# Patient Record
Sex: MALE | Employment: FULL TIME | URBAN - METROPOLITAN AREA
[De-identification: names, ages, dates, MRNs, and addresses within clinical notes are randomized per-mention and may not be internally consistent; named-entity substitution may affect disease eponyms.]

---

## 2017-08-06 DIAGNOSIS — I10 BENIGN ESSENTIAL HYPERTENSION: ICD-10-CM

## 2017-08-07 NOTE — TELEPHONE ENCOUNTER
HYZAAR 100-25 MG    Last Written Prescription Date: 6/28/16  Last Fill Quantity: 90, # refills: 3  Last Office Visit : 6/28/16  Next Clinic Visit: none     Results for JONATHAN DAVIDSON (MRN [id number]) as of 8/7/2017 11:41   Ref. Range 6/29/2016 09:15   Sodium Latest Ref Range: 133 - 144 mmol/L 138       Potassium   Date Value Ref Range Status   06/29/2016 4.3 3.4 - 5.3 mmol/L Final     Creatinine   Date Value Ref Range Status   06/29/2016 0.66 0.66 - 1.25 mg/dL Final     BP Readings from Last 3 Encounters:   06/28/16 153/88   12/29/14 (!) 170/95   10/28/13 (!) 146/93     * MD APPT. OVER DUE / LETTER SENT    * labs are overdue, route to provider  30 DAY RF

## 2017-08-08 RX ORDER — LOSARTAN POTASSIUM AND HYDROCHLOROTHIAZIDE 25; 100 MG/1; MG/1
1 TABLET ORAL DAILY
Qty: 30 TABLET | Refills: 0 | Status: SHIPPED | OUTPATIENT
Start: 2017-08-08 | End: 2017-09-15

## 2017-09-15 DIAGNOSIS — I10 BENIGN ESSENTIAL HYPERTENSION: ICD-10-CM

## 2017-09-18 NOTE — TELEPHONE ENCOUNTER
HYZAAR 100-25 MG       Last Written Prescription Date: 8/8/17  Last Fill Quantity: 30, # refills: 0  Last Office Visit : 6/28/16  Next Clinic Visit: NONE     Results for JONATHAN DAVIDSON (MRN [id number]) as of 9/18/2017 14:24   Ref. Range 6/29/2016 09:15   Sodium Latest Ref Range: 133 - 144 mmol/L 138       Potassium   Date Value Ref Range Status   06/29/2016 4.3 3.4 - 5.3 mmol/L Final     Creatinine   Date Value Ref Range Status   06/29/2016 0.66 0.66 - 1.25 mg/dL Final     BP Readings from Last 3 Encounters:   06/28/16 153/88   12/29/14 (!) 170/95   10/28/13 (!) 146/93       Routing refill request to provider for review/approval because:  OVER DUE MD APPT./LABS   LETTER + 30 DAY RF 8/8/17  NO APPT.

## 2017-09-19 RX ORDER — LOSARTAN POTASSIUM AND HYDROCHLOROTHIAZIDE 25; 100 MG/1; MG/1
1 TABLET ORAL DAILY
Qty: 30 TABLET | Refills: 0 | Status: SHIPPED | OUTPATIENT
Start: 2017-09-19 | End: 2017-09-20

## 2017-09-20 ENCOUNTER — OFFICE VISIT (OUTPATIENT)
Dept: FAMILY MEDICINE | Facility: CLINIC | Age: 67
End: 2017-09-20

## 2017-09-20 VITALS
OXYGEN SATURATION: 98 % | DIASTOLIC BLOOD PRESSURE: 84 MMHG | BODY MASS INDEX: 26.08 KG/M2 | HEIGHT: 68 IN | SYSTOLIC BLOOD PRESSURE: 124 MMHG | TEMPERATURE: 97.9 F | HEART RATE: 74 BPM | WEIGHT: 172.1 LBS | RESPIRATION RATE: 20 BRPM

## 2017-09-20 DIAGNOSIS — J31.0 OTHER CHRONIC RHINITIS: ICD-10-CM

## 2017-09-20 DIAGNOSIS — Z23 NEED FOR PROPHYLACTIC VACCINATION AND INOCULATION AGAINST INFLUENZA: ICD-10-CM

## 2017-09-20 DIAGNOSIS — I10 BENIGN ESSENTIAL HYPERTENSION: ICD-10-CM

## 2017-09-20 DIAGNOSIS — Z23 NEED FOR PROPHYLACTIC VACCINATION AGAINST STREPTOCOCCUS PNEUMONIAE (PNEUMOCOCCUS): ICD-10-CM

## 2017-09-20 DIAGNOSIS — Z00.00 ROUTINE GENERAL MEDICAL EXAMINATION AT A HEALTH CARE FACILITY: Primary | ICD-10-CM

## 2017-09-20 LAB
ALBUMIN SERPL-MCNC: 4 G/DL (ref 3.4–5)
ALP SERPL-CCNC: 72 U/L (ref 40–150)
ALT SERPL W P-5'-P-CCNC: 29 U/L (ref 0–70)
ANION GAP SERPL CALCULATED.3IONS-SCNC: 5 MMOL/L (ref 3–14)
AST SERPL W P-5'-P-CCNC: 13 U/L (ref 0–45)
BILIRUB SERPL-MCNC: 0.6 MG/DL (ref 0.2–1.3)
BUN SERPL-MCNC: 13 MG/DL (ref 7–30)
CALCIUM SERPL-MCNC: 8.9 MG/DL (ref 8.5–10.1)
CHLORIDE SERPL-SCNC: 106 MMOL/L (ref 94–109)
CHOLEST SERPL-MCNC: 143 MG/DL
CO2 SERPL-SCNC: 27 MMOL/L (ref 20–32)
CREAT SERPL-MCNC: 0.84 MG/DL (ref 0.66–1.25)
GFR SERPL CREATININE-BSD FRML MDRD: >90 ML/MIN/1.7M2
GLUCOSE SERPL-MCNC: 107 MG/DL (ref 70–99)
HDLC SERPL-MCNC: 51 MG/DL
LDLC SERPL CALC-MCNC: 67 MG/DL
NONHDLC SERPL-MCNC: 92 MG/DL
POTASSIUM SERPL-SCNC: 4.2 MMOL/L (ref 3.4–5.3)
PROT SERPL-MCNC: 7.6 G/DL (ref 6.8–8.8)
SODIUM SERPL-SCNC: 138 MMOL/L (ref 133–144)
TRIGL SERPL-MCNC: 126 MG/DL

## 2017-09-20 RX ORDER — LOSARTAN POTASSIUM AND HYDROCHLOROTHIAZIDE 25; 100 MG/1; MG/1
1 TABLET ORAL DAILY
Qty: 90 TABLET | Refills: 3 | Status: SHIPPED | OUTPATIENT
Start: 2017-09-20 | End: 2018-10-02

## 2017-09-20 ASSESSMENT — ACTIVITIES OF DAILY LIVING (ADL)
IN_THE_PAST_7_DAYS,_DID_YOU_NEED_HELP_FROM_OTHERS_TO_PERFORM_EVERYDAY_ACTIVITIES_SUCH_AS_EATING,_GETTING_DRESSED,_GROOMING,_BATHING,_WALKING,_OR_USING_THE_TOILET: N
IN_THE_PAST_7_DAYS,_DID_YOU_NEED_HELP_FROM_OTHERS_TO_TAKE_CARE_OF_THINGS_SUCH_AS_LAUNDRY_AND_HOUSEKEEPING,_BANKING,_SHOPPING,_USING_THE_TELEPHONE,_FOOD_PREPARATION,_TRANSPORTATION,_OR_TAKING_YOUR_OWN_MEDICATIONS?: N

## 2017-09-20 ASSESSMENT — ENCOUNTER SYMPTOMS
SMELL DISTURBANCE: 0
TASTE DISTURBANCE: 0
SINUS CONGESTION: 0
HOARSE VOICE: 0
NECK MASS: 0
SORE THROAT: 0
TROUBLE SWALLOWING: 0
SINUS PAIN: 0

## 2017-09-20 ASSESSMENT — PAIN SCALES - GENERAL: PAINLEVEL: NO PAIN (0)

## 2017-09-20 NOTE — NURSING NOTE
Injectable Influenza Immunization Documentation      1.  Has the patient received the information for the injectable influenza vaccine? YES    2. Is the patient 6 months of age or older? YES    3. Does the patient have any of the following contraindications?          Severe allergy to eggs? No     Severe allergic reaction to previous influenza vaccines? No     Allergy to contact lens solution/thimerosol? No     History of Guillain-[place] syndrome? No     Undergoing chemotherapy or radiation therapy?       (vaccine should be given at least 2 weeks prior or 3 weeks after)  No     Currently have moderate or severe illness? No         4.  The vaccine has been administered and the patient was instructed to wait 15 minutes before leaving the building in the event of an allergic reaction: YES      Administered Influenza High Dose and Pneumococcal Prevnar 13 (see Immunizations in Chart Review). Patient tolerated well.      Sin De Jesus CMA ([facility]) at 9:29 AM on 9/20/2017

## 2017-09-20 NOTE — PROGRESS NOTES
SUBJECTIVE:    Pt is a 67 year old male with pmh of     Patient Active Problem List   Diagnosis     High triglycerides     HTN (hypertension)       who is here for evaluation of had concerns including Physical.    Here for a physical.   Doing well.  Eats healthy, exercises about qod.   Tolerates BP meds, due for labs, working well.  Chronic nasal drg; usually managed with Rhinocort. Had a bad cold earlier this mo and it went away, but since nasal post nasal drainage worse than usual. Not on Rhinocort right now, ran out.  Colonoscopy next year.    No sinus pain or fever.    Rvwd pro/con PSA, he declines    Due for Prevnar, flu shot    H/o rectal abscess    FH: metabolic syndrome    Ex smoker. Social etoh. U prof. 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     No Known Allergies        Current Outpatient Prescriptions   Medication Sig Dispense Refill     losartan-hydrochlorothiazide (HYZAAR) 100-25 MG per tablet Take 1 tablet by mouth daily 90 tablet 3     budesonide (RHINOCORT AQUA) 32 MCG/ACT spray Spray 2 sprays into both nostrils daily 1 Bottle 3     losartan (COZAAR) 100 MG tablet Take 1 tablet (100 mg) by mouth daily MUST BE SEEN IN CLINIC FOR ANY FURTHER REFILLS  **Reminder letter sent today** 30 tablet 0     [DISCONTINUED] losartan-hydrochlorothiazide (HYZAAR) 100-25 MG per tablet Take 1 tablet by mouth daily 30 tablet 0       Social History   Substance Use Topics     Smoking status: Former Smoker     Quit date: 9/2/2000     Smokeless tobacco: Never Used     Alcohol use Yes      Comment: occ       Answers for HPI/ROS submitted by the patient on 9/20/2017   General Symptoms: No  Skin Symptoms: No  HENT Symptoms: Yes  EYE SYMPTOMS: No  HEART SYMPTOMS: No  LUNG SYMPTOMS: No  INTESTINAL SYMPTOMS: No  URINARY SYMPTOMS: No  REPRODUCTIVE SYMPTOMS: No  SKELETAL SYMPTOMS: No  BLOOD SYMPTOMS: No  NERVOUS SYSTEM SYMPTOMS: No  MENTAL HEALTH SYMPTOMS: No  Ear pain: No  Ear discharge: No  Hearing loss: No  Tinnitus: No  Nosebleeds:  "No  Congestion: No  Sinus pain: No  Trouble swallowing: No   Voice hoarseness: No  Mouth sores: No  Sore throat: No  Tooth pain: No  Gum tenderness: No  Bleeding gums: No  Change in taste: No  Change in sense of smell: No  Dry mouth: No  Hearing aid used: No  Neck lump: No      OBJECTIVE:  /84 (BP Location: Right arm, Patient Position: Chair, Cuff Size: Adult Regular)  Pulse 74  Temp 97.9  F (36.6  C) (Oral)  Resp 20  Ht 1.727 m (5' 8\")  Wt 78.1 kg (172 lb 1.6 oz)  SpO2 98%  BMI 26.17 kg/m2  GENERAL APPEARANCE: Alert, no acute distress  EYES: PERRL, EOM normal, conjunctiva and lids normal  HENT: Ears and TMs normal, oral mucosa and posterior oropharynx normal  NECK: No adenopathy,masses or thyromegaly  RESP: lungs clear to auscultation   CV: normal rate, regular rhythm, no murmur or gallop  ABDOMEN: soft, no organomegaly, masses or tenderness   (male): penis, scrotum, testes normal, no hernias  LYMPHATICS: No cervical, supraclavicular or inguinal adenopathy  MS: extremities normal, no peripheral edema  NEURO: Alert, oriented, speech and mentation normal  PSYCHE: mentation appears normal, affect and mood normal    ASSESSMENT/PLAN:    Rhinitis: resume Rhinocort, if not helping let me know  Labs, refill for htn  Shots updated  See me one year earlier prn  Alonso then    AMANDA UREÑA MD      "

## 2017-09-20 NOTE — MR AVS SNAPSHOT
After Visit Summary   9/20/2017    Ronna Collins    MRN: [id number]           Patient Information     Date Of Birth          1950        Visit Information        Provider Department      9/20/2017 8:35 AM Stewart Henry MD [facility]        Today's Diagnoses     Routine general medical examination at a health care facility    -  1    Need for prophylactic vaccination and inoculation against influenza        Need for prophylactic vaccination against Streptococcus pneumoniae (pneumococcus)        Benign essential hypertension        Other chronic rhinitis           Follow-ups after your visit        Future tests that were ordered for you today     Open Future Orders        Priority Expected Expires Ordered    Lipid panel reflex to direct LDL FASTING Routine 9/20/2017 10/4/2017 9/20/2017    Comprehensive metabolic panel Routine 9/20/2017 10/4/2017 9/20/2017            Who to contact     Please call your clinic at [phone] to:    Ask questions about your health    Make or cancel appointments    Discuss your medicines    Learn about your test results    Speak to your doctor   If you have compliments or concerns about an experience at your clinic, or if you wish to file a complaint, please contact [facility] Patient Relations at [phone] or email us at [email or web address]         Additional Information About Your Visit        AltavozharVitaPortal Information     poLight gives you secure access to your electronic health record. If you see a primary care provider, you can also send messages to your care team and make appointments. If you have questions, please call your primary care clinic.  If you do not have a primary care provider, please call [phone] and they will assist you.      poLight is an electronic gateway that provides easy, online access to your medical records. With poLight, you can request a clinic appointment, read your  "test results, renew a prescription or communicate with your care team.     To access your existing account, please contact your [facility] or call [phone] for assistance.        Care EveryWhere ID     This is your Care EveryWhere ID. This could be used by other organizations to access your [facility] medical records  [id number]        Your Vitals Were     Pulse Temperature Respirations Height Pulse Oximetry BMI (Body Mass Index)    74 97.9  F (36.6  C) (Oral) 20 1.727 m (5' 8\") 98% 26.17 kg/m2       Blood Pressure from Last 3 Encounters:   09/20/17 124/84   06/28/16 153/88   12/29/14 (!) 170/95    Weight from Last 3 Encounters:   09/20/17 78.1 kg (172 lb 1.6 oz)   06/28/16 79.6 kg (175 lb 8 oz)   12/29/14 83 kg (183 lb)              We Performed the Following     FLU VACCINE, INCREASED ANTIGEN, PRESV FREE, AGE 65+ [85382]     Pneumococcal vaccine 13 valent PCV13 IM (Prevnar) [50248]          Today's Medication Changes          These changes are accurate as of: 9/20/17  9:22 AM.  If you have any questions, ask your nurse or doctor.               Start taking these medicines.        Dose/Directions    budesonide 32 MCG/ACT spray   Commonly known as:  RHINOCORT AQUA   Used for:  Other chronic rhinitis   Started by:  Stewart Henry MD        Dose:  2 spray   Spray 2 sprays into both nostrils daily   Quantity:  1 Bottle   Refills:  3            Where to get your medicines      These medications were sent to [facility] [place], [place] - [address]  [address], [facility] [address]    Hours:  TRANSPLANT PHONE NUMBER [phone] Phone:  [phone]     budesonide 32 MCG/ACT spray         These medications were sent to [facility] [address] [place] [address] - [address] [place] [address], [facility] [address]     Phone:  [phone]     losartan-hydrochlorothiazide 100-25 MG " per tablet                Primary Care Provider Office Phone # Fax #    Stewart Henry [phone]       [address] [address]        Equal Access to Services     ARIELLE GRANADO : Hadii aad ku hadanno Soautumnali, waaxda luqadaha, qaybta kaalmada adebrianyada, sedrick mathews laPiedadjacque st. So [facility] [phone].    ATENCIÓN: Si habla español, tiene a luevano disposición servicios gratuitos de asistencia lingüística. Llame al [phone].    We comply with applicable federal civil rights laws and [place] laws. We do not discriminate on the basis of race, color, national origin, age, disability sex, sexual orientation or gender identity.            Thank you!     Thank you for choosing [facility]  for your care. Our goal is always to provide you with excellent care. Hearing back from our patients is one way we can continue to improve our services. Please take a few minutes to complete the written survey that you may receive in the mail after your visit with us. Thank you!             Your Updated Medication List - Protect others around you: Learn how to safely use, store and throw away your medicines at www.disposemymeds.org.          This list is accurate as of: 9/20/17  9:22 AM.  Always use your most recent med list.                   Brand Name Dispense Instructions for use Diagnosis    budesonide 32 MCG/ACT spray    RHINOCORT AQUA    1 Bottle    Spray 2 sprays into both nostrils daily    Other chronic rhinitis       losartan 100 MG tablet    COZAAR    30 tablet    Take 1 tablet (100 mg) by mouth daily MUST BE SEEN IN CLINIC FOR ANY FURTHER REFILLS  **Reminder letter sent today**    Benign essential hypertension       losartan-hydrochlorothiazide 100-25 MG per tablet    HYZAAR    90 tablet    Take 1 tablet by mouth daily    Benign essential hypertension

## 2017-09-20 NOTE — NURSING NOTE
Chief Complaint   Patient presents with     Physical     Patient is here for physical exam.     Sin De Jesus CMA ([facility]) at 8:39 AM on 9/20/2017

## 2017-10-18 DIAGNOSIS — I10 BENIGN ESSENTIAL HYPERTENSION: ICD-10-CM

## 2017-10-18 RX ORDER — LOSARTAN POTASSIUM AND HYDROCHLOROTHIAZIDE 25; 100 MG/1; MG/1
TABLET ORAL
Qty: 30 TABLET | Refills: 0 | OUTPATIENT
Start: 2017-10-18

## 2018-10-02 ENCOUNTER — TELEPHONE (OUTPATIENT)
Dept: GASTROENTEROLOGY | Facility: CLINIC | Age: 68
End: 2018-10-02

## 2018-10-02 ENCOUNTER — OFFICE VISIT (OUTPATIENT)
Dept: FAMILY MEDICINE | Facility: CLINIC | Age: 68
End: 2018-10-02
Payer: COMMERCIAL

## 2018-10-02 VITALS
DIASTOLIC BLOOD PRESSURE: 87 MMHG | SYSTOLIC BLOOD PRESSURE: 134 MMHG | HEART RATE: 78 BPM | WEIGHT: 178.4 LBS | OXYGEN SATURATION: 98 % | BODY MASS INDEX: 27.13 KG/M2

## 2018-10-02 DIAGNOSIS — I10 BENIGN ESSENTIAL HYPERTENSION: ICD-10-CM

## 2018-10-02 DIAGNOSIS — I10 BENIGN ESSENTIAL HYPERTENSION: Primary | ICD-10-CM

## 2018-10-02 DIAGNOSIS — Z00.00 HEALTH CARE MAINTENANCE: ICD-10-CM

## 2018-10-02 DIAGNOSIS — H61.23 EXCESSIVE EAR WAX, BILATERAL: ICD-10-CM

## 2018-10-02 LAB
ALBUMIN SERPL-MCNC: 4.2 G/DL (ref 3.4–5)
ALP SERPL-CCNC: 73 U/L (ref 40–150)
ALT SERPL W P-5'-P-CCNC: 41 U/L (ref 0–70)
ANION GAP SERPL CALCULATED.3IONS-SCNC: 6 MMOL/L (ref 3–14)
AST SERPL W P-5'-P-CCNC: 23 U/L (ref 0–45)
BILIRUB SERPL-MCNC: 0.6 MG/DL (ref 0.2–1.3)
BUN SERPL-MCNC: 11 MG/DL (ref 7–30)
CALCIUM SERPL-MCNC: 9.1 MG/DL (ref 8.5–10.1)
CHLORIDE SERPL-SCNC: 106 MMOL/L (ref 94–109)
CHOLEST SERPL-MCNC: 132 MG/DL
CO2 SERPL-SCNC: 27 MMOL/L (ref 20–32)
CREAT SERPL-MCNC: 0.76 MG/DL (ref 0.66–1.25)
GFR SERPL CREATININE-BSD FRML MDRD: >90 ML/MIN/1.7M2
GLUCOSE SERPL-MCNC: 115 MG/DL (ref 70–99)
HDLC SERPL-MCNC: 47 MG/DL
LDLC SERPL CALC-MCNC: 44 MG/DL
NONHDLC SERPL-MCNC: 84 MG/DL
POTASSIUM SERPL-SCNC: 4.4 MMOL/L (ref 3.4–5.3)
PROT SERPL-MCNC: 7.9 G/DL (ref 6.8–8.8)
SODIUM SERPL-SCNC: 139 MMOL/L (ref 133–144)
TRIGL SERPL-MCNC: 200 MG/DL

## 2018-10-02 RX ORDER — LOSARTAN POTASSIUM AND HYDROCHLOROTHIAZIDE 25; 100 MG/1; MG/1
1 TABLET ORAL DAILY
Qty: 90 TABLET | Refills: 3 | Status: SHIPPED | OUTPATIENT
Start: 2018-10-02 | End: 2019-10-07

## 2018-10-02 ASSESSMENT — PAIN SCALES - GENERAL: PAINLEVEL: NO PAIN (0)

## 2018-10-02 ASSESSMENT — ACTIVITIES OF DAILY LIVING (ADL)
IN_THE_PAST_7_DAYS,_DID_YOU_NEED_HELP_FROM_OTHERS_TO_TAKE_CARE_OF_THINGS_SUCH_AS_LAUNDRY_AND_HOUSEKEEPING,_BANKING,_SHOPPING,_USING_THE_TELEPHONE,_FOOD_PREPARATION,_TRANSPORTATION,_OR_TAKING_YOUR_OWN_MEDICATIONS?: N
IN_THE_PAST_7_DAYS,_DID_YOU_NEED_HELP_FROM_OTHERS_TO_PERFORM_EVERYDAY_ACTIVITIES_SUCH_AS_EATING,_GETTING_DRESSED,_GROOMING,_BATHING,_WALKING,_OR_USING_THE_TOILET: N

## 2018-10-02 NOTE — PATIENT INSTRUCTIONS
Primary Care Center Medication Refill Request Information:  * Please contact your pharmacy regarding ANY request for medication refills.  ** [facility] Prescription Fax = [phone]  * Please allow 3 business days for routine medication refills.  * Please allow 5 business days for controlled substance medication refills.     Primary [facility] Test Result notification information:  *You will be notified with in 7-10 days of your appointment day regarding the results of your test.  If you are on MyChart you will be notified as soon as the provider has reviewed the results and signed off on them.    [facility]: [phone]       Colonoscopy or Upper GI Endoscopy  [phone]

## 2018-10-02 NOTE — MR AVS SNAPSHOT
After Visit Summary   10/2/2018    Ronna Collins    MRN: [id number]           Patient Information     Date Of Birth          1950        Visit Information        Provider Department      10/2/2018 8:00 AM Stewart Henry MD [facility]        Today's Diagnoses     Benign essential hypertension    -  1    Health care maintenance          Care Instructions    Primary Care Center Medication Refill Request Information:  * Please contact your pharmacy regarding ANY request for medication refills.  ** [facility] Prescription Fax = [phone]  * Please allow 3 business days for routine medication refills.  * Please allow 5 business days for controlled substance medication refills.     Primary Care Center Test Result notification information:  *You will be notified with in 7-10 days of your appointment day regarding the results of your test.  If you are on MyChart you will be notified as soon as the provider has reviewed the results and signed off on them.    Primary Care Center: [phone]       Colonoscopy or Upper GI Endoscopy  [phone]            Follow-ups after your visit        Additional Services     GASTROENTEROLOGY ADULT REF PROCEDURE ONLY       Last Lab Result: Creatinine (mg/dL)       Date                     Value                 09/20/2017               0.84             ----------  Body mass index is 27.13 kg/(m^2).     Needed:  No  Language:  English    Patient will be contacted to schedule procedure.     Please be aware that coverage of these services is subject to the terms and limitations of your health insurance plan.  Call member services at your health plan with any benefit or coverage questions.  Any procedures must be performed at a [facility] facility OR coordinated by your clinic's referral office.    Please bring the following with you to your appointment:    (1) Any X-Rays, CTs or MRIs which have been performed.  Contact the facility where  they were done to arrange for  prior to your scheduled appointment.    (2) List of current medications   (3) This referral request   (4) Any documents/labs given to you for this referral                  Your next 10 appointments already scheduled     Oct 02, 2018  9:15 AM CDT   LAB with  LAB    Health Lab ([facility])    [address]  1st Floor  [facility] [address] [phone]           Please do not eat 10-12 hours before your appointment if you are coming in fasting for labs on lipids, cholesterol, or glucose (sugar). This does not apply to pregnant women. Water, hot tea and black coffee (with nothing added) are okay. Do not drink other fluids, diet soda or chew gum.              Future tests that were ordered for you today     Open Future Orders        Priority Expected Expires Ordered    Lipid panel reflex to direct LDL Fasting Routine 10/2/2018 10/16/2018 10/2/2018    Comprehensive metabolic panel Routine 10/2/2018 10/16/2018 10/2/2018            Who to contact     Please call your clinic at [phone] to:    Ask questions about your health    Make or cancel appointments    Discuss your medicines    Learn about your test results    Speak to your doctor            Additional Information About Your Visit        CinaMaker Information     CinaMaker gives you secure access to your electronic health record. If you see a primary care provider, you can also send messages to your care team and make appointments. If you have questions, please call your primary care clinic.  If you do not have a primary care provider, please call [phone] and they will assist you.      CinaMaker is an electronic gateway that provides easy, online access to your medical records. With CinaMaker, you can request a clinic appointment, read your test results, renew a prescription or communicate with your care team.     To access your existing account, please contact your [facility]  [place] [facility] or call [phone] for assistance.        Care EveryWhere ID     This is your Care EveryWhere ID. This could be used by other organizations to access your [facility] medical records  [id number]        Your Vitals Were     Pulse Pulse Oximetry BMI (Body Mass Index)             78 98% 27.13 kg/m2          Blood Pressure from Last 3 Encounters:   10/02/18 134/87   09/20/17 124/84   06/28/16 153/88    Weight from Last 3 Encounters:   10/02/18 80.9 kg (178 lb 6.4 oz)   09/20/17 78.1 kg (172 lb 1.6 oz)   06/28/16 79.6 kg (175 lb 8 oz)              We Performed the Following     FLU VACCINE HIGH DOSE PRESERVATIVE FREE, AGE =>65 YR     GASTROENTEROLOGY ADULT REF PROCEDURE ONLY     Pneumococcal vaccine 23 valent PPSV23  (Pneumovax) [63131]          Where to get your medicines      These medications were sent to [facility] [address] [place] [address] [address] [place] [address] [address], [facility] [address]     Phone:  [phone]     losartan-hydrochlorothiazide 100-25 MG per tablet          Primary Care Provider Office Phone # Fax #    Stewart Henry [phone]       [address] [address]        Equal Access to Services     JORGE ALBERTO [facility] AH: Hadii ayanna eliaso Soluz, waaxda luqadaha, qaybta kaalmarick booker, sedrick pillai . So [facility] [phone].    ATENCIÓN: Si habla español, tiene a luevano disposición servicios gratuitos de asistencia lingüística. Llame al [phone].    We comply with applicable federal civil rights laws and [place] laws. We do not discriminate on the basis of race, color, national origin, age, disability, sex, sexual orientation, or gender identity.            Thank you!     Thank you for choosing [facility]  for your care. Our goal is always to provide you with excellent care. Hearing back from our patients is one way we can continue to  improve our services. Please take a few minutes to complete the written survey that you may receive in the mail after your visit with us. Thank you!             Your Updated Medication List - Protect others around you: Learn how to safely use, store and throw away your medicines at www.disposemymeds.org.          This list is accurate as of 10/2/18  8:42 AM.  Always use your most recent med list.                   Brand Name Dispense Instructions for use Diagnosis    budesonide 32 MCG/ACT spray    RHINOCORT AQUA    1 Bottle    Spray 2 sprays into both nostrils daily    Other chronic rhinitis       losartan 100 MG tablet    COZAAR    30 tablet    Take 1 tablet (100 mg) by mouth daily MUST BE SEEN IN CLINIC FOR ANY FURTHER REFILLS  **Reminder letter sent today**    Benign essential hypertension       losartan-hydrochlorothiazide 100-25 MG per tablet    HYZAAR    90 tablet    Take 1 tablet by mouth daily    Benign essential hypertension

## 2018-10-02 NOTE — NURSING NOTE
Chief Complaint   Patient presents with     Physical     annual physical       JOSE Villagomez 8:10 AM on 10/2/2018.

## 2018-10-02 NOTE — PROGRESS NOTES
[facility]  Primary Care Center   Stewart Henry MD  10/02/2018      Chief Complaint:   Physical       History of Present Illness:   Ronna Collins is a 68 year old male with a history of high triglycerides and hypertension who presents for evaluation of a physical.    Ronna has not been into the ER or hospital since our last visit a year ago. He notes that he does check his blood pressure at home regularly which ranges about 125/83. He is currently fasting and is open to having blood work drawn. He does exercise about once a week on the treadmill. He acknowledges that he could increase his amount of weekly physical activity.    Other concerns discussed:  -Ronna is interested in the new shingles vaccine. He would like his flu shot today.  -Ronna is due for a colonoscopy. His most recent one was in 2008. He has no family history of colon issues and has never had polyps pulled in past colonoscopies.     Review of Systems:   Pertinent items are noted in HPI, remainder of complete ROS is negative.      Active Medications:      budesonide (RHINOCORT AQUA) 32 MCG/ACT spray, Spray 2 sprays into both nostrils daily, Disp: 1 Bottle, Rfl: 3     losartan (COZAAR) 100 MG tablet, Take 1 tablet (100 mg) by mouth daily MUST BE SEEN IN CLINIC FOR ANY FURTHER REFILLS  **Reminder letter sent today**, Disp: 30 tablet, Rfl: 0     losartan-hydrochlorothiazide (HYZAAR) 100-25 MG per tablet, Take 1 tablet by mouth daily, Disp: 90 tablet, Rfl: 3      Allergies:   Review of patient's allergies indicates no known allergies.      Past Medical History:  High triglycerides  Hypertension     Past Surgical History:  No past surgical history.    Family History:   No pertinent family medical history.     Social History:   The patient was alone.  Smoking Status: Former smoker, Quit: 9/2/2000   Smokeless Tobacco: never used   Alcohol Use: yes, occ   Marital Status:      Physical Exam:   /87 (BP Location: Right  arm, Patient Position: Sitting, Cuff Size: Adult Regular)  Pulse 78  Wt 80.9 kg (178 lb 6.4 oz)  SpO2 98%  BMI 27.13 kg/m2   Constitutional: Alert. In no distress.  Head: Normocephalic. No masses, lesions, tenderness or abnormalities.  ENT: No neck nodes or sinus tenderness. Ears plugged with cerumen bilaterally  Cardiovascular: RRR. No murmurs, clicks, gallops, or rub.  Respiratory: Clear to auscultation bilaterally, no wheezes or crackles.  Gastrointestinal: Abdomen soft. Non-tender. BS normal. No masses or organomegaly.  Musculoskeletal: Extremities normal. No gross deformities noted. Normal muscle tone.  Skin: No suspicious lesions. No rashes.  Neurologic: Gait normal. Reflexes normal and symmetric. Sensation grossly WNL.  Psychiatric: Mentation appears normal. Normal affect.   Hematologic/Lymphatic/Immunologic: Normal cervical lymph nodes.   Penis/testicle/Hernia Exam: All normal     Assessment and Plan:  Benign essential hypertension  Due for labs today. Patient is fasting. Refill of Hyzaar provided. Medication works well and condition is controlled.  - Lipid panel reflex to direct LDL Fasting  - Comprehensive metabolic panel  - losartan-hydrochlorothiazide (HYZAAR) 100-25 MG per tablet  Dispense: 90 tablet; Refill: 3    Health care maintenance  Flu shot was offered and administered. Pneumococcal vaccine administered to update immunization records. Patient due for colonoscopy, last being in 2008. Referral provided, will schedule for fall. Mentioned new shingles shot and provided a handout. He will check with insurance to see if it is covered.  - FLU VACCINE HIGH DOSE PRESERVATIVE FREE, AGE =>65 YR  - Pneumococcal vaccine 23 valent PPSV23  (Pneumovax) [89417]  - GASTROENTEROLOGY ADULT REF PROCEDURE ONLY    Excessive ear wax, bilateral  Upon further exam, patient has excess cerumen build up. Ear washout provided.  - REMOVE IMPACTED CERUMEN     Follow-up: Return in 1 year for physical     Discussed PSA  pro/con, he declines        Scribe Disclosure:   I, Neri Dubon, am serving as a scribe to document services personally performed by Stewart Henry MD at this visit, based upon the provider's statements to me. All documentation has been reviewed by the aforementioned provider prior to being entered into the official medical record.     Portions of this medical record were completed by a scribe. UPON MY REVIEW AND AUTHENTICATION BY ELECTRONIC SIGNATURE, this confirms (a) I performed the applicable clinical services, and (b) the record is accurate.   Stewart Henry MD

## 2019-06-19 ENCOUNTER — OFFICE VISIT (OUTPATIENT)
Dept: INTERNAL MEDICINE | Facility: CLINIC | Age: 69
End: 2019-06-19
Payer: COMMERCIAL

## 2019-06-19 ENCOUNTER — ANCILLARY PROCEDURE (OUTPATIENT)
Dept: GENERAL RADIOLOGY | Facility: CLINIC | Age: 69
End: 2019-06-19
Attending: NURSE PRACTITIONER
Payer: COMMERCIAL

## 2019-06-19 VITALS
SYSTOLIC BLOOD PRESSURE: 136 MMHG | HEART RATE: 64 BPM | TEMPERATURE: 98.4 F | WEIGHT: 175.9 LBS | BODY MASS INDEX: 26.75 KG/M2 | OXYGEN SATURATION: 98 % | DIASTOLIC BLOOD PRESSURE: 86 MMHG

## 2019-06-19 DIAGNOSIS — M25.562 LEFT KNEE PAIN, UNSPECIFIED CHRONICITY: ICD-10-CM

## 2019-06-19 DIAGNOSIS — R21 RASH AND NONSPECIFIC SKIN ERUPTION: Primary | ICD-10-CM

## 2019-06-19 RX ORDER — TRIAMCINOLONE ACETONIDE 1 MG/G
CREAM TOPICAL 2 TIMES DAILY
Qty: 80 G | Refills: 1 | Status: SHIPPED | OUTPATIENT
Start: 2019-06-19 | End: 2021-06-04

## 2019-06-19 RX ORDER — NAPROXEN 500 MG/1
500 TABLET ORAL 2 TIMES DAILY PRN
Qty: 30 TABLET | Refills: 1 | Status: SHIPPED | OUTPATIENT
Start: 2019-06-19 | End: 2021-06-04

## 2019-06-19 ASSESSMENT — PAIN SCALES - GENERAL: PAINLEVEL: MILD PAIN (3)

## 2019-06-19 NOTE — NURSING NOTE
Chief Complaint   Patient presents with     Musculoskeletal Problem     pt states he is having pain in left knee, trouble climbing stairs and walking fast     Derm Problem     pt states he is itchy all over body, especially back and legs       Anne Jackson CMA at 8:47 AM on 6/19/2019.

## 2019-06-19 NOTE — PATIENT INSTRUCTIONS
[facility] Medication Refill Request Information:  * Please contact your pharmacy regarding ANY request for medication refills.  ** [facility] Prescription Fax = [phone]  * Please allow 3 business days for routine medication refills.  * Please allow 5 business days for controlled substance medication refills.     [facility] Test Result notification information:  *You will be notified with in 7-10 days of your appointment day regarding the results of your test.  If you are on MyChart you will be notified as soon as the provider has reviewed the results and signed off on them.    [facility]: [phone]
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
## 2019-06-21 ENCOUNTER — OFFICE VISIT (OUTPATIENT)
Dept: DERMATOLOGY | Facility: CLINIC | Age: 69
End: 2019-06-21
Attending: NURSE PRACTITIONER
Payer: COMMERCIAL

## 2019-06-21 DIAGNOSIS — L30.0 NUMMULAR DERMATITIS: Primary | ICD-10-CM

## 2019-06-21 DIAGNOSIS — L01.00 IMPETIGO: ICD-10-CM

## 2019-06-21 RX ORDER — MOMETASONE FUROATE 1 MG/G
OINTMENT TOPICAL
Qty: 45 G | Refills: 3 | Status: SHIPPED | OUTPATIENT
Start: 2019-06-21 | End: 2021-06-04

## 2019-06-21 ASSESSMENT — PAIN SCALES - GENERAL: PAINLEVEL: NO PAIN (0)

## 2019-06-21 NOTE — LETTER
6/21/2019       RE: Ronna Collins  [address]  [facility] [address]     Dear Colleague,    Thank you for referring your patient, Ronna Collins, to the [facility]MAT[facility] at [facility]. Please see a copy of my visit note below.    Dermatology Consult Note    CHIEF COMPLAINT:  Rash.      DERMATOLOGY PROBLEM LIST:   1.  Nummular dermatitis with impetiginization.    -Current treatment:  Mometasone 0.1% ointment b.i.d., gentle skin care with frequent moisturizer application, povidone iodine wash, aerobic swab pending 06/21/2019.   2.  Atopic dermatitis with chronic sinusitis and cat/dog allergy on prick testing 06/21/2019.      HISTORY OF PRESENT ILLNESS:  Mr. Ronna Collins is a pleasant 68-year-old man who is seen today in consultation from Arabella Hanna for evaluation of a rash on the back and lower extremities.  On discussion with the patient and review of Epic, the patient has had a rash on the low back and more recently the bilateral lower extremities for the last 3 months.  He notes that these spots initially started on the back and then spread distally.  He notes round areas that are quite pruritic and he scratches at these frequently.  He is not using any moisturizer.  He uses a Dove bar soap.  He recently was prescribed triamcinolone 0.1% cream that he has used a couple times since starting a couple of days ago.  This has led to some improvement and the best improvement that he has had to date.  He denies any issues with eczema or psoriasis previously and denies rashes in childhood.  He has had some difficulty with chronic sinusitis and has never undergone prick/poke testing.  No other major concerns at this point.  No fevers, chills, sweats.      REVIEW OF SYSTEMS:  As per HPI.      PAST MEDICAL HISTORY:  Negative for eczema or psoriasis.      FAMILY HISTORY:  Negative for eczema or psoriasis.      SOCIAL HISTORY:  The patient's daughter is an ENT  physician.     Current Outpatient Medications   Medication     budesonide (RHINOCORT AQUA) 32 MCG/ACT spray     losartan-hydrochlorothiazide (HYZAAR) 100-25 MG per tablet     mometasone (ELOCON) 0.1 % external ointment     naproxen (NAPROSYN) 500 MG tablet     povidone-iodine (BETADINE) 7.5 % SOLN topical solution     triamcinolone (KENALOG) 0.1 % external cream     losartan (COZAAR) 100 MG tablet     No current facility-administered medications for this visit.        No Known Allergies      PHYSICAL EXAMINATION:  There were no vitals taken for this visit.     GENERAL:  Well appearing, well nourished, in no acute distress, apparent stated age, cooperative with exam.   SKIN:  A full body skin examination was performed today, including the head, neck, chest, abdomen, back, lower extremities, palms, soles, buttocks, nails, sparing the genitalia.  The patient has well-marginated, eczematous, nummular pink plaques over the central lower back, abdomen and bilaterally over the lower extremities with mild overlying crust.  He has a background of xerosis, benign-appearing, waxy, stuck-on, airwq-wv-bdapdqdka papules over the areas evaluated today.      ASSESSMENT AND PLAN:   1.  Nummular dermatitis with mild impetiginization.  Reviewed today in detail the etiology, pathophysiology and treatment options for this condition.  Suspect that the triamcinolone cream will be not quite potent enough to take care of this rash for him.  A multi-pronged approach to treatment was reviewed.   -Start gentle skin care with twice daily application of a gentle moisturizer like Vanicream or CeraVe.   -Start mometasone 0.1% ointment b.i.d. p.r.n. to the affected sites for the next 2-3 weeks until the lesions have resolved on the trunk and extremities.   -Start povidone iodine wash once to twice weekly for antimicrobial effect.   --> in  Bacteriology swab few staphylococcus aureus see! PVP Iodine right treatment (if not improved, then do nasal  swab and maybe treat nasal colonisation)    2.  Atopic dermatitis with systemic allergy to cats/dogs.  Given a history of chronic sinusitis and no previous allergy testing, we did perform atopic type 1 hypersensitivity testing as noted below.  Of note, the patient had a severe reaction to cat and a less severe, but positive reaction to dog; see below.  This confirms the suspicion of atopy, although does not necessarily explain his tendency towards chronic sinusitis as his exposure to pets is absent/quite minimal at this point.      Follow up in 6-8 weeks or sooner as needed.      Patient was seen and discussed with Dr. Jeremy Varela, who was staff for this encounter.     This note was dictated and edited by MD Ryan Garsia MD  PGY4 Dermatology  [phone]        Dictated by Tomás Rios MD   Resident     Staff Physician Comments:  I saw and evaluated the patient with the resident and I agree with the assessment and plan as documented in the resident's note. I was present and evaluated the prick tests     Jeremy Varela MD  Professor  Head of Dermato-Allergy Division  Department of Dermatology  [facility]    I spent a total of 20 minutes face to face with Ronna Collins during today s office visit. Over 50% of this time was spent counseling the patient and/or coordinating care. Please see Assessment and Plan for details.      Aerobic swab performed and pending.     Order documented by: Khadra Lin LPN  Physician:    Date/time of application:06/21/19   Localization of application: arm   Atopy Screen  No Substance Readings (15min) Evaluation   POS Histamine 1mg/ml +/++    NEG NaCl 0.9% -      No Substance Readings (15min) Evaluation   1 Alternaria alternata (tenuis)  -    2 Cladosporium herbarum -    3 Aspergillus fumigatus -    4 Penicillium notatum -    5 Dermatophagoides pteronyssinus -    6 Dermatophagoides farinae -    7 Dog  epithelium (canis spp) +    8 Cat hair (mary ellen catus) +++    9 Cockroach   (Blatella americana & germanica) -    10 Grass mix midwest   (Marie, Orchard, Redtop, Marcus) -    11 Celestino grass (sorghum halepense) -    12 Weed mix   (common Cocklebur, Lamb s quarters, rough redroot Pigweed, Dock/Sorrel) -    13 Mugwort (artemisia vulgare) -    14 Ragweed giant/short (ambrosia spp) -    15 English Plantain (plantago lanceolata) -    16 Tree mix 1 (Pecan, Maple BHR, Oak RVW, american [place], black [place]) NA    17 Red cedar (juniperus virginia) -    18 Tree mix 2   (white [place], river/red Birch, black [place], common [place], american Elm) NA    19 Box elder/Maple mix (acer spp) -    20 [place] shagbark (carya ovata) -       -    --> patient is atopic with strong reaction to cat epithelia and less to dog. Patient has no cat and therefore not sure if clinically relevant for the recurrent sinusitis.

## 2019-06-21 NOTE — NURSING NOTE
Chief Complaint   Patient presents with     Derm Problem     Patient is here today for a rash on back and legs.      Nidhi Kauffman CMA

## 2019-06-21 NOTE — PATIENT INSTRUCTIONS
Nummular dermatitis:   -Start daily moisturizer (as below).   -Start mometasone ointment twice daily to the raised itchy rash for 2-3 weeks as needed.   -Use iodine soap 1-2x weekly in the shower; apply moistuizer immediately after bathing.   -bacterial culture performed today.       For sinus difficulties:   -Skin prick testing for house dust mites and molds (atopy screen).     Dry Skin    What is dry skin?    Common skin problem    Can be worse during the winter     Affects all ages    Occurs in people with or without other skin problems    What does it look like?    Fine lines in the skin become more visible     Rough feeling skin     Flaky skin    Most common on the arms and legs    Skin can become cracked, especially on the hands and feet    What are some problems caused by dry skin?     Itching    Rubbing or scratching can cause thickened, rough skin patches    Cracks in skin can be painful    Red, itchy, scaly skin (called eczema) can occur    Yellow crusting or pus could be signs of an infection    What causes dry skin?    A lack of water in the top layer of the skin    Too much soapy water,  hot water, or harsh chemicals    Aging and sun damage    How do I treat dry skin?    Shower or bathe daily for under ten minutes with lukewarm water and mild soap.    Pat yourself dry with a towel gently and leave your skin slightly damp.    Use moisturizing cream or ointment right away.  Avoid lotions.    What kind of mild soap should I be using?    Camay , Dove , Tone , Neutrogena , Purpose , or Oil of Olay     A non-detergent cleanser, like Cetaphil , can be used.    What should I stay away from?    Scented soaps     Bath oils    What moisturizers should I be using?    Cetaphil Cream,CeraVe Cream, Vanicream, Aquaphilic, Eucerin, Aquaphor, or Vaseline     Always apply after showering or bathing.    Reapply throughout the day, if possible.    If dry skin affects your hands, always reapply after handwashing.    What  else should I know?    Using a humidifier during winter months may help.    If dry skin gets worse or if eczema develops, a steroid cream may be needed.      Order documented by: Khadra Lin LPN  Physician:    Date/time of application:06/21/19   Localization of application: arm   Atopy Screen  No Substance Readings (15min) Evaluation   POS Histamine 1mg/ml +/++     NEG NaCl 0.9% -        No Substance Readings (15min) Evaluation   1 Alternaria alternata (tenuis)  -     2 Cladosporium herbarum -     3 Aspergillus fumigatus -     4 Penicillium notatum -     5 Dermatophagoides pteronyssinus -     6 Dermatophagoides farinae -     7 Dog epithelium (canis spp) +     8 Cat hair (mary ellen catus) +++     9 Cockroach   (Blatella americana & germanica) -     10 Grass mix midwest   (Marie, Orchard, Redtop, Marcus) -     11 Celestino grass (sorghum halepense) -     12 Weed mix   (common Cocklebur, Lamb s quarters, rough redroot Pigweed, Dock/Sorrel) -     13 Mugwort (artemisia vulgare) -     14 Ragweed giant/short (ambrosia spp) -     15 English Plantain (plantago lanceolata) -     16 Tree mix 1 (Pecan, Maple BHR, Oak RVW, american [place], black [place]) NA     17 Red cedar (juniperus virginia) -     18 Tree mix 2   (white [place], river/red Birch, black [place], common [place], american Elm) NA     19 Box elder/Maple mix (acer spp) -     20 [place] shagbark (carya ovata) -         -     --> patient is atopic with strong reaction to cat epithelia and less to dog. Patient has no cat and therefore not sure if clinically relevant for the recurrent sinusitis.

## 2019-06-21 NOTE — PROGRESS NOTES
Dermatology Consult Note    CHIEF COMPLAINT:  Rash.      DERMATOLOGY PROBLEM LIST:   1.  Nummular dermatitis with impetiginization.    -Current treatment:  Mometasone 0.1% ointment b.i.d., gentle skin care with frequent moisturizer application, povidone iodine wash, aerobic swab pending 06/21/2019.   2.  Atopic dermatitis with chronic sinusitis and cat/dog allergy on prick testing 06/21/2019.      HISTORY OF PRESENT ILLNESS:  Mr. Ronna Collins is a pleasant 68-year-old man who is seen today in consultation from Arabella Hanna for evaluation of a rash on the back and lower extremities.  On discussion with the patient and review of Epic, the patient has had a rash on the low back and more recently the bilateral lower extremities for the last 3 months.  He notes that these spots initially started on the back and then spread distally.  He notes round areas that are quite pruritic and he scratches at these frequently.  He is not using any moisturizer.  He uses a Dove bar soap.  He recently was prescribed triamcinolone 0.1% cream that he has used a couple times since starting a couple of days ago.  This has led to some improvement and the best improvement that he has had to date.  He denies any issues with eczema or psoriasis previously and denies rashes in childhood.  He has had some difficulty with chronic sinusitis and has never undergone prick/poke testing.  No other major concerns at this point.  No fevers, chills, sweats.      REVIEW OF SYSTEMS:  As per HPI.      PAST MEDICAL HISTORY:  Negative for eczema or psoriasis.      FAMILY HISTORY:  Negative for eczema or psoriasis.      SOCIAL HISTORY:  The patient's daughter is an ENT physician.     Current Outpatient Medications   Medication     budesonide (RHINOCORT AQUA) 32 MCG/ACT spray     losartan-hydrochlorothiazide (HYZAAR) 100-25 MG per tablet     mometasone (ELOCON) 0.1 % external ointment     naproxen (NAPROSYN) 500 MG tablet     povidone-iodine (BETADINE)  7.5 % SOLN topical solution     triamcinolone (KENALOG) 0.1 % external cream     losartan (COZAAR) 100 MG tablet     No current facility-administered medications for this visit.        No Known Allergies      PHYSICAL EXAMINATION:  There were no vitals taken for this visit.     GENERAL:  Well appearing, well nourished, in no acute distress, apparent stated age, cooperative with exam.   SKIN:  A full body skin examination was performed today, including the head, neck, chest, abdomen, back, lower extremities, palms, soles, buttocks, nails, sparing the genitalia.  The patient has well-marginated, eczematous, nummular pink plaques over the central lower back, abdomen and bilaterally over the lower extremities with mild overlying crust.  He has a background of xerosis, benign-appearing, waxy, stuck-on, xjyln-mx-ciuyrlzlm papules over the areas evaluated today.      ASSESSMENT AND PLAN:   1.  Nummular dermatitis with mild impetiginization.  Reviewed today in detail the etiology, pathophysiology and treatment options for this condition.  Suspect that the triamcinolone cream will be not quite potent enough to take care of this rash for him.  A multi-pronged approach to treatment was reviewed.   -Start gentle skin care with twice daily application of a gentle moisturizer like Vanicream or CeraVe.   -Start mometasone 0.1% ointment b.i.d. p.r.n. to the affected sites for the next 2-3 weeks until the lesions have resolved on the trunk and extremities.   -Start povidone iodine wash once to twice weekly for antimicrobial effect.   --> in  Bacteriology swab few staphylococcus aureus see! PVP Iodine right treatment (if not improved, then do nasal swab and maybe treat nasal colonisation)    2.  Atopic dermatitis with systemic allergy to cats/dogs.  Given a history of chronic sinusitis and no previous allergy testing, we did perform atopic type 1 hypersensitivity testing as noted below.  Of note, the patient had a severe reaction to  cat and a less severe, but positive reaction to dog; see below.  This confirms the suspicion of atopy, although does not necessarily explain his tendency towards chronic sinusitis as his exposure to pets is absent/quite minimal at this point.      Follow up in 6-8 weeks or sooner as needed.      Patient was seen and discussed with Dr. Jeremy Varela, who was staff for this encounter.     This note was dictated and edited by MD Ryan Garsia MD  PGY4 Dermatology  [phone]        Dictated by Tomás Rios MD   Resident     Staff Physician Comments:  I saw and evaluated the patient with the resident and I agree with the assessment and plan as documented in the resident's note. I was present and evaluated the prick tests     Jeremy Varela MD  Professor  Head of Dermato-Allergy Division  Department of Dermatology  [facility]    I spent a total of 20 minutes face to face with Ronna Collins during today s office visit. Over 50% of this time was spent counseling the patient and/or coordinating care. Please see Assessment and Plan for details.      Aerobic swab performed and pending.     Order documented by: Khadra Lin LPN  Physician:    Date/time of application:06/21/19   Localization of application: arm   Atopy Screen  No Substance Readings (15min) Evaluation   POS Histamine 1mg/ml +/++    NEG NaCl 0.9% -      No Substance Readings (15min) Evaluation   1 Alternaria alternata (tenuis)  -    2 Cladosporium herbarum -    3 Aspergillus fumigatus -    4 Penicillium notatum -    5 Dermatophagoides pteronyssinus -    6 Dermatophagoides farinae -    7 Dog epithelium (canis spp) +    8 Cat hair (mary ellen catus) +++    9 Cockroach   (Blatella americana & germanica) -    10 Grass mix midwest   (Marie, Orchard, Redtop, Marcus) -    11 Celestino grass (sorghum halepense) -    12 Weed mix   (common Cocklebur, Lamb s quarters, rough redroot Pigweed,  Dock/Sorrel) -    13 Mugwort (artemisia vulgare) -    14 Ragweed giant/short (ambrosia spp) -    15 English Plantain (plantago lanceolata) -    16 Tree mix 1 (Pecan, Maple BHR, Oak RVW, american Tripp, black Yampa) NA    17 Red cedar (juniperus virginia) -    18 Tree mix 2   (white Keith, river/red Birch, black [place]ch [place], common Hand, american Elm) NA    19 Box elder/Maple mix (acer spp) -    20 Pawnee shagbark (carya ovata) -       -    --> patient is atopic with strong reaction to cat epithelia and less to dog. Patient has no cat and therefore not sure if clinically relevant for the recurrent sinusitis.

## 2019-06-23 LAB
BACTERIA SPEC CULT: ABNORMAL
Lab: ABNORMAL
SPECIMEN SOURCE: ABNORMAL

## 2019-06-28 ENCOUNTER — THERAPY VISIT (OUTPATIENT)
Dept: PHYSICAL THERAPY | Facility: CLINIC | Age: 69
End: 2019-06-28
Attending: NURSE PRACTITIONER
Payer: COMMERCIAL

## 2019-06-28 DIAGNOSIS — M25.562 LEFT KNEE PAIN, UNSPECIFIED CHRONICITY: ICD-10-CM

## 2019-06-28 DIAGNOSIS — M25.562 ACUTE PAIN OF LEFT KNEE: ICD-10-CM

## 2019-06-28 PROCEDURE — 97161 PT EVAL LOW COMPLEX 20 MIN: CPT | Mod: GP | Performed by: PHYSICAL THERAPIST

## 2019-06-28 PROCEDURE — 97110 THERAPEUTIC EXERCISES: CPT | Mod: GP | Performed by: PHYSICAL THERAPIST

## 2019-06-28 NOTE — PROGRESS NOTES
[facility] Initial Evaluation  Subjective:    Ronna Collins being seen for Knee Pain.   Problem began 4/1/2019. Where condition occurred: for unknown reasons.  and reported as 2/10 on pain scale. General health as reported by patient is good. Pertinent medical history includes:  None.    Surgeries include:  None.  Current medications:  High blood pressure medication and pain medication.   Primary job tasks include:  Computer work.  Pain is described as other and is constant. Pain is the same all the time. Since onset symptoms are unchanged. Special tests:  X-ray.      Restrictions include:  Working in normal job without restrictions.    Barriers include:  None as reported by patient.  Red flags:  None as reported by patient.  Type of problem:  Left knee   Condition occurred with:  Repetition/overuse and insidious onset.    Problem details: MD order 6/19/19. MD order 6/19/19. Ronna has been having knee pain for 5 yeas. He took MRI and did PT and got better. 6 months ago he experienced pain in his left knee, took xray recently which came out clear. He was advised to see PT for further management. .   Patient reports pain:  Medial and anterior.  Associated symptoms:  Buckling/giving out and loss of motion/stiffness. Symptoms are exacerbated by bending/squatting, ascending stairs, standing, weight bearing and walking Relieved by: naproxen.                      Objective:        Flexibility/Screens:       Lower Extremity:  Decreased left lower extremity flexibility:Hamstrings; Gastroc and Soleus    Decreased right lower extremity flexibility:  Hamstrings; Gastroc and Soleus               Lumbar/SI Evaluation  ROM:      Strength: weak abdominal muscle adn core stabilization is poor                                                      Hip Evaluation    Hip Strength:    Flexion:   Left: 5-/5   Pain:                      Extension:  Left: 4+/5  Pain:  Abduction:  Left: 4+/5      Pain:                           Knee Evaluation:  ROM:  Strength:  Normal  AROM    Hyperextension:  Left:  130    Right: 135              Ligament Testing:  Normal                Special Tests: Normal      Palpation:    Left knee tenderness present at:  Medial Joint Line and Patellar Medial              General     ROS    Assessment/Plan:    Patient is a 68 year old male with left side knee complaints.    Patient has the following significant findings with corresponding treatment plan.                Diagnosis 1:  Left Knee pain  Pain -  hot/cold therapy, manual therapy, STS, splint/taping/bracing/orthotics, self management, education and home program  Decreased ROM/flexibility - manual therapy, therapeutic exercise, therapeutic activity and home program  Decreased strength - therapeutic exercise, therapeutic activities and home program  Decreased function - therapeutic activities and home program    Therapy Evaluation Codes:   1) History comprised of:   Personal factors that impact the plan of care:      Time since onset of symptoms.    Comorbidity factors that impact the plan of care are:      cehck HPI.     Medications impacting care: Check HPI.  2) Examination of Body Systems comprised of:   Body structures and functions that impact the plan of care:      Knee.   Activity limitations that impact the plan of care are:      Standing, Walking and transfers.  3) Clinical presentation characteristics are:   Stable/Uncomplicated.  4) Decision-Making    Low complexity using standardized patient assessment instrument and/or measureable assessment of functional outcome.  Cumulative Therapy Evaluation is: Low complexity.    Previous and current functional limitations:  (See Goal Flow Sheet for this information)    Short term and Long term goals: (See Goal Flow Sheet for this information)     Communication ability:  Patient appears to be able to clearly communicate and understand verbal and written communication and follow  directions correctly.  Treatment Explanation - The following has been discussed with the patient:   RX ordered/plan of care  Anticipated outcomes  Possible risks and side effects  This patient would benefit from PT intervention to resume normal activities.   Rehab potential is good.    Frequency:  1 X week, once daily  Duration:  for 6 weeks  Discharge Plan:  Achieve all LTG.  Independent in home treatment program.  Reach maximal therapeutic benefit.    Please refer to the daily flowsheet for treatment today, total treatment time and time spent performing 1:1 timed codes.

## 2019-07-02 ENCOUNTER — THERAPY VISIT (OUTPATIENT)
Dept: PHYSICAL THERAPY | Facility: CLINIC | Age: 69
End: 2019-07-02
Payer: COMMERCIAL

## 2019-07-02 DIAGNOSIS — M25.562 ACUTE PAIN OF LEFT KNEE: ICD-10-CM

## 2019-07-02 PROCEDURE — 97112 NEUROMUSCULAR REEDUCATION: CPT | Mod: GP | Performed by: PHYSICAL THERAPIST

## 2019-07-02 PROCEDURE — 97110 THERAPEUTIC EXERCISES: CPT | Mod: GP | Performed by: PHYSICAL THERAPIST

## 2019-07-09 ENCOUNTER — THERAPY VISIT (OUTPATIENT)
Dept: PHYSICAL THERAPY | Facility: CLINIC | Age: 69
End: 2019-07-09
Payer: COMMERCIAL

## 2019-07-09 DIAGNOSIS — M25.562 ACUTE PAIN OF LEFT KNEE: ICD-10-CM

## 2019-07-09 PROCEDURE — 97112 NEUROMUSCULAR REEDUCATION: CPT | Mod: GP | Performed by: PHYSICAL THERAPIST

## 2019-07-09 PROCEDURE — 97530 THERAPEUTIC ACTIVITIES: CPT | Mod: GP | Performed by: PHYSICAL THERAPIST

## 2019-07-09 PROCEDURE — 97110 THERAPEUTIC EXERCISES: CPT | Mod: GP | Performed by: PHYSICAL THERAPIST

## 2019-08-07 ENCOUNTER — TELEPHONE (OUTPATIENT)
Dept: ALLERGY | Facility: CLINIC | Age: 69
End: 2019-08-07

## 2019-08-21 PROBLEM — M25.562 LEFT KNEE PAIN: Status: RESOLVED | Noted: 2019-06-28 | Resolved: 2019-08-21

## 2019-08-21 NOTE — PROGRESS NOTES
Subjective:  HPI                    Objective:  System    Physical Exam    General     ROS    Discharge Note    Progress reporting period is from last progress note on   to Jul 9, 2019.    Ronna failed to follow up and current status is unknown.  Please see information below for last relevant information on current status.  Patient seen for 3 visits.    SUBJECTIVE  Subjective changes noted by patient:  Whit is well, he was in [place] last week for weeding and was unable to keep up with his exercises  .  Current pain level is 1/10.     Previous pain level was   .   Changes in function:  Yes (See Goal flowsheet attached for changes in current functional level)  Adverse reaction to treatment or activity: None    OBJECTIVE  Changes noted in objective findings: Started WB exercises today adn he mentioned of decreased pain level.      ASSESSMENT/PLAN  Diagnosis: Left knee pain   Updated problem list and treatment plan:     STG/LTGs have been met or progress has been made towards goals:  Yes, please see goal flowsheet for most current information  Assessment of Progress: current status is unknown.    Last current status: Pt is progressing well   Self Management Plans:  HEP  I have re-evaluated this patient and find that the nature, scope, duration and intensity of the therapy is appropriate for the medical condition of the patient.  Ronna continues to require the following intervention to meet STG and LTG's:  HEP.    Recommendations:  Discharge with current home program.  Patient to follow up with MD as needed.    Please refer to the daily flowsheet for treatment today, total treatment time and time spent performing 1:1 timed codes.

## 2019-09-30 ENCOUNTER — HEALTH MAINTENANCE LETTER (OUTPATIENT)
Age: 69
End: 2019-09-30

## 2019-10-07 ENCOUNTER — HOSPITAL ENCOUNTER (OUTPATIENT)
Facility: AMBULATORY SURGERY CENTER | Age: 69
End: 2019-10-07
Attending: INTERNAL MEDICINE
Payer: COMMERCIAL

## 2019-10-07 ENCOUNTER — OFFICE VISIT (OUTPATIENT)
Dept: FAMILY MEDICINE | Facility: CLINIC | Age: 69
End: 2019-10-07
Payer: COMMERCIAL

## 2019-10-07 VITALS
SYSTOLIC BLOOD PRESSURE: 150 MMHG | OXYGEN SATURATION: 98 % | WEIGHT: 172 LBS | BODY MASS INDEX: 26.15 KG/M2 | HEART RATE: 71 BPM | DIASTOLIC BLOOD PRESSURE: 90 MMHG

## 2019-10-07 DIAGNOSIS — R09.82 POST-NASAL DRAINAGE: ICD-10-CM

## 2019-10-07 DIAGNOSIS — I10 BENIGN ESSENTIAL HYPERTENSION: ICD-10-CM

## 2019-10-07 DIAGNOSIS — Z00.00 HEALTH CARE MAINTENANCE: Primary | ICD-10-CM

## 2019-10-07 LAB
ALBUMIN SERPL-MCNC: 4.2 G/DL (ref 3.4–5)
ALP SERPL-CCNC: 77 U/L (ref 40–150)
ALT SERPL W P-5'-P-CCNC: 35 U/L (ref 0–70)
ANION GAP SERPL CALCULATED.3IONS-SCNC: 5 MMOL/L (ref 3–14)
AST SERPL W P-5'-P-CCNC: 16 U/L (ref 0–45)
BILIRUB SERPL-MCNC: 0.6 MG/DL (ref 0.2–1.3)
BUN SERPL-MCNC: 12 MG/DL (ref 7–30)
CALCIUM SERPL-MCNC: 9.3 MG/DL (ref 8.5–10.1)
CHLORIDE SERPL-SCNC: 105 MMOL/L (ref 94–109)
CHOLEST SERPL-MCNC: 130 MG/DL
CO2 SERPL-SCNC: 29 MMOL/L (ref 20–32)
CREAT SERPL-MCNC: 0.77 MG/DL (ref 0.66–1.25)
GFR SERPL CREATININE-BSD FRML MDRD: >90 ML/MIN/{1.73_M2}
GLUCOSE SERPL-MCNC: 108 MG/DL (ref 70–99)
HDLC SERPL-MCNC: 44 MG/DL
LDLC SERPL CALC-MCNC: 55 MG/DL
NONHDLC SERPL-MCNC: 86 MG/DL
POTASSIUM SERPL-SCNC: 4.1 MMOL/L (ref 3.4–5.3)
PROT SERPL-MCNC: 7.9 G/DL (ref 6.8–8.8)
SODIUM SERPL-SCNC: 138 MMOL/L (ref 133–144)
TRIGL SERPL-MCNC: 158 MG/DL

## 2019-10-07 RX ORDER — LOSARTAN POTASSIUM AND HYDROCHLOROTHIAZIDE 25; 100 MG/1; MG/1
1 TABLET ORAL DAILY
Qty: 90 TABLET | Refills: 3 | Status: SHIPPED | OUTPATIENT
Start: 2019-10-07 | End: 2020-10-14

## 2019-10-07 ASSESSMENT — PAIN SCALES - GENERAL: PAINLEVEL: NO PAIN (0)

## 2019-10-07 NOTE — TELEPHONE ENCOUNTER
FUTURE VISIT INFORMATION      FUTURE VISIT INFORMATION:    Date: 11/14/2019    Time: 8:00 AM    Location: [facility]   REFERRAL INFORMATION:    Referring provider:  Dr. Henry    Referring providers clinic:  [facility]    Reason for visit/diagnosis  Post Nasal Drainage     RECORDS REQUESTED FROM:       Clinic name Comments Records Status Imaging Status   [facility] 10/7/19 Office visit with Dr. Henry, Referral [facility] 10/10/11 Office visit with Dr. Calles  9/2/11 Office visit with Dr. He [facility]  CT Maxillofacial: 10/10/11  CT Maxillofacial: 1/13/11  CT Scan Face, Jaw: 4/4/07 Epic PACS

## 2019-10-07 NOTE — PATIENT INSTRUCTIONS
[facility] Medication Refill Request Information:  * Please contact your pharmacy regarding ANY request for medication refills.  ** [facility] Prescription Fax = [phone]  * Please allow 3 business days for routine medication refills.  * Please allow 5 business days for controlled substance medication refills.     [facility] Test Result notification information:  *You will be notified with in 7-10 days of your appointment day regarding the results of your test.  If you are on MyChart you will be notified as soon as the provider has reviewed the results and signed off on them.    [facility]: [phone]

## 2019-10-07 NOTE — NURSING NOTE
Ronna Collins comes into clinic today at the request of Carl, Ordering Provider for an immunization/s.    I gave the Shingrix immunization/s while the patient was in clinic. They received an informational sheet and I explained the common side effects of the injection. The patient tolerated the procedure well and had no complications while here in clinic.     This service provided today was under the supervising provider of the day Carl, who was available if needed.    Gera Hawkins CMA, EMT at 12:00 PM on 10/7/2019.   Shingrix vaccine information: Adjuvant Suspension Component: [id number] LOT/EXP: [id number], 09/13/2021

## 2019-10-07 NOTE — PROGRESS NOTES
[facility]  Primary Care Center   Stewart Henry MD  10/07/2019      Chief Complaint:   Physical       History of Present Illness:   Ronna Collins is a 69 year old male with a history of hypertension who presents for a physical.    Hypertension: He went to [place] for his biometric screening, at that time his blood pressure was ~130/80. He also takes his blood pressure at home, which is about the same. He got a significant amount of exercise while vacationing in [facility], otherwise he is not exercising as often as he should. He does some rowing and running. He eats a vegetarian diet which has not changed recently. Losartan has been on backorder and he is having difficulty refilling. His batch was recalled so he simply asked for a new one.     Post nasal drip: he has a cough from post nasal drip which is irritating. He has been trying to use Budesonide without noticing relief. This becomes an issue annually around August/September. He saw ENT for this issue in 2011.     Rash: He saw dermatology for a rash. They diagnosed it as nummular dermatitis and recommended Mometasone. Allergy testing was also performed which showed he is allergic to cats and mildly to dogs. He does not currently have cat or a dog at home. They also recommended Betadine washes but he was unable to find this online.     Left knee pain: he saw PT for knee pain, they have been treating with exercises, manual therapy, and heat/ice.     Healthcare maintenance:   Colon Cancer screening (age 50 - 75): yearly FIT or colonoscopy every 5 - 10 years - Recommended  Glucose: every 5 years, yearly if risk factors? - Recommended  Lipid panel: every 5 years, yearly if risk factors - Recommended  Hepatitis C (adults born 1945 - 1965): once per lifetime - Negative 2016  PSA screening: discussed, declined  Immunizations (Tetanus every 10 years, Influenza yearly, Pneumonia PPV-23 and PCV-13 age ? 65 or sooner if risk factors) - Recommended Shingrix vaccine  today, patient agreed   Immunization History   Administered Date(s) Administered     Influenza (High Dose) 3 valent vaccine 2017, 10/02/2018     Influenza (IIV3) PF 2012, 10/28/2013, 2014     Pneumo Conj 13-V (2010&after) 2017     Pneumococcal 23 valent 10/02/2018     TD (ADULT, 7+) 2004     TDAP Vaccine (Boostrix) 10/28/2013     Zoster vaccine, live 2012      The following health maintenance issues were also reviewed and addressed as needed:  Blood pressure (>18 years annually)  Lifestyle habits (including exercise, diet, weight)    Review of Systems:   Pertinent items are noted in HPI, remainder of complete ROS is negative.      Active Medications:     Current Outpatient Medications:      budesonide (RHINOCORT AQUA) 32 MCG/ACT spray, Spray 2 sprays into both nostrils daily, Disp: 1 Bottle, Rfl: 3     losartan-hydrochlorothiazide (HYZAAR) 100-25 MG tablet, Take 1 tablet by mouth daily, Disp: 90 tablet, Rfl: 3     mometasone (ELOCON) 0.1 % external ointment, Apply twice daily to the rash for 2-3 weeks as needed., Disp: 45 g, Rfl: 3     naproxen (NAPROSYN) 500 MG tablet, Take 1 tablet (500 mg) by mouth 2 times daily as needed for moderate pain (take with food), Disp: 30 tablet, Rfl: 1     povidone-iodine (BETADINE) 7.5 % SOLN topical solution, Wash body ~1-2x weekly., Disp: 237 mL, Rfl: 3     triamcinolone (KENALOG) 0.1 % external cream, Apply topically 2 times daily To rash on back and legs for 14 days, Disp: 80 g, Rfl: 1      Allergies:   Patient has no known allergies.      Past Medical History:  High triglycerides  Hypertension      Past Surgical History:  No past surgical history      Family history:   Father:  at 83  Mother:  at 86, poor health after hip fracture  Sister: 80  Brother: diabetes mellitus, hypertension     Social History:     Former smoker, quit     Physical Exam:   BP (!) 150/90 (BP Location: Right arm, Patient Position: Sitting, Cuff Size:  Adult Regular)   Pulse 71   Wt 78 kg (172 lb)   SpO2 98%   BMI 26.15 kg/m     Constitutional: Alert. In no distress.  Head: Normocephalic. No masses, lesions, tenderness or abnormalities.  ENT: No neck nodes or sinus tenderness.  Cardiovascular: RRR. No murmurs, clicks, gallops, or rub.  Respiratory: Clear to auscultation bilaterally, no wheezes or crackles.  Gastrointestinal: Abdomen soft. Non-tender. BS normal. No masses or organomegaly.  Musculoskeletal: Extremities normal. No gross deformities noted. Normal muscle tone.  Skin: No suspicious lesions. No rashes.  : no inguinal hernias, normal scrotum, penis, testes normal  Neurologic: Gait normal. Reflexes normal and symmetric. Sensation grossly WNL.  Psychiatric: Mentation appears normal. Normal affect.   Hematologic/Lymphatic/Immunologic: Normal cervical lymph nodes.      Labs   Component      Latest Ref Rng & Units 10/7/2019   Sodium      133 - 144 mmol/L 138   Potassium      3.4 - 5.3 mmol/L 4.1   Chloride      94 - 109 mmol/L 105   Carbon Dioxide      20 - 32 mmol/L 29   Anion Gap      3 - 14 mmol/L 5   Glucose      70 - 99 mg/dL 108 (H)   Urea Nitrogen      7 - 30 mg/dL 12   Creatinine      0.66 - 1.25 mg/dL 0.77   GFR Estimate      >60 mL/min/1.73:m2 >90   GFR Estimate If Black      >60 mL/min/1.73:m2 >90   Calcium      8.5 - 10.1 mg/dL 9.3   Bilirubin Total      0.2 - 1.3 mg/dL 0.6   Albumin      3.4 - 5.0 g/dL 4.2   Protein Total      6.8 - 8.8 g/dL 7.9   Alkaline Phosphatase      40 - 150 U/L 77   ALT      0 - 70 U/L 35   AST      0 - 45 U/L 16   Cholesterol      <200 mg/dL 130   Triglycerides      <150 mg/dL 158 (H)   HDL Cholesterol      >39 mg/dL 44   LDL Cholesterol Calculated      <100 mg/dL 55   Non HDL Cholesterol      <130 mg/dL 86     Assessment and Plan:    Benign essential hypertension  Per patient well controlled at home and at his previous office visit with Viviana. Gave him a paper copy of his prescription to find a pharmacy with  stock, he will let us know if he cannot get his medicine.   - Lipid panel reflex to direct LDL Fasting  - Comprehensive metabolic panel  - losartan-hydrochlorothiazide (HYZAAR) 100-25 MG tablet  Dispense: 90 tablet; Refill: 3    Health care maintenance  - GASTROENTEROLOGY ADULT REF PROCEDURE ONLY [facility] [phone]    Rash   Patient will try to contact one of the dermatology nurses for instructions on Betadine use.     Post-nasal drainage  He will try Flonase and Zyrtec for post-nasal drainage. He did have abnormal sinus scans years ago. -     OTOLARYNGOLOGY REFERRAL    Other orders  -     FLU VACCINE HIGH DOSE PRESERVATIVE FREE, AGE =>65 YR  -     ZOSTER VACCINE RECOMBINANT ADJUVANTED IM NJX    Follow-up: No follow-ups on file.         Scribe Disclosure:  I, Prosper Cortés, am serving as a scribe to document services personally performed by Stewart Henry MD at this visit, based upon the provider's statements to me. All documentation has been reviewed by the aforementioned provider prior to being entered into the official medical record.    Portions of this medical record were completed by a scribe. UPON MY REVIEW AND AUTHENTICATION BY ELECTRONIC SIGNATURE, this confirms (a) I performed the applicable clinical services, and (b) the record is accurate.   Stewart Henry MD MD

## 2019-10-07 NOTE — NURSING NOTE
Chief Complaint   Patient presents with     Physical     Patient is here for his annual physical        Oliva Juan LPN, EMT at 7:14 AM on 10/7/2019.

## 2019-10-07 NOTE — NURSING NOTE
Ronna TUAN Karina      1.  Has the patient received the information for the influenza vaccine? YES    2.  Does the patient have any of the following contraindications?     Allergy to eggs? No     Allergy to a component of the influenza vaccine? No     Serious reaction to previous influenza vaccines? No     Paralyzed by Guillain-Greenwood syndrome? No     Currently sick today? No         3.  Verified patient name and  prior to injection. Yes  4.  The patient was instructed to wait 15 minutes before leaving the building in the event of an allergic reaction: YES        Vaccination given by Gera Hawkins,EMT.      Recorded by Gera Hawkins CMA      Ashiabillnatali Collins comes into clinic today at the request of Carl, Ordering Provider for an immunization/s.    I gave the Flu  immunization/s while the patient was in clinic. They received an informational sheet and I explained the common side effects of the injection. The patient tolerated the procedure well and had no complications while here in clinic.     This service provided today was under the supervising provider of the day Carl, who was available if needed.    Gera Hawkins CMA, EMT at 11:59 AM on 10/7/2019.

## 2019-10-10 ENCOUNTER — DOCUMENTATION ONLY (OUTPATIENT)
Dept: CARE COORDINATION | Facility: CLINIC | Age: 69
End: 2019-10-10

## 2019-10-24 ENCOUNTER — TELEPHONE (OUTPATIENT)
Dept: GASTROENTEROLOGY | Facility: CLINIC | Age: 69
End: 2019-10-24

## 2019-10-24 NOTE — TELEPHONE ENCOUNTER
"Patient Name: Ronna Collins   : 1950  MRN: [id number]       : [x] N/A         Woman answered pt's phone, would not identify herself.  She stated \"he's out of town, is this about ?\"  When I stated that is was not and asked to leave a message for the patient, she stated, \"then I just need to cancel that.\"  There are no individuals, including pt's wife with CBO Authorization, listed in Epic.  I informed her that Mr. Parada would need to cancel his appointment himself.  She stated \"OK, I'll have him call\" and began to hang up.  I asked if she would like the call-back number to provide him.  She stated \"alright\".    Susannah Saldana, RN, RN  [facility]    Additional Information regarding appointment:      Patient scheduled for:   [x] Colonoscopy      Indication for procedure. [x] Screening       Sedation Type:   [x] MAC       Procedure Provider:  Adolfo      Referring Provider. Stewart Henry    Arrival time verified: Wed / [phone]    Facility location verified:   [x][address], 5th floor       Prep Type:   [x]Golytely eRx: (not sent)    Anticoagulants or blood thinners: [x]None        Electronic implanted devices: [x] No      H&P / Pre op physical completed: [x] N/A    Additional Information: None at this time.   _______________________________________________    "

## 2019-11-13 ENCOUNTER — OFFICE VISIT (OUTPATIENT)
Dept: ALLERGY | Facility: CLINIC | Age: 69
End: 2019-11-13
Payer: COMMERCIAL

## 2019-11-13 ENCOUNTER — OFFICE VISIT (OUTPATIENT)
Dept: DERMATOLOGY | Facility: CLINIC | Age: 69
End: 2019-11-13
Payer: COMMERCIAL

## 2019-11-13 DIAGNOSIS — L20.89 OTHER ATOPIC DERMATITIS: Primary | ICD-10-CM

## 2019-11-13 DIAGNOSIS — L30.0 NUMMULAR DERMATITIS: ICD-10-CM

## 2019-11-13 DIAGNOSIS — L30.0 NUMMULAR DERMATITIS: Primary | ICD-10-CM

## 2019-11-13 RX ORDER — MOMETASONE FUROATE 1 MG/G
OINTMENT TOPICAL
Qty: 45 G | Refills: 3 | Status: SHIPPED | OUTPATIENT
Start: 2019-11-14 | End: 2021-06-04

## 2019-11-13 RX ORDER — EMOLLIENT BASE
CREAM (GRAM) TOPICAL
Qty: 453 G | Refills: 11 | Status: SHIPPED | OUTPATIENT
Start: 2019-11-13 | End: 2021-06-22

## 2019-11-13 RX ORDER — FEXOFENADINE HCL 180 MG/1
180 TABLET ORAL EVERY MORNING
Qty: 90 TABLET | Refills: 1 | Status: SHIPPED | OUTPATIENT
Start: 2019-11-13 | End: 2020-02-21

## 2019-11-13 RX ORDER — HYDROXYZINE HYDROCHLORIDE 25 MG/1
25 TABLET, FILM COATED ORAL EVERY EVENING
Qty: 90 TABLET | Refills: 1 | Status: SHIPPED | OUTPATIENT
Start: 2019-11-13 | End: 2020-02-11

## 2019-11-13 ASSESSMENT — PAIN SCALES - GENERAL
PAINLEVEL: NO PAIN (0)
PAINLEVEL: NO PAIN (0)

## 2019-11-13 NOTE — PROGRESS NOTES
[facility] Dermatology Note      Dermatology Problem List:  1.  Nummular dermatitis, previously with impetiginization.    -Current treatment:  Mometasone 0.1% ointment b.i.d., gentle skin care with frequent moisturizer application  - Prior rx: povidone iodine wash, bacterial cx 6/21/19 with MSSA  2.  Atopic dermatitis with chronic sinusitis and cat/dog allergy on prick testing 6/21/19     Encounter Date: Nov 13, 2019    CC:  Chief Complaint   Patient presents with     Derm Problem     Nummular dermatitis - He says he has not been doing very well. The sores are gone but the itching is still there.         History of Present Illness:  Mr. Ronna Collins is a 69 year old male who presents today for a dermatitis follow up. The patient was last seen by Dr. Varela on 6/21/19 when he started mometasone 0.1% ointment and povidone iodine wash. He says his sores have disappeared, however discoloration in those areas has become much more pronounced. He is still dealing with significant itch. This all began around March of this year. He has been applying the mometasone once daily to wherever he senses itch but it doesn't help resolve it. He admits to not using moisturizer often enough. His wife who accompanies him corroborates this statement. She bought him a Neutrogena sesame body oil, but he does not use it much. The patient is otherwise feeling well. There are no other skin concerns at this time. He has no personal history of eczema.    His daughter is an ENT physician on the [facility] [employer].    Past Medical History:   Patient Active Problem List   Diagnosis     High triglycerides     HTN (hypertension)     History reviewed. No pertinent past medical history.  History reviewed. No pertinent surgical history.    Social History:  Patient reports that he quit smoking about 19 years ago. He has never used smokeless tobacco. He reports current alcohol use.    Family History:  No history of eczema in  family.    Medications:  Current Outpatient Medications   Medication Sig Dispense Refill     budesonide (RHINOCORT AQUA) 32 MCG/ACT spray Spray 2 sprays into both nostrils daily 1 Bottle 3     losartan-hydrochlorothiazide (HYZAAR) 100-25 MG tablet Take 1 tablet by mouth daily 90 tablet 3     mometasone (ELOCON) 0.1 % external ointment Apply twice daily to the rash for 2-3 weeks as needed. 45 g 3     naproxen (NAPROSYN) 500 MG tablet Take 1 tablet (500 mg) by mouth 2 times daily as needed for moderate pain (take with food) 30 tablet 1     povidone-iodine (BETADINE) 7.5 % SOLN topical solution Wash body ~1-2x weekly. (Patient not taking: Reported on 11/13/2019) 237 mL 3     triamcinolone (KENALOG) 0.1 % external cream Apply topically 2 times daily To rash on back and legs for 14 days (Patient not taking: Reported on 11/13/2019) 80 g 1       No Known Allergies    Review of Systems:  -Skin Establ Pt: The patient denies any new rash, pruritus, or lesions that are symptomatic, changing or bleeding, except as per HPI.  -Constitutional: The patient is feeling generally well.    Physical exam:  GEN: This is a well developed, well-nourished male in no acute distress, in a pleasant mood.    SKIN: Examination of the face, neck, chest, abdomen, back, upper and lower extremities was performed.  - Multiple coin shaped lichenified hyperpigmented plaques on the lower back, arms, and legs.   - No other lesions of concern on areas examined.       Impression/Plan:  1. Nummular dermatitis. Still with active disease but only using mometasone once daily and no moisturization. Natural history and etiology of nummular dermatitis discussed at length today. Advised need to start daily moisturization with use of topical steroid twice daily as needed. Also advised to start anti-histamine for pruritus. Lastly counseled on expectations for post-inflammatory hyperpigmentation.    Advised daily moisturization with bland emollient. Dry skin care  handout provided and samples of moisturizer provided.    Continue mometasone 0.1% ointment up to twice daily to affected areas.     Start daily antihistamine for pruritus such as cetirizine, fexofenadine, or loratidine.     Follow-up in 6-12 weeks for recheck, earlier as needed.       Staff Involved:    Scribe Disclosure  I, Joseph Sparks, am serving as a scribe to document services personally performed by Dr. Kayla Buitrago, based on data collection and the provider's statements to me.     Provider Disclosure:   The documentation recorded by the scribe accurately reflects the services I personally performed and the decisions made by me.    Kayla Buitrago MD    Department of Dermatology  [facility]: Phone: [phone], Fax: [phone]

## 2019-11-13 NOTE — LETTER
11/13/2019       RE: Ronna Collins  [address]  [place] [address]     Dear Colleague,    Thank you for referring your patient, Ronna Collins, to the [facility] DERMATOLOGY at [facility]. Please see a copy of my visit note below.    [facility] Dermatology Note      Dermatology Problem List:  1.  Nummular dermatitis, previously with impetiginization.    -Current treatment:  Mometasone 0.1% ointment b.i.d., gentle skin care with frequent moisturizer application  - Prior rx: povidone iodine wash, bacterial cx 6/21/19 with MSSA  2.  Atopic dermatitis with chronic sinusitis and cat/dog allergy on prick testing 6/21/19     Encounter Date: Nov 13, 2019    CC:  Chief Complaint   Patient presents with     Derm Problem     Nummular dermatitis - He says he has not been doing very well. The sores are gone but the itching is still there.       History of Present Illness:  Mr. Ronna Collins is a 69 year old male who presents today for a dermatitis follow up. The patient was last seen by Dr. Varela on 6/21/19 when he started mometasone 0.1% ointment and povidone iodine wash. He says his sores have disappeared, however discoloration in those areas has become much more pronounced. He is still dealing with significant itch. This all began around March of this year. He has been applying the mometasone once daily to wherever he senses itch but it doesn't help resolve it. He admits to not using moisturizer often enough. His wife who accompanies him corroborates this statement. She bought him a Neutrogena sesame body oil, but he does not use it much. The patient is otherwise feeling well. There are no other skin concerns at this time. He has no personal history of eczema.    [place]    Past Medical History:   Patient Active Problem List   Diagnosis     High triglycerides     HTN (hypertension)     History  reviewed. No pertinent past medical history.  History reviewed. No pertinent surgical history.    Social History:  Patient reports that he quit smoking about 19 years ago. He has never used smokeless tobacco. He reports current alcohol use.    Family History:  No history of eczema in family.    Medications:  Current Outpatient Medications   Medication Sig Dispense Refill     budesonide (RHINOCORT AQUA) 32 MCG/ACT spray Spray 2 sprays into both nostrils daily 1 Bottle 3     losartan-hydrochlorothiazide (HYZAAR) 100-25 MG tablet Take 1 tablet by mouth daily 90 tablet 3     mometasone (ELOCON) 0.1 % external ointment Apply twice daily to the rash for 2-3 weeks as needed. 45 g 3     naproxen (NAPROSYN) 500 MG tablet Take 1 tablet (500 mg) by mouth 2 times daily as needed for moderate pain (take with food) 30 tablet 1     povidone-iodine (BETADINE) 7.5 % SOLN topical solution Wash body ~1-2x weekly. (Patient not taking: Reported on 11/13/2019) 237 mL 3     triamcinolone (KENALOG) 0.1 % external cream Apply topically 2 times daily To rash on back and legs for 14 days (Patient not taking: Reported on 11/13/2019) 80 g 1       No Known Allergies    Review of Systems:  -Skin Establ Pt: The patient denies any new rash, pruritus, or lesions that are symptomatic, changing or bleeding, except as per HPI.  -Constitutional: The patient is feeling generally well.    Physical exam:  GEN: This is a well developed, well-nourished male in no acute distress, in a pleasant mood.    SKIN: Examination of the face, neck, chest, abdomen, back, upper and lower extremities was performed.  - Multiple coin shaped lichenified hyperpigmented plaques on the lower back, arms, and legs.   - No other lesions of concern on areas examined.       Impression/Plan:  1. Nummular dermatitis. Still with active disease but only using mometasone once daily and no moisturization. Natural history and etiology of nummular dermatitis discussed at length today.  Advised need to start daily moisturization with use of topical steroid twice daily as needed. Also advised to start anti-histamine for pruritus. Lastly counseled on expectations for post-inflammatory hyperpigmentation.    Advised daily moisturization with bland emollient. Dry skin care handout provided and samples of moisturizer provided.    Continue mometasone 0.1% ointment up to twice daily to affected areas.     Start daily antihistamine for pruritus such as cetirizine, fexofenadine, or loratidine.     Follow-up in 6-12 weeks for recheck, earlier as needed.       Staff Involved:    Scribe Disclosure  I, Joseph Sparks, am serving as a scribe to document services personally performed by Dr. Kayla Buitrago, based on data collection and the provider's statements to me.     Provider Disclosure:   The documentation recorded by the scribe accurately reflects the services I personally performed and the decisions made by me.    Kayla Buitrago MD    Department of Dermatology  [facility]: Phone: [phone], Fax: [phone]

## 2019-11-13 NOTE — NURSING NOTE
Dermatology Rooming Note    Ronna Collins's goals for this visit include:   Chief Complaint   Patient presents with     Derm Problem     Nummular dermatitis - He says he has not been doing very well. The sores are gone but the itching is still there.     Claudette Zamarripa, [facility]

## 2019-11-13 NOTE — PROGRESS NOTES
[facility] Dermatology Note    CHIEF COMPLAINT:  Allergy Testing Followup (Allergy testing follow up from last appointment. Dermatitis same as it was a few months ago.)       DERMATOLOGY PROBLEM LIST:   1.  Nummular dermatitis with impetiginization.    -Current treatment:  Mometasone 0.1% ointment b.i.d., gentle skin care with frequent moisturizer application  2.  Atopic dermatitis with chronic sinusitis and cat/dog allergy on prick testing 06/21/2019     HISTORY OF PRESENT ILLNESS:    Mr. Ronna Collins is a pleasant 69 year old who is seen today in consultation from Arabella Hanna for evaluation of a rash on the back and lower extremities.  On discussion with the patient and review of Epic, the patient has had a rash on the low back and more recently the bilateral lower extremities for the last 3 months.  He notes that these spots initially started on the back and then spread distally.  He notes round areas that are quite pruritic and he scratches at these frequently.  He is not using any moisturizer.  He uses a Dove bar soap.  He recently was prescribed triamcinolone 0.1% cream that he has used a couple times since starting a couple of days ago.  This has led to some improvement and the best improvement that he has had to date.  He denies any issues with eczema or psoriasis previously and denies rashes in childhood.  He has had some difficulty with chronic sinusitis and has never undergone prick/poke testing.  No other major concerns at this point.  No fevers, chills, sweats.     ----------------  Follow up 11/13/2019:    Patient returns to clinic today for a follow up. He reports he has been doing well since he was last seen on 6/21/2019, but is still very itchy and having issues with hyperpigmentation. He has been using mometasone as prescribed. He has not been using povidone iodine or moisturizers. No additional concerns discussed.       REVIEW OF SYSTEMS:  As per HPI.      PAST  MEDICAL HISTORY:  Negative for eczema or psoriasis.      FAMILY HISTORY:  Negative for eczema or psoriasis.      SOCIAL HISTORY:  The patient's daughter is an ENT physician.     Current Outpatient Medications   Medication     budesonide (RHINOCORT AQUA) 32 MCG/ACT spray     losartan-hydrochlorothiazide (HYZAAR) 100-25 MG tablet     mometasone (ELOCON) 0.1 % external ointment     naproxen (NAPROSYN) 500 MG tablet     povidone-iodine (BETADINE) 7.5 % SOLN topical solution     triamcinolone (KENALOG) 0.1 % external cream     No current facility-administered medications for this visit.        No Known Allergies      PHYSICAL EXAMINATION:  There were no vitals taken for this visit.     GENERAL:  Well appearing, well nourished, in no acute distress, apparent stated age, cooperative with exam.   SKIN: Focused examination of chest, abdomen, and trunk was performed.  - generalized xerosis of skin  - nummular lesions on trunk  - post-inflammatory hyperpigmentations on trunk  - No other lesions of concern on areas examined.    Atopy Screen 6/21/2019  No Substance Readings (15min) Evaluation   POS Histamine 1mg/ml +/++    NEG NaCl 0.9% -      No Substance Readings (15min) Evaluation   1 Alternaria alternata (tenuis)  -    2 Cladosporium herbarum -    3 Aspergillus fumigatus -    4 Penicillium notatum -    5 Dermatophagoides pteronyssinus -    6 Dermatophagoides farinae -    7 Dog epithelium (canis spp) +    8 Cat hair (mary ellen catus) +++    9 Cockroach   (Blatella americana & germanica) -    10 Grass mix midwest   (Marie, Orchard, Redtop, Marcus) -    11 Celestino grass (sorghum halepense) -    12 Weed mix   (common Cocklebur, Lamb s quarters, rough redroot Pigweed, Dock/Sorrel) -    13 Mugwort (artemisia vulgare) -    14 Ragweed giant/short (ambrosia spp) -    15 English Plantain (plantago lanceolata) -    16 Tree mix 1 (Pecan, Maple BHR, Oak RVW, american [place], black [place]) NA    17 Red cedar (juniperus virginia) -    18  Tree mix 2   (white Keith, river/red Birch, black [place], common Sweet Grass, american Elm) NA    19 Box elder/Maple mix (acer spp) -    20 [place] shagbark (carya ovata) -       -    --> patient is atopic with strong reaction to cat epithelia and less to dog. Patient has no cat and therefore not sure if clinically relevant for the recurrent sinusitis.              ASSESSMENT AND PLAN:     1.  Nummular dermatitis with mild impetiginization    Start gentle skin care with twice daily application of a gentle moisturizer like Vanicream or CeraVe.    Continue mometasone 0.1% ointment b.i.d. p.r.n. to the affected sites for the next 2-3 weeks until the lesions have resolved on the trunk and extremities.     Start Allegra 180 mg. Take daily in morning.    Start hydroxyzine 25 mg. Take daily at night.    Consider patch testing if not improved after 1 month of consistent moisturizer use.    2.  Atopic dermatitis with sensitization to cats and dogs (clinically relevant?)    Given a history of chronic sinusitis and no previous allergy testing, we did perform atopic type 1 hypersensitivity testing as noted below.  Of note, the patient had a severe reaction to cat and a less severe, but positive reaction to dog.  This confirms the suspicion of atopy, although does not necessarily explain his tendency towards chronic sinusitis as his exposure to pets is absent/quite minimal at this point.       Follow up in 1-2 months if not improved, earlier if needed.       Scribe Disclosure  I, Татьяна Hurt, am serving as a scribe to document services personally performed by Dr. Jeremy Varela MD, based on data collection and the provider's statements to me.    I spent a total of 18 minutes face to face with Ronna Collins during today s office visit. Over 50% of this time was spent counseling the patient and/or coordinating care. Please see Assessment and Plan for details.

## 2019-11-13 NOTE — NURSING NOTE
Chief Complaint   Patient presents with     Allergy Testing Followup     Allergy testing follow up from last appointment. Dermatitis same as it was a few months ago.     Khadra Lin LPN on 11/13/2019 at 7:42 AM

## 2019-11-13 NOTE — LETTER
11/13/2019         RE: Ronna Collins  [address]  [place] [place] [facility] [address]        Dear Colleague,    Thank you for referring your patient, Ronna Collins, to the [facility] ALLERGY. Please see a copy of my visit note below.    [facility] Dermatology Note    CHIEF COMPLAINT:  Allergy Testing Followup (Allergy testing follow up from last appointment. Dermatitis same as it was a few months ago.)       DERMATOLOGY PROBLEM LIST:   1.  Nummular dermatitis with impetiginization.    -Current treatment:  Mometasone 0.1% ointment b.i.d., gentle skin care with frequent moisturizer application  2.  Atopic dermatitis with chronic sinusitis and cat/dog allergy on prick testing 06/21/2019     HISTORY OF PRESENT ILLNESS:    Mr. Ronna Collins is a pleasant 69 year old who is seen today in consultation from Arabella Hanna for evaluation of a rash on the back and lower extremities.  On discussion with the patient and review of Epic, the patient has had a rash on the low back and more recently the bilateral lower extremities for the last 3 months.  He notes that these spots initially started on the back and then spread distally.  He notes round areas that are quite pruritic and he scratches at these frequently.  He is not using any moisturizer.  He uses a Dove bar soap.  He recently was prescribed triamcinolone 0.1% cream that he has used a couple times since starting a couple of days ago.  This has led to some improvement and the best improvement that he has had to date.  He denies any issues with eczema or psoriasis previously and denies rashes in childhood.  He has had some difficulty with chronic sinusitis and has never undergone prick/poke testing.  No other major concerns at this point.  No fevers, chills, sweats.     ----------------  Follow up 11/13/2019:    Patient returns to clinic today for a follow up. He reports he has been doing well since he was last seen on 6/21/2019, but is  still very itchy and having issues with hyperpigmentation. He has been using mometasone as prescribed. He has not been using povidone iodine or moisturizers. No additional concerns discussed.       REVIEW OF SYSTEMS:  As per HPI.      PAST MEDICAL HISTORY:  Negative for eczema or psoriasis.      FAMILY HISTORY:  Negative for eczema or psoriasis.      SOCIAL HISTORY:  The patient's daughter is an ENT physician.     Current Outpatient Medications   Medication     budesonide (RHINOCORT AQUA) 32 MCG/ACT spray     losartan-hydrochlorothiazide (HYZAAR) 100-25 MG tablet     mometasone (ELOCON) 0.1 % external ointment     naproxen (NAPROSYN) 500 MG tablet     povidone-iodine (BETADINE) 7.5 % SOLN topical solution     triamcinolone (KENALOG) 0.1 % external cream     No current facility-administered medications for this visit.        No Known Allergies      PHYSICAL EXAMINATION:  There were no vitals taken for this visit.     GENERAL:  Well appearing, well nourished, in no acute distress, apparent stated age, cooperative with exam.   SKIN: Focused examination of chest, abdomen, and trunk was performed.  - generalized xerosis of skin  - nummular lesions on trunk  - post-inflammatory hyperpigmentations on trunk  - No other lesions of concern on areas examined.    Atopy Screen 6/21/2019  No Substance Readings (15min) Evaluation   POS Histamine 1mg/ml +/++    NEG NaCl 0.9% -      No Substance Readings (15min) Evaluation   1 Alternaria alternata (tenuis)  -    2 Cladosporium herbarum -    3 Aspergillus fumigatus -    4 Penicillium notatum -    5 Dermatophagoides pteronyssinus -    6 Dermatophagoides farinae -    7 Dog epithelium (canis spp) +    8 Cat hair (mary ellen catus) +++    9 Cockroach   (Blatella americana & germanica) -    10 Grass mix midwest   (Marie, Orchard, Redtop, Marcus) -    11 Celestino grass (sorghum halepense) -    12 Weed mix   (common Cocklebur, Lamb s quarters, rough redroot Pigweed, Dock/Sorrel) -    13 Mugwort  (artemisia vulgare) -    14 Ragweed giant/short (ambrosia spp) -    15 English Plantain (plantago lanceolata) -    16 Tree mix 1 (Pecan, Maple BHR, Oak RVW, american [place], black [place]) NA    17 Red cedar (juniperus virginia) -    18 Tree mix 2   (white Keith, river/red Birch, black [place], common [place], american Elm) NA    19 Box elder/Maple mix (acer spp) -    20 [place] shagbark (carya ovata) -       -    --> patient is atopic with strong reaction to cat epithelia and less to dog. Patient has no cat and therefore not sure if clinically relevant for the recurrent sinusitis.              ASSESSMENT AND PLAN:     1.  Nummular dermatitis with mild impetiginization    Start gentle skin care with twice daily application of a gentle moisturizer like Vanicream or CeraVe.    Continue mometasone 0.1% ointment b.i.d. p.r.n. to the affected sites for the next 2-3 weeks until the lesions have resolved on the trunk and extremities.     Start Allegra 180 mg. Take daily in morning.    Start hydroxyzine 25 mg. Take daily at night.    Consider patch testing if not improved after 1 month of consistent moisturizer use.    2.  Atopic dermatitis with sensitization to cats and dogs (clinically relevant?)    Given a history of chronic sinusitis and no previous allergy testing, we did perform atopic type 1 hypersensitivity testing as noted below.  Of note, the patient had a severe reaction to cat and a less severe, but positive reaction to dog.  This confirms the suspicion of atopy, although does not necessarily explain his tendency towards chronic sinusitis as his exposure to pets is absent/quite minimal at this point.       Follow up in 1-2 months if not improved, earlier if needed.       Scribe Disclosure  I, Татьяна Hurt, am serving as a scribe to document services personally performed by Dr. Jeremy Varela MD, based on data collection and the provider's statements to me.    I spent a total of 18 minutes face to face with  Ronna Collins during today s office visit. Over 50% of this time was spent counseling the patient and/or coordinating care. Please see Assessment and Plan for details.      Again, thank you for allowing me to participate in the care of your patient.        Sincerely,        Jeremy Varela MD

## 2019-11-13 NOTE — PATIENT INSTRUCTIONS
Please start a daily moisturizer every day to all skin, best when applied getting out of the shower.  Can use the mometasone ointment twice daily to itchy spots.  Can also try anti-histamine for itch:  - Cetirizine 10 mg daily (some people notice drowsiness)  - Fexofenadine 180 mg daily  - Loratidine 10 mg daily    Discoloration may take a long time to fade and may not fade completely.  Best way to treat it is to prevent it from occurring.    Return in 6-12 weeks, sooner if concerns.    Dry Skin    What is dry skin?    Common skin problem    Can be worse during the winter     Affects all ages    Occurs in people with or without other skin problems    What does it look like?    Fine lines in the skin become more visible     Rough feeling skin     Flaky skin    Most common on the arms and legs    Skin can become cracked, especially on the hands and feet    What are some problems caused by dry skin?     Itching    Rubbing or scratching can cause thickened, rough skin patches    Cracks in skin can be painful    Red, itchy, scaly skin (called eczema) can occur    Yellow crusting or pus could be signs of an infection    What causes dry skin?    A lack of water in the top layer of the skin    Too much soapy water,  hot water, or harsh chemicals    Aging and sun damage    How do I treat dry skin?    Shower or bathe daily for under ten minutes with lukewarm water and mild soap.    Pat yourself dry with a towel gently and leave your skin slightly damp.    Use moisturizing cream or ointment right away.  Avoid lotions.    What kind of mild soap should I be using?    Camay , Dove , Tone , Neutrogena , Purpose , or Oil of Olay     A non-detergent cleanser, like Cetaphil , can be used.    What should I stay away from?    Scented soaps     Bath oils    What moisturizers should I be using?    Cetaphil Cream,CeraVe Cream, Vanicream, Aquaphilic, Eucerin, Aquaphor, or Vaseline     Always apply after showering or bathing.    Reapply  throughout the day, if possible.    If dry skin affects your hands, always reapply after handwashing.    What else should I know?    Using a humidifier during winter months may help.    If dry skin gets worse or if eczema develops, a steroid cream may be needed.

## 2019-11-14 ENCOUNTER — OFFICE VISIT (OUTPATIENT)
Dept: OTOLARYNGOLOGY | Facility: CLINIC | Age: 69
End: 2019-11-14
Attending: FAMILY MEDICINE
Payer: COMMERCIAL

## 2019-11-14 ENCOUNTER — PRE VISIT (OUTPATIENT)
Dept: OTOLARYNGOLOGY | Facility: CLINIC | Age: 69
End: 2019-11-14

## 2019-11-14 VITALS
DIASTOLIC BLOOD PRESSURE: 85 MMHG | HEART RATE: 71 BPM | HEIGHT: 68 IN | OXYGEN SATURATION: 98 % | TEMPERATURE: 97.6 F | RESPIRATION RATE: 18 BRPM | WEIGHT: 178 LBS | SYSTOLIC BLOOD PRESSURE: 129 MMHG | BODY MASS INDEX: 26.98 KG/M2

## 2019-11-14 DIAGNOSIS — R05.3 CHRONIC COUGHING: Primary | ICD-10-CM

## 2019-11-14 DIAGNOSIS — J34.2 DEVIATED NASAL SEPTUM: ICD-10-CM

## 2019-11-14 DIAGNOSIS — J32.0 CHRONIC MAXILLARY SINUSITIS: ICD-10-CM

## 2019-11-14 RX ORDER — LORATADINE 10 MG/1
10 CAPSULE, LIQUID FILLED ORAL DAILY
Qty: 60 CAPSULE | Refills: 3 | Status: SHIPPED | OUTPATIENT
Start: 2019-11-14 | End: 2020-01-13

## 2019-11-14 RX ORDER — MONTELUKAST SODIUM 10 MG/1
10 TABLET ORAL AT BEDTIME
Qty: 90 TABLET | Refills: 1 | Status: SHIPPED | OUTPATIENT
Start: 2019-11-14 | End: 2021-06-04

## 2019-11-14 ASSESSMENT — PAIN SCALES - GENERAL: PAINLEVEL: NO PAIN (0)

## 2019-11-14 ASSESSMENT — MIFFLIN-ST. JEOR: SCORE: 1552.41

## 2019-11-14 NOTE — PROGRESS NOTES
The patient presents with a history of chronic rhinitis and coughing. He reports that the symptoms are most pronounced during the Fall and early spring. His allergy testing demonstrates positive findings for Cat and Dog allergies. He does not have such pets. He reports chronic post-nasal drainage but no facial pain. The patient denies chronic or recurrent tonsillitis, chronic or recurrent pharyngitis. The patient denies otalgia, otorrhea, eustachian tube dysfunction, ear infections, dizziness or tinnitus.       This patient is seen in consultation at the request of Dr. Stewart Henry.    All other systems were reviewed and they are either negative or they are not directly pertinent to this Otolaryngology examination.      Past Medical History:    Past Medical History:   Diagnosis Date     Chronic sinusitis      Hypertension 2002       Past Surgical History:    No past surgical history on file.    Medications:      Current Outpatient Medications:      budesonide (RHINOCORT AQUA) 32 MCG/ACT spray, Spray 2 sprays into both nostrils daily, Disp: 1 Bottle, Rfl: 3     emollient (VANICREAM) external cream, On entire body against dry skin  Use for skin wash free&clear soap and for hair Free&Clear Shampoo and conditioner, Disp: 453 g, Rfl: 11     fexofenadine (ALLEGRA) 180 MG tablet, Take 1 tablet (180 mg) by mouth every morning, Disp: 90 tablet, Rfl: 1     hydrOXYzine (ATARAX) 25 MG tablet, Take 1 tablet (25 mg) by mouth every evening, Disp: 90 tablet, Rfl: 1     losartan-hydrochlorothiazide (HYZAAR) 100-25 MG tablet, Take 1 tablet by mouth daily, Disp: 90 tablet, Rfl: 3     mometasone (ELOCON) 0.1 % external ointment, Apply topically twice a week On lesions, Disp: 45 g, Rfl: 3     mometasone (ELOCON) 0.1 % external ointment, Apply twice daily to the rash for 2-3 weeks as needed., Disp: 45 g, Rfl: 3     naproxen (NAPROSYN) 500 MG tablet, Take 1 tablet (500 mg) by mouth 2 times daily as needed for moderate pain (take with  food), Disp: 30 tablet, Rfl: 1     povidone-iodine (BETADINE) 7.5 % SOLN topical solution, Wash body ~1-2x weekly., Disp: 237 mL, Rfl: 3     triamcinolone (KENALOG) 0.1 % external cream, Apply topically 2 times daily To rash on back and legs for 14 days, Disp: 80 g, Rfl: 1    Allergies:    Patient has no known allergies.    Physical Examination:    The patient is a well developed, well nourished male in no apparent distress.  He is normocepahlic, atraumatic with pupils equally round and reactive to light.    Oral Cavity Examination: Normal Mucosa with no masses or lesions  Nasal Examination: Normal Mucosa with no masses or lesions  Ear Examination: Ear canals clear, tympanic membranes and middle ear spaces normal, deviated septum to the left.   Neurological Examination: Facial nerve function intact and symmetric  Integumentary Examination: No lesions on the skin of the head or neck  Neck Examination: No masses or lesions, no lymphadenopathy  Endocrine Examination: Normal thyroid examination  Flexible Fiberoptic Laryngoscopy:  Normal nasopharynx, base of tongue, pyriform sinuses, epiglottis, valleculae, false vocal cords, true vocal cords, and larynx.  Normal motion of the vocal cords with no lesions, masses, nodules, or polyps bilaterally.     Assessment and Plan:    The patient presents with a history of chronic rhinitis and post-nasal drainage with coughing. Based upon his allergy testing results, the patient will be further evaluated with a CT scan of the sinuses. He will also be treated in the Spring with Claritin and Singulair. He will be seen again after his CT scan is completed.     PreOp Diagnosis:  Chronic Coughing    PostOp Diagnosis: Chronic Coughing    Procedure: Flexible Fiberoptic Laryngoscopy    Estimated Blood Loss: None    Complications: None    Consent: The patient was informed of the possible complications and probable outcomes of the procedure and the patient gave informed consent.    Fluids:  None    Drains: None    Disposition: to home    Findings: Normal nasopharynx, base of tongue, pyriform sinuses, epiglottis, valleculae, false vocal cords, true vocal cords, and larynx.  Normal motion of the vocal cords with no lesions, masses, nodules, or polyps bilaterally.     Description of Procedure:    The patient was positioned on the clinic room chair. The nose was decongested and anesthetized with 2 puffs in each nostrils lidocaine hcl 4% and oxymetazoline hcl 0.05% topical solution. The flexible fiberoptic scope was passed into the each nostrils and the nasal passages visualized. The scope was passed through the left nostril into the nasopharynx which was normal. The base of tongue and tonsil tissues were visualized and found to be normal. The vocal cords and larynx were visualized and the true vocal cords were visualized and found to be normal.       CC: Dr. Stewart Henry

## 2019-11-14 NOTE — LETTER
11/14/2019       RE: Ronna Collins  [address]  [facility] [address]     Dear Colleague,    Thank you for referring your patient, Ronna Collins, to the [facility] EAR NOSE AND THROAT at [facility]. Please see a copy of my visit note below.    The patient presents with a history of chronic rhinitis and coughing. He reports that the symptoms are most pronounced during the Fall and early spring. His allergy testing demonstrates positive findings for Cat and Dog allergies. He does not have such pets. He reports chronic post-nasal drainage but no facial pain. The patient denies chronic or recurrent tonsillitis, chronic or recurrent pharyngitis. The patient denies otalgia, otorrhea, eustachian tube dysfunction, ear infections, dizziness or tinnitus.       This patient is seen in consultation at the request of Dr. Stewart Henry.    All other systems were reviewed and they are either negative or they are not directly pertinent to this Otolaryngology examination.      Past Medical History:    Past Medical History:   Diagnosis Date     Chronic sinusitis      Hypertension 2002       Past Surgical History:    No past surgical history on file.    Medications:      Current Outpatient Medications:      budesonide (RHINOCORT AQUA) 32 MCG/ACT spray, Spray 2 sprays into both nostrils daily, Disp: 1 Bottle, Rfl: 3     emollient (VANICREAM) external cream, On entire body against dry skin  Use for skin wash free&clear soap and for hair Free&Clear Shampoo and conditioner, Disp: 453 g, Rfl: 11     fexofenadine (ALLEGRA) 180 MG tablet, Take 1 tablet (180 mg) by mouth every morning, Disp: 90 tablet, Rfl: 1     hydrOXYzine (ATARAX) 25 MG tablet, Take 1 tablet (25 mg) by mouth every evening, Disp: 90 tablet, Rfl: 1     losartan-hydrochlorothiazide (HYZAAR) 100-25 MG tablet, Take 1 tablet by mouth daily, Disp: 90 tablet, Rfl: 3     mometasone (ELOCON) 0.1 % external ointment, Apply  topically twice a week On lesions, Disp: 45 g, Rfl: 3     mometasone (ELOCON) 0.1 % external ointment, Apply twice daily to the rash for 2-3 weeks as needed., Disp: 45 g, Rfl: 3     naproxen (NAPROSYN) 500 MG tablet, Take 1 tablet (500 mg) by mouth 2 times daily as needed for moderate pain (take with food), Disp: 30 tablet, Rfl: 1     povidone-iodine (BETADINE) 7.5 % SOLN topical solution, Wash body ~1-2x weekly., Disp: 237 mL, Rfl: 3     triamcinolone (KENALOG) 0.1 % external cream, Apply topically 2 times daily To rash on back and legs for 14 days, Disp: 80 g, Rfl: 1    Allergies:    Patient has no known allergies.    Physical Examination:    The patient is a well developed, well nourished male in no apparent distress.  He is normocepahlic, atraumatic with pupils equally round and reactive to light.    Oral Cavity Examination: Normal Mucosa with no masses or lesions  Nasal Examination: Normal Mucosa with no masses or lesions  Ear Examination: Ear canals clear, tympanic membranes and middle ear spaces normal, deviated septum to the left.   Neurological Examination: Facial nerve function intact and symmetric  Integumentary Examination: No lesions on the skin of the head or neck  Neck Examination: No masses or lesions, no lymphadenopathy  Endocrine Examination: Normal thyroid examination  Flexible Fiberoptic Laryngoscopy:  Normal nasopharynx, base of tongue, pyriform sinuses, epiglottis, valleculae, false vocal cords, true vocal cords, and larynx.  Normal motion of the vocal cords with no lesions, masses, nodules, or polyps bilaterally.     Assessment and Plan:    The patient presents with a history of chronic rhinitis and post-nasal drainage with coughing. Based upon his allergy testing results, the patient will be further evaluated with a CT scan of the sinuses. He will also be treated in the Spring with Claritin and Singulair. He will be seen again after his CT scan is completed.     PreOp Diagnosis:  Chronic  Coughing    PostOp Diagnosis: Chronic Coughing    Procedure: Flexible Fiberoptic Laryngoscopy    Estimated Blood Loss: None    Complications: None    Consent: The patient was informed of the possible complications and probable outcomes of the procedure and the patient gave informed consent.    Fluids: None    Drains: None    Disposition: to home    Findings: Normal nasopharynx, base of tongue, pyriform sinuses, epiglottis, valleculae, false vocal cords, true vocal cords, and larynx.  Normal motion of the vocal cords with no lesions, masses, nodules, or polyps bilaterally.     Description of Procedure:    The patient was positioned on the clinic room chair. The nose was decongested and anesthetized with 2 puffs in each nostrils lidocaine hcl 4% and oxymetazoline hcl 0.05% topical solution. The flexible fiberoptic scope was passed into the each nostrils and the nasal passages visualized. The scope was passed through the left nostril into the nasopharynx which was normal. The base of tongue and tonsil tissues were visualized and found to be normal. The vocal cords and larynx were visualized and the true vocal cords were visualized and found to be normal.       CC: Dr. Stewart Henry      Again, thank you for allowing me to participate in the care of your patient.      Sincerely,    Carson Cesar MD

## 2019-11-14 NOTE — PATIENT INSTRUCTIONS
1.  You were seen in the ENT Clinic today by Dr. Cesar.  If you have any questions or concerns after your appointment, please call [phone]. Press option #1 for scheduling related needs. Press option #3 for Nurse advice.    2.  Please schedule an appointment for the following:   - CT Scan - Sinus    3.  Plan is to return to clinic to see Dr. Cesar AFTER completion of CT scan.      Renay Lopez LPN  [facility] Otolaryngology  [phone]    The patient presents with a history of chronic rhinitis and post-nasal drainage with coughing. Based upon his allergy testing results, the patient will be further evaluated with a CT scan of the sinuses. He will also be treated in the Spring with Claritin and Singulair. He will be seen again after his CT scan is completed.

## 2019-11-14 NOTE — NURSING NOTE
"Chief Complaint   Patient presents with     Consult     post nasal drip      Blood pressure 129/85, pulse 71, temperature 97.6  F (36.4  C), resp. rate 18, height 1.72 m (5' 7.72\"), weight 80.7 kg (178 lb), SpO2 98 %.    Juan Manuel Ulloa LPN    "

## 2019-11-15 ENCOUNTER — MYC MEDICAL ADVICE (OUTPATIENT)
Dept: OTOLARYNGOLOGY | Facility: CLINIC | Age: 69
End: 2019-11-15

## 2019-11-15 ENCOUNTER — ANCILLARY PROCEDURE (OUTPATIENT)
Dept: CT IMAGING | Facility: CLINIC | Age: 69
End: 2019-11-15
Attending: OTOLARYNGOLOGY
Payer: COMMERCIAL

## 2019-11-15 DIAGNOSIS — J32.0 CHRONIC MAXILLARY SINUSITIS: ICD-10-CM

## 2019-11-15 DIAGNOSIS — R05.3 CHRONIC COUGHING: ICD-10-CM

## 2019-11-22 ENCOUNTER — OFFICE VISIT (OUTPATIENT)
Dept: OTOLARYNGOLOGY | Facility: CLINIC | Age: 69
End: 2019-11-22
Payer: COMMERCIAL

## 2019-11-22 VITALS
RESPIRATION RATE: 17 BRPM | SYSTOLIC BLOOD PRESSURE: 130 MMHG | DIASTOLIC BLOOD PRESSURE: 88 MMHG | HEART RATE: 90 BPM | BODY MASS INDEX: 27.29 KG/M2 | WEIGHT: 178 LBS

## 2019-11-22 DIAGNOSIS — J32.0 CHRONIC MAXILLARY SINUSITIS: Primary | ICD-10-CM

## 2019-11-22 ASSESSMENT — PAIN SCALES - GENERAL: PAINLEVEL: NO PAIN (0)

## 2019-11-22 NOTE — LETTER
11/22/2019       RE: Ronna Collins  [address]  [facility] [address]     Dear Colleague,  Thank you for referring your patient, Ronna Collins, to the [facility] EAR NOSE AND THROAT at [facility]. Please see a copy of my visit note below.    The patient presents with a history of chronic rhinitis and coughing. He reports that the symptoms are most pronounced during the Fall and early spring. His allergy testing demonstrates positive findings for Cat and Dog allergies. He does not have such pets. He reports chronic post-nasal drainage but no facial pain. The patient's CT scan demonstrates:     Impression: No evidence of acute sinusitis. Pansinus mucosal  thickening. Enlarged turbinates with irregular margins and  outpouchings. These may be sequela of chronic rhinosinusitis or small  polyps.     All other systems were reviewed and they are either negative or they are not directly pertinent to this Otolaryngology examination.      Past Medical History:  Past Medical History:   Diagnosis Date     Chronic sinusitis      Hypertension 2002     Past Surgical History:  No past surgical history on file.    Medications:  Current Outpatient Medications:      budesonide (RHINOCORT AQUA) 32 MCG/ACT spray, Spray 2 sprays into both nostrils daily, Disp: 1 Bottle, Rfl: 3     emollient (VANICREAM) external cream, On entire body against dry skin  Use for skin wash free&clear soap and for hair Free&Clear Shampoo and conditioner, Disp: 453 g, Rfl: 11     fexofenadine (ALLEGRA) 180 MG tablet, Take 1 tablet (180 mg) by mouth every morning, Disp: 90 tablet, Rfl: 1     hydrOXYzine (ATARAX) 25 MG tablet, Take 1 tablet (25 mg) by mouth every evening, Disp: 90 tablet, Rfl: 1     loratadine (CLARITIN) 10 MG capsule, Take 10 mg by mouth daily, Disp: 60 capsule, Rfl: 3     losartan-hydrochlorothiazide (HYZAAR) 100-25 MG tablet, Take 1 tablet by mouth daily, Disp: 90 tablet, Rfl: 3      mometasone (ELOCON) 0.1 % external ointment, Apply topically twice a week On lesions, Disp: 45 g, Rfl: 3     mometasone (ELOCON) 0.1 % external ointment, Apply twice daily to the rash for 2-3 weeks as needed., Disp: 45 g, Rfl: 3     montelukast (SINGULAIR) 10 MG tablet, Take 1 tablet (10 mg) by mouth At Bedtime, Disp: 90 tablet, Rfl: 1     naproxen (NAPROSYN) 500 MG tablet, Take 1 tablet (500 mg) by mouth 2 times daily as needed for moderate pain (take with food), Disp: 30 tablet, Rfl: 1     povidone-iodine (BETADINE) 7.5 % SOLN topical solution, Wash body ~1-2x weekly., Disp: 237 mL, Rfl: 3     triamcinolone (KENALOG) 0.1 % external cream, Apply topically 2 times daily To rash on back and legs for 14 days, Disp: 80 g, Rfl: 1    Allergies:  Patient has no known allergies.    Physical Examination:  The patient is a well developed, well nourished male in no apparent distress.  He is normocepahlic, atraumatic with pupils equally round and reactive to light.    Oral Cavity Examination: Normal Mucosa with no masses or lesions  Nasal Examination: Normal Mucosa with no masses or lesions  Neurological Examination: Facial nerve function intact and symmetric  Integumentary Examination: No lesions on the skin of the head or neck    Assessment and Plan:  The patient presents with a history of chronic rhinitis and post-nasal drainage with coughing. Based upon his CT scan, the patient will referred to Dr. Carson Coughiln for further management. He will also be treated in the Spring with Claritin and Singulair.       CC: Dr. Stewart Henry          Again, thank you for allowing me to participate in the care of your patient.      Sincerely,    Carson Cesar MD

## 2019-11-22 NOTE — PROGRESS NOTES
The patient presents with a history of chronic rhinitis and coughing. He reports that the symptoms are most pronounced during the Fall and early spring. His allergy testing demonstrates positive findings for Cat and Dog allergies. He does not have such pets. He reports chronic post-nasal drainage but no facial pain. The patient's CT scan demonstrates:     Impression: No evidence of acute sinusitis. Pansinus mucosal  thickening. Enlarged turbinates with irregular margins and  outpouchings. These may be sequela of chronic rhinosinusitis or small  polyps.     All other systems were reviewed and they are either negative or they are not directly pertinent to this Otolaryngology examination.      Past Medical History:    Past Medical History:   Diagnosis Date     Chronic sinusitis      Hypertension 2002       Past Surgical History:    No past surgical history on file.    Medications:      Current Outpatient Medications:      budesonide (RHINOCORT AQUA) 32 MCG/ACT spray, Spray 2 sprays into both nostrils daily, Disp: 1 Bottle, Rfl: 3     emollient (VANICREAM) external cream, On entire body against dry skin  Use for skin wash free&clear soap and for hair Free&Clear Shampoo and conditioner, Disp: 453 g, Rfl: 11     fexofenadine (ALLEGRA) 180 MG tablet, Take 1 tablet (180 mg) by mouth every morning, Disp: 90 tablet, Rfl: 1     hydrOXYzine (ATARAX) 25 MG tablet, Take 1 tablet (25 mg) by mouth every evening, Disp: 90 tablet, Rfl: 1     loratadine (CLARITIN) 10 MG capsule, Take 10 mg by mouth daily, Disp: 60 capsule, Rfl: 3     losartan-hydrochlorothiazide (HYZAAR) 100-25 MG tablet, Take 1 tablet by mouth daily, Disp: 90 tablet, Rfl: 3     mometasone (ELOCON) 0.1 % external ointment, Apply topically twice a week On lesions, Disp: 45 g, Rfl: 3     mometasone (ELOCON) 0.1 % external ointment, Apply twice daily to the rash for 2-3 weeks as needed., Disp: 45 g, Rfl: 3     montelukast (SINGULAIR) 10 MG tablet, Take 1 tablet (10 mg)  by mouth At Bedtime, Disp: 90 tablet, Rfl: 1     naproxen (NAPROSYN) 500 MG tablet, Take 1 tablet (500 mg) by mouth 2 times daily as needed for moderate pain (take with food), Disp: 30 tablet, Rfl: 1     povidone-iodine (BETADINE) 7.5 % SOLN topical solution, Wash body ~1-2x weekly., Disp: 237 mL, Rfl: 3     triamcinolone (KENALOG) 0.1 % external cream, Apply topically 2 times daily To rash on back and legs for 14 days, Disp: 80 g, Rfl: 1    Allergies:    Patient has no known allergies.    Physical Examination:    The patient is a well developed, well nourished male in no apparent distress.  He is normocepahlic, atraumatic with pupils equally round and reactive to light.    Oral Cavity Examination: Normal Mucosa with no masses or lesions  Nasal Examination: Normal Mucosa with no masses or lesions  Neurological Examination: Facial nerve function intact and symmetric  Integumentary Examination: No lesions on the skin of the head or neck    Assessment and Plan:    The patient presents with a history of chronic rhinitis and post-nasal drainage with coughing. Based upon his CT scan, the patient will referred to Dr. Carson Coughlin for further management. He will also be treated in the Spring with Claritin and Singulair.       CC: Dr. Stewart Henry

## 2019-11-22 NOTE — NURSING NOTE
Chief Complaint   Patient presents with     RECHECK     chronic cough      Blood pressure 130/88, pulse 90, resp. rate 17, weight 80.7 kg (178 lb).    Juan Manuel Ulloa LPN

## 2019-11-22 NOTE — PATIENT INSTRUCTIONS
1.  You were seen in the ENT Clinic today by Dr. Cesar.  If you have any questions or concerns after your appointment, please call [phone]. Press option #1 for scheduling related needs. Press option #3 for Nurse advice.    2.  Please contact our Medical Records Department to obtain CT scan images:   - Phone #: [phone]    3.  Plan is to return to clinic to see Dr. Carson Coughlin for further evaluation.      Renay Lopez LPN  [facility] Otolaryngology  [phone]    The patient presents with a history of chronic rhinitis and post-nasal drainage with coughing. Based upon his CT scan, the patient will referred to Dr. Carson Coughlin for further management. He will also be treated in the Spring with Claritin and Singulair.

## 2020-10-01 ASSESSMENT — ENCOUNTER SYMPTOMS
POOR WOUND HEALING: 0
NAIL CHANGES: 0
SKIN CHANGES: 0

## 2020-10-14 ENCOUNTER — OFFICE VISIT (OUTPATIENT)
Dept: FAMILY MEDICINE | Facility: CLINIC | Age: 70
End: 2020-10-14
Payer: COMMERCIAL

## 2020-10-14 VITALS
WEIGHT: 176.7 LBS | OXYGEN SATURATION: 98 % | SYSTOLIC BLOOD PRESSURE: 134 MMHG | DIASTOLIC BLOOD PRESSURE: 88 MMHG | HEART RATE: 73 BPM | BODY MASS INDEX: 27.09 KG/M2

## 2020-10-14 DIAGNOSIS — Z00.00 HEALTH CARE MAINTENANCE: Primary | ICD-10-CM

## 2020-10-14 DIAGNOSIS — Z00.00 ENCOUNTER FOR MEDICARE ANNUAL WELLNESS EXAM: ICD-10-CM

## 2020-10-14 DIAGNOSIS — L98.9 SKIN LESION: ICD-10-CM

## 2020-10-14 DIAGNOSIS — I10 BENIGN ESSENTIAL HYPERTENSION: ICD-10-CM

## 2020-10-14 DIAGNOSIS — E78.1 HIGH TRIGLYCERIDES: ICD-10-CM

## 2020-10-14 LAB
ALBUMIN SERPL-MCNC: 4.1 G/DL (ref 3.4–5)
ALP SERPL-CCNC: 67 U/L (ref 40–150)
ALT SERPL W P-5'-P-CCNC: 49 U/L (ref 0–70)
ANION GAP SERPL CALCULATED.3IONS-SCNC: 5 MMOL/L (ref 3–14)
AST SERPL W P-5'-P-CCNC: 35 U/L (ref 0–45)
BILIRUB SERPL-MCNC: 0.6 MG/DL (ref 0.2–1.3)
BUN SERPL-MCNC: 9 MG/DL (ref 7–30)
CALCIUM SERPL-MCNC: 9 MG/DL (ref 8.5–10.1)
CHLORIDE SERPL-SCNC: 108 MMOL/L (ref 94–109)
CHOLEST SERPL-MCNC: 160 MG/DL
CO2 SERPL-SCNC: 26 MMOL/L (ref 20–32)
CREAT SERPL-MCNC: 0.75 MG/DL (ref 0.66–1.25)
GFR SERPL CREATININE-BSD FRML MDRD: >90 ML/MIN/{1.73_M2}
GLUCOSE SERPL-MCNC: 107 MG/DL (ref 70–99)
HDLC SERPL-MCNC: 51 MG/DL
LDLC SERPL CALC-MCNC: 72 MG/DL
NONHDLC SERPL-MCNC: 108 MG/DL
POTASSIUM SERPL-SCNC: 4.4 MMOL/L (ref 3.4–5.3)
PROT SERPL-MCNC: 7.7 G/DL (ref 6.8–8.8)
SODIUM SERPL-SCNC: 140 MMOL/L (ref 133–144)
TRIGL SERPL-MCNC: 183 MG/DL

## 2020-10-14 PROCEDURE — 36415 COLL VENOUS BLD VENIPUNCTURE: CPT | Performed by: PATHOLOGY

## 2020-10-14 PROCEDURE — 80053 COMPREHEN METABOLIC PANEL: CPT | Performed by: PATHOLOGY

## 2020-10-14 PROCEDURE — 80061 LIPID PANEL: CPT | Performed by: PATHOLOGY

## 2020-10-14 PROCEDURE — 99397 PER PM REEVAL EST PAT 65+ YR: CPT | Performed by: FAMILY MEDICINE

## 2020-10-14 RX ORDER — ATORVASTATIN CALCIUM 40 MG/1
40 TABLET, FILM COATED ORAL DAILY
Qty: 90 TABLET | Refills: 3 | Status: SHIPPED | OUTPATIENT
Start: 2020-10-14 | End: 2021-06-04

## 2020-10-14 RX ORDER — LOSARTAN POTASSIUM AND HYDROCHLOROTHIAZIDE 25; 100 MG/1; MG/1
1 TABLET ORAL DAILY
Qty: 90 TABLET | Refills: 3 | Status: SHIPPED | OUTPATIENT
Start: 2020-10-14 | End: 2021-04-19

## 2020-10-14 ASSESSMENT — PAIN SCALES - GENERAL: PAINLEVEL: NO PAIN (0)

## 2020-10-14 NOTE — NURSING NOTE
Chief Complaint   Patient presents with     Medicare Visit     Pt would like a medicare annual wellness visit      JOSE Castillo at 8:56 AM sign on 10/14/2020

## 2020-10-14 NOTE — PROGRESS NOTES
Answers for HPI/ROS submitted by the patient on 10/1/2020   General Symptoms: No  Skin Symptoms: Yes  HENT Symptoms: No  EYE SYMPTOMS: No  HEART SYMPTOMS: No  LUNG SYMPTOMS: No  INTESTINAL SYMPTOMS: No  URINARY SYMPTOMS: No  REPRODUCTIVE SYMPTOMS: No  SKELETAL SYMPTOMS: No  BLOOD SYMPTOMS: No  NERVOUS SYSTEM SYMPTOMS: No  MENTAL HEALTH SYMPTOMS: No  Changes in hair: No  Changes in moles/birth marks: No  Itching: Yes  Rashes: No  Changes in nails: No  Acne: No  Change in facial hair: No  Warts: No  Non-healing sores: No  Scarring: No  Flaking of skin: No  Color changes of hands/feet in cold : No  Sun sensitivity: No  Skin thickening: No    Skin right upper arm lesion about a year getting bigger at first, is itchy no bleed  Dermtatitis better  Walks for exercise, home bp's about like here  First gc on way   PMH Hi trig, htn  No PSH  FH: dad  at 85          Mom  at 86 had htn         DM in family, htn too    SH , [employer] prof, smoked till       Medicare Annual Wellness Visit         HPI     This 70 year old male presents as an established patient  Stewart Henry who presents for an subsequent Medicare Wellness Exam.  Patient also reports above        Patient Active Problem List   Diagnosis     High triglycerides     HTN (hypertension)       Past Medical History:   Diagnosis Date     Chronic sinusitis      Hypertension         Family History   Problem Relation Age of Onset     Diabetes Brother      Hypertension Brother          No past surgical history on file.    Reviewed no other significant FH    Family History and past Medical History reviewed and it is unchanged/updated.       Review of Systems       above         Medical Care     Have you been to an ER or a hospital in the last year? No  What other specialists or organizations are involved in your medical care?  below  Current providers sharing in care for this patient include:  Patient Care Team:  Stewart Henry MD as PCP - General  (Family Practice)  Jeremy Varela MD as MD (Dermatology)  Kayla Buitrago MD as MD (Internal Medicine)  Stewart Henry MD as Assigned PCP         Social History     Social History     Tobacco Use     Smoking status: Former Smoker     Packs/day: 0.50     Years: 20.00     Pack years: 10.00     Types: Cigarettes     Start date: 1976     Quit date: 2000     Years since quittin.1     Smokeless tobacco: Never Used   Substance Use Topics     Alcohol use: Yes     Comment: occ     Marital Status:  Who lives in your household? Wife and self  Does your home have any of the following safety concerns? Loose rugs in the hallway, no grab bars in the bathroom, no handrails on the stairs or have poorly lit areas?  No  Do you feel threatened or controlled by a partner, ex-partner or anyone in your life? No  Has anyone hurt you physically, for example by pushing, hitting, slapping or kicking you   or forcing you to have sex? No  Do you need help with the phone, transportation, shopping, preparing meals, housework, laundry, medications or managing money? No   Have you noticed any hearing difficulties? No      Risk Behaviors and Healthy Habits     How many servings of fruits and vegetables do you eat a day? 4  How often do you exercise and what do you do? 60 minutes not answered a week  Do you frequently ride without a seatbelt? No  Do you use tobacco?  No  Do you use any other drugs? No         Do you use alcohol?No      Today's PHQ-2 Score: 0    Sexual Health     Are you sexually active?  No   If yes, with men, women, or both? NA  If yes, do you more than one current partner?No  If yes, are you using condoms?  No  Have you had any sexually transmitted infections? No   Any sexual concerns? No       FUNCTIONAL ABILITY/SAFETY SCREENING     Fall Risk Assessment Today: Fallen 2 or more times in the past year?: No  Any fall with injury in the past year?: No    Hearing evaluation if done: No    EVALUATION OF  COGNITIVE FUNCTION     Mood/affect:Normal  Appearance:Normal  Family member/caregiver input: none    Mini Cog Scoring   3 points   Clock Draw Test result:  Normal      SCREENING FOR PREVENTION and EARLY DETECTION     ECG (if done)not performed    Corrected Visual acuity: will see eye MD here soon    CV Risk based on Pooled Cohort Risk:  The 10-year ASCVD risk score (Kyler NGUYEN Jr., et al., 2013) is: 16.5%    Values used to calculate the score:      Age: 70 years      Sex: Male      Is Non- : No      Diabetic: No      Tobacco smoker: No      Systolic Blood Pressure: 134 mmHg      Is BP treated: Yes      HDL Cholesterol: 44 mg/dL      Total Cholesterol: 130 mg/dL  {      Advanced Directives: Discussed and patient desires to he will bring his to scan in.      Immunization History   Administered Date(s) Administered     Influenza (High Dose) 3 valent vaccine 09/20/2017, 10/02/2018, 10/07/2019     Influenza (IIV3) PF 09/24/2012, 10/28/2013, 12/29/2014     Pneumo Conj 13-V (2010&after) 09/20/2017     Pneumococcal 23 valent 10/02/2018     TD (ADULT, 7+) 11/29/2004     TDAP Vaccine (Boostrix) 10/28/2013     Zoster vaccine recombinant adjuvanted (SHINGRIX) 10/07/2019     Zoster vaccine, live 09/24/2012       Reviewed Immunization Record Today  Pneumoccocal Vaccine: No  Varicella Vaccine: Yes  TDaP:No         Physical Exam     Vitals: /88 (BP Location: Left arm, Patient Position: Sitting, Cuff Size: Adult Regular)   Pulse 73   Wt 80.2 kg (176 lb 11.2 oz)   SpO2 98%   BMI 27.09 kg/m    BMI= Body mass index is 27.09 kg/m .  GENERAL APPEARANCE: healthy, alert and no distress  EYES: Eyes grossly normal to inspection, PERRL and conjunctivae and sclerae normal  HENT: ear canals and TM's normal, nose and mouth without ulcers or lesions, oropharynx clear and oral mucous membranes moist  NECK: no adenopathy, no asymmetry, masses, or scars and thyroid normal to palpation  RESP: lungs clear to auscultation  - no rales, rhonchi or wheezes  CV: regular rates and rhythm, normal S1 S2, no S3 or S4, no murmur, click or rub, no peripheral edema and peripheral pulses strong  ABDOMEN: soft, nontender, no hepatosplenomegaly, no masses and bowel sounds normal   (male): normal male genitalia without lesions or urethral discharge, no hernia  RECTAL: normal sphincter tone, no rectal masses, prostate of normal size, smooth, nontender without nodules or masses  MS: no musculoskeletal defects are noted and gait is age appropriate without ataxia  SKIN: no suspicious lesions or rashes except right upper arm five mm around based raised nodule  NEURO: Normal strength and tone, sensory exam grossly normal, mentation intact and speech normal  PSYCH: mentation appears normal and affect normal/bright        Assessment and Plan   subsequent   Medicare Wellness Exam      PLAN:  1. Lipid panel ordered. , Patient referred to ophthalmology for glaucoma testing. , Colonoscopy ordered. and Routine follow up in one year.       Ronna was seen today for medicare visit.    Diagnoses and all orders for this visit:    Health care maintenance    Benign essential hypertension    Based on risk calc, start lipitor    See derm for arm lesion, KA vs SCC    Cont as is htn/meds      Options for treatment and follow-up care were reviewed with the Ronna DICKERSON Karina and/or guardian engaged in the decision making process and verbalized understanding of the options discussed and agreed with the final plan.    AMANDA UREÑA

## 2020-10-14 NOTE — PATIENT INSTRUCTIONS
Personalized Prevention Plan  You are due for the preventive services outlined below.  Your care team is available to assist you in scheduling these services.  If you have already completed any of these items, please share that information with your care team to update in your medical record.  Health Maintenance Due   Topic Date Due     Discuss Advance Care Planning  1950     AORTIC ANEURYSM SCREENING (SYSTEM ASSIGNED)  08/03/2017     Colorectal Cancer Screening  08/07/2018     Zoster (Shingles) Vaccine (3 of 3) 12/02/2019       Gastroenterology [phone] (4th Floor [facility])   Dermatology [phone] (3rd Floor [facility])

## 2021-01-14 ENCOUNTER — OFFICE VISIT (OUTPATIENT)
Dept: DERMATOLOGY | Facility: CLINIC | Age: 71
End: 2021-01-14
Attending: FAMILY MEDICINE
Payer: COMMERCIAL

## 2021-01-14 DIAGNOSIS — D48.5 NEOPLASM OF UNCERTAIN BEHAVIOR OF SKIN: Primary | ICD-10-CM

## 2021-01-14 DIAGNOSIS — L82.1 SEBORRHEIC KERATOSIS: ICD-10-CM

## 2021-01-14 DIAGNOSIS — L30.0 NUMMULAR DERMATITIS: ICD-10-CM

## 2021-01-14 PROCEDURE — 99213 OFFICE O/P EST LOW 20 MIN: CPT | Mod: 25 | Performed by: DERMATOLOGY

## 2021-01-14 PROCEDURE — 88305 TISSUE EXAM BY PATHOLOGIST: CPT | Performed by: DERMATOLOGY

## 2021-01-14 PROCEDURE — 11301 SHAVE SKIN LESION 0.6-1.0 CM: CPT | Mod: GC | Performed by: DERMATOLOGY

## 2021-01-14 ASSESSMENT — PAIN SCALES - GENERAL: PAINLEVEL: NO PAIN (0)

## 2021-01-14 NOTE — PROGRESS NOTES
[facility] Dermatology Note  Encounter Date: Jan 14, 2021  Office Visit     Dermatology Problem List:  0. NUB right triceps s/p shave bx 1/14/21  1.  Nummular dermatitis, previously with impetiginization.    -Current treatment:  Mometasone 0.1% ointment b.i.d., gentle skin care with frequent moisturizer application  - Prior rx: povidone iodine wash, bacterial cx 6/21/19 with MSSA  2.  Atopic dermatitis with chronic sinusitis and cat/dog allergy on prick testing 6/21/19     ____________________________________________    Assessment & Plan:  # Neoplasm of uncertain behavior on the right triceps. The differential diagnosis includes verruca vulgaris vs SCC vs KA. Suspect verruca but given size ~1 cm, elected to do shave biopsy .  - Shave biopsy performed today (see procedure note(s) below).       # Nummular dermatitis  No active lesions, but several post inflammatory patches with xerosis.  -Will refill mometasone to use for flares, encouraged more frequent emollient use    #SK right upper extremity  -Benign nature of skin lesion described    Procedures Performed:   - Shave biopsy procedure note: After discussion of benefits and risks including but not limited to bleeding, infection, scar, incomplete removal, recurrence, and non-diagnostic biopsy, written consent and photographs were obtained. The area was cleaned with isopropyl alcohol. 1 mL of 1% lidocaine with epinephrine was injected to obtain adequate anesthesia of 1 lesion(s) on the right triceps. Shave biopsy at site(s) performed. Hemostasis was achieved with aluminium chloride. Petrolatum ointment and a sterile dressing were applied. The patient tolerated the procedure and no complications were noted. The patient was provided with verbal and written post care instructions.     Follow-up: Pending biopsy otherwise prn for new or changing lesions    Staff and Resident:     Peter Mares MD  Internal Medicine-Dermatology,  PGY-5    Staffed with Dr. Ranjit GREENFIELD was present for key portions of the procedure. Nati WOODRUFF I, Nati Church MD, saw this patient with the resident and agree with the resident s findings and plan of care as documented in the resident s note.    ____________________________________________    CC: Derm Problem (Ronna is here today for a lesion on his arm )      HPI:  Mr. Ronna Collins is a(n) 70 year old male who presents today as a return patient for a growth on the arm    Patient is otherwise feeling well, without additional concerns.    ROS: As per HPI    Labs:  None reviewed.    Physical Exam:  Vitals: There were no vitals taken for this visit.  SKIN: Focused examination of the upper extremities, lower back, and this shins/calves was performed.  - ~1cm verrucous nodule on the right triceps  -adjacent is a stuck on waxy brown papule with pseudocysts seen under dermoscopy  -xerosis cutis  -post inflammatory dark ovoid patches on back and lower extremities  - No other lesions of concern on areas examined.     Medications:  Current Outpatient Medications   Medication     atorvastatin (LIPITOR) 40 MG tablet     budesonide (RHINOCORT AQUA) 32 MCG/ACT spray     emollient (VANICREAM) external cream     losartan-hydrochlorothiazide (HYZAAR) 100-25 MG tablet     mometasone (ELOCON) 0.1 % external ointment     mometasone (ELOCON) 0.1 % external ointment     montelukast (SINGULAIR) 10 MG tablet     naproxen (NAPROSYN) 500 MG tablet     povidone-iodine (BETADINE) 7.5 % SOLN topical solution     triamcinolone (KENALOG) 0.1 % external cream     No current facility-administered medications for this visit.       Past Medical/Surgical History:   Patient Active Problem List   Diagnosis     High triglycerides     HTN (hypertension)     Past Medical History:   Diagnosis Date     Chronic sinusitis      Hypertension 2002       CC Stewart Henry MD  [address] [place] [address] on close of  this encounter.

## 2021-01-14 NOTE — NURSING NOTE
Dermatology Rooming Note    Ronna Collins's goals for this visit include:   Chief Complaint   Patient presents with     Derm Problem     Ronna is here today for a lesion on his arm      CODY Baeza

## 2021-01-14 NOTE — PATIENT INSTRUCTIONS

## 2021-01-18 LAB — COPATH REPORT: NORMAL

## 2021-04-19 ENCOUNTER — TELEPHONE (OUTPATIENT)
Dept: FAMILY MEDICINE | Facility: CLINIC | Age: 71
End: 2021-04-19

## 2021-04-19 DIAGNOSIS — I10 BENIGN ESSENTIAL HYPERTENSION: ICD-10-CM

## 2021-04-19 RX ORDER — LOSARTAN POTASSIUM AND HYDROCHLOROTHIAZIDE 25; 100 MG/1; MG/1
1 TABLET ORAL DAILY
Qty: 90 TABLET | Refills: 1 | Status: SHIPPED | OUTPATIENT
Start: 2021-04-19 | End: 2021-10-25

## 2021-04-19 NOTE — TELEPHONE ENCOUNTER
[facility] Call Center    Phone Message    May a detailed message be left on voicemail: yes     Reason for Call: Medication Refill Request    Has the patient contacted the pharmacy for the refill? Yes   Name of medication being requested: losartan-hydrochlorothiazide (HYZAAR) 100-25 MG tablet  Provider who prescribed the medication: Doctor Ofstedal  Pharmacy: [id number] - [address] [place] [address]  Date medication is needed: as soon as possible         Action Taken: Message routed to:  [facility] primary care clinic    Travel Screening: Not Applicable

## 2021-06-01 ENCOUNTER — TRANSFERRED RECORDS (OUTPATIENT)
Dept: HEALTH INFORMATION MANAGEMENT | Facility: CLINIC | Age: 71
End: 2021-06-01

## 2021-06-01 ENCOUNTER — DOCUMENTATION ONLY (OUTPATIENT)
Dept: ONCOLOGY | Facility: CLINIC | Age: 71
End: 2021-06-01

## 2021-06-01 ENCOUNTER — PATIENT OUTREACH (OUTPATIENT)
Dept: SURGERY | Facility: CLINIC | Age: 71
End: 2021-06-01

## 2021-06-01 ENCOUNTER — TRANSCRIBE ORDERS (OUTPATIENT)
Dept: OTHER | Age: 71
End: 2021-06-01

## 2021-06-01 ENCOUNTER — TELEPHONE (OUTPATIENT)
Dept: FAMILY MEDICINE | Facility: CLINIC | Age: 71
End: 2021-06-01

## 2021-06-01 DIAGNOSIS — C50.021: ICD-10-CM

## 2021-06-01 DIAGNOSIS — C50.929: Primary | ICD-10-CM

## 2021-06-01 DIAGNOSIS — D49.2 NEOPLASM OF SKIN: Primary | ICD-10-CM

## 2021-06-01 NOTE — PROGRESS NOTES
RECORDS STATUS - ALL OTHER DIAGNOSIS      RECORDS RECEIVED FROM: [place], [facility]   DATE RECEIVED:    NOTES STATUS DETAILS   OFFICE NOTE from referring provider     OFFICE NOTE from medical oncologist     DISCHARGE SUMMARY from hospital     DISCHARGE REPORT from the ER     OPERATIVE REPORT     MEDICATION LIST     CLINICAL TRIAL TREATMENTS TO DATE     LABS     PATHOLOGY REPORTS Muslim, Report in CE, Slides requested per IB  [employer]:    ANYTHING RELATED TO DIAGNOSIS     GENONOMIC TESTING     TYPE:     IMAGING (NEED IMAGES & REPORT)     CT SCANS PACS 21: [facility]   MRI     MAMMO     ULTRASOUND     PET

## 2021-06-01 NOTE — TELEPHONE ENCOUNTER
[facility] Call Center    Phone Message    May a detailed message be left on voicemail: yes     Reason for Call: Other: Pt requesting call back to discussa referral for male breast cancer. Pt would like a referral to either Dr. Serge Gould or Dr. Nida Snider     Action Taken: Message routed to:  [facility] pcc

## 2021-06-01 NOTE — PROGRESS NOTES
New Patient Oncology Nurse Navigator Note     Referring provider: Self     Referring Clinic/Organization: [facility]     Referred to: Surgical Oncology - Breast Surgery     Requested provider (if applicable): Dr. Nida Snider     Referral Received: 06/01/21       Evaluation for : Breast cancer      Clinical History (per Nurse review of records provided):      - patient seen by derm for concerns of right breast skin lesion, biopsy completed and concerns of breast cancer.       Past Medical History:   Diagnosis Date     Chronic sinusitis      Hypertension 2002       No past surgical history on file.    Current Outpatient Medications   Medication Sig Dispense Refill     atorvastatin (LIPITOR) 40 MG tablet Take 1 tablet (40 mg) by mouth daily 90 tablet 3     budesonide (RHINOCORT AQUA) 32 MCG/ACT spray Spray 2 sprays into both nostrils daily 1 Bottle 3     emollient (VANICREAM) external cream On entire body against dry skin    Use for skin wash free&clear soap and for hair Free&Clear Shampoo and conditioner 453 g 11     losartan-hydrochlorothiazide (HYZAAR) 100-25 MG tablet Take 1 tablet by mouth daily 90 tablet 1     mometasone (ELOCON) 0.1 % external ointment Apply topically twice a week On lesions 45 g 3     mometasone (ELOCON) 0.1 % external ointment Apply twice daily to the rash for 2-3 weeks as needed. 45 g 3     montelukast (SINGULAIR) 10 MG tablet Take 1 tablet (10 mg) by mouth At Bedtime 90 tablet 1     naproxen (NAPROSYN) 500 MG tablet Take 1 tablet (500 mg) by mouth 2 times daily as needed for moderate pain (take with food) 30 tablet 1     povidone-iodine (BETADINE) 7.5 % SOLN topical solution Wash body ~1-2x weekly. 237 mL 3     triamcinolone (KENALOG) 0.1 % external cream Apply topically 2 times daily To rash on back and legs for 14 days 80 g 1         No Known Allergies       Clinical Assessment / Barriers to Care (Per Nurse):    None at this time.     Records Location:     [facility] Everywhere      Records Needed:     BREAST IMAGING DATING BACK TO 5/21  PATHOLOGY SLIDES      Additional testing needed prior to consult:     Mammogram and breast US     Referral updates and Plan:       Consult with Surgical Oncology   Breast US and Mammogram       Tova Tirado RN, BSN   Surgical Oncology [facility] Patient Nurse Navigator  [facility]  [phone]

## 2021-06-02 NOTE — TELEPHONE ENCOUNTER
Referral signed for two providers requested, patient will be called to schedule .   JOSE Carey at 12:18 PM on 6/2/2021.

## 2021-06-03 ENCOUNTER — ANCILLARY PROCEDURE (OUTPATIENT)
Dept: MAMMOGRAPHY | Facility: CLINIC | Age: 71
End: 2021-06-03
Attending: SURGERY
Payer: COMMERCIAL

## 2021-06-03 ENCOUNTER — ANCILLARY PROCEDURE (OUTPATIENT)
Dept: ULTRASOUND IMAGING | Facility: CLINIC | Age: 71
End: 2021-06-03
Attending: SURGERY
Payer: COMMERCIAL

## 2021-06-03 DIAGNOSIS — C50.929: ICD-10-CM

## 2021-06-03 DIAGNOSIS — R92.8 ABNORMAL FINDING ON BREAST IMAGING: ICD-10-CM

## 2021-06-03 PROCEDURE — 77066 DX MAMMO INCL CAD BI: CPT

## 2021-06-03 PROCEDURE — 88341 IMHCHEM/IMCYTCHM EA ADD ANTB: CPT | Mod: 59

## 2021-06-03 PROCEDURE — 99N1020 PR STATISTIC H-SEND OUTS PREP

## 2021-06-03 PROCEDURE — 88342 IMHCHEM/IMCYTCHM 1ST ANTB: CPT | Mod: 59

## 2021-06-03 PROCEDURE — 88377 M/PHMTRC ALYS ISHQUANT/SEMIQ: CPT

## 2021-06-03 PROCEDURE — 88305 TISSUE EXAM BY PATHOLOGIST: CPT

## 2021-06-03 PROCEDURE — 88360 TUMOR IMMUNOHISTOCHEM/MANUAL: CPT

## 2021-06-03 PROCEDURE — 99N1019 PR STATISTIC H-FISH PROCESS B/S

## 2021-06-03 PROCEDURE — 76642 ULTRASOUND BREAST LIMITED: CPT | Mod: RT

## 2021-06-03 PROCEDURE — 19083 BX BREAST 1ST LESION US IMAG: CPT | Mod: RT

## 2021-06-03 RX ORDER — LIDOCAINE HYDROCHLORIDE 10 MG/ML
9 INJECTION, SOLUTION EPIDURAL; INFILTRATION; INTRACAUDAL; PERINEURAL ONCE
Status: COMPLETED | OUTPATIENT
Start: 2021-06-03 | End: 2021-06-03

## 2021-06-03 RX ADMIN — LIDOCAINE HYDROCHLORIDE 9 ML: 10 INJECTION, SOLUTION EPIDURAL; INFILTRATION; INTRACAUDAL; PERINEURAL at 15:04

## 2021-06-04 ENCOUNTER — TRANSFERRED RECORDS (OUTPATIENT)
Dept: HEALTH INFORMATION MANAGEMENT | Facility: CLINIC | Age: 71
End: 2021-06-04

## 2021-06-04 ENCOUNTER — TELEPHONE (OUTPATIENT)
Dept: ONCOLOGY | Facility: CLINIC | Age: 71
End: 2021-06-04

## 2021-06-04 ENCOUNTER — ONCOLOGY VISIT (OUTPATIENT)
Dept: ONCOLOGY | Facility: CLINIC | Age: 71
End: 2021-06-04
Attending: SURGERY
Payer: COMMERCIAL

## 2021-06-04 VITALS
BODY MASS INDEX: 27.91 KG/M2 | RESPIRATION RATE: 16 BRPM | DIASTOLIC BLOOD PRESSURE: 77 MMHG | SYSTOLIC BLOOD PRESSURE: 118 MMHG | HEART RATE: 87 BPM | WEIGHT: 177.8 LBS | OXYGEN SATURATION: 97 % | HEIGHT: 67 IN | TEMPERATURE: 98 F

## 2021-06-04 DIAGNOSIS — C50.121: ICD-10-CM

## 2021-06-04 PROBLEM — Z17.0 MALIGNANT NEOPLASM OF CENTRAL PORTION OF RIGHT BREAST IN MALE, ESTROGEN RECEPTOR POSITIVE (H): Status: ACTIVE | Noted: 2021-06-04

## 2021-06-04 LAB
INTERPRETATION: NORMAL
LAB DIRECTOR INTERPRETATION: NORMAL
LAB PDF RESULT: NORMAL
MDL NUMBER: NORMAL
SIGNIFICANT RESULTS: NORMAL
SPECIMEN DESCRIPTION: NORMAL
TEST DETAILS, MDL: NORMAL

## 2021-06-04 PROCEDURE — 99417 PROLNG OP E/M EACH 15 MIN: CPT | Performed by: SURGERY

## 2021-06-04 PROCEDURE — 999N001104 HC STATISTIC DNA ISOL HIGH PURITY: Performed by: SURGERY

## 2021-06-04 PROCEDURE — 99205 OFFICE O/P NEW HI 60 MIN: CPT | Performed by: SURGERY

## 2021-06-04 PROCEDURE — G0463 HOSPITAL OUTPT CLINIC VISIT: HCPCS

## 2021-06-04 ASSESSMENT — MIFFLIN-ST. JEOR: SCORE: 1542.75

## 2021-06-04 ASSESSMENT — PAIN SCALES - GENERAL: PAINLEVEL: NO PAIN (0)

## 2021-06-04 NOTE — NURSING NOTE
Labs drawn in clinic by LPN via venipuncture in left arm with 23G.    Patient tolerated well and had no issues.    Guillermo Espinoza LPN

## 2021-06-04 NOTE — PROGRESS NOTES
NEW SURGICAL CONSULTATION  Jun 4, 2021    Ronna Collins is a 70 year old man who presents with a right  breast complaint.  He was a self-referral.    HPI:    He noted some discomfort and hardening on the skin in the right nipple 5 or 6 weeks ago. He thought it looked similar to a verrucous keratoma in the arm some time ago. Then while on vacation with his family to [place],  3 weeks ago, an underlying mass was also noted.  There has been some progressive enlargement of the breast mass in the meantime.  No nipple discharge.  No contralateral symptoms.  No upper extremity symptoms.     He was evaluated at an urgent care clinic and a CT chest was obtained, which showed a 3 cm right subareolar mass and adenopathy.   He was then evaluated by a dermatologist, Dr El, who obtained a punch biopsy of the nipple mass.  The results of this biopsy are pending.    I was then contacted by Ronna Collins as a self-referral and I recommended dedicated breast imaging and biopsy, which was performed yesterday.     In the meantime, he had also met with Dr Burroughs of Medical oncology, who recommended neoadjuvant systemic therapy and evaluation with a PET/CT scan.    Imaging showed a 2.9 cm mass in the retroareolar region with invasion of overlying skin and nipple and abutment of the pectoralis muscle.  Axillary adenopathy was also seen.    A biopsy was performed and a clip was placed yesterday. The pathology results are pending.      BREAST-SPECIFIC HISTORY:  Prior radiation history: No  Dominant hand: Right    FAMILY HISTORY:  Breast ca: No  Ovarian ca: No  Pancreatic ca: No  Melanoma: No  Gastric ca: No  Colon ca: Yes - mat uncle (uncertain, older age)  Other cancer: No     Past Medical History:   Diagnosis Date     Chronic sinusitis      Hypertension 2002   No MI, CVA, DM  Able to climb a flight of stair    No past surgical history on file.   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.  He has two daughters, one of which is an otolaryngologist.    Fully vaccinated against COVID-19 in Feb 2021 (left arm).    ROS:  Back pain: No  Headache: No  Abdominal pain: No  Unexpected weight loss: No  Easy bruising/bleeding: No    /77 (BP Location: Right arm, Patient Position: Sitting, Cuff Size: Adult Regular)   Pulse 87   Temp 98  F (36.7  C) (Oral)   Resp 16   Ht 1.714 m (5' 7.48\")   Wt 80.6 kg (177 lb 12.8 oz)   SpO2 97%   BMI 27.45 kg/m     Physical Exam  Constitutional:       Appearance: He is well-developed.   Pulmonary:      Effort: Pulmonary effort is normal. No respiratory distress.   Chest:      Breasts: Breasts are asymmetrical.         Right: Mass (There is a 2.5 cm firm mass fixed to the overlying nipple-areolar complex but mobile to the underlying chest wall.  There is also a nipple mass without ulceration (see photo)) present. No inverted nipple, nipple discharge, skin change or tenderness.         Left: No inverted nipple, mass, nipple discharge, skin change or tenderness.   Lymphadenopathy:      Upper Body:      Right upper body: Axillary adenopathy (Firm mobile 2 cm node laterally and at least 1 additional firm mobile node posterior to the first node) and pectoral adenopathy present. No supraclavicular adenopathy.      Left upper body: No supraclavicular, axillary or pectoral adenopathy.      Comments: No lymphedema in bilateral upper extremities.    Skin:     General: Skin is warm and dry.        INVESTIGATIONS:    PET/CT today at [facility] - report pending.  PET/CT demonstrates FDG avid central right breast mass and multiple FDG avid enlarge right axillary nodes. No intramammary christiano disease was seen on PET/CT.    Diagnostic Mammogram & Ultrasound (6/3/2021) showed:  Findings: There is a spiculated hyperdense retroareolar mass with a few calcifications and nipple retraction on the right. There are enlarged rounded lymph nodes with, one with possible calcifications in the right axilla.  Targeted right breast ultrasound was performed demonstrating a spiculated hypoechoic shadowing mass in the retroareolar region measuring 2.9 x 2.5 x 2.6 cm. There is invasion of the overlying skin/nipple. The mass abuts the pectoralis muscle, indeterminate for invasion.  There are at least 2 enlarged, irregular abnormal appearing shadowing right axillary lymph nodes measuring 1.6 x 1.5 x 1.4 cm and 2.1 x 1.1 x 1.3 cm.  IMPRESSION: BI-RADS CATEGORY: 5 - Highly Suggestive of Malignancy-Appropriate Action Should Be Taken.     CT chest from [facility] (5/30/2021) showed:  FINDINGS:   LUNGS AND PLEURA: Lungs are clear. No pleural effusion.  MEDIASTINUM/AXILLAE: No lymphadenopathy. No thoracic aneurysm.  CORONARY ARTERY CALCIFICATION: None.  UPPER ABDOMEN: Diffuse hepatic steatosis.  MUSCULOSKELETAL: Right subareolar 3 x 2.9 cm irregular mass which appears adherent to the pectoralis major muscle. There is right axillary 1.8 and 1.6 cm adenopathy. There is also an abnormal lymph node below the pectoralis minor muscle.  IMPRESSION:   1.  Probable right male breast cancer with axillary metastases. Recommend ultrasound-guided core biopsy at a breast center.  2.  No pulmonary metastases seen.    Biopsy (6/3/2021) are pending.    ASSESSMENT:  Ronna Collins is a 70 year old man with likely right breast cancer.    His stage is II or III, given the christiano involvement, depending on the receptor status.    I personally reviewed the imaging above.    I reviewed the imaging,  diagnosis, staging, and management (including surgery, chemotherapy, radiation therapy, and endocrine therapy) of breast cancer with Ronna Collins and his wife, and his daughter Emerald, by phone.     We reviewed that the treatment of breast cancer typically involves a combination of surgery, systemic therapy, and radiation therapy. In terms of surgery, mastectomy is recommended given the involvement of the central breast.  Because of the christiano involvement based on PET/CT and the morphology of the axillary nodes, post-mastectomy adjuvant radiation therapy will be recommended.    With the christiano involvement, Ronna Collins is a candidate for chemotherapy. We discussed that the specific recommendation is highly dependent on the receptor status of the breast cancer, which is pending.  Surgery is typically performed 4-6 weeks after neoadjuvant (or upfront) chemotherapy is completed.  We reviewed that the benefits of neoadjuvant systemic therapy include potential prognostic information from tumor pathology post-chemotherapy and the ability to participate in a clinical trial, I-SPY2. He is interested in this.   I have recommended a referral to Dr Sr of Medical Oncology to further discuss this.     Another major benefit to neoadjuvant chemotherapy is the potential to downstage his axilla.  I strongly recommended neoadjuvant chemotherapy for this reason.  We discussed that upfront surgery with clinically positive nodes is a lymph node dissection, with a high risk of lymphedema.  We reviewed that if his nodes are normal on clinical exam and imaging after chemotherapy, he would be a candidate for a sentinel lymph node biopsy instead of the lymph node dissection.  The higher risks of an axillary lymph node dissection were reviewed, including lymphedema (20-30%), bleeding, wound infection, wound dehiscence, seroma formation, nerve injury, limited arm range of motion and paresthesias.     Finally, given that he has  male breast cancer, I recommended a genetic testing and counseling.  We reviewed genetic testing and its implications.  Please see below for additional details.    He plans on going to [facility] for a second opinion, which I encouraged.    I will see Ronna Collins for follow up at the last cycle of chemotherapy for surgical planning.     All of the above was discussed with Ronna Collins and all questions were answered.  He elected to proceed with neoadjuvant systemic therapy and genetic testing + counseling.    Total time spent with the patient was 80 minutes, of which 75% was counseling.     PLAN:  1. Genetic testing + counseling  2. Referral to Dr Sr of medical oncology for consideration of I-SPY2  3. RIGHT simple mastectomy  4. Currently only a candidate for RIGHT axillary lymph node dissection  5. Follow up with me at the end of chemotherapy for surgical planning  6. Follow up on PET/CT report and biopsy results  7. Will need adjuvant radiation therapy    Nida Snider MD MSc [facility] FACS    Division of Surgical Oncology  [facility]     115 minutes spent on the date of the encounter doing chart review, review of outside records, review of test results, interpretation of tests, patient visit, documentation and discussion with family.    ---  GENETIC TESTING FOR BREAST ACTIONABLE    Based on his personal history, Ronna meets the current National Comprehensive Cancer Network (NCCN) criteria for genetic testing of BRCA1/BRCA2 and/or requires genetic testing based on the recommendation of the American Society of Breast Surgeons Consensus Guideline on Genetic Testing for Hereditary Breast Cancer.     The Breast Actionable test analyzes 11 genes associated with hereditary breast cancer: HERNAN, BRCA1, BRCA2, CDH1, CHEK2, NBN, NF1, PALB2, PTEN, STK11, TP53. BRCA1 and BRCA2 only account for about half of hereditary breast cancer; therefore, this multi-gene test is the most  efficient and cost-effective way to analyze these genes. As hereditary breast cancer is suspected, there is a reasonable probability of detecting a mutation in my patient. All of the genes on this test have published clinical practice guidelines.    Medical Management:   For Ronna, we reviewed that the information from genetic testing may determine:    surgery to treat Simeons active cancer diagnosis    targeted chemotherapies for Simeons active cancer    additional cancer screening and/or medical intervention for which Ronna may qualify    Additionally, family history was reviewed for any significant cancer history in Simenos first and second degree relatives. Family history is significant for the following: see above    I, a qualified medical provider, counseled the patient on possible test results and its implications in relation to Simeons treatment/management. Ronna expressed understanding and consented to proceed with genetic testing. Referral to genetic counseling will be made upon return of results to discuss additional genetic testing options if indicated based on patient s personal and/or family history.

## 2021-06-04 NOTE — TELEPHONE ENCOUNTER
I called Ronna Collins to confirm the location ([facility]) of his appointment with Dr Snider for today 6/4/21at 3pm. I called both his mobile & home phone numbers - mobile # no answer and voicemail was full, I was able to leave a message on his home # stating that I was calling to confirm he is aware that his appointment with Dr Snider is at the [facility] not , and I provided the address of the [facility] & asked for a return call. Message sent to DEEPIKA with status.

## 2021-06-04 NOTE — LETTER
6/4/2021         RE: Ronna Collins  [address]  [place] [address]        Dear Colleague,    Thank you for referring your patient, Ronna Collins, to the [facility]. Please see a copy of my visit note below.    NEW SURGICAL CONSULTATION  Jun 4, 2021    Ronna Collins is a 70 year old man who presents with a right  breast complaint.  He was a self-referral.    HPI:    He noted some discomfort and hardening on the skin in the right nipple 5 or 6 weeks ago. He thought it looked similar to a verrucous keratoma in the arm some time ago. Then while on vacation with his family to [place],  3 weeks ago, an underlying mass was also noted.  There has been some progressive enlargement of the breast mass in the meantime.  No nipple discharge.  No contralateral symptoms.  No upper extremity symptoms.     He was evaluated at an urgent care clinic and a CT chest was obtained, which showed a 3 cm right subareolar mass and adenopathy.   He was then evaluated by a dermatologist, Dr El, who obtained a punch biopsy of the nipple mass.  The results of this biopsy are pending.    I was then contacted by Ronna Collins as a self-referral and I recommended dedicated breast imaging and biopsy, which was performed yesterday.     In the meantime, he had also met with Dr Burroughs of Medical oncology, who recommended neoadjuvant systemic therapy and evaluation with a PET/CT scan.    Imaging showed a 2.9 cm mass in the retroareolar region with invasion of overlying skin and nipple and abutment of the pectoralis muscle.  Axillary adenopathy was also seen.    A biopsy was performed and a clip was placed yesterday. The pathology results are pending.      BREAST-SPECIFIC HISTORY:  Prior radiation history: No  Dominant hand: Right    FAMILY HISTORY:  Breast ca: No  Ovarian ca: No  Pancreatic ca: No  Melanoma: No  Gastric ca: No  Colon ca: Yes - mat uncle (uncertain, older age)  Other  "cancer: No     Past Medical History:   Diagnosis Date     Chronic sinusitis      Hypertension 2002   No MI, CVA, DM  Able to climb a flight of stair    No past surgical history on file.   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    Fully vaccinated against COVID-19 in Feb 2021 (left arm).    ROS:  Back pain: No  Headache: No  Abdominal pain: No  Unexpected weight loss: No  Easy bruising/bleeding: No    /77 (BP Location: Right arm, Patient Position: Sitting, Cuff Size: Adult Regular)   Pulse 87   Temp 98  F (36.7  C) (Oral)   Resp 16   Ht 1.714 m (5' 7.48\")   Wt 80.6 kg (177 lb 12.8 oz)   SpO2 97%   BMI 27.45 kg/m     Physical Exam  Constitutional:       Appearance: He is well-developed.   Pulmonary:      Effort: Pulmonary effort is normal. No respiratory distress.   Chest:      Breasts: Breasts are asymmetrical.         Right: Mass (There is a 2.5 cm firm mass fixed to the overlying nipple-areolar complex but mobile to the underlying chest wall.  There is also a nipple mass without ulceration (see photo)) present. No inverted nipple, nipple discharge, skin change or tenderness.         Left: No inverted nipple, mass, nipple discharge, skin change or tenderness.   Lymphadenopathy:      Upper Body:      Right upper body: Axillary adenopathy (Firm mobile 2 cm node laterally and at least 1 additional firm mobile node posterior to the first node) and pectoral " adenopathy present. No supraclavicular adenopathy.      Left upper body: No supraclavicular, axillary or pectoral adenopathy.      Comments: No lymphedema in bilateral upper extremities.    Skin:     General: Skin is warm and dry.        INVESTIGATIONS:    PET/CT today at [facility] - report pending.  PET/CT demonstrates FDG avid central right breast mass and multiple FDG avid enlarge right axillary nodes. No intramammary christiano disease was seen on PET/CT.    Diagnostic Mammogram & Ultrasound (6/3/2021) showed:  Findings: There is a spiculated hyperdense retroareolar mass with a few calcifications and nipple retraction on the right. There are enlarged rounded lymph nodes with, one with possible calcifications in the right axilla.  Targeted right breast ultrasound was performed demonstrating a spiculated hypoechoic shadowing mass in the retroareolar region measuring 2.9 x 2.5 x 2.6 cm. There is invasion of the overlying skin/nipple. The mass abuts the pectoralis muscle, indeterminate for invasion.  There are at least 2 enlarged, irregular abnormal appearing shadowing right axillary lymph nodes measuring 1.6 x 1.5 x 1.4 cm and 2.1 x 1.1 x 1.3 cm.  IMPRESSION: BI-RADS CATEGORY: 5 - Highly Suggestive of Malignancy-Appropriate Action Should Be Taken.     CT chest from [facility] (5/30/2021) showed:  FINDINGS:   LUNGS AND PLEURA: Lungs are clear. No pleural effusion.  MEDIASTINUM/AXILLAE: No lymphadenopathy. No thoracic aneurysm.  CORONARY ARTERY CALCIFICATION: None.  UPPER ABDOMEN: Diffuse hepatic steatosis.  MUSCULOSKELETAL: Right subareolar 3 x 2.9 cm irregular mass which appears adherent to the pectoralis major muscle. There is right axillary 1.8 and 1.6 cm adenopathy. There is also an abnormal lymph node below the pectoralis minor muscle.  IMPRESSION:   1.  Probable right male breast cancer with axillary metastases. Recommend ultrasound-guided core biopsy at a breast center.  2.  No pulmonary metastases seen.    Biopsy  (6/3/2021) are pending.    ASSESSMENT:  Ronna Collins is a 70 year old man with likely right breast cancer.    His stage is II or III, given the christiano involvement, depending on the receptor status.    I personally reviewed the imaging above.    I reviewed the imaging, diagnosis, staging, and management (including surgery, chemotherapy, radiation therapy, and endocrine therapy) of breast cancer with Ronna Collins and his wife, and his daughter Emerald, by phone.     We reviewed that the treatment of breast cancer typically involves a combination of surgery, systemic therapy, and radiation therapy. In terms of surgery, mastectomy is recommended given the involvement of the central breast.  Because of the christiano involvement based on PET/CT and the morphology of the axillary nodes, post-mastectomy adjuvant radiation therapy will be recommended.    With the christiano involvement, Ronna Collins is a candidate for chemotherapy. We discussed that the specific recommendation is highly dependent on the receptor status of the breast cancer, which is pending.  Surgery is typically performed 4-6 weeks after neoadjuvant (or upfront) chemotherapy is completed.  We reviewed that the benefits of neoadjuvant systemic therapy include potential prognostic information from tumor pathology post-chemotherapy and the ability to participate in a clinical trial, I-SPY2. He is interested in this.   I have recommended a referral to Dr Sr of Medical Oncology to further discuss this.     Another major benefit to neoadjuvant chemotherapy is the potential to downstage his axilla.  I strongly recommended neoadjuvant chemotherapy for this reason.  We discussed that upfront surgery with clinically positive nodes is a lymph node dissection, with a high risk of lymphedema.  We reviewed that if his nodes are normal on clinical exam and imaging after chemotherapy, he would be a candidate for a sentinel lymph node biopsy instead of the  lymph node dissection.  The higher risks of an axillary lymph node dissection were reviewed, including lymphedema (20-30%), bleeding, wound infection, wound dehiscence, seroma formation, nerve injury, limited arm range of motion and paresthesias.     Finally, given that he has male breast cancer, I recommended a genetic testing and counseling.  We reviewed genetic testing and its implications.  Please see below for additional details.    He plans on going to [facility] for a second opinion, which I encouraged.    I will see Ronna Collins for follow up at the last cycle of chemotherapy for surgical planning.     All of the above was discussed with Ronna Collins and all questions were answered.  He elected to proceed with neoadjuvant systemic therapy and genetic testing + counseling.    Total time spent with the patient was 80 minutes, of which 75% was counseling.     PLAN:  1. Genetic testing + counseling  2. Referral to Dr Sr of medical oncology for consideration of I-SPY2  3. RIGHT simple mastectomy  4. Currently only a candidate for RIGHT axillary lymph node dissection  5. Follow up with me at the end of chemotherapy for surgical planning  6. Follow up on PET/CT report and biopsy results  7. Will need adjuvant radiation therapy    Nida Snider MD MSc [facility] FACS    Division of Surgical Oncology  [facility]     115 minutes spent on the date of the encounter doing chart review, review of outside records, review of test results, interpretation of tests, patient visit, documentation and discussion with family.    ---  GENETIC TESTING FOR BREAST ACTIONABLE    Based on his personal history, Ronna meets the current National Comprehensive Cancer Network (NCCN) criteria for genetic testing of BRCA1/BRCA2 and/or requires genetic testing based on the recommendation of the American Society of Breast Surgeons Consensus Guideline on Genetic Testing for Hereditary Breast  Cancer.     The Breast Actionable test analyzes 11 genes associated with hereditary breast cancer: HERNAN, BRCA1, BRCA2, CDH1, CHEK2, NBN, NF1, PALB2, PTEN, STK11, TP53. BRCA1 and BRCA2 only account for about half of hereditary breast cancer; therefore, this multi-gene test is the most efficient and cost-effective way to analyze these genes. As hereditary breast cancer is suspected, there is a reasonable probability of detecting a mutation in my patient. All of the genes on this test have published clinical practice guidelines.    Medical Management:   For Ronna, we reviewed that the information from genetic testing may determine:    surgery to treat Simeons active cancer diagnosis    targeted chemotherapies for Simeons active cancer    additional cancer screening and/or medical intervention for which Ronna may qualify    Additionally, family history was reviewed for any significant cancer history in Simeons first and second degree relatives. Family history is significant for the following: see above    I, a qualified medical provider, counseled the patient on possible test results and its implications in relation to Simeons treatment/management. Ronna expressed understanding and consented to proceed with genetic testing. Referral to genetic counseling will be made upon return of results to discuss additional genetic testing options if indicated based on patient s personal and/or family history.        Again, thank you for allowing me to participate in the care of your patient.        Sincerely,        Nida Snider MD

## 2021-06-04 NOTE — NURSING NOTE
"Oncology Rooming Note    June 4, 2021 3:09 PM   Ronna Collins is a 70 year old male who presents for:    Chief Complaint   Patient presents with     Oncology Clinic Visit     BREAST CANCER     Initial Vitals: /77 (BP Location: Right arm, Patient Position: Sitting, Cuff Size: Adult Regular)   Pulse 87   Temp 98  F (36.7  C) (Oral)   Resp 16   Ht 1.714 m (5' 7.48\")   Wt 80.6 kg (177 lb 12.8 oz)   SpO2 97%   BMI 27.45 kg/m   Estimated body mass index is 27.45 kg/m  as calculated from the following:    Height as of this encounter: 1.714 m (5' 7.48\").    Weight as of this encounter: 80.6 kg (177 lb 12.8 oz). Body surface area is 1.96 meters squared.  No Pain (0) Comment: Data Unavailable   No LMP for male patient.  Allergies reviewed: Yes  Medications reviewed: Yes    Medications: Medication refills not needed today.  Pharmacy name entered into [facility]:    [id number] - [place], [place] - [address] [place] [address] N AT [id number] - [place], [place] - [address] [place] [address] AT [facility]/ [place] [facility]    Clinical concerns: No new concerns.        Renay Cuevas CMA              "

## 2021-06-07 ENCOUNTER — TRANSCRIBE ORDERS (OUTPATIENT)
Dept: ONCOLOGY | Facility: CLINIC | Age: 71
End: 2021-06-07

## 2021-06-07 DIAGNOSIS — C50.919 BREAST CANCER (H): Primary | ICD-10-CM

## 2021-06-07 NOTE — PROGRESS NOTES
Oncology Consultation:  Date on this visit: 6/8/2021    Ronna Collins is referred by Dr.Jane Manuel Snider for an oncology consultation. He requires evaluation for new diagnosis of clinical prognostic stage IIIb, [id number], grade 2, ER positive, SC positive, HER-2 negative right breast cancer.    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  He noted discomfort and hardening of the skin of the right nipple in late April/early May, 2021.  He had a similar appearing benign skin lesion of the arm 3 months earlier and thought it likely to be the same.  However, he subsequently noted a mass in the same area.  Right breast skin punch biopsy performed by dermatology was c/w grade 2 invasive ductal carcinoma with associated DCIS.  Invasive carcinoma was ER positive 100% and SC positive 70%, with tumor invading the dermis.  HER-2 was negative by IHC (score 1+).  Diagnostic mammogram and ultrasound showed a retroareolar right breast mass measuring 2.9 cm with associated nipple retraction and enlarged, abnormal right axillary lymph nodes.  Right breast biopsy was again c/w grade 2 invasive ductal carcinoma, ER strong in 98% of tumor cell nuclei, HER-2 pending.  Ki-67 was 15%.    Patient denies right chest pain or discomfort.  He denies prior breast issues or biopsies.  He denies open wound or drainage.  He is generally in good health.  He takes Hyzaar for hypertension and states blood pressure is well controlled on the medication.  He has no current bone or joint aches or pains.  He denies abdominal complaints.  He has no cough, shortness of breath, or chest pain.  No headaches or focal neurologic complaints.  He reports chronic sinusitis characterized by nasal congestion and rhinorrhea.  He has previously seen ENT for this and was previously using Rhinocort.  The remainder of a complete 12 point review of systems was reviewed with the  patient and was negative with the exception of that mentioned above.    Past Medical/Surgical History:  Past Medical History:   Diagnosis Date     Chronic sinusitis      Hypertension 2002     No past surgical history on file.    Allergies:  Allergies as of 06/08/2021     (No Known Allergies)     Current Medications:  Current Outpatient Medications   Medication Sig Dispense Refill     emollient (VANICREAM) external cream On entire body against dry skin    Use for skin wash free&clear soap and for hair Free&Clear Shampoo and conditioner 453 g 11     losartan-hydrochlorothiazide (HYZAAR) 100-25 MG tablet Take 1 tablet by mouth daily 90 tablet 1      Family History:  Maternal uncle with a h/o colon cancer, age at diagnosis unknown.  Denies other family history of malignancy.    Social History:  .  He is a  at the [employer].  Has 2 daughters, one is an ENT physician in the [place] area.  Has a 25 pyh of smoking cigarettes, quit around 2000.  He drinks alcohol socially only.    Physical Exam:  /78   Pulse 91   Temp 98  F (36.7  C) (Oral)   Wt 81.4 kg (179 lb 8 oz)   SpO2 97%   BMI 27.72 kg/m    General:  Well appearing adult male in NAD.  Alert and oriented.  HEENT:  Normocephalic.  Sclera anicteric.  MMM.  No lesions of the oropharynx.  Lymph:  There is an approximately 3-4 cm mass of lymph nodes palpated in the right anterior axilla/prepectoral area.  No palpable cervical or supraclavicular LAD.  Chest:  CTA bilaterally.  No wheezes or crackles.  CV:  RRR.  Nl S1 and S2.  No m/r/g.  Breast:  Retroareolar right breast mass measures 5.5 x 5 cm.  There is a fungating skin involvement of mass with right nipple.  No ulceration.    Abd:  SoftND.   Ext:  No pitting edema of the bilateral lower extremities.  Musculo:  Full ROM of the bilateral upper extremities.  Neuro:  Cranial nerves grossly intact.  Psych:  Mood and affect appear normal.  Skin:  Scar right forearm.  Area  of hyperpigmentation mid back in area of topically treated skin lesion.  Numerous scattered nevi and keratoses.    Laboratory/Imaging Studies  6/2/2021 Right breast skin punch biopsy:  Grade 2 invasive ductal carcinoma  DCIS with associated calcification  Tumor invades the dermis  ER positive 100%  MD positive 70%  HER2 negative by IHC    6/3/2021 Bilateral Diagnostic Mammograms:  Spiculated retroareolar mass with calcifications and nipple retraction on the right.  Enlarged rounded lymph nodes.        6/3/2021 Right breast ultrasound:  Retroareolar mass measuring 2.9 x 2.5 x 2.6 cm.  There are at least 2 enlarged, abnormal right axillary nodes measuring 1.6 and 2.1 cm respectively.    6/3/2021 Right breast 9:00, retroareolar biopsy:  - Grade 2 invasive ductal carcinoma  - ER strong in 98%.  - MD strong in 95%.  - HER-2 by FISH is pending.  - Ki-67 is 15%.    6/4/2021 PET/CT:  - Hypermetabolic mass in the central breast/areolar region right breast.  Extends from the skin, through the breast, to the right pectoralis muscle.  Measures 3.6 cm in greatest dimension.  - Multiple hypermetabolic right axillary lymph nodes (at least 3 on my view).  - No convincing evidence of metastatic disease.    ASSESSMENT/PLAN:  Jamie Collins is a 69 yo male with clinical stage IIIb, [id number], grade 2, ER positive, MD positive, HER-2 negative invasive ductal carcinoma of the right breast.  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  We reviewed by ultrasound, the right breast cancer measures 2.9 cm, by PET/CT 3.6 cm.  There is skin invasion of the tumor with macronodularity.  He has multiple abnormal lymph nodes in the right axilla.  Taken together with grade and receptor status, this is a stage IIIb breast cancer.  The goal of treatment is cure, or in other words, to reduce the future risk of recurrence as  much as possible.      We reviewed the roles of surgery and radiation therapy in preventing local breast cancer recurrence.  Given size of the right breast mass and skin invasion, a right mastectomy will be recommended.  Surgery will also include definitive staging of the axillary lymph nodes.  Given known lymph node involvement radiation after surgery is indicated.    We then reviewed the roles of chemotherapy and endocrine therapy in reducing the future risks of both local and distant recurrence.  We specifically discussed the role of chemotherapy in eliminating micrometastatic disease.  Indications for chemotherapy in this case include T4b/stage III disease and >3 suspicious lymph nodes in the right axilla.  I recommend chemotherapy be administered in the neoadjuvant setting in order to allow monitoring of tumor response to treatment, ability to predict prognosis based on response and also ability to choose adjuvant therapies based on response.      We discussed options for chemotherapy both off clinical trial and on clinical trial.  Off clinical trial, I recommend a regimen of Taxol administered IV once a week for 12 weeks.  This would then be followed by adriamycin and cyclophosphamide administered IV once every 2 weeks for 4 cycles.  We reviewed potential side effects of chemotherapy including alopecia, fatigue, nausea, vomiting, diarrhea, constipation, low blood counts, increased risk of infection, peripheral neuropathy, hypersensitivity reaction, mouth sores, rash, elevation of liver tests, and cardiac toxicity.  I recommend the chemotherapy be administered via a port-a-catheter which can be placed in a day procedure by interventional radiology.      We then reviewed that he is eligible to screen for the ISPY2 clinical trial which is currently open at the [facility].  ISPY2 is a trial evaluating whether the addition of a study drug to standard breast cancer chemotherapy in the neoadjuvant treatment of high  risk breast cancer will increase the number of patients with no tumor at the time of surgery.  The trial also seeks to monitor tumor response to treatment with serial breast MRIs and biopsies.  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  Four of these arms are adding immunotherapy to chemotherapy, one is adding the IV CDK 4/6 inhibitor trilaciclib, and another is evaluating [id number] trastuzumab duocarmazine in the treatment of HER2 low breast cancer (HER2 1+ or 2+ by IHC).  In order for a patient with ER positive breast cancer to be randomized to one of these treatment arms, they must have a high risk Mammaprint genotyping study.  The final arm is randomizing ER positive patients with a low risk Mammaprint to one of 3 neoadjuvant endocrine therapy treatments.  We reviewed it is an adaptive randomization trial in that the response to treatment of patients with a similar subtype of tumor influences which arm future patients are assigned.  Dr. 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  I also agreed to send Dr. Collins and his daughter more information on the treatment arms by e-mail.      In summary, plan is to screen for the ISPY-2 clinical trial.  This order of treatments will be as follows:  neoadjuvant therapy followed by surgery and radiation.  Finally, upon completion of these treatments, plan will be to treat with a 10 year course of endocrine therapy.  In the case of male breast cancer the standard recommendation is for treatment with tamoxifen.  We spent some time reviewing the timeline of treatments and how side effects of each type of treatment may influence patient's ability to work.      At this time, we will proceed with screening studies for ISPY-2 including echocardiogram, breast MRI, repeat biopsy, Mammaprint, and laboratory  testing.  In addition, I recommend biopsy of right axillary lymph node to confirm lymph node involvement and to evaluate HER2 status of the lymph node prior to initiation of neoadjuvant therapy.  I recommend holding off on port placement until Mammaprint is resulted as the endocrine optimization arm of ISPY2, for patients with low risk Mammaprint, consists or oral therapy only and does not require a port.    We discussed recommendation for genetic counseling and testing given male breast cancer.  He is agreeable to this and had blood drawn for genetic testing at the time of his visit with Dr. Snider on 6/3/2021.    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  120 minutes was spent on the date of the encounter doing chart review, review of outside records, review of test results, interpretation of tests, patient visit, documentation, discussion with other provider(s) and discussion with family.

## 2021-06-07 NOTE — TELEPHONE ENCOUNTER
RECORDS STATUS - BREAST    RECORDS REQUESTED FROM: breast cancer, ref by Dr GILL Snider   DATE REQUESTED: 7.13.21   NOTES DETAILS STATUS   OFFICE NOTE from referring provider Internal 6.4.21 Dr Nida Snider, [facility]   OFFICE NOTE from medical oncologist Care Everywhere 6.1.21 Dr Jose R El, [facility] Derm   OFFICE NOTE from surgeon     OFFICE NOTE from radiation oncologist     DISCHARGE SUMMARY from hospital     DISCHARGE REPORT from the ER Care Everywhere 5.30.21 [facility]   OPERATIVE REPORT     MEDICATION LIST Internal    CLINICAL TRIAL TREATMENTS TO DATE N/A    LABS     PATHOLOGY REPORTS  (Tissue diagnosis, Stage, ER/GA percentage positive and intensity of staining, HER2 IHC, FISH, and all biopsies from breast and any distant metastasis)                 Rastafari, Report in CE 6.1.21   GENONOMIC TESTING     TYPE:   (Next Generation Sequencing, including Foundation One testing, and Oncotype score) N/A    IMAGING (NEED IMAGES & REPORT)     CT SCANS PACS 5.30.21 [facility]   MRI     MAMMO Internal 6.3.21   ULTRASOUND Internal 6.3.21   PET PACS 6.4.21   BONE SCAN     BRAIN MRI

## 2021-06-08 ENCOUNTER — PRE VISIT (OUTPATIENT)
Dept: ONCOLOGY | Facility: CLINIC | Age: 71
End: 2021-06-08

## 2021-06-08 ENCOUNTER — ONCOLOGY VISIT (OUTPATIENT)
Dept: ONCOLOGY | Facility: CLINIC | Age: 71
End: 2021-06-08
Attending: INTERNAL MEDICINE
Payer: COMMERCIAL

## 2021-06-08 ENCOUNTER — TELEPHONE (OUTPATIENT)
Dept: GENERAL RADIOLOGY | Facility: CLINIC | Age: 71
End: 2021-06-08

## 2021-06-08 VITALS
SYSTOLIC BLOOD PRESSURE: 137 MMHG | OXYGEN SATURATION: 97 % | TEMPERATURE: 98 F | HEART RATE: 91 BPM | WEIGHT: 179.5 LBS | BODY MASS INDEX: 27.72 KG/M2 | DIASTOLIC BLOOD PRESSURE: 78 MMHG

## 2021-06-08 DIAGNOSIS — Z51.11 ENCOUNTER FOR ANTINEOPLASTIC CHEMOTHERAPY: ICD-10-CM

## 2021-06-08 DIAGNOSIS — Z00.6 EXAMINATION OF PARTICIPANT IN CLINICAL TRIAL: ICD-10-CM

## 2021-06-08 DIAGNOSIS — Z17.0 MALIGNANT NEOPLASM OF CENTRAL PORTION OF RIGHT BREAST IN MALE, ESTROGEN RECEPTOR POSITIVE (H): Primary | ICD-10-CM

## 2021-06-08 DIAGNOSIS — C50.911 INVASIVE DUCTAL CARCINOMA OF BREAST, RIGHT (H): Primary | ICD-10-CM

## 2021-06-08 DIAGNOSIS — C50.121 MALIGNANT NEOPLASM OF CENTRAL PORTION OF RIGHT BREAST IN MALE, ESTROGEN RECEPTOR POSITIVE (H): Primary | ICD-10-CM

## 2021-06-08 LAB
COPATH REPORT: NORMAL
COPATH REPORT: NORMAL

## 2021-06-08 PROCEDURE — G0463 HOSPITAL OUTPT CLINIC VISIT: HCPCS

## 2021-06-08 PROCEDURE — 99205 OFFICE O/P NEW HI 60 MIN: CPT | Performed by: INTERNAL MEDICINE

## 2021-06-08 ASSESSMENT — PAIN SCALES - GENERAL: PAINLEVEL: NO PAIN (0)

## 2021-06-08 NOTE — TELEPHONE ENCOUNTER
Spoke to patient regarding Right breast biopsy done on 6/3/21 with finding of Invasive ductal carcinoma. Notified patient that the Radiologist recommendation is Oncological and Surgical follow-up. Patient is scheduled to see Dr. Sr today 6/8/21 and referral for Dr. Agatha mercado.  will call patient to schedule surgery consultation. Patient verbalized understanding and all questions and concerns answered to patients satisfaction.

## 2021-06-08 NOTE — TELEPHONE ENCOUNTER
RECORDS STATUS - BREAST    [facility]   DATE REQUESTED:    NOTES DETAILS STATUS   OFFICE NOTE from referring provider Benson Snider   OFFICE NOTE from medical oncologist [facility] 21   OFFICE NOTE from surgeon     OFFICE NOTE from radiation oncologist     DISCHARGE SUMMARY from hospital     DISCHARGE REPORT from the [facility] 21   OPERATIVE REPORT     MEDICATION LIST     CLINICAL TRIAL TREATMENTS TO DATE     LABS     PATHOLOGY REPORTS  (Tissue diagnosis, Stage, ER/NE percentage positive and intensity of staining, HER2 IHC, FISH, and all biopsies from breast and any distant metastasis)                 [employer], Report in CE, Slides requested   [employer] 21: [id number]    [facility]  6/3/20: HER 2 BILL FISH/Surg Path  21: Dermatopathology     GENONOMIC TESTING     TYPE:   (Next Generation Sequencing, including Foundation One testing, and Oncotype score)     IMAGING (NEED IMAGES & REPORT)     CT SCANS PACS 21: [facility]   MRI     MAMMO     ULTRASOUND     PET     BONE SCAN     BRAIN MRI PACS 21: [facility]

## 2021-06-08 NOTE — LETTER
6/8/2021         RE: Ronna Collins  [address]  [facility] [address]        Dear Colleague,    Thank you for referring your patient, Ronna Collins, to the [facility]. Please see a copy of my visit note below.    Oncology Consultation:  Date on this visit: 6/8/2021    Ronna Collins is referred by Dr.Jane Manuel Snider for an oncology consultation. He requires evaluation for new diagnosis of clinical prognostic stage IIIb, [id number], grade 2, ER positive, GA positive, HER-2 negative right breast cancer.    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  He noted discomfort and hardening of the skin of the right nipple in late April/early May, 2021.  He had a similar appearing benign skin lesion of the arm 3 months earlier and thought it likely to be the same.  However, he subsequently noted a mass in the same area.  Right breast skin punch biopsy performed by dermatology was c/w grade 2 invasive ductal carcinoma with associated DCIS.  Invasive carcinoma was ER positive 100% and GA positive 70%, with tumor invading the dermis.  HER-2 was negative by IHC (score 1+).  Diagnostic mammogram and ultrasound showed a retroareolar right breast mass measuring 2.9 cm with associated nipple retraction and enlarged, abnormal right axillary lymph nodes.  Right breast biopsy was again c/w grade 2 invasive ductal carcinoma, ER strong in 98% of tumor cell nuclei, HER-2 pending.  Ki-67 was 15%.    Patient denies right chest pain or discomfort.  He denies prior breast issues or biopsies.  He denies open wound or drainage.  He is generally in good health.  He takes Hyzaar for hypertension and states blood pressure is well controlled on the medication.  He has no current bone or joint aches or pains.  He denies abdominal complaints.  He has no cough, shortness of breath, or chest pain.  No headaches  or focal neurologic complaints.  He reports chronic sinusitis characterized by nasal congestion and rhinorrhea.  He has previously seen ENT for this and was previously using Rhinocort.  The remainder of a complete 12 point review of systems was reviewed with the patient and was negative with the exception of that mentioned above.    Past Medical/Surgical History:  Past Medical History:   Diagnosis Date     Chronic sinusitis      Hypertension 2002     No past surgical history on file.    Allergies:  Allergies as of 06/08/2021     (No Known Allergies)     Current Medications:  Current Outpatient Medications   Medication Sig Dispense Refill     emollient (VANICREAM) external cream On entire body against dry skin    Use for skin wash free&clear soap and for hair Free&Clear Shampoo and conditioner 453 g 11     losartan-hydrochlorothiazide (HYZAAR) 100-25 MG tablet Take 1 tablet by mouth daily 90 tablet 1      Family History:  Maternal uncle with a h/o colon cancer, age at diagnosis unknown.  Denies other family history of malignancy.    Social History:  .  He is a  at the [employer].  Has 2 daughters, one is an ENT physician in the [place] area.  Has a 25 pyh of smoking cigarettes, quit around 2000.  He drinks alcohol socially only.    Physical Exam:  /78   Pulse 91   Temp 98  F (36.7  C) (Oral)   Wt 81.4 kg (179 lb 8 oz)   SpO2 97%   BMI 27.72 kg/m    General:  Well appearing adult male in NAD.  Alert and oriented.  HEENT:  Normocephalic.  Sclera anicteric.  MMM.  No lesions of the oropharynx.  Lymph:  There is an approximately 3-4 cm mass of lymph nodes palpated in the right anterior axilla/prepectoral area.  No palpable cervical or supraclavicular LAD.  Chest:  CTA bilaterally.  No wheezes or crackles.  CV:  RRR.  Nl S1 and S2.  No m/r/g.  Breast:  Retroareolar right breast mass measures 5.5 x 5 cm.  There is a fungating skin involvement of mass with right nipple.   No ulceration.    Abd:  SoftND.   Ext:  No pitting edema of the bilateral lower extremities.  Musculo:  Full ROM of the bilateral upper extremities.  Neuro:  Cranial nerves grossly intact.  Psych:  Mood and affect appear normal.  Skin:  Scar right forearm.  Area of hyperpigmentation mid back in area of topically treated skin lesion.  Numerous scattered nevi and keratoses.    Laboratory/Imaging Studies  6/2/2021 Right breast skin punch biopsy:  Grade 2 invasive ductal carcinoma  DCIS with associated calcification  Tumor invades the dermis  ER positive 100%  TN positive 70%  HER2 negative by IHC    6/3/2021 Bilateral Diagnostic Mammograms:  Spiculated retroareolar mass with calcifications and nipple retraction on the right.  Enlarged rounded lymph nodes.        6/3/2021 Right breast ultrasound:  Retroareolar mass measuring 2.9 x 2.5 x 2.6 cm.  There are at least 2 enlarged, abnormal right axillary nodes measuring 1.6 and 2.1 cm respectively.    6/3/2021 Right breast 9:00, retroareolar biopsy:  - Grade 2 invasive ductal carcinoma  - ER strong in 98%.  - TN strong in 95%.  - HER-2 by FISH is pending.  - Ki-67 is 15%.    6/4/2021 PET/CT:  - Hypermetabolic mass in the central breast/areolar region right breast.  Extends from the skin, through the breast, to the right pectoralis muscle.  Measures 3.6 cm in greatest dimension.  - Multiple hypermetabolic right axillary lymph nodes (at least 3 on my view).  - No convincing evidence of metastatic disease.    ASSESSMENT/PLAN:  Jamie Collins is a 69 yo male with clinical stage IIIb, [id number], grade 2, ER positive, TN positive, HER-2 negative invasive ductal carcinoma of the right breast.  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  We reviewed by ultrasound, the right breast cancer measures 2.9 cm, by PET/CT 3.6 cm.  There is skin invasion of the  tumor with macronodularity.  He has multiple abnormal lymph nodes in the right axilla.  Taken together with grade and receptor status, this is a stage IIIb breast cancer.  The goal of treatment is cure, or in other words, to reduce the future risk of recurrence as much as possible.      We reviewed the roles of surgery and radiation therapy in preventing local breast cancer recurrence.  Given size of the right breast mass and skin invasion, a right mastectomy will be recommended.  Surgery will also include definitive staging of the axillary lymph nodes.  Given known lymph node involvement radiation after surgery is indicated.    We then reviewed the roles of chemotherapy and endocrine therapy in reducing the future risks of both local and distant recurrence.  We specifically discussed the role of chemotherapy in eliminating micrometastatic disease.  Indications for chemotherapy in this case include T4b/stage III disease and >3 suspicious lymph nodes in the right axilla.  I recommend chemotherapy be administered in the neoadjuvant setting in order to allow monitoring of tumor response to treatment, ability to predict prognosis based on response and also ability to choose adjuvant therapies based on response.      We discussed options for chemotherapy both off clinical trial and on clinical trial.  Off clinical trial, I recommend a regimen of Taxol administered IV once a week for 12 weeks.  This would then be followed by adriamycin and cyclophosphamide administered IV once every 2 weeks for 4 cycles.  We reviewed potential side effects of chemotherapy including alopecia, fatigue, nausea, vomiting, diarrhea, constipation, low blood counts, increased risk of infection, peripheral neuropathy, hypersensitivity reaction, mouth sores, rash, elevation of liver tests, and cardiac toxicity.  I recommend the chemotherapy be administered via a port-a-catheter which can be placed in a day procedure by interventional radiology.       We then reviewed that he is eligible to screen for the ISPY2 clinical trial which is currently open at the [facility].  ISPY2 is a trial evaluating whether the addition of a study drug to standard breast cancer chemotherapy in the neoadjuvant treatment of high risk breast cancer will increase the number of patients with no tumor at the time of surgery.  The trial also seeks to monitor tumor response to treatment with serial breast MRIs and biopsies.  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  Four of these arms are adding immunotherapy to chemotherapy, one is adding the IV CDK 4/6 inhibitor trilaciclib, and another is evaluating [id number] trastuzumab duocarmazine in the treatment of HER2 low breast cancer (HER2 1+ or 2+ by IHC).  In order for a patient with ER positive breast cancer to be randomized to one of these treatment arms, they must have a high risk Mammaprint genotyping study.  The final arm is randomizing ER positive patients with a low risk Mammaprint to one of 3 neoadjuvant endocrine therapy treatments.  We reviewed it is an adaptive randomization trial in that the response to treatment of patients with a similar subtype of tumor influences which arm future patients are assigned.  Dr. 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  I also agreed to send Dr. Collins and his daughter more information on the treatment arms by e-mail.      In summary, plan is to screen for the ISPY-2 clinical trial.  This order of treatments will be as follows:  neoadjuvant therapy followed by surgery and radiation.  Finally, upon completion of these treatments, plan will be to treat with a 10 year course of endocrine therapy.  In the case of male breast cancer the standard recommendation is for treatment with tamoxifen.  We spent some time reviewing the  timeline of treatments and how side effects of each type of treatment may influence patient's ability to work.      At this time, we will proceed with screening studies for ISPY-2 including echocardiogram, breast MRI, repeat biopsy, Mammaprint, and laboratory testing.  In addition, I recommend biopsy of right axillary lymph node to confirm lymph node involvement and to evaluate HER2 status of the lymph node prior to initiation of neoadjuvant therapy.  I recommend holding off on port placement until Mammaprint is resulted as the endocrine optimization arm of ISPY2, for patients with low risk Mammaprint, consists or oral therapy only and does not require a port.    We discussed recommendation for genetic counseling and testing given male breast cancer.  He is agreeable to this and had blood drawn for genetic testing at the time of his visit with Dr. Snider on 6/3/2021.    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  120 minutes was spent on the date of the encounter doing chart review, review of outside records, review of test results, interpretation of tests, patient visit, documentation, discussion with other provider(s) and discussion with family.         Again, thank you for allowing me to participate in the care of your patient.        Sincerely,        Mikaela Sr MD

## 2021-06-08 NOTE — NURSING NOTE
"Oncology Rooming Note    June 8, 2021 1:36 PM   Ronna Collins is a 70 year old male who presents for:    Chief Complaint   Patient presents with     Oncology Clinic Visit     breast cancer     Initial Vitals: /78   Pulse 91   Temp 98  F (36.7  C) (Oral)   Wt 81.4 kg (179 lb 8 oz)   SpO2 97%   BMI 27.72 kg/m   Estimated body mass index is 27.72 kg/m  as calculated from the following:    Height as of 6/4/21: 1.714 m (5' 7.48\").    Weight as of this encounter: 81.4 kg (179 lb 8 oz). Body surface area is 1.97 meters squared.  No Pain (0) Comment: Data Unavailable   No LMP for male patient.  Allergies reviewed: Yes  Medications reviewed: Yes    Medications: Medication refills not needed today.  Pharmacy name entered into [facility]:    [id number] - [place], [place] - [address] [place] [address] N AT [id number] - [place], [place] - [address] [place] [address] [place] AT [facility]/ [facility]    Clinical concerns: none       Samia New CMA            "

## 2021-06-14 ENCOUNTER — ALLIED HEALTH/NURSE VISIT (OUTPATIENT)
Dept: ONCOLOGY | Facility: CLINIC | Age: 71
End: 2021-06-14
Attending: INTERNAL MEDICINE
Payer: COMMERCIAL

## 2021-06-14 ENCOUNTER — VIRTUAL VISIT (OUTPATIENT)
Dept: FAMILY MEDICINE | Facility: CLINIC | Age: 71
End: 2021-06-14
Payer: COMMERCIAL

## 2021-06-14 VITALS
HEART RATE: 69 BPM | RESPIRATION RATE: 16 BRPM | SYSTOLIC BLOOD PRESSURE: 136 MMHG | TEMPERATURE: 97.5 F | BODY MASS INDEX: 27.33 KG/M2 | OXYGEN SATURATION: 99 % | DIASTOLIC BLOOD PRESSURE: 76 MMHG | WEIGHT: 177 LBS

## 2021-06-14 DIAGNOSIS — Z51.11 ENCOUNTER FOR ANTINEOPLASTIC CHEMOTHERAPY: ICD-10-CM

## 2021-06-14 DIAGNOSIS — C50.919 BREAST CANCER (H): Primary | ICD-10-CM

## 2021-06-14 DIAGNOSIS — C50.021: Primary | ICD-10-CM

## 2021-06-14 LAB
ALBUMIN SERPL-MCNC: 4 G/DL (ref 3.4–5)
ALBUMIN UR-MCNC: NEGATIVE MG/DL
ALP SERPL-CCNC: 79 U/L (ref 40–150)
ALT SERPL W P-5'-P-CCNC: 60 U/L (ref 0–70)
ANION GAP SERPL CALCULATED.3IONS-SCNC: 3 MMOL/L (ref 3–14)
APPEARANCE UR: CLEAR
APTT PPP: 44 SEC (ref 22–37)
AST SERPL W P-5'-P-CCNC: 31 U/L (ref 0–45)
BASOPHILS # BLD AUTO: 0 10E9/L (ref 0–0.2)
BASOPHILS NFR BLD AUTO: 0.7 %
BILIRUB SERPL-MCNC: 0.6 MG/DL (ref 0.2–1.3)
BILIRUB UR QL STRIP: NEGATIVE
BUN SERPL-MCNC: 9 MG/DL (ref 7–30)
CALCIUM SERPL-MCNC: 8.7 MG/DL (ref 8.5–10.1)
CHLORIDE SERPL-SCNC: 106 MMOL/L (ref 94–109)
CO2 SERPL-SCNC: 28 MMOL/L (ref 20–32)
COLOR UR AUTO: ABNORMAL
CREAT SERPL-MCNC: 0.69 MG/DL (ref 0.66–1.25)
DIFFERENTIAL METHOD BLD: NORMAL
EOSINOPHIL # BLD AUTO: 0.3 10E9/L (ref 0–0.7)
EOSINOPHIL NFR BLD AUTO: 6 %
ERYTHROCYTE [DISTWIDTH] IN BLOOD BY AUTOMATED COUNT: 12.5 % (ref 10–15)
GFR SERPL CREATININE-BSD FRML MDRD: >90 ML/MIN/{1.73_M2}
GLUCOSE SERPL-MCNC: 116 MG/DL (ref 70–99)
GLUCOSE UR STRIP-MCNC: NEGATIVE MG/DL
HBA1C MFR BLD: 6.5 % (ref 0–5.6)
HCT VFR BLD AUTO: 45.1 % (ref 40–53)
HGB BLD-MCNC: 14.7 G/DL (ref 13.3–17.7)
HGB UR QL STRIP: NEGATIVE
IMM GRANULOCYTES # BLD: 0 10E9/L (ref 0–0.4)
IMM GRANULOCYTES NFR BLD: 0.2 %
INR PPP: 1.12 (ref 0.86–1.14)
KETONES UR STRIP-MCNC: NEGATIVE MG/DL
LEUKOCYTE ESTERASE UR QL STRIP: NEGATIVE
LYMPHOCYTES # BLD AUTO: 1.9 10E9/L (ref 0.8–5.3)
LYMPHOCYTES NFR BLD AUTO: 34.5 %
MAGNESIUM SERPL-MCNC: 2.4 MG/DL (ref 1.6–2.3)
MCH RBC QN AUTO: 27.2 PG (ref 26.5–33)
MCHC RBC AUTO-ENTMCNC: 32.6 G/DL (ref 31.5–36.5)
MCV RBC AUTO: 84 FL (ref 78–100)
MONOCYTES # BLD AUTO: 0.4 10E9/L (ref 0–1.3)
MONOCYTES NFR BLD AUTO: 7 %
NEUTROPHILS # BLD AUTO: 2.9 10E9/L (ref 1.6–8.3)
NEUTROPHILS NFR BLD AUTO: 51.6 %
NITRATE UR QL: NEGATIVE
NRBC # BLD AUTO: 0 10*3/UL
NRBC BLD AUTO-RTO: 0 /100
PH UR STRIP: 8 PH (ref 5–7)
PLATELET # BLD AUTO: 252 10E9/L (ref 150–450)
POTASSIUM SERPL-SCNC: 3.6 MMOL/L (ref 3.4–5.3)
PROT SERPL-MCNC: 7.7 G/DL (ref 6.8–8.8)
RBC # BLD AUTO: 5.4 10E12/L (ref 4.4–5.9)
RBC #/AREA URNS AUTO: 0 /HPF (ref 0–2)
SODIUM SERPL-SCNC: 138 MMOL/L (ref 133–144)
SOURCE: ABNORMAL
SP GR UR STRIP: 1.01 (ref 1–1.03)
T4 FREE SERPL-MCNC: 1.18 NG/DL (ref 0.76–1.46)
TSH SERPL DL<=0.005 MIU/L-ACNC: 1.03 MU/L (ref 0.4–4)
UROBILINOGEN UR STRIP-MCNC: 0 MG/DL (ref 0–2)
WBC # BLD AUTO: 5.5 10E9/L (ref 4–11)
WBC #/AREA URNS AUTO: 1 /HPF (ref 0–5)

## 2021-06-14 PROCEDURE — 81001 URINALYSIS AUTO W/SCOPE: CPT | Performed by: INTERNAL MEDICINE

## 2021-06-14 PROCEDURE — 85025 COMPLETE CBC W/AUTO DIFF WBC: CPT | Performed by: INTERNAL MEDICINE

## 2021-06-14 PROCEDURE — 85730 THROMBOPLASTIN TIME PARTIAL: CPT | Performed by: INTERNAL MEDICINE

## 2021-06-14 PROCEDURE — 93005 ELECTROCARDIOGRAM TRACING: CPT

## 2021-06-14 PROCEDURE — 999N000104 HC STATISTIC NO CHARGE

## 2021-06-14 PROCEDURE — 85610 PROTHROMBIN TIME: CPT | Performed by: INTERNAL MEDICINE

## 2021-06-14 PROCEDURE — 36415 COLL VENOUS BLD VENIPUNCTURE: CPT

## 2021-06-14 PROCEDURE — 83735 ASSAY OF MAGNESIUM: CPT | Performed by: INTERNAL MEDICINE

## 2021-06-14 PROCEDURE — 80053 COMPREHEN METABOLIC PANEL: CPT | Performed by: INTERNAL MEDICINE

## 2021-06-14 PROCEDURE — 84443 ASSAY THYROID STIM HORMONE: CPT | Performed by: INTERNAL MEDICINE

## 2021-06-14 PROCEDURE — 84439 ASSAY OF FREE THYROXINE: CPT | Performed by: INTERNAL MEDICINE

## 2021-06-14 PROCEDURE — 83036 HEMOGLOBIN GLYCOSYLATED A1C: CPT | Performed by: INTERNAL MEDICINE

## 2021-06-14 PROCEDURE — 99213 OFFICE O/P EST LOW 20 MIN: CPT | Mod: 95 | Performed by: FAMILY MEDICINE

## 2021-06-14 ASSESSMENT — PAIN SCALES - GENERAL: PAINLEVEL: NO PAIN (0)

## 2021-06-14 NOTE — PROGRESS NOTES
"Ronna is a 70 year old who is being evaluated via a billable video visit.      How would you like to obtain your AVS? MyChart  If the video visit is dropped, the invitation should be resent by: in epic  Will anyone else be joining your video visit? No    Video Start Time: 9 am    Assessment & Plan     Malignant neoplasm of nipple and areola of male breast, right (H)  He's not sure if will proceed at  vs [place], I've relayed study concerns      Review of external notes as documented elsewhere in note  28 minutes spent on the date of the encounter doing chart review, history and exam, documentation and further activities per the note    BMI:   Estimated body mass index is 27.72 kg/m  as calculated from the following:    Height as of 6/4/21: 1.714 m (5' 7.48\").    Weight as of 6/8/21: 81.4 kg (179 lb 8 oz).     Stewart Henry MD  [facility] [place]    Subjective   Ronna is a 70 year old who presents for the following health issues     HPI Here in follow-up new dx ca. [facility] work up rvwd, he's undecided where to proceed yet. Considering  study, not sure how to schedule required MR to fit his schedule or if his lymph node bx clip is MR safe, I've relayed those concerns to his  onco team.   Past Medical History:   Diagnosis Date     Chronic sinusitis      Hypertension 2002     No past surgical history on file.  Current Outpatient Medications   Medication     losartan-hydrochlorothiazide (HYZAAR) 100-25 MG tablet     emollient (VANICREAM) external cream     No current facility-administered medications for this visit.      No Known Allergies          Review of Systems         Objective           Vitals:  No vitals were obtained today due to virtual visit.    Physical Exam   GENERAL: Healthy, alert and no distress  EYES: Eyes grossly normal to inspection.  No discharge or erythema, or obvious scleral/conjunctival abnormalities.  RESP: No audible wheeze, cough, or visible " cyanosis.  No visible retractions or increased work of breathing.    SKIN: Visible skin clear. No significant rash, abnormal pigmentation or lesions.  NEURO: Cranial nerves grossly intact.  Mentation and speech appropriate for age.  PSYCH: Mentation appears normal, affect normal/bright, judgement and insight intact, normal speech and appearance well-groomed.            Video-Visit Details    Type of service:  Video Visit    Video End Time:9:52    Originating Location (pt. Location): Home    Distant Location (provider location):  [facility] [place]     Platform used for Video Visit: Liquid [employer]

## 2021-06-14 NOTE — NURSING NOTE
Chief Complaint   Patient presents with     Recheck Medication     discuss new diagnosis per pt        Oliva Juan EMT at 8:48 AM on 6/14/2021.

## 2021-06-14 NOTE — PATIENT INSTRUCTIONS
[facility] Medication Refill Request Information:  * Please contact your pharmacy regarding ANY request for medication refills.  ** [facility] Prescription Fax = [phone]  * Please allow 3 business days for routine medication refills.  * Please allow 5 business days for controlled substance medication refills.     [facility] Test Result notification information:  *You will be notified with in 7-10 days of your appointment day regarding the results of your test.  If you are on MyChart you will be notified as soon as the provider has reviewed the results and signed off on them.    [facility]: [phone]

## 2021-06-14 NOTE — NURSING NOTE
EKG was performed today.  Order written by Dr Sr was completed today. Name and  verified with patient.  Patient was checked into next appt.    Cailin Owens CMA ([facility])       0

## 2021-06-15 PROCEDURE — 999N001032 HC STATISTIC REVIEW OUTSIDE SLIDES TC 88321: Performed by: INTERNAL MEDICINE

## 2021-06-15 PROCEDURE — 88321 CONSLTJ&REPRT SLD PREP ELSWR: CPT | Performed by: PATHOLOGY

## 2021-06-18 ENCOUNTER — ANCILLARY PROCEDURE (OUTPATIENT)
Dept: MRI IMAGING | Facility: CLINIC | Age: 71
End: 2021-06-18
Attending: INTERNAL MEDICINE

## 2021-06-18 DIAGNOSIS — C50.121 MALIGNANT NEOPLASM OF CENTRAL PORTION OF RIGHT BREAST IN MALE, ESTROGEN RECEPTOR POSITIVE (H): ICD-10-CM

## 2021-06-18 DIAGNOSIS — Z51.11 ENCOUNTER FOR ANTINEOPLASTIC CHEMOTHERAPY: ICD-10-CM

## 2021-06-18 DIAGNOSIS — Z17.0 MALIGNANT NEOPLASM OF CENTRAL PORTION OF RIGHT BREAST IN MALE, ESTROGEN RECEPTOR POSITIVE (H): ICD-10-CM

## 2021-06-18 LAB
COPATH REPORT: NORMAL
MAGNESIUM SERPL-MCNC: 2.5 MG/DL (ref 1.6–2.3)

## 2021-06-18 PROCEDURE — 83735 ASSAY OF MAGNESIUM: CPT | Performed by: INTERNAL MEDICINE

## 2021-06-18 RX ORDER — GADOBUTROL 604.72 MG/ML
8.1 INJECTION INTRAVENOUS ONCE
Status: COMPLETED | OUTPATIENT
Start: 2021-06-18 | End: 2021-06-18

## 2021-06-18 RX ADMIN — GADOBUTROL 8.1 ML: 604.72 INJECTION INTRAVENOUS at 08:02

## 2021-06-18 NOTE — NURSING NOTE
Chief Complaint   Patient presents with     Blood Draw     vpt blood draw.     Venipuncture labs drawn from right arm.  There was no research labs    Jackeline Hendricks MA

## 2021-06-21 ENCOUNTER — ANCILLARY PROCEDURE (OUTPATIENT)
Dept: MAMMOGRAPHY | Facility: CLINIC | Age: 71
End: 2021-06-21
Attending: INTERNAL MEDICINE
Payer: COMMERCIAL

## 2021-06-21 ENCOUNTER — ALLIED HEALTH/NURSE VISIT (OUTPATIENT)
Dept: ONCOLOGY | Facility: CLINIC | Age: 71
End: 2021-06-21
Attending: INTERNAL MEDICINE
Payer: COMMERCIAL

## 2021-06-21 ENCOUNTER — PATIENT OUTREACH (OUTPATIENT)
Dept: ONCOLOGY | Facility: CLINIC | Age: 71
End: 2021-06-21

## 2021-06-21 DIAGNOSIS — C50.121: ICD-10-CM

## 2021-06-21 DIAGNOSIS — Z00.6 EXAMINATION OF PARTICIPANT IN CLINICAL TRIAL: Primary | ICD-10-CM

## 2021-06-21 DIAGNOSIS — Z17.0 MALIGNANT NEOPLASM OF CENTRAL PORTION OF RIGHT BREAST IN MALE, ESTROGEN RECEPTOR POSITIVE (H): ICD-10-CM

## 2021-06-21 DIAGNOSIS — C50.121 MALIGNANT NEOPLASM OF CENTRAL PORTION OF RIGHT BREAST IN MALE, ESTROGEN RECEPTOR POSITIVE (H): ICD-10-CM

## 2021-06-21 DIAGNOSIS — Z51.11 ENCOUNTER FOR ANTINEOPLASTIC CHEMOTHERAPY: ICD-10-CM

## 2021-06-21 PROCEDURE — 999N000104 HC STATISTIC NO CHARGE

## 2021-06-21 PROCEDURE — 93005 ELECTROCARDIOGRAM TRACING: CPT

## 2021-06-21 PROCEDURE — 19083 BX BREAST 1ST LESION US IMAG: CPT | Mod: RT | Performed by: RADIOLOGY

## 2021-06-21 NOTE — PROGRESS NOTES
Oncology Visit:  Date on this visit: 6/21/2021    Diagnosis: clinical prognostic stage IIIb, [id number], grade 2, ER positive, TX positive, HER-2 negative right breast cancer.    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  He noted discomfort and hardening of the skin of the right nipple in late April/early May, 2021.  He had a similar appearing benign skin lesion of the arm 3 months earlier and thought it likely to be the same.  However, he subsequently noted a mass in the same area.  Right breast skin punch biopsy performed by dermatology was c/w grade 2 invasive ductal carcinoma with associated DCIS.  Invasive carcinoma was ER positive 100% and TX positive 70%, with tumor invading the dermis.  HER-2 was negative by IHC (score 1+).  Diagnostic mammogram and ultrasound showed a retroareolar right breast mass measuring 2.9 cm with associated nipple retraction and enlarged, abnormal right axillary lymph nodes.  Right breast biopsy was again c/w grade 2 invasive ductal carcinoma, ER strong in 98% of tumor cell nuclei, HER-2 pending.  Ki-67 was 15%.    He elected to screen for the ISPY-2 clinical trial.    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  He is in process of screening the the ISPY-2 clinical trial and has many questions regarding the trial.  There have been no changes in his health since our initial visit on 6/8/2021.    Past Medical/Surgical History:  Past Medical History:   Diagnosis Date     Chronic sinusitis      Hypertension 2002     No past surgical history on file.    Allergies:  Allergies as of 06/22/2021     (No Known Allergies)     Current Medications:  Current Outpatient Medications   Medication Sig Dispense Refill     emollient (VANICREAM) external cream On entire body against dry skin    Use for skin wash  free&clear soap and for hair Free&Clear Shampoo and conditioner (Patient not taking: Reported on 6/8/2021) 453 g 11     losartan-hydrochlorothiazide (HYZAAR) 100-25 MG tablet Take 1 tablet by mouth daily 90 tablet 1      Family and Social History:  See initial consultation dated 6/8/2021 for further details.    Physical Exam:  BP (!) 144/88   Pulse 68   Temp 97.8  F (36.6  C) (Oral)   Wt 80.3 kg (177 lb)   SpO2 97%   BMI 27.33 kg/m    General:  Well appearing adult male in NAD.  No physical exam performed today as the entirety of the visit was spent in counseling.    Laboratory/Imaging Studies  6/14/2021 Labs:  Electrolytes and creatinine are wnl.  LFTs are wnl.  Hemoglobin A1C is elevated at 6.5%  TSH and free T4 are wnl.    Blood counts are wnl.    6/18/2021 Breast MRI:  - Biopsy proven spiculated cancer in the right breast measures 3.9 cm and demonstrates invasion of the nipple and underlying musculature.  - In the right axilla are at least 4 abnormal lymph nodes measuring 2.2 cm, 2 cm, 1.4 cm, and 1.2 cm.  There is a suspicious lymph node bordering on level 2 and 3 measuring 1.3 cm.  There is a tiny 0.4 mm right internal mammary chain lymph node.          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      1.  Right breast cancer:  We reviewed the results of breast MRI which measures the right breast mass at 3.9 cm and shows level 1, 2, and 3 LAD.  There is no suspicious enhancement on the left side.     We spent the majority of our visit discussing details of the I-SPY2 clinical trial.  He is anxious to be randomized to a treatment arm.  We are currently awaiting the results of his Mammaprint also central review of breast MRI.  Once these are complete, we should be able to proceed with randomization.       We reviewed the purpose of the trial is to pair the right treatment with  the right patient.  There are currently two separate portions of the trial, a chemotherapy + study drug portion and an endocrine optimization protocol.  If Mammaprint returns high risk, he will be a candidate for treatment on the chemotherapy portion.  This portion is adding a study drug to standard breast cancer chemotherapy.  On this portion of the trial, he would have an 80% chance of getting a study drug and a 20% chance of getting SOC chemotherapy (weekly Taxol x 12 followed by ddAC).  The chemotherapy portion of the study is an adaptive randomization, meaning the response to treatment of patients with similar tumor subtype who were enrolled on the trial prior to him will influence which arm he is randomized to.  We reviewed the treatment options on the chemotherapy portion of the trial and I have previously sent these in written form to him in an e-mail.  On the chemotherapy portion of the trial, he would have additional breast MRIs at weeks 3, 6, 12, and prior to surgery.  If he did not have significant response to treatment by week 6, would change treatment (I.e. if he was on Taxol plus study drug without response at week 6, would then change treatment to ddAC portion of treatment).      If he has a low risk Mammaprint, he will be eligible to screen the Endocrine Optimization Protocol.  We reviewed the three treatment arms on the EOP as well.  On the EOP he would have breast MRIs at weeks 3, 12 and prior to surgery.  He would be treated with a total 6 months of preoperative endocrine therapy.    We discussed once we know which arm he is randomized too, we will discuss the dosing, administration and potential side effects in detail.    2.  Pre-diabetes.  His screening Hemoglobin A1C for the ISPY-2 trial was 6.5% c/w pre-diabetes.  I have notified his PCP of this result.    3.  Cancer risk management:  Breast Actionable panel was drawn on 6/4/2021 and results are pending at this time.    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  35 minutes spent on the date of the encounter doing chart review, review of test results, interpretation of tests, patient visit, documentation and discussion with other provider(s).

## 2021-06-21 NOTE — NURSING NOTE
EKG was performed today.  Order written by Dr Sr was completed today. Name and  verified with patient.  Patient was discharged.    Cailin Owens CMA ([facility])

## 2021-06-22 ENCOUNTER — ONCOLOGY VISIT (OUTPATIENT)
Dept: ONCOLOGY | Facility: CLINIC | Age: 71
End: 2021-06-22
Attending: INTERNAL MEDICINE
Payer: COMMERCIAL

## 2021-06-22 VITALS
TEMPERATURE: 97.8 F | WEIGHT: 177 LBS | HEART RATE: 68 BPM | OXYGEN SATURATION: 97 % | BODY MASS INDEX: 27.33 KG/M2 | SYSTOLIC BLOOD PRESSURE: 144 MMHG | DIASTOLIC BLOOD PRESSURE: 88 MMHG

## 2021-06-22 DIAGNOSIS — Z00.6 EXAMINATION OF PARTICIPANT IN CLINICAL TRIAL: ICD-10-CM

## 2021-06-22 DIAGNOSIS — Z51.11 ENCOUNTER FOR ANTINEOPLASTIC CHEMOTHERAPY: ICD-10-CM

## 2021-06-22 DIAGNOSIS — Z17.0 MALIGNANT NEOPLASM OF CENTRAL PORTION OF RIGHT BREAST IN MALE, ESTROGEN RECEPTOR POSITIVE (H): Primary | ICD-10-CM

## 2021-06-22 DIAGNOSIS — R73.03 PREDIABETES: ICD-10-CM

## 2021-06-22 DIAGNOSIS — C50.121 MALIGNANT NEOPLASM OF CENTRAL PORTION OF RIGHT BREAST IN MALE, ESTROGEN RECEPTOR POSITIVE (H): Primary | ICD-10-CM

## 2021-06-22 LAB
INTERPRETATION ECG - MUSE: NORMAL
MAGNESIUM SERPL-MCNC: 2.3 MG/DL (ref 1.6–2.3)

## 2021-06-22 PROCEDURE — 83735 ASSAY OF MAGNESIUM: CPT | Performed by: INTERNAL MEDICINE

## 2021-06-22 PROCEDURE — 99215 OFFICE O/P EST HI 40 MIN: CPT | Performed by: INTERNAL MEDICINE

## 2021-06-22 PROCEDURE — G0463 HOSPITAL OUTPT CLINIC VISIT: HCPCS

## 2021-06-22 PROCEDURE — 36415 COLL VENOUS BLD VENIPUNCTURE: CPT

## 2021-06-22 ASSESSMENT — PAIN SCALES - GENERAL: PAINLEVEL: NO PAIN (0)

## 2021-06-22 NOTE — NURSING NOTE
Chief Complaint   Patient presents with     Lab Only     labs drawn with vpt by rn.       Labs drawn with vpt by rn.  Pt tolerated well.      Monie Ibrahim RN

## 2021-06-22 NOTE — NURSING NOTE
"Oncology Rooming Note    June 22, 2021 10:21 AM   Ronna Collins is a 70 year old male who presents for:    Chief Complaint   Patient presents with     Oncology Clinic Visit     breast cancer     Initial Vitals: BP (!) 144/88   Pulse 68   Temp 97.8  F (36.6  C) (Oral)   Wt 80.3 kg (177 lb)   SpO2 97%   BMI 27.33 kg/m   Estimated body mass index is 27.33 kg/m  as calculated from the following:    Height as of 6/4/21: 1.714 m (5' 7.48\").    Weight as of this encounter: 80.3 kg (177 lb). Body surface area is 1.96 meters squared.  No Pain (0) Comment: Data Unavailable   No LMP for male patient.  Allergies reviewed: Yes  Medications reviewed: Yes    Medications: Medication refills not needed today.  Pharmacy name entered into [facility]:    [id number] - [place], [place] - [address] [place] [address] N AT [facility] #[id number] - [place], [place] - [address] [place] [address] [place] AT [facility]/ [place] [facility]    Clinical concerns: none       Samia New CMA            "

## 2021-06-22 NOTE — LETTER
6/22/2021         RE: Ronna Collins  [address]  [facility] [address]        Dear Colleague,    Thank you for referring your patient, Ronna Collins, to the [facility]. Please see a copy of my visit note below.    Oncology Visit:  Date on this visit: 6/21/2021    Diagnosis: clinical prognostic stage IIIb, [id number], grade 2, ER positive, NY positive, HER-2 negative right breast cancer.    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  He noted discomfort and hardening of the skin of the right nipple in late April/early May, 2021.  He had a similar appearing benign skin lesion of the arm 3 months earlier and thought it likely to be the same.  However, he subsequently noted a mass in the same area.  Right breast skin punch biopsy performed by dermatology was c/w grade 2 invasive ductal carcinoma with associated DCIS.  Invasive carcinoma was ER positive 100% and NY positive 70%, with tumor invading the dermis.  HER-2 was negative by IHC (score 1+).  Diagnostic mammogram and ultrasound showed a retroareolar right breast mass measuring 2.9 cm with associated nipple retraction and enlarged, abnormal right axillary lymph nodes.  Right breast biopsy was again c/w grade 2 invasive ductal carcinoma, ER strong in 98% of tumor cell nuclei, HER-2 pending.  Ki-67 was 15%.    He elected to screen for the ISPY-2 clinical trial.    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  He is in process of screening the the ISPY-2 clinical trial and has many questions regarding the trial.  There have been no changes in his health since our initial visit on 6/8/2021.    Past Medical/Surgical History:  Past Medical History:   Diagnosis Date     Chronic sinusitis      Hypertension 2002     No past surgical history on  file.    Allergies:  Allergies as of 06/22/2021     (No Known Allergies)     Current Medications:  Current Outpatient Medications   Medication Sig Dispense Refill     emollient (VANICREAM) external cream On entire body against dry skin    Use for skin wash free&clear soap and for hair Free&Clear Shampoo and conditioner (Patient not taking: Reported on 6/8/2021) 453 g 11     losartan-hydrochlorothiazide (HYZAAR) 100-25 MG tablet Take 1 tablet by mouth daily 90 tablet 1      Family and Social History:  See initial consultation dated 6/8/2021 for further details.    Physical Exam:  BP (!) 144/88   Pulse 68   Temp 97.8  F (36.6  C) (Oral)   Wt 80.3 kg (177 lb)   SpO2 97%   BMI 27.33 kg/m    General:  Well appearing adult male in NAD.  No physical exam performed today as the entirety of the visit was spent in counseling.    Laboratory/Imaging Studies  6/14/2021 Labs:  Electrolytes and creatinine are wnl.  LFTs are wnl.  Hemoglobin A1C is elevated at 6.5%  TSH and free T4 are wnl.    Blood counts are wnl.    6/18/2021 Breast MRI:  - Biopsy proven spiculated cancer in the right breast measures 3.9 cm and demonstrates invasion of the nipple and underlying musculature.  - In the right axilla are at least 4 abnormal lymph nodes measuring 2.2 cm, 2 cm, 1.4 cm, and 1.2 cm.  There is a suspicious lymph node bordering on level 2 and 3 measuring 1.3 cm.  There is a tiny 0.4 mm right internal mammary chain lymph node.          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      1.  Right breast cancer:  We reviewed the results of breast MRI which measures the right breast mass at 3.9 cm and shows level 1, 2, and 3 LAD.  There is no suspicious enhancement on the left side.     We spent the majority of our visit discussing details of the I-SPY2 clinical trial.  He is anxious to be randomized to a  treatment arm.  We are currently awaiting the results of his Mammaprint also central review of breast MRI.  Once these are complete, we should be able to proceed with randomization.       We reviewed the purpose of the trial is to pair the right treatment with the right patient.  There are currently two separate portions of the trial, a chemotherapy + study drug portion and an endocrine optimization protocol.  If Mammaprint returns high risk, he will be a candidate for treatment on the chemotherapy portion.  This portion is adding a study drug to standard breast cancer chemotherapy.  On this portion of the trial, he would have an 80% chance of getting a study drug and a 20% chance of getting SOC chemotherapy (weekly Taxol x 12 followed by ddAC).  The chemotherapy portion of the study is an adaptive randomization, meaning the response to treatment of patients with similar tumor subtype who were enrolled on the trial prior to him will influence which arm he is randomized to.  We reviewed the treatment options on the chemotherapy portion of the trial and I have previously sent these in written form to him in an e-mail.  On the chemotherapy portion of the trial, he would have additional breast MRIs at weeks 3, 6, 12, and prior to surgery.  If he did not have significant response to treatment by week 6, would change treatment (I.e. if he was on Taxol plus study drug without response at week 6, would then change treatment to ddAC portion of treatment).      If he has a low risk Mammaprint, he will be eligible to screen the Endocrine Optimization Protocol.  We reviewed the three treatment arms on the EOP as well.  On the EOP he would have breast MRIs at weeks 3, 12 and prior to surgery.  He would be treated with a total 6 months of preoperative endocrine therapy.    We discussed once we know which arm he is randomized too, we will discuss the dosing, administration and potential side effects in detail.    2.  Pre-diabetes.   His screening Hemoglobin A1C for the ISPY-2 trial was 6.5% c/w pre-diabetes.  I have notified his PCP of this result.    3.  Cancer risk management:  Breast Actionable panel was drawn on 6/4/2021 and results are pending at this time.    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  35 minutes spent on the date of the encounter doing chart review, review of test results, interpretation of tests, patient visit, documentation and discussion with other provider(s).           Again, thank you for allowing me to participate in the care of your patient.        Sincerely,        Mikaela Sr MD

## 2021-06-23 ENCOUNTER — ANCILLARY PROCEDURE (OUTPATIENT)
Dept: CARDIOLOGY | Facility: CLINIC | Age: 71
End: 2021-06-23
Attending: INTERNAL MEDICINE
Payer: COMMERCIAL

## 2021-06-23 DIAGNOSIS — Z51.11 ENCOUNTER FOR ANTINEOPLASTIC CHEMOTHERAPY: ICD-10-CM

## 2021-06-23 DIAGNOSIS — Z00.6 EXAMINATION OF PARTICIPANT IN CLINICAL TRIAL: ICD-10-CM

## 2021-06-23 DIAGNOSIS — C50.121: ICD-10-CM

## 2021-06-23 LAB — GGT SERPL-CCNC: 28 U/L (ref 0–75)

## 2021-06-23 PROCEDURE — 93306 TTE W/DOPPLER COMPLETE: CPT | Performed by: INTERNAL MEDICINE

## 2021-06-23 PROCEDURE — 82977 ASSAY OF GGT: CPT | Performed by: INTERNAL MEDICINE

## 2021-06-23 PROCEDURE — 36415 COLL VENOUS BLD VENIPUNCTURE: CPT | Performed by: INTERNAL MEDICINE

## 2021-06-24 LAB — LAB SCANNED RESULT: NORMAL

## 2021-06-25 LAB — COPATH REPORT: NORMAL

## 2021-06-28 DIAGNOSIS — C50.121: Primary | ICD-10-CM

## 2021-06-28 PROCEDURE — 81406 MOPATH PROCEDURE LEVEL 7: CPT | Performed by: SURGERY

## 2021-06-28 PROCEDURE — 81479 UNLISTED MOLECULAR PATHOLOGY: CPT | Performed by: SURGERY

## 2021-06-28 PROCEDURE — 81408 MOPATH PROCEDURE LEVEL 9: CPT | Performed by: SURGERY

## 2021-06-28 PROCEDURE — 81321 PTEN GENE FULL SEQUENCE: CPT | Performed by: SURGERY

## 2021-06-28 PROCEDURE — 81162 BRCA1&2 GEN FULL SEQ DUP/DEL: CPT | Performed by: SURGERY

## 2021-06-28 PROCEDURE — 81405 MOPATH PROCEDURE LEVEL 6: CPT | Performed by: SURGERY

## 2021-06-28 PROCEDURE — 81323 PTEN GENE DUP/DELET VARIANT: CPT | Performed by: SURGERY

## 2021-06-28 PROCEDURE — 81307 PALB2 GENE FULL GENE SEQ: CPT | Performed by: SURGERY

## 2021-06-28 PROCEDURE — G0452 MOLECULAR PATHOLOGY INTERPR: HCPCS | Mod: 26 | Performed by: PATHOLOGY

## 2021-06-28 PROCEDURE — 81351 TP53 GENE FULL GENE SEQUENCE: CPT | Performed by: SURGERY

## 2021-06-29 LAB — COPATH REPORT: NORMAL

## 2021-06-30 ENCOUNTER — ONCOLOGY VISIT (OUTPATIENT)
Dept: ONCOLOGY | Facility: CLINIC | Age: 71
End: 2021-06-30
Attending: INTERNAL MEDICINE
Payer: COMMERCIAL

## 2021-06-30 ENCOUNTER — APPOINTMENT (OUTPATIENT)
Dept: LAB | Facility: CLINIC | Age: 71
End: 2021-06-30
Attending: INTERNAL MEDICINE
Payer: COMMERCIAL

## 2021-06-30 ENCOUNTER — RESEARCH ENCOUNTER (OUTPATIENT)
Dept: ONCOLOGY | Facility: CLINIC | Age: 71
End: 2021-06-30

## 2021-06-30 VITALS
BODY MASS INDEX: 27.14 KG/M2 | WEIGHT: 175.8 LBS | SYSTOLIC BLOOD PRESSURE: 135 MMHG | DIASTOLIC BLOOD PRESSURE: 89 MMHG | OXYGEN SATURATION: 98 % | HEART RATE: 85 BPM | TEMPERATURE: 98 F | RESPIRATION RATE: 16 BRPM

## 2021-06-30 DIAGNOSIS — C50.121 MALIGNANT NEOPLASM OF CENTRAL PORTION OF RIGHT BREAST IN MALE, ESTROGEN RECEPTOR POSITIVE (H): Primary | ICD-10-CM

## 2021-06-30 DIAGNOSIS — C50.929: ICD-10-CM

## 2021-06-30 DIAGNOSIS — Z17.0 MALIGNANT NEOPLASM OF CENTRAL PORTION OF RIGHT BREAST IN MALE, ESTROGEN RECEPTOR POSITIVE (H): Primary | ICD-10-CM

## 2021-06-30 LAB
ALBUMIN SERPL-MCNC: 4 G/DL (ref 3.4–5)
ALP SERPL-CCNC: 75 U/L (ref 40–150)
ALT SERPL W P-5'-P-CCNC: 48 U/L (ref 0–70)
ANION GAP SERPL CALCULATED.3IONS-SCNC: 9 MMOL/L (ref 3–14)
AST SERPL W P-5'-P-CCNC: 23 U/L (ref 0–45)
BASOPHILS # BLD AUTO: 0.1 10E9/L (ref 0–0.2)
BASOPHILS NFR BLD AUTO: 1 %
BILIRUB SERPL-MCNC: 0.5 MG/DL (ref 0.2–1.3)
BUN SERPL-MCNC: 11 MG/DL (ref 7–30)
CALCIUM SERPL-MCNC: 9.4 MG/DL (ref 8.5–10.1)
CHLORIDE SERPL-SCNC: 103 MMOL/L (ref 94–109)
CO2 SERPL-SCNC: 25 MMOL/L (ref 20–32)
CREAT SERPL-MCNC: 0.73 MG/DL (ref 0.66–1.25)
DIFFERENTIAL METHOD BLD: NORMAL
EOSINOPHIL # BLD AUTO: 0.3 10E9/L (ref 0–0.7)
EOSINOPHIL NFR BLD AUTO: 5.9 %
ERYTHROCYTE [DISTWIDTH] IN BLOOD BY AUTOMATED COUNT: 12.4 % (ref 10–15)
GFR SERPL CREATININE-BSD FRML MDRD: >90 ML/MIN/{1.73_M2}
GLUCOSE SERPL-MCNC: 182 MG/DL (ref 70–99)
HCT VFR BLD AUTO: 47.5 % (ref 40–53)
HGB BLD-MCNC: 15.1 G/DL (ref 13.3–17.7)
IMM GRANULOCYTES # BLD: 0 10E9/L (ref 0–0.4)
IMM GRANULOCYTES NFR BLD: 0.3 %
LYMPHOCYTES # BLD AUTO: 2 10E9/L (ref 0.8–5.3)
LYMPHOCYTES NFR BLD AUTO: 33.7 %
MCH RBC QN AUTO: 27.1 PG (ref 26.5–33)
MCHC RBC AUTO-ENTMCNC: 31.8 G/DL (ref 31.5–36.5)
MCV RBC AUTO: 85 FL (ref 78–100)
MONOCYTES # BLD AUTO: 0.4 10E9/L (ref 0–1.3)
MONOCYTES NFR BLD AUTO: 7.4 %
NEUTROPHILS # BLD AUTO: 3 10E9/L (ref 1.6–8.3)
NEUTROPHILS NFR BLD AUTO: 51.7 %
NRBC # BLD AUTO: 0 10*3/UL
NRBC BLD AUTO-RTO: 0 /100
PLATELET # BLD AUTO: 241 10E9/L (ref 150–450)
POTASSIUM SERPL-SCNC: 3.7 MMOL/L (ref 3.4–5.3)
PROT SERPL-MCNC: 7.8 G/DL (ref 6.8–8.8)
RBC # BLD AUTO: 5.57 10E12/L (ref 4.4–5.9)
SODIUM SERPL-SCNC: 138 MMOL/L (ref 133–144)
WBC # BLD AUTO: 5.8 10E9/L (ref 4–11)

## 2021-06-30 PROCEDURE — G0463 HOSPITAL OUTPT CLINIC VISIT: HCPCS

## 2021-06-30 PROCEDURE — 99214 OFFICE O/P EST MOD 30 MIN: CPT | Performed by: INTERNAL MEDICINE

## 2021-06-30 PROCEDURE — 80053 COMPREHEN METABOLIC PANEL: CPT | Performed by: INTERNAL MEDICINE

## 2021-06-30 PROCEDURE — 36415 COLL VENOUS BLD VENIPUNCTURE: CPT

## 2021-06-30 PROCEDURE — 85025 COMPLETE CBC W/AUTO DIFF WBC: CPT | Performed by: INTERNAL MEDICINE

## 2021-06-30 ASSESSMENT — PAIN SCALES - GENERAL: PAINLEVEL: NO PAIN (0)

## 2021-06-30 NOTE — LETTER
6/30/2021         RE: Ronna Collins  [address]  [facility] [address]        Dear Colleague,    Thank you for referring your patient, Ronna Collins, to the [facility]. Please see a copy of my visit note below.    Oncology Visit:  Date on this visit: 6/30/2021    Diagnosis: clinical prognostic stage IIIb, P5iG1F4, grade 2, ER positive, AL positive, HER-2 negative right breast cancer.    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  He noted discomfort and hardening of the skin of the right nipple in late April/early May, 2021.  He had a similar appearing benign skin lesion of the arm 3 months earlier and thought it likely to be the same.  However, he subsequently noted a mass in the same area.  Right breast skin punch biopsy performed by dermatology was c/w grade 2 invasive ductal carcinoma with associated DCIS.  Invasive carcinoma was ER positive 100% and AL positive 70%, with tumor invading the dermis.  HER-2 was negative by IHC (score 1+).  Diagnostic mammogram and ultrasound showed a retroareolar right breast mass measuring 2.9 cm with associated nipple retraction and enlarged, abnormal right axillary lymph nodes.  Right breast biopsy was again c/w grade 2 invasive ductal carcinoma, ER strong in 98% of tumor cell nuclei, HER-2 pending.  Ki-67 was 15%.    He elected to screen for the ISPY-2 clinical trial.  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  He has not yet been randomized to  a treatment arm at the time of our visit.  He is anxious to start treatment.  He is otherwise in good health and is without complaints today.    Past Medical/Surgical History:  Past Medical History:   Diagnosis Date     Chronic sinusitis      Hypertension 2002     No past surgical history on file.    Allergies:  Allergies as of 06/30/2021     (No Known Allergies)     Current Medications:  Current Outpatient Medications   Medication Sig Dispense Refill     losartan-hydrochlorothiazide (HYZAAR) 100-25 MG tablet Take 1 tablet by mouth daily 90 tablet 1      Family and Social History:  See initial consultation dated 6/8/2021 for further details.    Physical Exam:  /89 (BP Location: Right arm, Patient Position: Sitting, Cuff Size: Adult Regular)   Pulse 85   Temp 98  F (36.7  C) (Oral)   Resp 16   Wt 79.7 kg (175 lb 12.8 oz)   SpO2 98%   BMI 27.14 kg/m    General:  Well appearing adult male in NAD.  Alert and oriented.    Laboratory/Imaging Studies  6/30/2021 Labs:  Electrolytes, creatinine and LFTs are wnl.  Non-fasting blood glucose is elevated at 182  Blood counts are wnl.    Breast Actionable hereditary cancer panel drawn on 6/4 and specimen received on 6/28, results are pending.    6/23/2021 Echocardiogram:  LVEF is normal at 60-65%      ASSESSMENT/PLAN:  Jamie Collins is a 69 yo male with clinical stage IIIb, I1pW4P2, grade 2, ER positive, PA positive, HER-2 negative invasive ductal carcinoma of the right breast.      1.  Right breast cancer:  He as completed the screening procedures for the ISPY-2 trial and is awaiting randomization.  We reviewed Mammaprint returned low risk with a score of +0.29.  This indicates little benefit to chemotherapy.  Given low risk Mammaprint he is not eligible for treatment on one of the chemotherapy based treatment arms of ISPY-2.  He is eligible for the Endocrine Optimization Protocol (EOP).    We reviewed the 3 treatment arms on the EOP in detail.  He will be randomized to  either treatment with 1) amcenestrant monotherapy, 2) amcenestrant and letrozole, or 3) amcenestrant and abemaciclib.  We reviewed the dosing and administration of each agent in detail.  We reviewed the potential side effects of these medications including but not limited to hot flashes, arthralgias, nausea, decreased appetite, fatigue, diarrhea, constipation, thinning of the bones, and potential for cardiac and eye toxicity.  We also discussed risk of low blood counts with abemaciclib and need to monitor frequent blood counts.  Ira Rider is in the process of reviewing his screening data and we anticipate randomization later today.    He had questions about lifestyle changes needed to be made on treatment.  We reviewed if he is randomized to the abemaciclib arm, he is at high risk of diarrhea.  Foods high in saturated fat may make this worse.  We reviewed management of diarrhea on abemaciclib including use of antidiarrheals such as imoidum and Lomotil, as well as some patients requiring dose reductions.  I encouraged him to drink water and exercise daily while on treatment.    2.  Pre-diabetes:  6/14/2021 hemoglobin A1C was 6.5%.  He will follow up with his PCP for recommendations.  Of note, I have previously messaged Dr. Henry about this result.    3.  Cancer risk management:  Breast Actionable panel drawn on 6/4/2021 and results are pending at this time.  He has an appointment with genetic counseling on 7/13/2021.    4.  Follow up:  To be scheduled once patient is randomized to a treatment arm on ISPY-2 clinical trial.    35 minutes spent on the date of the encounter doing chart review, review of test results, interpretation of tests, patient visit, documentation, discussion with other provider(s) and discussion with family.           Again, thank you for allowing me to participate in the care of your patient.        Sincerely,        Mikaela Sr MD

## 2021-06-30 NOTE — NURSING NOTE
"Oncology Rooming Note    June 30, 2021 9:48 AM   Ronna Collins is a 70 year old male who presents for:    Chief Complaint   Patient presents with     Oncology Clinic Visit     breast cancer     Blood Draw     Labs drawn via  by rn in lab. VS taken.     Initial Vitals: /89 (BP Location: Right arm, Patient Position: Sitting, Cuff Size: Adult Regular)   Pulse 85   Temp 98  F (36.7  C) (Oral)   Resp 16   Wt 79.7 kg (175 lb 12.8 oz)   SpO2 98%   BMI 27.14 kg/m   Estimated body mass index is 27.14 kg/m  as calculated from the following:    Height as of 6/4/21: 1.714 m (5' 7.48\").    Weight as of this encounter: 79.7 kg (175 lb 12.8 oz). Body surface area is 1.95 meters squared.  No Pain (0) Comment: Data Unavailable   No LMP for male patient.  Allergies reviewed: Yes  Medications reviewed: Yes    Medications: Medication refills not needed today.  Pharmacy name entered into [facility]:    [id number] - [place], [place] - [address] [place] [address] N [id number] - [place], [place] - [address] [place] [address] AT [facility]/ [place] [facility]    Clinical concerns: none       Samia New CMA            "

## 2021-06-30 NOTE — PROGRESS NOTES
Oncology Visit:  Date on this visit: 6/30/2021    Diagnosis: clinical prognostic stage IIIb, [id number], grade 2, ER positive, HI positive, HER-2 negative right breast cancer.    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  He noted discomfort and hardening of the skin of the right nipple in late April/early May, 2021.  He had a similar appearing benign skin lesion of the arm 3 months earlier and thought it likely to be the same.  However, he subsequently noted a mass in the same area.  Right breast skin punch biopsy performed by dermatology was c/w grade 2 invasive ductal carcinoma with associated DCIS.  Invasive carcinoma was ER positive 100% and HI positive 70%, with tumor invading the dermis.  HER-2 was negative by IHC (score 1+).  Diagnostic mammogram and ultrasound showed a retroareolar right breast mass measuring 2.9 cm with associated nipple retraction and enlarged, abnormal right axillary lymph nodes.  Right breast biopsy was again c/w grade 2 invasive ductal carcinoma, ER strong in 98% of tumor cell nuclei, HER-2 pending.  Ki-67 was 15%.    He elected to screen for the ISPY-2 clinical trial.  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  He has not yet been randomized to a treatment arm at the time of our visit.  He is anxious to start treatment.  He is otherwise in good health and is without complaints today.    Past Medical/Surgical History:  Past Medical History:   Diagnosis Date     Chronic sinusitis      Hypertension 2002      No past surgical history on file.    Allergies:  Allergies as of 06/30/2021     (No Known Allergies)     Current Medications:  Current Outpatient Medications   Medication Sig Dispense Refill     losartan-hydrochlorothiazide (HYZAAR) 100-25 MG tablet Take 1 tablet by mouth daily 90 tablet 1      Family and Social History:  See initial consultation dated 6/8/2021 for further details.    Physical Exam:  /89 (BP Location: Right arm, Patient Position: Sitting, Cuff Size: Adult Regular)   Pulse 85   Temp 98  F (36.7  C) (Oral)   Resp 16   Wt 79.7 kg (175 lb 12.8 oz)   SpO2 98%   BMI 27.14 kg/m    General:  Well appearing adult male in NAD.  Alert and oriented.    Laboratory/Imaging Studies  6/30/2021 Labs:  Electrolytes, creatinine and LFTs are wnl.  Non-fasting blood glucose is elevated at 182  Blood counts are wnl.    Breast Actionable hereditary cancer panel drawn on 6/4 and specimen received on 6/28, results are pending.    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      1.  Right breast cancer:  He as completed the screening procedures for the ISPY-2 trial and is awaiting randomization.  We reviewed Mammaprint returned low risk with a score of +0.29.  This indicates little benefit to chemotherapy.  Given low risk Mammaprint he is not eligible for treatment on one of the chemotherapy based treatment arms of ISPY-2.  He is eligible for the Endocrine Optimization Protocol (EOP).    We reviewed the 3 treatment arms on the EOP in detail.  He will be randomized to either treatment with 1) amcenestrant monotherapy, 2) amcenestrant and letrozole, or 3) amcenestrant and abemaciclib.  We reviewed the dosing and administration of each agent in detail.  We reviewed the potential side effects of these medications including but  not limited to hot flashes, arthralgias, nausea, decreased appetite, fatigue, diarrhea, constipation, thinning of the bones, and potential for cardiac and eye toxicity.  We also discussed risk of low blood counts with abemaciclib and need to monitor frequent blood counts.  Ira Rider is in the process of reviewing his screening data and we anticipate randomization later today.    He had questions about lifestyle changes needed to be made on treatment.  We reviewed if he is randomized to the abemaciclib arm, he is at high risk of diarrhea.  Foods high in saturated fat may make this worse.  We reviewed management of diarrhea on abemaciclib including use of antidiarrheals such as imoidum and Lomotil, as well as some patients requiring dose reductions.  I encouraged him to drink water and exercise daily while on treatment.    2.  Pre-diabetes:  6/14/2021 hemoglobin A1C was 6.5%.  He will follow up with his PCP for recommendations.  Of note, I have previously messaged Dr. Henry about this result.    3.  Cancer risk management:  Breast Actionable panel drawn on 6/4/2021 and results are pending at this time.  He has an appointment with genetic counseling on 7/13/2021.    4.  Follow up:  To be scheduled once patient is randomized to a treatment arm on ISPY-2 clinical trial.    35 minutes spent on the date of the encounter doing chart review, review of test results, interpretation of tests, patient visit, documentation, discussion with other provider(s) and discussion with family.

## 2021-06-30 NOTE — NURSING NOTE
Chief Complaint   Patient presents with     Oncology Clinic Visit     breast cancer     Blood Draw     Labs drawn via  by rn in lab. VS taken.     Labs collected from venipuncture by RN. Vitals taken. Checked in for appointment(s).    Jt Cheng RN

## 2021-07-01 ENCOUNTER — VIRTUAL VISIT (OUTPATIENT)
Dept: ONCOLOGY | Facility: CLINIC | Age: 71
End: 2021-07-01
Attending: SURGERY
Payer: COMMERCIAL

## 2021-07-01 DIAGNOSIS — Z17.0 MALIGNANT NEOPLASM OF CENTRAL PORTION OF RIGHT BREAST IN MALE, ESTROGEN RECEPTOR POSITIVE (H): Primary | ICD-10-CM

## 2021-07-01 DIAGNOSIS — C50.121 MALIGNANT NEOPLASM OF CENTRAL PORTION OF RIGHT BREAST IN MALE, ESTROGEN RECEPTOR POSITIVE (H): Primary | ICD-10-CM

## 2021-07-01 PROCEDURE — 99212 OFFICE O/P EST SF 10 MIN: CPT | Mod: 95 | Performed by: SURGERY

## 2021-07-01 NOTE — PROGRESS NOTES
The above were completed by the medical assistant for the visit.    FOLLOW-UP  Jul 1, 2021    Ronna Collins is a 70 year old male who is phoning in for follow-up for     Cancer Staging  Malignant neoplasm of central portion of right breast in male, estrogen receptor positive (H)  Staging form: Breast, AJCC 8th Edition  - Clinical stage from 6/8/2021: Stage IIIB (cT4b, cN1, cM0, G2, ER+, PA+, HER2-) - Signed by Mikaela Sr MD on 6/8/2021    Treatment to date:  1. Genetic testing - negative for pathogenic variants in HERNAN, BRCA1 , BRCA2, CDH1, CHEK2, NBN, NF1, PALB2, PTEN, STK11, TP53  2. MammaPrint - low    HPI:    Since his last visit, he has been screening for the ISPY-2 clinical trial with Dr Sr.  The MammaPrint score was low and he will be in the amcenestrant monotherapy arm. He is coming in to pick this up today.    Assessment/Plan:  Diagnoses       Codes Comments    Malignant neoplasm of central portion of right breast in male, estrogen receptor positive (H)    -  Primary C50.121, Z17.0          ASSESSMENT:  Ronna Collins is a 70 year old male with breast cancer.    I spoke with Ronna Collins and his daughter.  We reviewed the implications of his negative genetic testing.  He does not need to be screened for other malignancies outside of the general screening guidelines.  I also recommended breast cancer risk consultation for his daughters, in their 30s to determine how they should be screened, but they themselves do not need to undergo genetic testing.    His recent HbA1c was 6.5.  We discussed the implications for wound healing with diabetes.  I recommended a nutritionist consultation; Ronna Collins will follow up with Dr Sr/his PCP regarding whether to start treatment for diabetes.    We discussed the rationale for neoadjuvant therapy and the desire to downstage the axilla.  Ronna Collins is in agreement. I will see him for follow up in person about  1 month prior to surgery after neoadjuvant therapy.    All of the above was discussed and all questions were answered.    PLAN:  1. Nutritionist consult  2. Neoadjuvant endocrine therapy via ISPY2 trial per Dr Sr  3. Follow up with me about a month prior to surgery for surgical planning    Nida Snider MD MSc [facility] FACS    Division of Surgical Oncology  [facility]     Phone call duration: 14 minutes    27 minutes spent on the date of the encounter doing chart review, review of test results, interpretation of tests, patient visit, documentation and discussion with family.

## 2021-07-01 NOTE — LETTER
7/1/2021         RE: Ronna Collins  [address]  [facility] [address]        Dear Colleague,    Thank you for referring your patient, Ronna Collins, to the [facility]. Please see a copy of my visit note below.    Ronna is a 70 year old who is being evaluated via a billable telephone visit.      What phone number would you like to be contacted at? [phone]    How would you like to obtain your AVS? Edis Espinoza LPN           The above were completed by the medical assistant for the visit.    FOLLOW-UP  Jul 1, 2021    Ronna Collins is a 70 year old male who is phoning in for follow-up for     Cancer Staging  Malignant neoplasm of central portion of right breast in male, estrogen receptor positive (H)  Staging form: Breast, AJCC 8th Edition  - Clinical stage from 6/8/2021: Stage IIIB (cT4b, cN1, cM0, G2, ER+, MA+, HER2-) - Signed by Mikaela Sr MD on 6/8/2021    Treatment to date:  1. Genetic testing - negative for pathogenic variants in HERNAN, BRCA1 , BRCA2, CDH1, CHEK2, NBN, NF1, PALB2, PTEN, STK11, TP53  2. MammaPrint - low    HPI:    Since his last visit, he has been screening for the ISPY-2 clinical trial with Dr Sr.  The MammaPrint score was low and he will be in the amcenestrant monotherapy arm. He is coming in to pick this up today.    Assessment/Plan:  Diagnoses       Codes Comments    Malignant neoplasm of central portion of right breast in male, estrogen receptor positive (H)    -  Primary C50.121, Z17.0          ASSESSMENT:  Ronna Collins is a 70 year old male with breast cancer.    I spoke with Ronna Collins and his daughter.  We reviewed the implications of his negative genetic testing.  He does not need to be screened for other malignancies outside of the general screening guidelines.  I also recommended breast cancer risk consultation for his daughters, in their 30s to determine how  they should be screened, but they themselves do not need to undergo genetic testing.    His recent HbA1c was 6.5.  We discussed the implications for wound healing with diabetes.  I recommended a nutritionist consultation; Ronna Collins will follow up with Dr Sr/his PCP regarding whether to start treatment for diabetes.    We discussed the rationale for neoadjuvant therapy and the desire to downstage the axilla.  Donycolin TUAN Karina is in agreement. I will see him for follow up in person about 1 month prior to surgery after neoadjuvant therapy.    All of the above was discussed and all questions were answered.    PLAN:  1. Nutritionist consult  2. Neoadjuvant endocrine therapy via ISPY2 trial per Dr Sr  3. Follow up with me about a month prior to surgery for surgical planning    Nida Snider MD MSc [facility] FACS    Division of Surgical Oncology  [facility]     Phone call duration: 14 minutes    27 minutes spent on the date of the encounter doing chart review, review of test results, interpretation of tests, patient visit, documentation and discussion with family.      Again, thank you for allowing me to participate in the care of your patient.        Sincerely,        Nida Snider MD

## 2021-07-01 NOTE — PROGRESS NOTES
Ronna is a 70 year old who is being evaluated via a billable telephone visit.      What phone number would you like to be contacted at? [phone]    How would you like to obtain your AVS? Edis Espinoza LPN

## 2021-07-08 DIAGNOSIS — Z17.0 MALIGNANT NEOPLASM OF CENTRAL PORTION OF RIGHT BREAST IN MALE, ESTROGEN RECEPTOR POSITIVE (H): ICD-10-CM

## 2021-07-08 DIAGNOSIS — C50.121 MALIGNANT NEOPLASM OF CENTRAL PORTION OF RIGHT BREAST IN MALE, ESTROGEN RECEPTOR POSITIVE (H): ICD-10-CM

## 2021-07-08 LAB
ALBUMIN SERPL-MCNC: 3.8 G/DL (ref 3.4–5)
ALP SERPL-CCNC: 61 U/L (ref 40–150)
ALT SERPL W P-5'-P-CCNC: 45 U/L (ref 0–70)
ANION GAP SERPL CALCULATED.3IONS-SCNC: 4 MMOL/L (ref 3–14)
AST SERPL W P-5'-P-CCNC: 21 U/L (ref 0–45)
BASOPHILS # BLD AUTO: 0 10E9/L (ref 0–0.2)
BASOPHILS NFR BLD AUTO: 0.7 %
BILIRUB SERPL-MCNC: 0.5 MG/DL (ref 0.2–1.3)
BUN SERPL-MCNC: 8 MG/DL (ref 7–30)
CALCIUM SERPL-MCNC: 8.7 MG/DL (ref 8.5–10.1)
CHLORIDE SERPL-SCNC: 107 MMOL/L (ref 94–109)
CO2 SERPL-SCNC: 27 MMOL/L (ref 20–32)
CREAT SERPL-MCNC: 0.8 MG/DL (ref 0.66–1.25)
DIFFERENTIAL METHOD BLD: NORMAL
EOSINOPHIL # BLD AUTO: 0.4 10E9/L (ref 0–0.7)
EOSINOPHIL NFR BLD AUTO: 7.1 %
ERYTHROCYTE [DISTWIDTH] IN BLOOD BY AUTOMATED COUNT: 12.2 % (ref 10–15)
GFR SERPL CREATININE-BSD FRML MDRD: >90 ML/MIN/{1.73_M2}
GLUCOSE SERPL-MCNC: 106 MG/DL (ref 70–99)
HCT VFR BLD AUTO: 43.9 % (ref 40–53)
HGB BLD-MCNC: 14.2 G/DL (ref 13.3–17.7)
IMM GRANULOCYTES # BLD: 0 10E9/L (ref 0–0.4)
IMM GRANULOCYTES NFR BLD: 0.4 %
LYMPHOCYTES # BLD AUTO: 1.8 10E9/L (ref 0.8–5.3)
LYMPHOCYTES NFR BLD AUTO: 32.2 %
MCH RBC QN AUTO: 27.1 PG (ref 26.5–33)
MCHC RBC AUTO-ENTMCNC: 32.3 G/DL (ref 31.5–36.5)
MCV RBC AUTO: 84 FL (ref 78–100)
MONOCYTES # BLD AUTO: 0.5 10E9/L (ref 0–1.3)
MONOCYTES NFR BLD AUTO: 8.7 %
NEUTROPHILS # BLD AUTO: 2.9 10E9/L (ref 1.6–8.3)
NEUTROPHILS NFR BLD AUTO: 50.9 %
NRBC # BLD AUTO: 0 10*3/UL
NRBC BLD AUTO-RTO: 0 /100
PLATELET # BLD AUTO: 220 10E9/L (ref 150–450)
POTASSIUM SERPL-SCNC: 4.3 MMOL/L (ref 3.4–5.3)
PROT SERPL-MCNC: 7.3 G/DL (ref 6.8–8.8)
RBC # BLD AUTO: 5.24 10E12/L (ref 4.4–5.9)
SODIUM SERPL-SCNC: 139 MMOL/L (ref 133–144)
WBC # BLD AUTO: 5.7 10E9/L (ref 4–11)

## 2021-07-08 PROCEDURE — 85025 COMPLETE CBC W/AUTO DIFF WBC: CPT | Performed by: INTERNAL MEDICINE

## 2021-07-08 PROCEDURE — 80053 COMPREHEN METABOLIC PANEL: CPT | Performed by: INTERNAL MEDICINE

## 2021-07-08 NOTE — NURSING NOTE
Chief Complaint   Patient presents with     Blood Draw     Labs drawn via VPT by RN in lab.      Labs collected from venipuncture by RN.     [place] YUEN RN PHN BSN  BMT/Oncology Lab

## 2021-07-12 ENCOUNTER — MYC MEDICAL ADVICE (OUTPATIENT)
Dept: ONCOLOGY | Facility: CLINIC | Age: 71
End: 2021-07-12

## 2021-07-12 ENCOUNTER — PATIENT OUTREACH (OUTPATIENT)
Dept: ONCOLOGY | Facility: CLINIC | Age: 71
End: 2021-07-12

## 2021-07-12 DIAGNOSIS — L98.9 SKIN LESION: Primary | ICD-10-CM

## 2021-07-12 NOTE — PROGRESS NOTES
Patient calls to report a growth on the right side of head, around the temple just into his hairline. He notes that it has always been there and thought it was a mole, but it has changed and grown. It is not warm or red, not tender, it looks like an ulcerative dark lesion. It's about a cm, and is irregular. He noticed it over the last 3 or 4 days.     Patient wrote a MyChart Message in as well.    Information forwarded to Dr. Sr for review.    Asha Morelos RN

## 2021-07-13 ENCOUNTER — PRE VISIT (OUTPATIENT)
Dept: ONCOLOGY | Facility: CLINIC | Age: 71
End: 2021-07-13

## 2021-07-13 ENCOUNTER — VIRTUAL VISIT (OUTPATIENT)
Dept: ONCOLOGY | Facility: CLINIC | Age: 71
End: 2021-07-13
Attending: GENETIC COUNSELOR, MS
Payer: COMMERCIAL

## 2021-07-13 DIAGNOSIS — Z80.0 FAMILY HISTORY OF COLON CANCER: ICD-10-CM

## 2021-07-13 DIAGNOSIS — C50.121 MALIGNANT NEOPLASM OF CENTRAL PORTION OF RIGHT BREAST IN MALE, ESTROGEN RECEPTOR POSITIVE (H): Primary | ICD-10-CM

## 2021-07-13 DIAGNOSIS — Z17.0 MALIGNANT NEOPLASM OF CENTRAL PORTION OF RIGHT BREAST IN MALE, ESTROGEN RECEPTOR POSITIVE (H): Primary | ICD-10-CM

## 2021-07-13 PROCEDURE — 96040 HC GENETIC COUNSELING, EACH 30 MINUTES: CPT | Mod: GT | Performed by: GENETIC COUNSELOR, MS

## 2021-07-13 NOTE — PROGRESS NOTES
7/13/2021    Ronna is a 70 year old who is being evaluated via a billable video visit.      Video-Visit Details    Type of service: Video Visit    Video Start Time: 12:02 pm  Video End Time: 12:47 pm    Originating Location (pt. Location): Home    Distant Location (provider location): Cancer Risk Management Program    Platform used for Video Visit: Jhonathan    Referring Provider: Nida Snider MD    Presenting Information:   I spoke with Ronna Collins and his daughter over video today for genetic counseling to discuss his personal and family history of cancer. With his permission, this appointment was conducted virtually due to COVID-19 precautions. We talked today to review this history, cancer screening recommendations, and available genetic testing options.    Personal History:  Ronna is a 70 year old male. He was recently diagnosed with a right breast cancer at age 70 (IDC, ER positive, NH positive, HER2 negative). He has begun neoadjuvant endocrine therapy as part of the ISPY2 trial.     He reports that his most recent colonoscopy or sigmoidoscopy was 6 or 7 years ago and that he also had one around age 52 (records not available for review today). He reports that both were negative for polyps. He is due for his next one, although this was previously postponed due to COVID-19. Ronna reported former tobacco use (quit 1.5 years ago).    Family History: (Please see scanned pedigree for detailed family history information)  Children:    He has two daughters (ages 30 and 33) with no known history of cancer. His older daughter is a physician and was also present for the visit today.  Siblings:    He reports no known history of cancers or colon polyps in his two brothers (ages 77 and 74) or sister (age 80).   Maternal:    His mother passed away at age 87 with no known history of cancer or colon polyps.     His uncle was diagnosed with colon cancer in his late 70s-early 80s and passed away in  his early 80s.    He is not aware of any other diagnoses of cancer in his maternal relatives.   Paternal:    His father passed away at age 83 with no known history of cancer.      He is not aware of any diagnoses of cancer in his paternal relatives.     His maternal and paternal ethnicity is .    Discussion:    Ronna's personal and family history of cancer is suggestive of a hereditary cancer syndrome.    We reviewed the features of sporadic, familial, and hereditary cancers. In looking at Ronna's family history, it is possible that a cancer susceptibility gene is present due to his recent diagnosis of breast cancer.    We discussed the natural history and genetics of hereditary cancer. He has already had some genetic testing via the Breast Actionable Panel, ordered by Dr. Snider through the Molecular Diagnostics Laboratory at [facility]. Results of this testing were negative. No mutations were detected in the following genes: HERNAN, BRCA1 , BRCA2, CDH1, CHEK2, NBN, NF1, PALB2, PTEN, STK11, TP53. He has previously reviewed these results with Dr. Snider, and we discussed these results as well today.    We discussed that there are additional genes that could cause increased risk for breast cancer. As many of these genes present with overlapping features in a family and accurate cancer risk cannot always be established based upon the pedigree analysis alone, it would be reasonable for Ronna to consider additional panel genetic testing to analyze multiple genes at once.     We reviewed additional genetic testing options for Ronna based on his personal and family history: a panel of genes associated with an increased risk for breast cancer (the Breast Expanded panel), or larger panel options to include genes associated with increased risk for multiple different cancer types. Ronna expressed an interest in additional testing of genes associated with an increased risk for breast cancer and  opted for the Breast Expanded Panel.   Genetic testing is available for 18 genes associated with increased risk for breast cancer: Breast Expanded Panel  (HERNAN, BARD1, BRCA1, BRCA2, BRIP1, CDH1, CHEK2, MRE11, MUTYH, NBN, NF1, PALB2, PTEN, RAD50, RAD51C, RAD51D, STK11 and TP53).  We discussed that some of the genes in the Breast Expanded Panel  have known risks and published management guidelines. Some genes are associated with specific hereditary cancer syndromes: Hereditary Breast and Ovarian Cancer syndrome (BRCA1, BRCA2), Peutz-Jeghers syndrome (STK11), Hereditary Diffuse Gastric Cancer (CDH1), Cowden syndrome (PTEN), Li Fraumeni syndrome (TP53), MUTYH Associated Polyposis (MUTYH), and Neurofibromatosis type 1 (NF1).   The HERNAN, BRIP1, CHEK2, NBN, PALB2, RAD51C, and RAD51D genes are associated with moderate breast cancer risk and have published management guidelines for either breast and/or ovarian cancer risk management.    The remaining genes (BARD1, MRE11, and RAD50) are associated with moderate breast cancer risk and may allow us to make medical recommendations when mutations are identified.   A detailed handout regarding the Breast Expanded panel will be provided to Ronna via [email or web address] and can be found in the after visit summary. Topics included: inheritance pattern, cancer risks, cancer screening recommendations, and also risks, benefits and limitations of testing.    Due to COVID-19 precautions consent was obtained over video today. Genetic testing via the Breast Expanded Panel will be ordered from the Molecular Diagnostics Laboratory at [facility]. This additional testing will be completed on his blood sample that is already available at the lab. Turnaround time from date when sample is received at the lab: approximately 3-4 weeks.    Medical Management: For Ronna, we reviewed that the information from genetic testing may determine:    additional cancer screening for which Ronna or his  family members may qualify (i.e. mammogram and breast MRI, more frequent colonoscopies, more frequent dermatologic exams, etc.),    and targeted chemotherapies if he were to develop certain cancers in the future (i.e. immunotherapy for individuals with Marquez syndrome, PARP inhibitors, etc.).     These recommendations will be discussed in detail once genetic testing is completed.     Plan:  1) Today Ronna elected to proceed with additional genetic testing via the Breast Expanded Panel offered by the Molecular Diagnostics Laboratory at [facility].  2) This information should be available in 3-4 weeks.  3) Ronna will be scheduled for a virtual visit (phone or video) to discuss the results.    Time spent over video: 45 minutes    Lo Zambrano MS, [facility] [place]  Licensed, Certified Genetic Counselor  Office: [phone]  Email: [email or web address]

## 2021-07-13 NOTE — LETTER
Cancer Risk Management  Program Locations    [facility]  [facility]  [facility]  [facility]  Mailing Address  Cancer Risk Management Program  [facility]  [address]  [place] [address]    New patient appointments  [phone]  July 13, 2021    Ronna Collins  [address]  [facility] [address]      Dear Ronna,    It was a pleasure speaking with you over video for genetic counseling on 7/13/2021. Here is a copy of the progress note from our discussion. If you have any additional questions, please feel free to call.    Referring Provider: Nida Snider MD    Presenting Information:   I spoke with Ronna Collins and his daughter over video today for genetic counseling to discuss his personal and family history of cancer. With his permission, this appointment was conducted virtually due to COVID-19 precautions. We talked today to review this history, cancer screening recommendations, and available genetic testing options.    Personal History:  Ronna is a 70 year old male. He was recently diagnosed with a right breast cancer at age 70 (IDC, ER positive, NC positive, HER2 negative). He has begun neoadjuvant endocrine therapy as part of the ISPY2 trial.     He reports that his most recent colonoscopy or sigmoidoscopy was 6 or 7 years ago and that he also had one around age 52 (records not available for review today). He reports that both were negative for polyps. He is due for his next one, although this was previously postponed due to COVID-19. Ronna reported former tobacco use (quit 1.5 years ago).    Family History: (Please see scanned pedigree for detailed family history information)  Children:    He has two daughters (ages 30 and 33) with no known history of cancer. His older daughter is a physician and  was also present for the visit today.  Siblings:    He reports no known history of cancers or colon polyps in his two brothers (ages 77 and 74) or sister (age 80).   Maternal:    His mother passed away at age 87 with no known history of cancer or colon polyps.     His uncle was diagnosed with colon cancer in his late 70s-early 80s and passed away in his early 80s.    He is not aware of any other diagnoses of cancer in his maternal relatives.   Paternal:    His father passed away at age 83 with no known history of cancer.      He is not aware of any diagnoses of cancer in his paternal relatives.     His maternal and paternal ethnicity is [place].    Discussion:    Ronna's personal and family history of cancer is suggestive of a hereditary cancer syndrome.    We reviewed the features of sporadic, familial, and hereditary cancers. In looking at Ronna's family history, it is possible that a cancer susceptibility gene is present due to his recent diagnosis of breast cancer.    We discussed the natural history and genetics of hereditary cancer. He has already had some genetic testing via the Breast Actionable Panel, ordered by Dr. Snider through the Molecular Diagnostics Laboratory at [facility]. Results of this testing were negative. No mutations were detected in the following genes: HERNAN, BRCA1 , BRCA2, CDH1, CHEK2, NBN, NF1, PALB2, PTEN, STK11, TP53. He has previously reviewed these results with Dr. Snider, and we discussed these results as well today.    We discussed that there are additional genes that could cause increased risk for breast cancer. As many of these genes present with overlapping features in a family and accurate cancer risk cannot always be established based upon the pedigree analysis alone, it would be reasonable for Ronna to consider additional panel genetic testing to analyze multiple genes at once.     We reviewed additional genetic testing options for Ronna based on his  personal and family history: a panel of genes associated with an increased risk for breast cancer (the Breast Expanded panel), or larger panel options to include genes associated with increased risk for multiple different cancer types. Ronna expressed an interest in additional testing of genes associated with an increased risk for breast cancer and opted for the Breast Expanded Panel.   Genetic testing is available for 18 genes associated with increased risk for breast cancer: Breast Expanded Panel  (HERNAN, BARD1, BRCA1, BRCA2, BRIP1, CDH1, CHEK2, MRE11, MUTYH, NBN, NF1, PALB2, PTEN, RAD50, RAD51C, RAD51D, STK11 and TP53).  We discussed that some of the genes in the Breast Expanded Panel  have known risks and published management guidelines. Some genes are associated with specific hereditary cancer syndromes: Hereditary Breast and Ovarian Cancer syndrome (BRCA1, BRCA2), Peutz-Jeghers syndrome (STK11), Hereditary Diffuse Gastric Cancer (CDH1), Cowden syndrome (PTEN), Li Fraumeni syndrome (TP53), MUTYH Associated Polyposis (MUTYH), and Neurofibromatosis type 1 (NF1).   The HERNAN, BRIP1, CHEK2, NBN, PALB2, RAD51C, and RAD51D genes are associated with moderate breast cancer risk and have published management guidelines for either breast and/or ovarian cancer risk management.    The remaining genes (BARD1, MRE11, and RAD50) are associated with moderate breast cancer risk and may allow us to make medical recommendations when mutations are identified.   A detailed handout regarding the Breast Expanded panel will be provided to Ronna via [employer] and can be found in the after visit summary. Topics included: inheritance pattern, cancer risks, cancer screening recommendations, and also risks, benefits and limitations of testing.    Due to COVID-19 precautions consent was obtained over video today. Genetic testing via the Breast Expanded Panel will be ordered from the Molecular Diagnostics Laboratory at [facility]  [place]. This additional testing will be completed on his blood sample that is already available at the lab. Turnaround time from date when sample is received at the lab: approximately 3-4 weeks.    Medical Management: For Ronna, we reviewed that the information from genetic testing may determine:    additional cancer screening for which Ronna or his family members may qualify (i.e. mammogram and breast MRI, more frequent colonoscopies, more frequent dermatologic exams, etc.),    and targeted chemotherapies if he were to develop certain cancers in the future (i.e. immunotherapy for individuals with Marquez syndrome, PARP inhibitors, etc.).     These recommendations will be discussed in detail once genetic testing is completed.     Plan:  1) Today Ronna elected to proceed with additional genetic testing via the Breast Expanded Panel offered by the Molecular Diagnostics Laboratory at [facility].  2) This information should be available in 3-4 weeks.  3) Ronna will be scheduled for a virtual visit (phone or video) to discuss the results.    Lo Zambrano MS, [facility] [place]  Licensed, Certified Genetic Counselor  Office: [phone]  Email: [email or web address]

## 2021-07-13 NOTE — PATIENT INSTRUCTIONS
BREAST EXPANDED PANEL  The Breast Expanded cancer panel is a genetic test which analyzes 18 genes known to be associated with an increased lifetime risk of breast and other cancers. For many of the genes, published medical guidelines exist for detection, prevention, risk reduction, and/or treatment.     Assessing Cancer Risk  Only about 5-10% of cancers are thought to be due to an inherited cancer susceptibility gene.    These families often have:    Several people with the same or related types of cancer    Cancers diagnosed at a young age (before age 50)    Individuals with more than one primary cancer    Multiple generations of the family affected with cancer    Some people may be candidates for genetic testing of more than one gene.  For these families, genetic testing using a multi-gene cancer panel may be offered.  These panels may test genes known to increase the risk for breast (and other) cancers: HERNAN, BRCA1, BRCA2, CDH1, CHEK2, PALB2, PTEN, and TP53.  The purpose of this handout is to serve as a brief summary of the high/moderate-risk breast cancer genes which have published clinical management guidelines for individuals who are found to carry a mutation.    BRCA1 and BRCA2-Hereditary Breast and Ovarian Cancer Syndrome (HBOC)  A single mutation in one of the copies of BRCA1 or BRCA2 increases the risk for breast and ovarian cancer, among others.  The risk for pancreatic cancer and melanoma may also be slightly increased in some families.  The tables below list the chance that someone with a BRCA mutation would develop cancer in his or her lifetime1,2,3,4.          Women   Men     General Population BRCA+    General Population BRCA+   Breast  12% 40-80%  Breast <1% ~7%   Ovarian  1-2% 10-40%  Prostate 16% 20%             A person s ethnic background is also important to consider, as individuals of Ashkenazi Hindu ancestry have a higher chance of having a BRCA gene mutation.  There are three BRCA  mutations that occur more frequently in this population.     Individuals who inherit two BRCA2 mutations--one from their mother and one from their father--have a condition called Fanconi Anemia.  This rare autosomal recessive condition is associated with short stature, developmental delay, bone marrow failure, and increased risk for childhood cancers.    TP53-Li-Fraumeni Syndrome (LFS)  LFS is a cancer predisposition syndrome. Individuals with LFS are at an increased risk for developing cancer at a young age. The general lifetime risk for development of cancer is 50% by age 30 and 90% by age 60.  LFS is caused by a mutation in the TP53 gene.  A single mutation in one of the copies of TP53 increases the risk for multiple cancers.     Core Cancers: Sarcomas, Breast, Brain, Lung, Leukemias/Lymphomas, Adrenocortical carcinomas  Other Cancers: Gastrointestinal, Thyroid, Skin, Genitourinary    PTEN-Cowden Syndrome  Cowden syndrome is a hereditary condition that increases the risk for breast, thyroid, endometrial, and kidney cancer.  Cowden syndrome is caused by a mutation in the PTEN gene.  A single mutation in one of the copies of PTEN causes Cowden syndrome and increases cancer risk.  The table below shows the chance that someone with a PTEN mutation would develop cancer in their lifetime5,6.  Other benign features seen in some individuals with Cowden syndrome include benign skin lesions (facial papules, keratoses, lipomas), learning disability, autism, thyroid nodules, colon polyps, and larger head size.      Lifetime Cancer Risk   Cancer Type General Population Cowden Syndrome   Breast  12% 25-50%*   Thyroid  1% 35%   Renal 1-2% 35%   Endometrial  2-3% 28%   Colon 5% 9%   Melanoma 2-3% >5%     *One recent study found breast cancer risk to be increased to 85%    CDH1-Hereditary Diffuse Gastric Cancer (HDGC)  Currently, one gene is known to cause hereditary diffuse gastric cancer: CDH1.  Individuals with HDGC are at  increased risk for diffuse gastric cancer and lobular breast cancer. Of people diagnosed with HDGC, 30-50% have a mutation in the CDH1 gene.  This suggests there are likely other genes that may cause HDGC that have not been identified yet.      Lifetime Cancer Risks    General Pop HDGC    Diffuse Gastric  <1% ~80%   Breast 12% 39-52%       Additional Genes Associated with Increased Breast Cancer Risk  PALB2  Mutations in PALB2 have been shown to increase the risk of breast cancer up to 33-58% in some families; where individuals fall within this risk range is dependent upon family history.9 PALB2 mutations have also been associated with increased risk for pancreatic cancer, although this risk has not been quantified yet.  Individuals who inherit two PALB2 mutations--one from their mother and one from their father--have a condition called Fanconi Anemia.  This rare autosomal recessive condition is associated with short stature, developmental delay, bone marrow failure, and increased risk for childhood cancers.    HERNAN  HERNAN is a moderate-risk breast cancer gene. Women who have a mutation in HERNAN can have between a 2-4 fold increased risk for breast cancer compared to the general population.10  HERNAN mutations have also been associated with increased risk for pancreatic cancer, however an estimate of this cancer risk is not well understood.11  Individuals who inherit two HERNAN mutations have a condition called ataxia-telangiectasia (AT).  This rare autosomal recessive condition affects the nervous system and immune system, and is associated with progressive cerebellar ataxia beginning in childhood.  Individuals with ataxia-telangiectasia often have a weakened immune system and have an increased risk for childhood cancers.         CHEK2   CHEK2 is a moderate-risk breast cancer gene.  Women who have a mutation in CHEK2 have around a 2-fold increased risk for breast cancer compared to the general population, and this risk may be  higher depending upon family history.12,13,14 Mutations in CHEK2 have also been shown to increase the risk of a number of other cancers, including colon and prostate, however these cancer risks are currently not well understood.      Note that the table at the end of the handout includes additional moderate breast cancer genes.         Inheritance   All of the genes reviewed above are inherited in an autosomal dominant pattern.  This means that if a parent has a mutation, each of his or her children will have a 50% chance of inheriting that same mutation.  Therefore, each child--male or female--would have a 50% chance of being at increased risk for developing cancer.        Image obtained from Genetics Home Reference, 2013     In rare cases, there can be mutations in both copies of these genes (one from each parent), leading to different autosomal recessive conditions.  Mutations in some genes can occur de param, which means that a person s mutation occurred for the first time in them and was not inherited from a parent.  Now that they have the mutation, however, it can be passed on to future generations.     Genetic Testing  Genetic testing involves a blood test and will look for any harmful mutations within genes that are associated with increased cancer risk.  If possible, it is recommended that the person(s) who has had cancer be tested before other family members.  That person will give us the most useful information about whether or not a specific gene is associated with the cancer in the family.    Results  There are three possible results of genetic testing:    Positive--a harmful mutation was identified in one or more of the genes    Negative--no mutation was identified in any of the genes on this panel    Variant of unknown significance--a variation in one of the genes was identified, but it is unclear how this impacts cancer risk in the family      Advantages and Disadvantages  There are advantages and  disadvantages to genetic testing.  Advantages    May clarify your cancer risk    Can help you make medical decisions    May explain the cancers in your family    May give useful information to your family members (if you share your results)    Disadvantages    Possible negative emotional impact of learning about inherited cancer risk    Uncertainty in interpreting a negative test result in some situations    Possible genetic discrimination concerns (see below)    Genetic Information Nondiscrimination Act (URI)  URI is a federal law that protects individuals from health insurance or employment discrimination based on a genetic test result alone.  Although rare, there are currently no legal discrimination protections in terms of life insurance, long term care, or disability insurances.  Visit the National Human Red [employer] [place] genome.gov/62655638 to learn more.    Reducing Cancer Risk  Each of the genes listed within this handout have nationally recognized cancer screening guidelines that would be recommended for individuals who test positive.  In addition to increased cancer screening, surgeries may be offered or recommended to reduce cancer risk.  Recommendations are based upon an individual s genetic test result as well as their personal and family history of cancer.    Questions to Think About Regarding Genetic Testing    What effect will the test result have on me and my relationship with my family members if I have an inherited gene mutation?  If I don t have a gene mutation?    Should I share my test results, and how will my family react to this news, which may also affect them?    Are my children ready to learn new information that may one day affect their own health?  Resources  FORCE: Facing Our Risk of Cancer Empowered facingourrisk.org   Bright Pink bebrightpink.org   Li-Fraumeni Syndrome Association lfsassociation.org   PTEN World PTENwFeedMagnet.BlueRonin   No stomach for cancer, Inc.  nostomachforcancer.org   Stomach cancer relief network scrnet.org   Collaborative Group of the Americas on Inherited Colorectal Cancer (CGA) cgaicc.com    Cancer Care cancercare.org   American Cancer Society (ACS) cancer.org   National Cancer [place] (NCI) cancer.gov     Please call us if you have any questions or concerns.   Cancer Risk Management Program [phone] ([phone])  ? Yovani Perera, MS, [facility] [phone]  ? Carole Plasencia, MS, [facility]  [phone]  ? Kathleen Reyes, MS, [facility]  [phone]  ? Yasmine Noble, MS, [facility] [phone]  ? Ritika Cheyenne, MS, [facility] [phone]  ? Lo Zambrano, MS, [facility]  [phone]    References  1. Ananth A, Gayatri PDP, Silvano S, Evelina BARRERA, Concepcion JE, Ruth JL, Fernanda N, Raad H, Ambrocio O, Aayush A, Oanh B, Rochelle P, Mantatyana S, Pedrito DM, Ruiz N, Mateo E, Kath H, Everardo E, Casey J, Gronjg J, Shawn B, Peggy H, Thorlacius S, Eerola H, Suzie H, Kathie K, Brittnee OP. Average risks of breast and ovarian cancer associated with BRCA1 or BRCA2 mutations detected in case series unselected for family history: a combined analysis of 222 studies. Am J Hum Kristan. 2003;72:1117-30.  2. Julio ROMERO, Mago M, Fawad G.  BRCA1 and BRCA2 Hereditary Breast and Ovarian Cancer. Gene Reviews online. 2013.  3. James YC, Sherita S, Jasvir G, Mcwilliams S. Breast cancer risk among male BRCA1 and BRCA2 mutation carriers. J Natl Cancer Inst. 2007;99:1811-4.  4. Gerry RANGEL, Trinity I, John J, Floyd E, Amador ER, Barbie F. Risk of breast cancer in male BRCA2 carriers. J Med Kristan. 2010;47:710-1.  5. Demario CR, Kong GARLAND, Darinel J, Rocio LA, Kirt MS, Adrienne C. Lifetime cancer risks in individuals with germline PTEN mutations. Clin Cancer Res. 2012;18:400-7.  6. Cary NOVOA. Cowden Syndrome: A Critical Review of the Clinical Literature. J Kristan . 2009:18:13-27.  7. National Comprehensive Cancer Network. Clinical practice guidelines in oncology, colorectal cancer  screening. Available online (registration required). 2013.  8. National Cancer [place]. SEER Cancer Stat Fact Sheets.  December 2013.  9. Ananth MATHEWS et al. Breast-Cancer Risk in Families with Mutations in PALB2. NEJM. 2014; 371(6):497-506.  10. Chinmay GUERRIER, Dallin D, Rafi S, Patricia P, Latesha T, Slim M, Jax B, Mari H, Omer R, Bimal K, Ene L, Gerry DG, Pedrito D, Juanito DF, Jose MR, The Breast Cancer Susceptibility Collaboration (UK) & Rebeka N. HERNAN mutations that cause ataxia-telangiectasia are breast cancer susceptibility alleles. Nature Genetics. 2006;38:873-875  11. Salomón N , Henna Y, Patience J, Jj L, Sherine GM , Nahun ML, Jania S, [place] AG, Syngal S, Blas ML, Susan J , Shana R, Gustavo SZ, Naomy JR, Jannette VE, Elie M, Vogelstein B, Bella N, Corina RH, Izabella KW, and Hernadez AP. HERNAN mutations in patients with hereditary pancreatic cancer. Cancer Discover. 2012;2:41-46  12. CHEK2 Breast Cancer Case-Control Consortium. CHEK2*1100delC and susceptibility to breast cancer: A collaborative analysis involving 10,860 breast cancer cases and 9,065 controls from 10 studies. Am J Hum Kristan, 74 (2004), pp. 4410-1735  13. Russell T, Vicenta S, Twin K, et al. Spectrum of Mutations in BRCA1, BRCA2, CHEK2, and TP53 in Families at High Risk of Breast Cancer. LUPE,2006;295(12):0005-4003.   14. Arsh C, Meggan MOREJON, Darron GUERRIER, et al. Risk of breast cancer in women with a CHEK2 mutation with and without a family history of breast cancer. JClin Oncol. 2011;29:8312-1539.      BREAST EXPANDED PANEL    GENES (18) ASSOCIATED CANCER(S) ASSOCIATED CANCER SYNDROME(S)   HERNAN breast, pancreas    BARD1 breast, ovary    BRCA1 breast, ovary, pancreas, melanoma, prostate, male breast Hereditary breast /ovarian cancer syndrome (HBOC)   BRCA2 breast, ovary, pancreas, melanoma, prostate, male breast Hereditary breast /ovarian cancer syndrome (HBOC)   BRIP1 breast, ovary    CDH1 breast,  stomach, colon Hereditary diffuse gastric cancer   CHEK2 breast, colon, prostate    MRE11 breast, ovary    MUTYH breast, colon MUTYH-associated polyposis (MAP)   NBN breast, ovary, prostate    NF1 breast, brain, peripheral nervous system Neurofibromatosis 1   PALB2 breast, pancreas    PTEN breast, thyroid, uterus, colon, kidney Cowden syndrome, Amylbnfk-Eivvs-Jlnctgmoc syndrome (BRRS), PTEN-related Proteus syndrome (PS), Proteus-like syndrome   RAD50 breast, ovary    RAD51C breast, ovary    RAD51D breast, ovary    STK11 breast, ovary, colon, pancreas, stomach, uterus, cervix Peutz-Jeghers syndrome   TP53 breast, bone, brain, soft tissues, adrenal cortex Li-Fraumeni syndrome           TURNAROUND TIME  4 - 6 weeks    INSURANCE COVERAGE   Testing will be held until prior authorization is obtained. You will be contacted once prior authorization is completed.   About the [facility] ([facility]), [facility]  In collaborative effort with the [facility] and the [place] [employer], the [facility] offers a full range of diagnostic testing services, with a strong focus on quality, accuracy, and timeliness.

## 2021-07-14 ENCOUNTER — VIRTUAL VISIT (OUTPATIENT)
Dept: ONCOLOGY | Facility: CLINIC | Age: 71
End: 2021-07-14
Attending: INTERNAL MEDICINE
Payer: COMMERCIAL

## 2021-07-14 ENCOUNTER — VIRTUAL VISIT (OUTPATIENT)
Dept: ONCOLOGY | Facility: CLINIC | Age: 71
End: 2021-07-14
Attending: DIETITIAN, REGISTERED
Payer: COMMERCIAL

## 2021-07-14 DIAGNOSIS — C50.121 MALIGNANT NEOPLASM OF CENTRAL PORTION OF RIGHT BREAST IN MALE, ESTROGEN RECEPTOR POSITIVE (H): Primary | ICD-10-CM

## 2021-07-14 DIAGNOSIS — L98.9 SKIN LESION: ICD-10-CM

## 2021-07-14 DIAGNOSIS — Z17.0 MALIGNANT NEOPLASM OF CENTRAL PORTION OF RIGHT BREAST IN MALE, ESTROGEN RECEPTOR POSITIVE (H): Primary | ICD-10-CM

## 2021-07-14 DIAGNOSIS — R73.03 PREDIABETES: ICD-10-CM

## 2021-07-14 DIAGNOSIS — R73.03 PRE-DIABETES: ICD-10-CM

## 2021-07-14 PROCEDURE — 97803 MED NUTRITION INDIV SUBSEQ: CPT | Mod: TEL,GT | Performed by: DIETITIAN, REGISTERED

## 2021-07-14 PROCEDURE — 999N001193 HC VIDEO/TELEPHONE VISIT; NO CHARGE

## 2021-07-14 PROCEDURE — 97802 MEDICAL NUTRITION INDIV IN: CPT | Mod: TEL,GT | Performed by: DIETITIAN, REGISTERED

## 2021-07-14 PROCEDURE — 99214 OFFICE O/P EST MOD 30 MIN: CPT | Mod: 95 | Performed by: INTERNAL MEDICINE

## 2021-07-14 NOTE — LETTER
7/14/2021         RE: Ronna Collins  [address]  [place] [address]        Dear Colleague,    Thank you for referring your patient, Ronna Collins, to the [facility]. Please see a copy of my visit note below.    Video-Visit Details     Type of service:  Video Visit     Video Start Time (time video started): 7:56AM     Video End Time (time video stopped): 8:23AM    Originating Location (pt. Location): Home     Distant Location (provider location):  [facility]      Mode of Communication:  Video Conference via [employer]ver    CLINICAL NUTRITION SERVICES - ASSESSMENT NOTE    Ronna Collins 70 year old referred for MNT related to breast cancer    Time Spent: 30 minutes  Visit Type: video  Pt accompanied by: his wife  Referring Physician: Agatha 7/1/21  C50.121, Z17.0 (ICD-10-CM) - Malignant neoplasm of central portion of right breast in male, estrogen receptor positive (H)    NUTRITION HISTORY  Factors affecting nutrition intake include: none at this time  Current diet/appetite: lacto/ovo vegetarian  Chemotherapy: Trial hormonal therapy drug  Surgery: impending post trial drug    Ronna is mostly lacto-ovo vegetarian - eats only vegetables, grains, [place] citizen flat bread, lentils/beans and home made yogurt  -Used to eat a lot of bagels, has cut back since noted elevated A1C  -He does not eat added sugars  -Has green tea twice/day, no sugar added but with oat or cashew milk, +/-2% milk  -No fried fried foods  -He is walking about 2-3 times/day, 20-30 min and engages in yoga 2-3 times/week  -He has lost ~4 lb with diet, exercise and behavioral changes  -He has a moderate understanding of carb counting has his wife is diabetic.   -He inquires about nutrition needs for carbs and protein     Diet Recall  Breakfast Oats, oatmeal.    Lunch Rice with vegetables   Dinner Whole wheat bread with vegetables   Snacks Green smoothie in am (all  "green, maria victoria), fruits   Beverages Water, tea     ANTHROPOMETRICS  Height: 67\"  Weight: 175 lb/79kg  BMI: 27  Weight Status:  Overweight BMI 25-29.9  IBW: 148 lbs (118%)  Weight History: down 4 lb x 1 month  Wt Readings from Last 8 Encounters:   06/30/21 79.7 kg (175 lb 12.8 oz)   06/22/21 80.3 kg (177 lb)   06/14/21 80.3 kg (177 lb)   06/08/21 81.4 kg (179 lb 8 oz)   06/04/21 80.6 kg (177 lb 12.8 oz)   10/14/20 80.2 kg (176 lb 11.2 oz)   11/22/19 80.7 kg (178 lb)   11/14/19 80.7 kg (178 lb)     Dosing Weight: 79kg (current wt)    Medications/vitamins/minerals/herbals:   Reviewed   A1C - 6.5 elevated  BG - 106-182    Labs:   Labs reviewed    NUTRITION FOCUSED PHYSICAL ASSESSMENT FOR DIAGNOSING MALNUTRITION:  Not observed due to Covid 19 pandemic - no clinic contact  Consult for education only      ASSESSED NUTRITION NEEDS:  Estimated Energy Needs: 7437-1355 kcals (30-35 Kcal/Kg)  Justification: maintenance/increased needs with cancer/treatment  Estimated Protein Needs:  grams protein (1.2-1.5 g pro/Kg)  Justification: increased needs with cancer/treatment    NUTRITION DIAGNOSIS:  Food and nutrition-related knowledge deficit related to nutrition needs with cancer treatment as evidenced by pt with questions regarding carb control and protein intake     INTERVENTIONS  Provided written & verbal education:     - Reviewed nutrition needs for maintenance. Advised pt to aim for at least 2500kcal and 80-100g protein daily.   - Reviewed carb counting and fiber intake. Encouraged to choose carb choices with >3-4 g fiber/svg.  Advised to choose ~3-4 CHO sources /meal and aim for at least 25-30g fiber/day.   - Reviewed sources of protein.  Encouraged to add protein to each meal and snack.  Advised to aim for at least 80-100g protein/day.     Provided pt with corresponding education materials/handouts on:  Carb counting, Fiber content of common foods and Sources of Protein    Pt verbalize understanding of materials provided " during consult.   Patient Understanding: Excellent  Expected patient engagement: Excellent     Goals  1.  Aim 25-30g fiber/day; 3-4 cho choices/meal  2.  Aim for 2500kcal and 80-100g protein/day  3. Weight maintenance     Follow-Up Plans: Pt has RD contact information for questions.      Patricia Cruz RD, LD          Again, thank you for allowing me to participate in the care of your patient.        Sincerely,        Patricia Cruz RD

## 2021-07-14 NOTE — LETTER
"    7/14/2021         RE: Ronna Collins  [address]  [facility] [address]        Dear Colleague,    Thank you for referring your patient, Ronna Collins, to the [facility]. Please see a copy of my visit note below.    Ronna is a 70 year old who is being evaluated via a billable video visit.      How would you like to obtain your AVS? MyChart     If the video visit is dropped, the invitation should be resent by: Send to e-mail at: [email or web address]     Will anyone else be joining your video visit? No     Vitals - Patient Reported  Weight (Patient Reported): 79.4 kg (175 lb)  Height (Patient Reported): 172.7 cm (5' 8\")  BMI (Based on Pt Reported Ht/Wt): 26.61  Pain Score: No Pain (0)    Guillermo Espinoza LPN    Video-Visit Details    Type of service:  Video Visit    30 minutes spent on the date of the encounter doing chart review, review of test results, interpretation of tests, patient visit and documentation     Originating Location (pt. Location): Home    Distant Location (provider location):  [facility]     Platform used for Video Visit: [employer] Mobile      Oncology Visit:  Date on this visit: 7/14/2021    Diagnosis: clinical prognostic stage IIIb, Q0gA1P6, grade 2, ER positive, NV positive, HER-2 negative right breast cancer.    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  He noted discomfort and hardening of the skin of the right nipple in late April/early May, 2021.  He had a similar appearing benign skin lesion of the arm 3 months earlier and thought it likely to be the same.  However, he subsequently noted a mass in the same area.  Right breast skin punch biopsy performed by dermatology was c/w grade 2 invasive ductal carcinoma with associated DCIS.  Invasive carcinoma was ER positive 100% and NV positive 70%, with tumor invading the dermis.  HER-2 was " negative by IHC (score 1+).  Diagnostic mammogram and ultrasound showed a retroareolar right breast mass measuring 2.9 cm with associated nipple retraction and enlarged, abnormal right axillary lymph nodes.  Right breast biopsy was again c/w grade 2 invasive ductal carcinoma, ER strong in 98% of tumor cell nuclei, HER-2 pending.  Ki-67 was 15%.    He elected to screen for the ISPY-2 clinical trial.  MRI breast on 6/18/2021 showed the biopsy proven right breast cancer to measure 3.9 cm in maximum dimension with invasion of the nipple and the pectoralis muscle as well as at least 4 abnormal level 1 and 2 right axillary lymph nodes and a tiny 0.4 cm right internal mammary lymph node, likely benign).  Mammoprint returned low risk with a score of +0.29.  He elected for treatment on the endocrine optimization protocol of ISPY-2 and was randomized to treatment with amcenestrant.  He has been on amcenestrant since 7/1/2021.    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  He has been on treatment with amcenestrant for approximately 2 weeks.  He has not noted any side effects from the medication.  He specifically denies hot flashes or mood disorder.  He states one morning, he woke with increased pain in his elbow, however, he is not sure whether it was due to the medication or something he had done the day prior.  This lasted the 1 day only and has not recurred since.  He has not had other bone or joint aches or pains.  He denies symptoms of nausea or diarrhea.  He has not had heart palpitations or vision changes.  He believes he is already seeing some change in the tumor.  There is less erythema than there was previously.  In addition, the tumor seems more mobile.  There previously seemed to be more lumpiness over the inferior aspect, now the superior aspect.  He reports recent growth of a pigmented lesion right scalp.  The remainder of a complete 12 point review of systems is  reviewed with the patient was negative with exception that mentioned above.    Past Medical/Surgical History:  Past Medical History:   Diagnosis Date     Chronic sinusitis      Hypertension 2002     No past surgical history on file.    Allergies:  Allergies as of 07/14/2021     (No Known Allergies)     Current Medications:  Current Outpatient Medications   Medication Sig Dispense Refill     losartan-hydrochlorothiazide (HYZAAR) 100-25 MG tablet Take 1 tablet by mouth daily 90 tablet 1     study - amcenestrant, IDS# [id number], 200 MG tablet Take 1 tablet (200 mg) by mouth daily 28 tablet 0      Family and Social History:  See initial consultation dated 6/8/2021 for further details.    Physical Exam:  General:  Well appearing adult male in NAD  Eyes:  No erythema or discharge  Respiratory:  Breathing comfortably on room air.  No wheezing or distress.  Musculoskeletal:  Full ROM of the bilateral upper extremities.  Chest:  Tumor macronodule in the area of the right nipple.  No surrounding erythema.  Skin:  No visible concerning skin rashes or lesions  Neuro:  No notable tremor and dyskinetic movements.  Psych:  Mood and affect appear normal.  Skin:  There is a dark, pigmented, round lesion right frontoparietal scalp.  Per his wife there is some variability in color.    Laboratory/Imaging Studies  6/30/2021 Labs:  Electrolytes, creatinine and LFTs are wnl.  Non-fasting blood glucose is elevated at 182  Blood counts are wnl.    Breast Actionable hereditary cancer panel drawn on 6/4 and specimen received on 6/28, results are pending.    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      1.  Right breast cancer:    He is enrolled on the Endocrine Optimization Protocol (EOP) of ISPY2 and has been on treatment with amcenestrant since 7/1/2021.  He  is tolerating the medication remarkably well.  We reviewed plan to repeat breast biopsy and MRI following week 3.  He is scheduled to have this done on 7/20/2021.  We reviewed protocol includes additional MRIs at weeks 12 and prior to surgery.  Plan is to treat with up to 6 months of neoadjuvant therapy as long as there is tumor response.  I would like him to be seen in person monthly for tumor measurement.    2.  Pre-diabetes:  6/14/2021 hemoglobin A1C was 6.5%.  Since last visit, he has started metformin.  He has also increased cardiovascular exercise and has reduced sugar intake in the diet.  He met with a nutritionist this morning.    3.  Cancer risk management:  Breast Actionable panel drawn on 6/4/2021 was reviewed and was negative.  He met with genetic counseling on 7/13/2021 and Breast Expanded testing was performed.    4.  Skin lesion:  We have scheduled patient to see Dr. Rubio on 7/16/2021 for possible biopsy.    5.  Follow up:  In person visit with me in 2 weeks.  Okay to add on at 1:00 on Tuesday 7/27.        Again, thank you for allowing me to participate in the care of your patient.        Sincerely,        Mikaela Sr MD

## 2021-07-14 NOTE — PROGRESS NOTES
"Ronna is a 70 year old who is being evaluated via a billable video visit.      How would you like to obtain your AVS? MyChart     If the video visit is dropped, the invitation should be resent by: Send to e-mail at: [email or web address]     Will anyone else be joining your video visit? No     Vitals - Patient Reported  Weight (Patient Reported): 79.4 kg (175 lb)  Height (Patient Reported): 172.7 cm (5' 8\")  BMI (Based on Pt Reported Ht/Wt): 26.61  Pain Score: No Pain (0)    Guillermo Espinoza LPN    Video-Visit Details    Type of service:  Video Visit    30 minutes spent on the date of the encounter doing chart review, review of test results, interpretation of tests, patient visit and documentation     Originating Location (pt. Location): Home    Distant Location (provider location):  [facility]     Platform used for Video Visit: [employer]      Oncology Visit:  Date on this visit: 7/14/2021    Diagnosis: clinical prognostic stage IIIb, [id number], grade 2, ER positive, NH positive, HER-2 negative right breast cancer.    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  He noted discomfort and hardening of the skin of the right nipple in late April/early May, 2021.  He had a similar appearing benign skin lesion of the arm 3 months earlier and thought it likely to be the same.  However, he subsequently noted a mass in the same area.  Right breast skin punch biopsy performed by dermatology was c/w grade 2 invasive ductal carcinoma with associated DCIS.  Invasive carcinoma was ER positive 100% and NH positive 70%, with tumor invading the dermis.  HER-2 was negative by IHC (score 1+).  Diagnostic mammogram and ultrasound showed a retroareolar right breast mass measuring 2.9 cm with associated nipple retraction and enlarged, abnormal right axillary lymph nodes.  Right breast biopsy was again c/w grade 2 invasive ductal " carcinoma, ER strong in 98% of tumor cell nuclei, HER-2 pending.  Ki-67 was 15%.    He elected to screen for the ISPY-2 clinical trial.  MRI breast on 6/18/2021 showed the biopsy proven right breast cancer to measure 3.9 cm in maximum dimension with invasion of the nipple and the pectoralis muscle as well as at least 4 abnormal level 1 and 2 right axillary lymph nodes and a tiny 0.4 cm right internal mammary lymph node, likely benign).  Mammoprint returned low risk with a score of +0.29.  He elected for treatment on the endocrine optimization protocol of ISPY-2 and was randomized to treatment with amcenestrant.  He has been on amcenestrant since 7/1/2021.    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  He has been on treatment with amcenestrant for approximately 2 weeks.  He has not noted any side effects from the medication.  He specifically denies hot flashes or mood disorder.  He states one morning, he woke with increased pain in his elbow, however, he is not sure whether it was due to the medication or something he had done the day prior.  This lasted the 1 day only and has not recurred since.  He has not had other bone or joint aches or pains.  He denies symptoms of nausea or diarrhea.  He has not had heart palpitations or vision changes.  He believes he is already seeing some change in the tumor.  There is less erythema than there was previously.  In addition, the tumor seems more mobile.  There previously seemed to be more lumpiness over the inferior aspect, now the superior aspect.  He reports recent growth of a pigmented lesion right scalp.  The remainder of a complete 12 point review of systems is reviewed with the patient was negative with exception that mentioned above.    Past Medical/Surgical History:  Past Medical History:   Diagnosis Date     Chronic sinusitis      Hypertension 2002     No past surgical history on file.    Allergies:  Allergies as of  07/14/2021     (No Known Allergies)     Current Medications:  Current Outpatient Medications   Medication Sig Dispense Refill     losartan-hydrochlorothiazide (HYZAAR) 100-25 MG tablet Take 1 tablet by mouth daily 90 tablet 1     study - amcenestrant, IDS# [id number], 200 MG tablet Take 1 tablet (200 mg) by mouth daily 28 tablet 0      Family and Social History:  See initial consultation dated 6/8/2021 for further details.    Physical Exam:  General:  Well appearing adult male in NAD  Eyes:  No erythema or discharge  Respiratory:  Breathing comfortably on room air.  No wheezing or distress.  Musculoskeletal:  Full ROM of the bilateral upper extremities.  Chest:  Tumor macronodule in the area of the right nipple.  No surrounding erythema.  Skin:  No visible concerning skin rashes or lesions  Neuro:  No notable tremor and dyskinetic movements.  Psych:  Mood and affect appear normal.  Skin:  There is a dark, pigmented, round lesion right frontoparietal scalp.  Per his wife there is some variability in color.    Laboratory/Imaging Studies  6/30/2021 Labs:  Electrolytes, creatinine and LFTs are wnl.  Non-fasting blood glucose is elevated at 182  Blood counts are wnl.    Breast Actionable hereditary cancer panel drawn on 6/4 and specimen received on 6/28, results are pending.    6/23/2021 Echocardiogram:  LVEF is normal at 60-65%    ASSESSMENT/PLAN:  Jamie Collins is a 69 yo male with clinical stage IIIb, [id number], grade 2, ER positive, WA positive, HER-2 negative invasive ductal carcinoma of the right breast.      1.  Right breast cancer:    He is enrolled on the Endocrine Optimization Protocol (EOP) of ISPY2 and has been on treatment with amcenestrant since 7/1/2021.  He is tolerating the medication remarkably well.  We reviewed plan to repeat breast biopsy and MRI following week 3.  He is scheduled to have this done on 7/20/2021.  We reviewed protocol includes additional MRIs at weeks 12 and prior to surgery.  Plan is to treat  with up to 6 months of neoadjuvant therapy as long as there is tumor response.  I would like him to be seen in person monthly for tumor measurement.    2.  Pre-diabetes:  6/14/2021 hemoglobin A1C was 6.5%.  Since last visit, he has started metformin.  He has also increased cardiovascular exercise and has reduced sugar intake in the diet.  He met with a nutritionist this morning.    3.  Cancer risk management:  Breast Actionable panel drawn on 6/4/2021 was reviewed and was negative.  He met with genetic counseling on 7/13/2021 and Breast Expanded testing was performed.    4.  Skin lesion:  We have scheduled patient to see Dr. Rubio on 7/16/2021 for possible biopsy.    5.  Follow up:  In person visit with me in 2 weeks.  Okay to add on at 1:00 on Tuesday 7/27.

## 2021-07-14 NOTE — PROGRESS NOTES
"Video-Visit Details     Type of service:  Video Visit     Video Start Time (time video started): 7:56AM     Video End Time (time video stopped): 8:23AM    Originating Location (pt. Location): Home     Distant Location (provider location):  [facility] NUTRITION SERVICES      Mode of Communication:  Video Conference via [facility]    CLINICAL NUTRITION SERVICES - ASSESSMENT NOTE    Ronna Collins 70 year old referred for MNT related to breast cancer    Time Spent: 30 minutes  Visit Type: video  Pt accompanied by: his wife  Referring Physician: Agatha 7/1/21  C50.121, Z17.0 (ICD-10-CM) - Malignant neoplasm of central portion of right breast in male, estrogen receptor positive (H)    NUTRITION HISTORY  Factors affecting nutrition intake include: none at this time  Current diet/appetite: lacto/ovo vegetarian  Chemotherapy: Trial hormonal therapy drug  Surgery: impending post trial drug    Ronna is mostly lacto-ovo vegetarian - eats only vegetables, grains,  flat bread, lentils/beans and home made yogurt  -Used to eat a lot of bagels, has cut back since noted elevated A1C  -He does not eat added sugars  -Has green tea twice/day, no sugar added but with oat or cashew milk, +/-2% milk  -No fried fried foods  -He is walking about 2-3 times/day, 20-30 min and engages in yoga 2-3 times/week  -He has lost ~4 lb with diet, exercise and behavioral changes  -He has a moderate understanding of carb counting has his wife is diabetic.   -He inquires about nutrition needs for carbs and protein     Diet Recall  Breakfast Oats, oatmeal.    Lunch Rice with vegetables   Dinner Whole wheat bread with vegetables   Snacks Green smoothie in am (all green, maria victoria), fruits   Beverages Water, tea     ANTHROPOMETRICS  Height: 67\"  Weight: 175 lb/79kg  BMI: 27  Weight Status:  Overweight BMI 25-29.9  IBW: 148 lbs (118%)  Weight History: down 4 lb x 1 month  Wt Readings from Last 8 Encounters:   06/30/21 79.7 kg (175 lb " 12.8 oz)   06/22/21 80.3 kg (177 lb)   06/14/21 80.3 kg (177 lb)   06/08/21 81.4 kg (179 lb 8 oz)   06/04/21 80.6 kg (177 lb 12.8 oz)   10/14/20 80.2 kg (176 lb 11.2 oz)   11/22/19 80.7 kg (178 lb)   11/14/19 80.7 kg (178 lb)     Dosing Weight: 79kg (current wt)    Medications/vitamins/minerals/herbals:   Reviewed   A1C - 6.5 elevated  BG - 106-182    Labs:   Labs reviewed    NUTRITION FOCUSED PHYSICAL ASSESSMENT FOR DIAGNOSING MALNUTRITION:  Not observed due to Covid 19 pandemic - no clinic contact  Consult for education only      ASSESSED NUTRITION NEEDS:  Estimated Energy Needs: 8986-7055 kcals (30-35 Kcal/Kg)  Justification: maintenance/increased needs with cancer/treatment  Estimated Protein Needs:  grams protein (1.2-1.5 g pro/Kg)  Justification: increased needs with cancer/treatment    NUTRITION DIAGNOSIS:  Food and nutrition-related knowledge deficit related to nutrition needs with cancer treatment as evidenced by pt with questions regarding carb control and protein intake     INTERVENTIONS  Provided written & verbal education:     - Reviewed nutrition needs for maintenance. Advised pt to aim for at least 2500kcal and 80-100g protein daily.   - Reviewed carb counting and fiber intake. Encouraged to choose carb choices with >3-4 g fiber/svg.  Advised to choose ~3-4 CHO sources /meal and aim for at least 25-30g fiber/day.   - Reviewed sources of protein.  Encouraged to add protein to each meal and snack.  Advised to aim for at least 80-100g protein/day.     Provided pt with corresponding education materials/handouts on:  Carb counting, Fiber content of common foods and Sources of Protein    Pt verbalize understanding of materials provided during consult.   Patient Understanding: Excellent  Expected patient engagement: Excellent     Goals  1.  Aim 25-30g fiber/day; 3-4 cho choices/meal  2.  Aim for 2500kcal and 80-100g protein/day  3. Weight maintenance     Follow-Up Plans: Pt has RD contact information  for questions.      Patricia Cruz RD, LD

## 2021-07-15 ENCOUNTER — LAB (OUTPATIENT)
Dept: LAB | Facility: CLINIC | Age: 71
End: 2021-07-15
Payer: COMMERCIAL

## 2021-07-15 DIAGNOSIS — L98.9 SKIN LESION: ICD-10-CM

## 2021-07-15 DIAGNOSIS — Z17.0 MALIGNANT NEOPLASM OF CENTRAL PORTION OF RIGHT BREAST IN MALE, ESTROGEN RECEPTOR POSITIVE (H): ICD-10-CM

## 2021-07-15 DIAGNOSIS — C50.121 MALIGNANT NEOPLASM OF CENTRAL PORTION OF RIGHT BREAST IN MALE, ESTROGEN RECEPTOR POSITIVE (H): ICD-10-CM

## 2021-07-15 LAB
ALBUMIN SERPL-MCNC: 4 G/DL (ref 3.4–5)
ALP SERPL-CCNC: 61 U/L (ref 40–150)
ALT SERPL W P-5'-P-CCNC: 39 U/L (ref 0–70)
ANION GAP SERPL CALCULATED.3IONS-SCNC: 4 MMOL/L (ref 3–14)
AST SERPL W P-5'-P-CCNC: 21 U/L (ref 0–45)
BASOPHILS # BLD AUTO: 0.1 10E3/UL (ref 0–0.2)
BASOPHILS NFR BLD AUTO: 1 %
BILIRUB SERPL-MCNC: 0.5 MG/DL (ref 0.2–1.3)
BUN SERPL-MCNC: 7 MG/DL (ref 7–30)
CALCIUM SERPL-MCNC: 9.3 MG/DL (ref 8.5–10.1)
CHLORIDE BLD-SCNC: 107 MMOL/L (ref 94–109)
CO2 SERPL-SCNC: 28 MMOL/L (ref 20–32)
CREAT SERPL-MCNC: 0.77 MG/DL (ref 0.66–1.25)
EOSINOPHIL # BLD AUTO: 0.4 10E3/UL (ref 0–0.7)
EOSINOPHIL NFR BLD AUTO: 7 %
ERYTHROCYTE [DISTWIDTH] IN BLOOD BY AUTOMATED COUNT: 12.4 % (ref 10–15)
GFR SERPL CREATININE-BSD FRML MDRD: >90 ML/MIN/1.73M2
GLUCOSE BLD-MCNC: 101 MG/DL (ref 70–99)
HCT VFR BLD AUTO: 45.2 % (ref 40–53)
HGB BLD-MCNC: 14.4 G/DL (ref 13.3–17.7)
IMM GRANULOCYTES # BLD: 0 10E3/UL
IMM GRANULOCYTES NFR BLD: 0 %
LYMPHOCYTES # BLD AUTO: 1.8 10E3/UL (ref 0.8–5.3)
LYMPHOCYTES NFR BLD AUTO: 32 %
MCH RBC QN AUTO: 26.9 PG (ref 26.5–33)
MCHC RBC AUTO-ENTMCNC: 31.9 G/DL (ref 31.5–36.5)
MCV RBC AUTO: 85 FL (ref 78–100)
MONOCYTES # BLD AUTO: 0.4 10E3/UL (ref 0–1.3)
MONOCYTES NFR BLD AUTO: 8 %
NEUTROPHILS # BLD AUTO: 2.8 10E3/UL (ref 1.6–8.3)
NEUTROPHILS NFR BLD AUTO: 52 %
NRBC # BLD AUTO: 0 10E3/UL
NRBC BLD AUTO-RTO: 0 /100
PLATELET # BLD AUTO: 236 10E3/UL (ref 150–450)
POTASSIUM BLD-SCNC: 4.3 MMOL/L (ref 3.4–5.3)
PROT SERPL-MCNC: 7.5 G/DL (ref 6.8–8.8)
RBC # BLD AUTO: 5.35 10E6/UL (ref 4.4–5.9)
SODIUM SERPL-SCNC: 139 MMOL/L (ref 133–144)
WBC # BLD AUTO: 5.5 10E3/UL (ref 4–11)

## 2021-07-15 PROCEDURE — 36415 COLL VENOUS BLD VENIPUNCTURE: CPT

## 2021-07-15 PROCEDURE — 85025 COMPLETE CBC W/AUTO DIFF WBC: CPT

## 2021-07-15 PROCEDURE — 82040 ASSAY OF SERUM ALBUMIN: CPT

## 2021-07-15 RX ORDER — LIDOCAINE HYDROCHLORIDE AND EPINEPHRINE 10; 10 MG/ML; UG/ML
1.5 INJECTION, SOLUTION INFILTRATION; PERINEURAL ONCE
Status: DISCONTINUED | OUTPATIENT
Start: 2021-07-16 | End: 2021-07-16

## 2021-07-15 NOTE — PROGRESS NOTES
[facility] Dermatology Note  Encounter Date: Jul 16, 2021  Office Visit     Dermatology Problem List:  1. Nummular dermatitis, previously with impetiginization.    -Current treatment:  Mometasone 0.1% ointment b.i.d., gentle skin care with frequent moisturizer application  - Prior rx: povidone iodine wash, bacterial cx 6/21/19 with [facility]  2. Atopic dermatitis with chronic sinusitis and cat/dog allergy on prick testing 6/21/19   3. History of benign biopsy  - Verrucous keratosis, right triceps, s/p shave bx 1/14/21  4. 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  ____________________________________________    Assessment & Plan:     # Benign skin findings including: seborrheic keratoses - minor, self limited problem  - No further intervention required. Patient to report changes.   - Patient reassured of the benign nature of these lesions.  - It is possible that clinical trial drug (amcenestrant) is causing inflammation of past SKs. This has been reported with other targeted therapies (BRAF and MEK inhibitors for instance), but I could not find any reports for amcenestrant in the literature.    # Inflamed seborrheic keratosis, right temporal scalp: Catching with combing hair. Symptomatic thus warranting treatment. Discussed cryotherapy to debulk size versus shave removal. He preferred cryo as first line. Instructed to call for a visit if does not respond enough to cryo in next few weeks. Lives in [place], so would be better to see at  office.  - See procedure note below.    Procedures Performed:   - Cryotherapy procedure note, location(s): right temporal scalpe. After verbal consent and discussion of risks and benefits including, but not limited to, dyspigmentation/scar, blister, and pain, 1 SK was(were) treated with 1-2 mm freeze border for 2-3 cycles with liquid nitrogen. Post cryotherapy  "instructions were provided.      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 He was present with his wife and daughter Constantino was on the phone for today's visit.    Referred by Remberto for pigmented lesion of concern. Note from 7/14/21 reviewed. States \"There is a dark, pigmented, round lesion right frontoparietal scalp.  Per his wife there is some variability in color.\" Currently on a clinical trial for breast cancer (treatment with neoadjuvant amcenestrant).    Today, Dr. 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 It has been present for some time but seems to be worsening. It is more elevated and catching on his comb more now. Wife notes several of the brown spots on his body have gotten darker and more elevated. This is concerning to them as Dr. Collins thought a lesion on the right breast was a seborrheic keratosis and did not seek care right away. It turned out to be breast cancer. They want to make sure no spots are concerning.    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 He has not been very diligent with moisturizing. They use Lubriderm and Cerave at home.     Patient is otherwise feeling well, without additional skin concerns.     Labs Reviewed:  N/A    Physical Exam:  Vitals: BP (!) 141/82 (BP Location: Right arm, Patient Position: Chair, Cuff Size: Adult Regular)   Pulse 75   Temp 98.3  F (36.8  C)   Resp 14   Wt 79.4 kg (175 " lb)   SpO2 98%   BMI 27.02 kg/m    SKIN: Waist-up skin, which includes the head/face, neck, both arms, chest, back, abdomen, digits and/or nails was examined.  - Gunn Type IV  - There are waxy stuck on tan to brown papules on the right temporal scalp, hairline scalp, trunk, and upper extremities.  - There is a waxy stuck on appearing cobblestone plaque at the right areola. At about 7 o'clock, there are more translucent pink cobblestones. There is firm induration underneath this area in particular.  - PIH in circular patches on the posterior legs and central mid back.  - No other lesions of concern on areas examined.       Medications:  Current Outpatient Medications   Medication     losartan-hydrochlorothiazide (HYZAAR) 100-25 MG tablet     study - amcenestrant, IDS# [id number], 200 MG tablet     Current Facility-Administered Medications   Medication     lidocaine 1% with EPINEPHrine 1:100,000 injection 1.5 mL     lidocaine 1% with EPINEPHrine 1:100,000 injection 1.5 mL      Past Medical History:   Patient Active Problem List   Diagnosis     High triglycerides     HTN (hypertension)     Malignant neoplasm of central portion of right breast in male, estrogen receptor positive (H)     Past Medical History:   Diagnosis Date     Chronic sinusitis      Hypertension 2002       CC Mikaela Sr MD  [address] [place] [address] on close of this encounter.

## 2021-07-15 NOTE — NURSING NOTE
Chief Complaint   Patient presents with     Labs Only     Labs drawn from  by RN in lab.      Labs drawn by venipuncture by RN in lab.      Luci Dias RN

## 2021-07-16 ENCOUNTER — OFFICE VISIT (OUTPATIENT)
Dept: SURGERY | Facility: CLINIC | Age: 71
End: 2021-07-16
Attending: DERMATOLOGY
Payer: COMMERCIAL

## 2021-07-16 VITALS
RESPIRATION RATE: 14 BRPM | SYSTOLIC BLOOD PRESSURE: 141 MMHG | BODY MASS INDEX: 27.02 KG/M2 | HEART RATE: 75 BPM | TEMPERATURE: 98.3 F | OXYGEN SATURATION: 98 % | WEIGHT: 175 LBS | DIASTOLIC BLOOD PRESSURE: 82 MMHG

## 2021-07-16 DIAGNOSIS — L82.1 SEBORRHEIC KERATOSIS: ICD-10-CM

## 2021-07-16 DIAGNOSIS — L82.0 INFLAMED SEBORRHEIC KERATOSIS: Primary | ICD-10-CM

## 2021-07-16 PROCEDURE — 17110 DESTRUCTION B9 LES UP TO 14: CPT | Performed by: DERMATOLOGY

## 2021-07-16 PROCEDURE — G0463 HOSPITAL OUTPT CLINIC VISIT: HCPCS

## 2021-07-16 PROCEDURE — 99212 OFFICE O/P EST SF 10 MIN: CPT | Mod: 25 | Performed by: DERMATOLOGY

## 2021-07-16 ASSESSMENT — PAIN SCALES - GENERAL: PAINLEVEL: NO PAIN (0)

## 2021-07-16 NOTE — NURSING NOTE
"Oncology Rooming Note    July 16, 2021 3:31 PM   Ronna Collins is a 70 year old male who presents for:    Chief Complaint   Patient presents with     Oncology Clinic Visit     [facility] NEW - NEOPLASM OF [facility] BEHAVIOR OF SKIN     Initial Vitals: BP (!) 141/82 (BP Location: Right arm, Patient Position: Chair, Cuff Size: Adult Regular)   Pulse 75   Temp 98.3  F (36.8  C)   Resp 14   Wt 79.4 kg (175 lb)   SpO2 98%   BMI 27.02 kg/m   Estimated body mass index is 27.02 kg/m  as calculated from the following:    Height as of 6/4/21: 1.714 m (5' 7.48\").    Weight as of this encounter: 79.4 kg (175 lb). Body surface area is 1.94 meters squared.  No Pain (0) Comment: Data Unavailable   No LMP for male patient.  Allergies reviewed: Yes  Medications reviewed: Yes    Medications: Medication refills not needed today.  Pharmacy name entered into [employer]:    [id number] - [place], [place] - [address] [place] [address] N AT [id number] - [place], [place] - [address] AT [facility]/ [facility] - [place], [place] - [address]    Clinical concerns: No new concerns. Ramiro was notified.      Guillermo Espinoza LPN            "

## 2021-07-16 NOTE — LETTER
7/16/2021         RE: Ronna Collins  [address]  [place] [address]        Dear Colleague,    Thank you for referring your patient, Ronna Collins, to the [facility]. Please see a copy of my visit note below.    [facility] Dermatology Note  Encounter Date: Jul 16, 2021  Office Visit     Dermatology Problem List:  1. Nummular dermatitis, previously with impetiginization.    -Current treatment:  Mometasone 0.1% ointment b.i.d., gentle skin care with frequent moisturizer application  - Prior rx: povidone iodine wash, bacterial cx 6/21/19 with MSSA  2. Atopic dermatitis with chronic sinusitis and cat/dog allergy on prick testing 6/21/19   3. History of benign biopsy  - Verrucous keratosis, right triceps, s/p shave bx 1/14/21  4. 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  ____________________________________________    Assessment & Plan:     # Benign skin findings including: seborrheic keratoses - minor, self limited problem  - No further intervention required. Patient to report changes.   - Patient reassured of the benign nature of these lesions.  - It is possible that clinical trial drug (amcenestrant) is causing inflammation of past SKs. This has been reported with other targeted therapies (BRAF and MEK inhibitors for instance), but I could not find any reports for amcenestrant in the literature.    # Inflamed seborrheic keratosis, right temporal scalp: Catching with combing hair. Symptomatic thus warranting treatment. Discussed cryotherapy to debulk size versus shave removal. He preferred cryo as first line. Instructed to call for a visit if does not respond enough to cryo in next few weeks. Lives in [place], so would be better to see at  office.  - See procedure note below.    Procedures Performed:   - Cryotherapy procedure note, location(s):  "right temporal scalpe. After verbal consent and discussion of risks and benefits including, but not limited to, dyspigmentation/scar, blister, and pain, 1 SK was(were) treated with 1-2 mm freeze border for 2-3 cycles with liquid nitrogen. Post cryotherapy instructions were provided.      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 He was present with his wife and daughter Constantino was on the phone for today's visit.    Referred by Remberto for pigmented lesion of concern. Note from 7/14/21 reviewed. States \"There is a dark, pigmented, round lesion right frontoparietal scalp.  Per his wife there is some variability in color.\" Currently on a clinical trial for breast cancer (treatment with neoadjuvant amcenestrant).    Today, Dr. 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 It has been present for some time but seems to be worsening. It is more elevated and catching on his comb more now. Wife notes several of the brown spots on his body have gotten darker and more elevated. This is concerning to them as Dr. Collins thought a lesion on the right breast was a seborrheic keratosis and did not seek care right away. It turned out to be breast cancer. They want to make sure no spots are concerning.    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 He has not been very diligent with moisturizing. They use Lubriderm and Cerave at home.     Patient is otherwise " feeling well, without additional skin concerns.     Labs Reviewed:  N/A    Physical Exam:  Vitals: BP (!) 141/82 (BP Location: Right arm, Patient Position: Chair, Cuff Size: Adult Regular)   Pulse 75   Temp 98.3  F (36.8  C)   Resp 14   Wt 79.4 kg (175 lb)   SpO2 98%   BMI 27.02 kg/m    SKIN: Waist-up skin, which includes the head/face, neck, both arms, chest, back, abdomen, digits and/or nails was examined.  - Gunn Type IV  - There are waxy stuck on tan to brown papules on the right temporal scalp, hairline scalp, trunk, and upper extremities.  - There is a waxy stuck on appearing cobblestone plaque at the right areola. At about 7 o'clock, there are more translucent pink cobblestones. There is firm induration underneath this area in particular.  - PIH in circular patches on the posterior legs and central mid back.  - No other lesions of concern on areas examined.       Medications:  Current Outpatient Medications   Medication     losartan-hydrochlorothiazide (HYZAAR) 100-25 MG tablet     study - amcenestrant, IDS# [id number], 200 MG tablet     Current Facility-Administered Medications   Medication     lidocaine 1% with EPINEPHrine 1:100,000 injection 1.5 mL     lidocaine 1% with EPINEPHrine 1:100,000 injection 1.5 mL      Past Medical History:   Patient Active Problem List   Diagnosis     High triglycerides     HTN (hypertension)     Malignant neoplasm of central portion of right breast in male, estrogen receptor positive (H)     Past Medical History:   Diagnosis Date     Chronic sinusitis      Hypertension 2002       CC Mikaela Sr MD  [address] [place] [address] on close of this encounter.        Again, thank you for allowing me to participate in the care of your patient.        Sincerely,        Josselyn Rubio MD

## 2021-07-20 ENCOUNTER — ANCILLARY PROCEDURE (OUTPATIENT)
Dept: MAMMOGRAPHY | Facility: CLINIC | Age: 71
End: 2021-07-20
Attending: INTERNAL MEDICINE
Payer: COMMERCIAL

## 2021-07-20 ENCOUNTER — ANCILLARY PROCEDURE (OUTPATIENT)
Dept: MRI IMAGING | Facility: CLINIC | Age: 71
End: 2021-07-20
Attending: INTERNAL MEDICINE

## 2021-07-20 DIAGNOSIS — Z00.6 RESEARCH EXAM: ICD-10-CM

## 2021-07-20 DIAGNOSIS — Z17.0 MALIGNANT NEOPLASM OF CENTRAL PORTION OF RIGHT BREAST IN MALE, ESTROGEN RECEPTOR POSITIVE (H): ICD-10-CM

## 2021-07-20 DIAGNOSIS — C50.121 MALIGNANT NEOPLASM OF CENTRAL PORTION OF RIGHT BREAST IN MALE, ESTROGEN RECEPTOR POSITIVE (H): ICD-10-CM

## 2021-07-20 DIAGNOSIS — Z80.0 FAMILY HISTORY OF COLON CANCER: ICD-10-CM

## 2021-07-20 DIAGNOSIS — C50.929: ICD-10-CM

## 2021-07-20 PROCEDURE — 36415 COLL VENOUS BLD VENIPUNCTURE: CPT

## 2021-07-20 PROCEDURE — 19083 BX BREAST 1ST LESION US IMAG: CPT | Mod: RT | Performed by: STUDENT IN AN ORGANIZED HEALTH CARE EDUCATION/TRAINING PROGRAM

## 2021-07-20 PROCEDURE — G0452 MOLECULAR PATHOLOGY INTERPR: HCPCS | Performed by: PATHOLOGY

## 2021-07-20 RX ORDER — GADOBUTROL 604.72 MG/ML
10 INJECTION INTRAVENOUS ONCE
Status: COMPLETED | OUTPATIENT
Start: 2021-07-20 | End: 2021-07-20

## 2021-07-20 RX ADMIN — GADOBUTROL 7.9 ML: 604.72 INJECTION INTRAVENOUS at 12:07

## 2021-07-20 NOTE — DISCHARGE INSTRUCTIONS
MRI Contrast Discharge Instructions    The IV contrast you received today will pass out of your body in your  urine. This will happen in the next 24 hours. You will not feel this process.  Your urine will not change color.    Drink at least 4 extra glasses of water or juice today (unless your doctor  has restricted your fluids). This reduces the stress on your kidneys.  You may take your regular medicines.    If you are on dialysis: It is best to have dialysis today.    If you have a reaction: Most reactions happen right away. If you have  any new symptoms after leaving the hospital (such as hives or swelling),  call your hospital at the correct number below. Or call your family doctor.  If you have breathing distress or wheezing, call 911.    Special instructions: ***    I have read and understand the above information.    Signature:______________________________________ Date:___________    Staff:__________________________________________ Date:___________     Time:__________    [facility] Rad[facility] Departments:    ___[place]: [phone]  ___[facility]: [phone]  ___[place]: [phone] ___[facility]: [phone]  ___[facility]: [phone]  ___[facility]: [phone]  ___[phone]  ___[facility]: [phone]  ___[phone]

## 2021-07-21 ENCOUNTER — LAB (OUTPATIENT)
Dept: LAB | Facility: CLINIC | Age: 71
End: 2021-07-21
Attending: INTERNAL MEDICINE
Payer: COMMERCIAL

## 2021-07-21 DIAGNOSIS — C50.929: Primary | ICD-10-CM

## 2021-07-21 LAB
ALBUMIN SERPL-MCNC: 4 G/DL (ref 3.4–5)
ALP SERPL-CCNC: 60 U/L (ref 40–150)
ALT SERPL W P-5'-P-CCNC: 33 U/L (ref 0–70)
ANION GAP SERPL CALCULATED.3IONS-SCNC: 5 MMOL/L (ref 3–14)
AST SERPL W P-5'-P-CCNC: 17 U/L (ref 0–45)
BASOPHILS # BLD AUTO: 0 10E3/UL (ref 0–0.2)
BASOPHILS NFR BLD AUTO: 1 %
BILIRUB SERPL-MCNC: 0.4 MG/DL (ref 0.2–1.3)
BUN SERPL-MCNC: 8 MG/DL (ref 7–30)
CALCIUM SERPL-MCNC: 9.2 MG/DL (ref 8.5–10.1)
CHLORIDE BLD-SCNC: 106 MMOL/L (ref 94–109)
CO2 SERPL-SCNC: 27 MMOL/L (ref 20–32)
CREAT SERPL-MCNC: 0.78 MG/DL (ref 0.66–1.25)
EOSINOPHIL # BLD AUTO: 0.5 10E3/UL (ref 0–0.7)
EOSINOPHIL NFR BLD AUTO: 9 %
ERYTHROCYTE [DISTWIDTH] IN BLOOD BY AUTOMATED COUNT: 12.4 % (ref 10–15)
GFR SERPL CREATININE-BSD FRML MDRD: >90 ML/MIN/1.73M2
GLUCOSE BLD-MCNC: 122 MG/DL (ref 70–99)
HCT VFR BLD AUTO: 45.4 % (ref 40–53)
HGB BLD-MCNC: 14.6 G/DL (ref 13.3–17.7)
IMM GRANULOCYTES # BLD: 0 10E3/UL
IMM GRANULOCYTES NFR BLD: 0 %
LYMPHOCYTES # BLD AUTO: 1.8 10E3/UL (ref 0.8–5.3)
LYMPHOCYTES NFR BLD AUTO: 34 %
MCH RBC QN AUTO: 27.1 PG (ref 26.5–33)
MCHC RBC AUTO-ENTMCNC: 32.2 G/DL (ref 31.5–36.5)
MCV RBC AUTO: 84 FL (ref 78–100)
MONOCYTES # BLD AUTO: 0.4 10E3/UL (ref 0–1.3)
MONOCYTES NFR BLD AUTO: 8 %
NEUTROPHILS # BLD AUTO: 2.7 10E3/UL (ref 1.6–8.3)
NEUTROPHILS NFR BLD AUTO: 48 %
NRBC # BLD AUTO: 0 10E3/UL
NRBC BLD AUTO-RTO: 0 /100
PLATELET # BLD AUTO: 247 10E3/UL (ref 150–450)
POTASSIUM BLD-SCNC: 4.7 MMOL/L (ref 3.4–5.3)
PROT SERPL-MCNC: 7.6 G/DL (ref 6.8–8.8)
RBC # BLD AUTO: 5.38 10E6/UL (ref 4.4–5.9)
SODIUM SERPL-SCNC: 138 MMOL/L (ref 133–144)
WBC # BLD AUTO: 5.4 10E3/UL (ref 4–11)

## 2021-07-21 PROCEDURE — 80053 COMPREHEN METABOLIC PANEL: CPT | Performed by: PATHOLOGY

## 2021-07-21 PROCEDURE — 85025 COMPLETE CBC W/AUTO DIFF WBC: CPT | Performed by: PATHOLOGY

## 2021-07-21 PROCEDURE — 36415 COLL VENOUS BLD VENIPUNCTURE: CPT | Performed by: PATHOLOGY

## 2021-07-26 NOTE — PROGRESS NOTES
Oncology Visit:  Date on this visit: 7/27/2021    Diagnosis: clinical prognostic stage IIIb, [id number], grade 2, ER positive, FL positive, HER-2 negative right breast cancer.    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  He noted discomfort and hardening of the skin of the right nipple in late April/early May, 2021.  He had a similar appearing benign skin lesion of the arm 3 months earlier and thought it likely to be the same.  However, he subsequently noted a mass in the same area.  Right breast skin punch biopsy performed by dermatology was c/w grade 2 invasive ductal carcinoma with associated DCIS.  Invasive carcinoma was ER positive 100% and FL positive 70%, with tumor invading the dermis.  HER-2 was negative by IHC (score 1+).  Diagnostic mammogram and ultrasound showed a retroareolar right breast mass measuring 2.9 cm with associated nipple retraction and enlarged, abnormal right axillary lymph nodes.  Right breast biopsy was again c/w grade 2 invasive ductal carcinoma, ER strong in 98% of tumor cell nuclei, HER-2 pending.  Ki-67 was 15%.    He elected to screen for the ISPY-2 clinical trial.  MRI breast on 6/18/2021 showed the biopsy proven right breast cancer to measure 3.9 cm in maximum dimension with invasion of the nipple and the pectoralis muscle as well as at least 4 abnormal level 1 and 2 right axillary lymph nodes and a tiny 0.4 cm right internal mammary lymph node, likely benign).  Mammoprint returned low risk with a score of +0.29.  He elected for treatment on the endocrine optimization protocol of ISPY-2 and was randomized to treatment with amcenestrant.  He has been on amcenestrant since 7/1/2021.    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  He was seen alongside his wife and daughter.  He continues to tolerate treatment very well.  He states initially he had  some joint pains on the medication, but these seemed to have resolved.  He denies current joint pains, hot flashes, or mood disorder.  He has had no nausea or diarrhea.  He feels right breast mass is unchanged from prior.  Since last visit, he saw a dermatologist who treated a right scalp lesion.  The dermatologist felt the right nipple lesion was a seborrheic keratosis that was being pushed outwards by the underlying breast cancer.  The remainder of a complete 12 point review of systems was reviewed with the patient and was negative with the exception of that mentioned above.    Past Medical/Surgical History:  Past Medical History:   Diagnosis Date     Chronic sinusitis      Hypertension 2002     No past surgical history on file.    Allergies:  Allergies as of 07/27/2021     (No Known Allergies)     Current Medications:  Current Outpatient Medications   Medication Sig Dispense Refill     losartan-hydrochlorothiazide (HYZAAR) 100-25 MG tablet Take 1 tablet by mouth daily 90 tablet 1     study - amcenestrant, IDS# [id number], 200 MG tablet Take 1 tablet (200 mg) by mouth daily 28 tablet 0      Family and Social History:  See initial consultation dated 6/8/2021 for further details.    Physical Exam:  /82   Pulse 85   Temp 97.7  F (36.5  C) (Oral)   Resp 14   Wt 80.6 kg (177 lb 11.2 oz)   SpO2 98%   BMI 27.44 kg/m    General:  Well appearing adult male in NAD.  HEENT:  Normocephalic.  Sclera anicteric.  MMM.  No lesions of the oropharynx.  Lymph:  Right axillary node is about 2-2.5 cm, mobile, in the anterior right axilla.  Chest:  CTA bilaterally.  No wheezes or crackles.  CV:  RRR.  Nl S1 and S2.  No m/r/g.  Breast:  Retroareolar right breast mass measures 5.5 x 5 cm, unchanged from prior examination.  There is a fungating skin involvement of mass with right nipple.  No ulceration.   Abd:  Soft/ND.   Ext:  No pitting edema of the bilateral lower extremities.    Musculo:  Full ROM of the bilateral upper  extremities.  Neuro:  Cranial nerves grossly intact.  Psych:  Mood and affect appear normal.    Laboratory/Imaging Studies  7/20/2021 Breast MRI:    Biopsy proven retroareolar breast carcinoma with nipple retraction and invasion of the pectoralis appears similar and measures 3.9 x 3.5 x 3.6 cm.  Multiple stable right axillary level 1 and 2 lymph nodes, such as a right level 1 lymph node measuring 1.6 cm and a level 2/3 lymph node measuring 1.1 cm.  Unchanged tiny right internal mammary chain lymph nodes.    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      1.  Right breast cancer:    He is enrolled on the Endocrine Optimization Protocol (EOP) of ISPY2 and has been on treatment with amcenestrant since 7/1/2021.  We reviewed the images from the pretreatment and 3 week posttreatment MRIs together.  There has been no change in either right breast mass or right axillary lymphadenopathy.  Clinical exam is without change as well.  We reviewed protocol includes additional MRIs at weeks 12 and prior to surgery.  Plan is to treat with up to 6 months of neoadjuvant therapy as long as there is tumor response.  I would like him to be seen in person monthly for tumor measurement.  They had a number of questions about this plan which I answered to the best of my ability today.    2.  Pre-diabetes:  6/14/2021 hemoglobin A1C was 6.5%.  Since last visit, he has started metformin.  He has also increased cardiovascular exercise and has reduced sugar intake in the diet.     3.  Cancer risk management:  Breast Actionable panel drawn on 6/4/2021 was negative.  He met with genetic counseling on 7/13/2021 and Breast Expanded testing was performed, results are still pending at this time.    4.  Follow up:  SANDRA visit in 4 weeks.  In person visit with me in 8 weeks.  Breast MRI 1-2 business days prior to return  visit with me.    30 minutes spent on the date of the encounter doing chart review, review of test results, interpretation of tests, patient visit and documentation.

## 2021-07-27 ENCOUNTER — ONCOLOGY VISIT (OUTPATIENT)
Dept: ONCOLOGY | Facility: CLINIC | Age: 71
End: 2021-07-27
Attending: INTERNAL MEDICINE
Payer: COMMERCIAL

## 2021-07-27 ENCOUNTER — RESEARCH ENCOUNTER (OUTPATIENT)
Dept: ONCOLOGY | Facility: CLINIC | Age: 71
End: 2021-07-27

## 2021-07-27 VITALS
DIASTOLIC BLOOD PRESSURE: 82 MMHG | TEMPERATURE: 97.7 F | SYSTOLIC BLOOD PRESSURE: 125 MMHG | OXYGEN SATURATION: 98 % | RESPIRATION RATE: 14 BRPM | WEIGHT: 177.7 LBS | HEART RATE: 85 BPM | BODY MASS INDEX: 27.44 KG/M2

## 2021-07-27 DIAGNOSIS — C50.929 MALIGNANT NEOPLASM OF MALE BREAST, UNSPECIFIED ESTROGEN RECEPTOR STATUS, UNSPECIFIED LATERALITY, UNSPECIFIED SITE OF BREAST (H): ICD-10-CM

## 2021-07-27 DIAGNOSIS — C50.929: Primary | ICD-10-CM

## 2021-07-27 DIAGNOSIS — C50.121 MALIGNANT NEOPLASM OF CENTRAL PORTION OF RIGHT BREAST IN MALE, ESTROGEN RECEPTOR POSITIVE (H): Primary | ICD-10-CM

## 2021-07-27 DIAGNOSIS — Z17.0 MALIGNANT NEOPLASM OF CENTRAL PORTION OF RIGHT BREAST IN MALE, ESTROGEN RECEPTOR POSITIVE (H): Primary | ICD-10-CM

## 2021-07-27 DIAGNOSIS — C50.929: ICD-10-CM

## 2021-07-27 LAB
ALBUMIN SERPL-MCNC: 4.1 G/DL (ref 3.4–5)
ALP SERPL-CCNC: 60 U/L (ref 40–150)
ALT SERPL W P-5'-P-CCNC: 37 U/L (ref 0–70)
ANION GAP SERPL CALCULATED.3IONS-SCNC: 7 MMOL/L (ref 3–14)
AST SERPL W P-5'-P-CCNC: 24 U/L (ref 0–45)
BASOPHILS # BLD AUTO: 0.1 10E3/UL (ref 0–0.2)
BASOPHILS NFR BLD AUTO: 1 %
BILIRUB SERPL-MCNC: 0.4 MG/DL (ref 0.2–1.3)
BUN SERPL-MCNC: 12 MG/DL (ref 7–30)
CALCIUM SERPL-MCNC: 8.9 MG/DL (ref 8.5–10.1)
CHLORIDE BLD-SCNC: 105 MMOL/L (ref 94–109)
CO2 SERPL-SCNC: 26 MMOL/L (ref 20–32)
CREAT SERPL-MCNC: 0.72 MG/DL (ref 0.66–1.25)
EOSINOPHIL # BLD AUTO: 0.4 10E3/UL (ref 0–0.7)
EOSINOPHIL NFR BLD AUTO: 8 %
ERYTHROCYTE [DISTWIDTH] IN BLOOD BY AUTOMATED COUNT: 12.6 % (ref 10–15)
GFR SERPL CREATININE-BSD FRML MDRD: >90 ML/MIN/1.73M2
GLUCOSE BLD-MCNC: 150 MG/DL (ref 70–99)
HCT VFR BLD AUTO: 45.7 % (ref 40–53)
HGB BLD-MCNC: 15.1 G/DL (ref 13.3–17.7)
IMM GRANULOCYTES # BLD: 0 10E3/UL
IMM GRANULOCYTES NFR BLD: 1 %
LYMPHOCYTES # BLD AUTO: 1.8 10E3/UL (ref 0.8–5.3)
LYMPHOCYTES NFR BLD AUTO: 32 %
MCH RBC QN AUTO: 27.2 PG (ref 26.5–33)
MCHC RBC AUTO-ENTMCNC: 33 G/DL (ref 31.5–36.5)
MCV RBC AUTO: 82 FL (ref 78–100)
MONOCYTES # BLD AUTO: 0.5 10E3/UL (ref 0–1.3)
MONOCYTES NFR BLD AUTO: 8 %
NEUTROPHILS # BLD AUTO: 2.8 10E3/UL (ref 1.6–8.3)
NEUTROPHILS NFR BLD AUTO: 50 %
NRBC # BLD AUTO: 0 10E3/UL
NRBC BLD AUTO-RTO: 0 /100
PLATELET # BLD AUTO: 278 10E3/UL (ref 150–450)
POTASSIUM BLD-SCNC: 3.6 MMOL/L (ref 3.4–5.3)
PROT SERPL-MCNC: 8 G/DL (ref 6.8–8.8)
RBC # BLD AUTO: 5.55 10E6/UL (ref 4.4–5.9)
SODIUM SERPL-SCNC: 138 MMOL/L (ref 133–144)
WBC # BLD AUTO: 5.5 10E3/UL (ref 4–11)

## 2021-07-27 PROCEDURE — 99214 OFFICE O/P EST MOD 30 MIN: CPT | Performed by: INTERNAL MEDICINE

## 2021-07-27 PROCEDURE — G0463 HOSPITAL OUTPT CLINIC VISIT: HCPCS

## 2021-07-27 PROCEDURE — 36415 COLL VENOUS BLD VENIPUNCTURE: CPT | Performed by: INTERNAL MEDICINE

## 2021-07-27 PROCEDURE — 85004 AUTOMATED DIFF WBC COUNT: CPT | Performed by: INTERNAL MEDICINE

## 2021-07-27 PROCEDURE — 80053 COMPREHEN METABOLIC PANEL: CPT | Performed by: INTERNAL MEDICINE

## 2021-07-27 ASSESSMENT — PAIN SCALES - GENERAL: PAINLEVEL: NO PAIN (0)

## 2021-07-27 NOTE — LETTER
7/27/2021         RE: Ronna Collins  [address]  [place] [address]        Dear Colleague,    Thank you for referring your patient, Ronna Collins, to the [facility]. Please see a copy of my visit note below.    Oncology Visit:  Date on this visit: 7/27/2021    Diagnosis: clinical prognostic stage IIIb, [id number], grade 2, ER positive, GA positive, HER-2 negative right breast cancer.    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  He noted discomfort and hardening of the skin of the right nipple in late April/early May, 2021.  He had a similar appearing benign skin lesion of the arm 3 months earlier and thought it likely to be the same.  However, he subsequently noted a mass in the same area.  Right breast skin punch biopsy performed by dermatology was c/w grade 2 invasive ductal carcinoma with associated DCIS.  Invasive carcinoma was ER positive 100% and GA positive 70%, with tumor invading the dermis.  HER-2 was negative by IHC (score 1+).  Diagnostic mammogram and ultrasound showed a retroareolar right breast mass measuring 2.9 cm with associated nipple retraction and enlarged, abnormal right axillary lymph nodes.  Right breast biopsy was again c/w grade 2 invasive ductal carcinoma, ER strong in 98% of tumor cell nuclei, HER-2 pending.  Ki-67 was 15%.    He elected to screen for the ISPY-2 clinical trial.  MRI breast on 6/18/2021 showed the biopsy proven right breast cancer to measure 3.9 cm in maximum dimension with invasion of the nipple and the pectoralis muscle as well as at least 4 abnormal level 1 and 2 right axillary lymph nodes and a tiny 0.4 cm right internal mammary lymph node, likely benign).  Mammoprint returned low risk with a score of +0.29.  He elected for treatment on the endocrine optimization protocol of ISPY-2 and was randomized to treatment with amcenestrant.   He has been on amcenestrant since 7/1/2021.    Interval History:   Karina was seen in clinic today for follow up on neoadjuvant amcenestrant.  He was seen alongside his wife and daughter.  He continues to tolerate treatment very well.  He states initially he had some joint pains on the medication, but these seemed to have resolved.  He denies current joint pains, hot flashes, or mood disorder.  He has had no nausea or diarrhea.  He feels right breast mass is unchanged from prior.  Since last visit, he saw a dermatologist who treated a right scalp lesion.  The dermatologist felt the right nipple lesion was a seborrheic keratosis that was being pushed outwards by the underlying breast cancer.  The remainder of a complete 12 point review of systems was reviewed with the patient and was negative with the exception of that mentioned above.    Past Medical/Surgical History:  Past Medical History:   Diagnosis Date     Chronic sinusitis      Hypertension 2002     No past surgical history on file.    Allergies:  Allergies as of 07/27/2021     (No Known Allergies)     Current Medications:  Current Outpatient Medications   Medication Sig Dispense Refill     losartan-hydrochlorothiazide (HYZAAR) 100-25 MG tablet Take 1 tablet by mouth daily 90 tablet 1     study - amcenestrant, IDS# [id number], 200 MG tablet Take 1 tablet (200 mg) by mouth daily 28 tablet 0      Family and Social History:  See initial consultation dated 6/8/2021 for further details.    Physical Exam:  /82   Pulse 85   Temp 97.7  F (36.5  C) (Oral)   Resp 14   Wt 80.6 kg (177 lb 11.2 oz)   SpO2 98%   BMI 27.44 kg/m    General:  Well appearing adult male in NAD.  HEENT:  Normocephalic.  Sclera anicteric.  MMM.  No lesions of the oropharynx.  Lymph:  Right axillary node is about 2-2.5 cm, mobile, in the anterior right axilla.  Chest:  CTA bilaterally.  No wheezes or crackles.  CV:  RRR.  Nl S1 and S2.  No m/r/g.  Breast:  Retroareolar right breast mass  measures 5.5 x 5 cm, unchanged from prior examination.  There is a fungating skin involvement of mass with right nipple.  No ulceration.   Abd:  Soft/ND.   Ext:  No pitting edema of the bilateral lower extremities.    Musculo:  Full ROM of the bilateral upper extremities.  Neuro:  Cranial nerves grossly intact.  Psych:  Mood and affect appear normal.    Laboratory/Imaging Studies  7/20/2021 Breast MRI:    Biopsy proven retroareolar breast carcinoma with nipple retraction and invasion of the pectoralis appears similar and measures 3.9 x 3.5 x 3.6 cm.  Multiple stable right axillary level 1 and 2 lymph nodes, such as a right level 1 lymph node measuring 1.6 cm and a level 2/3 lymph node measuring 1.1 cm.  Unchanged tiny right internal mammary chain lymph nodes.    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      1.  Right breast cancer:    He is enrolled on the Endocrine Optimization Protocol (EOP) of ISPY2 and has been on treatment with amcenestrant since 7/1/2021.  We reviewed the images from the pretreatment and 3 week posttreatment MRIs together.  There has been no change in either right breast mass or right axillary lymphadenopathy.  Clinical exam is without change as well.  We reviewed protocol includes additional MRIs at weeks 12 and prior to surgery.  Plan is to treat with up to 6 months of neoadjuvant therapy as long as there is tumor response.  I would like him to be seen in person monthly for tumor measurement.  They had a number of questions about this plan which I answered to the best of my ability today.    2.  Pre-diabetes:  6/14/2021 hemoglobin A1C was 6.5%.  Since last visit, he has started metformin.  He has also increased cardiovascular exercise and has reduced sugar intake in the diet.     3.  Cancer risk management:  Breast Actionable panel drawn on 6/4/2021 was  negative.  He met with genetic counseling on 7/13/2021 and Breast Expanded testing was performed, results are still pending at this time.    4.  Follow up:  SANDRA visit in 4 weeks.  In person visit with me in 8 weeks.  Breast MRI 1-2 business days prior to return visit with me.    30 minutes spent on the date of the encounter doing chart review, review of test results, interpretation of tests, patient visit and documentation.         Again, thank you for allowing me to participate in the care of your patient.        Sincerely,        Mikaela Sr MD

## 2021-07-27 NOTE — NURSING NOTE
[id number]: Study Visit Note   Subject name: Ronna Collins     Visit: C2D1    Did the study visit occur within the appropriate window allowed by the protocol? yes    Since the last study visit, He has been doing well.     I have personally interviewed Ronna Collins and reviewed his medical record for adverse events and concomitant medications and these have been recorded on the corresponding logs in Ronna Collins's research file.     Ronna Collins was given the opportunity to ask any trial related questions.  Please see provider progress note for physical exam and other clinical information. Labs were reviewed - any significant lab values were addressed and reviewed.    Delvis Bae RN     Pager: [phone]

## 2021-07-27 NOTE — NURSING NOTE
"Oncology Rooming Note    July 27, 2021 1:07 PM   Ronna Collins is a 70 year old male who presents for:    Chief Complaint   Patient presents with     Oncology Clinic Visit     Return; Malignant neoplasm      Initial Vitals: /82   Pulse 85   Temp 97.7  F (36.5  C) (Oral)   Resp 14   Wt 80.6 kg (177 lb 11.2 oz)   SpO2 98%   BMI 27.44 kg/m   Estimated body mass index is 27.44 kg/m  as calculated from the following:    Height as of 6/4/21: 1.714 m (5' 7.48\").    Weight as of this encounter: 80.6 kg (177 lb 11.2 oz). Body surface area is 1.96 meters squared.  No Pain (0) Comment: Data Unavailable   No LMP for male patient.  Allergies reviewed: Yes  Medications reviewed: Yes    Medications: MEDICATION REFILLS NEEDED TODAY. Provider was notified.  Pharmacy name entered into [employer]:    [id number] - [place], [place] - [address] [place] [address] N AT [facility] #[id number] - [place], [place] - [address] [facility] AT [facility]/ [place] [facility] - [place], [place] - [address] [facility]    Clinical concerns: No concerns.        Benjamin Alvarez,   (Student [place])              "

## 2021-08-09 ENCOUNTER — VIRTUAL VISIT (OUTPATIENT)
Dept: ONCOLOGY | Facility: CLINIC | Age: 71
End: 2021-08-09
Attending: GENETIC COUNSELOR, MS
Payer: COMMERCIAL

## 2021-08-09 DIAGNOSIS — C50.929 MALIGNANT NEOPLASM OF MALE BREAST, UNSPECIFIED ESTROGEN RECEPTOR STATUS, UNSPECIFIED LATERALITY, UNSPECIFIED SITE OF BREAST (H): Primary | ICD-10-CM

## 2021-08-09 PROCEDURE — 999N000069 HC STATISTIC GENETIC COUNSELING, < 16 MIN: Mod: GT | Performed by: GENETIC COUNSELOR, MS

## 2021-08-09 NOTE — LETTER
Cancer Risk Management  Program Locations    [facility]  [facility]  [facility]  Mailing Address  Cancer Risk Management Program  [facility]  [address]  [place] [address]    New patient appointments  [phone]  August 12, 2021    Ronna Collins  [address]  [facility] [address]      Dear Ronna,    It was a pleasure speaking with you over video for genetic counseling on 8/9/2021. Here is a copy of the progress note from our discussion. If you have any additional questions, please feel free to call.    Referring Provider: Nida Snider MD    Presenting Information:  I spoke to Ronna over video today to discuss his additional genetic testing results. The Breast Expanded Panel was ordered from the Molecular Diagnostics Laboratory at [facility]. This testing was done because of Ronna's personal and family history of cancer.    Genetic Testing Result: NEGATIVE  Ronna is negative for mutations in the 18 genes analyzed: HERNAN, BARD1, BRCA1, BRCA2, BRIP1, CDH1, CHEK2, MRE11, MUTYH, NBN, NF1, PALB2, PTEN, RAD50, RAD51C, RAD51D, STK11, TP53.     Interpretation:  We discussed several different interpretations of this negative test result.    1. One explanation may be that there is a different gene or combination of genes and environment that are associated with the cancers in this family.  2. It is possible that his relatives have a mutation in one of these genes and he did not inherit it.  3. There is also a small possibility that there is a mutation in one of these genes, and the testing laboratory could not find it with their current testing methods.       Screening:  Based on this negative test result, it is important for Ronna and his relatives to refer back to the family history for  appropriate cancer screening.      He should continue with his breast cancer treatment and screening as recommended by his physicians.     Other population cancer screening options, such as those recommended by the American Cancer Society and the National Comprehensive Cancer Network (NCCN), are also appropriate for Ronna and his family. These screening recommendations may change if there are changes to Ronna's personal and/or family history of cancer. Final screening recommendations should be made by each individual's managing physician.    Ronna s close relatives remain at increased risk for breast cancer given their family history. Breast cancer screening is generally recommended to begin approximately 10 years younger than the earliest age of breast cancer diagnosis in the family, or at age 40, whichever comes first. In this family, screening may begin at age 40. Breast screening options should be discussed with an individual's primary care provider and a genetic counselor, to determine at what age to begin screening, what screening is appropriate, and if additional screening (such as breast MRI) is necessary based on personal/family history factors.        Inheritance:  We reviewed the autosomal dominant inheritance of mutations in these genes. We discussed that Ronna cannot/did not pass on an identifiable mutation in these genes to his children based on this test result. Mutations in these genes do not skip generations.      Summary:  We do not have an explanation for Simeons breast cancer. While no genetic changes were identified, Ronna may still be at risk for certain cancers due to family history, environmental factors, or other genetic causes not identified by this test. Because of that, it is important that he continue with cancer screening based on his personal and family history as discussed above.    Genetic testing is rapidly advancing, and new cancer susceptibility  genes will most likely be identified in the future. Therefore, I encouraged Ronna to contact me annually or if there are changes in his personal or family history. This may change how we assess his cancer risk, screening, and the testing we would offer.    Plan:  1. He has access to copy of his test results via [employer].  2. He plans to follow-up with his physicians.  3. He should contact me annually, or sooner if his family history changes.    If Ronna has any further questions, I encouraged him to contact me at [phone].    Lo Zambrano MS, [facility]  Licensed, Certified Genetic Counselor  Office: [phone]  Email: [email or web address]

## 2021-08-09 NOTE — PROGRESS NOTES
"8/9/2021    Ronna is a 71 year old who is being evaluated via a billable video visit.      Video-Visit Details    Type of service: Video Visit    Video Start Time: 03:37 pm  Video End Time: 03:41 pm     Originating Location (pt. Location): Home    Distant Location (provider location): Cancer Risk Management Program    Platform used for Video Visit: [employer]    Referring Provider: Nida Snider MD    Presenting Information:  I spoke to Ronna over video today to discuss his additional genetic testing results. The Breast Expanded Panel was ordered from the Molecular Diagnostics Laboratory at [facility]. This testing was done because of Ronna's personal and family history of cancer.    Genetic Testing Result: NEGATIVE  Ronna is negative for mutations in the 18 genes analyzed: HERNAN, BARD1, BRCA1, BRCA2, BRIP1, CDH1, CHEK2, MRE11, MUTYH, NBN, NF1, PALB2, PTEN, RAD50, RAD51C, RAD51D, STK11, TP53.     A copy of the test report can be found in the Laboratory tab, dated 7/20/21, and named \"Next generation sequencing\".     Interpretation:  We discussed several different interpretations of this negative test result.    1. One explanation may be that there is a different gene or combination of genes and environment that are associated with the cancers in this family.  2. It is possible that his relatives have a mutation in one of these genes and he did not inherit it.  3. There is also a small possibility that there is a mutation in one of these genes, and the testing laboratory could not find it with their current testing methods.       Screening:  Based on this negative test result, it is important for Ronna and his relatives to refer back to the family history for appropriate cancer screening.      He should continue with his breast cancer treatment and screening as recommended by his physicians.     Other population cancer screening options, such as those recommended by the American Cancer " Society and the National Comprehensive Cancer Network (NCCN), are also appropriate for Ronna and his family. These screening recommendations may change if there are changes to Ronna's personal and/or family history of cancer. Final screening recommendations should be made by each individual's managing physician.    Ronna s close relatives remain at increased risk for breast cancer given their family history. Breast cancer screening is generally recommended to begin approximately 10 years younger than the earliest age of breast cancer diagnosis in the family, or at age 40, whichever comes first. In this family, screening may begin at age 40. Breast screening options should be discussed with an individual's primary care provider and a genetic counselor, to determine at what age to begin screening, what screening is appropriate, and if additional screening (such as breast MRI) is necessary based on personal/family history factors.        Inheritance:  We reviewed the autosomal dominant inheritance of mutations in these genes. We discussed that Ronna cannot/did not pass on an identifiable mutation in these genes to his children based on this test result. Mutations in these genes do not skip generations.      Summary:  We do not have an explanation for Ronna's breast cancer. While no genetic changes were identified, Ronna may still be at risk for certain cancers due to family history, environmental factors, or other genetic causes not identified by this test. Because of that, it is important that he continue with cancer screening based on his personal and family history as discussed above.    Genetic testing is rapidly advancing, and new cancer susceptibility genes will most likely be identified in the future. Therefore, I encouraged Ronna to contact me annually or if there are changes in his personal or family history. This may change how we assess his cancer risk, screening,  and the testing we would offer.    Plan:  1. He has access to copy of his test results via [employer].  2. He plans to follow-up with his physicians.  3. He should contact me annually, or sooner if his family history changes.    If Ronna has any further questions, I encouraged him to contact me at [phone].    Time spent over video: 4 minutes.    Lo Zambrano MS, [facility]  Licensed, Certified Genetic Counselor  Office: [phone]  Email: [email or web address]

## 2021-08-10 ENCOUNTER — ANCILLARY PROCEDURE (OUTPATIENT)
Dept: MRI IMAGING | Facility: CLINIC | Age: 71
End: 2021-08-10
Attending: INTERNAL MEDICINE

## 2021-08-10 DIAGNOSIS — C50.929: ICD-10-CM

## 2021-08-10 RX ORDER — GADOBUTROL 604.72 MG/ML
10 INJECTION INTRAVENOUS ONCE
Status: COMPLETED | OUTPATIENT
Start: 2021-08-10 | End: 2021-08-10

## 2021-08-10 RX ADMIN — GADOBUTROL 8 ML: 604.72 INJECTION INTRAVENOUS at 11:51

## 2021-08-10 NOTE — DISCHARGE INSTRUCTIONS
MRI Contrast Discharge Instructions    The IV contrast you received today will pass out of your body in your  urine. This will happen in the next 24 hours. You will not feel this process.  Your urine will not change color.    Drink at least 4 extra glasses of water or juice today (unless your doctor  has restricted your fluids). This reduces the stress on your kidneys.  You may take your regular medicines.    If you are on dialysis: It is best to have dialysis today.    If you have a reaction: Most reactions happen right away. If you have  any new symptoms after leaving the hospital (such as hives or swelling),  call your hospital at the correct number below. Or call your family doctor.  If you have breathing distress or wheezing, call 911.    Special instructions: ***    I have read and understand the above information.    Signature:______________________________________ Date:___________    Staff:__________________________________________ Date:___________     Time:__________    [facility] Departments:    ___[place]: [phone]  ___[facility]: [phone]  ___[place]: [phone] ___[facility]: [phone]  ___[facility]: [phone]  ___[facility]: [phone]  ___[phone]  ___[facility]: [phone]  ___[phone]

## 2021-08-29 NOTE — PROGRESS NOTES
Oncology Visit:  Date on this visit: 8/30/2021    Diagnosis: clinical prognostic stage IIIb, [id number], grade 2, ER positive, UT positive, HER-2 negative right breast cancer.    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  He noted discomfort and hardening of the skin of the right nipple in late April/early May, 2021.  He had a similar appearing benign skin lesion of the arm 3 months earlier and thought it likely to be the same.  However, he subsequently noted a mass in the same area.  Right breast skin punch biopsy performed by dermatology was c/w grade 2 invasive ductal carcinoma with associated DCIS.  Invasive carcinoma was ER positive 100% and UT positive 70%, with tumor invading the dermis.  HER-2 was negative by IHC (score 1+).  Diagnostic mammogram and ultrasound showed a retroareolar right breast mass measuring 2.9 cm with associated nipple retraction and enlarged, abnormal right axillary lymph nodes.  Right breast biopsy was again c/w grade 2 invasive ductal carcinoma, ER strong in 98% of tumor cell nuclei, HER-2 pending.  Ki-67 was 15%.    He elected to screen for the ISPY-2 clinical trial.  MRI breast on 6/18/2021 showed the biopsy proven right breast cancer to measure 3.9 cm in maximum dimension with invasion of the nipple and the pectoralis muscle as well as at least 4 abnormal level 1 and 2 right axillary lymph nodes and a tiny 0.4 cm right internal mammary lymph node, likely benign).  Mammoprint returned low risk with a score of +0.29.  He elected for treatment on the endocrine optimization protocol of ISPY-2 and was randomized to treatment with amcenestrant.  He has been on amcenestrant since 7/1/2021.    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  He continues on treatment with amcenestrant.  He continues to tolerate it remarkably well.  He specifically denies hot  "flashes, rash, or joint pain.  He has had no signs or symptoms of mood disorder.  He denies infectious complaints including fevers, chills, cough, shortness of breath, or chest pain.  He typically has fall allergies, these have not yet manifested.  He denies current abdominal complaints.  He states that he is exercising and eating healthier than he was prior to his breast cancer diagnosis.  The remainder of a complete 12 point review of systems is reviewed with patient was negative with exception that mentioned above.    Past Medical/Surgical History:  Past Medical History:   Diagnosis Date     Chronic sinusitis      Hypertension 2002     No past surgical history on file.    Allergies:  Allergies as of 08/30/2021     (No Known Allergies)     Current Medications:  Current Outpatient Medications   Medication Sig Dispense Refill     losartan-hydrochlorothiazide (HYZAAR) 100-25 MG tablet Take 1 tablet by mouth daily 90 tablet 1     study - amcenestrant, IDS# 3903, 200 MG tablet Take 1 tablet (200 mg) by mouth daily 28 tablet 0     study - amcenestrant, IDS# 3903, 200 MG tablet Take 1 tablet (200 mg) by mouth daily 32 tablet 0     study - amcenestrant, IDS# 3903, 200 MG tablet Take 1 tablet (200 mg) by mouth daily 28 tablet 0      Family and Social History:  See initial consultation dated 6/8/2021 for further details.    Physical Exam:  BP (!) 141/84 (BP Location: Right arm, Patient Position: Sitting, Cuff Size: Adult Regular)   Pulse 76   Temp 97.4  F (36.3  C) (Oral)   Resp 16   Ht 1.727 m (5' 8\")   Wt 80.4 kg (177 lb 4.8 oz)   SpO2 98%   BMI 26.96 kg/m    General:  Well appearing adult male in NAD.  Alert and oriented.  HEENT:  Normocephalic.  Sclera anicteric.  MMM.  No lesions of the oropharynx.  Lymph:  Right axillary node is about 2 x 1 cm, in the anterior mid-right axilla.  Chest:  CTA bilaterally.  No wheezes or crackles.  CV:  RRR.  Nl S1 and S2.  No m/r/g.  Breast:  Retroareolar right breast mass " measures 4.5 x 4 cm.  It also is flatter than on prior exam.  There is a very firm area to the tumor at 9:00.    Abd:  Soft/ND.   Ext:  No pitting edema of the bilateral lower extremities.    Musculo:  Full ROM of the bilateral upper extremities.  Neuro:  Cranial nerves grossly intact.  Psych:  Mood and affect appear normal.    Laboratory/Imaging Studies  7/20/2021 Breast MRI:    Biopsy proven retroareolar breast carcinoma with nipple retraction and invasion of the pectoralis appears similar and measures 3.9 x 3.5 x 3.6 cm.  Multiple stable right axillary level 1 and 2 lymph nodes, such as a right level 1 lymph node measuring 1.6 cm and a level 2/3 lymph node measuring 1.1 cm.  Unchanged tiny right internal mammary chain lymph nodes.    8/10/2021 Breast MRI:  Mass measures 3.7 x 3.2 x 3.5 cm.  Multiple abnormal right axillary level 1 and 2 lymph nodes.  A right lower lymph node measures 1.9 cm and a level 2/3 lymph node measures 1.1 cm.  No significant change.  Stable size of right internal mammary node measuring 4 mm.    8/30/2021 Labs:  Electrolytes, creatinine, and LFTs are wnl.  Glucose is slightly elevated at 116.  Blood counts are wnl.    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      1.  Right breast cancer:    He is enrolled on the Endocrine Optimization Protocol (EOP) of ISPY2 and has been on treatment with amcenestrant since 7/1/2021 (approximately 2 months).  He is tolerating treatment remarkably well without side effects.  On clinical exam today, there has been a decrease in size of the breast mass.  The right axillary node also feels less plump than prior.  The EOP protocol includes additional MRIs at weeks 12 and prior to surgery.  We reviewed treatment plan.  Plan is to treat with up to 6 months of neoadjuvant therapy as long as there is tumor response.  Will then  proceed with surgery.  He will need return visit with Dr. Snider by mid-November in order to schedule surgery.  Approximately 4-6 weeks after surgery, will proceed with radiation.  We reviewed last breast MRI measured right internal mammary chain lymph node at 4 mm.  This lymph node will be involved in the radiation field.  Will then plan to treat with adjuvant endocrine therapy for a total of 10 years.    2.  Pre-diabetes:  6/14/2021 hemoglobin A1C was 6.5%.  He has increased cardiovascular exercise and has reduced sugar intake in the diet. Fasting blood sugars have been in the 100-120 range.  Will discuss with surgery whether metformin should be initiated to reduce risk of poor wound healing and perioperative infection.    3.  Cancer risk management:  Breast Actionable and Breast Expanded panels were both negative for pathogenic germline mutations.    4.  Follow up:  Breast MRI on 9/20 and visit with me on 9/21 as already scheduled.  Please add on appointment with me in 8 weeks.    35 minutes spent on the date of the encounter doing chart review, review of test results, interpretation of tests, patient visit and documentation

## 2021-08-30 ENCOUNTER — APPOINTMENT (OUTPATIENT)
Dept: LAB | Facility: CLINIC | Age: 71
End: 2021-08-30
Attending: INTERNAL MEDICINE
Payer: COMMERCIAL

## 2021-08-30 ENCOUNTER — RESEARCH ENCOUNTER (OUTPATIENT)
Dept: ONCOLOGY | Facility: CLINIC | Age: 71
End: 2021-08-30

## 2021-08-30 ENCOUNTER — ONCOLOGY VISIT (OUTPATIENT)
Dept: ONCOLOGY | Facility: CLINIC | Age: 71
End: 2021-08-30
Attending: INTERNAL MEDICINE
Payer: COMMERCIAL

## 2021-08-30 VITALS
HEART RATE: 76 BPM | HEIGHT: 68 IN | OXYGEN SATURATION: 98 % | SYSTOLIC BLOOD PRESSURE: 141 MMHG | RESPIRATION RATE: 16 BRPM | TEMPERATURE: 97.4 F | DIASTOLIC BLOOD PRESSURE: 84 MMHG | WEIGHT: 177.3 LBS | BODY MASS INDEX: 26.87 KG/M2

## 2021-08-30 DIAGNOSIS — C50.929 MALIGNANT NEOPLASM OF MALE BREAST, UNSPECIFIED ESTROGEN RECEPTOR STATUS, UNSPECIFIED LATERALITY, UNSPECIFIED SITE OF BREAST (H): ICD-10-CM

## 2021-08-30 DIAGNOSIS — C50.121 MALIGNANT NEOPLASM OF CENTRAL PORTION OF RIGHT BREAST IN MALE, ESTROGEN RECEPTOR POSITIVE (H): Primary | ICD-10-CM

## 2021-08-30 DIAGNOSIS — Z17.0 MALIGNANT NEOPLASM OF CENTRAL PORTION OF RIGHT BREAST IN MALE, ESTROGEN RECEPTOR POSITIVE (H): Primary | ICD-10-CM

## 2021-08-30 LAB
ALBUMIN SERPL-MCNC: 4.3 G/DL (ref 3.4–5)
ALP SERPL-CCNC: 56 U/L (ref 40–150)
ALT SERPL W P-5'-P-CCNC: 34 U/L (ref 0–70)
ANION GAP SERPL CALCULATED.3IONS-SCNC: 6 MMOL/L (ref 3–14)
AST SERPL W P-5'-P-CCNC: 20 U/L (ref 0–45)
BASOPHILS # BLD AUTO: 0 10E3/UL (ref 0–0.2)
BASOPHILS NFR BLD AUTO: 1 %
BILIRUB SERPL-MCNC: 0.5 MG/DL (ref 0.2–1.3)
BUN SERPL-MCNC: 10 MG/DL (ref 7–30)
CALCIUM SERPL-MCNC: 9.2 MG/DL (ref 8.5–10.1)
CHLORIDE BLD-SCNC: 104 MMOL/L (ref 94–109)
CO2 SERPL-SCNC: 29 MMOL/L (ref 20–32)
CREAT SERPL-MCNC: 0.77 MG/DL (ref 0.66–1.25)
EOSINOPHIL # BLD AUTO: 0.4 10E3/UL (ref 0–0.7)
EOSINOPHIL NFR BLD AUTO: 7 %
ERYTHROCYTE [DISTWIDTH] IN BLOOD BY AUTOMATED COUNT: 12.5 % (ref 10–15)
GFR SERPL CREATININE-BSD FRML MDRD: >90 ML/MIN/1.73M2
GLUCOSE BLD-MCNC: 116 MG/DL (ref 70–99)
HCT VFR BLD AUTO: 46.1 % (ref 40–53)
HGB BLD-MCNC: 14.8 G/DL (ref 13.3–17.7)
IMM GRANULOCYTES # BLD: 0 10E3/UL
IMM GRANULOCYTES NFR BLD: 0 %
LYMPHOCYTES # BLD AUTO: 1.9 10E3/UL (ref 0.8–5.3)
LYMPHOCYTES NFR BLD AUTO: 35 %
MCH RBC QN AUTO: 26.8 PG (ref 26.5–33)
MCHC RBC AUTO-ENTMCNC: 32.1 G/DL (ref 31.5–36.5)
MCV RBC AUTO: 84 FL (ref 78–100)
MONOCYTES # BLD AUTO: 0.5 10E3/UL (ref 0–1.3)
MONOCYTES NFR BLD AUTO: 9 %
NEUTROPHILS # BLD AUTO: 2.6 10E3/UL (ref 1.6–8.3)
NEUTROPHILS NFR BLD AUTO: 48 %
NRBC # BLD AUTO: 0 10E3/UL
NRBC BLD AUTO-RTO: 0 /100
PLATELET # BLD AUTO: 251 10E3/UL (ref 150–450)
POTASSIUM BLD-SCNC: 4 MMOL/L (ref 3.4–5.3)
PROT SERPL-MCNC: 7.7 G/DL (ref 6.8–8.8)
RBC # BLD AUTO: 5.52 10E6/UL (ref 4.4–5.9)
SODIUM SERPL-SCNC: 139 MMOL/L (ref 133–144)
WBC # BLD AUTO: 5.5 10E3/UL (ref 4–11)

## 2021-08-30 PROCEDURE — 85025 COMPLETE CBC W/AUTO DIFF WBC: CPT | Performed by: INTERNAL MEDICINE

## 2021-08-30 PROCEDURE — G0463 HOSPITAL OUTPT CLINIC VISIT: HCPCS

## 2021-08-30 PROCEDURE — 99214 OFFICE O/P EST MOD 30 MIN: CPT | Performed by: INTERNAL MEDICINE

## 2021-08-30 PROCEDURE — 36415 COLL VENOUS BLD VENIPUNCTURE: CPT | Performed by: INTERNAL MEDICINE

## 2021-08-30 PROCEDURE — 82040 ASSAY OF SERUM ALBUMIN: CPT | Performed by: INTERNAL MEDICINE

## 2021-08-30 ASSESSMENT — PAIN SCALES - GENERAL: PAINLEVEL: NO PAIN (0)

## 2021-08-30 ASSESSMENT — MIFFLIN-ST. JEOR: SCORE: 1543.73

## 2021-08-30 NOTE — NURSING NOTE
[id number]: Study Visit Note   Subject name: Ronna Collins     Visit: C3 D1    Did the study visit occur within the appropriate window allowed by the protocol? yes      Since the last study visit, He has been doing well. He has no new complaints to note. No hot flashes, joint pain, or fatigue. His labs look great.     Diary was not collected, but patient said that he would bring in the diary from C2 at his next visit.      I have personally interviewed Ronna Collins and reviewed his medical record for adverse events and concomitant medications and these have been recorded on the corresponding logs in Ronna Collins's research file.     Ronna Collins was given the opportunity to ask any trial related questions.  Please see provider progress note for physical exam and other clinical information. Labs were reviewed - any significant lab values were addressed and reviewed.      [id number]: Medication Count/IDS Note      Ronna Collins is enrolled on the trial number listed above. The patient presented for his C3 D1 day visit.   IDS number: [id number]  Drug name: Amcenestrant  Number of boxes returned: 2  Lot numbers: [id number]  Number of pills returned: 3      Number of boxes dispensed:1  Lot number: [id number]  Number of pills dispensed: 32    Drug diary returned? no    Are there any discrepancies between the amount of medication the patient was instructed to take and the amount recorded as taken in the patient s drug diary?  No    Are there any discrepancies between the amount of medication the patient has recorded as taken in the drug diary and the amount that would be expected to be returned based on the amount recorded as taken?  no    Debra Roper RN, MS  Research   Phone: [phone]  Pager: [phone]      Form 504.00.01 (Version 1)     Effective date: 01JUL2018     Next Review Date: 01JUL2020        '

## 2021-08-30 NOTE — NURSING NOTE
Chief Complaint   Patient presents with     Blood Draw     Labs drawn via  by LPN in lab. VS Taken.      Labs collected from venipuncture by LPN. Vitals taken. Checked in for appointment(s).    Regina Miranda RN

## 2021-08-30 NOTE — LETTER
8/30/2021         RE: Ronna Collins  [address]  [facility] [address]        Dear Colleague,    Thank you for referring your patient, Ronna Collins, to the [facility]. Please see a copy of my visit note below.    Oncology Visit:  Date on this visit: 8/30/2021    Diagnosis: clinical prognostic stage IIIb, [id number], grade 2, ER positive, GA positive, HER-2 negative right breast cancer.    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  He noted discomfort and hardening of the skin of the right nipple in late April/early May, 2021.  He had a similar appearing benign skin lesion of the arm 3 months earlier and thought it likely to be the same.  However, he subsequently noted a mass in the same area.  Right breast skin punch biopsy performed by dermatology was c/w grade 2 invasive ductal carcinoma with associated DCIS.  Invasive carcinoma was ER positive 100% and GA positive 70%, with tumor invading the dermis.  HER-2 was negative by IHC (score 1+).  Diagnostic mammogram and ultrasound showed a retroareolar right breast mass measuring 2.9 cm with associated nipple retraction and enlarged, abnormal right axillary lymph nodes.  Right breast biopsy was again c/w grade 2 invasive ductal carcinoma, ER strong in 98% of tumor cell nuclei, HER-2 pending.  Ki-67 was 15%.    He elected to screen for the ISPY-2 clinical trial.  MRI breast on 6/18/2021 showed the biopsy proven right breast cancer to measure 3.9 cm in maximum dimension with invasion of the nipple and the pectoralis muscle as well as at least 4 abnormal level 1 and 2 right axillary lymph nodes and a tiny 0.4 cm right internal mammary lymph node, likely benign).  Mammoprint returned low risk with a score of +0.29.  He elected for treatment on the endocrine optimization protocol of ISPY-2 and was randomized to treatment with  "amcenestrant.  He has been on amcenestrant since 7/1/2021.    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  He continues on treatment with amcenestrant.  He continues to tolerate it remarkably well.  He specifically denies hot flashes, rash, or joint pain.  He has had no signs or symptoms of mood disorder.  He denies infectious complaints including fevers, chills, cough, shortness of breath, or chest pain.  He typically has fall allergies, these have not yet manifested.  He denies current abdominal complaints.  He states that he is exercising and eating healthier than he was prior to his breast cancer diagnosis.  The remainder of a complete 12 point review of systems is reviewed with patient was negative with exception that mentioned above.    Past Medical/Surgical History:  Past Medical History:   Diagnosis Date     Chronic sinusitis      Hypertension 2002     No past surgical history on file.    Allergies:  Allergies as of 08/30/2021     (No Known Allergies)     Current Medications:  Current Outpatient Medications   Medication Sig Dispense Refill     losartan-hydrochlorothiazide (HYZAAR) 100-25 MG tablet Take 1 tablet by mouth daily 90 tablet 1     study - amcenestrant, IDS# 3903, 200 MG tablet Take 1 tablet (200 mg) by mouth daily 28 tablet 0     study - amcenestrant, IDS# 3903, 200 MG tablet Take 1 tablet (200 mg) by mouth daily 32 tablet 0     study - amcenestrant, IDS# [id number], 200 MG tablet Take 1 tablet (200 mg) by mouth daily 28 tablet 0      Family and Social History:  See initial consultation dated 6/8/2021 for further details.    Physical Exam:  BP (!) 141/84 (BP Location: Right arm, Patient Position: Sitting, Cuff Size: Adult Regular)   Pulse 76   Temp 97.4  F (36.3  C) (Oral)   Resp 16   Ht 1.727 m (5' 8\")   Wt 80.4 kg (177 lb 4.8 oz)   SpO2 98%   BMI 26.96 kg/m    General:  Well appearing adult male in NAD.  Alert and oriented.  HEENT:  Normocephalic.  " Sclera anicteric.  MMM.  No lesions of the oropharynx.  Lymph:  Right axillary node is about 2 x 1 cm, in the anterior mid-right axilla.  Chest:  CTA bilaterally.  No wheezes or crackles.  CV:  RRR.  Nl S1 and S2.  No m/r/g.  Breast:  Retroareolar right breast mass measures 4.5 x 4 cm.  It also is flatter than on prior exam.  There is a very firm area to the tumor at 9:00.    Abd:  Soft/ND.   Ext:  No pitting edema of the bilateral lower extremities.    Musculo:  Full ROM of the bilateral upper extremities.  Neuro:  Cranial nerves grossly intact.  Psych:  Mood and affect appear normal.    Laboratory/Imaging Studies  7/20/2021 Breast MRI:    Biopsy proven retroareolar breast carcinoma with nipple retraction and invasion of the pectoralis appears similar and measures 3.9 x 3.5 x 3.6 cm.  Multiple stable right axillary level 1 and 2 lymph nodes, such as a right level 1 lymph node measuring 1.6 cm and a level 2/3 lymph node measuring 1.1 cm.  Unchanged tiny right internal mammary chain lymph nodes.    8/10/2021 Breast MRI:  Mass measures 3.7 x 3.2 x 3.5 cm.  Multiple abnormal right axillary level 1 and 2 lymph nodes.  A right lower lymph node measures 1.9 cm and a level 2/3 lymph node measures 1.1 cm.  No significant change.  Stable size of right internal mammary node measuring 4 mm.    8/30/2021 Labs:  Electrolytes, creatinine, and LFTs are wnl.  Glucose is slightly elevated at 116.  Blood counts are wnl.    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      1.  Right breast cancer:    He is enrolled on the Endocrine Optimization Protocol (EOP) of ISPY2 and has been on treatment with amcenestrant since 7/1/2021 (approximately 2 months).  He is tolerating treatment remarkably well without side effects.  On clinical exam today, there has been a decrease in size of the breast mass.  The right  axillary node also feels less plump than prior.  The EO protocol includes additional MRIs at weeks 12 and prior to surgery.  We reviewed treatment plan.  Plan is to treat with up to 6 months of neoadjuvant therapy as long as there is tumor response.  Will then proceed with surgery.  He will need return visit with Dr. Snider by mid-November in order to schedule surgery.  Approximately 4-6 weeks after surgery, will proceed with radiation.  We reviewed last breast MRI measured right internal mammary chain lymph node at 4 mm.  This lymph node will be involved in the radiation field.  Will then plan to treat with adjuvant endocrine therapy for a total of 10 years.    2.  Pre-diabetes:  6/14/2021 hemoglobin A1C was 6.5%.  He has increased cardiovascular exercise and has reduced sugar intake in the diet. Fasting blood sugars have been in the 100-120 range.  Will discuss with surgery whether metformin should be initiated to reduce risk of poor wound healing and perioperative infection.    3.  Cancer risk management:  Breast Actionable and Breast Expanded panels were both negative for pathogenic germline mutations.    4.  Follow up:  Breast MRI on 9/20 and visit with me on 9/21 as already scheduled.  Please add on appointment with me in 8 weeks.    35 minutes spent on the date of the encounter doing chart review, review of test results, interpretation of tests, patient visit and documentation.      Mikaela Sr MD

## 2021-08-30 NOTE — NURSING NOTE
"Oncology Rooming Note    August 30, 2021 7:59 AM   Ronna Collins is a 71 year old male who presents for:    Chief Complaint   Patient presents with     Blood Draw     Labs drawn via  by LPN in lab. VS Taken.      Oncology Clinic Visit     Mikaela Sr MD     Initial Vitals: BP (!) 141/84 (BP Location: Right arm, Patient Position: Sitting, Cuff Size: Adult Regular)   Pulse 76   Temp 97.4  F (36.3  C) (Oral)   Resp 16   Ht 1.727 m (5' 8\")   Wt 80.4 kg (177 lb 4.8 oz)   SpO2 98%   BMI 26.96 kg/m   Estimated body mass index is 26.96 kg/m  as calculated from the following:    Height as of this encounter: 1.727 m (5' 8\").    Weight as of this encounter: 80.4 kg (177 lb 4.8 oz). Body surface area is 1.96 meters squared.  No Pain (0) Comment: Data Unavailable   No LMP for male patient.  Allergies reviewed: Yes  Medications reviewed: Yes    Medications: Medication refills not needed today.  Pharmacy name entered into [facility]:    [id number] - [place], [place] - [address] [place] [address] N AT [id number] - [place], [place] - [address] [facility] AT [facility]/ [facility] - [place], [place] - [address] [facility]    Clinical concerns: No new concerns        Mike Guerrero MA            "

## 2021-09-20 ENCOUNTER — ANCILLARY PROCEDURE (OUTPATIENT)
Dept: MRI IMAGING | Facility: CLINIC | Age: 71
End: 2021-09-20
Attending: INTERNAL MEDICINE

## 2021-09-20 DIAGNOSIS — C50.929: ICD-10-CM

## 2021-09-20 RX ORDER — GADOBUTROL 604.72 MG/ML
0.1 INJECTION INTRAVENOUS ONCE
Status: COMPLETED | OUTPATIENT
Start: 2021-09-20 | End: 2021-09-20

## 2021-09-20 RX ADMIN — GADOBUTROL 8.04 ML: 604.72 INJECTION INTRAVENOUS at 09:21

## 2021-09-20 NOTE — PROGRESS NOTES
Oncology Visit:  Date on this visit: 9/21/2021    Diagnosis: clinical prognostic stage IIIb, [id number], grade 2, ER positive, [place] positive, HER-2 negative right breast cancer.    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  He noted discomfort and hardening of the skin of the right nipple in late April/early May, 2021.  He had a similar appearing benign skin lesion of the arm 3 months earlier and thought it likely to be the same.  However, he subsequently noted a mass in the same area.  Right breast skin punch biopsy performed by dermatology was c/w grade 2 invasive ductal carcinoma with associated DCIS.  Invasive carcinoma was ER positive 100% and [place] positive 70%, with tumor invading the dermis.  HER-2 was negative by IHC (score 1+).  Diagnostic mammogram and ultrasound showed a retroareolar right breast mass measuring 2.9 cm with associated nipple retraction and enlarged, abnormal right axillary lymph nodes.  Right breast biopsy was again c/w grade 2 invasive ductal carcinoma, ER strong in 98% of tumor cell nuclei, HER-2 pending.  Ki-67 was 15%.    He elected to screen for the ISPY-2 clinical trial.  MRI breast on 6/18/2021 showed the biopsy proven right breast cancer to measure 3.9 cm in maximum dimension with invasion of the nipple and the pectoralis muscle as well as at least 4 abnormal level 1 and 2 right axillary lymph nodes and a tiny 0.4 cm right internal mammary lymph node, likely benign).  Mammoprint returned low risk with a score of +0.29.  He elected for treatment on the endocrine optimization protocol of ISPY-2 and was randomized to treatment with amcenestrant.  He has been on amcenestrant since 7/1/2021.    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  He has been on treatment with amcenestrant since July 1.  He continues to tolerate the  "medication remarkably well without side effects.  He specifically denies joint pains or stiffness.  He has no hot flashes.  He has not noted any mood disorder.  He states, in fact, he has felt better than he ever has as he is eating healthier and exercising more than prior to his breast cancer diagnosis.  He has been watching carbohydrate intake and wonders when he should have another hemoglobin A1c checked.  He has many questions today about timing of continuing endocrine therapy and future treatment plan.  The remainder of a complete 12 point review of systems is reviewed with patient was negative with exception that mentioned above.    Past Medical/Surgical History:  Past Medical History:   Diagnosis Date     Chronic sinusitis      Hypertension 2002     No past surgical history on file.    Allergies:  Allergies as of 09/21/2021     (No Known Allergies)     Current Medications:  Current Outpatient Medications   Medication Sig Dispense Refill     losartan-hydrochlorothiazide (HYZAAR) 100-25 MG tablet Take 1 tablet by mouth daily 90 tablet 1     study - amcenestrant, IDS# 3903, 200 MG tablet Take 1 tablet (200 mg) by mouth daily 32 tablet 0      Family and Social History:  See initial consultation dated 6/8/2021 for further details.  Breast Actionable and Breast Expanded panels were both negative for pathogenic germline mutations.    Physical Exam:  /77   Pulse 81   Temp (!) 96.7  F (35.9  C) (Tympanic)   Resp 14   Ht 1.727 m (5' 7.99\")   Wt 81.3 kg (179 lb 4.8 oz)   SpO2 98%   BMI 27.27 kg/m    General:  Well appearing adult male in NAD.  Alert and oriented.  HEENT:  Normocephalic.  Sclera anicteric.  MMM.  No lesions of the oropharynx.  Lymph:  Right axillary node is mobile and about 2 cm, in the anterior mid-right axilla.  Chest:  CTA bilaterally.  No wheezes or crackles.  CV:  RRR.  Nl S1 and S2.  No m/r/g.  Breast:  Retroareolar right breast mass measures 4 x 3.5 cm.  It also is flatter than on " prior exam.  There is a small firm area to the tumor at 9:00.    Abd:  Soft/ND.   Ext:  No pitting edema of the bilateral lower extremities.    Musculo:  Full ROM of the bilateral upper extremities.  Neuro:  Cranial nerves grossly intact.  Psych:  Mood and affect appear normal.    Laboratory/Imaging Studies  7/20/2021 Breast MRI:    Biopsy proven retroareolar breast carcinoma with nipple retraction and invasion of the pectoralis appears similar and measures 3.9 x 3.5 x 3.6 cm.  Multiple stable right axillary level 1 and 2 lymph nodes, such as a right level 1 lymph node measuring 1.6 cm and a level 2/3 lymph node measuring 1.1 cm.  Unchanged tiny right internal mammary chain lymph nodes.    8/10/2021 Breast MRI:  Mass measures 3.7 x 3.2 x 3.5 cm.  Multiple abnormal right axillary level 1 and 2 lymph nodes.  A right lower lymph node measures 1.9 cm and a level 2/3 lymph node measures 1.1 cm.  No significant change.  Stable size of right internal mammary node measuring 4 mm.    9/20/2021 Breast MRI:  Persistent invasion of the nipple with associated retraction and invasion of the underlying pectoralis muscle, similar to prior.  The mass measures 3.5 x 3.4 x 3.2 cm.  No significant change in right axillary lymphadenopathy.  There are two stable right internal mammary chain nodes since the first MR.    9/21/2021 Labs:  Electrolytes, creatinine, and LFTs are wnl.  Blood counts are wnl.    ASSESSMENT/PLAN:  Jamie Collins is a 72 yo male with clinical stage IIIb, [id number], grade 2, ER positive, [place] positive, HER-2 negative invasive ductal carcinoma of the right breast.      1.  Right breast cancer:    He is enrolled on the Endocrine Optimization Protocol (EOP) of ISPY2 and has been on treatment with amcenestrant since 7/1/2021 (approximately 12 weeks).  He is tolerating treatment remarkably well without side effects.  We reviewed the images of the 12 week breast MRI which again shows a small decrease in size of the right breast  tumor and stable lymphadenopathy.  With each breast MRI, he has had a progressive small decrease in size of right breast mass.  This is a  typical, expected response to endocrine therapy.  Chance of obtaining a pCR with neoadjuvant endocrine therapy is low.  Many trials are now showing 6-12 months of neoadjuvant therapy is likely needed to illicit maximal response.  We discussed we need a biomarker other than pCR to correlate with improved outcomes.  Some studies suggest a drop in Ki-67 after starting endocrine therapy is such a biomarker.  He had pretreatment and 3 week biopsies.  I have inquired with Ana Carmen MD the PI of the study as to whether we can have access to the Ki-67 data from these trials.      Given progressive decrease in size of right breast mass, I recommend continuing treatment with amcenestrant for now.  I have discussed Dr. Collins's case and most recent MRI results with Dr. Field, the site PI of the ISPY2 trial, who agrees.  I will present Dr. Collins's case at the [place] breast conference this Friday to discuss with the entire [place] breast group.    We reviewed plan is to treat with up to 6 months of neoadjuvant therapy as long as there is tumor response.  Will then proceed with surgery.  He will have a final breast MRI prior to surgery.  Approximately 4-6 weeks after surgery, will proceed with radiation.  Will then plan to treat with adjuvant endocrine therapy for a total of 10 years.    2.  Pre-diabetes:  6/14/2021 hemoglobin A1C was 6.5%.  He has increased cardiovascular exercise and has reduced sugar intake in the diet.     3.  Follow up:  Return visit with Dr. Snider in mid-November.  Labs and visit with me in 4 and 8 weeks.    45 minutes spent on the date of the encounter doing chart review, review of test results, interpretation of tests, patient visit, documentation, discussion with other provider(s) and discussion with family.    Mikaela Sr MD

## 2021-09-21 ENCOUNTER — APPOINTMENT (OUTPATIENT)
Dept: LAB | Facility: CLINIC | Age: 71
End: 2021-09-21
Attending: INTERNAL MEDICINE
Payer: COMMERCIAL

## 2021-09-21 ENCOUNTER — ONCOLOGY VISIT (OUTPATIENT)
Dept: ONCOLOGY | Facility: CLINIC | Age: 71
End: 2021-09-21
Attending: INTERNAL MEDICINE
Payer: COMMERCIAL

## 2021-09-21 ENCOUNTER — RESEARCH ENCOUNTER (OUTPATIENT)
Dept: ONCOLOGY | Facility: CLINIC | Age: 71
End: 2021-09-21

## 2021-09-21 VITALS
RESPIRATION RATE: 14 BRPM | SYSTOLIC BLOOD PRESSURE: 119 MMHG | TEMPERATURE: 96.7 F | HEART RATE: 81 BPM | OXYGEN SATURATION: 98 % | DIASTOLIC BLOOD PRESSURE: 77 MMHG | WEIGHT: 179.3 LBS | HEIGHT: 68 IN | BODY MASS INDEX: 27.17 KG/M2

## 2021-09-21 DIAGNOSIS — Z17.0 MALIGNANT NEOPLASM OF CENTRAL PORTION OF RIGHT BREAST IN MALE, ESTROGEN RECEPTOR POSITIVE (H): Primary | ICD-10-CM

## 2021-09-21 DIAGNOSIS — C50.121 MALIGNANT NEOPLASM OF CENTRAL PORTION OF RIGHT BREAST IN MALE, ESTROGEN RECEPTOR POSITIVE (H): Primary | ICD-10-CM

## 2021-09-21 DIAGNOSIS — C50.929 MALIGNANT NEOPLASM OF MALE BREAST, UNSPECIFIED ESTROGEN RECEPTOR STATUS, UNSPECIFIED LATERALITY, UNSPECIFIED SITE OF BREAST (H): ICD-10-CM

## 2021-09-21 LAB
ALBUMIN SERPL-MCNC: 3.9 G/DL (ref 3.4–5)
ALP SERPL-CCNC: 60 U/L (ref 40–150)
ALT SERPL W P-5'-P-CCNC: 31 U/L (ref 0–70)
ANION GAP SERPL CALCULATED.3IONS-SCNC: 6 MMOL/L (ref 3–14)
AST SERPL W P-5'-P-CCNC: 20 U/L (ref 0–45)
BASOPHILS # BLD AUTO: 0.1 10E3/UL (ref 0–0.2)
BASOPHILS NFR BLD AUTO: 1 %
BILIRUB SERPL-MCNC: 0.4 MG/DL (ref 0.2–1.3)
BUN SERPL-MCNC: 7 MG/DL (ref 7–30)
CALCIUM SERPL-MCNC: 8.8 MG/DL (ref 8.5–10.1)
CHLORIDE BLD-SCNC: 105 MMOL/L (ref 94–109)
CO2 SERPL-SCNC: 27 MMOL/L (ref 20–32)
CREAT SERPL-MCNC: 0.8 MG/DL (ref 0.66–1.25)
EOSINOPHIL # BLD AUTO: 0.3 10E3/UL (ref 0–0.7)
EOSINOPHIL NFR BLD AUTO: 5 %
ERYTHROCYTE [DISTWIDTH] IN BLOOD BY AUTOMATED COUNT: 12.7 % (ref 10–15)
GFR SERPL CREATININE-BSD FRML MDRD: >90 ML/MIN/1.73M2
GLUCOSE BLD-MCNC: 98 MG/DL (ref 70–99)
HCT VFR BLD AUTO: 48.3 % (ref 40–53)
HGB BLD-MCNC: 15.6 G/DL (ref 13.3–17.7)
IMM GRANULOCYTES # BLD: 0 10E3/UL
IMM GRANULOCYTES NFR BLD: 0 %
LYMPHOCYTES # BLD AUTO: 1.9 10E3/UL (ref 0.8–5.3)
LYMPHOCYTES NFR BLD AUTO: 33 %
MCH RBC QN AUTO: 27.1 PG (ref 26.5–33)
MCHC RBC AUTO-ENTMCNC: 32.3 G/DL (ref 31.5–36.5)
MCV RBC AUTO: 84 FL (ref 78–100)
MONOCYTES # BLD AUTO: 0.4 10E3/UL (ref 0–1.3)
MONOCYTES NFR BLD AUTO: 7 %
NEUTROPHILS # BLD AUTO: 3.2 10E3/UL (ref 1.6–8.3)
NEUTROPHILS NFR BLD AUTO: 54 %
NRBC # BLD AUTO: 0 10E3/UL
NRBC BLD AUTO-RTO: 0 /100
PLATELET # BLD AUTO: 217 10E3/UL (ref 150–450)
POTASSIUM BLD-SCNC: 3.7 MMOL/L (ref 3.4–5.3)
PROT SERPL-MCNC: 7.6 G/DL (ref 6.8–8.8)
RBC # BLD AUTO: 5.75 10E6/UL (ref 4.4–5.9)
SODIUM SERPL-SCNC: 138 MMOL/L (ref 133–144)
WBC # BLD AUTO: 5.9 10E3/UL (ref 4–11)

## 2021-09-21 PROCEDURE — 36415 COLL VENOUS BLD VENIPUNCTURE: CPT | Performed by: INTERNAL MEDICINE

## 2021-09-21 PROCEDURE — 85025 COMPLETE CBC W/AUTO DIFF WBC: CPT | Performed by: INTERNAL MEDICINE

## 2021-09-21 PROCEDURE — G0463 HOSPITAL OUTPT CLINIC VISIT: HCPCS

## 2021-09-21 PROCEDURE — 82040 ASSAY OF SERUM ALBUMIN: CPT | Performed by: INTERNAL MEDICINE

## 2021-09-21 PROCEDURE — 99215 OFFICE O/P EST HI 40 MIN: CPT | Performed by: INTERNAL MEDICINE

## 2021-09-21 ASSESSMENT — PAIN SCALES - GENERAL: PAINLEVEL: NO PAIN (0)

## 2021-09-21 ASSESSMENT — MIFFLIN-ST. JEOR: SCORE: 1552.67

## 2021-09-21 NOTE — NURSING NOTE
"Oncology Rooming Note    September 21, 2021 1:05 PM   Ronna Collins is a 71 year old male who presents for:    Chief Complaint   Patient presents with     Blood Draw     Labs drawn via  by RN in lab. VS taken.      Oncology Clinic Visit     [facility] RETURN - BREAST CANCER     Initial Vitals: /77   Pulse 81   Temp (!) 96.7  F (35.9  C) (Tympanic)   Resp 14   Ht 1.727 m (5' 7.99\")   Wt 81.3 kg (179 lb 4.8 oz)   SpO2 98%   BMI 27.27 kg/m   Estimated body mass index is 27.27 kg/m  as calculated from the following:    Height as of this encounter: 1.727 m (5' 7.99\").    Weight as of this encounter: 81.3 kg (179 lb 4.8 oz). Body surface area is 1.97 meters squared.  No Pain (0) Comment: Data Unavailable   No LMP for male patient.  Allergies reviewed: Yes  Medications reviewed: Yes    Medications: Medication refills not needed today.  Pharmacy name entered into [facility]:    [id number] - [place], [place] - [address] [place] [address] [address] AT [id number] - [place], [place] - [address] [facility] AT [facility]/ [place] [facility] - [place], [place] - [address] [facility]    Clinical concerns: No new concerns. Remberto was notified.      Guillermo Espinoza LPN            "

## 2021-09-21 NOTE — LETTER
9/21/2021         RE: Ronna Collins  [address]  [facility] [address]        Dear Colleague,    Thank you for referring your patient, Ronna Collins, to the [facility]. Please see a copy of my visit note below.    Oncology Visit:  Date on this visit: 9/21/2021    Diagnosis: clinical prognostic stage IIIb, [id number], grade 2, ER positive, KS positive, HER-2 negative right breast cancer.    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  He noted discomfort and hardening of the skin of the right nipple in late April/early May, 2021.  He had a similar appearing benign skin lesion of the arm 3 months earlier and thought it likely to be the same.  However, he subsequently noted a mass in the same area.  Right breast skin punch biopsy performed by dermatology was c/w grade 2 invasive ductal carcinoma with associated DCIS.  Invasive carcinoma was ER positive 100% and KS positive 70%, with tumor invading the dermis.  HER-2 was negative by IHC (score 1+).  Diagnostic mammogram and ultrasound showed a retroareolar right breast mass measuring 2.9 cm with associated nipple retraction and enlarged, abnormal right axillary lymph nodes.  Right breast biopsy was again c/w grade 2 invasive ductal carcinoma, ER strong in 98% of tumor cell nuclei, HER-2 pending.  Ki-67 was 15%.    He elected to screen for the ISPY-2 clinical trial.  MRI breast on 6/18/2021 showed the biopsy proven right breast cancer to measure 3.9 cm in maximum dimension with invasion of the nipple and the pectoralis muscle as well as at least 4 abnormal level 1 and 2 right axillary lymph nodes and a tiny 0.4 cm right internal mammary lymph node, likely benign).  Mammoprint returned low risk with a score of +0.29.  He elected for treatment on the endocrine optimization protocol of ISPY-2 and was randomized to treatment with  "amcenestrant.  He has been on amcenestrant since 7/1/2021.    Interval History:   Karina was seen for routine follow-up on the endocrine optimization protocol of the ISPY-2 clinical trial.  He has been on treatment with amcenestrant since July 1.  He continues to tolerate the medication remarkably well without side effects.  He specifically denies joint pains or stiffness.  He has no hot flashes.  He has not noted any mood disorder.  He states, in fact, he has felt better than he ever has as he is eating healthier and exercising more than prior to his breast cancer diagnosis.  He has been watching carbohydrate intake and wonders when he should have another hemoglobin A1c checked.  He has many questions today about timing of continuing endocrine therapy and future treatment plan.  The remainder of a complete 12 point review of systems is reviewed with patient was negative with exception that mentioned above.    Past Medical/Surgical History:  Past Medical History:   Diagnosis Date     Chronic sinusitis      Hypertension 2002     No past surgical history on file.    Allergies:  Allergies as of 09/21/2021     (No Known Allergies)     Current Medications:  Current Outpatient Medications   Medication Sig Dispense Refill     losartan-hydrochlorothiazide (HYZAAR) 100-25 MG tablet Take 1 tablet by mouth daily 90 tablet 1     study - amcenestrant, IDS# 3903, 200 MG tablet Take 1 tablet (200 mg) by mouth daily 32 tablet 0      Family and Social History:  See initial consultation dated 6/8/2021 for further details.  Breast Actionable and Breast Expanded panels were both negative for pathogenic germline mutations.    Physical Exam:  /77   Pulse 81   Temp (!) 96.7  F (35.9  C) (Tympanic)   Resp 14   Ht 1.727 m (5' 7.99\")   Wt 81.3 kg (179 lb 4.8 oz)   SpO2 98%   BMI 27.27 kg/m    General:  Well appearing adult male in NAD.  Alert and oriented.  HEENT:  Normocephalic.  Sclera anicteric.  MMM.  No lesions of the " oropharynx.  Lymph:  Right axillary node is mobile and about 2 cm, in the anterior mid-right axilla.  Chest:  CTA bilaterally.  No wheezes or crackles.  CV:  RRR.  Nl S1 and S2.  No m/r/g.  Breast:  Retroareolar right breast mass measures 4 x 3.5 cm.  It also is flatter than on prior exam.  There is a small firm area to the tumor at 9:00.    Abd:  Soft/ND.   Ext:  No pitting edema of the bilateral lower extremities.    Musculo:  Full ROM of the bilateral upper extremities.  Neuro:  Cranial nerves grossly intact.  Psych:  Mood and affect appear normal.    Laboratory/Imaging Studies  7/20/2021 Breast MRI:    Biopsy proven retroareolar breast carcinoma with nipple retraction and invasion of the pectoralis appears similar and measures 3.9 x 3.5 x 3.6 cm.  Multiple stable right axillary level 1 and 2 lymph nodes, such as a right level 1 lymph node measuring 1.6 cm and a level 2/3 lymph node measuring 1.1 cm.  Unchanged tiny right internal mammary chain lymph nodes.    8/10/2021 Breast MRI:  Mass measures 3.7 x 3.2 x 3.5 cm.  Multiple abnormal right axillary level 1 and 2 lymph nodes.  A right lower lymph node measures 1.9 cm and a level 2/3 lymph node measures 1.1 cm.  No significant change.  Stable size of right internal mammary node measuring 4 mm.    9/20/2021 Breast MRI:  Persistent invasion of the nipple with associated retraction and invasion of the underlying pectoralis muscle, similar to prior.  The mass measures 3.5 x 3.4 x 3.2 cm.  No significant change in right axillary lymphadenopathy.  There are two stable right internal mammary chain nodes since the first MR.    9/21/2021 Labs:  Electrolytes, creatinine, and LFTs are wnl.  Blood counts are wnl.    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      1.  Right breast cancer:    He is enrolled on the Endocrine Optimization Protocol  (EOP) of ISPY2 and has been on treatment with amcenestrant since 7/1/2021 (approximately 12 weeks).  He is tolerating treatment remarkably well without side effects.  We reviewed the images of the 12 week breast MRI which again shows a small decrease in size of the right breast tumor and stable lymphadenopathy.  With each breast MRI, he has had a progressive small decrease in size of right breast mass.  This is a  typical, expected response to endocrine therapy.  Chance of obtaining a pCR with neoadjuvant endocrine therapy is low.  Many trials are now showing 6-12 months of neoadjuvant therapy is likely needed to illicit maximal response.  We discussed we need a biomarker other than pCR to correlate with improved outcomes.  Some studies suggest a drop in Ki-67 after starting endocrine therapy is such a biomarker.  He had pretreatment and 3 week biopsies.  I have inquired with Ana Carmen MD the PI of the study as to whether we can have access to the Ki-67 data from these trials.      Given progressive decrease in size of right breast mass, I recommend continuing treatment with amcenestrant for now.  I have discussed Dr. Collins's case and most recent MRI results with Dr. Field, the site PI of the ISPY2 trial, who agrees.  I will present Dr. Collins's case at the [place] breast conference this Friday to discuss with the entire [place] breast group.    We reviewed plan is to treat with up to 6 months of neoadjuvant therapy as long as there is tumor response.  Will then proceed with surgery.  He will have a final breast MRI prior to surgery.  Approximately 4-6 weeks after surgery, will proceed with radiation.  Will then plan to treat with adjuvant endocrine therapy for a total of 10 years.    2.  Pre-diabetes:  6/14/2021 hemoglobin A1C was 6.5%.  He has increased cardiovascular exercise and has reduced sugar intake in the diet.     3.  Follow up:  Return visit with Dr. Snider in mid-November.  Labs and visit with me  in 4 and 8 weeks.    45 minutes spent on the date of the encounter doing chart review, review of test results, interpretation of tests, patient visit, documentation, discussion with other provider(s) and discussion with family.    Mikaela Sr MD           Again, thank you for allowing me to participate in the care of your patient.        Sincerely,        Mikaela Sr MD

## 2021-09-21 NOTE — NURSING NOTE
Chief Complaint   Patient presents with     Blood Draw     Labs drawn via  by RN in lab. VS taken.      Ana Morgan RN

## 2021-09-21 NOTE — NURSING NOTE
[id number]: Study Visit Note   Subject name: Ronna Collins     Visit: C4 D1    Did the study visit occur within the appropriate window allowed by the protocol? yes    Since the last study visit, He has been doing well. He notes that he has been tolerating the Amcenestrant well and has not had any side effects thus far aside from the occasional hot flashes.     I have personally interviewed Ronna Collins and reviewed his medical record for adverse events and concomitant medications and these have been recorded on the corresponding logs in Ronna Collins's research file.     Ronna Collins was given the opportunity to ask any trial related questions.  Please see provider progress note for physical exam and other clinical information. Labs were reviewed - any significant lab values were addressed and reviewed.      [id number]: Medication Count/IDS Note      Ronna Collins is enrolled on the trial number listed above. The patient presented for his C4 D1 day visit.   IDS number: [id number]  Drug name: Amcenestrant  Number of boxes returned: 0  Lot number:  Number of pills returned: patient forgot box, will return next month      Number of boxes dispensed:1  Lot number:[id number]  Number of pills dispensed: 32    Drug diary returned? yes    Are there any discrepancies between the amount of medication the patient was instructed to take and the amount recorded as taken in the patient s drug diary?  No    Are there any discrepancies between the amount of medication the patient has recorded as taken in the drug diary and the amount that would be expected to be returned based on the amount recorded as taken?  no    Debra Roper RN, MS  Research   Phone: [phone]  Pager: [phone]      Form 504.00.01 (Version 1)     Effective date: 01JUL2018     Next Review Date: 01JUL2020

## 2021-09-22 ENCOUNTER — MYC MEDICAL ADVICE (OUTPATIENT)
Dept: ONCOLOGY | Facility: CLINIC | Age: 71
End: 2021-09-22

## 2021-09-23 NOTE — TELEPHONE ENCOUNTER
[facility] Triage    MyChart Messages:    Dr. Sr requested pictures for a presentation.    Routing messages to Dr. Sr and April Farris

## 2021-10-01 DIAGNOSIS — C50.121 MALIGNANT NEOPLASM OF CENTRAL PORTION OF RIGHT BREAST IN MALE, ESTROGEN RECEPTOR POSITIVE (H): Primary | ICD-10-CM

## 2021-10-01 DIAGNOSIS — Z17.0 MALIGNANT NEOPLASM OF CENTRAL PORTION OF RIGHT BREAST IN MALE, ESTROGEN RECEPTOR POSITIVE (H): Primary | ICD-10-CM

## 2021-10-01 RX ORDER — CEFAZOLIN SODIUM 2 G/50ML
2 SOLUTION INTRAVENOUS SEE ADMIN INSTRUCTIONS
Status: CANCELLED | OUTPATIENT
Start: 2021-10-01

## 2021-10-01 RX ORDER — CEFAZOLIN SODIUM 2 G/50ML
2 SOLUTION INTRAVENOUS
Status: CANCELLED | OUTPATIENT
Start: 2021-10-01

## 2021-10-01 RX ORDER — ACETAMINOPHEN 325 MG/1
975 TABLET ORAL ONCE
Status: CANCELLED | OUTPATIENT
Start: 2021-10-01 | End: 2021-10-01

## 2021-10-18 ENCOUNTER — LAB (OUTPATIENT)
Dept: LAB | Facility: CLINIC | Age: 71
End: 2021-10-18
Attending: INTERNAL MEDICINE
Payer: COMMERCIAL

## 2021-10-18 ENCOUNTER — ANCILLARY PROCEDURE (OUTPATIENT)
Dept: MAMMOGRAPHY | Facility: CLINIC | Age: 71
End: 2021-10-18
Attending: INTERNAL MEDICINE
Payer: COMMERCIAL

## 2021-10-18 ENCOUNTER — ONCOLOGY VISIT (OUTPATIENT)
Dept: ONCOLOGY | Facility: CLINIC | Age: 71
End: 2021-10-18
Attending: INTERNAL MEDICINE
Payer: COMMERCIAL

## 2021-10-18 ENCOUNTER — RESEARCH ENCOUNTER (OUTPATIENT)
Dept: ONCOLOGY | Facility: CLINIC | Age: 71
End: 2021-10-18

## 2021-10-18 VITALS
WEIGHT: 177.4 LBS | BODY MASS INDEX: 26.98 KG/M2 | DIASTOLIC BLOOD PRESSURE: 91 MMHG | SYSTOLIC BLOOD PRESSURE: 169 MMHG | OXYGEN SATURATION: 99 % | HEART RATE: 75 BPM | TEMPERATURE: 97.9 F

## 2021-10-18 DIAGNOSIS — Z51.11 ENCOUNTER FOR ANTINEOPLASTIC CHEMOTHERAPY: ICD-10-CM

## 2021-10-18 DIAGNOSIS — C77.3 BREAST CANCER METASTASIZED TO AXILLARY LYMPH NODE, RIGHT (H): ICD-10-CM

## 2021-10-18 DIAGNOSIS — R73.03 PREDIABETES: ICD-10-CM

## 2021-10-18 DIAGNOSIS — Z17.0 MALIGNANT NEOPLASM OF CENTRAL PORTION OF RIGHT BREAST IN MALE, ESTROGEN RECEPTOR POSITIVE (H): Primary | ICD-10-CM

## 2021-10-18 DIAGNOSIS — C50.929 MALIGNANT NEOPLASM OF MALE BREAST, UNSPECIFIED ESTROGEN RECEPTOR STATUS, UNSPECIFIED LATERALITY, UNSPECIFIED SITE OF BREAST (H): ICD-10-CM

## 2021-10-18 DIAGNOSIS — C50.911 BREAST CANCER METASTASIZED TO AXILLARY LYMPH NODE, RIGHT (H): ICD-10-CM

## 2021-10-18 DIAGNOSIS — Z17.0 MALIGNANT NEOPLASM OF CENTRAL PORTION OF RIGHT BREAST IN MALE, ESTROGEN RECEPTOR POSITIVE (H): ICD-10-CM

## 2021-10-18 DIAGNOSIS — C50.121 MALIGNANT NEOPLASM OF CENTRAL PORTION OF RIGHT BREAST IN MALE, ESTROGEN RECEPTOR POSITIVE (H): Primary | ICD-10-CM

## 2021-10-18 DIAGNOSIS — C50.121 MALIGNANT NEOPLASM OF CENTRAL PORTION OF RIGHT BREAST IN MALE, ESTROGEN RECEPTOR POSITIVE (H): ICD-10-CM

## 2021-10-18 LAB
ALBUMIN SERPL-MCNC: 4.2 G/DL (ref 3.4–5)
ALP SERPL-CCNC: 63 U/L (ref 40–150)
ALT SERPL W P-5'-P-CCNC: 30 U/L (ref 0–70)
ANION GAP SERPL CALCULATED.3IONS-SCNC: 7 MMOL/L (ref 3–14)
AST SERPL W P-5'-P-CCNC: 23 U/L (ref 0–45)
BASOPHILS # BLD AUTO: 0 10E3/UL (ref 0–0.2)
BASOPHILS NFR BLD AUTO: 1 %
BILIRUB SERPL-MCNC: 0.4 MG/DL (ref 0.2–1.3)
BUN SERPL-MCNC: 8 MG/DL (ref 7–30)
CALCIUM SERPL-MCNC: 9.3 MG/DL (ref 8.5–10.1)
CHLORIDE BLD-SCNC: 105 MMOL/L (ref 94–109)
CO2 SERPL-SCNC: 26 MMOL/L (ref 20–32)
CREAT SERPL-MCNC: 0.81 MG/DL (ref 0.66–1.25)
EOSINOPHIL # BLD AUTO: 0.3 10E3/UL (ref 0–0.7)
EOSINOPHIL NFR BLD AUTO: 5 %
ERYTHROCYTE [DISTWIDTH] IN BLOOD BY AUTOMATED COUNT: 12.8 % (ref 10–15)
GFR SERPL CREATININE-BSD FRML MDRD: >90 ML/MIN/1.73M2
GLUCOSE BLD-MCNC: 96 MG/DL (ref 70–99)
HBA1C MFR BLD: 6.2 % (ref 0–5.6)
HCT VFR BLD AUTO: 47.9 % (ref 40–53)
HGB BLD-MCNC: 15.4 G/DL (ref 13.3–17.7)
IMM GRANULOCYTES # BLD: 0 10E3/UL
IMM GRANULOCYTES NFR BLD: 0 %
LYMPHOCYTES # BLD AUTO: 2 10E3/UL (ref 0.8–5.3)
LYMPHOCYTES NFR BLD AUTO: 33 %
MCH RBC QN AUTO: 26.5 PG (ref 26.5–33)
MCHC RBC AUTO-ENTMCNC: 32.2 G/DL (ref 31.5–36.5)
MCV RBC AUTO: 82 FL (ref 78–100)
MONOCYTES # BLD AUTO: 0.5 10E3/UL (ref 0–1.3)
MONOCYTES NFR BLD AUTO: 7 %
NEUTROPHILS # BLD AUTO: 3.3 10E3/UL (ref 1.6–8.3)
NEUTROPHILS NFR BLD AUTO: 54 %
NRBC # BLD AUTO: 0 10E3/UL
NRBC BLD AUTO-RTO: 0 /100
PLATELET # BLD AUTO: 254 10E3/UL (ref 150–450)
POTASSIUM BLD-SCNC: 4 MMOL/L (ref 3.4–5.3)
PROT SERPL-MCNC: 8 G/DL (ref 6.8–8.8)
RBC # BLD AUTO: 5.81 10E6/UL (ref 4.4–5.9)
SODIUM SERPL-SCNC: 138 MMOL/L (ref 133–144)
WBC # BLD AUTO: 6.1 10E3/UL (ref 4–11)

## 2021-10-18 PROCEDURE — 76642 ULTRASOUND BREAST LIMITED: CPT | Mod: RT

## 2021-10-18 PROCEDURE — 85025 COMPLETE CBC W/AUTO DIFF WBC: CPT

## 2021-10-18 PROCEDURE — 36415 COLL VENOUS BLD VENIPUNCTURE: CPT

## 2021-10-18 PROCEDURE — 80053 COMPREHEN METABOLIC PANEL: CPT

## 2021-10-18 PROCEDURE — 99214 OFFICE O/P EST MOD 30 MIN: CPT | Performed by: INTERNAL MEDICINE

## 2021-10-18 PROCEDURE — G0463 HOSPITAL OUTPT CLINIC VISIT: HCPCS

## 2021-10-18 PROCEDURE — 83036 HEMOGLOBIN GLYCOSYLATED A1C: CPT

## 2021-10-18 ASSESSMENT — PAIN SCALES - GENERAL: PAINLEVEL: NO PAIN (0)

## 2021-10-18 NOTE — LETTER
10/18/2021         RE: Ronna Collins  [address]  [facility] [address]        Dear Colleague,    Thank you for referring your patient, Ronna Collins, to the [facility]. Please see a copy of my visit note below.    Oncology Visit:  Date on this visit: 10/18/2021    Diagnosis: Clinical prognostic stage IIIb, [id number], grade 2, ER positive, OR positive, HER-2 negative right breast cancer.    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  He noted discomfort and hardening of the skin of the right nipple in late April/early May, 2021.  He had a similar appearing benign skin lesion of the arm 3 months earlier and thought it likely to be the same.  However, he subsequently noted a mass in the same area.  Right breast skin punch biopsy performed by dermatology was c/w grade 2 invasive ductal carcinoma with associated DCIS.  Invasive carcinoma was ER positive 100% and OR positive 70%, with tumor invading the dermis.  HER-2 was negative by IHC (score 1+).  Diagnostic mammogram and ultrasound showed a retroareolar right breast mass measuring 2.9 cm with associated nipple retraction and enlarged, abnormal right axillary lymph nodes.  Right breast biopsy was again c/w grade 2 invasive ductal carcinoma, ER strong in 98% of tumor cell nuclei, HER-2 pending.  Ki-67 was 15%.    He elected to screen for the ISPY-2 clinical trial.  MRI breast on 6/18/2021 showed the biopsy proven right breast cancer to measure 3.9 cm in maximum dimension with invasion of the nipple and the pectoralis muscle as well as at least 4 abnormal level 1 and 2 right axillary lymph nodes and a tiny 0.4 cm right internal mammary lymph node, likely benign).  Mammoprint returned low risk with a score of +0.29.  He elected for treatment on the endocrine optimization protocol of ISPY-2 and was randomized to treatment with  amcenestrant.  He has been on amcenestrant since 7/1/2021.    Interval History:  Dr. Collins returns to clinic for routine follow up on the ISPY2 endocrine optimization arm.  His wife telephoned into the visit today.  Dr. Collins is feeling well without new symptoms.  He has been on amcenestrant since 7/1/2021 and has been tolerating this remarkably well.  He has not noticed any significant changes in the mass or axillary lymphadenopathy.  He denies hot flashes, bone pain, joint pain, stiffness, or mood disorder.  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  He has met with Dr. Snider and is tentatively scheduled for an MRI on 12/16/2021 and surgery on 12/22/2021.  The remainder of a complete 12 point review of systems was reviewed with the patient and was negative with the exception of that mentioned above.    Past Medical/Surgical History:  Past Medical History:   Diagnosis Date     Chronic sinusitis      Hypertension 2002     No past surgical history on file.    Allergies:  Allergies as of 10/18/2021     (No Known Allergies)     Current Medications:  Current Outpatient Medications   Medication Sig Dispense Refill     losartan-hydrochlorothiazide (HYZAAR) 100-25 MG tablet Take 1 tablet by mouth daily 90 tablet 1     study - amcenestrant, IDS# 3903, 200 MG tablet Take 1 tablet (200 mg) by mouth daily 32 tablet 0     study - amcenestrant, IDS# 3903, 200 MG tablet Take 1 tablet (200 mg) by mouth daily 32 tablet 0      Family and Social History:  See initial consultation dated 6/8/2021 for further details.  Breast Actionable and Breast Expanded panels were both negative for pathogenic germline mutations.    Physical Exam:  BP (!) 169/91   Pulse 75   Temp 97.9  F (36.6  C) (Oral)   Wt 80.5 kg (177 lb 6.4 oz)   SpO2 99%   BMI 26.98 kg/m    General:  Well appearing adult male in NAD.  Alert and oriented.  HEENT:  Normocephalic.  Sclera anicteric.  Lymph:  Right axillary node is mobile, firm and measures  approximately 2 cm, in the anterior mid-right axilla.  Chest:  CTA bilaterally.  No wheezes or crackles.  CV:  RRR.  Nl S1 and S2.  No m/r/g.  Breast:  Retroareolar right breast mass measures 4.5 x 3.5 cm.  It is slightly larger and also is more firm than on prior exam.  The hardest are of tumor is a discrete nodule at 9:00.    Abd:  Soft/ND.   Ext:  No pitting edema of the bilateral lower extremities.    Musculo:  Full ROM of the bilateral upper extremities.  Neuro:  Cranial nerves grossly intact.  Psych:  Mood and affect appear normal.  Skin:  Large seborrheic keratosis overlying the right nipple.    Laboratory/Imaging Studies  7/20/2021 Breast MRI:    Biopsy proven retroareolar breast carcinoma with nipple retraction and invasion of the pectoralis appears similar and measures 3.9 x 3.5 x 3.6 cm.  Multiple stable right axillary level 1 and 2 lymph nodes, such as a right level 1 lymph node measuring 1.6 cm and a level 2/3 lymph node measuring 1.1 cm.  Unchanged tiny right internal mammary chain lymph nodes.    8/10/2021 Breast MRI:  Mass measures 3.7 x 3.2 x 3.5 cm.  Multiple abnormal right axillary level 1 and 2 lymph nodes.  A right lower lymph node measures 1.9 cm and a level 2/3 lymph node measures 1.1 cm.  No significant change.  Stable size of right internal mammary node measuring 4 mm.    9/20/2021 Breast MRI:  Persistent invasion of the nipple with associated retraction and invasion of the underlying pectoralis muscle, similar to prior.  The mass measures 3.5 x 3.4 x 3.2 cm.  No significant change in right axillary lymphadenopathy.  There are two stable right internal mammary chain nodes since the first MR.    10/18/2021 Labs:   Electrolytes, creatinine, and LFTs are wnl.  Hemoglobin A1C is 6.2% (down from 6.5% approximately 4 months prior)  Blood counts are wnl.    10/18/2021 Right Breast and Axillary Ultrasound:  I reviewed the findings with Dr. Rm Jacobson of breast radiology and the primary breast mass  appears to be 27 x 25 x 22 mm, compared to 29 x 26 x 25 mm on initial ultrasound in June 2021.  Right axillary lymphadenopathy appears unchanged.    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      1.  Right breast cancer:    He is enrolled on the Endocrine Optimization Protocol (EOP) of ISPY2 and has been on treatment with amcenestrant since 7/1/2021 (3.5 months).  He is tolerating treatment remarkably well without side effects.  However, on clinical exam today there is increased firmness and size of the right breast mass which now measures 3.5 x 4.5 cm, compared to 3.5 x 4 cm at his last visit.  We obtained an ultrasound of the right breast and right axilla today which showed a slight decrease in size of the primary breast mass from initial ultrasound in June 2021 (results above), however, we cannot reliably compare the size of the mass on today's ultrasound to the size on the 3-month MRI obtained 1 month ago, as the size of the mass on the initial ultrasound and initial MRI were not concordant.  Patient would like a breast MRI at this time, for objective measurement.  We discussed that given this would not be a protocol MRI, it may not be covered by either the ISPY2 trial or his insurance.  He expressed his understanding and wishes to proceed.    Will plan for breast MRI now and touch base with Dr. Snider to see if surgery can be scheduled sooner.   In the interim, we will continue neoadjuvant therapy with amcenestrant.  Approximately 4-6 weeks after surgery, he will need adjuvant radiation.  Will then plan to treat with adjuvant endocrine therapy for a total of 10 years.    2.  Pre-diabetes:  6/14/2021 hemoglobin A1C was 6.5%.  HgbA1c rechecked today and is 6.2%.  He has increased cardiovascular exercise and has reduced sugar intake in the diet.     3.  Follow up:  Will continue every  4 week visits until surgery.  Will need to be seen in medical oncology clinic 2 weeks after surgery.     Tobi Tolentino, MS4  [employer]    Patient was seen and discussed with medical student Tobi Tolentino.  I have edited the above note to reflect our joint assessment and plan.  I spent 35 minutes on the date of the encounter doing chart review, review of test results, interpretation of tests, patient visit, documentation, discussion with other provider(s) and discussion with family.     Mikaela Sr MD        Again, thank you for allowing me to participate in the care of your patient.        Sincerely,        Mikaela Sr MD

## 2021-10-18 NOTE — PROGRESS NOTES
Oncology Visit:  Date on this visit: 10/18/2021    Diagnosis: Clinical prognostic stage IIIb, [id number], grade 2, ER positive, CO positive, HER-2 negative right breast cancer.    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  He noted discomfort and hardening of the skin of the right nipple in late April/early May, 2021.  He had a similar appearing benign skin lesion of the arm 3 months earlier and thought it likely to be the same.  However, he subsequently noted a mass in the same area.  Right breast skin punch biopsy performed by dermatology was c/w grade 2 invasive ductal carcinoma with associated DCIS.  Invasive carcinoma was ER positive 100% and CO positive 70%, with tumor invading the dermis.  HER-2 was negative by IHC (score 1+).  Diagnostic mammogram and ultrasound showed a retroareolar right breast mass measuring 2.9 cm with associated nipple retraction and enlarged, abnormal right axillary lymph nodes.  Right breast biopsy was again c/w grade 2 invasive ductal carcinoma, ER strong in 98% of tumor cell nuclei, HER-2 pending.  Ki-67 was 15%.    He elected to screen for the ISPY-2 clinical trial.  MRI breast on 6/18/2021 showed the biopsy proven right breast cancer to measure 3.9 cm in maximum dimension with invasion of the nipple and the pectoralis muscle as well as at least 4 abnormal level 1 and 2 right axillary lymph nodes and a tiny 0.4 cm right internal mammary lymph node, likely benign).  Mammoprint returned low risk with a score of +0.29.  He elected for treatment on the endocrine optimization protocol of ISPY-2 and was randomized to treatment with amcenestrant.  He has been on amcenestrant since 7/1/2021.    Interval History:  Dr. Collins returns to clinic for routine follow up on the ISPY2 endocrine optimization arm.  His wife telephoned into the visit today.  Dr. Collins is feeling well without new symptoms.  He has been  on amcenestrant since 7/1/2021 and has been tolerating this remarkably well.  He has not noticed any significant changes in the mass or axillary lymphadenopathy.  He denies hot flashes, bone pain, joint pain, stiffness, or mood disorder.  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  He has met with Dr. Snider and is tentatively scheduled for an MRI on 12/16/2021 and surgery on 12/22/2021.  The remainder of a complete 12 point review of systems was reviewed with the patient and was negative with the exception of that mentioned above.    Past Medical/Surgical History:  Past Medical History:   Diagnosis Date     Chronic sinusitis      Hypertension 2002     No past surgical history on file.    Allergies:  Allergies as of 10/18/2021     (No Known Allergies)     Current Medications:  Current Outpatient Medications   Medication Sig Dispense Refill     losartan-hydrochlorothiazide (HYZAAR) 100-25 MG tablet Take 1 tablet by mouth daily 90 tablet 1     study - amcenestrant, IDS# 3903, 200 MG tablet Take 1 tablet (200 mg) by mouth daily 32 tablet 0     study - amcenestrant, IDS# 3903, 200 MG tablet Take 1 tablet (200 mg) by mouth daily 32 tablet 0      Family and Social History:  See initial consultation dated 6/8/2021 for further details.  Breast Actionable and Breast Expanded panels were both negative for pathogenic germline mutations.    Physical Exam:  BP (!) 169/91   Pulse 75   Temp 97.9  F (36.6  C) (Oral)   Wt 80.5 kg (177 lb 6.4 oz)   SpO2 99%   BMI 26.98 kg/m    General:  Well appearing adult male in NAD.  Alert and oriented.  HEENT:  Normocephalic.  Sclera anicteric.  Lymph:  Right axillary node is mobile, firm and measures approximately 2 cm, in the anterior mid-right axilla.  Chest:  CTA bilaterally.  No wheezes or crackles.  CV:  RRR.  Nl S1 and S2.  No m/r/g.  Breast:  Retroareolar right breast mass measures 4.5 x 3.5 cm.  It is slightly larger and also is more firm than on prior exam.  The  hardest are of tumor is a discrete nodule at 9:00.    Abd:  Soft/ND.   Ext:  No pitting edema of the bilateral lower extremities.    Musculo:  Full ROM of the bilateral upper extremities.  Neuro:  Cranial nerves grossly intact.  Psych:  Mood and affect appear normal.  Skin:  Large seborrheic keratosis overlying the right nipple.    Laboratory/Imaging Studies  7/20/2021 Breast MRI:    Biopsy proven retroareolar breast carcinoma with nipple retraction and invasion of the pectoralis appears similar and measures 3.9 x 3.5 x 3.6 cm.  Multiple stable right axillary level 1 and 2 lymph nodes, such as a right level 1 lymph node measuring 1.6 cm and a level 2/3 lymph node measuring 1.1 cm.  Unchanged tiny right internal mammary chain lymph nodes.    8/10/2021 Breast MRI:  Mass measures 3.7 x 3.2 x 3.5 cm.  Multiple abnormal right axillary level 1 and 2 lymph nodes.  A right lower lymph node measures 1.9 cm and a level 2/3 lymph node measures 1.1 cm.  No significant change.  Stable size of right internal mammary node measuring 4 mm.    9/20/2021 Breast MRI:  Persistent invasion of the nipple with associated retraction and invasion of the underlying pectoralis muscle, similar to prior.  The mass measures 3.5 x 3.4 x 3.2 cm.  No significant change in right axillary lymphadenopathy.  There are two stable right internal mammary chain nodes since the first MR.    10/18/2021 Labs:   Electrolytes, creatinine, and LFTs are wnl.  Hemoglobin A1C is 6.2% (down from 6.5% approximately 4 months prior)  Blood counts are wnl.    10/18/2021 Right Breast and Axillary Ultrasound:  I reviewed the findings with Dr. Rm Jacobson of breast radiology and the primary breast mass appears to be 27 x 25 x 22 mm, compared to 29 x 26 x 25 mm on initial ultrasound in June 2021.  Right axillary lymphadenopathy appears unchanged.    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      1.  Right breast cancer:    He is enrolled on the Endocrine Optimization Protocol (EOP) of ISPY2 and has been on treatment with amcenestrant since 7/1/2021 (3.5 months).  He is tolerating treatment remarkably well without side effects.  However, on clinical exam today there is increased firmness and size of the right breast mass which now measures 3.5 x 4.5 cm, compared to 3.5 x 4 cm at his last visit.  We obtained an ultrasound of the right breast and right axilla today which showed a slight decrease in size of the primary breast mass from initial ultrasound in June 2021 (results above), however, we cannot reliably compare the size of the mass on today's ultrasound to the size on the 3-month MRI obtained 1 month ago, as the size of the mass on the initial ultrasound and initial MRI were not concordant.  Patient would like a breast MRI at this time, for objective measurement.  We discussed that given this would not be a protocol MRI, it may not be covered by either the ISPY2 trial or his insurance.  He expressed his understanding and wishes to proceed.    Will plan for breast MRI now and touch base with Dr. Snider to see if surgery can be scheduled sooner.   In the interim, we will continue neoadjuvant therapy with amcenestrant.  Approximately 4-6 weeks after surgery, he will need adjuvant radiation.  Will then plan to treat with adjuvant endocrine therapy for a total of 10 years.    2.  Pre-diabetes:  6/14/2021 hemoglobin A1C was 6.5%.  HgbA1c rechecked today and is 6.2%.  He has increased cardiovascular exercise and has reduced sugar intake in the diet.     3.  Follow up:  Will continue every 4 week visits until surgery.  Will need to be seen in medical oncology clinic 2 weeks after surgery.     Tobi Tolentino, MS4  [employer]    Patient was seen and discussed with medical student Tobi Tolentino.  I have edited the above note to reflect our joint  assessment and plan.  I spent 35 minutes on the date of the encounter doing chart review, review of test results, interpretation of tests, patient visit, documentation, discussion with other provider(s) and discussion with family.     Mikaela Sr MD

## 2021-10-18 NOTE — NURSING NOTE
Chief Complaint   Patient presents with     Blood Draw     Labs drawn via  by RN in lab.     Ana Morgan RN

## 2021-10-18 NOTE — NURSING NOTE
"Oncology Rooming Note    October 18, 2021 12:07 PM   Ronna Collins is a 71 year old male who presents for:    Chief Complaint   Patient presents with     Oncology Clinic Visit     breast cancer     Initial Vitals: BP (!) 169/91   Pulse 75   Temp 97.9  F (36.6  C) (Oral)   Wt 80.5 kg (177 lb 6.4 oz)   SpO2 99%   BMI 26.98 kg/m   Estimated body mass index is 26.98 kg/m  as calculated from the following:    Height as of 9/21/21: 1.727 m (5' 7.99\").    Weight as of this encounter: 80.5 kg (177 lb 6.4 oz). Body surface area is 1.97 meters squared.  No Pain (0) Comment: Data Unavailable   No LMP for male patient.  Allergies reviewed: Yes  Medications reviewed: Yes    Medications: Medication refills not needed today.  Pharmacy name entered into Physitrack:    [id number] - [place], [place] - [address] [id number] - [place], [place] - [address] AT [address] [facility] - [place], [place] - [address]    Clinical concerns: none       Samia New CMA            "

## 2021-10-18 NOTE — NURSING NOTE
[id number]: Study Visit Note   Subject name: Ronna Collins     Visit: C5 D1    Did the study visit occur within the appropriate window allowed by the protocol? yes    Since the last study visit, He has been doing well. He reports no side effects with the Amcenestrant and has been tolerating the drug.     Dr. Sr is concerned that the patient's tumor is slightly bigger and size and harder than last visit and is pushing to have the patient go straight to surgery. She ordered a breast/lymph node ultrasound which was completed after the visit. She is going to speak with Dr. Snider to determine next steps. Patient will still receive next supply of Amcenestrant in the mean time.     I have personally interviewed Ronna Collins and reviewed his medical record for adverse events and concomitant medications and these have been recorded on the corresponding logs in Ronna Collins's research file.     Ronna Collins was given the opportunity to ask any trial related questions.  Please see provider progress note for physical exam and other clinical information. Labs were reviewed - any significant lab values were addressed and reviewed.       [id number]: Medication Count/IDS Note      Ronna Collins is enrolled on the trial number listed above. The patient presented for his C5 D1 day visit.   IDS number: [id number]  Drug name: Amcenestrant  Number of boxes returned: 2  Lot number: [id number]  Number of pills returned: 3      Number of boxes dispensed:2  Lot number: [id number]  Number of pills dispensed: 30    Drug diary returned? yes    Are there any discrepancies between the amount of medication the patient was instructed to take and the amount recorded as taken in the patient s drug diary?  No    Are there any discrepancies between the amount of medication the patient has recorded as taken in the drug diary and the amount that would be expected to be returned based on the amount recorded as taken?  no        Debra Roper RN, MS  Research   Phone: [phone]  Pager: [phone]      Form 504.00.01 (Version 1)     Effective date: 01JUL2018     Next Review Date: 01JUL2020

## 2021-10-21 ENCOUNTER — MYC MEDICAL ADVICE (OUTPATIENT)
Dept: ONCOLOGY | Facility: CLINIC | Age: 71
End: 2021-10-21

## 2021-10-22 ENCOUNTER — TELEPHONE (OUTPATIENT)
Dept: ONCOLOGY | Facility: CLINIC | Age: 71
End: 2021-10-22

## 2021-10-22 DIAGNOSIS — C50.929: Primary | ICD-10-CM

## 2021-10-22 DIAGNOSIS — Z11.59 ENCOUNTER FOR SCREENING FOR OTHER VIRAL DISEASES: ICD-10-CM

## 2021-10-22 NOTE — TELEPHONE ENCOUNTER
RNCC: Tova Tirado;  [phone]    Surgery is scheduled with Dr. Snider on 10/27 at [facility].  Scheduled per needing sooner date per Dr. Snider and Dr. Sr.    H&P to be completed by PAC: Scheduled virtual visit on 10/25.    C[place]19 [place] [facility]     Post-op: 11/11 as a in person visit.    The RN completed the education regarding the surgery.     Patient will receive surgery arrival and start time from PAC.    The surgery packet was sent via [employer].    Patient will complete COVID-19 test that was scheduled by surgical coordinator 2-4 days prior to surgery.     I called the patient and was able to confirm the scheduled information above.

## 2021-10-22 NOTE — TELEPHONE ENCOUNTER
FUTURE VISIT INFORMATION      SURGERY INFORMATION:    Date: 10/27/2021    Location: [facility]    Surgeon:  Nida Snider MD    Anesthesia Type:  Combined General with Block    Procedure: RIGHT Simple Mastectomy    Consult: 2021    RECORDS REQUESTED FROM:       Primary Care Provider: Dr Henry ([facility] [place])     Most recent EKG+ Tracin2021    Most recent ECHO: 2021    :

## 2021-10-22 NOTE — TELEPHONE ENCOUNTER
----- Message from Luci Mojica sent at 10/22/2021 11:52 AM CDT -----  Regarding: 10/27 Agatha Bernardo - can you enter PAC orders?    I let him know that another appointment is in the works for Monday morning and so he is aware of this. I do not know what the appointment entails and I told him this would be explained.     Surgery scheduled on 10/27 with Dr. Snider at [place]    Appointments:   10/25:Dr. Snider at 2 PM - Tova - please print the surgery packet from the letters tab for him  H&P: PAC: Scheduled virtual visit at 5:45 PM     10/26:  MRI at 7 AM   COVID: 10/26 at [facility], lab at 8:30 AM    POST-OP: I moved the appointment with Dr. Snider on 11/18 to 11/11 at 7:15 AM in person.     I was able to confirm the above dates.     - Luci     ----- Message -----  From: Mercy Mcnair, PT  Sent: 10/22/2021   9:05 AM CDT  To: Mikaela Sr MD, #  Subject: RE: Juanita - Put on for 10/27??                    Hi -    I am forwarding his MR# and contact information to PT scheduling and will let you know, hopefully we can get him in Monday morning for a new PT evaluation.    Dr Snider - will you be taking any of the pec or will that be addressed with radiation?    Thanks-    Mercy  ----- Message -----  From: Nida Snider MD  Sent: 10/21/2021   1:36 PM CDT  To: Mikaela Sr MD, #  Subject: RE: Juanita - Put on for 10/27??                    Tova - can you set him up for a [facility] clinic visit with me this coming Monday in the afternoon (2?) so I can go over surgery details with him?  Mercy - would that timing work? This is the patient who needs to be seen preop (for Monday in email) and we are moving his surgery up due to disease progression. He will need an axillary dissection.    Thanks,  JUAN RAMON  ----- Message -----  From: Tova Tirado, RN  Sent: 10/21/2021  12:26 PM CDT  To: Nida Lui MD, #  Subject: RE: Juanita - Put on for 10/27??                    Yes, there is a  ruslan to patient and he has read and responded.     DEEPIKA  ----- Message -----  From: Luci Mojica  Sent: 10/21/2021  12:21 PM CDT  To: Nida Lui MD, #  Subject: RE: Juanita - Put on for 10/27??                    I am just waiting for approval to see if my colleague does not need the hold that is in place.    I do not believe that they are using it which is why I am 90% sure it will be okay.    I'll update you. Is the patient aware?    Luci   ----- Message -----  From: Tova Tirado RN  Sent: 10/21/2021  12:08 PM CDT  To: Nida Lui MD, #  Subject: RE: Juanita - Put on for 10/27??                    Lets go ahead and schedule on 10/27 please.     Thanks!DEEPIKA   ----- Message -----  From: Luci Mojica  Sent: 10/21/2021  12:01 PM CDT  To: Nida Lui MD, #  Subject: RE: Juanita - Put on for 10/27??                    It would start at 10:20 AM on 10/27.    I will leave 11/3 to Janelle because I see a hold on H[employer] as well.     Luci   ----- Message -----  From: Nida Snider MD  Sent: 10/21/2021  11:40 AM CDT  To: Tova Lui RN, #  Subject: RE: Juanita - Put on for 10/27??                    Ok thanks 11/3 is fine too if we can't get 10/27   ----- Message -----  From: Luci Mojica  Sent: 10/21/2021  11:15 AM CDT  To: Nida Lui MD, #  Subject: RE: Juanita - Put on for 10/27??                    This was sent to Juanita at 6 PM last night (10/20) so she was off already (leaves at 4 PM) - she has an appointment this morning so is not here and I am trying my best to cover.    The block on 10/27 released at the end of the day on 10/19.    I am on the waitlist.    Luci     ----- Message -----  From: Tova Tirado RN  Sent: 10/21/2021   9:32 AM CDT  To: Shonna Field  Subject: FW: Juanita - Put on for 10/27??                    Did we lose our block or were we able to get him on the  schedule?     Christy     ----- Message -----  From: Nida Snider MD  Sent: 10/20/2021   6:03 PM CDT  To: Mikaela Sr MD, #  Subject: Juanita - Put on for 10/27??                        Juanita,    Can we put him on for next Wednesday?    Dr Sr - if you think he is progressing and want to move to surgery I don't think we necessarily need an MRI unless you wanted to document the progression.    Thanks    ----- Message -----  From: Mikaela Sr MD  Sent: 10/20/2021  12:49 PM CDT  To: Nida Snider MD  Subject: move surgery date?                               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  He is s/p 4 months of neoadjuvant amcenestrant on the endocrine optimization protocol.  On exam this week, right breast mass measured 4.5 x 3.5 (previously 4 x 3.5 cm), but also felt much more firm than prior.  He has had 3 breast MRIs, the last was 1 month ago.  The three breast MRIs showed minimal decrease in size of the breast mass with each.  I am not sure that ISPY or insurance would pay for a 4th MRI.  The patient is willing to pay for one if needed.  In addition, right axillary node is still firm and palpable.  I am thinking it may be best to start to work on a plan to move up his surgery date.    Mikaela

## 2021-10-24 ENCOUNTER — MYC MEDICAL ADVICE (OUTPATIENT)
Dept: FAMILY MEDICINE | Facility: CLINIC | Age: 71
End: 2021-10-24

## 2021-10-24 ENCOUNTER — HEALTH MAINTENANCE LETTER (OUTPATIENT)
Age: 71
End: 2021-10-24

## 2021-10-25 ENCOUNTER — VIRTUAL VISIT (OUTPATIENT)
Dept: SURGERY | Facility: CLINIC | Age: 71
End: 2021-10-25
Payer: COMMERCIAL

## 2021-10-25 ENCOUNTER — ONCOLOGY VISIT (OUTPATIENT)
Dept: ONCOLOGY | Facility: CLINIC | Age: 71
End: 2021-10-25
Attending: SURGERY
Payer: COMMERCIAL

## 2021-10-25 ENCOUNTER — PRE VISIT (OUTPATIENT)
Dept: SURGERY | Facility: CLINIC | Age: 71
End: 2021-10-25

## 2021-10-25 ENCOUNTER — HOSPITAL ENCOUNTER (OUTPATIENT)
Dept: PHYSICAL THERAPY | Facility: CLINIC | Age: 71
Setting detail: THERAPIES SERIES
End: 2021-10-25
Attending: SURGERY
Payer: COMMERCIAL

## 2021-10-25 ENCOUNTER — ANESTHESIA EVENT (OUTPATIENT)
Dept: SURGERY | Facility: CLINIC | Age: 71
End: 2021-10-25
Payer: COMMERCIAL

## 2021-10-25 VITALS
RESPIRATION RATE: 16 BRPM | TEMPERATURE: 97.6 F | DIASTOLIC BLOOD PRESSURE: 80 MMHG | HEART RATE: 80 BPM | WEIGHT: 178.3 LBS | OXYGEN SATURATION: 98 % | SYSTOLIC BLOOD PRESSURE: 138 MMHG | HEIGHT: 68 IN | BODY MASS INDEX: 27.02 KG/M2

## 2021-10-25 DIAGNOSIS — Z17.0 MALIGNANT NEOPLASM OF CENTRAL PORTION OF RIGHT BREAST IN MALE, ESTROGEN RECEPTOR POSITIVE (H): Primary | ICD-10-CM

## 2021-10-25 DIAGNOSIS — I10 BENIGN ESSENTIAL HYPERTENSION: ICD-10-CM

## 2021-10-25 DIAGNOSIS — Z01.818 PRE-OP EVALUATION: Primary | ICD-10-CM

## 2021-10-25 DIAGNOSIS — C50.121 MALIGNANT NEOPLASM OF CENTRAL PORTION OF RIGHT BREAST IN MALE, ESTROGEN RECEPTOR POSITIVE (H): Primary | ICD-10-CM

## 2021-10-25 DIAGNOSIS — C50.929: ICD-10-CM

## 2021-10-25 PROCEDURE — 99213 OFFICE O/P EST LOW 20 MIN: CPT | Mod: 95 | Performed by: PHYSICIAN ASSISTANT

## 2021-10-25 PROCEDURE — 99215 OFFICE O/P EST HI 40 MIN: CPT | Performed by: SURGERY

## 2021-10-25 PROCEDURE — G0463 HOSPITAL OUTPT CLINIC VISIT: HCPCS

## 2021-10-25 PROCEDURE — 97535 SELF CARE MNGMENT TRAINING: CPT | Mod: GP | Performed by: PHYSICAL THERAPIST

## 2021-10-25 PROCEDURE — 97162 PT EVAL MOD COMPLEX 30 MIN: CPT | Mod: GP | Performed by: PHYSICAL THERAPIST

## 2021-10-25 PROCEDURE — 97140 MANUAL THERAPY 1/> REGIONS: CPT | Mod: GP | Performed by: PHYSICAL THERAPIST

## 2021-10-25 RX ORDER — LOSARTAN POTASSIUM AND HYDROCHLOROTHIAZIDE 25; 100 MG/1; MG/1
1 TABLET ORAL EVERY MORNING
Qty: 90 TABLET | Refills: 1 | Status: SHIPPED | OUTPATIENT
Start: 2021-10-25 | End: 2022-05-10

## 2021-10-25 ASSESSMENT — LIFESTYLE VARIABLES: TOBACCO_USE: 1

## 2021-10-25 ASSESSMENT — PAIN SCALES - GENERAL
PAINLEVEL: NO PAIN (0)
PAINLEVEL: NO PAIN (0)

## 2021-10-25 ASSESSMENT — MIFFLIN-ST. JEOR: SCORE: 1548.1

## 2021-10-25 NOTE — LETTER
"    10/25/2021         RE: Ronna Collins  [address]  [facility] [place] [address]        Dear Colleague,    Thank you for referring your patient, Ronna Collins, to the [facility]. Please see a copy of my visit note below.    FOLLOW-UP  Oct 25, 2021    Ronna Collins is a 71 year old male who returns for follow-up for .    Cancer Staging  Malignant neoplasm of male breast (H)  Staging form: Breast, AJCC 8th Edition  - Clinical stage from 6/8/2021: Stage IIIB (cT4b, cN3b, cM0, G2, ER+, AZ+, HER2-) - Signed by Mikaela Sr MD on 8/30/2021      Treatment to date:  1. Enrolled on ISPY-2   2. Neoadjuvant amcenestrant (7/1/2021 to ongoing)    HPI:    Since his last visit, he has begun treatment with neoadjuvant amcenestrant in the ISPY2 trial under the care of Dr Sr.  He has been tolerating this well.  At their last visit on 9/21/2021, Dr Sr noted on physical exam progression.  Prior to this, imaging have been demonstrating small incremental decrease in size of the breast mass.    He feels well.      /80 (BP Location: Left arm, Patient Position: Sitting, Cuff Size: Adult Regular)   Pulse 80   Temp 97.6  F (36.4  C) (Tympanic)   Resp 16   Ht 1.727 m (5' 7.99\")   Wt 80.9 kg (178 lb 4.8 oz)   SpO2 98%   BMI 27.12 kg/m     Physical Exam  Pulmonary:      Effort: No respiratory distress.   Chest:      Breasts:         Right: Mass (Unchanged central mass) and skin change present. No swelling, bleeding, inverted nipple, nipple discharge or tenderness.         Left: No swelling, bleeding, inverted nipple, mass, nipple discharge, skin change or tenderness.   Lymphadenopathy:      Cervical: No cervical adenopathy.      Right cervical: No superficial, deep or posterior cervical adenopathy.     Left cervical: No superficial, deep or posterior cervical adenopathy.      Upper Body:      Right upper body: Axillary adenopathy and pectoral adenopathy " present. No supraclavicular adenopathy.      Left upper body: No supraclavicular, axillary or pectoral adenopathy.      Comments: No lymphedema in bilateral upper extremities.    Skin:     General: Skin is warm and dry.        INVESTIGATIONS:    Right Breast US (10/18/2021) showed:  FINDINGS:     Targeted right breast ultrasound was performed demonstrating a slight decrease in size of spiculated retroareolar mass measuring 2.7 x 2.2 x 2.5 cm compared to 2.9 x 2.5 x 2.6 cm on ultrasound from 6/3/2021. Measurements on ultrasound and MRI were discrepant in June when the mass measured 3.9 x 3.4 cm on MR. Therefore, comparing current ultrasound with most recent MRI may not be helpful.  Right axillary lymphadenopathy is again seen, not significantly changed when compared with the prior ultrasound. Previously biopsied node measures 1.4 x 1.5 x 1.5 cm, previously 1.6 x 1.5 x 1.4 cm. Second abnormal lymph node seen in the right axilla more superiorly also appear similar.  IMPRESSION: BI-RADS CATEGORY: 6 - Known Biopsy-Proven Malignancy-Appropriate Action Should Be Taken    Bilateral Breast MRI (9/20/2021) showed:  TECHNIQUE: Axial T2 images with fat suppression and axial T1 without fat saturation images were obtained of both breasts prior to gadolinium administration. Following the uneventful administration of weight-based gadolinium intravenously, high-resolution dynamic imaging of both breasts was performed in the axial plane. Dynamic images are reviewed with subtraction technique. Axial and coronal maximal intensity projection images are displayed. Additional research sequences were obtained.  Kinetic analysis was performed using a separate station. Contrast: 8 ml Gadavist  Breast composition: Almost entirely fat  Background parenchymal enhancement 1st post C image: Minimal  FINDINGS:   Longest distance of suspicious enhancement: Right breast, 3.5 cm  Biopsy-proven heterogeneously enhancing, irregular cancer of the right  breast has persistent invasion of the nipple and associated retraction and invasion of the underlying pectoralis muscle, similar to prior. The mass measures 3.5 x 3.4 x 3.2 cm, previously 3.7 x 3.2 x 3.5 cm and 3.9 x 3.5 x 3.6 cm on 7/20/2021. However, upon remeasurement, longest dimension initially is up to 4.1 cm. No additional abnormal enhancement in either breast.    No significant change of the right axillary lymphadenopathy.   There are two stable right internal mammary chain nodes since the first MR: just below the 2nd costochondral junction (image 83) and below the 3rd rib junction (image 59).  No left sided lymphadenopathy.   IMPRESSION: BI-RADS CATEGORY: 6 - Known Biopsy-Proven Malignancy.      The biopsy-proven cancer in the right breast measures smaller. Longest distance is 3.5 cm.  Persistent, unchanged right axillary lymphadenopathy.    ASSESSMENT:    Ronna Collins is a 71 year old male with locally advanced right breast cancer, s/p neoadjuvant endocrine therapy.    The diagnosis and management of locoregionally advanced breast cancer was discussed with Ronna Collins.  His disease has progressed while on neoadjuvant endocrine therapy.  The risks of a mastectomy were discussed with the patient and his wife, including the risks of bleeding, wound infection, wound dehiscence, skin flap necrosis, and seroma formation.   We discussed that a disc of pectoralis major muscle will be removed en bloc with the specimen. Typically this has little functional consequence.    In addition to the surgical management of the breast, his axilla must be addressed.  He was initially found to be node positive.  Currently, the adenopathy IS palpable.  He is NOT a candidate for sentinel lymph node biopsy.   The risks of an axillary lymph node dissection were reviewed with the patient and and his wife, including lymphedema (20-30%), bleeding, wound infection, wound dehiscence, seroma formation, nerve injury, limited  arm range of motion and paresthesias. We discussed that a drain will be placed intra-operatively.  We reviewed that a physical therapy/lymphedema clinic referral will be made pre- and post-operatively.     We then discussed that he can use either arm for labs, IVs, blood pressure measurements, etc. postoperatively, as there is little data to suggest these interventions increase his risk of lymphedema.     We discussed that since he was initially node positive, adjuvant radiation will be recommended.    We discussed that surgical pathology results will be reviewed at the postoperative visit to allow for careful discussion of next steps and for answering questions.    All of the above were discussed and all questions were answered. He elected to proceed with RIGHT simple mastectomy, RIGHT axillary lymph node dissection.    I personally reviewed his case with Dr Sr today as well.    Total time spent with the patient was 40 minutes, of which 75% was counseling.     PLAN:  1. RIGHT simple mastectomy  2. RIGHT axillary lymph node dissection  3. Preop lymphedema clinic evaluation (completed today)  4. Patient to report to his PCP for preoperative H&P and testing.    Nida Snider MD MSc [facility] FACS  Assistant Professor of Surgery  Division of Surgical Oncology  [facility]     60 minutes spent on the date of the encounter doing chart review, review of test results, interpretation of tests, patient visit, documentation, discussion with other provider(s) and discussion with family.        Again, thank you for allowing me to participate in the care of your patient.        Sincerely,        Nida Snider MD

## 2021-10-25 NOTE — PROGRESS NOTES
"FOLLOW-UP  Oct 25, 2021    Ronna Collins is a 71 year old male who returns for follow-up for .    Cancer Staging  Malignant neoplasm of male breast (H)  Staging form: Breast, AJCC 8th Edition  - Clinical stage from 6/8/2021: Stage IIIB (cT4b, cN3b, cM0, G2, ER+, MO+, HER2-) - Signed by Mikaela Sr MD on 8/30/2021      Treatment to date:  1. Enrolled on ISPY-2   2. Neoadjuvant amcenestrant (7/1/2021 to ongoing)    HPI:    Since his last visit, he has begun treatment with neoadjuvant amcenestrant in the ISPY2 trial under the care of Dr Sr.  He has been tolerating this well.  At their last visit on 9/21/2021, Dr Sr noted on physical exam progression.  Prior to this, imaging have been demonstrating small incremental decrease in size of the breast mass.    He feels well.      /80 (BP Location: Left arm, Patient Position: Sitting, Cuff Size: Adult Regular)   Pulse 80   Temp 97.6  F (36.4  C) (Tympanic)   Resp 16   Ht 1.727 m (5' 7.99\")   Wt 80.9 kg (178 lb 4.8 oz)   SpO2 98%   BMI 27.12 kg/m     Physical Exam  Pulmonary:      Effort: No respiratory distress.   Chest:      Breasts:         Right: Mass (Unchanged central mass) and skin change present. No swelling, bleeding, inverted nipple, nipple discharge or tenderness.         Left: No swelling, bleeding, inverted nipple, mass, nipple discharge, skin change or tenderness.   Lymphadenopathy:      Cervical: No cervical adenopathy.      Right cervical: No superficial, deep or posterior cervical adenopathy.     Left cervical: No superficial, deep or posterior cervical adenopathy.      Upper Body:      Right upper body: Axillary adenopathy and pectoral adenopathy present. No supraclavicular adenopathy.      Left upper body: No supraclavicular, axillary or pectoral adenopathy.      Comments: No lymphedema in bilateral upper extremities.    Skin:     General: Skin is warm and dry.        INVESTIGATIONS:    Right Breast US " (10/18/2021) showed:  FINDINGS:     Targeted right breast ultrasound was performed demonstrating a slight decrease in size of spiculated retroareolar mass measuring 2.7 x 2.2 x 2.5 cm compared to 2.9 x 2.5 x 2.6 cm on ultrasound from 6/3/2021. Measurements on ultrasound and MRI were discrepant in June when the mass measured 3.9 x 3.4 cm on MR. Therefore, comparing current ultrasound with most recent MRI may not be helpful.  Right axillary lymphadenopathy is again seen, not significantly changed when compared with the prior ultrasound. Previously biopsied node measures 1.4 x 1.5 x 1.5 cm, previously 1.6 x 1.5 x 1.4 cm. Second abnormal lymph node seen in the right axilla more superiorly also appear similar.  IMPRESSION: BI-RADS CATEGORY: 6 - Known Biopsy-Proven Malignancy-Appropriate Action Should Be Taken    Bilateral Breast MRI (9/20/2021) showed:  TECHNIQUE: Axial T2 images with fat suppression and axial T1 without fat saturation images were obtained of both breasts prior to gadolinium administration. Following the uneventful administration of weight-based gadolinium intravenously, high-resolution dynamic imaging of both breasts was performed in the axial plane. Dynamic images are reviewed with subtraction technique. Axial and coronal maximal intensity projection images are displayed. Additional research sequences were obtained.  Kinetic analysis was performed using a separate station. Contrast: 8 ml Gadavist  Breast composition: Almost entirely fat  Background parenchymal enhancement 1st post C image: Minimal  FINDINGS:   Longest distance of suspicious enhancement: Right breast, 3.5 cm  Biopsy-proven heterogeneously enhancing, irregular cancer of the right breast has persistent invasion of the nipple and associated retraction and invasion of the underlying pectoralis muscle, similar to prior. The mass measures 3.5 x 3.4 x 3.2 cm, previously 3.7 x 3.2 x 3.5 cm and 3.9 x 3.5 x 3.6 cm on 7/20/2021. However, upon  remeasurement, longest dimension initially is up to 4.1 cm. No additional abnormal enhancement in either breast.    No significant change of the right axillary lymphadenopathy.   There are two stable right internal mammary chain nodes since the first MR: just below the 2nd costochondral junction (image 83) and below the 3rd rib junction (image 59).  No left sided lymphadenopathy.   IMPRESSION: BI-RADS CATEGORY: 6 - Known Biopsy-Proven Malignancy.      The biopsy-proven cancer in the right breast measures smaller. Longest distance is 3.5 cm.  Persistent, unchanged right axillary lymphadenopathy.    ASSESSMENT:    Ronna Collins is a 71 year old male with locally advanced right breast cancer, s/p neoadjuvant endocrine therapy.    The diagnosis and management of locoregionally advanced breast cancer was discussed with Ronna Collins.  His disease has progressed while on neoadjuvant endocrine therapy.  The risks of a mastectomy were discussed with the patient and his wife, including the risks of bleeding, wound infection, wound dehiscence, skin flap necrosis, and seroma formation.   We discussed that a disc of pectoralis major muscle will be removed en bloc with the specimen. Typically this has little functional consequence.    In addition to the surgical management of the breast, his axilla must be addressed.  He was initially found to be node positive.  Currently, the adenopathy IS palpable.  He is NOT a candidate for sentinel lymph node biopsy.   The risks of an axillary lymph node dissection were reviewed with the patient and and his wife, including lymphedema (20-30%), bleeding, wound infection, wound dehiscence, seroma formation, nerve injury, limited arm range of motion and paresthesias. We discussed that a drain will be placed intra-operatively.  We reviewed that a physical therapy/lymphedema clinic referral will be made pre- and post-operatively.     We then discussed that he can use either arm for  labs, IVs, blood pressure measurements, etc. postoperatively, as there is little data to suggest these interventions increase his risk of lymphedema.     We discussed that since he was initially node positive, adjuvant radiation will be recommended.    We discussed that surgical pathology results will be reviewed at the postoperative visit to allow for careful discussion of next steps and for answering questions.    All of the above were discussed and all questions were answered. He elected to proceed with RIGHT simple mastectomy, RIGHT axillary lymph node dissection.    I personally reviewed his case with Dr Sr today as well.    Total time spent with the patient was 40 minutes, of which 75% was counseling.     PLAN:  1. RIGHT simple mastectomy  2. RIGHT axillary lymph node dissection  3. Preop lymphedema clinic evaluation (completed today)  4. Patient to report to his PCP for preoperative H&P and testing.    Nida Snider MD MSc [facility] FACS  Assistant Professor of Surgery  Division of Surgical Oncology  [facility]     60 minutes spent on the date of the encounter doing chart review, review of test results, interpretation of tests, patient visit, documentation, discussion with other provider(s) and discussion with family.

## 2021-10-25 NOTE — ANESTHESIA PREPROCEDURE EVALUATION
Anesthesia Pre-Procedure Evaluation    Patient: Ronna Collins   MRN: [id number] : 1950        Preoperative Diagnosis: Malignant neoplasm of central portion of right breast in male, estrogen receptor positive (H) [C50.121, Z17.0]    Procedure : Procedure(s):  RIGHT Simple Mastectomy  RIGHT Axillary Lymph Node Dissection  Video Visit       Past Medical History:   Diagnosis Date     Chronic sinusitis      Hypertension       No past surgical history on file.   No Known Allergies   Social History     Tobacco Use     Smoking status: Former Smoker     Packs/day: 0.50     Years: 20.00     Pack years: 10.00     Types: Cigarettes     Start date: 1976     Quit date: 2000     Years since quittin.1     Smokeless tobacco: Never Used   Substance Use Topics     Alcohol use: Yes     Comment: occ      Wt Readings from Last 1 Encounters:   10/25/21 80.9 kg (178 lb 4.8 oz)        Anesthesia Evaluation   Pt has not had prior anesthetic         ROS/MED HX  ENT/Pulmonary:     (+) tobacco use, Past use,     Neurologic:  - neg neurologic ROS     Cardiovascular:     (+) hypertension-----Previous cardiac testing   Echo: Date: 21 Results:  Interpretation Summary     Global and regional left ventricular function is normal with an EF of 60-65%.  LVEF 63% based on biplane 2D tracing.  Right ventricular function, chamber size, wall motion, and thickness are  normal.  The inferior vena cava is normal.  No pericardial effusion is present.  There is no prior study for direct comparison.    Stress Test: Date: Results:    ECG Reviewed: Date: 21 Results:  SR with first degree AV block  Cath: Date: Results:   (-) taking anticoagulants/antiplatelets, murmur and wheezes   METS/Exercise Tolerance: >4 METS Comment: Walk 3 miles daily, yoga   Hematologic:  - neg hematologic  ROS  (-) history of blood clots and history of blood transfusion   Musculoskeletal:  - neg musculoskeletal ROS     GI/Hepatic:  - neg GI/hepatic  ROS  (-) GERD   Renal/Genitourinary:  - neg Renal ROS     Endo:  - neg endo ROS     Psychiatric/Substance Use:  - neg psychiatric ROS     Infectious Disease:  - neg infectious disease ROS     Malignancy:   (+) Malignancy, History of Breast.Breast CA status post Chemo.        Other:            Physical Exam    Airway        Mallampati: II   TM distance: > 3 FB   Neck ROM: full   Mouth opening: > 3 cm    Respiratory Devices and Support         Dental  no notable dental history         Cardiovascular          Rhythm and rate: regular and normal (-) no systolic click and no murmur    Pulmonary   pulmonary exam normal        breath sounds clear to auscultation   (-) no wheezes        OUTSIDE LABS:  CBC:   Lab Results   Component Value Date    WBC 6.1 10/18/2021    WBC 5.9 09/21/2021    HGB 15.4 10/18/2021    HGB 15.6 09/21/2021    HCT 47.9 10/18/2021    HCT 48.3 09/21/2021     10/18/2021     09/21/2021     BMP:   Lab Results   Component Value Date     10/18/2021     09/21/2021    POTASSIUM 4.0 10/18/2021    POTASSIUM 3.7 09/21/2021    CHLORIDE 105 10/18/2021    CHLORIDE 105 09/21/2021    CO2 26 10/18/2021    CO2 27 09/21/2021    BUN 8 10/18/2021    BUN 7 09/21/2021    CR 0.81 10/18/2021    CR 0.80 09/21/2021    GLC 96 10/18/2021    GLC 98 09/21/2021     COAGS:   Lab Results   Component Value Date    PTT 44 (H) 06/14/2021    INR 1.12 06/14/2021     POC: No results found for: BGM, HCG, HCGS  HEPATIC:   Lab Results   Component Value Date    ALBUMIN 4.2 10/18/2021    PROTTOTAL 8.0 10/18/2021    ALT 30 10/18/2021    AST 23 10/18/2021    GGT 28 06/23/2021    ALKPHOS 63 10/18/2021    BILITOTAL 0.4 10/18/2021     OTHER:   Lab Results   Component Value Date    A1C 6.2 (H) 10/18/2021    PROSPER 9.3 10/18/2021    MAG 2.3 06/22/2021    TSH 1.03 06/14/2021    T4 1.18 06/14/2021       Anesthesia Plan    ASA Status:  2   NPO Status:  NPO Appropriate    Anesthesia Type: General.     - Airway: ETT   Induction:  "Intravenous.   Maintenance: Inhalation.        Consents    Anesthesia Plan(s) and associated risks, benefits, and realistic alternatives discussed. Questions answered and patient/representative(s) expressed understanding.     - Discussed with:  Patient      - Extended Intubation/Ventilatory Support Discussed: Yes.      - Patient is DNR/DNI Status: No    Use of blood products discussed: Yes.     - Discussed with: Patient.     Postoperative Care    Pain management: IV analgesics.   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 Snider in treatment of breast cancer.  PAC referral for risk assessment and optimization for anesthesia with comorbid conditions of hypertension, former tobacco use:      Pre-operative considerations:   1.  Cardiac:  Functional status- METS >4. Denies cardiac symptoms.  low risk surgery with 0.4% (RCRI #) risk of major adverse cardiac event.   ~hypertension using hyzaar   ~previous cardiac testing above     2.  Pulm:   YANE risk: intermediate   ~former tobacco use      3.  GI:  Risk of PONV score = 2.  If > 2, anti-emetic intervention recommended.      4.  onc: breast cancer with the above procedure planned      5. ENT: h/o chronic sinusitis symptoms. Reports some mild \"stuffiness\" lately. Meme fevers or other associated symptoms.     6. Access: patient reports difficult IV access     VTE risk: 3%     Patient is optimized and is acceptable candidate for the proposed procedure.  No further diagnostic evaluation is needed.      **Physical exam and vital signs not completed today as this visit was " scheduled as a virtual visit during Covid 19 pandemic. Physical exam should be completed the DOS in pre-op**     Final plan per anesthesiologist on day of surgery.      **For further details of assessment, testing, and physical exam please see H and P completed on same date.         TRINY Alicea PA-C

## 2021-10-25 NOTE — H&P
Pre-Operative H & P     CC:  Preoperative exam to assess for increased cardiopulmonary risk while undergoing surgery and anesthesia.    Date of Encounter: 10/25/2021  Primary Care Physician:  Stewart Henry     Reason for visit:   Encounter Diagnosis   Name Primary?     Pre-op evaluation Yes         HPI  Ronna Collins is a 71 year old male who presents for pre-operative H & P in preparation for right simple mastectomy with Dr. Snider on 10/27/21 at [facility]. Patient is being evaluated for comorbid conditions of hypertension, former tobacco use      Dr. Collins has a known right breast cancer. He has been part of ISPY-2 clinical trial. He has been treating with amcenestrant since 2021. He was seen by oncology 10/18/21. He is now scheduled for surgery as above.      History is obtained from the patient and chart review      Hx of abnormal bleeding or anti-platelet use: denies    Menstrual history: No LMP for male patient.    Prior to Admission Medications  Current Outpatient Medications   Medication Sig Dispense Refill     losartan-hydrochlorothiazide (HYZAAR) 100-25 MG tablet Take 1 tablet by mouth daily (Patient taking differently: Take 1 tablet by mouth every morning ) 90 tablet 1     study - amcenestrant, IDS# 3903, 200 MG tablet Take 1 tablet (200 mg) by mouth daily (Patient taking differently: Take 200 mg by mouth every morning ) 32 tablet 0     study - amcenestrant, IDS# 3903, 200 MG tablet Take 1 tablet (200 mg) by mouth daily 32 tablet 0     study - amcenestrant, IDS# 3903, 200 MG tablet Take 1 tablet (200 mg) by mouth daily 32 tablet 0       Family History  Family History   Problem Relation Age of Onset     Diabetes Brother      Hypertension Brother        The complete review of systems is negative other than noted in the HPI or here.   Anesthesia Pre-Procedure Evaluation    Patient: Ronna Collins   MRN: [id number] : 1950        Preoperative  Diagnosis: Malignant neoplasm of central portion of right breast in male, estrogen receptor positive (H) [C50.121, Z17.0]    Procedure : Procedure(s):  RIGHT Simple Mastectomy  RIGHT Axillary Lymph Node Dissection  Video Visit       Past Medical History:   Diagnosis Date     Chronic sinusitis      Hypertension       No past surgical history on file.   No Known Allergies   Social History     Tobacco Use     Smoking status: Former Smoker     Packs/day: 0.50     Years: 20.00     Pack years: 10.00     Types: Cigarettes     Start date: 1976     Quit date: 2000     Years since quittin.1     Smokeless tobacco: Never Used   Substance Use Topics     Alcohol use: Yes     Comment: occ      Wt Readings from Last 1 Encounters:   10/25/21 80.9 kg (178 lb 4.8 oz)        Anesthesia Evaluation            ROS/MED HX  ENT/Pulmonary:     (+) tobacco use, Past use,     Neurologic:  - neg neurologic ROS     Cardiovascular:     (+) hypertension-----Previous cardiac testing   Echo: Date: 21 Results:  Interpretation Summary       Global and regional left ventricular function is normal with an EF of 60-65%.   LVEF 63% based on biplane 2D tracing.   Right ventricular function, chamber size, wall motion, and thickness are   normal.   The inferior vena cava is normal.   No pericardial effusion is present.   There is no prior study for direct comparison.     Stress Test: Date: Results:    ECG Reviewed: Date: 21 Results:  SR with first degree AV block  Cath: Date: Results:   (-) taking anticoagulants/antiplatelets   METS/Exercise Tolerance:     Hematologic:  - neg hematologic  ROS  (-) history of blood transfusion   Musculoskeletal:  - neg musculoskeletal ROS     GI/Hepatic:  - neg GI/hepatic ROS     Renal/Genitourinary:  - neg Renal ROS     Endo:  - neg endo ROS     Psychiatric/Substance Use:  - neg psychiatric ROS     Infectious Disease:  - neg infectious disease ROS     Malignancy:   (+) Malignancy, History of  Breast.Breast CA status post Chemo.        Other:               OUTSIDE LABS:  CBC:   Lab Results   Component Value Date    WBC 6.1 10/18/2021    WBC 5.9 09/21/2021    HGB 15.4 10/18/2021    HGB 15.6 09/21/2021    HCT 47.9 10/18/2021    HCT 48.3 09/21/2021     10/18/2021     09/21/2021     BMP:   Lab Results   Component Value Date     10/18/2021     09/21/2021    POTASSIUM 4.0 10/18/2021    POTASSIUM 3.7 09/21/2021    CHLORIDE 105 10/18/2021    CHLORIDE 105 09/21/2021    CO2 26 10/18/2021    CO2 27 09/21/2021    BUN 8 10/18/2021    BUN 7 09/21/2021    CR 0.81 10/18/2021    CR 0.80 09/21/2021    GLC 96 10/18/2021    GLC 98 09/21/2021     COAGS:   Lab Results   Component Value Date    PTT 44 (H) 06/14/2021    INR 1.12 06/14/2021     POC: No results found for: BGM, HCG, HCGS  HEPATIC:   Lab Results   Component Value Date    ALBUMIN 4.2 10/18/2021    PROTTOTAL 8.0 10/18/2021    ALT 30 10/18/2021    AST 23 10/18/2021    GGT 28 06/23/2021    ALKPHOS 63 10/18/2021    BILITOTAL 0.4 10/18/2021     OTHER:   Lab Results   Component Value Date    A1C 6.2 (H) 10/18/2021    PROSPER 9.3 10/18/2021    MAG 2.3 06/22/2021    TSH 1.03 06/14/2021    T4 1.18 06/14/2021     Virtual visit -  No vitals were obtained       Physical Exam  Constitutional: Awake, alert, cooperative, no apparent distress, and appears stated age.  HENT: Normocephalic  Respiratory: non labored breathing   Neurologic: Awake, alert, oriented to name, place and time.   Neuropsychiatric: Calm, cooperative. Normal affect.         PRIOR LABS/DIAGNOSTIC STUDIES:   All labs and imaging personally reviewed       EKG/ stress test - if available please see in ROS above   Echo result w/o MOPS: Interpretation Summary Global and regional left ventricular function is normal with an EF of 60-65%.LVEF 63% based on biplane 2D tracing.Right ventricular function, chamber size, wall motion, and thickness arenormal.The inferior vena cava is normal.No pericardial  "effusion is present.There is no prior study for direct comparison.    No flowsheet data found.    The patient's records and results personally reviewed by this provider.     Outside records reviewed from: care everywhere      ASSESSMENT and PLAN     Karina is a 71 year old male scheduled for right simple mastectomy on 10/27/21 by Dr. Snider in treatment of breast cancer.  PAC referral for risk assessment and optimization for anesthesia with comorbid conditions of hypertension, former tobacco use:      Pre-operative considerations:   1.  Cardiac:  Functional status- METS >4. Denies cardiac symptoms.  low risk surgery with 0.4% (RCRI #) risk of major adverse cardiac event.   ~hypertension using hyzaar   ~previous cardiac testing above     2.  Pulm:   YANE risk: intermediate   ~former tobacco use      3.  GI:  Risk of PONV score = 2.  If > 2, anti-emetic intervention recommended.      4.  onc: breast cancer with the above procedure planned      5. ENT: h/o chronic sinusitis symptoms. Reports some mild \"stuffiness\" lately. Denies fevers or other associated symptoms.     6. Access: patient reports difficult IV access     VTE risk: 3%     Patient is optimized and is acceptable candidate for the proposed procedure.  No further diagnostic evaluation is needed.      **Physical exam and vital signs not completed today as this visit was scheduled as a virtual visit during Covid 19 pandemic. Physical exam should be completed the DOS in pre-op**     Final plan per anesthesiologist on day of surgery.     ** Patient's visit was done virtually today.  A full physical exam was not completed.  Please refer to the physical examination documented by the anesthesiologist in the anesthesia record on the day of surgery. **     Arrival time, NPO, shower and medication instructions provided by nursing staff today.      Video-Visit Details    Type of service:  Video Visit    Patient verbally consented to video service today: YES      Video " Start Time: 1603  Video End Time (time video stopped): 1616    Originating Location (pt. Location): Home    Distant Location (provider location):  home    Mode of Communication:  Video Conference via [employer]    30 minutes were spent completing chart review, seeing the patient, reviewing labs and test results, and completing documentation         Liana Haley PA-C  Preoperative Assessment Center  [facility]  Clinic and Surgery Center  Phone: [phone]  Fax: [phone]

## 2021-10-25 NOTE — PROGRESS NOTES
Ronna is a 71 year old who is being evaluated via a billable video visit.      How would you like to obtain your AVS? MyChart  If the video visit is dropped, the invitation should be resent by:  Text to [phone]         HPI         Review of Systems         Physical Exam

## 2021-10-25 NOTE — PATIENT INSTRUCTIONS
Preparing for Your Surgery      Name:  Ronna Collins   MRN:  [id number]   :  1950   Today's Date:  10/25/2021       Arriving for surgery:  Surgery date:  10/27/21  Arrival time:  8 am    Restrictions due to COVID 19:       One visitor is allowed in the Pre Op area.       When you go into surgery, one visitor is allowed to wait in the Surgery Waiting Room       (provided there is enough space to social distance).         In hospital patients are allowed 1 visitor per day       The visitor may change daily     Visiting Hours: 8 am - 8:30 pm   No ill visitors.   All visitors must wear face mask.    eduFire parking is available for anyone with mobility limitations or disabilities.  ([place]  24 hours/ 7 days a week; [facility]  7 am- 3:30 pm, Mon- Fri)    Please come to:     [facility] [address]  [address] [place] [address]     -    On arrival to hospital, you will be asked some screening questions and directed to Patient Registration. Patient Registration will then give you further instructions. [phone]?     - ?If you are in need of directions, wheelchair or escort please stop at the Information Desk in the lobby.  Inform the information person that you are here for surgery; a wheelchair and escort to [address] will be provided.?     What can I eat or drink?  -  You may eat and drink normally for up to 8 hours before your surgery. (Until 2:20 am)  -  You may have clear liquids until 2 hours before surgery. (Until 8 am- Stop on arrival to hospital)    Examples of clear liquids:  Water  Clear broth  Juices (apple, white grape, white cranberry  and cider) without pulp  Noncarbonated, powder based beverages  (lemonade and Gerardo-Aid)  Sodas (Sprite, 7-Up, ginger ale and seltzer)  Coffee or tea (without milk or cream)  Gatorade    -  No Alcohol for at least 24 hours before surgery     Which medicines can I take?  Hold Aspirin for 7 days  before surgery.   Hold Multivitamins for 7 days before surgery.  Hold Supplements for 7 days before surgery.  Hold Ibuprofen (Advil, Motrin) for 1 day before surgery--unless otherwise directed by surgeon.  Hold Naproxen (Aleve) for 4 days before surgery.    -  DO NOT take these medications the day of surgery:  Losartan-Hydrochlorothiazide (Hyzaar)    -  PLEASE TAKE these medications the day of surgery:  Study Drug  Acetaminophen (Tylenol) if needed    How do I prepare myself?  - Please take 2 showers before surgery using Scrubcare or Hibiclens soap.    Use this soap only from the neck to your toes.     Leave the soap on your skin for one minute--then rinse thoroughly.      You may use your own shampoo and conditioner; no other hair products.   - Please remove all jewelry and body piercings.  - No lotions, deodorants or fragrance.  - Bring your ID and insurance card.    -If you have a Deep Brain Stimulator, Spinal Cord Stimulator or any neuro stimulator device---you must bring the remote control to the hospital     - All patients are required to have a Covid-19 test within 4 days of surgery/procedure.      -Patients will be contacted by the [facility] scheduling team within 1 week of surgery to make an appointment.      - Patients may call the Scheduling team at [phone] if they have not been scheduled within 4 days of  surgery.      ALL PATIENTS GOING HOME THE SAME DAY OF SURGERY ARE REQUIRED TO HAVE A RESPONSIBLE ADULT TO DRIVE AND BE IN ATTENDANCE WITH THEM FOR 24 HOURS FOLLOWING SURGERY.      Questions or Concerns:    - For any questions regarding the day of surgery or your hospital stay, please contact the Pre Admission Nursing Office at [phone].       - If you have health changes between today and your surgery please call your surgeon.       For questions after surgery please call your surgeons office.

## 2021-10-25 NOTE — NURSING NOTE
"Oncology Rooming Note    October 25, 2021 1:59 PM   Ronna Collins is a 71 year old male who presents for:    Chief Complaint   Patient presents with     Oncology Clinic Visit     Return: Malignant neoplasm of male breast     Initial Vitals: /80 (BP Location: Left arm, Patient Position: Sitting, Cuff Size: Adult Regular)   Pulse 80   Temp 97.6  F (36.4  C) (Tympanic)   Resp 16   Ht 1.727 m (5' 7.99\")   Wt 80.9 kg (178 lb 4.8 oz)   SpO2 98%   BMI 27.12 kg/m   Estimated body mass index is 27.12 kg/m  as calculated from the following:    Height as of this encounter: 1.727 m (5' 7.99\").    Weight as of this encounter: 80.9 kg (178 lb 4.8 oz). Body surface area is 1.97 meters squared.  No Pain (0) Comment: Data Unavailable   No LMP for male patient.  Allergies reviewed: Yes  Medications reviewed: Yes    Medications: Medication refills not needed today.  Pharmacy name entered into [facility]:    [id number] - [place], [place] - [address] AT [id number] - [place], [place] - [address] AT [facility]/ [facility] - [place], [place] - [address]    Clinical concerns: N/A       Lizbet Matos CMA              "

## 2021-10-26 ENCOUNTER — ANCILLARY PROCEDURE (OUTPATIENT)
Dept: MRI IMAGING | Facility: CLINIC | Age: 71
End: 2021-10-26
Attending: INTERNAL MEDICINE
Payer: COMMERCIAL

## 2021-10-26 ENCOUNTER — VIRTUAL VISIT (OUTPATIENT)
Dept: ONCOLOGY | Facility: CLINIC | Age: 71
End: 2021-10-26
Attending: SURGERY
Payer: COMMERCIAL

## 2021-10-26 ENCOUNTER — LAB (OUTPATIENT)
Dept: LAB | Facility: CLINIC | Age: 71
End: 2021-10-26
Attending: SURGERY
Payer: COMMERCIAL

## 2021-10-26 DIAGNOSIS — C77.3 BREAST CANCER METASTASIZED TO AXILLARY LYMPH NODE, RIGHT (H): ICD-10-CM

## 2021-10-26 DIAGNOSIS — Z11.59 ENCOUNTER FOR SCREENING FOR OTHER VIRAL DISEASES: ICD-10-CM

## 2021-10-26 DIAGNOSIS — Z17.0 MALIGNANT NEOPLASM OF CENTRAL PORTION OF RIGHT BREAST IN MALE, ESTROGEN RECEPTOR POSITIVE (H): Primary | ICD-10-CM

## 2021-10-26 DIAGNOSIS — Z17.0 MALIGNANT NEOPLASM OF CENTRAL PORTION OF RIGHT BREAST IN MALE, ESTROGEN RECEPTOR POSITIVE (H): ICD-10-CM

## 2021-10-26 DIAGNOSIS — C50.121 MALIGNANT NEOPLASM OF CENTRAL PORTION OF RIGHT BREAST IN MALE, ESTROGEN RECEPTOR POSITIVE (H): Primary | ICD-10-CM

## 2021-10-26 DIAGNOSIS — C50.911 BREAST CANCER METASTASIZED TO AXILLARY LYMPH NODE, RIGHT (H): ICD-10-CM

## 2021-10-26 DIAGNOSIS — C50.121 MALIGNANT NEOPLASM OF CENTRAL PORTION OF RIGHT BREAST IN MALE, ESTROGEN RECEPTOR POSITIVE (H): ICD-10-CM

## 2021-10-26 LAB — SARS-COV-2 RNA RESP QL NAA+PROBE: NEGATIVE

## 2021-10-26 PROCEDURE — A9585 GADOBUTROL INJECTION: HCPCS | Performed by: RADIOLOGY

## 2021-10-26 PROCEDURE — U0003 INFECTIOUS AGENT DETECTION BY NUCLEIC ACID (DNA OR RNA); SEVERE ACUTE RESPIRATORY SYNDROME CORONAVIRUS 2 (SARS-COV-2) (CORONAVIRUS DISEASE [COVID-19]), AMPLIFIED PROBE TECHNIQUE, MAKING USE OF HIGH THROUGHPUT TECHNOLOGIES AS DESCRIBED BY CMS-2020-01-R: HCPCS | Mod: 90 | Performed by: PATHOLOGY

## 2021-10-26 PROCEDURE — U0005 INFEC AGEN DETEC AMPLI PROBE: HCPCS | Mod: 90 | Performed by: PATHOLOGY

## 2021-10-26 PROCEDURE — 99207 PR PREOP VISIT IN GLOBAL PKG: CPT | Performed by: SURGERY

## 2021-10-26 PROCEDURE — 77049 MRI BREAST C-+ W/CAD BI: CPT | Performed by: RADIOLOGY

## 2021-10-26 RX ORDER — GADOBUTROL 604.72 MG/ML
10 INJECTION INTRAVENOUS ONCE
Status: COMPLETED | OUTPATIENT
Start: 2021-10-26 | End: 2021-10-26

## 2021-10-26 RX ADMIN — GADOBUTROL 8 ML: 604.72 INJECTION INTRAVENOUS at 07:20

## 2021-10-26 NOTE — LETTER
10/26/2021         RE: Ronna Collins  [address]  [place] [address]        Dear Colleague,    Thank you for referring your patient, Ronna Collins, to the [facility]. Please see a copy of my visit note below.    Ronna is a 71 year old who is being evaluated via a billable video visit.      How would you like to obtain your AVS? MyChart  If the video visit is dropped, the invitation should be resent by: Send to e-mail at: [email or web address]  Will anyone else be joining your video visit? Yes: Daughter-Gregorio. How would they like to receive their invitation? Send to e-mail at: [email or web address]      ---  Visit was scheduled as a phone visit and thus patient was phoned.    The above were completed by the medical assistant for the visit.    FOLLOW-UP  Oct 26, 2021    Ronna Collins is a 71 year old male who is phoning in for follow-up for:    Cancer Staging  Malignant neoplasm of male breast (H)  Staging form: Breast, AJCC 8th Edition  - Clinical stage from 6/8/2021: Stage IIIB (cT4b, cN3b, cM0, G2, ER+, AL+, HER2-) - Signed by Mikaela Sr MD on 8/30/2021    Treatment to date:  1. Enrolled on ISPY-2   2. Neoadjuvant amcenestrant (7/1/2021 to ongoing)    INVESTIGATIONS:    Bilateral Breast MRI (10/26/2021) showed:  Breast composition: Almost entirely fat  Background parenchymal enhancement 1st post C image: Minimal  FINDINGS:   Longest distance of suspicious enhancement: Right breast, 3.2 cm  Biopsy-proven heterogeneously enhancing, irregular cancer of the right breast has persistent invasion of the nipple and associated retraction and invasion of the underlying pectoralis muscle, similar to prior. The mass measures 3.3 x 2.6 x 3.3 cm, previously 3.5 x 3.4 x 3.2 cm,    No significant change of the right axillary lymphadenopathy.  There are two stable right internal mammary chain nodes since the first MR, unchanged.  No left sided  lymphadenopathy. No suspicious enhancement in the left breast.  IMPRESSION: BI-RADS CATEGORY: 6 - Known Biopsy-Proven Malignancy.      Continued decrease in volume of the right breast cancer. Unchanged right axillary adenopathy.     Assessment/Plan:  Diagnoses       Codes Comments    Malignant neoplasm of central portion of right breast in male, estrogen receptor positive (H)    -  Primary C50.121, Z17.0          ASSESSMENT:  Ronna Collins is a 71 year old male with breast cancer.    We reviewed the MRI findings. It is essentially unchanged from prior, with small incremental decrease in the primary. The axillary adenopathy remains unchanged. My suspicion is that it is unlikely to significantly change enough before December that it'll alter surgery.  I also reviewed this with Dr Sr and the plan will be to move forward with surgery.      All of the above was discussed and all questions were answered.    PLAN:  1. Proceed with surgery.    Nida Snider MD MSc [facility] FACS    Division of Surgical Oncology  [facility]     Phone call duration: 5 minutes    10 minutes spent on the date of the encounter doing chart review, review of test results, interpretation of tests, patient visit, documentation and discussion with family.      Again, thank you for allowing me to participate in the care of your patient.        Sincerely,        Nida Snider MD

## 2021-10-26 NOTE — DISCHARGE INSTRUCTIONS
MRI Contrast Discharge Instructions    The IV contrast you received today will pass out of your body in your  urine. This will happen in the next 24 hours. You will not feel this process.  Your urine will not change color.    Drink at least 4 extra glasses of water or juice today (unless your doctor  has restricted your fluids). This reduces the stress on your kidneys.  You may take your regular medicines.    If you are on dialysis: It is best to have dialysis today.    If you have a reaction: Most reactions happen right away. If you have  any new symptoms after leaving the hospital (such as hives or swelling),  call your hospital at the correct number below. Or call your family doctor.  If you have breathing distress or wheezing, call 911.    Special instructions: ***    I have read and understand the above information.    Signature:______________________________________ Date:___________    Staff:__________________________________________ Date:___________     Time:__________    [facility] Departments:    ___[place]: [phone]  ___[facility]: [phone]  ___[place]: [phone] ___[facility]: [phone]  ___[facility]: [phone]  ___[facility]: [phone]  ___[phone]  ___[facility]: [phone]  ___[phone]

## 2021-10-26 NOTE — PROGRESS NOTES
Ronna is a 71 year old who is being evaluated via a billable video visit.      How would you like to obtain your AVS? MyChart  If the video visit is dropped, the invitation should be resent by: Send to e-mail at: [email or web address]  Will anyone else be joining your video visit? Yes: Daughter-Gregorio. How would they like to receive their invitation? Send to e-mail at: [email or web address]      ---  Visit was scheduled as a phone visit and thus patient was phoned.    The above were completed by the medical assistant for the visit.    FOLLOW-UP  Oct 26, 2021    Ronna Collnis is a 71 year old male who is phoning in for follow-up for:    Cancer Staging  Malignant neoplasm of male breast (H)  Staging form: Breast, AJCC 8th Edition  - Clinical stage from 6/8/2021: Stage IIIB (cT4b, cN3b, cM0, G2, ER+, CO+, HER2-) - Signed by Mikaela Sr MD on 8/30/2021    Treatment to date:  1. Enrolled on ISPY-2   2. Neoadjuvant amcenestrant (7/1/2021 to ongoing)    INVESTIGATIONS:    Bilateral Breast MRI (10/26/2021) showed:  Breast composition: Almost entirely fat  Background parenchymal enhancement 1st post C image: Minimal  FINDINGS:   Longest distance of suspicious enhancement: Right breast, 3.2 cm  Biopsy-proven heterogeneously enhancing, irregular cancer of the right breast has persistent invasion of the nipple and associated retraction and invasion of the underlying pectoralis muscle, similar to prior. The mass measures 3.3 x 2.6 x 3.3 cm, previously 3.5 x 3.4 x 3.2 cm,    No significant change of the right axillary lymphadenopathy.  There are two stable right internal mammary chain nodes since the first MR, unchanged.  No left sided lymphadenopathy. No suspicious enhancement in the left breast.  IMPRESSION: BI-RADS CATEGORY: 6 - Known Biopsy-Proven Malignancy.      Continued decrease in volume of the right breast cancer. Unchanged right axillary adenopathy.     Assessment/Plan:  Diagnoses       Codes  Comments    Malignant neoplasm of central portion of right breast in male, estrogen receptor positive (H)    -  Primary C50.121, Z17.0          ASSESSMENT:  Ronna Collins is a 71 year old male with breast cancer.    We reviewed the MRI findings. It is essentially unchanged from prior, with small incremental decrease in the primary. The axillary adenopathy remains unchanged. My suspicion is that it is unlikely to significantly change enough before December that it'll alter surgery.  I also reviewed this with Dr Sr and the plan will be to move forward with surgery.      All of the above was discussed and all questions were answered.    PLAN:  1. Proceed with surgery.    Nida Snider MD MSc [facility] FACS    Division of Surgical Oncology  [facility]     Phone call duration: 5 minutes    10 minutes spent on the date of the encounter doing chart review, review of test results, interpretation of tests, patient visit, documentation and discussion with family.

## 2021-10-27 ENCOUNTER — HOSPITAL ENCOUNTER (OUTPATIENT)
Facility: CLINIC | Age: 71
Discharge: HOME | End: 2021-10-27
Attending: SURGERY | Admitting: SURGERY
Payer: COMMERCIAL

## 2021-10-27 ENCOUNTER — ANESTHESIA (OUTPATIENT)
Dept: SURGERY | Facility: CLINIC | Age: 71
End: 2021-10-27
Payer: COMMERCIAL

## 2021-10-27 VITALS
HEIGHT: 68 IN | HEART RATE: 83 BPM | OXYGEN SATURATION: 98 % | SYSTOLIC BLOOD PRESSURE: 155 MMHG | DIASTOLIC BLOOD PRESSURE: 100 MMHG | TEMPERATURE: 97 F | RESPIRATION RATE: 16 BRPM | WEIGHT: 175.27 LBS | BODY MASS INDEX: 26.56 KG/M2

## 2021-10-27 DIAGNOSIS — Z17.0 MALIGNANT NEOPLASM OF CENTRAL PORTION OF RIGHT BREAST IN MALE, ESTROGEN RECEPTOR POSITIVE (H): Primary | ICD-10-CM

## 2021-10-27 DIAGNOSIS — C50.121 MALIGNANT NEOPLASM OF CENTRAL PORTION OF RIGHT BREAST IN MALE, ESTROGEN RECEPTOR POSITIVE (H): Primary | ICD-10-CM

## 2021-10-27 LAB
CREAT SERPL-MCNC: 0.73 MG/DL (ref 0.66–1.25)
GFR SERPL CREATININE-BSD FRML MDRD: >90 ML/MIN/1.73M2
GLUCOSE BLDC GLUCOMTR-MCNC: 112 MG/DL (ref 70–99)
POTASSIUM BLD-SCNC: 4 MMOL/L (ref 3.4–5.3)

## 2021-10-27 PROCEDURE — 88332 PATH CONSLTJ SURG EA ADD BLK: CPT | Mod: 26 | Performed by: PATHOLOGY

## 2021-10-27 PROCEDURE — 88360 TUMOR IMMUNOHISTOCHEM/MANUAL: CPT | Mod: 26 | Performed by: PATHOLOGY

## 2021-10-27 PROCEDURE — 250N000013 HC RX MED GY IP 250 OP 250 PS 637: Performed by: ANESTHESIOLOGY

## 2021-10-27 PROCEDURE — 258N000003 HC RX IP 258 OP 636: Performed by: NURSE ANESTHETIST, CERTIFIED REGISTERED

## 2021-10-27 PROCEDURE — 250N000009 HC RX 250: Performed by: NURSE ANESTHETIST, CERTIFIED REGISTERED

## 2021-10-27 PROCEDURE — 88341 IMHCHEM/IMCYTCHM EA ADD ANTB: CPT | Mod: 26 | Performed by: PATHOLOGY

## 2021-10-27 PROCEDURE — 88307 TISSUE EXAM BY PATHOLOGIST: CPT | Mod: 26 | Performed by: PATHOLOGY

## 2021-10-27 PROCEDURE — 710N000012 HC RECOVERY PHASE 2, PER MINUTE: Performed by: SURGERY

## 2021-10-27 PROCEDURE — 250N000011 HC RX IP 250 OP 636: Performed by: ANESTHESIOLOGY

## 2021-10-27 PROCEDURE — 999N000141 HC STATISTIC PRE-PROCEDURE NURSING ASSESSMENT: Performed by: SURGERY

## 2021-10-27 PROCEDURE — 88342 IMHCHEM/IMCYTCHM 1ST ANTB: CPT | Mod: TC | Performed by: SURGERY

## 2021-10-27 PROCEDURE — 250N000011 HC RX IP 250 OP 636: Performed by: SURGERY

## 2021-10-27 PROCEDURE — 88331 PATH CONSLTJ SURG 1 BLK 1SPC: CPT | Mod: 26 | Performed by: PATHOLOGY

## 2021-10-27 PROCEDURE — 84132 ASSAY OF SERUM POTASSIUM: CPT | Performed by: ANESTHESIOLOGY

## 2021-10-27 PROCEDURE — 88291 CYTO/MOLECULAR REPORT: CPT | Performed by: MEDICAL GENETICS

## 2021-10-27 PROCEDURE — 360N000076 HC SURGERY LEVEL 3, PER MIN: Performed by: SURGERY

## 2021-10-27 PROCEDURE — 250N000013 HC RX MED GY IP 250 OP 250 PS 637: Performed by: SURGERY

## 2021-10-27 PROCEDURE — 88342 IMHCHEM/IMCYTCHM 1ST ANTB: CPT | Mod: 26 | Performed by: PATHOLOGY

## 2021-10-27 PROCEDURE — 370N000017 HC ANESTHESIA TECHNICAL FEE, PER MIN: Performed by: SURGERY

## 2021-10-27 PROCEDURE — 710N000010 HC RECOVERY PHASE 1, LEVEL 2, PER MIN: Performed by: SURGERY

## 2021-10-27 PROCEDURE — 272N000001 HC OR GENERAL SUPPLY STERILE: Performed by: SURGERY

## 2021-10-27 PROCEDURE — 19307 MAST MOD RAD: CPT | Mod: RT | Performed by: SURGERY

## 2021-10-27 PROCEDURE — 250N000025 HC SEVOFLURANE, PER MIN: Performed by: SURGERY

## 2021-10-27 PROCEDURE — 36415 COLL VENOUS BLD VENIPUNCTURE: CPT | Performed by: ANESTHESIOLOGY

## 2021-10-27 PROCEDURE — 250N000011 HC RX IP 250 OP 636: Performed by: NURSE ANESTHETIST, CERTIFIED REGISTERED

## 2021-10-27 PROCEDURE — 88305 TISSUE EXAM BY PATHOLOGIST: CPT | Mod: 26 | Performed by: PATHOLOGY

## 2021-10-27 PROCEDURE — 82565 ASSAY OF CREATININE: CPT | Performed by: ANESTHESIOLOGY

## 2021-10-27 PROCEDURE — 88309 TISSUE EXAM BY PATHOLOGIST: CPT | Mod: 26 | Performed by: PATHOLOGY

## 2021-10-27 PROCEDURE — 88331 PATH CONSLTJ SURG 1 BLK 1SPC: CPT | Mod: TC | Performed by: SURGERY

## 2021-10-27 PROCEDURE — 250N000009 HC RX 250: Performed by: ANESTHESIOLOGY

## 2021-10-27 PROCEDURE — 82962 GLUCOSE BLOOD TEST: CPT

## 2021-10-27 PROCEDURE — 88377 M/PHMTRC ALYS ISHQUANT/SEMIQ: CPT | Performed by: SURGERY

## 2021-10-27 RX ORDER — PROPOFOL 10 MG/ML
INJECTION, EMULSION INTRAVENOUS PRN
Status: DISCONTINUED | OUTPATIENT
Start: 2021-10-27 | End: 2021-10-27

## 2021-10-27 RX ORDER — LORAZEPAM 2 MG/ML
.5-1 INJECTION INTRAMUSCULAR
Status: DISCONTINUED | OUTPATIENT
Start: 2021-10-27 | End: 2021-10-27 | Stop reason: HOSPADM

## 2021-10-27 RX ORDER — NALOXONE HYDROCHLORIDE 0.4 MG/ML
0.2 INJECTION, SOLUTION INTRAMUSCULAR; INTRAVENOUS; SUBCUTANEOUS
Status: DISCONTINUED | OUTPATIENT
Start: 2021-10-27 | End: 2021-10-27 | Stop reason: HOSPADM

## 2021-10-27 RX ORDER — ONDANSETRON 4 MG/1
4 TABLET, ORALLY DISINTEGRATING ORAL EVERY 30 MIN PRN
Status: DISCONTINUED | OUTPATIENT
Start: 2021-10-27 | End: 2021-10-27 | Stop reason: HOSPADM

## 2021-10-27 RX ORDER — OXYCODONE HYDROCHLORIDE 5 MG/1
5 TABLET ORAL
Status: CANCELLED | OUTPATIENT
Start: 2021-10-27

## 2021-10-27 RX ORDER — ONDANSETRON 4 MG/1
4 TABLET, ORALLY DISINTEGRATING ORAL
Status: CANCELLED | OUTPATIENT
Start: 2021-10-27

## 2021-10-27 RX ORDER — SODIUM CHLORIDE, SODIUM LACTATE, POTASSIUM CHLORIDE, CALCIUM CHLORIDE 600; 310; 30; 20 MG/100ML; MG/100ML; MG/100ML; MG/100ML
INJECTION, SOLUTION INTRAVENOUS CONTINUOUS PRN
Status: DISCONTINUED | OUTPATIENT
Start: 2021-10-27 | End: 2021-10-27

## 2021-10-27 RX ORDER — PROPOFOL 10 MG/ML
INJECTION, EMULSION INTRAVENOUS CONTINUOUS PRN
Status: DISCONTINUED | OUTPATIENT
Start: 2021-10-27 | End: 2021-10-27

## 2021-10-27 RX ORDER — OXYCODONE HYDROCHLORIDE 5 MG/1
5 TABLET ORAL EVERY 4 HOURS PRN
Status: DISCONTINUED | OUTPATIENT
Start: 2021-10-27 | End: 2021-10-27 | Stop reason: HOSPADM

## 2021-10-27 RX ORDER — OXYCODONE HYDROCHLORIDE 5 MG/1
5 TABLET ORAL EVERY 4 HOURS PRN
Qty: 25 TABLET | Refills: 0 | Status: SHIPPED | OUTPATIENT
Start: 2021-10-27 | End: 2021-12-14

## 2021-10-27 RX ORDER — POLYETHYLENE GLYCOL 3350 17 G/17G
17 POWDER, FOR SOLUTION ORAL DAILY
Qty: 510 G | Refills: 0 | Status: SHIPPED | OUTPATIENT
Start: 2021-10-27 | End: 2021-12-14

## 2021-10-27 RX ORDER — BUPIVACAINE HYDROCHLORIDE 2.5 MG/ML
INJECTION, SOLUTION EPIDURAL; INFILTRATION; INTRACAUDAL
Status: COMPLETED | OUTPATIENT
Start: 2021-10-27 | End: 2021-10-27

## 2021-10-27 RX ORDER — ALBUTEROL SULFATE 0.83 MG/ML
2.5 SOLUTION RESPIRATORY (INHALATION) EVERY 4 HOURS PRN
Status: DISCONTINUED | OUTPATIENT
Start: 2021-10-27 | End: 2021-10-27 | Stop reason: HOSPADM

## 2021-10-27 RX ORDER — FENTANYL CITRATE 50 UG/ML
25 INJECTION, SOLUTION INTRAMUSCULAR; INTRAVENOUS
Status: DISCONTINUED | OUTPATIENT
Start: 2021-10-27 | End: 2021-10-27 | Stop reason: HOSPADM

## 2021-10-27 RX ORDER — HYDROMORPHONE HYDROCHLORIDE 1 MG/ML
0.2 INJECTION, SOLUTION INTRAMUSCULAR; INTRAVENOUS; SUBCUTANEOUS EVERY 5 MIN PRN
Status: DISCONTINUED | OUTPATIENT
Start: 2021-10-27 | End: 2021-10-27 | Stop reason: HOSPADM

## 2021-10-27 RX ORDER — DEXMEDETOMIDINE HYDROCHLORIDE 4 UG/ML
INJECTION, SOLUTION INTRAVENOUS
Status: COMPLETED | OUTPATIENT
Start: 2021-10-27 | End: 2021-10-27

## 2021-10-27 RX ORDER — DEXAMETHASONE SODIUM PHOSPHATE 10 MG/ML
INJECTION, SOLUTION INTRAMUSCULAR; INTRAVENOUS
Status: COMPLETED | OUTPATIENT
Start: 2021-10-27 | End: 2021-10-27

## 2021-10-27 RX ORDER — ONDANSETRON 2 MG/ML
4 INJECTION INTRAMUSCULAR; INTRAVENOUS EVERY 30 MIN PRN
Status: DISCONTINUED | OUTPATIENT
Start: 2021-10-27 | End: 2021-10-27 | Stop reason: HOSPADM

## 2021-10-27 RX ORDER — FENTANYL CITRATE 50 UG/ML
50 INJECTION, SOLUTION INTRAMUSCULAR; INTRAVENOUS EVERY 5 MIN PRN
Status: DISCONTINUED | OUTPATIENT
Start: 2021-10-27 | End: 2021-10-27 | Stop reason: HOSPADM

## 2021-10-27 RX ORDER — FENTANYL CITRATE 50 UG/ML
25-50 INJECTION, SOLUTION INTRAMUSCULAR; INTRAVENOUS
Status: DISCONTINUED | OUTPATIENT
Start: 2021-10-27 | End: 2021-10-27 | Stop reason: HOSPADM

## 2021-10-27 RX ORDER — NALOXONE HYDROCHLORIDE 0.4 MG/ML
0.4 INJECTION, SOLUTION INTRAMUSCULAR; INTRAVENOUS; SUBCUTANEOUS
Status: DISCONTINUED | OUTPATIENT
Start: 2021-10-27 | End: 2021-10-27 | Stop reason: HOSPADM

## 2021-10-27 RX ORDER — FLUMAZENIL 0.1 MG/ML
0.2 INJECTION, SOLUTION INTRAVENOUS
Status: DISCONTINUED | OUTPATIENT
Start: 2021-10-27 | End: 2021-10-27 | Stop reason: HOSPADM

## 2021-10-27 RX ORDER — LIDOCAINE HYDROCHLORIDE 20 MG/ML
INJECTION, SOLUTION INFILTRATION; PERINEURAL PRN
Status: DISCONTINUED | OUTPATIENT
Start: 2021-10-27 | End: 2021-10-27

## 2021-10-27 RX ORDER — LIDOCAINE 40 MG/G
CREAM TOPICAL
Status: DISCONTINUED | OUTPATIENT
Start: 2021-10-27 | End: 2021-10-27 | Stop reason: HOSPADM

## 2021-10-27 RX ORDER — ACETAMINOPHEN 325 MG/1
975 TABLET ORAL ONCE
Status: COMPLETED | OUTPATIENT
Start: 2021-10-27 | End: 2021-10-27

## 2021-10-27 RX ORDER — ACETAMINOPHEN 325 MG/1
650 TABLET ORAL
Status: CANCELLED | OUTPATIENT
Start: 2021-10-27

## 2021-10-27 RX ORDER — EPHEDRINE SULFATE 50 MG/ML
INJECTION, SOLUTION INTRAMUSCULAR; INTRAVENOUS; SUBCUTANEOUS PRN
Status: DISCONTINUED | OUTPATIENT
Start: 2021-10-27 | End: 2021-10-27

## 2021-10-27 RX ORDER — HALOPERIDOL 5 MG/ML
1 INJECTION INTRAMUSCULAR
Status: DISCONTINUED | OUTPATIENT
Start: 2021-10-27 | End: 2021-10-27 | Stop reason: HOSPADM

## 2021-10-27 RX ORDER — CEFAZOLIN SODIUM 2 G/100ML
2 INJECTION, SOLUTION INTRAVENOUS
Status: COMPLETED | OUTPATIENT
Start: 2021-10-27 | End: 2021-10-27

## 2021-10-27 RX ORDER — MEPERIDINE HYDROCHLORIDE 25 MG/ML
12.5 INJECTION INTRAMUSCULAR; INTRAVENOUS; SUBCUTANEOUS
Status: DISCONTINUED | OUTPATIENT
Start: 2021-10-27 | End: 2021-10-27 | Stop reason: HOSPADM

## 2021-10-27 RX ORDER — ONDANSETRON 2 MG/ML
INJECTION INTRAMUSCULAR; INTRAVENOUS PRN
Status: DISCONTINUED | OUTPATIENT
Start: 2021-10-27 | End: 2021-10-27

## 2021-10-27 RX ORDER — SODIUM CHLORIDE, SODIUM LACTATE, POTASSIUM CHLORIDE, CALCIUM CHLORIDE 600; 310; 30; 20 MG/100ML; MG/100ML; MG/100ML; MG/100ML
INJECTION, SOLUTION INTRAVENOUS CONTINUOUS
Status: DISCONTINUED | OUTPATIENT
Start: 2021-10-27 | End: 2021-10-27 | Stop reason: HOSPADM

## 2021-10-27 RX ORDER — DEXAMETHASONE SODIUM PHOSPHATE 4 MG/ML
INJECTION, SOLUTION INTRA-ARTICULAR; INTRALESIONAL; INTRAMUSCULAR; INTRAVENOUS; SOFT TISSUE PRN
Status: DISCONTINUED | OUTPATIENT
Start: 2021-10-27 | End: 2021-10-27

## 2021-10-27 RX ORDER — CEFAZOLIN SODIUM 2 G/100ML
2 INJECTION, SOLUTION INTRAVENOUS SEE ADMIN INSTRUCTIONS
Status: DISCONTINUED | OUTPATIENT
Start: 2021-10-27 | End: 2021-10-27 | Stop reason: HOSPADM

## 2021-10-27 RX ADMIN — SODIUM CHLORIDE, POTASSIUM CHLORIDE, SODIUM LACTATE AND CALCIUM CHLORIDE: 600; 310; 30; 20 INJECTION, SOLUTION INTRAVENOUS at 10:39

## 2021-10-27 RX ADMIN — ROCURONIUM BROMIDE 10 MG: 50 INJECTION, SOLUTION INTRAVENOUS at 11:30

## 2021-10-27 RX ADMIN — PHENYLEPHRINE HYDROCHLORIDE 100 MCG: 10 INJECTION INTRAVENOUS at 10:51

## 2021-10-27 RX ADMIN — PROPOFOL 180 MG: 10 INJECTION, EMULSION INTRAVENOUS at 10:45

## 2021-10-27 RX ADMIN — ENOXAPARIN SODIUM 40 MG: 40 INJECTION SUBCUTANEOUS at 10:07

## 2021-10-27 RX ADMIN — DEXAMETHASONE SODIUM PHOSPHATE 6 MG: 4 INJECTION, SOLUTION INTRA-ARTICULAR; INTRALESIONAL; INTRAMUSCULAR; INTRAVENOUS; SOFT TISSUE at 11:06

## 2021-10-27 RX ADMIN — FENTANYL CITRATE 150 MCG: 50 INJECTION INTRAMUSCULAR; INTRAVENOUS at 10:45

## 2021-10-27 RX ADMIN — HYDROMORPHONE HYDROCHLORIDE 0.25 MG: 1 INJECTION, SOLUTION INTRAMUSCULAR; INTRAVENOUS; SUBCUTANEOUS at 15:31

## 2021-10-27 RX ADMIN — PROPOFOL 50 MCG/KG/MIN: 10 INJECTION, EMULSION INTRAVENOUS at 10:49

## 2021-10-27 RX ADMIN — ROCURONIUM BROMIDE 5 MG: 50 INJECTION, SOLUTION INTRAVENOUS at 13:55

## 2021-10-27 RX ADMIN — ROCURONIUM BROMIDE 10 MG: 50 INJECTION, SOLUTION INTRAVENOUS at 12:13

## 2021-10-27 RX ADMIN — ACETAMINOPHEN 975 MG: 325 TABLET, FILM COATED ORAL at 17:48

## 2021-10-27 RX ADMIN — LIDOCAINE HYDROCHLORIDE 100 MG: 20 INJECTION, SOLUTION INFILTRATION; PERINEURAL at 10:45

## 2021-10-27 RX ADMIN — CEFAZOLIN 2 G: 10 INJECTION, POWDER, FOR SOLUTION INTRAVENOUS at 10:53

## 2021-10-27 RX ADMIN — ROCURONIUM BROMIDE 5 MG: 50 INJECTION, SOLUTION INTRAVENOUS at 14:17

## 2021-10-27 RX ADMIN — FENTANYL CITRATE 50 MCG: 50 INJECTION INTRAMUSCULAR; INTRAVENOUS at 10:00

## 2021-10-27 RX ADMIN — DEXAMETHASONE SODIUM PHOSPHATE 1 MG: 10 INJECTION, SOLUTION INTRAMUSCULAR; INTRAVENOUS at 10:10

## 2021-10-27 RX ADMIN — Medication 5 MG: at 10:51

## 2021-10-27 RX ADMIN — FENTANYL CITRATE 50 MCG: 50 INJECTION INTRAMUSCULAR; INTRAVENOUS at 13:07

## 2021-10-27 RX ADMIN — FENTANYL CITRATE 25 MCG: 50 INJECTION INTRAMUSCULAR; INTRAVENOUS at 18:08

## 2021-10-27 RX ADMIN — HYDROMORPHONE HYDROCHLORIDE 0.2 MG: 1 INJECTION, SOLUTION INTRAMUSCULAR; INTRAVENOUS; SUBCUTANEOUS at 16:06

## 2021-10-27 RX ADMIN — ROCURONIUM BROMIDE 5 MG: 50 INJECTION, SOLUTION INTRAVENOUS at 13:07

## 2021-10-27 RX ADMIN — Medication 10 MG: at 11:02

## 2021-10-27 RX ADMIN — ROCURONIUM BROMIDE 50 MG: 50 INJECTION, SOLUTION INTRAVENOUS at 10:45

## 2021-10-27 RX ADMIN — ONDANSETRON 4 MG: 2 INJECTION INTRAMUSCULAR; INTRAVENOUS at 11:06

## 2021-10-27 RX ADMIN — ACETAMINOPHEN 975 MG: 325 TABLET, FILM COATED ORAL at 09:48

## 2021-10-27 RX ADMIN — OXYCODONE HYDROCHLORIDE 5 MG: 5 TABLET ORAL at 16:29

## 2021-10-27 RX ADMIN — HYDROMORPHONE HYDROCHLORIDE 0.2 MG: 1 INJECTION, SOLUTION INTRAMUSCULAR; INTRAVENOUS; SUBCUTANEOUS at 16:12

## 2021-10-27 RX ADMIN — SUGAMMADEX 200 MG: 100 INJECTION, SOLUTION INTRAVENOUS at 15:37

## 2021-10-27 RX ADMIN — DEXMEDETOMIDINE 20 MCG: 100 INJECTION, SOLUTION, CONCENTRATE INTRAVENOUS at 10:10

## 2021-10-27 RX ADMIN — MIDAZOLAM 1 MG: 1 INJECTION INTRAMUSCULAR; INTRAVENOUS at 09:59

## 2021-10-27 RX ADMIN — PHENYLEPHRINE HYDROCHLORIDE 0.2 MCG/KG/MIN: 10 INJECTION INTRAVENOUS at 11:30

## 2021-10-27 RX ADMIN — CEFAZOLIN 2 G: 10 INJECTION, POWDER, FOR SOLUTION INTRAVENOUS at 14:50

## 2021-10-27 RX ADMIN — FENTANYL CITRATE 25 MCG: 50 INJECTION INTRAMUSCULAR; INTRAVENOUS at 17:48

## 2021-10-27 RX ADMIN — BUPIVACAINE HYDROCHLORIDE 30 ML: 2.5 INJECTION, SOLUTION EPIDURAL; INFILTRATION; INTRACAUDAL; PERINEURAL at 10:10

## 2021-10-27 ASSESSMENT — MIFFLIN-ST. JEOR: SCORE: 1534.5

## 2021-10-27 NOTE — DISCHARGE INSTRUCTIONS
[facility]  Same-Day Surgery   Adult Discharge Orders & Instructions     For 24 hours after surgery    1. Get plenty of rest.  A responsible adult must stay with you for at least 24 hours after you leave the hospital.   2. Do not drive or use heavy equipment.  If you have weakness or tingling, don't drive or use heavy equipment until this feeling goes away.  3. Do not drink alcohol.  4. Avoid strenuous or risky activities.  Ask for help when climbing stairs.   5. You may feel lightheaded.  IF so, sit for a few minutes before standing.  Have someone help you get up.   6. If you have nausea (feel sick to your stomach): Drink only clear liquids such as apple juice, ginger ale, broth or 7-Up.  Rest may also help.  Be sure to drink enough fluids.  Move to a regular diet as you feel able.  7. You may have a slight fever. Call the doctor if your fever is over 100 F (37.7 C) (taken under the tongue) or lasts longer than 24 hours.  8. You may have a dry mouth, a sore throat, muscle aches or trouble sleeping.  These should go away after 24 hours.  9. Do not make important or legal decisions.   Call your doctor for any of the followin.  Signs of infection (fever, growing tenderness at the surgery site, a large amount of drainage or bleeding, severe pain, foul-smelling drainage, redness, swelling).    2. It has been over 8 to 10 hours since surgery and you are still not able to urinate (pass water).    3.  Headache for over 24 hours.    4.  Numbness, tingling or weakness the day after surgery (if you had spinal anesthesia).  To contact a doctor, call Dr. Nida Snider's office @ [phone] (clinic number) or:        [phone] and ask for the resident on call for   Oncology (answered 24 hours a day)      Emergency Department:    [facility]: [phone]       (TTY for hearing impaired: [phone])    [facility]: [phone]       (TTY for hearing impaired:  [phone])

## 2021-10-27 NOTE — BRIEF OP NOTE
[facility]    Brief Operative Note    Pre-operative diagnosis: Malignant neoplasm of central portion of right breast in male, estrogen receptor positive (H) [C50.121, Z17.0]  Post-operative diagnosis Same as pre-operative diagnosis    Procedure: Procedure(s):  RIGHT Simple Mastectomy  RIGHT Axillary Lymph Node Dissection  Surgeon: Surgeon(s) and Role:     * Nida Snider MD - Primary     * Cheryl Mooney MD - Resident - Assisting  Anesthesia: Combined General with Block   Estimated Blood Loss: 30 mL from 10/27/2021 10:38 AM to 10/27/2021  3:50 PM      Drains: TEOFILO drain x2  Specimens:   ID Type Source Tests Collected by Time Destination   1 : Right breast inferior flap margain Foreign Body Breast, Right SURGICAL PATHOLOGY EXAM Nida Snider MD 10/27/2021 11:24 AM    2 : Right axillary contents, levels 1 and 2 Tissue Axilla, Right SURGICAL PATHOLOGY EXAM Nida Snider MD 10/27/2021  2:28 PM    3 : Right breast skin, nipple and muscle  Tissue Breast, Right SURGICAL PATHOLOGY EXAM, RESEARCH SPECIMEN FOR BIONET TESTING Nida Snider MD 10/27/2021 12:28 PM    4 : Right Inferior Skip Flap new inferior margin Tissue Other SURGICAL PATHOLOGY EXAM Nida Snider MD 10/27/2021  2:51 PM      Findings:   Right mastectomy performed with axillary node dissection. Long thoracic and thoracodorsal nerves identified and preserved..  Complications: None.  Implants: No implants

## 2021-10-27 NOTE — ANESTHESIA POSTPROCEDURE EVALUATION
Patient: Ronna Collins    Procedure: Procedure(s):  RIGHT Simple Mastectomy  RIGHT Axillary Lymph Node Dissection       Diagnosis:Malignant neoplasm of central portion of right breast in male, estrogen receptor positive (H) [C50.121, Z17.0]  Diagnosis Additional Information: No value filed.    Anesthesia Type:  General    Note:  Disposition: Outpatient   Postop Pain Control: Uneventful            Sign Out: Well controlled pain   PONV: No   Neuro/Psych: Uneventful            Sign Out: Acceptable/Baseline neuro status   Airway/Respiratory: Uneventful            Sign Out: Acceptable/Baseline resp. status   CV/Hemodynamics: Uneventful            Sign Out: Acceptable CV status; No obvious hypovolemia; No obvious fluid overload   Other NRE: NONE   DID A NON-ROUTINE EVENT OCCUR? No           Last vitals:  Vitals Value Taken Time   /97 10/27/21 1645   Temp 36.5  C (97.7  F) 10/27/21 1551   Pulse 86 10/27/21 1659   Resp 19 10/27/21 1659   SpO2 97 % 10/27/21 1701   Vitals shown include unvalidated device data.    Electronically Signed By: Devon Shaw DO  October 27, 2021  5:02 PM

## 2021-10-27 NOTE — ANESTHESIA CARE TRANSFER NOTE
Patient: Ronna Collins    Procedure: Procedure(s):  RIGHT Simple Mastectomy  RIGHT Axillary Lymph Node Dissection       Diagnosis: Malignant neoplasm of central portion of right breast in male, estrogen receptor positive (H) [C50.121, Z17.0]  Diagnosis Additional Information: No value filed.    Anesthesia Type:   General     Note:    Oropharynx: oropharynx clear of all foreign objects and spontaneously breathing  Level of Consciousness: drowsy  Oxygen Supplementation: nasal cannula  Level of Supplemental Oxygen (L/min / FiO2): 2  Independent Airway: airway patency satisfactory and stable  Dentition: dentition unchanged  Vital Signs Stable: post-procedure vital signs reviewed and stable  Report to RN Given: handoff report given  Patient transferred to: PACU    Handoff Report: Identifed the Patient, Identified the Reponsible Provider, Reviewed the pertinent medical history, Discussed the surgical course, Reviewed Intra-OP anesthesia mangement and issues during anesthesia, Set expectations for post-procedure period and Allowed opportunity for questions and acknowledgement of understanding      Vitals:  Vitals Value Taken Time   BP     Temp     Pulse 93 10/27/21 1552   Resp 17 10/27/21 1552   SpO2 97 % 10/27/21 1552   Vitals shown include unvalidated device data.    Electronically Signed By: ZINA Jimenez CRNA  October 27, 2021  3:53 PM

## 2021-10-27 NOTE — ANESTHESIA PROCEDURE NOTES
Airway       Patient location during procedure: OR       Procedure Start/Stop Times: 10/27/2021 10:47 AM  Staff -        CRNA: Autumn Lam APRN CRNA       Performed By: CRNA  Consent for Airway        Urgency: elective  Indications and Patient Condition       Indications for airway management: minnie-procedural       Induction type:intravenous       Mask difficulty assessment: 2 - vent by mask + OA or adjuvant +/- NMBA    Final Airway Details       Final airway type: endotracheal airway       Successful airway: ETT - single  Endotracheal Airway Details        ETT size (mm): 7.5       Cuffed: yes       Successful intubation technique: direct laryngoscopy       DL Blade Type: King 2       Grade View of Cords: 1       Adjucts: stylet       Position: Right       Measured from: gums/teeth       Secured at (cm): 22       Bite block used: None    Post intubation assessment        Placement verified by: capnometry, equal breath sounds and chest rise        Number of attempts at approach: 1       Secured with: pink tape       Ease of procedure: easy       Dentition: Intact

## 2021-10-27 NOTE — OP NOTE
DATE OF SURGERY: October 27, 2021     SURGEON: Nida Snider MD  ASSISTANT(S):   1. Cheryl Mooney MD PGY-3  2. Wilman Cunningham MS-3  3. Irina Augustin MS-3    PREOPERATIVE DIAGNOSIS: Right central breast cancer  POSTOPERATIVE DIAGNOSIS: same    PROCEDURE(S):   1. Right simple mastectomy  2. Right axillary lymph node dissection, levels I & II    ANESTHESIA: General + Block    CLINICAL NOTE:  Ronna Collins is a 71 year old man with right breast cancer, s/p neoadjuvant endocrine therapy with minimal to no clinical response.  He now presents for surgical management.  The risks and benefits of right mastectomy and axillary dissection were discussed with the patient and he elected to proceed with informed consent.    OPERATIVE FINDINGS:  1. Mass involving nipple areolar complex and pectoralis major posteriorly.  Initially, a small margin of skin inferior to the skin involvement at the nipple-areolar complex was removed and sent for frozen section. That new inferior margin on the frozen section was free of tumor, but there was tumor in the specimen. Thus, an additional 8 mm of skin inferior to the cut edge was removed for a new inferior margin.  Posteriorly, a disc of pectoralis major muscle was removed en bloc with the main specimen. This area in the muscle was marked with numerous large hemoclips.  2. Numerous palpable firm lymph nodes in the right axilla at levels I and II. No palpable adenopathy in level III.   3. Right thoracodorsal and long thoracic nerves preserved and functional at the end of the case.    OPERATIVE PROCEDURE:  The patient was brought to the operating room and placed in the supine position with appropriate padding for all of the pressure points. A general anesthetic was administered by the Anesthesia team.   A surgical safety checklist was performed to confirm the patient's identity, the site and laterality of the procedure. Perioperative antibiotics (Ancef) was provided.  VTE prophylaxis was  provided with serial compression devices and preoperative subcutaneous lovenox. The right breast and axilla were then prepped and draped in the usual sterile fashion using Chloraprep.     The central involvement was evident on the skin.  An elliptical incision was made in the skin encompassing the nipple-areolar complex.  Attention was paid to the inferior skin flap, where there was a red nodule at the inferolateral corner of the nipple-areolar complex, to go around this nodule.  An additional sliver of skin inferior to this was removed, inked, and sent for frozen section.  The frozen section returned with tumor within the specimen but not reaching the inked new margin.    Skin flaps were carefully raised, heading superiorly towards the clavicle, medially towards the lateral border of the sternum, inferiorly towards the insertion of the rectus sheath, and laterally to the lateral edge of the pectoralis major muscle. The breast was then dissected off of the pectoralis major muscle, taking its fascia en bloc with the specimen.  Where the tumor was centrally, I did resect a disc of pectoralis major muscle en bloc with the specimen. This area of muscle resection was marked with large hemoclips.  The specimen was then marked with a short suture for the 12:00 position at the skin and a long suture for the axillary tail and sent to pathology.     A lateral skin flap was continued in the right  axilla, down to the level of the anterior portion of the latissimus dorsi.  The christiano tissue medial to the latissimus was swept medially.  Medially, we have already previously identified the pectoralis major muscle. We then dissected the axillary christiano tissue off of the lateral edge of the pectoralis major muscle, and followed this posterior to the pectoralis major, and onto the pectoralis minor as well, sweeping all of the christiano tissue laterally.  There were numerous palpable nodes in levels I and II.  Superiorly, we identified  theright  axillary vein. The first tributary was ligated and divided.  We then identified the right  thoracodorsal neurovascular bundle and dissected the christiano tissue off of it.  Medially, we swept the christiano tissue from the interpectoral space, as well as from posterior to the pectoralis minor muscle.  We identified the right  long thoracic nerve and protected it throughout its course, dissecting the christiano tissue off of it.  We did preserve the right intercostobrachial nerve.  All of the christiano tissue between the chest wall and the latissimus and between long thoracic and thoracodorsal nerves were swept inferiorly and removed with the specimen.  We continued this dissection inferiorly, until we reached the point where the thoracodorsal neurovascular bundle turns laterally towards the latissimus dorsi.  The specimen was sent to pathology.   There were no palpable level III lymph nodes, and I did not dissect medial to the pectoralis minor.      Given the finding of tumor in the inferior margin of the ellipse opposite the cutaneous nodule earlier, an additional 8 mm rim of skin was removed along the entire length of the lower limb of the ellipse as a new inferior margin.  This too was inked and sent to pathology.    The wound was irrigated and hemostasis was achieved.  Two 15 Fr drains were placed through separate stab incisions in the skin and sutured in place. The lateral one was placed in the axilla and the medial one placed on top of the pectoralis major muscle. The incision was closed in two layers with interrupted 3-0 vicryl and a running subcuticular 4-0 monocryl.  Exofin fusion was applied.  The chest was wrapped in an ACE bandage.   The patient tolerated the procedure well. The sponge, needle, instrument counts were correct.  The patient was then awakened and taken to recovery in stable fashion.      I was present and scrubbed for the entire above procedure.    EBL: 30 mL    SPECIMEN(S):  A. Right breast skin  inferior flap margin - for frozen section  B. Right breast, skin, nipple and muscle; short suture = 12:00, long suture - axillary tail  C. Right axillary contents, levels I & II  D. Right inferior skin flap, new inferior margin; ink = new margin    POSTOPERATIVE PLANS:  Ronna Camarilloi will be discharged home today with wound/drain care instructions and a prescription for analgesics.  He will follow-up with me in 2 weeks.     Nida Snider MD MSc FRCSC FACS    Division of Surgical Oncology  [facility]

## 2021-10-27 NOTE — OR NURSING
Right pectoralis block performed without complications. 1 mg Versed and 50 mcg Fentanyl given for sedation. VSS.  Pt tolerated well.  Will continue to monitor.

## 2021-10-28 ENCOUNTER — PATIENT OUTREACH (OUTPATIENT)
Dept: ONCOLOGY | Facility: CLINIC | Age: 71
End: 2021-10-28

## 2021-10-28 NOTE — PROGRESS NOTES
POST-OP CALL  Oct 28, 2021    Ronna Collins is a 71 year old male s/p right mastectomy and right axillary lymph node dissection with Dr. Snider.   He reports doing fine. He has some pain at his IV site.   Fevers/chills: Patient denies fever/chills. 99  Eating/drinking: Patient is able to eat and drink without any complaints.   Bowel habits: No concerns.   Urine output: Voiding without difficulty.   Drains (TEOFILO): Drainage: axillary drain with 50 ml overnight thin dark red, breast drain with 20 ml overnight light red.   Incisions:chest is wrapped. No swelling or bruising.   Pain: Patient reports pain is controlled with acetaminophen and oxycodone.   Follow up appointment scheduled on 11/1 with Dr. Snider.  Patient will call with any questions or concerns.    Kennedi Maria PA-C

## 2021-11-01 NOTE — TELEPHONE ENCOUNTER
Patient needs an appointment in august or September. FOllow up allergy appointment with .   
Patient scheduled,.   
[Time Spent: ___ minutes] : I have spent [unfilled] minutes of time on the encounter.

## 2021-11-08 NOTE — PROGRESS NOTES
FOLLOW-UP  Nov 11, 2021    Ronna Collins is a 71 year old male who returns for his 1st post-operative follow-up visit.    Cancer Staging  Malignant neoplasm of male breast (H)  Staging form: Breast, AJCC 8th Edition  - Clinical stage from 6/8/2021: Stage IIIB (cT4b, cN3b, cM0, G2, ER+, CO+, HER2-) - Signed by Mikaela Sr MD on 8/30/2021    Treatment to date:  1. Enrolled on ISPY-2   2. Neoadjuvant amcenestrant (7/1/2021 to ongoing)  3. Right simple mastectomy and right axillary lymph node dissection, levels I & II (10/27/2021)    HPI:    He underwent a right simple mastectomy and ALND on 10/27/2021.  He is currently 2 week(s) post-op.  Final surgical pathology showed a [id number] 3.2 cm invasive ductal carcinoma, with direct skin and pectoralis major muscle involvement, uninvolved margins, and 15/44 involved lymph nodes.  The largest metastatic deposit was 2.1 cm and extranodal extension was seen.    Since the procedure, he has been doing well.  He denies any redness or swelling, or drainage from the incision, or fever/chills.  He has good range of motion of his shoulders bilaterally and denies any upper extremity swelling.  His axillary drain has put out 60-80 mL per day for the past 3 days, and the breast drain has put out 10 mL per day for the past 3 days.  There has not been fluid leakage around the drain site.  They have not been unwrapping and rewrapping with the ACE wrap daily; it has been on since surgery until 11/9/2021.    He met with Dr Sr on 11/9/2021.  Repeat systemic staging and adjuvant chemotherapy is planned.    BP (!) 153/89   Pulse 81   Temp 98.3  F (36.8  C) (Oral)   Wt 78.5 kg (173 lb 1.6 oz)   SpO2 99%   BMI 26.32 kg/m     Physical Exam  Pulmonary:      Effort: No respiratory distress.   Chest:      Comments: Surgical absence of right breast. Incision with exofin fusion in place, intact, healing well, without cellulitis, hematoma or seroma. Axillary and breast  drains with serous output. Drain sites unremarkable. Inferior to the drain sites, there is some scabbing likely from pressure from the drain tubing.  Skin:     General: Skin is warm and dry.         INVESTIGATIONS:    Surgical Pathology (10/27/2021):  Final Diagnosis   A. RIGHT BREAST INFERIOR FLAP MARGIN, EXCISION:  -SMALL FOCUS OF INVASIVE CARCINOMA present in the dermis, measuring 1 mm in linear extent.  -Final inferior margin is negative for tumor (at least 10 mm from invasive carcinoma).     B. LYMPH NODES, RIGHT AXILLARY CONTENTS, LEVELS 1 AND 2, DISSECTION:  -METASTATIC BREAST CARCINOMA to fifteen of forty-four lymph nodes, 2.1 cm in greatest dimension, 6 of which with extranodal extension up to 10 mm into adjacent adipose tissue (15/44).     C. RIGHT BREAST SKIN, NIPPLE AND MUSCLE, MASTECTOMY:  -INVASIVE DUCTAL CARCINOMA, [place] grade 2, 3.2 cm in greatest dimension.  -Tumor infiltrating lymphocytes (TILs) comprise approximately 20% of mass volume.  -There is no apparent response to neoadjuvant endocrine therapy.  -Angiolymphatic invasion is present, including involvement of dermal lymphatics.  -Calcifications are associated with invasive carcinoma.  -Margins are negative for tumor; closest margin to invasive carcinoma is deep/posterior (muscle) at 4 mm.  -Seborrheic keratosis.  -AJCC pathologic staging is ypT4b N3b.  -See comment and synoptic report.     D. RIGHT INFERIOR SKIP FLAP NEW INFERIOR MARGIN, EXCISION:  -Benign skin and subcutaneous tissue with scar.  -No evidence of residual malignancy identified.       ASSESSMENT:    Ronna Collins is a 71 year old male with locally advanced RIGHT breast cancer.    He is healing well. We did remove the breast drain today without incident. The axillary drain output is too high for removal. He will call when the output is < 30 mL per day for at least 2 consecutive days for drain and exofin fusion removal. In the meantime, we replaced the ACE wrap. I  recommended taking it down and rewrapping daily.  I recommended vaseline to the blisters inferior to the drain site and to pad this area with gauze to minimize contact of the drain tubing with the skin.  He should have the ACE wrap on for another 48 hours following the last drain removal. After that, it may remain off.    I recommended a physical and occupational therapy referral for range of motion as well as lymphedema education. We reviewed that he will have a compression sleeve and he should wear it even in the absence of swelling if his arm will be immobile for a long period of time, such as during a long car ride or a flight.  We also discussed that the ipsilateral (RIGHT) arm may be used in the future for IVs/blood draws/blood pressure measurements, etc.    We reviewed the pathology today and a copy of the report was provided. No additional surgery is recommended.  We discussed systemic therapy recommendations; I agree with this given the high burden of disease.      He will also need adjuvant radiation therapy given the node positive disease in the axilla, as well as the internal mammary disease seen on initial imaging. A referral to radiation oncology will be made.    All of the above was discussed with the patient and all questions were answered.     PLAN:  1. Patient will call for axillary drain & Exofin removal; the chest wall skin inferior to the drain sites to be checked at that time.  2. Radiation oncology referral  3. Lymphedema clinic  4. Follow up with Dr Sr  5. Follow up with me in 3 months    Nida Snider MD MSc [facility] FACS  Assistant Professor of Surgery  Division of Surgical Oncology  [facility]

## 2021-11-09 ENCOUNTER — ONCOLOGY VISIT (OUTPATIENT)
Dept: ONCOLOGY | Facility: CLINIC | Age: 71
End: 2021-11-09
Attending: INTERNAL MEDICINE
Payer: COMMERCIAL

## 2021-11-09 VITALS
DIASTOLIC BLOOD PRESSURE: 87 MMHG | HEART RATE: 102 BPM | SYSTOLIC BLOOD PRESSURE: 144 MMHG | WEIGHT: 177.2 LBS | TEMPERATURE: 98.2 F | BODY MASS INDEX: 26.94 KG/M2 | OXYGEN SATURATION: 97 %

## 2021-11-09 DIAGNOSIS — C77.3 BREAST CANCER METASTASIZED TO AXILLARY LYMPH NODE, RIGHT (H): ICD-10-CM

## 2021-11-09 DIAGNOSIS — Z17.0 MALIGNANT NEOPLASM OF CENTRAL PORTION OF RIGHT BREAST IN MALE, ESTROGEN RECEPTOR POSITIVE (H): Primary | ICD-10-CM

## 2021-11-09 DIAGNOSIS — C50.121 MALIGNANT NEOPLASM OF CENTRAL PORTION OF RIGHT BREAST IN MALE, ESTROGEN RECEPTOR POSITIVE (H): Primary | ICD-10-CM

## 2021-11-09 DIAGNOSIS — C50.911 BREAST CANCER METASTASIZED TO AXILLARY LYMPH NODE, RIGHT (H): ICD-10-CM

## 2021-11-09 PROCEDURE — 99215 OFFICE O/P EST HI 40 MIN: CPT | Performed by: INTERNAL MEDICINE

## 2021-11-09 PROCEDURE — 99417 PROLNG OP E/M EACH 15 MIN: CPT | Performed by: INTERNAL MEDICINE

## 2021-11-09 PROCEDURE — G0463 HOSPITAL OUTPT CLINIC VISIT: HCPCS

## 2021-11-09 ASSESSMENT — PAIN SCALES - GENERAL: PAINLEVEL: NO PAIN (0)

## 2021-11-09 NOTE — NURSING NOTE
"Oncology Rooming Note    November 9, 2021 1:13 PM   Ronna Collins is a 71 year old male who presents for:    Chief Complaint   Patient presents with     Oncology Clinic Visit     Malignant neoplasm of male breast (H)     Initial Vitals: BP (!) 144/87 (BP Location: Left arm, Patient Position: Sitting, Cuff Size: Adult Small)   Pulse 102   Temp 98.2  F (36.8  C) (Oral)   Wt 80.4 kg (177 lb 3.2 oz)   SpO2 97%   BMI 26.94 kg/m   Estimated body mass index is 26.94 kg/m  as calculated from the following:    Height as of 10/27/21: 1.727 m (5' 8\").    Weight as of this encounter: 80.4 kg (177 lb 3.2 oz). Body surface area is 1.96 meters squared.  No Pain (0) Comment: Data Unavailable   No LMP for male patient.  Allergies reviewed: Yes  Medications reviewed: Yes    Medications: Medication refills not needed today.  Pharmacy name entered into [email or web address]:    [id number] - [place], [place] - [address] [place] [address] N AT [facility][id number] - [place], [place] - [address] [place] [address] AT [facility] - [place], [place] - [address] [place] [address]    Clinical concerns: Surgical procedure recently-removed tumor and lymph nodes on the right side.        aRdha Orta LPN November 9, 2021 1:14 PM                "

## 2021-11-09 NOTE — PROGRESS NOTES
Oncology Visit:  Date on this visit: 11/9/2021    Diagnosis: Clinical prognostic stage IIIb, [id number], grade 2, ER positive, WV positive, HER-2 negative right breast cancer.    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  He noted discomfort and hardening of the skin of the right nipple in late April/early May, 2021.  He noted a seborrheic keratosis of the right nipple and remembered he had a similar skin lesion of the arm 3 months earlier.  However, he subsequently noted a mass in the same area.  Right breast skin punch biopsy performed by dermatology was c/w grade 2 invasive ductal carcinoma with associated DCIS.  Invasive carcinoma was ER positive 100% and WV positive 70%, with tumor invading the dermis.  HER-2 was negative by IHC (score 1+).  Diagnostic mammogram and ultrasound showed a retroareolar right breast mass measuring 2.9 cm with associated nipple retraction and enlarged, abnormal right axillary lymph nodes.  Right breast biopsy was again c/w grade 2 invasive ductal carcinoma, ER strong in 98% of tumor cell nuclei, HER-2 negative.  Ki-67 was 15%.    He elected to screen for the ISPY-2 clinical trial.  MRI breast on 6/18/2021 showed the biopsy proven right breast cancer to measure 3.9 cm in maximum dimension with invasion of the nipple and the pectoralis muscle as well as at least 4 abnormal level 1 and 2 right axillary lymph nodes and a tiny 0.4 cm right internal mammary lymph node.  Mammoprint returned low risk with a score of +0.29.  He elected for treatment on the endocrine optimization protocol of ISPY-2 and was randomized to treatment with amcenestrant.  He was on treatment with  amcenestrant from 7/1/2021 - 10/26/2021.  At our visit in 01/2021, both right axillary node and right breast tumor palpated more firm then prior.  Breast MRI was stable, however, due to clinical concerns for progression, decision was made  to proceed with surgery.   Right breast mastectomy and right axillary lymph node dissection was performed by Dr. Snider on 10/27/2021.  Pathology showed grade 2 carcinoma of the right breast measuring 3.2 cm with invasion of the dermis, nipple and the pectoralis muscle.  Ki-67 of the excised tumor was 7%.  15/44 right axillary lymph nodes were positive with 6 of the lymph nodes demonstrating extranodal extension.  The largest lymph node metastasis measured 2.1 cm.    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  He states the right mastectomy overall went well for him.  He took oxycodone only the day and day after the surgery.  He took tylenol for 4-5 days and has been off of all pain medication since.  He has not yet unwrapped the chest so is uncertain as to what the incision looks like.  His axillary drain has put out less than 10 mL per day for quite sometime now.  The right chest wall drain output is diminishing, but was still 73 mL of output as of yesterday.  He has near full range of movement of his right upper extremity.  He continues on a lifting restriction.  He has had no fevers, chills, or infectious complaints.  He otherwise feels well.  The remainder of a complete 12 point review of systems was reviewed with the patient and was negative with the exception of that mentioned above.    Past Medical/Surgical History:  Past Medical History:   Diagnosis Date     Chronic sinusitis      Hypertension 2002     Past Surgical History:   Procedure Laterality Date     DISSECT LYMPH NODE AXILLA Right 10/27/2021    Procedure: RIGHT Axillary Lymph Node Dissection;  Surgeon: Nida Snider MD;  Location: [facility] OR     MASTECTOMY SIMPLE Right 10/27/2021    Procedure: RIGHT Simple Mastectomy;  Surgeon: Nida Snider MD;  Location: [facility] OR       Allergies:  Allergies as of 11/09/2021     (No Known Allergies)     Current Medications:  Current Outpatient  Medications   Medication Sig Dispense Refill     losartan-hydrochlorothiazide (HYZAAR) 100-25 MG tablet Take 1 tablet by mouth every morning 90 tablet 1     oxyCODONE (ROXICODONE) 5 MG tablet Take 1 tablet (5 mg) by mouth every 4 hours as needed for moderate to severe pain 25 tablet 0     polyethylene glycol (MIRALAX) 17 GM/Dose powder Take 17 g by mouth daily 510 g 0     study - amcenestrant, IDS# [id number], 200 MG tablet Take 1 tablet (200 mg) by mouth daily (Patient taking differently: Take 200 mg by mouth every morning ) 32 tablet 0      Family and Social History:  See initial consultation dated 6/8/2021 for further details.  Breast Actionable and Breast Expanded panels were both negative for pathogenic germline mutations.    Physical Exam:  BP (!) 144/87 (BP Location: Left arm, Patient Position: Sitting, Cuff Size: Adult Small)   Pulse 102   Temp 98.2  F (36.8  C) (Oral)   Wt 80.4 kg (177 lb 3.2 oz)   SpO2 97%   BMI 26.94 kg/m    General:  Well appearing adult male in NAD.  Alert and oriented.  HEENT:  Normocephalic.  Sclera anicteric.  Breast:  Patient with chest wall wrapped.  This was removed for exam and redressed after.  Right breast mastectomy.  Incision is clean, dry, and intact and without surrounding erythema or edema.  Axillary and chest wall TEOFILO drains in place with serosanguinous fluid without significant debris.  Abd:  Soft/ND.   Ext:  No pitting edema of the bilateral lower extremities.    Musculo:  Full ROM of the bilateral upper extremities.  Neuro:  Cranial nerves grossly intact.  Psych:  Mood and affect appear normal.    Laboratory/Imaging Studies  10/26/2021 Breast MRI:  Irregular mass right breast with persistent invasion of the nipple and pectoralis muscle measuring 3.3 x 2.6 x 3.3 cm, unchanged from prior.  No significant change in right axillary lymphadenopathy.  Two stable right internal mammary lymph nodes.    10/27/2021 Right breast mastectomy and right axillary lymph node  dissection:  1.  Right breast inferior flap margin, small focus of invasive carcinoma in the dermis measuring 1 mm.  2.  Right axillary lymph nodes:  - metastatic carcinoma to 15/44 lymph nodes.  Largest metastasis measures 2.1 cm and shows extranodal extension.  6 of the nodes demonstrate extranodal extension.  3.  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  He is s/p treatment with 3.5 months neoadjuvant amcenestrant, right breast mastectomy, and right ALND.    1.  Right breast cancer:    We reviewed the pathology of the right breast mastectomy and right axillary lymph node dissection performed on 10/27/2021.  Pathology of the right breast shows a 3.2 cm carcinoma with invasion of the skin, nipple and pectoralis muscle.  15/22 axillary lymph nodes were positive.  Notably, the pathology showed no evidence of response to either neoadjuvant endocrine therapy in either the breast or the lymph nodes.  Pathologic staging is T4bN3b or stage IIIb.  I recommend repeating a CT C/A/P w/ contrast at this time to rule our distant metastases.    As long as restaging CT is without distant metastases, my recommendation at this time is to proceed with a course of adjuvant chemotherapy.  I specifically recommend a regimen of Taxol 80 mg/m2 administered IV weekly for 12 weeks followed by adriamycin 60 mg/m2 and cyclophosphamide 600 mg/m2 administered IV once every 2 weeks for 4 cycles.  We reviewed the potential side effects of chemotherapy including alopecia, fatigue, nausea, vomiting, diarrhea, constipation,  low blood counts, increased risk of infection, peripheral neuropathy, hypersensitivity reaction, cardiac toxicity and risk of a secondary malignancy.  The chemotherapy is administered via a port-a-catheter which can be placed by interventional radiology.      We had a prolonged conversation regarding the recommendation for chemotherapy.  Dr. Collins is aware he has a number of biomarkers which suggest little benefit to chemotherapy including initial high hormone receptor staining, a Mammaprint of +0.29, and low Ki-67.  In addition the mitotic grading of his excised tumor is low.  On the other hand, he has a large number of positive lymph nodes which conveys a high risk of distant recurrence and he had little to no response to neoadjuvant endocrine therapy.  I plan to discuss his case with my colleagues at this Friday's breast conference to ensure adjuvant chemotherapy is the consensus of the group.      We discussed if he elects to proceed with chemotherapy, this would take place over a period of approximately 4.5 months.  One month after completion of chemotherapy, would proceed with adjuvant radiation.  Radiation will be administered over a period of approximately 6 weeks with treatments administered Mon-Fri, 5 days per week.  Finally, we would plan to treat with a 10 year course of endocrine therapy.    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  Amongst our discussion, we discussed cooling hands and feet during infusions and vitamin B6 supplementation as a means of reducing Taxol induced neuropathy.  We discussed plan to administer Neulasta the day after each AC infusion in order to reduce the duration of neutropenia.  I recommend he adhere to a diet high in lean proteins and continue to walk daily while on treatment.  We discussed infectious precautions needed during chemotherapy as well as how chemotherapy may affect his work and travel plans.    2.  S/p right breast mastectomy and  right axillary lymph node dissection:  Given these surgeries and plan for christiano irradiation, he is at high risk for lymphedema.  Will need referral to lymphedema clinic once drains have been removed.  He has follow up with Dr. Snider this Thursday.    3.  Follow up:  CT C/A/P w/ contrast within 1-2 weeks.  I will communicate with patient the outcome of case discussion at the [place] breast tumor conference on 11/12.    75 minutes spent on the date of the encounter doing chart review, review of test results, interpretation of tests, patient visit, documentation, discussion with other provider(s) and discussion with family.

## 2021-11-09 NOTE — LETTER
11/9/2021         RE: Ronna Collins  [address]  [facility] [address]        Dear Colleague,    Thank you for referring your patient, Ronna Collins, to the [facility]. Please see a copy of my visit note below.    Oncology Visit:  Date on this visit: 11/9/2021    Diagnosis: Clinical prognostic stage IIIb, [id number], grade 2, ER positive, ND positive, HER-2 negative right breast cancer.    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  He noted discomfort and hardening of the skin of the right nipple in late April/early May, 2021.  He noted a seborrheic keratosis of the right nipple and remembered he had a similar skin lesion of the arm 3 months earlier.  However, he subsequently noted a mass in the same area.  Right breast skin punch biopsy performed by dermatology was c/w grade 2 invasive ductal carcinoma with associated DCIS.  Invasive carcinoma was ER positive 100% and ND positive 70%, with tumor invading the dermis.  HER-2 was negative by IHC (score 1+).  Diagnostic mammogram and ultrasound showed a retroareolar right breast mass measuring 2.9 cm with associated nipple retraction and enlarged, abnormal right axillary lymph nodes.  Right breast biopsy was again c/w grade 2 invasive ductal carcinoma, ER strong in 98% of tumor cell nuclei, HER-2 negative.  Ki-67 was 15%.    He elected to screen for the ISPY-2 clinical trial.  MRI breast on 6/18/2021 showed the biopsy proven right breast cancer to measure 3.9 cm in maximum dimension with invasion of the nipple and the pectoralis muscle as well as at least 4 abnormal level 1 and 2 right axillary lymph nodes and a tiny 0.4 cm right internal mammary lymph node.  Mammoprint returned low risk with a score of +0.29.  He elected for treatment on the endocrine optimization protocol of ISPY-2 and was randomized to treatment with amcenestrant.   He was on treatment with  amcenestrant from 7/1/2021 - 10/26/2021.  At our visit in 01/2021, both right axillary node and right breast tumor palpated more firm then prior.  Breast MRI was stable, however, due to clinical concerns for progression, decision was made to proceed with surgery.   Right breast mastectomy and right axillary lymph node dissection was performed by Dr. Snider on 10/27/2021.  Pathology showed grade 2 carcinoma of the right breast measuring 3.2 cm with invasion of the dermis, nipple and the pectoralis muscle.  Ki-67 of the excised tumor was 7%.  15/44 right axillary lymph nodes were positive with 6 of the lymph nodes demonstrating extranodal extension.  The largest lymph node metastasis measured 2.1 cm.    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  He states the right mastectomy overall went well for him.  He took oxycodone only the day and day after the surgery.  He took tylenol for 4-5 days and has been off of all pain medication since.  He has not yet unwrapped the chest so is uncertain as to what the incision looks like.  His axillary drain has put out less than 10 mL per day for quite sometime now.  The right chest wall drain output is diminishing, but was still 73 mL of output as of yesterday.  He has near full range of movement of his right upper extremity.  He continues on a lifting restriction.  He has had no fevers, chills, or infectious complaints.  He otherwise feels well.  The remainder of a complete 12 point review of systems was reviewed with the patient and was negative with the exception of that mentioned above.    Past Medical/Surgical History:  Past Medical History:   Diagnosis Date     Chronic sinusitis      Hypertension 2002     Past Surgical History:   Procedure Laterality Date     DISSECT LYMPH NODE AXILLA Right 10/27/2021    Procedure: RIGHT Axillary Lymph Node Dissection;  Surgeon: Nida Snider MD;   Location: [facility] OR     MASTECTOMY SIMPLE Right 10/27/2021    Procedure: RIGHT Simple Mastectomy;  Surgeon: Nida Snider MD;  Location: [facility] OR       Allergies:  Allergies as of 11/09/2021     (No Known Allergies)     Current Medications:  Current Outpatient Medications   Medication Sig Dispense Refill     losartan-hydrochlorothiazide (HYZAAR) 100-25 MG tablet Take 1 tablet by mouth every morning 90 tablet 1     oxyCODONE (ROXICODONE) 5 MG tablet Take 1 tablet (5 mg) by mouth every 4 hours as needed for moderate to severe pain 25 tablet 0     polyethylene glycol (MIRALAX) 17 GM/Dose powder Take 17 g by mouth daily 510 g 0     study - amcenestrant, IDS# [id number], 200 MG tablet Take 1 tablet (200 mg) by mouth daily (Patient taking differently: Take 200 mg by mouth every morning ) 32 tablet 0      Family and Social History:  See initial consultation dated 6/8/2021 for further details.  Breast Actionable and Breast Expanded panels were both negative for pathogenic germline mutations.    Physical Exam:  BP (!) 144/87 (BP Location: Left arm, Patient Position: Sitting, Cuff Size: Adult Small)   Pulse 102   Temp 98.2  F (36.8  C) (Oral)   Wt 80.4 kg (177 lb 3.2 oz)   SpO2 97%   BMI 26.94 kg/m    General:  Well appearing adult male in NAD.  Alert and oriented.  HEENT:  Normocephalic.  Sclera anicteric.  Breast:  Patient with chest wall wrapped.  This was removed for exam and redressed after.  Right breast mastectomy.  Incision is clean, dry, and intact and without surrounding erythema or edema.  Axillary and chest wall TEOFILO drains in place with serosanguinous fluid without significant debris.  Abd:  Soft/ND.   Ext:  No pitting edema of the bilateral lower extremities.    Musculo:  Full ROM of the bilateral upper extremities.  Neuro:  Cranial nerves grossly intact.  Psych:  Mood and affect appear normal.    Laboratory/Imaging Studies  10/26/2021 Breast MRI:  Irregular mass right breast with persistent invasion of the nipple  and pectoralis muscle measuring 3.3 x 2.6 x 3.3 cm, unchanged from prior.  No significant change in right axillary lymphadenopathy.  Two stable right internal mammary lymph nodes.    10/27/2021 Right breast mastectomy and right axillary lymph node dissection:  1.  Right breast inferior flap margin, small focus of invasive carcinoma in the dermis measuring 1 mm.  2.  Right axillary lymph nodes:  - metastatic carcinoma to 15/44 lymph nodes.  Largest metastasis measures 2.1 cm and shows extranodal extension.  6 of the nodes demonstrate extranodal extension.  3.  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  He is s/p treatment with 3.5 months neoadjuvant amcenestrant, right breast mastectomy, and right ALND.    1.  Right breast cancer:    We reviewed the pathology of the right breast mastectomy and right axillary lymph node dissection performed on 10/27/2021.  Pathology of the right breast shows a 3.2 cm carcinoma with invasion of the skin, nipple and pectoralis muscle.  15/22 axillary lymph nodes were positive.  Notably, the pathology showed no evidence of response to either neoadjuvant endocrine therapy in either the breast or the lymph nodes.  Pathologic staging is T4bN3b or stage IIIb.  I recommend repeating a CT C/A/P w/ contrast at this time to rule our distant metastases.    As long as restaging CT is without distant metastases, my recommendation at this time is to proceed with a course of adjuvant chemotherapy.  I specifically recommend a regimen of Taxol 80 mg/m2 administered IV weekly for  12 weeks followed by adriamycin 60 mg/m2 and cyclophosphamide 600 mg/m2 administered IV once every 2 weeks for 4 cycles.  We reviewed the potential side effects of chemotherapy including alopecia, fatigue, nausea, vomiting, diarrhea, constipation, low blood counts, increased risk of infection, peripheral neuropathy, hypersensitivity reaction, cardiac toxicity and risk of a secondary malignancy.  The chemotherapy is administered via a port-a-catheter which can be placed by interventional radiology.      We had a prolonged conversation regarding the recommendation for chemotherapy.  Dr. Collins is aware he has a number of biomarkers which suggest little benefit to chemotherapy including initial high hormone receptor staining, a Mammaprint of +0.29, and low Ki-67.  In addition the mitotic grading of his excised tumor is low.  On the other hand, he has a large number of positive lymph nodes which conveys a high risk of distant recurrence and he had little to no response to neoadjuvant endocrine therapy.  I plan to discuss his case with my colleagues at this Friday's breast conference to ensure adjuvant chemotherapy is the consensus of the group.      We discussed if he elects to proceed with chemotherapy, this would take place over a period of approximately 4.5 months.  One month after completion of chemotherapy, would proceed with adjuvant radiation.  Radiation will be administered over a period of approximately 6 weeks with treatments administered Mon-Fri, 5 days per week.  Finally, we would plan to treat with a 10 year course of endocrine therapy.    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  Amongst our discussion, we discussed cooling hands and feet during infusions and vitamin B6 supplementation as a means of reducing Taxol induced neuropathy.  We discussed plan to administer Neulasta the day after each AC infusion in order to reduce the duration of neutropenia.  I recommend he adhere  to a diet high in lean proteins and continue to walk daily while on treatment.  We discussed infectious precautions needed during chemotherapy as well as how chemotherapy may affect his work and travel plans.    2.  S/p right breast mastectomy and right axillary lymph node dissection:  Given these surgeries and plan for christiano irradiation, he is at high risk for lymphedema.  Will need referral to lymphedema clinic once drains have been removed.  He has follow up with Dr. Snider this Thursday.    3.  Follow up:  CT C/A/P w/ contrast within 1-2 weeks.  I will communicate with patient the outcome of case discussion at the [place] breast tumor conference on 11/12.    75 minutes spent on the date of the encounter doing chart review, review of test results, interpretation of tests, patient visit, documentation, discussion with other provider(s) and discussion with family.       Mikaela Sr MD

## 2021-11-10 ENCOUNTER — MYC MEDICAL ADVICE (OUTPATIENT)
Dept: ONCOLOGY | Facility: CLINIC | Age: 71
End: 2021-11-10
Payer: COMMERCIAL

## 2021-11-11 ENCOUNTER — HOSPITAL ENCOUNTER (OUTPATIENT)
Dept: PHYSICAL THERAPY | Facility: CLINIC | Age: 71
Setting detail: THERAPIES SERIES
End: 2021-11-11
Attending: SURGERY
Payer: COMMERCIAL

## 2021-11-11 ENCOUNTER — ONCOLOGY VISIT (OUTPATIENT)
Dept: ONCOLOGY | Facility: CLINIC | Age: 71
End: 2021-11-11
Attending: SURGERY
Payer: COMMERCIAL

## 2021-11-11 VITALS
WEIGHT: 173.1 LBS | OXYGEN SATURATION: 99 % | TEMPERATURE: 98.3 F | BODY MASS INDEX: 26.32 KG/M2 | SYSTOLIC BLOOD PRESSURE: 153 MMHG | DIASTOLIC BLOOD PRESSURE: 89 MMHG | HEART RATE: 81 BPM

## 2021-11-11 DIAGNOSIS — C50.121 MALIGNANT NEOPLASM OF CENTRAL PORTION OF RIGHT BREAST IN MALE, ESTROGEN RECEPTOR POSITIVE (H): Primary | ICD-10-CM

## 2021-11-11 DIAGNOSIS — I97.2 POSTMASTECTOMY LYMPHEDEMA SYNDROME OF RIGHT UPPER EXTREMITY: ICD-10-CM

## 2021-11-11 DIAGNOSIS — Z17.0 MALIGNANT NEOPLASM OF CENTRAL PORTION OF RIGHT BREAST IN MALE, ESTROGEN RECEPTOR POSITIVE (H): Primary | ICD-10-CM

## 2021-11-11 PROCEDURE — 97110 THERAPEUTIC EXERCISES: CPT | Mod: GP | Performed by: PHYSICAL THERAPIST

## 2021-11-11 PROCEDURE — 99024 POSTOP FOLLOW-UP VISIT: CPT | Performed by: SURGERY

## 2021-11-11 PROCEDURE — G0463 HOSPITAL OUTPT CLINIC VISIT: HCPCS

## 2021-11-11 PROCEDURE — 97535 SELF CARE MNGMENT TRAINING: CPT | Mod: GP | Performed by: PHYSICAL THERAPIST

## 2021-11-11 PROCEDURE — 97140 MANUAL THERAPY 1/> REGIONS: CPT | Mod: GP | Performed by: PHYSICAL THERAPIST

## 2021-11-11 ASSESSMENT — PAIN SCALES - GENERAL: PAINLEVEL: NO PAIN (0)

## 2021-11-11 NOTE — LETTER
11/11/2021         RE: Ronna Collins  [address]  [facility] [address]        Dear Colleague,    Thank you for referring your patient, Ronna Collins, to the [facility]. Please see a copy of my visit note below.    FOLLOW-UP  Nov 11, 2021    Ronna Collins is a 71 year old male who returns for his 1st post-operative follow-up visit.    Cancer Staging  Malignant neoplasm of male breast (H)  Staging form: Breast, AJCC 8th Edition  - Clinical stage from 6/8/2021: Stage IIIB (cT4b, cN3b, cM0, G2, ER+, ID+, HER2-) - Signed by Mikaela Sr MD on 8/30/2021    Treatment to date:  1. Enrolled on ISPY-2   2. Neoadjuvant amcenestrant (7/1/2021 to ongoing)  3. Right simple mastectomy and right axillary lymph node dissection, levels I & II (10/27/2021)    HPI:    He underwent a right simple mastectomy and ALND on 10/27/2021.  He is currently 2 week(s) post-op.  Final surgical pathology showed a [id number] 3.2 cm invasive ductal carcinoma, with direct skin and pectoralis major muscle involvement, uninvolved margins, and 15/44 involved lymph nodes.  The largest metastatic deposit was 2.1 cm and extranodal extension was seen.    Since the procedure, he has been doing well.  He denies any redness or swelling, or drainage from the incision, or fever/chills.  He has good range of motion of his shoulders bilaterally and denies any upper extremity swelling.  His axillary drain has put out 60-80 mL per day for the past 3 days, and the breast drain has put out 10 mL per day for the past 3 days.  There has not been fluid leakage around the drain site.  They have not been unwrapping and rewrapping with the ACE wrap daily; it has been on since surgery until 11/9/2021.    He met with Dr Sr on 11/9/2021.  Repeat systemic staging and adjuvant chemotherapy is planned.    BP (!) 153/89   Pulse 81   Temp 98.3  F (36.8  C) (Oral)   Wt 78.5 kg (173 lb 1.6 oz)   SpO2  99%   BMI 26.32 kg/m     Physical Exam  Pulmonary:      Effort: No respiratory distress.   Chest:      Comments: Surgical absence of right breast. Incision with exofin fusion in place, intact, healing well, without cellulitis, hematoma or seroma. Axillary and breast drains with serous output. Drain sites unremarkable. Inferior to the drain sites, there is some scabbing likely from pressure from the drain tubing.  Skin:     General: Skin is warm and dry.         INVESTIGATIONS:    Surgical Pathology (10/27/2021):  Final Diagnosis   A. RIGHT BREAST INFERIOR FLAP MARGIN, EXCISION:  -SMALL FOCUS OF INVASIVE CARCINOMA present in the dermis, measuring 1 mm in linear extent.  -Final inferior margin is negative for tumor (at least 10 mm from invasive carcinoma).     B. LYMPH NODES, RIGHT AXILLARY CONTENTS, LEVELS 1 AND 2, DISSECTION:  -METASTATIC BREAST CARCINOMA to fifteen of forty-four lymph nodes, 2.1 cm in greatest dimension, 6 of which with extranodal extension up to 10 mm into adjacent adipose tissue (15/44).     C. RIGHT BREAST SKIN, NIPPLE AND MUSCLE, MASTECTOMY:  -INVASIVE DUCTAL CARCINOMA, [place] grade 2, 3.2 cm in greatest dimension.  -Tumor infiltrating lymphocytes (TILs) comprise approximately 20% of mass volume.  -There is no apparent response to neoadjuvant endocrine therapy.  -Angiolymphatic invasion is present, including involvement of dermal lymphatics.  -Calcifications are associated with invasive carcinoma.  -Margins are negative for tumor; closest margin to invasive carcinoma is deep/posterior (muscle) at 4 mm.  -Seborrheic keratosis.  -AJCC pathologic staging is ypT4b N3b.  -See comment and synoptic report.     D. RIGHT INFERIOR SKIP FLAP NEW INFERIOR MARGIN, EXCISION:  -Benign skin and subcutaneous tissue with scar.  -No evidence of residual malignancy identified.       ASSESSMENT:    Ronna Collins is a 71 year old male with locally advanced RIGHT breast cancer.    He is healing well. We  did remove the breast drain today without incident. The axillary drain output is too high for removal. He will call when the output is < 30 mL per day for at least 2 consecutive days for drain and exofin fusion removal. In the meantime, we replaced the ACE wrap. I recommended taking it down and rewrapping daily.  I recommended vaseline to the blisters inferior to the drain site and to pad this area with gauze to minimize contact of the drain tubing with the skin.  He should have the ACE wrap on for another 48 hours following the last drain removal. After that, it may remain off.    I recommended a physical and occupational therapy referral for range of motion as well as lymphedema education. We reviewed that he will have a compression sleeve and he should wear it even in the absence of swelling if his arm will be immobile for a long period of time, such as during a long car ride or a flight.  We also discussed that the ipsilateral (RIGHT) arm may be used in the future for IVs/blood draws/blood pressure measurements, etc.    We reviewed the pathology today and a copy of the report was provided. No additional surgery is recommended.  We discussed systemic therapy recommendations; I agree with this given the high burden of disease.      He will also need adjuvant radiation therapy given the node positive disease in the axilla, as well as the internal mammary disease seen on initial imaging. A referral to radiation oncology will be made.    All of the above was discussed with the patient and all questions were answered.     PLAN:  1. Patient will call for axillary drain & Exofin removal; the chest wall skin inferior to the drain sites to be checked at that time.  2. Radiation oncology referral  3. Lymphedema clinic  4. Follow up with Dr Sr  5. Follow up with me in 3 months    Nida Snider MD MSc [facility] FACS  Assistant Professor of Surgery  Division of Surgical Oncology  [facility]

## 2021-11-11 NOTE — NURSING NOTE
"Oncology Rooming Note    November 11, 2021 7:19 AM   Ronna Collins is a 71 year old male who presents for:    Chief Complaint   Patient presents with     Oncology Clinic Visit     breast cancer     Initial Vitals: BP (!) 153/89   Pulse 81   Temp 98.3  F (36.8  C) (Oral)   Wt 78.5 kg (173 lb 1.6 oz)   SpO2 99%   BMI 26.32 kg/m   Estimated body mass index is 26.32 kg/m  as calculated from the following:    Height as of 10/27/21: 1.727 m (5' 8\").    Weight as of this encounter: 78.5 kg (173 lb 1.6 oz). Body surface area is 1.94 meters squared.  No Pain (0) Comment: Data Unavailable   No LMP for male patient.  Allergies reviewed: Yes  Medications reviewed: Yes    Medications: Medication refills not needed today.  Pharmacy name entered into EPIC:    [id number] - [place], [place] - [address] [id number] - [place], [place] - [address] [place] [address] AT [facility]/ [facility] - [place], [place] - [address]    Clinical concerns: none       Samia New CMA            "

## 2021-11-13 LAB — INTERPRETATION: NORMAL

## 2021-11-15 ENCOUNTER — PATIENT OUTREACH (OUTPATIENT)
Dept: ONCOLOGY | Facility: CLINIC | Age: 71
End: 2021-11-15
Payer: COMMERCIAL

## 2021-11-15 DIAGNOSIS — C50.929: ICD-10-CM

## 2021-11-15 DIAGNOSIS — Z90.11 HX OF RIGHT MASTECTOMY: Primary | ICD-10-CM

## 2021-11-16 LAB
PATH REPORT.ADDENDUM SPEC: NORMAL
PATH REPORT.COMMENTS IMP SPEC: NORMAL
PATH REPORT.FINAL DX SPEC: NORMAL
PATH REPORT.GROSS SPEC: NORMAL
PATH REPORT.INTRAOP OBS SPEC DOC: NORMAL
PATH REPORT.MICROSCOPIC SPEC OTHER STN: NORMAL
PATH REPORT.RELEVANT HX SPEC: NORMAL
PATHOLOGY SYNOPTIC REPORT: NORMAL
PHOTO IMAGE: NORMAL

## 2021-11-22 ENCOUNTER — HOSPITAL ENCOUNTER (OUTPATIENT)
Dept: PET IMAGING | Facility: CLINIC | Age: 71
Discharge: HOME | End: 2021-11-22
Attending: INTERNAL MEDICINE | Admitting: INTERNAL MEDICINE
Payer: COMMERCIAL

## 2021-11-22 DIAGNOSIS — C50.911 BREAST CANCER METASTASIZED TO AXILLARY LYMPH NODE, RIGHT (H): ICD-10-CM

## 2021-11-22 DIAGNOSIS — C50.121 MALIGNANT NEOPLASM OF CENTRAL PORTION OF RIGHT BREAST IN MALE, ESTROGEN RECEPTOR POSITIVE (H): ICD-10-CM

## 2021-11-22 DIAGNOSIS — Z17.0 MALIGNANT NEOPLASM OF CENTRAL PORTION OF RIGHT BREAST IN MALE, ESTROGEN RECEPTOR POSITIVE (H): ICD-10-CM

## 2021-11-22 DIAGNOSIS — C77.3 BREAST CANCER METASTASIZED TO AXILLARY LYMPH NODE, RIGHT (H): ICD-10-CM

## 2021-11-22 PROCEDURE — 74177 CT ABD & PELVIS W/CONTRAST: CPT | Mod: 26 | Performed by: STUDENT IN AN ORGANIZED HEALTH CARE EDUCATION/TRAINING PROGRAM

## 2021-11-22 PROCEDURE — 250N000011 HC RX IP 250 OP 636: Performed by: INTERNAL MEDICINE

## 2021-11-22 PROCEDURE — 78816 PET IMAGE W/CT FULL BODY: CPT | Mod: 26 | Performed by: STUDENT IN AN ORGANIZED HEALTH CARE EDUCATION/TRAINING PROGRAM

## 2021-11-22 PROCEDURE — 78816 PET IMAGE W/CT FULL BODY: CPT | Mod: PI

## 2021-11-22 PROCEDURE — 71260 CT THORAX DX C+: CPT | Mod: 26 | Performed by: STUDENT IN AN ORGANIZED HEALTH CARE EDUCATION/TRAINING PROGRAM

## 2021-11-22 PROCEDURE — 343N000001 HC RX 343: Performed by: INTERNAL MEDICINE

## 2021-11-22 PROCEDURE — 999N000128 HC STATISTIC PERIPHERAL IV START W/O US GUIDANCE

## 2021-11-22 PROCEDURE — A9552 F18 FDG: HCPCS | Performed by: INTERNAL MEDICINE

## 2021-11-22 RX ORDER — IOPAMIDOL 755 MG/ML
50-135 INJECTION, SOLUTION INTRAVASCULAR ONCE
Status: COMPLETED | OUTPATIENT
Start: 2021-11-22 | End: 2021-11-22

## 2021-11-22 RX ADMIN — FLUDEOXYGLUCOSE F-18 10.4 MCI.: 500 INJECTION, SOLUTION INTRAVENOUS at 14:44

## 2021-11-22 RX ADMIN — IOPAMIDOL 105 ML: 755 INJECTION, SOLUTION INTRAVENOUS at 15:51

## 2021-11-23 ENCOUNTER — LAB (OUTPATIENT)
Dept: LAB | Facility: CLINIC | Age: 71
End: 2021-11-23
Attending: INTERNAL MEDICINE
Payer: COMMERCIAL

## 2021-11-23 DIAGNOSIS — C50.929 MALIGNANT NEOPLASM OF MALE BREAST, UNSPECIFIED ESTROGEN RECEPTOR STATUS, UNSPECIFIED LATERALITY, UNSPECIFIED SITE OF BREAST (H): ICD-10-CM

## 2021-11-23 LAB
ALBUMIN SERPL-MCNC: 3.6 G/DL (ref 3.4–5)
ALP SERPL-CCNC: 74 U/L (ref 40–150)
ALT SERPL W P-5'-P-CCNC: 29 U/L (ref 0–70)
ANION GAP SERPL CALCULATED.3IONS-SCNC: 8 MMOL/L (ref 3–14)
AST SERPL W P-5'-P-CCNC: 16 U/L (ref 0–45)
BASOPHILS # BLD AUTO: 0 10E3/UL (ref 0–0.2)
BASOPHILS NFR BLD AUTO: 1 %
BILIRUB SERPL-MCNC: 0.5 MG/DL (ref 0.2–1.3)
BUN SERPL-MCNC: 8 MG/DL (ref 7–30)
CALCIUM SERPL-MCNC: 8.9 MG/DL (ref 8.5–10.1)
CHLORIDE BLD-SCNC: 106 MMOL/L (ref 94–109)
CO2 SERPL-SCNC: 26 MMOL/L (ref 20–32)
CREAT SERPL-MCNC: 0.7 MG/DL (ref 0.66–1.25)
EOSINOPHIL # BLD AUTO: 0.4 10E3/UL (ref 0–0.7)
EOSINOPHIL NFR BLD AUTO: 8 %
ERYTHROCYTE [DISTWIDTH] IN BLOOD BY AUTOMATED COUNT: 13.3 % (ref 10–15)
GFR SERPL CREATININE-BSD FRML MDRD: >90 ML/MIN/1.73M2
GLUCOSE BLD-MCNC: 112 MG/DL (ref 70–99)
HCT VFR BLD AUTO: 46.6 % (ref 40–53)
HGB BLD-MCNC: 15 G/DL (ref 13.3–17.7)
IMM GRANULOCYTES # BLD: 0 10E3/UL
IMM GRANULOCYTES NFR BLD: 1 %
LYMPHOCYTES # BLD AUTO: 1.4 10E3/UL (ref 0.8–5.3)
LYMPHOCYTES NFR BLD AUTO: 32 %
MCH RBC QN AUTO: 26.8 PG (ref 26.5–33)
MCHC RBC AUTO-ENTMCNC: 32.2 G/DL (ref 31.5–36.5)
MCV RBC AUTO: 83 FL (ref 78–100)
MONOCYTES # BLD AUTO: 0.4 10E3/UL (ref 0–1.3)
MONOCYTES NFR BLD AUTO: 9 %
NEUTROPHILS # BLD AUTO: 2.1 10E3/UL (ref 1.6–8.3)
NEUTROPHILS NFR BLD AUTO: 49 %
NRBC # BLD AUTO: 0 10E3/UL
NRBC BLD AUTO-RTO: 0 /100
PLATELET # BLD AUTO: 229 10E3/UL (ref 150–450)
POTASSIUM BLD-SCNC: 3.6 MMOL/L (ref 3.4–5.3)
PROT SERPL-MCNC: 7.2 G/DL (ref 6.8–8.8)
RBC # BLD AUTO: 5.59 10E6/UL (ref 4.4–5.9)
SODIUM SERPL-SCNC: 140 MMOL/L (ref 133–144)
WBC # BLD AUTO: 4.4 10E3/UL (ref 4–11)

## 2021-11-23 PROCEDURE — 36415 COLL VENOUS BLD VENIPUNCTURE: CPT

## 2021-11-23 PROCEDURE — 85025 COMPLETE CBC W/AUTO DIFF WBC: CPT

## 2021-11-23 PROCEDURE — 80053 COMPREHEN METABOLIC PANEL: CPT

## 2021-11-23 NOTE — NURSING NOTE
Chief Complaint   Patient presents with     Labs Only     Labs drawn via  by RN in lab.         Labs collected from venipuncture by RN.     Emilie Henderson RN

## 2021-11-29 ENCOUNTER — VIRTUAL VISIT (OUTPATIENT)
Dept: ONCOLOGY | Facility: CLINIC | Age: 71
End: 2021-11-29
Attending: INTERNAL MEDICINE
Payer: COMMERCIAL

## 2021-11-29 ENCOUNTER — OFFICE VISIT (OUTPATIENT)
Dept: SURGERY | Facility: CLINIC | Age: 71
End: 2021-11-29
Attending: PHYSICIAN ASSISTANT
Payer: COMMERCIAL

## 2021-11-29 DIAGNOSIS — Z17.0 MALIGNANT NEOPLASM OF CENTRAL PORTION OF RIGHT BREAST IN MALE, ESTROGEN RECEPTOR POSITIVE (H): Primary | ICD-10-CM

## 2021-11-29 DIAGNOSIS — C50.929 MALIGNANT NEOPLASM OF MALE BREAST, UNSPECIFIED ESTROGEN RECEPTOR STATUS, UNSPECIFIED LATERALITY, UNSPECIFIED SITE OF BREAST (H): Primary | ICD-10-CM

## 2021-11-29 DIAGNOSIS — Z51.11 ENCOUNTER FOR ANTINEOPLASTIC CHEMOTHERAPY: ICD-10-CM

## 2021-11-29 DIAGNOSIS — C50.121 MALIGNANT NEOPLASM OF CENTRAL PORTION OF RIGHT BREAST IN MALE, ESTROGEN RECEPTOR POSITIVE (H): Primary | ICD-10-CM

## 2021-11-29 PROCEDURE — 99024 POSTOP FOLLOW-UP VISIT: CPT | Performed by: PHYSICIAN ASSISTANT

## 2021-11-29 PROCEDURE — 99215 OFFICE O/P EST HI 40 MIN: CPT | Mod: 95 | Performed by: INTERNAL MEDICINE

## 2021-11-29 PROCEDURE — G0463 HOSPITAL OUTPT CLINIC VISIT: HCPCS

## 2021-11-29 RX ORDER — METHYLPREDNISOLONE SODIUM SUCCINATE 125 MG/2ML
125 INJECTION, POWDER, LYOPHILIZED, FOR SOLUTION INTRAMUSCULAR; INTRAVENOUS
Status: CANCELLED
Start: 2021-12-10

## 2021-11-29 RX ORDER — MEPERIDINE HYDROCHLORIDE 25 MG/ML
25 INJECTION INTRAMUSCULAR; INTRAVENOUS; SUBCUTANEOUS EVERY 30 MIN PRN
Status: CANCELLED | OUTPATIENT
Start: 2021-12-10

## 2021-11-29 RX ORDER — HEPARIN SODIUM,PORCINE 10 UNIT/ML
5 VIAL (ML) INTRAVENOUS
Status: CANCELLED | OUTPATIENT
Start: 2021-12-10

## 2021-11-29 RX ORDER — LORAZEPAM 2 MG/ML
0.5 INJECTION INTRAMUSCULAR EVERY 4 HOURS PRN
Status: CANCELLED | OUTPATIENT
Start: 2021-12-10

## 2021-11-29 RX ORDER — DIPHENHYDRAMINE HCL 25 MG
50 CAPSULE ORAL ONCE
Status: CANCELLED
Start: 2021-12-10

## 2021-11-29 RX ORDER — DIPHENHYDRAMINE HYDROCHLORIDE 50 MG/ML
50 INJECTION INTRAMUSCULAR; INTRAVENOUS
Status: CANCELLED
Start: 2021-12-10

## 2021-11-29 RX ORDER — ALBUTEROL SULFATE 90 UG/1
1-2 AEROSOL, METERED RESPIRATORY (INHALATION)
Status: CANCELLED
Start: 2021-12-10

## 2021-11-29 RX ORDER — NALOXONE HYDROCHLORIDE 0.4 MG/ML
0.2 INJECTION, SOLUTION INTRAMUSCULAR; INTRAVENOUS; SUBCUTANEOUS
Status: CANCELLED | OUTPATIENT
Start: 2021-12-10

## 2021-11-29 RX ORDER — EPINEPHRINE 1 MG/ML
0.3 INJECTION, SOLUTION INTRAMUSCULAR; SUBCUTANEOUS EVERY 5 MIN PRN
Status: CANCELLED | OUTPATIENT
Start: 2021-12-10

## 2021-11-29 RX ORDER — ALBUTEROL SULFATE 0.83 MG/ML
2.5 SOLUTION RESPIRATORY (INHALATION)
Status: CANCELLED | OUTPATIENT
Start: 2021-12-10

## 2021-11-29 RX ORDER — HEPARIN SODIUM (PORCINE) LOCK FLUSH IV SOLN 100 UNIT/ML 100 UNIT/ML
5 SOLUTION INTRAVENOUS
Status: CANCELLED | OUTPATIENT
Start: 2021-12-10

## 2021-11-29 NOTE — PROGRESS NOTES
FOLLOW-UP  Nov 29, 2021    Ronna Collins is a 71 year old male who returns for a post-operative follow-up visit.    HPI:    He underwent a right mastectomy and right axillary lymph node dissection for right breast cancer on 10/27/21 with Dr. Snider. He presents today for drain removal. He reports the output is minimal to none at this point. He denies any pain, swelling, drainage, fever or other concern.     There were no vitals taken for this visit.   Physical Exam  Right chest wall with intact incision. Exofin mesh was removed. Drain bulb with scant serous fluid. Drain removed. Chest rewrapped    ASSESSMENT:    Ronna Collins is a 71 year old male s/p right mastectomy and right axillary sentinel lymph node biopsy. Drain removed today. Keep chest wrapped for 24 hours and then use as needed for comfort. Ok to shower starting tomorrow. Follow up with lymphedema. Follow up with Dr. Snider in 2 months.     Kennedi Maria PA-C

## 2021-11-29 NOTE — LETTER
11/29/2021         RE: Ronna Collins  [address]  [facility] [address]        Dear Colleague,    Thank you for referring your patient, Ronna Collins, to the [facility]. Please see a copy of my visit note below.    FOLLOW-UP  Nov 29, 2021    Ronna Collins is a 71 year old male who returns for a post-operative follow-up visit.    HPI:    He underwent a right mastectomy and right axillary lymph node dissection for right breast cancer on 10/27/21 with Dr. Snider. He presents today for drain removal. He reports the output is minimal to none at this point. He denies any pain, swelling, drainage, fever or other concern.     There were no vitals taken for this visit.   Physical Exam  Right chest wall with intact incision. Exofin mesh was removed. Drain bulb with scant serous fluid. Drain removed. Chest rewrapped    ASSESSMENT:    Ronna Collins is a 71 year old male s/p right mastectomy and right axillary sentinel lymph node biopsy. Drain removed today. Keep chest wrapped for 24 hours and then use as needed for comfort. Ok to shower starting tomorrow. Follow up with lymphedema. Follow up with Dr. Snider in 2 months.     Kennedi Maria PA-C

## 2021-11-29 NOTE — LETTER
11/29/2021         RE: Jessicanatali TUAN Karina  [address]  [facility] [address]        Dear Colleague,    Thank you for referring your patient, Ronna Collins, to the [facility]. Please see a copy of my visit note below.    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  It has been nearly 1 month since he underwent a right breast mastectomy and right axillary lymph node dissection for treatment of a stage III breast cancer.  Since last visit, a PET/CT was performed.  I personally reviewed the images and we reviewed the results together, which demonstrated no evidence of distant metastases.  He was noted to have bladder wall thickening and prostate enlargement for which I have recommended further evaluation by urology.      We reviewed discussion of his case which occurred at the [facility] breast conference on Friday, 11/26.  At the conference we reviewed his case.  There were mixed opinions about whether to proceed with adjuvant chemotherapy versus endocrine therapy as next line treatment.  The mixed opinions are due to patient having multiple biomarkers including low Ki-67, initial high hormone receptor positivity, and a low risk MammaPrint which suggest little benefit to chemotherapy.  However, adjuvant chemotherapy is the standard of care in the setting of stage III disease.  We spent some time discussing the predictors of benefit from chemotherapy in detail.  At the end of our conversation he agreed to proceed with adjuvant chemotherapy.    His wife inquired as to whether there were different options for chemotherapy.  We reviewed the recommendation for weekly Taxol followed by Adriamycin and cyclophosphamide is standard chemotherapy recommendation for stage III breast cancer.  In patients who we would like to avoid the cardiotoxic side effects of Adriamycin we will sometimes  use Taxotere and cyclophosphamide administered IV once every 3 weeks for total of 4 cycles instead.  However, the ABC trials demonstrated anthracycline containing regimen was superior to a nonanthracycline regimen in multiple subtypes of breast cancer including node positive disease.  It is therefore my recommendation that we proceed with planned Taxol followed by Adriamycin and cyclophosphamide.  We reviewed the anticipated side effects of this regimen.  We reviewed that side effects tend to be worse towards the middle of each cycle.  Patient states that he would like to continue to work work during the Taxol portion of chemotherapy and is considering taking time off work during the Adriamycin and cyclophosphamide portions.    Following completion of chemotherapy, plan will be to proceed with adjuvant radiation therapy.  Finally, we will plan to treat with a 10 year course of endocrine therapy.  We discussed the endocrine therapy portion of his treatment will consist of lupron, letrozole, and abemaciclib.  We reviewed the data from the MONARCH-E clinical trial which showed improvement in 3 year disease free survival in patients with high risk ER positive breast cancer treated with 2 years of abemaciclib in addition to planned endocrine therapy.    The patient and his family had multiple questions which I answered to the best my ability today.  All in all we decided to proceed with adjuvant chemotherapy.  This will then be followed by radiation and endocrine therapy.  We discussed recommendation for endocrine therapy will be with Lupron, letrozole, and abemaciclib.  I spent a total of 35 minutes on the telephone with the patient and his family today.    Addendum:  In the days following our visit, patient consulted with Dr. Mckay at the [facility], Dr. Farrell at [facility], and Dr. Cordero in [place], who all thought given the unknown benefit of chemotherapy in this case, avoiding the risks of treatment with an  anthracycline is preferable.  Plan is therefore to proceed with treatment with Taxotere and cyclophosphamide IV once every 3 weeks x 4 cycles.      Mikaela Sr MD

## 2021-11-29 NOTE — PROGRESS NOTES
Ronna is a 71 year old who is being evaluated via a billable telephone visit.      What phone number would you like to be contacted at? [phone]  How would you like to obtain your AVS? Edis     Phone call duration: 35 minutes, an additional 20 minutes was spent on the day of the visit reviewing medical records, discussing case with colleagues, and in documentation.    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  It has been nearly 1 month since he underwent a right breast mastectomy and right axillary lymph node dissection for treatment of a stage III breast cancer.  Since last visit, a PET/CT was performed.  I personally reviewed the images and we reviewed the results together, which demonstrated no evidence of distant metastases.  He was noted to have bladder wall thickening and prostate enlargement for which I have recommended further evaluation by urology.      We reviewed discussion of his case which occurred at the [employer] breast conference on Friday, 11/26.  At the conference we reviewed his case.  There were mixed opinions about whether to proceed with adjuvant chemotherapy versus endocrine therapy as next line treatment.  The mixed opinions are due to patient having multiple biomarkers including low Ki-67, initial high hormone receptor positivity, and a low risk MammaPrint which suggest little benefit to chemotherapy.  However, adjuvant chemotherapy is the standard of care in the setting of stage III disease.  We spent some time discussing the predictors of benefit from chemotherapy in detail.  At the end of our conversation he agreed to proceed with adjuvant chemotherapy.    His wife inquired as to whether there were different options for chemotherapy.  We reviewed the recommendation for weekly Taxol followed by Adriamycin and cyclophosphamide is standard chemotherapy recommendation for stage III  breast cancer.  In patients who we would like to avoid the cardiotoxic side effects of Adriamycin we will sometimes use Taxotere and cyclophosphamide administered IV once every 3 weeks for total of 4 cycles instead.  However, the ABC trials demonstrated anthracycline containing regimen was superior to a nonanthracycline regimen in multiple subtypes of breast cancer including node positive disease.  It is therefore my recommendation that we proceed with planned Taxol followed by Adriamycin and cyclophosphamide.  We reviewed the anticipated side effects of this regimen.  We reviewed that side effects tend to be worse towards the middle of each cycle.  Patient states that he would like to continue to work work during the Taxol portion of chemotherapy and is considering taking time off work during the Adriamycin and cyclophosphamide portions.    Following completion of chemotherapy, plan will be to proceed with adjuvant radiation therapy.  Finally, we will plan to treat with a 10 year course of endocrine therapy.  We discussed the endocrine therapy portion of his treatment will consist of lupron, letrozole, and abemaciclib.  We reviewed the data from the MONARCH-E clinical trial which showed improvement in 3 year disease free survival in patients with high risk ER positive breast cancer treated with 2 years of abemaciclib in addition to planned endocrine therapy.    The patient and his family had multiple questions which I answered to the best my ability today.  All in all we decided to proceed with adjuvant chemotherapy.  This will then be followed by radiation and endocrine therapy.  We discussed recommendation for endocrine therapy will be with Lupron, letrozole, and abemaciclib.  I spent a total of 35 minutes on the telephone with the patient and his family today.    Addendum:  In the days following our visit, patient consulted with Dr. Mckay at the [facility], Dr. Farrell at [facility], and Dr. Cordero in [place],  who all thought given the unknown benefit of chemotherapy in this case, avoiding the risks of treatment with an anthracycline is preferable.  Plan is therefore to proceed with treatment with Taxotere and cyclophosphamide IV once every 3 weeks x 4 cycles.

## 2021-12-02 ENCOUNTER — TELEPHONE (OUTPATIENT)
Dept: FAMILY MEDICINE | Facility: CLINIC | Age: 71
End: 2021-12-02

## 2021-12-02 ENCOUNTER — MYC MEDICAL ADVICE (OUTPATIENT)
Dept: ONCOLOGY | Facility: CLINIC | Age: 71
End: 2021-12-02
Payer: COMMERCIAL

## 2021-12-02 NOTE — TELEPHONE ENCOUNTER
[facility] to [facility].    Fax to:[phone]    Mya Wylie RN

## 2021-12-02 NOTE — TELEPHONE ENCOUNTER
Hi  I am his primary  I just got this message I don't know if you did also but I think you could handle the request better than I could

## 2021-12-06 ENCOUNTER — PATIENT OUTREACH (OUTPATIENT)
Dept: ONCOLOGY | Facility: CLINIC | Age: 71
End: 2021-12-06
Payer: COMMERCIAL

## 2021-12-06 NOTE — PROGRESS NOTES
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 Via Oncology handouts sent via on TC. Reviewed administration, side effects (including myelosuppression, nausea/vomiting, diarrhea/constipation, hair loss, mouth sores) and ongoing symptom management by APPs in clinic. Provided phone numbers for triage and after hours care. Highlighted steps to expect when getting chemotherapy (check in, labs, pre- meds, infusion).   Answered all Ronna's questions to his stated satisfaction.

## 2021-12-07 ENCOUNTER — MYC MEDICAL ADVICE (OUTPATIENT)
Dept: ONCOLOGY | Facility: CLINIC | Age: 71
End: 2021-12-07
Payer: COMMERCIAL

## 2021-12-07 DIAGNOSIS — C50.929 MALIGNANT NEOPLASM OF MALE BREAST, UNSPECIFIED ESTROGEN RECEPTOR STATUS, UNSPECIFIED LATERALITY, UNSPECIFIED SITE OF BREAST (H): Primary | ICD-10-CM

## 2021-12-07 RX ORDER — EPINEPHRINE 1 MG/ML
0.3 INJECTION, SOLUTION INTRAMUSCULAR; SUBCUTANEOUS EVERY 5 MIN PRN
Status: CANCELLED | OUTPATIENT
Start: 2021-12-13

## 2021-12-07 RX ORDER — DIPHENHYDRAMINE HYDROCHLORIDE 50 MG/ML
50 INJECTION INTRAMUSCULAR; INTRAVENOUS
Status: CANCELLED
Start: 2021-12-13

## 2021-12-07 RX ORDER — ALBUTEROL SULFATE 0.83 MG/ML
2.5 SOLUTION RESPIRATORY (INHALATION)
Status: CANCELLED | OUTPATIENT
Start: 2021-12-13

## 2021-12-07 RX ORDER — HEPARIN SODIUM (PORCINE) LOCK FLUSH IV SOLN 100 UNIT/ML 100 UNIT/ML
5 SOLUTION INTRAVENOUS
Status: CANCELLED | OUTPATIENT
Start: 2021-12-13

## 2021-12-07 RX ORDER — ALBUTEROL SULFATE 90 UG/1
1-2 AEROSOL, METERED RESPIRATORY (INHALATION)
Status: CANCELLED
Start: 2021-12-13

## 2021-12-07 RX ORDER — LORAZEPAM 2 MG/ML
0.5 INJECTION INTRAMUSCULAR EVERY 4 HOURS PRN
Status: CANCELLED | OUTPATIENT
Start: 2021-12-13

## 2021-12-07 RX ORDER — PALONOSETRON 0.05 MG/ML
0.25 INJECTION, SOLUTION INTRAVENOUS ONCE
Status: CANCELLED
Start: 2021-12-13 | End: 2021-12-13

## 2021-12-07 RX ORDER — METHYLPREDNISOLONE SODIUM SUCCINATE 125 MG/2ML
125 INJECTION, POWDER, LYOPHILIZED, FOR SOLUTION INTRAMUSCULAR; INTRAVENOUS
Status: CANCELLED
Start: 2021-12-13

## 2021-12-07 RX ORDER — NALOXONE HYDROCHLORIDE 0.4 MG/ML
0.2 INJECTION, SOLUTION INTRAMUSCULAR; INTRAVENOUS; SUBCUTANEOUS
Status: CANCELLED | OUTPATIENT
Start: 2021-12-13

## 2021-12-07 RX ORDER — MEPERIDINE HYDROCHLORIDE 25 MG/ML
25 INJECTION INTRAMUSCULAR; INTRAVENOUS; SUBCUTANEOUS EVERY 30 MIN PRN
Status: CANCELLED | OUTPATIENT
Start: 2021-12-13

## 2021-12-07 RX ORDER — HEPARIN SODIUM,PORCINE 10 UNIT/ML
5 VIAL (ML) INTRAVENOUS
Status: CANCELLED | OUTPATIENT
Start: 2021-12-13

## 2021-12-10 ENCOUNTER — HOSPITAL ENCOUNTER (OUTPATIENT)
Dept: PHYSICAL THERAPY | Facility: CLINIC | Age: 71
Setting detail: THERAPIES SERIES
End: 2021-12-10
Attending: SURGERY
Payer: COMMERCIAL

## 2021-12-10 PROCEDURE — 97140 MANUAL THERAPY 1/> REGIONS: CPT | Mod: GP | Performed by: PHYSICAL THERAPIST

## 2021-12-10 PROCEDURE — 97535 SELF CARE MNGMENT TRAINING: CPT | Mod: GP | Performed by: PHYSICAL THERAPIST

## 2021-12-10 PROCEDURE — 97110 THERAPEUTIC EXERCISES: CPT | Mod: GP | Performed by: PHYSICAL THERAPIST

## 2021-12-10 NOTE — PROGRESS NOTES
[facility]    Outpatient Physical Therapy Progress Note  Patient: Ronna Collins  : 1950    Beginning/End Dates of Reporting Period:  10/25/21 to 12/10/21    Referring Provider: Dr. Nida Snider    Therapy Diagnosis: high risk for Lymphedema and now with mild truncal edema, shoulder dysfunction     Client Self Report: Pt has the last of the drains removed on 21.  He is to start chemo on 21 unless a spot opens up sooner.  Chemo will last about 12 weeks and 4 weeks later will start radiation, which is late March.   Spouse would like for patient to check in with edema therapy regularly to keep accountability.      Objective Measurements:  Objective Measure: MICHELE  Details: not palpable today  Objective Measure: Volume/edema  Details: increased but different therapist taking measurements today 1716mL. Pt without signs or symptoms of edema in the R UE. He does have mild edema in the lateral trunk and medial pole of chest incision  Objective Measure: AROM  Details: R shoulder , ab 136 l and L shoulder  , abd 165    Goals:  Goal Identifier edema   Goal Description Patient will understand and express independence with lymphedema risk reduction program including but not limited to skincare, exercise, GCB PRN, MLD   Target Date 22   Date Met      Progress (detail required for progress note): Date extended as it has not been met, not doing massage daily     Goal Identifier shoulder ROM   Goal Description Pt to express understanding and independence with acute postsurgical precautions re: RUQ   Target Date 21   Date Met  12/10/21   Progress (detail required for progress note):       Goal Identifier HEP   Goal Description Pt will be independent with exercise program to for R UE strength and ROM to allow for improved overhead lifting and prevent shoulder injury   Target Date 22    Date Met      Progress (detail required for progress note):       Plan:  Changes to therapy plan of care: Plan to see pt 2x/mo for 3 months and then likely after radiation to monitor swelling while working on strength and ROM deficits in shoulder    Discharge:  No

## 2021-12-14 ENCOUNTER — INFUSION THERAPY VISIT (OUTPATIENT)
Dept: ONCOLOGY | Facility: CLINIC | Age: 71
End: 2021-12-14
Attending: INTERNAL MEDICINE
Payer: COMMERCIAL

## 2021-12-14 ENCOUNTER — APPOINTMENT (OUTPATIENT)
Dept: LAB | Facility: CLINIC | Age: 71
End: 2021-12-14
Attending: INTERNAL MEDICINE
Payer: COMMERCIAL

## 2021-12-14 ENCOUNTER — TELEPHONE (OUTPATIENT)
Dept: ONCOLOGY | Facility: CLINIC | Age: 71
End: 2021-12-14

## 2021-12-14 VITALS
HEART RATE: 84 BPM | RESPIRATION RATE: 14 BRPM | BODY MASS INDEX: 26.15 KG/M2 | SYSTOLIC BLOOD PRESSURE: 138 MMHG | TEMPERATURE: 97.1 F | WEIGHT: 172 LBS | OXYGEN SATURATION: 98 % | DIASTOLIC BLOOD PRESSURE: 91 MMHG

## 2021-12-14 DIAGNOSIS — C50.121 MALIGNANT NEOPLASM OF CENTRAL PORTION OF RIGHT BREAST IN MALE, ESTROGEN RECEPTOR POSITIVE (H): Primary | ICD-10-CM

## 2021-12-14 DIAGNOSIS — Z17.0 MALIGNANT NEOPLASM OF CENTRAL PORTION OF RIGHT BREAST IN MALE, ESTROGEN RECEPTOR POSITIVE (H): Primary | ICD-10-CM

## 2021-12-14 DIAGNOSIS — C50.929 MALIGNANT NEOPLASM OF MALE BREAST, UNSPECIFIED ESTROGEN RECEPTOR STATUS, UNSPECIFIED LATERALITY, UNSPECIFIED SITE OF BREAST (H): ICD-10-CM

## 2021-12-14 LAB
ALBUMIN SERPL-MCNC: 3.6 G/DL (ref 3.4–5)
ALP SERPL-CCNC: 90 U/L (ref 40–150)
ALT SERPL W P-5'-P-CCNC: 30 U/L (ref 0–70)
ANION GAP SERPL CALCULATED.3IONS-SCNC: 11 MMOL/L (ref 3–14)
AST SERPL W P-5'-P-CCNC: 13 U/L (ref 0–45)
BASOPHILS # BLD AUTO: 0.1 10E3/UL (ref 0–0.2)
BASOPHILS NFR BLD AUTO: 1 %
BILIRUB SERPL-MCNC: 0.5 MG/DL (ref 0.2–1.3)
BUN SERPL-MCNC: 10 MG/DL (ref 7–30)
CALCIUM SERPL-MCNC: 8.9 MG/DL (ref 8.5–10.1)
CHLORIDE BLD-SCNC: 103 MMOL/L (ref 94–109)
CO2 SERPL-SCNC: 24 MMOL/L (ref 20–32)
CREAT SERPL-MCNC: 0.74 MG/DL (ref 0.66–1.25)
EOSINOPHIL # BLD AUTO: 0.4 10E3/UL (ref 0–0.7)
EOSINOPHIL NFR BLD AUTO: 7 %
ERYTHROCYTE [DISTWIDTH] IN BLOOD BY AUTOMATED COUNT: 13 % (ref 10–15)
GFR SERPL CREATININE-BSD FRML MDRD: >90 ML/MIN/1.73M2
GLUCOSE BLD-MCNC: 198 MG/DL (ref 70–99)
HCT VFR BLD AUTO: 44.4 % (ref 40–53)
HGB BLD-MCNC: 14.4 G/DL (ref 13.3–17.7)
IMM GRANULOCYTES # BLD: 0 10E3/UL
IMM GRANULOCYTES NFR BLD: 0 %
LYMPHOCYTES # BLD AUTO: 1.8 10E3/UL (ref 0.8–5.3)
LYMPHOCYTES NFR BLD AUTO: 35 %
MCH RBC QN AUTO: 26.1 PG (ref 26.5–33)
MCHC RBC AUTO-ENTMCNC: 32.4 G/DL (ref 31.5–36.5)
MCV RBC AUTO: 80 FL (ref 78–100)
MONOCYTES # BLD AUTO: 0.3 10E3/UL (ref 0–1.3)
MONOCYTES NFR BLD AUTO: 7 %
NEUTROPHILS # BLD AUTO: 2.6 10E3/UL (ref 1.6–8.3)
NEUTROPHILS NFR BLD AUTO: 50 %
NRBC # BLD AUTO: 0 10E3/UL
NRBC BLD AUTO-RTO: 0 /100
PLATELET # BLD AUTO: 267 10E3/UL (ref 150–450)
POTASSIUM BLD-SCNC: 3.7 MMOL/L (ref 3.4–5.3)
PROT SERPL-MCNC: 7.3 G/DL (ref 6.8–8.8)
RBC # BLD AUTO: 5.52 10E6/UL (ref 4.4–5.9)
SODIUM SERPL-SCNC: 138 MMOL/L (ref 133–144)
WBC # BLD AUTO: 5.1 10E3/UL (ref 4–11)

## 2021-12-14 PROCEDURE — 96417 CHEMO IV INFUS EACH ADDL SEQ: CPT

## 2021-12-14 PROCEDURE — 36415 COLL VENOUS BLD VENIPUNCTURE: CPT | Performed by: INTERNAL MEDICINE

## 2021-12-14 PROCEDURE — 96372 THER/PROPH/DIAG INJ SC/IM: CPT | Performed by: INTERNAL MEDICINE

## 2021-12-14 PROCEDURE — 250N000011 HC RX IP 250 OP 636: Performed by: INTERNAL MEDICINE

## 2021-12-14 PROCEDURE — 96377 APPLICATON ON-BODY INJECTOR: CPT | Mod: 59

## 2021-12-14 PROCEDURE — 96375 TX/PRO/DX INJ NEW DRUG ADDON: CPT

## 2021-12-14 PROCEDURE — 80053 COMPREHEN METABOLIC PANEL: CPT | Performed by: INTERNAL MEDICINE

## 2021-12-14 PROCEDURE — 85025 COMPLETE CBC W/AUTO DIFF WBC: CPT | Performed by: INTERNAL MEDICINE

## 2021-12-14 PROCEDURE — 258N000003 HC RX IP 258 OP 636: Performed by: INTERNAL MEDICINE

## 2021-12-14 PROCEDURE — 96413 CHEMO IV INFUSION 1 HR: CPT

## 2021-12-14 PROCEDURE — 96367 TX/PROPH/DG ADDL SEQ IV INF: CPT

## 2021-12-14 RX ORDER — PALONOSETRON 0.05 MG/ML
0.25 INJECTION, SOLUTION INTRAVENOUS ONCE
Status: COMPLETED | OUTPATIENT
Start: 2021-12-14 | End: 2021-12-14

## 2021-12-14 RX ORDER — ONDANSETRON 8 MG/1
8 TABLET, FILM COATED ORAL EVERY 8 HOURS PRN
Qty: 30 TABLET | Refills: 3 | Status: SHIPPED | OUTPATIENT
Start: 2021-12-14 | End: 2022-03-09

## 2021-12-14 RX ORDER — PROCHLORPERAZINE MALEATE 10 MG
5 TABLET ORAL EVERY 6 HOURS PRN
Qty: 30 TABLET | Refills: 3 | Status: SHIPPED | OUTPATIENT
Start: 2021-12-14 | End: 2022-03-09

## 2021-12-14 RX ORDER — DEXAMETHASONE 4 MG/1
8 TABLET ORAL
Qty: 6 TABLET | Refills: 3 | Status: SHIPPED | OUTPATIENT
Start: 2021-12-14 | End: 2022-01-05

## 2021-12-14 RX ORDER — LORAZEPAM 0.5 MG/1
0.5 TABLET ORAL EVERY 4 HOURS PRN
Qty: 30 TABLET | Refills: 3 | Status: SHIPPED | OUTPATIENT
Start: 2021-12-14 | End: 2022-03-09

## 2021-12-14 RX ADMIN — PALONOSETRON 0.25 MG: 0.05 INJECTION, SOLUTION INTRAVENOUS at 07:44

## 2021-12-14 RX ADMIN — DOCETAXEL 146 MG: 20 INJECTION, SOLUTION, CONCENTRATE INTRAVENOUS at 08:44

## 2021-12-14 RX ADMIN — DEXAMETHASONE SODIUM PHOSPHATE 20 MG: 10 INJECTION, SOLUTION INTRAMUSCULAR; INTRAVENOUS at 07:47

## 2021-12-14 RX ADMIN — SODIUM CHLORIDE 250 ML: 9 INJECTION, SOLUTION INTRAVENOUS at 07:44

## 2021-12-14 RX ADMIN — CYCLOPHOSPHAMIDE 1165 MG: 1 INJECTION, POWDER, FOR SOLUTION INTRAVENOUS; ORAL at 09:47

## 2021-12-14 RX ADMIN — PEGFILGRASTIM 6 MG: KIT SUBCUTANEOUS at 10:13

## 2021-12-14 ASSESSMENT — PAIN SCALES - GENERAL: PAINLEVEL: NO PAIN (0)

## 2021-12-14 NOTE — TELEPHONE ENCOUNTER
PA Initiation    Medication: Ondansetron - Submitted  Insurance Company: [employer] - Phone [phone] Fax [phone]  Pharmacy Filling the Rx:    Filling Pharmacy Phone:    Filling Pharmacy Fax:    Start Date: 12/14/2021        Oksana Ames CPhT  [facility]  Oncology Pharmacy Liaison  [email or web address]  Phone: [phone]  Fax: [phone]

## 2021-12-14 NOTE — PATIENT INSTRUCTIONS
Contact Numbers    [facility] Main Line (for Scheduling/Triage/After Hours Nurse Line): [phone]    Please call the [facility] nurse triage or the after hours nurse line if you experience a temperature greater than or equal to 100.5, shaking chills, have uncontrolled nausea, vomiting and/or diarrhea, dizziness, lightheadedness, shortness of breath, chest pain, bleeding, unexplained bruising, or if you have any other new/concerning symptoms, questions or concerns.     If you are having any concerning symptoms or wish to speak to a provider before your next infusion visit, please call your care coordinator or triage to notify them so we can adequately serve you.     If you need any refills on medications (narcotics or other medications), please call before your infusion appointment.         December 2021 Sunday Monday Tuesday Wednesday Thursday Friday Saturday                  1     2     3     4       5     6     7     8     9     10    LYMPHEDEMA TREATMENT   9:15 AM   (60 min.)   Renetta Dempsey PT   [facility] 11       12     13     14    LAB PERIPHERAL   6:30 AM   (15 min.)   [facility]   [facility]    ONC INFUSION 3 HR (180 MIN)   7:00 AM   (180 min.)    ONC INFUSION NURSE   [facility] 15     16     17     18       19     20     21     22     23     24     25       26     27     28     29     30     31 January 2022 Sunday Monday Tuesday Wednesday Thursday Friday Saturday                                 1       2     3     4    LAB PERIPHERAL  10:30 AM   (15 min.)   [facility]   [facility]    RETURN  10:45 AM   (45 min.)   Ana Plummer CNP   [facility]    ONC INFUSION 3 HR (180 MIN)  12:00 PM   (180 min.)    ONC INFUSION NURSE   [facility] 5     6     7     8       9     10     11     12      13     14    LYMPHEDEMA TREATMENT   8:00 AM   (75 min.)   Renetta Dempsey PT   [facility] 15       16     17     18    LAB PERIPHERAL   9:15 AM   (15 min.)   [facility]   [facility]    RETURN   9:30 AM   (30 min.)   Mikaela Sr MD   [facility] 19     20     21     22       23     24     25    LAB PERIPHERAL   8:30 AM   (15 min.)   [facility]   [facility]    RETURN   8:45 AM   (45 min.)   Ana Plummer CNP   [facility]    ONC INFUSION 3 HR (180 MIN)  10:30 AM   (180 min.)    ONC INFUSION NURSE   [facility] 26     27     28     29       30     31                                              Lab Results:  Recent Results (from the past 12 hour(s))   Comprehensive metabolic panel    Collection Time: 12/14/21  7:08 AM   Result Value Ref Range    Sodium 138 133 - 144 mmol/L    Potassium 3.7 3.4 - 5.3 mmol/L    Chloride 103 94 - 109 mmol/L    Carbon Dioxide (CO2) 24 20 - 32 mmol/L    Anion Gap 11 3 - 14 mmol/L    Urea Nitrogen 10 7 - 30 mg/dL    Creatinine 0.74 0.66 - 1.25 mg/dL    Calcium 8.9 8.5 - 10.1 mg/dL    Glucose 198 (H) 70 - 99 mg/dL    Alkaline Phosphatase 90 40 - 150 U/L    AST 13 0 - 45 U/L    ALT 30 0 - 70 U/L    Protein Total 7.3 6.8 - 8.8 g/dL    Albumin 3.6 3.4 - 5.0 g/dL    Bilirubin Total 0.5 0.2 - 1.3 mg/dL    GFR Estimate >90 >60 mL/min/1.73m2   CBC with platelets and differential    Collection Time: 12/14/21  7:08 AM   Result Value Ref Range    WBC Count 5.1 4.0 - 11.0 10e3/uL    RBC Count 5.52 4.40 - 5.90 10e6/uL    Hemoglobin 14.4 13.3 - 17.7 g/dL    Hematocrit 44.4 40.0 - 53.0 %    MCV 80 78 - 100 fL    MCH 26.1 (L) 26.5 - 33.0 pg    MCHC 32.4 31.5 - 36.5 g/dL    RDW 13.0 10.0 - 15.0 %    Platelet Count 267 150 - 450 10e3/uL    % Neutrophils 50 %    % Lymphocytes 35 %    % Monocytes 7 %     % Eosinophils 7 %    % Basophils 1 %    % Immature Granulocytes 0 %    NRBCs per 100 WBC 0 <1 /100    Absolute Neutrophils 2.6 1.6 - 8.3 10e3/uL    Absolute Lymphocytes 1.8 0.8 - 5.3 10e3/uL    Absolute Monocytes 0.3 0.0 - 1.3 10e3/uL    Absolute Eosinophils 0.4 0.0 - 0.7 10e3/uL    Absolute Basophils 0.1 0.0 - 0.2 10e3/uL    Absolute Immature Granulocytes 0.0 <=0.4 10e3/uL    Absolute NRBCs 0.0 10e3/uL               L

## 2021-12-14 NOTE — NURSING NOTE
Chief Complaint   Patient presents with     Blood Draw     Labs drawn with PIV start by RN. VS taken.     Labs drawn via peripheral IV. Vital signs taken. Checked into next appointment.     Ana Morgan RN

## 2021-12-14 NOTE — TELEPHONE ENCOUNTER
Prior Authorization Approval    Authorization Effective Date: 12/14/2021  Authorization Expiration Date: 12/14/2022  Medication: Ondansetron - APPROVED  Approved Dose/Quantity:   Reference #:     Insurance Company: [employer] [phone] Fax [phone]  Expected CoPay:       CoPay Card Available:      Foundation Assistance Needed:    Which Pharmacy is filling the prescription (Not needed for infusion/clinic administered):    Pharmacy Notified:    Patient Notified:          Oksana Ames CPhT  [facility]  Oncology Pharmacy Liaison  [email or web address]  Phone: [phone]  Fax: [phone]

## 2021-12-14 NOTE — PROGRESS NOTES
Infusion Nursing Note:  Ronna Collins presents today for Cycle 1 Taxotere, Cytoxan, Neulasta Onpro.    Patient seen by provider today: No    Note: Patient new to oncology infusion room and is receiving Taxotere and Cytoxan for the first time. Pt oriented to infusion room and call light. Chemotherapy teaching done previously by RNCC.  Reinforced chemotherapy teaching/side effects and schedule during infusion visit.     Copy of AVS reviewed with patient. Pt instructed to call care coordinator, triage (or MD on call if after hours/weekends) with chills/temp >=100.4, questions/concerns. Pt stated understanding of plan.     Neulasta Onpro On-Body injector applied to left arm at 1015 with light facing down.  Writer discussed Neulasta injection would start tomorrow 12/15 at 1315, approximately 27 hours after application applied today.  Written and Verbal instruction reviewed with patient.  Pt instructed when the dose delivery starts, it will take about 45 minutes to complete.  Pt aware Neulasta Onpro On-Body should have green flashing light and to call triage or on-call MD if injector flashes red or appears to be leaking. Pt aware to keep Onpro On-Body Neulasta 4 inches away from electrical equipment and to avoid showering 4 hours prior to injection.     Intravenous Access:  Peripheral IV placed.    Treatment Conditions:  Lab Results   Component Value Date    HGB 14.4 12/14/2021    WBC 5.1 12/14/2021    ANEU 2.9 07/08/2021    ANEUTAUTO 2.6 12/14/2021     12/14/2021      Lab Results   Component Value Date     12/14/2021    POTASSIUM 3.7 12/14/2021    MAG 2.3 06/22/2021    CR 0.74 12/14/2021    PROSPER 8.9 12/14/2021    BILITOTAL 0.5 12/14/2021    ALBUMIN 3.6 12/14/2021    ALT 30 12/14/2021    AST 13 12/14/2021     Results reviewed, labs MET treatment parameters, ok to proceed with treatment.    Post Infusion Assessment:  Patient tolerated infusion without incident.  Blood return noted pre and post  infusion.  Site patent and intact, free from redness, edema or discomfort.  No evidence of extravasations. Access discontinued per protocol.     Discharge Plan:   Prescription refills given for Zofran, Ativan, Compazine and Decadron.  Copy of AVS reviewed with patient and/or family.  Patient will return 1/4 for next appointment.  Patient discharged in stable condition accompanied by: self.  Departure Mode: Ambulatory.      Regina Wheatley RN

## 2021-12-15 ENCOUNTER — NURSE TRIAGE (OUTPATIENT)
Dept: NURSING | Facility: CLINIC | Age: 71
End: 2021-12-15
Payer: COMMERCIAL

## 2021-12-16 NOTE — TELEPHONE ENCOUNTER
Pt calling to report persistent hiccups every 10 seconds, occurring since 2PM today (7 hrs).      Pt received his first chemo treatment yesterday.  He took 2 Dexamethasone tabs today and questions if this medication is causing his hiccups.      RN advised pt to try the following, per [facility] protocol care advice:       Pt tried drinking cold water through as straw while pinching his nose during triage call.  Hiccups cleared.      RN will route to Oncology provider to discuss hiccups + medication.  Patient verbalized understanding and had no further questions.      COVID 19 Nurse Triage Plan/Patient Instructions    Please be aware that novel coronavirus (COVID-19) may be circulating in the community. If you develop symptoms such as fever, cough, or SOB or if you have concerns about the presence of another infection including coronavirus (COVID-19), please contact your health care provider or visit [email or web address].     Disposition/Instructions    Virtual Visit with provider recommended. Reference Visit Selection Guide.    Thank you for taking steps to prevent the spread of this virus.  o Limit your contact with others.  o Wear a simple mask to cover your cough.  o Wash your hands well and often.    Resources    [facility]: About COVID-19: [email or web address]    CDC: What to Do If You're Sick: www.cdc.gov/coronavirus/2019-ncov/about/steps-when-sick.html    CDC: Ending Home Isolation: www.cdc.gov/coronavirus/2019-ncov/hcp/disposition-in-home-patients.html     CDC: Caring for Someone: www.cdc.gov/coronavirus/2019-ncov/if-you-are-sick/care-for-someone.html     [facility]: Interim Guidance for Hospital Discharge to Home: www.health.[facility].mn.us/diseases/coronavirus/hcp/hospdischarge.pdf    [facility] clinical trials (COVID-19 research studies): [email or web address]/umn-clinical-trials     Below are the COVID-19 hotlines at the [place] [facility] ([facility]). Interpreters are  available.   o For health questions: Call 238-278-7707 or 1-217.528.9248 (7 a.m. to 7 p.m.)  o For questions about schools and childcare: Call 582-337-2991 or 1-702.205.4533 (7 a.m. to 7 p.m.)                 Rkia Frias RN  [facility] Nurse Advisor      Additional Information    Negative: Chest pain    Negative: Difficulty breathing    Negative: [1] Abdomen pain is main symptom AND [2] male    Negative: [1] Abdomen pain is main symptom AND [2] adult female    Negative: Vomiting    Negative: Patient sounds very sick or weak to the triager    Negative: [1] Hiccups present > 3 hours AND [2] severe AND [3] no improvement using hiccup treatment per protocol    Negative: [1] Hiccups present > 24 hours AND [2] nearly continuous    Hiccups interfere with work, school, or sleep    Protocols used: [id number]

## 2021-12-16 NOTE — TELEPHONE ENCOUNTER
Clinic Action Needed: Yes, please contact patient.    FNA Triage Call  Presenting Problem: See FNA triage encounter below.  Pt would like to know if he should continue taking the Dexamethasone, he plans on taking this with breakfast, around 9AM.     Routed to: Oncology pool    Rika Frias RN  [facility] Nurse Advisor

## 2021-12-17 NOTE — TELEPHONE ENCOUNTER
[facility] Triage    After Hours Call:    Spoke to Ronna and his hiccups are better.  He only has today left of the dexamethasone and is wondering if he should take it today.    He is feeling really good at this point.    Advised to finish the dexamethasone as instructed as the hiccups have gone away and the benefits of the steroid vs the hiccups is outweighed.    Patient will continue the steroid and today is his last day.    Routing to Remberto and April Farris

## 2021-12-19 ENCOUNTER — HEALTH MAINTENANCE LETTER (OUTPATIENT)
Age: 71
End: 2021-12-19

## 2022-01-03 ENCOUNTER — MYC MEDICAL ADVICE (OUTPATIENT)
Dept: ONCOLOGY | Facility: CLINIC | Age: 72
End: 2022-01-03
Payer: COMMERCIAL

## 2022-01-05 ENCOUNTER — APPOINTMENT (OUTPATIENT)
Dept: LAB | Facility: CLINIC | Age: 72
End: 2022-01-05
Attending: INTERNAL MEDICINE
Payer: COMMERCIAL

## 2022-01-05 ENCOUNTER — VIRTUAL VISIT (OUTPATIENT)
Dept: ONCOLOGY | Facility: CLINIC | Age: 72
End: 2022-01-05
Attending: PHYSICIAN ASSISTANT
Payer: COMMERCIAL

## 2022-01-05 ENCOUNTER — INFUSION THERAPY VISIT (OUTPATIENT)
Dept: ONCOLOGY | Facility: CLINIC | Age: 72
End: 2022-01-05
Attending: INTERNAL MEDICINE
Payer: COMMERCIAL

## 2022-01-05 VITALS
TEMPERATURE: 98.3 F | RESPIRATION RATE: 16 BRPM | HEART RATE: 86 BPM | OXYGEN SATURATION: 98 % | SYSTOLIC BLOOD PRESSURE: 137 MMHG | BODY MASS INDEX: 26 KG/M2 | WEIGHT: 171 LBS | DIASTOLIC BLOOD PRESSURE: 78 MMHG

## 2022-01-05 DIAGNOSIS — C50.929 MALIGNANT NEOPLASM OF MALE BREAST, UNSPECIFIED ESTROGEN RECEPTOR STATUS, UNSPECIFIED LATERALITY, UNSPECIFIED SITE OF BREAST (H): Primary | ICD-10-CM

## 2022-01-05 DIAGNOSIS — C50.929 MALIGNANT NEOPLASM OF MALE BREAST, UNSPECIFIED ESTROGEN RECEPTOR STATUS, UNSPECIFIED LATERALITY, UNSPECIFIED SITE OF BREAST (H): ICD-10-CM

## 2022-01-05 LAB
ALBUMIN SERPL-MCNC: 3.3 G/DL (ref 3.4–5)
ALP SERPL-CCNC: 85 U/L (ref 40–150)
ALT SERPL W P-5'-P-CCNC: 28 U/L (ref 0–70)
ANION GAP SERPL CALCULATED.3IONS-SCNC: 9 MMOL/L (ref 3–14)
AST SERPL W P-5'-P-CCNC: 18 U/L (ref 0–45)
BASOPHILS # BLD AUTO: 0.1 10E3/UL (ref 0–0.2)
BASOPHILS NFR BLD AUTO: 1 %
BILIRUB SERPL-MCNC: 0.4 MG/DL (ref 0.2–1.3)
BUN SERPL-MCNC: 9 MG/DL (ref 7–30)
CALCIUM SERPL-MCNC: 9.1 MG/DL (ref 8.5–10.1)
CHLORIDE BLD-SCNC: 103 MMOL/L (ref 94–109)
CO2 SERPL-SCNC: 24 MMOL/L (ref 20–32)
CREAT SERPL-MCNC: 0.65 MG/DL (ref 0.66–1.25)
EOSINOPHIL # BLD AUTO: 0.1 10E3/UL (ref 0–0.7)
EOSINOPHIL NFR BLD AUTO: 2 %
ERYTHROCYTE [DISTWIDTH] IN BLOOD BY AUTOMATED COUNT: 14 % (ref 10–15)
GFR SERPL CREATININE-BSD FRML MDRD: >90 ML/MIN/1.73M2
GLUCOSE BLD-MCNC: 163 MG/DL (ref 70–99)
HCT VFR BLD AUTO: 41.7 % (ref 40–53)
HGB BLD-MCNC: 13.3 G/DL (ref 13.3–17.7)
IMM GRANULOCYTES # BLD: 0 10E3/UL
IMM GRANULOCYTES NFR BLD: 1 %
LYMPHOCYTES # BLD AUTO: 1.4 10E3/UL (ref 0.8–5.3)
LYMPHOCYTES NFR BLD AUTO: 27 %
MCH RBC QN AUTO: 25.8 PG (ref 26.5–33)
MCHC RBC AUTO-ENTMCNC: 31.9 G/DL (ref 31.5–36.5)
MCV RBC AUTO: 81 FL (ref 78–100)
MONOCYTES # BLD AUTO: 0.4 10E3/UL (ref 0–1.3)
MONOCYTES NFR BLD AUTO: 8 %
NEUTROPHILS # BLD AUTO: 3.1 10E3/UL (ref 1.6–8.3)
NEUTROPHILS NFR BLD AUTO: 61 %
NRBC # BLD AUTO: 0 10E3/UL
NRBC BLD AUTO-RTO: 0 /100
PLATELET # BLD AUTO: 428 10E3/UL (ref 150–450)
POTASSIUM BLD-SCNC: 3.5 MMOL/L (ref 3.4–5.3)
PROT SERPL-MCNC: 7.3 G/DL (ref 6.8–8.8)
RBC # BLD AUTO: 5.15 10E6/UL (ref 4.4–5.9)
SODIUM SERPL-SCNC: 136 MMOL/L (ref 133–144)
WBC # BLD AUTO: 5.1 10E3/UL (ref 4–11)

## 2022-01-05 PROCEDURE — 250N000011 HC RX IP 250 OP 636: Performed by: PHYSICIAN ASSISTANT

## 2022-01-05 PROCEDURE — 258N000003 HC RX IP 258 OP 636: Performed by: PHYSICIAN ASSISTANT

## 2022-01-05 PROCEDURE — 85025 COMPLETE CBC W/AUTO DIFF WBC: CPT | Performed by: PHYSICIAN ASSISTANT

## 2022-01-05 PROCEDURE — 96413 CHEMO IV INFUSION 1 HR: CPT

## 2022-01-05 PROCEDURE — 82040 ASSAY OF SERUM ALBUMIN: CPT | Performed by: PHYSICIAN ASSISTANT

## 2022-01-05 PROCEDURE — 96417 CHEMO IV INFUS EACH ADDL SEQ: CPT

## 2022-01-05 PROCEDURE — 96377 APPLICATON ON-BODY INJECTOR: CPT | Mod: 59

## 2022-01-05 PROCEDURE — 99214 OFFICE O/P EST MOD 30 MIN: CPT | Mod: 95 | Performed by: PHYSICIAN ASSISTANT

## 2022-01-05 PROCEDURE — 96372 THER/PROPH/DIAG INJ SC/IM: CPT | Performed by: PHYSICIAN ASSISTANT

## 2022-01-05 PROCEDURE — 96375 TX/PRO/DX INJ NEW DRUG ADDON: CPT

## 2022-01-05 PROCEDURE — 80053 COMPREHEN METABOLIC PANEL: CPT | Performed by: PHYSICIAN ASSISTANT

## 2022-01-05 PROCEDURE — 36415 COLL VENOUS BLD VENIPUNCTURE: CPT | Performed by: PHYSICIAN ASSISTANT

## 2022-01-05 RX ORDER — EPINEPHRINE 1 MG/ML
0.3 INJECTION, SOLUTION INTRAMUSCULAR; SUBCUTANEOUS EVERY 5 MIN PRN
Status: CANCELLED | OUTPATIENT
Start: 2022-01-05

## 2022-01-05 RX ORDER — PALONOSETRON 0.05 MG/ML
0.25 INJECTION, SOLUTION INTRAVENOUS ONCE
Status: COMPLETED | OUTPATIENT
Start: 2022-01-05 | End: 2022-01-05

## 2022-01-05 RX ORDER — HEPARIN SODIUM,PORCINE 10 UNIT/ML
5 VIAL (ML) INTRAVENOUS
Status: DISCONTINUED | OUTPATIENT
Start: 2022-01-05 | End: 2022-01-05 | Stop reason: HOSPADM

## 2022-01-05 RX ORDER — PALONOSETRON 0.05 MG/ML
0.25 INJECTION, SOLUTION INTRAVENOUS ONCE
Status: CANCELLED
Start: 2022-01-05 | End: 2022-01-05

## 2022-01-05 RX ORDER — LORAZEPAM 2 MG/ML
0.5 INJECTION INTRAMUSCULAR EVERY 4 HOURS PRN
Status: CANCELLED | OUTPATIENT
Start: 2022-01-05

## 2022-01-05 RX ORDER — MEPERIDINE HYDROCHLORIDE 25 MG/ML
25 INJECTION INTRAMUSCULAR; INTRAVENOUS; SUBCUTANEOUS EVERY 30 MIN PRN
Status: CANCELLED | OUTPATIENT
Start: 2022-01-05

## 2022-01-05 RX ORDER — NALOXONE HYDROCHLORIDE 0.4 MG/ML
0.2 INJECTION, SOLUTION INTRAMUSCULAR; INTRAVENOUS; SUBCUTANEOUS
Status: CANCELLED | OUTPATIENT
Start: 2022-01-05

## 2022-01-05 RX ORDER — DEXAMETHASONE 4 MG/1
4 TABLET ORAL
Qty: 3 TABLET | Refills: 3 | Status: SHIPPED | OUTPATIENT
Start: 2022-01-05 | End: 2022-02-15

## 2022-01-05 RX ORDER — ALBUTEROL SULFATE 0.83 MG/ML
2.5 SOLUTION RESPIRATORY (INHALATION)
Status: CANCELLED | OUTPATIENT
Start: 2022-01-05

## 2022-01-05 RX ORDER — HEPARIN SODIUM (PORCINE) LOCK FLUSH IV SOLN 100 UNIT/ML 100 UNIT/ML
5 SOLUTION INTRAVENOUS
Status: CANCELLED | OUTPATIENT
Start: 2022-01-05

## 2022-01-05 RX ORDER — METHYLPREDNISOLONE SODIUM SUCCINATE 125 MG/2ML
125 INJECTION, POWDER, LYOPHILIZED, FOR SOLUTION INTRAMUSCULAR; INTRAVENOUS
Status: CANCELLED
Start: 2022-01-05

## 2022-01-05 RX ORDER — HEPARIN SODIUM,PORCINE 10 UNIT/ML
5 VIAL (ML) INTRAVENOUS
Status: CANCELLED | OUTPATIENT
Start: 2022-01-05

## 2022-01-05 RX ORDER — DIPHENHYDRAMINE HYDROCHLORIDE 50 MG/ML
50 INJECTION INTRAMUSCULAR; INTRAVENOUS
Status: CANCELLED
Start: 2022-01-05

## 2022-01-05 RX ORDER — ALBUTEROL SULFATE 90 UG/1
1-2 AEROSOL, METERED RESPIRATORY (INHALATION)
Status: CANCELLED
Start: 2022-01-05

## 2022-01-05 RX ORDER — HEPARIN SODIUM (PORCINE) LOCK FLUSH IV SOLN 100 UNIT/ML 100 UNIT/ML
5 SOLUTION INTRAVENOUS
Status: DISCONTINUED | OUTPATIENT
Start: 2022-01-05 | End: 2022-01-05 | Stop reason: HOSPADM

## 2022-01-05 RX ADMIN — DOCETAXEL 146 MG: 20 INJECTION, SOLUTION, CONCENTRATE INTRAVENOUS at 10:22

## 2022-01-05 RX ADMIN — PEGFILGRASTIM 6 MG: KIT SUBCUTANEOUS at 11:27

## 2022-01-05 RX ADMIN — CYCLOPHOSPHAMIDE 1165 MG: 1 INJECTION, POWDER, FOR SOLUTION INTRAVENOUS; ORAL at 11:24

## 2022-01-05 RX ADMIN — PALONOSETRON HYDROCHLORIDE 0.25 MG: 0.25 INJECTION INTRAVENOUS at 10:19

## 2022-01-05 RX ADMIN — SODIUM CHLORIDE 250 ML: 9 INJECTION, SOLUTION INTRAVENOUS at 10:07

## 2022-01-05 RX ADMIN — DEXAMETHASONE SODIUM PHOSPHATE 20 MG: 10 INJECTION, SOLUTION INTRAMUSCULAR; INTRAVENOUS at 10:07

## 2022-01-05 ASSESSMENT — PAIN SCALES - GENERAL: PAINLEVEL: NO PAIN (0)

## 2022-01-05 NOTE — NURSING NOTE
Chief Complaint   Patient presents with     Blood Draw     Labs drawn via PIV placed by RN in lab. VS taken.      Fabiano Diop RN

## 2022-01-05 NOTE — NURSING NOTE
Patient verified meds and allergies are correct via patients echeck in.    Tk Arriaza, Virtual Facilitator

## 2022-01-05 NOTE — PROGRESS NOTES
Infusion Nursing Note:  Ronna Collins presents today for C2D1 Taxotere-Cytoxan-Onpro Neulasta.    Patient seen by provider today: Yes: ANGEL Chavis   present during visit today: Not Applicable.    Note: PT saw provider prior to infusion, ok for treatment.      Intravenous Access:  Peripheral IV placed in lab.    Treatment Conditions:  Lab Results   Component Value Date    HGB 13.3 01/05/2022    WBC 5.1 01/05/2022    ANEU 2.9 07/08/2021    ANEUTAUTO 3.1 01/05/2022     01/05/2022      Lab Results   Component Value Date     01/05/2022    POTASSIUM 3.5 01/05/2022    MAG 2.3 06/22/2021    CR 0.65 (L) 01/05/2022    PROSPER 9.1 01/05/2022    BILITOTAL 0.4 01/05/2022    ALBUMIN 3.3 (L) 01/05/2022    ALT 28 01/05/2022    AST 18 01/05/2022     Results reviewed, labs MET treatment parameters, ok to proceed with treatment.      Post Infusion Assessment:  Patient tolerated infusion without incident.  Blood return noted pre and post infusion.  Site patent and intact, free from redness, edema or discomfort.  No evidence of extravasations.  Access discontinued per protocol.   Neulasta Onpro On-Body injector applied to L arm at 1130.  Writer discussed Neulasta injection would start on 1/6 at 1430, approximately 27 hours after application applied today.  Written and Verbal instruction reviewed with patient.  Pt instructed when the dose delivery starts, it will take about 45 minutes to complete.  Pt aware Neulasta Onpro On-Body should have green flashing light and to call triage or on-call MD if injector flashes red or appears to be leaking. Pt aware to keep Onpro On-Body Neulasta 4 inches away from electrical equipment and to avoid showering 4 hours prior to injection.         Discharge Plan:   Prescription refills given for DEX.  Discharge instructions reviewed with: Patient.  Patient and/or family verbalized understanding of discharge instructions and all questions answered.  AVS to patient via  ZEE.  Patient will return 1/26 for next appointment.   Patient discharged in stable condition accompanied by: self.  Departure Mode: Ambulatory.    Verito Leonardo RN

## 2022-01-05 NOTE — PROGRESS NOTES
Ronna is a 71 year old who is being evaluated via a billable video visit.      How would you like to obtain your AVS? MyChart  If the video visit is dropped, the invitation should be resent by: Send to e-mail at: [email or web address]  Will anyone else be joining your video visit? Yes: Melanie - wife. How would they like to receive their invitation? Text to cell phone: in person    Video Start Time: 7:08  Video-Visit Details    Type of service:  Video Visit    Video End Time:7:40    Originating Location (pt. Location):HOME    Distant Location (provider location):  [facility]     Platform used for Video Visit: NILTON Arriaza    Oncology Visit:  Date on this visit: Jan 5, 2022       Diagnosis: Clinical prognostic stage IIIb, [id number], grade 2, ER positive, AL positive, HER-2 negative right breast cancer.     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  He noted discomfort and hardening of the skin of the right nipple in late April/early May, 2021.  He noted a seborrheic keratosis of the right nipple and remembered he had a similar skin lesion of the arm 3 months earlier.  However, he subsequently noted a mass in the same area.  Right breast skin punch biopsy performed by dermatology was c/w grade 2 invasive ductal carcinoma with associated DCIS.  Invasive carcinoma was ER positive 100% and AL positive 70%, with tumor invading the dermis.  HER-2 was negative by IHC (score 1+).  Diagnostic mammogram and ultrasound showed a retroareolar right breast mass measuring 2.9 cm with associated nipple retraction and enlarged, abnormal right axillary lymph nodes.  Right breast biopsy was again c/w grade 2 invasive ductal carcinoma, ER strong in 98% of tumor cell nuclei, HER-2 negative.  Ki-67 was 15%.     He elected to screen for the ISPY-2 clinical trial.  MRI breast on 6/18/2021 showed the biopsy proven  right breast cancer to measure 3.9 cm in maximum dimension with invasion of the nipple and the pectoralis muscle as well as at least 4 abnormal level 1 and 2 right axillary lymph nodes and a tiny 0.4 cm right internal mammary lymph node.  Mammoprint returned low risk with a score of +0.29.  He elected for treatment on the endocrine optimization protocol of ISPY-2 and was randomized to treatment with amcenestrant.  He was on treatment with  amcenestrant from 7/1/2021 - 10/26/2021.  At our visit in 01/2021, both right axillary node and right breast tumor palpated more firm then prior.  Breast MRI was stable, however, due to clinical concerns for progression, decision was made to proceed with surgery.   Right breast mastectomy and right axillary lymph node dissection was performed by Dr. Snider on 10/27/2021.  Pathology showed grade 2 carcinoma of the right breast measuring 3.2 cm with invasion of the dermis, nipple and the pectoralis muscle.  Ki-67 of the excised tumor was 7%.  15/44 right axillary lymph nodes were positive with 6 of the lymph nodes demonstrating extranodal extension.  The largest lymph node metastasis measured 2.1 cm.     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  Had hiccups, resolved with stopping the dexamethasone.  Was able to maintain his taste, and did not have nausea.  Did notice some weight loss, mostly in the second week.  Admitted he is eating healthy since he was diagnosed with cancer.    On days 6-12 felt tired and less motivated but still maintained walking 45-60 min daily.  The third week he was feeling much back to baseline and felt his weight went up a little the third week.   His hair started to fall out.      No concerning f/s/c.  Hydrating 2L daily.  Up at night with nocturia a couple times because of the hydration.  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  Did notice Sore/stiff back week two, unsure if  this was related to Neulasta.  Resolved.     Has chronically dry skin, sees dermatology.  Did feel his feet were way more dry with chemo than normal.      Noticed some sore heals from treadmill.  Trying to stretch more.  Doing PT of the R arm/shoulder, ROM is getting better 90%.  No concerns with surgical site.         Allergies:      Allergies as of 11/09/2021     (No Known Allergies)      Current Medications:  Current Outpatient Prescriptions          Current Outpatient Medications   Medication Sig Dispense Refill     losartan-hydrochlorothiazide (HYZAAR) 100-25 MG tablet Take 1 tablet by mouth every morning 90 tablet 1     oxyCODONE (ROXICODONE) 5 MG tablet Take 1 tablet (5 mg) by mouth every 4 hours as needed for moderate to severe pain 25 tablet 0     polyethylene glycol (MIRALAX) 17 GM/Dose powder Take 17 g by mouth daily 510 g 0     study - amcenestrant, IDS# [id number], 200 MG tablet Take 1 tablet (200 mg) by mouth daily (Patient taking differently: Take 200 mg by mouth every morning ) 32 tablet 0         Family and Social History:  See initial consultation dated 6/8/2021 for further details.  Breast Actionable and Breast Expanded panels were both negative for pathogenic germline mutations.     Physical Exam:  Vitals 1/5/2022   Systolic 137   Diastolic 78   Pulse 86   Respiration 16   Temp 98.3   O2 98   Pain    Weight 171 lb     Video physical exam  General: Patient appears well in no acute distress.   Skin: No visualized rash or lesions on visualized skin  Eyes: EOMI, no erythema, sclera icterus or discharge noted  Resp: Appears to be breathing comfortably without accessory muscle usage, speaking in full sentences, no cough  MSK: Appears to have normal range of motion based on visualized movements  Neurologic: No apparent tremors, facial movements symmetric  Psych: affect bright, alert and oriented    The rest of a comprehensive physical examination is deferred due to PHE (public health emergency) video  restrictions         Laboratory/Imaging Studies       11/23/2021 11:19 12/14/2021 07:08 1/5/2022 09:24   Sodium 140 138 136   Potassium 3.6 3.7 3.5   Chloride 106 103 103   Carbon Dioxide 26 24 24   Urea Nitrogen 8 10 9   Creatinine 0.70 0.74 0.65 (L)   GFR Estimate >90 >90 >90   Calcium 8.9 8.9 9.1   Anion Gap 8 11 9   Albumin 3.6 3.6 3.3 (L)   Protein Total 7.2 7.3 7.3   Bilirubin Total 0.5 0.5 0.4   Alkaline Phosphatase 74 90 85   ALT 29 30 28   AST 16 13 18   Glucose 112 (H) 198 (H) 163 (H)   WBC 4.4 5.1 5.1   Hemoglobin 15.0 14.4 13.3   Hematocrit 46.6 44.4 41.7   Platelet Count 229 267 428   RBC Count 5.59 5.52 5.15   MCV 83 80 81     10/26/2021 Breast MRI:  Irregular mass right breast with persistent invasion of the nipple and pectoralis muscle measuring 3.3 x 2.6 x 3.3 cm, unchanged from prior.  No significant change in right axillary lymphadenopathy.  Two stable right internal mammary lymph nodes.     10/27/2021 Right breast mastectomy and right axillary lymph node dissection:  1.  Right breast inferior flap margin, small focus of invasive carcinoma in the dermis measuring 1 mm.  2.  Right axillary lymph nodes:  - metastatic carcinoma to 15/44 lymph nodes.  Largest metastasis measures 2.1 cm and shows extranodal extension.  6 of the nodes demonstrate extranodal extension.  3.  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  He is s/p treatment with 3.5 months neoadjuvant amcenestrant, right breast mastectomy, and right ALND.     1.  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  Pathology of the right breast shows a 3.2 cm carcinoma with invasion of the skin, nipple and pectoralis muscle.  15/22 axillary lymph nodes were positive.  Notably, the pathology showed no evidence of response to either neoadjuvant endocrine therapy in either the breast or the lymph nodes.  Pathologic staging is T4bN3b or stage IIIb.  PET in Nov 2021 showed no metastatic disease. Patient is aware he has a number of biomarkers which suggest little benefit to chemotherapy including initial high hormone receptor staining, a Mammaprint of +0.29, and low Ki-67.  In addition the mitotic grading of his excised tumor is low.  On the other hand, he has a large number of positive lymph nodes which conveys a high risk of distant recurrence and he had little to no response to neoadjuvant endocrine therapy.  Was heavily discussed and plan was for adjuvant TC, radiation and 10 years of endocrine therapy.     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  Labs sufficient for C2 today.  He tolerated first cycle with mild side effects.  Discussed plan for four cycles, then proceed with radiation.  I did make a radiation referral for him today.      2.  S/p right breast mastectomy and right axillary lymph node dissection:  Given these surgeries and plan for christiano irradiation, he is at high risk for lymphedema.  Doing daily PT with good ROM     3.  hiccups 2/2 to the Dex. Given he had no HARMEET, will drop the dex to 4 mg days 2-4.  OK to drop as well if cutting dose doesn't help.     4. CV: VSS, continues on Hyzaar    5.  FEN: eating/drinking well.  Creat/lytes stable.  Weight declined a little, discussed increased snacking to stabilize weight     45 minutes spent on the date of the encounter doing chart review, review of test results, interpretation of tests, patient visit, documentation, discussion with other provider(s) and discussion with family.          Ingrid Rolon PA-C

## 2022-01-14 ENCOUNTER — HOSPITAL ENCOUNTER (OUTPATIENT)
Dept: PHYSICAL THERAPY | Facility: CLINIC | Age: 72
Discharge: HOME | End: 2022-01-14
Attending: SURGERY | Admitting: SURGERY
Payer: COMMERCIAL

## 2022-01-14 PROCEDURE — 97140 MANUAL THERAPY 1/> REGIONS: CPT | Mod: GP | Performed by: PHYSICAL THERAPIST

## 2022-01-14 PROCEDURE — 97535 SELF CARE MNGMENT TRAINING: CPT | Mod: GP | Performed by: PHYSICAL THERAPIST

## 2022-01-14 PROCEDURE — 97110 THERAPEUTIC EXERCISES: CPT | Mod: GP | Performed by: PHYSICAL THERAPIST

## 2022-01-14 NOTE — PROGRESS NOTES
[facility] Rehabilitation Service    Outpatient Physical Therapy Progress Note  Patient: Ronna Collins  : 1950    Beginning/End Dates of Reporting Period:  12/10/21 to 22    Referring Provider: Dr. Nida Snider    Therapy Diagnosis: High risk for RUQ lymphedema, shoulder dysfunction     Client Self Report: Pt is feeling about 90-95% of his motion is there. Pt does not have anything in particular to go through. Infusions are going well, just hair loss.      Objective Measurements:  Objective Measure: MICHELE  Details: not palpable today, pt feels tight in pec but not axilla or arm  Objective Measure: Volume/edema  Details: 3.7% difference  Objective Measure: AROM  Details: R shoulder , ab 155> 150,157 after manual therapy and L shoulder  , abd 162 (start of treatment)     Goals:  Goal Identifier edema   Goal Description Patient will understand and express independence with lymphedema risk reduction program including but not limited to skincare, exercise, GCB PRN, MLD   Target Date 22   Date Met  22   Progress (detail required for progress note): Date extended as it has not been met, not doing massage daily     Goal Identifier shoulder ROM   Goal Description Pt to express understanding and independence with acute postsurgical precautions re: RUQ   Target Date 21   Date Met  12/10/21   Progress (detail required for progress note):       Goal Identifier HEP   Goal Description Pt will be independent with exercise program to for R UE strength and ROM to allow for improved overhead lifting and prevent shoulder injury   Target Date 22   Date Met  22   Progress (detail required for progress note):       Goal Identifier Maintenance   Goal Description Pt will continue with exercise program to prevent loss of ROM and use compression as appropriate if signs of edema to prevent Volume R UE from  increase greater than 5% over L.    Target Date 05/30/21   Date Met      Progress (detail required for progress note):       Plan:  Changes to therapy plan of care: Pt met 3/3 initial goals but will benefit from continued surveillance and update of home program through radiation which will not start until Mid-march. Pt to return to clinic before starting radiation and then as needed. Recommend 6 visits over next 4.5 months    Discharge:  No

## 2022-01-17 RX ORDER — METHYLPREDNISOLONE SODIUM SUCCINATE 125 MG/2ML
125 INJECTION, POWDER, LYOPHILIZED, FOR SOLUTION INTRAMUSCULAR; INTRAVENOUS
Status: CANCELLED
Start: 2022-01-26

## 2022-01-17 RX ORDER — ALBUTEROL SULFATE 90 UG/1
1-2 AEROSOL, METERED RESPIRATORY (INHALATION)
Status: CANCELLED
Start: 2022-01-26

## 2022-01-17 RX ORDER — LORAZEPAM 2 MG/ML
0.5 INJECTION INTRAMUSCULAR EVERY 4 HOURS PRN
Status: CANCELLED | OUTPATIENT
Start: 2022-01-26

## 2022-01-17 RX ORDER — DIPHENHYDRAMINE HYDROCHLORIDE 50 MG/ML
50 INJECTION INTRAMUSCULAR; INTRAVENOUS
Status: CANCELLED
Start: 2022-01-26

## 2022-01-17 RX ORDER — ALBUTEROL SULFATE 0.83 MG/ML
2.5 SOLUTION RESPIRATORY (INHALATION)
Status: CANCELLED | OUTPATIENT
Start: 2022-01-26

## 2022-01-17 RX ORDER — EPINEPHRINE 1 MG/ML
0.3 INJECTION, SOLUTION INTRAMUSCULAR; SUBCUTANEOUS EVERY 5 MIN PRN
Status: CANCELLED | OUTPATIENT
Start: 2022-01-26

## 2022-01-17 RX ORDER — HEPARIN SODIUM,PORCINE 10 UNIT/ML
5 VIAL (ML) INTRAVENOUS
Status: CANCELLED | OUTPATIENT
Start: 2022-01-26

## 2022-01-17 RX ORDER — NALOXONE HYDROCHLORIDE 0.4 MG/ML
0.2 INJECTION, SOLUTION INTRAMUSCULAR; INTRAVENOUS; SUBCUTANEOUS
Status: CANCELLED | OUTPATIENT
Start: 2022-01-26

## 2022-01-17 RX ORDER — MEPERIDINE HYDROCHLORIDE 25 MG/ML
25 INJECTION INTRAMUSCULAR; INTRAVENOUS; SUBCUTANEOUS EVERY 30 MIN PRN
Status: CANCELLED | OUTPATIENT
Start: 2022-01-26

## 2022-01-17 RX ORDER — PALONOSETRON 0.05 MG/ML
0.25 INJECTION, SOLUTION INTRAVENOUS ONCE
Status: CANCELLED
Start: 2022-01-26 | End: 2022-01-26

## 2022-01-17 RX ORDER — HEPARIN SODIUM (PORCINE) LOCK FLUSH IV SOLN 100 UNIT/ML 100 UNIT/ML
5 SOLUTION INTRAVENOUS
Status: CANCELLED | OUTPATIENT
Start: 2022-01-26

## 2022-01-18 ENCOUNTER — VIRTUAL VISIT (OUTPATIENT)
Dept: ONCOLOGY | Facility: CLINIC | Age: 72
End: 2022-01-18
Attending: INTERNAL MEDICINE
Payer: COMMERCIAL

## 2022-01-18 DIAGNOSIS — Z17.0 MALIGNANT NEOPLASM OF CENTRAL PORTION OF RIGHT BREAST IN MALE, ESTROGEN RECEPTOR POSITIVE (H): Primary | ICD-10-CM

## 2022-01-18 DIAGNOSIS — C50.121 MALIGNANT NEOPLASM OF CENTRAL PORTION OF RIGHT BREAST IN MALE, ESTROGEN RECEPTOR POSITIVE (H): Primary | ICD-10-CM

## 2022-01-18 PROCEDURE — 99214 OFFICE O/P EST MOD 30 MIN: CPT | Mod: 95 | Performed by: INTERNAL MEDICINE

## 2022-01-18 NOTE — PROGRESS NOTES
Ronna is a 71 year old who is being evaluated via a billable video visit.      If the video visit is dropped, the invitation should be resent by: Send to e-mail at: [email or web address]  Will anyone else be joining your video visit? Yes: Wife will be w/ Pt.. How would they like to receive their invitation? Text to cell phone: n/a     Video-Visit Details    Type of service:  Video Visit    Originating Location (pt. Location): Home    Distant Location (provider location):  [facility]     Platform used for Video Visit: [employer]    Oncology Visit:  Date on this visit: 1/18/2022    Diagnosis: Clinical prognostic stage IIIb, [id number], grade 2, ER positive, CA positive, HER-2 negative right breast cancer.    Primary Physician: Stewart Henry     History Of Present Illness:   Karina is a 71 year old male with right breast cancer.  He noted discomfort and hardening of the skin of the right nipple in late April/early May, 2021.  He noted a seborrheic keratosis of the right nipple and remembered he had a similar skin lesion of the arm 3 months earlier.  However, he subsequently noted a mass in the same area.  Right breast skin punch biopsy performed by dermatology was c/w grade 2 invasive ductal carcinoma with associated DCIS.  Invasive carcinoma was ER positive 100% and CA positive 70%, with tumor invading the dermis.  HER-2 was negative by IHC (score 1+).  Diagnostic mammogram and ultrasound showed a retroareolar right breast mass measuring 2.9 cm with associated nipple retraction and enlarged, abnormal right axillary lymph nodes.  Right breast biopsy was again c/w grade 2 invasive ductal carcinoma, ER strong in 98% of tumor cell nuclei, HER-2 negative.  Ki-67 was 15%.    He elected to screen for the ISPY-2 clinical trial.  MRI breast on 6/18/2021 showed the biopsy proven right breast cancer to measure 3.9 cm in maximum dimension with invasion of the nipple and the pectoralis muscle as  well as at least 4 abnormal level 1 and 2 right axillary lymph nodes and a tiny 0.4 cm right internal mammary lymph node.  Mammoprint returned low risk with a score of +0.29.  He elected for treatment on the endocrine optimization protocol of ISPY-2 and was randomized to treatment with amcenestrant.  He was on treatment with  amcenestrant from 7/1/2021 - 10/26/2021.  At our visit in 01/2021, both right axillary node and right breast tumor palpated more firm then prior.  Breast MRI was stable, however, due to clinical concerns for progression, decision was made to proceed with surgery.   Right breast mastectomy and right axillary lymph node dissection was performed by Dr. Snider on 10/27/2021.  Pathology showed grade 2 carcinoma of the right breast measuring 3.2 cm with invasion of the dermis, nipple and the pectoralis muscle.  Ki-67 of the excised tumor was 7%.  15/44 right axillary lymph nodes were positive with 6 of the lymph nodes demonstrating extranodal extension.  The largest lymph node metastasis measured 2.1 cm.    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  C2 was given on 1/5/2021.      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  He has completed 2 cycles of Taxotere and cyclophosphamide chemotherapy.  He received his second cycle on January 5 and is now day 14.  In general, he is tolerating chemotherapy well.  He reports increased fatigue this cycle compared to last cycle.  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  He states that his feet have become quite dry and fissured.  At times the cracks are painful.  He has been using a pumice stone as well as a moisturizer on them.  He also has noted some bleeding from the nose, particularly in the morning.  He is using a humidifier at home.  He denies skin rashes or mouth sores.  He had a couple days of low-grade  temperature as high as 100.4.  This has since resolved.  He denies other infectious complaints.  He specifically has no cough, shortness of breath, or chest pain.  He is walking a 1/2-hour daily on his treadmill.  He has no current abdominal complaints.  He had hiccups for 4 to 5 days following chemotherapy.  He was told that if he stopped the steroid this might go away faster.  He took dexamethasone only 1 day, however, the hiccups seem to last longer than they did with the first cycle despite this.  He also reports symptom of increased belching.  He denies nausea despite having stopped the steroid earlier.  The remainder of a complete 12 point review of systems was reviewed with the patient was negative with exception that mentioned above.    Past Medical/Surgical History:  Past Medical History:   Diagnosis Date     Chronic sinusitis      Hypertension 2002     Past Surgical History:   Procedure Laterality Date     DISSECT LYMPH NODE AXILLA Right 10/27/2021    Procedure: RIGHT Axillary Lymph Node Dissection;  Surgeon: Nida Snider MD;  Location: [facility] OR     MASTECTOMY SIMPLE Right 10/27/2021    Procedure: RIGHT Simple Mastectomy;  Surgeon: Nida Snider MD;  Location: [facility] OR       Allergies:  Allergies as of 01/18/2022     (No Known Allergies)     Current Medications:  Current Outpatient Medications   Medication Sig Dispense Refill     dexamethasone (DECADRON) 4 MG tablet Take 1 tablet (4 mg) by mouth daily (with breakfast) Start morning AFTER Docetaxel dose and continue for 2 additional doses. 3 tablet 3     LORazepam (ATIVAN) 0.5 MG tablet Take 1 tablet (0.5 mg) by mouth every 4 hours as needed (Anxiety, Nausea/Vomiting or Sleep) (Patient not taking: Reported on 12/14/2021) 30 tablet 3     losartan-hydrochlorothiazide (HYZAAR) 100-25 MG tablet Take 1 tablet by mouth every morning 90 tablet 1     ondansetron (ZOFRAN) 8 MG tablet Take 1 tablet (8 mg) by mouth every 8 hours as needed for nausea  (Patient not taking: Reported on 12/14/2021) 30 tablet 3     prochlorperazine (COMPAZINE) 10 MG tablet Take 0.5 tablets (5 mg) by mouth every 6 hours as needed (Nausea/Vomiting) (Patient not taking: Reported on 12/14/2021) 30 tablet 3      Family and Social History:  See initial consultation dated 6/8/2021 for further details.  Breast Actionable and Breast Expanded panels were both negative for pathogenic germline mutations.    Physical Exam:  General:  Well appearing adult male in NAD.  Eyes:  No erythema or discharge  Respiratory:  Breathing comfortably on room air.  No wheezing or distress.  Musculoskeletal:  Full ROM of the bilateral upper extremities.  Skin:  No visible concerning skin rashes or lesions  Neuro:  No notable tremor and dyskinetic movements.  Psych:  Mood and affect appear normal.    The rest of a comprehensive physical examination is deferred due to PHE (public health emergency) video visit restrictions.    Laboratory/Imaging Studies  1/5/2022 Labs:  Electrolytes, kidney function, and liver function are wnl.  Albumin is slightly low at 3.3 g/dL c/w chemotherapy effect.  Liver function tests are wnl.    Blood counts are wnl.    ASSESSMENT/PLAN:  Jamie Collins is a 72 yo male with pathologic stage IIIb, [id number], grade 2, ER positive, VT positive, HER-2 negative invasive ductal carcinoma of the right breast.  He is s/p treatment with 3.5 months neoadjuvant amcenestrant, right breast mastectomy, right ALND, and 2 cycles of adjuvant Taxotere and cyclophosphamide.    1.  Right breast cancer:    He has now completed 2 cycles of Taxotere and cyclophosphamide.   He is day #14 from his last cycle.  He has fatigue, dry nose, and fissured/cracking soles from the treatment.  He has also had hiccups with each cycle.  Overall, however he is tolerating chemotherapy well and despite these side effects, he is willing to continue treatment.   He is scheduled to receive C3 on 1/26/2022 and C4 on 2/15/2022.    We  reviewed his overall treatment plan.  Plan is for adjuvant radiation following completion of chemotherapy.  We will initiate treatment with letrozole at same time as radiation and abemaciclib upon completion of radiation.  Plan to administer abemaciclib for 2 years.  Letrozole a total of 10 years.    2.  Hiccups:  It is not clear to me this is from dexamethasone.  Nevertheless, he has no h/o hypersensitivity reaction to Taxotere and has had minimal nausea.  Will decrease dexamethasone premedication dose.  Will fill prescription for at home dexamethasone on days 2-4, but he will only take if nausea.  Hiccups may be due to muscle spasms induced by cyclophosphamide.  Recommend drinking plenty of water.  If recurrent symptom of hiccups, recommend trial of a muscle relaxant.    3.  Epistaxis:  Secondary to loss of nasal hair and dryness.  Recommend trial of Ayr gel or applying a thin layer of vaseline to the inside of the nares QHS.  Continue to use humidifier in bedroom.    4.  Fatigue:  We discussed chemotherapy related fatigue is common.  I recommend he continue daily cardiovascular exercise and try to obtain 7-9 hours of sleep each night.  Continue to eat a diet high in variety.    5.  Follow Up:  Labs, Taxotere and cyclophosphamide infusion, and visit with Ingrid Rolon on 1/26 as already scheduled.    35 minutes spent on the date of the encounter doing chart review, review of test results, interpretation of tests, patient visit and documentation.

## 2022-01-18 NOTE — LETTER
1/18/2022         RE: Ronna Collins  [address]  [facility] [address]        Dear Colleague,    Thank you for referring your patient, Ronna Collins, to the [facility]. Please see a copy of my visit note below.    Ronna is a 71 year old who is being evaluated via a billable video visit.      If the video visit is dropped, the invitation should be resent by: Send to e-mail at: [email or web address]  Will anyone else be joining your video visit? Yes: Wife will be w/ Pt.. How would they like to receive their invitation? Text to cell phone: n/a     Video-Visit Details    Type of service:  Video Visit    Originating Location (pt. Location): Home    Distant Location (provider location):  [facility]     Platform used for Video Visit: [employer]    Oncology Visit:  Date on this visit: 1/18/2022    Diagnosis: Clinical prognostic stage IIIb, [id number], grade 2, ER positive, VT positive, HER-2 negative right breast cancer.    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  He noted discomfort and hardening of the skin of the right nipple in late April/early May, 2021.  He noted a seborrheic keratosis of the right nipple and remembered he had a similar skin lesion of the arm 3 months earlier.  However, he subsequently noted a mass in the same area.  Right breast skin punch biopsy performed by dermatology was c/w grade 2 invasive ductal carcinoma with associated DCIS.  Invasive carcinoma was ER positive 100% and VT positive 70%, with tumor invading the dermis.  HER-2 was negative by IHC (score 1+).  Diagnostic mammogram and ultrasound showed a retroareolar right breast mass measuring 2.9 cm with associated nipple retraction and enlarged, abnormal right axillary lymph nodes.  Right breast biopsy was again c/w grade 2 invasive ductal carcinoma, ER strong in 98% of tumor  cell nuclei, HER-2 negative.  Ki-67 was 15%.    He elected to screen for the ISPY-2 clinical trial.  MRI breast on 6/18/2021 showed the biopsy proven right breast cancer to measure 3.9 cm in maximum dimension with invasion of the nipple and the pectoralis muscle as well as at least 4 abnormal level 1 and 2 right axillary lymph nodes and a tiny 0.4 cm right internal mammary lymph node.  Mammoprint returned low risk with a score of +0.29.  He elected for treatment on the endocrine optimization protocol of ISPY-2 and was randomized to treatment with amcenestrant.  He was on treatment with  amcenestrant from 7/1/2021 - 10/26/2021.  At our visit in 01/2021, both right axillary node and right breast tumor palpated more firm then prior.  Breast MRI was stable, however, due to clinical concerns for progression, decision was made to proceed with surgery.   Right breast mastectomy and right axillary lymph node dissection was performed by Dr. Snider on 10/27/2021.  Pathology showed grade 2 carcinoma of the right breast measuring 3.2 cm with invasion of the dermis, nipple and the pectoralis muscle.  Ki-67 of the excised tumor was 7%.  15/44 right axillary lymph nodes were positive with 6 of the lymph nodes demonstrating extranodal extension.  The largest lymph node metastasis measured 2.1 cm.    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  C2 was given on 1/5/2021.      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  He has completed 2 cycles of Taxotere and cyclophosphamide chemotherapy.  He received his second cycle on January 5 and is now day 14.  In general, he is tolerating chemotherapy well.  He reports increased fatigue this cycle compared to last cycle.  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  He states that his feet have become quite dry and fissured.  At times the cracks are  painful.  He has been using a pumice stone as well as a moisturizer on them.  He also has noted some bleeding from the nose, particularly in the morning.  He is using a humidifier at home.  He denies skin rashes or mouth sores.  He had a couple days of low-grade temperature as high as 100.4.  This has since resolved.  He denies other infectious complaints.  He specifically has no cough, shortness of breath, or chest pain.  He is walking a 1/2-hour daily on his treadmill.  He has no current abdominal complaints.  He had hiccups for 4 to 5 days following chemotherapy.  He was told that if he stopped the steroid this might go away faster.  He took dexamethasone only 1 day, however, the hiccups seem to last longer than they did with the first cycle despite this.  He also reports symptom of increased belching.  He denies nausea despite having stopped the steroid earlier.  The remainder of a complete 12 point review of systems was reviewed with the patient was negative with exception that mentioned above.    Past Medical/Surgical History:  Past Medical History:   Diagnosis Date     Chronic sinusitis      Hypertension 2002     Past Surgical History:   Procedure Laterality Date     DISSECT LYMPH NODE AXILLA Right 10/27/2021    Procedure: RIGHT Axillary Lymph Node Dissection;  Surgeon: Nida Snider MD;  Location: [facility] OR     MASTECTOMY SIMPLE Right 10/27/2021    Procedure: RIGHT Simple Mastectomy;  Surgeon: Nida Snider MD;  Location: [facility] OR       Allergies:  Allergies as of 01/18/2022     (No Known Allergies)     Current Medications:  Current Outpatient Medications   Medication Sig Dispense Refill     dexamethasone (DECADRON) 4 MG tablet Take 1 tablet (4 mg) by mouth daily (with breakfast) Start morning AFTER Docetaxel dose and continue for 2 additional doses. 3 tablet 3     LORazepam (ATIVAN) 0.5 MG tablet Take 1 tablet (0.5 mg) by mouth every 4 hours as needed (Anxiety, Nausea/Vomiting or Sleep) (Patient  not taking: Reported on 12/14/2021) 30 tablet 3     losartan-hydrochlorothiazide (HYZAAR) 100-25 MG tablet Take 1 tablet by mouth every morning 90 tablet 1     ondansetron (ZOFRAN) 8 MG tablet Take 1 tablet (8 mg) by mouth every 8 hours as needed for nausea (Patient not taking: Reported on 12/14/2021) 30 tablet 3     prochlorperazine (COMPAZINE) 10 MG tablet Take 0.5 tablets (5 mg) by mouth every 6 hours as needed (Nausea/Vomiting) (Patient not taking: Reported on 12/14/2021) 30 tablet 3      Family and Social History:  See initial consultation dated 6/8/2021 for further details.  Breast Actionable and Breast Expanded panels were both negative for pathogenic germline mutations.    Physical Exam:  General:  Well appearing adult male in NAD.  Eyes:  No erythema or discharge  Respiratory:  Breathing comfortably on room air.  No wheezing or distress.  Musculoskeletal:  Full ROM of the bilateral upper extremities.  Skin:  No visible concerning skin rashes or lesions  Neuro:  No notable tremor and dyskinetic movements.  Psych:  Mood and affect appear normal.    The rest of a comprehensive physical examination is deferred due to PHE (public health emergency) video visit restrictions.    Laboratory/Imaging Studies  1/5/2022 Labs:  Electrolytes, kidney function, and liver function are wnl.  Albumin is slightly low at 3.3 g/dL c/w chemotherapy effect.  Liver function tests are wnl.    Blood counts are wnl.    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  He is s/p treatment with 3.5 months neoadjuvant amcenestrant, right breast mastectomy, right ALND, and 2 cycles of adjuvant Taxotere and cyclophosphamide.    1.  Right breast cancer:    He has now completed 2 cycles of Taxotere and cyclophosphamide.   He is day #14 from his last cycle.  He has fatigue, dry nose, and fissured/cracking soles from  the treatment.  He has also had hiccups with each cycle.  Overall, however he is tolerating chemotherapy well and despite these side effects, he is willing to continue treatment.   He is scheduled to receive C3 on 1/26/2022 and C4 on 2/15/2022.    We reviewed his overall treatment plan.  Plan is for adjuvant radiation following completion of chemotherapy.  We will initiate treatment with letrozole at same time as radiation and abemaciclib upon completion of radiation.  Plan to administer abemaciclib for 2 years.  Letrozole a total of 10 years.    2.  Hiccups:  It is not clear to me this is from dexamethasone.  Nevertheless, he has no h/o hypersensitivity reaction to Taxotere and has had minimal nausea.  Will decrease dexamethasone premedication dose.  Will fill prescription for at home dexamethasone on days 2-4, but he will only take if nausea.  Hiccups may be due to muscle spasms induced by cyclophosphamide.  Recommend drinking plenty of water.  If recurrent symptom of hiccups, recommend trial of a muscle relaxant.    3.  Epistaxis:  Secondary to loss of nasal hair and dryness.  Recommend trial of Ayr gel or applying a thin layer of vaseline to the inside of the nares QHS.  Continue to use humidifier in bedroom.    4.  Fatigue:  We discussed chemotherapy related fatigue is common.  I recommend he continue daily cardiovascular exercise and try to obtain 7-9 hours of sleep each night.  Continue to eat a diet high in variety.    5.  Follow Up:  Labs, Taxotere and cyclophosphamide infusion, and visit with Ingrid Rolon on 1/26 as already scheduled.    35 minutes spent on the date of the encounter doing chart review, review of test results, interpretation of tests, patient visit and documentation.              Again, thank you for allowing me to participate in the care of your patient.      Sincerely,    Mikaela Sr MD

## 2022-01-26 ENCOUNTER — APPOINTMENT (OUTPATIENT)
Dept: LAB | Facility: CLINIC | Age: 72
End: 2022-01-26
Attending: INTERNAL MEDICINE
Payer: COMMERCIAL

## 2022-01-26 ENCOUNTER — ONCOLOGY VISIT (OUTPATIENT)
Dept: ONCOLOGY | Facility: CLINIC | Age: 72
End: 2022-01-26
Attending: INTERNAL MEDICINE
Payer: COMMERCIAL

## 2022-01-26 VITALS
DIASTOLIC BLOOD PRESSURE: 79 MMHG | HEART RATE: 94 BPM | SYSTOLIC BLOOD PRESSURE: 148 MMHG | TEMPERATURE: 97.7 F | WEIGHT: 173.3 LBS | OXYGEN SATURATION: 94 % | RESPIRATION RATE: 16 BRPM | BODY MASS INDEX: 26.35 KG/M2

## 2022-01-26 DIAGNOSIS — R73.03 PREDIABETES: Primary | ICD-10-CM

## 2022-01-26 DIAGNOSIS — C50.929 MALIGNANT NEOPLASM OF MALE BREAST, UNSPECIFIED ESTROGEN RECEPTOR STATUS, UNSPECIFIED LATERALITY, UNSPECIFIED SITE OF BREAST (H): ICD-10-CM

## 2022-01-26 DIAGNOSIS — C50.929 MALIGNANT NEOPLASM OF MALE BREAST, UNSPECIFIED ESTROGEN RECEPTOR STATUS, UNSPECIFIED LATERALITY, UNSPECIFIED SITE OF BREAST (H): Primary | ICD-10-CM

## 2022-01-26 LAB
ALBUMIN SERPL-MCNC: 3.4 G/DL (ref 3.4–5)
ALP SERPL-CCNC: 85 U/L (ref 40–150)
ALT SERPL W P-5'-P-CCNC: 26 U/L (ref 0–70)
ANION GAP SERPL CALCULATED.3IONS-SCNC: 10 MMOL/L (ref 3–14)
AST SERPL W P-5'-P-CCNC: 16 U/L (ref 0–45)
BASOPHILS # BLD AUTO: 0 10E3/UL (ref 0–0.2)
BASOPHILS NFR BLD AUTO: 1 %
BILIRUB SERPL-MCNC: 0.5 MG/DL (ref 0.2–1.3)
BUN SERPL-MCNC: 8 MG/DL (ref 7–30)
CALCIUM SERPL-MCNC: 9.1 MG/DL (ref 8.5–10.1)
CHLORIDE BLD-SCNC: 103 MMOL/L (ref 94–109)
CO2 SERPL-SCNC: 26 MMOL/L (ref 20–32)
CREAT SERPL-MCNC: 0.69 MG/DL (ref 0.66–1.25)
EOSINOPHIL # BLD AUTO: 0 10E3/UL (ref 0–0.7)
EOSINOPHIL NFR BLD AUTO: 1 %
ERYTHROCYTE [DISTWIDTH] IN BLOOD BY AUTOMATED COUNT: 15.9 % (ref 10–15)
GFR SERPL CREATININE-BSD FRML MDRD: >90 ML/MIN/1.73M2
GLUCOSE BLD-MCNC: 152 MG/DL (ref 70–99)
HCT VFR BLD AUTO: 39.9 % (ref 40–53)
HGB BLD-MCNC: 12.7 G/DL (ref 13.3–17.7)
IMM GRANULOCYTES # BLD: 0 10E3/UL
IMM GRANULOCYTES NFR BLD: 0 %
LYMPHOCYTES # BLD AUTO: 1.3 10E3/UL (ref 0.8–5.3)
LYMPHOCYTES NFR BLD AUTO: 27 %
MCH RBC QN AUTO: 26.3 PG (ref 26.5–33)
MCHC RBC AUTO-ENTMCNC: 31.8 G/DL (ref 31.5–36.5)
MCV RBC AUTO: 83 FL (ref 78–100)
MONOCYTES # BLD AUTO: 0.4 10E3/UL (ref 0–1.3)
MONOCYTES NFR BLD AUTO: 9 %
NEUTROPHILS # BLD AUTO: 2.9 10E3/UL (ref 1.6–8.3)
NEUTROPHILS NFR BLD AUTO: 62 %
NRBC # BLD AUTO: 0 10E3/UL
NRBC BLD AUTO-RTO: 0 /100
PLATELET # BLD AUTO: 287 10E3/UL (ref 150–450)
POTASSIUM BLD-SCNC: 3.4 MMOL/L (ref 3.4–5.3)
PROT SERPL-MCNC: 7 G/DL (ref 6.8–8.8)
RBC # BLD AUTO: 4.83 10E6/UL (ref 4.4–5.9)
SODIUM SERPL-SCNC: 139 MMOL/L (ref 133–144)
WBC # BLD AUTO: 4.7 10E3/UL (ref 4–11)

## 2022-01-26 PROCEDURE — G0463 HOSPITAL OUTPT CLINIC VISIT: HCPCS

## 2022-01-26 PROCEDURE — 258N000003 HC RX IP 258 OP 636: Performed by: INTERNAL MEDICINE

## 2022-01-26 PROCEDURE — 96375 TX/PRO/DX INJ NEW DRUG ADDON: CPT

## 2022-01-26 PROCEDURE — 96413 CHEMO IV INFUSION 1 HR: CPT

## 2022-01-26 PROCEDURE — 80053 COMPREHEN METABOLIC PANEL: CPT | Performed by: INTERNAL MEDICINE

## 2022-01-26 PROCEDURE — 96417 CHEMO IV INFUS EACH ADDL SEQ: CPT

## 2022-01-26 PROCEDURE — 250N000011 HC RX IP 250 OP 636: Performed by: INTERNAL MEDICINE

## 2022-01-26 PROCEDURE — 82040 ASSAY OF SERUM ALBUMIN: CPT | Performed by: INTERNAL MEDICINE

## 2022-01-26 PROCEDURE — 99215 OFFICE O/P EST HI 40 MIN: CPT | Performed by: PHYSICIAN ASSISTANT

## 2022-01-26 PROCEDURE — 36415 COLL VENOUS BLD VENIPUNCTURE: CPT | Performed by: INTERNAL MEDICINE

## 2022-01-26 PROCEDURE — 85041 AUTOMATED RBC COUNT: CPT | Performed by: INTERNAL MEDICINE

## 2022-01-26 PROCEDURE — 96377 APPLICATON ON-BODY INJECTOR: CPT | Mod: 59

## 2022-01-26 PROCEDURE — 250N000009 HC RX 250: Performed by: INTERNAL MEDICINE

## 2022-01-26 PROCEDURE — 96372 THER/PROPH/DIAG INJ SC/IM: CPT | Performed by: INTERNAL MEDICINE

## 2022-01-26 RX ORDER — HEPARIN SODIUM,PORCINE 10 UNIT/ML
5 VIAL (ML) INTRAVENOUS
Status: DISCONTINUED | OUTPATIENT
Start: 2022-01-26 | End: 2022-01-26 | Stop reason: HOSPADM

## 2022-01-26 RX ORDER — HEPARIN SODIUM (PORCINE) LOCK FLUSH IV SOLN 100 UNIT/ML 100 UNIT/ML
5 SOLUTION INTRAVENOUS
Status: DISCONTINUED | OUTPATIENT
Start: 2022-01-26 | End: 2022-01-26 | Stop reason: HOSPADM

## 2022-01-26 RX ORDER — PALONOSETRON 0.05 MG/ML
0.25 INJECTION, SOLUTION INTRAVENOUS ONCE
Status: COMPLETED | OUTPATIENT
Start: 2022-01-26 | End: 2022-01-26

## 2022-01-26 RX ADMIN — DEXAMETHASONE SODIUM PHOSPHATE 12 MG: 10 INJECTION, SOLUTION INTRAMUSCULAR; INTRAVENOUS at 09:11

## 2022-01-26 RX ADMIN — CYCLOPHOSPHAMIDE 1165 MG: 1 INJECTION, POWDER, FOR SOLUTION INTRAVENOUS; ORAL at 10:44

## 2022-01-26 RX ADMIN — DOCETAXEL 146 MG: 20 INJECTION, SOLUTION, CONCENTRATE INTRAVENOUS at 09:42

## 2022-01-26 RX ADMIN — SODIUM CHLORIDE 250 ML: 9 INJECTION, SOLUTION INTRAVENOUS at 09:11

## 2022-01-26 RX ADMIN — FAMOTIDINE 20 MG: 10 INJECTION INTRAVENOUS at 09:31

## 2022-01-26 RX ADMIN — PEGFILGRASTIM 6 MG: KIT SUBCUTANEOUS at 10:48

## 2022-01-26 RX ADMIN — PALONOSETRON HYDROCHLORIDE 0.25 MG: 0.25 INJECTION INTRAVENOUS at 09:30

## 2022-01-26 ASSESSMENT — PAIN SCALES - GENERAL: PAINLEVEL: NO PAIN (0)

## 2022-01-26 NOTE — NURSING NOTE
Chief Complaint   Patient presents with     Blood Draw     Labs drawn via PIV by RN in lab. VS taken     Labs drawn from PIV placed by RN. Line flushed with saline. Vitals taken. Pt checked in for next appointment.      Carmen Gandhi RN

## 2022-01-26 NOTE — PROGRESS NOTES
Oncology Visit:  Date on this visit: Jan 26, 2022          Diagnosis: Clinical prognostic stage IIIb, [id number], grade 2, ER positive, NE positive, HER-2 negative right breast cancer.     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  He noted discomfort and hardening of the skin of the right nipple in late April/early May, 2021.  He noted a seborrheic keratosis of the right nipple and remembered he had a similar skin lesion of the arm 3 months earlier.  However, he subsequently noted a mass in the same area.  Right breast skin punch biopsy performed by dermatology was c/w grade 2 invasive ductal carcinoma with associated DCIS.  Invasive carcinoma was ER positive 100% and NE positive 70%, with tumor invading the dermis.  HER-2 was negative by IHC (score 1+).  Diagnostic mammogram and ultrasound showed a retroareolar right breast mass measuring 2.9 cm with associated nipple retraction and enlarged, abnormal right axillary lymph nodes.  Right breast biopsy was again c/w grade 2 invasive ductal carcinoma, ER strong in 98% of tumor cell nuclei, HER-2 negative.  Ki-67 was 15%.     He elected to screen for the ISPY-2 clinical trial.  MRI breast on 6/18/2021 showed the biopsy proven right breast cancer to measure 3.9 cm in maximum dimension with invasion of the nipple and the pectoralis muscle as well as at least 4 abnormal level 1 and 2 right axillary lymph nodes and a tiny 0.4 cm right internal mammary lymph node.  Mammoprint returned low risk with a score of +0.29.  He elected for treatment on the endocrine optimization protocol of ISPY-2 and was randomized to treatment with amcenestrant.  He was on treatment with  amcenestrant from 7/1/2021 - 10/26/2021.  At our visit in 01/2021, both right axillary node and right breast tumor palpated more firm then prior.  Breast MRI was stable, however, due to clinical concerns for progression,  decision was made to proceed with surgery.   Right breast mastectomy and right axillary lymph node dissection was performed by Dr. Snider on 10/27/2021.  Pathology showed grade 2 carcinoma of the right breast measuring 3.2 cm with invasion of the dermis, nipple and the pectoralis muscle.  Ki-67 of the excised tumor was 7%.  15/44 right axillary lymph nodes were positive with 6 of the lymph nodes demonstrating extranodal extension.  The largest lymph node metastasis measured 2.1 cm.     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  Had hiccups, that lasted longer with this second cycle, despite not taking the oral Dex.  Was able to maintain his taste, and did not have nausea.  Felt more fatigue in the second week, than with the first cycle,  less motivated but still maintained walking 45-60 min daily.  He has alopecia now.      He did have a low grade fever 100 on night of day 2 and 3.  He noted this did occur with the first cycle as well the night of day 2.  He treated with APAP and had not concerning other symptoms.   Hydrating 2L daily.  Up at night with nocturia a couple times because of the hydration, which has improved with less evening fluid intake.      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 No neulasta concerns.      Has chronically dry skin, sees dermatology.  Did feel his feet were way more dry with chemo than normal.  Has cracks in his heels, using Urea.      Doing PT of the R arm/shoulder, ROM is getting better 90%.  No concerns with surgical site.  Has appt with lymphedema.         Allergies:        Allergies as of 11/09/2021     (No Known Allergies)      Current Medications:  Current Outpatient Prescriptions     Current Outpatient Medications   Medication Sig Dispense Refill     dexamethasone (DECADRON) 4 MG tablet Take 1 tablet (4 mg) by mouth daily (with breakfast) Start morning AFTER Docetaxel dose and continue for 2  additional doses. 3 tablet 3     losartan-hydrochlorothiazide (HYZAAR) 100-25 MG tablet Take 1 tablet by mouth every morning 90 tablet 1     LORazepam (ATIVAN) 0.5 MG tablet Take 1 tablet (0.5 mg) by mouth every 4 hours as needed (Anxiety, Nausea/Vomiting or Sleep) (Patient not taking: Reported on 12/14/2021) 30 tablet 3     ondansetron (ZOFRAN) 8 MG tablet Take 1 tablet (8 mg) by mouth every 8 hours as needed for nausea (Patient not taking: Reported on 12/14/2021) 30 tablet 3     prochlorperazine (COMPAZINE) 10 MG tablet Take 0.5 tablets (5 mg) by mouth every 6 hours as needed (Nausea/Vomiting) (Patient not taking: Reported on 12/14/2021) 30 tablet 3       Family and Social History:  See initial consultation dated 6/8/2021 for further details.  Breast Actionable and Breast Expanded panels were both negative for pathogenic germline mutations.     Physical Exam:  BP (!) 148/79   Pulse 94   Temp 97.7  F (36.5  C) (Oral)   Resp 16   Wt 78.6 kg (173 lb 4.8 oz)   SpO2 94%   BMI 26.35 kg/m    Wt Readings from Last 4 Encounters:   01/26/22 78.6 kg (173 lb 4.8 oz)   01/05/22 77.6 kg (171 lb)   12/14/21 78 kg (172 lb)   11/11/21 78.5 kg (173 lb 1.6 oz)     Vital signs were reviewed.   Patient alert and oriented x3.   PERRLA. EOMI. No scleral icterus noted. OP without thrush/sores.  Neck exam: No palpable cervical, supraclavicular or axillary nodes bilaterally.   Heart: RRR no murmurs noted.   Lungs: clear to auscultation bilaterally.  No crackles or wheezing.   Breast: R breast s/p mastectomy, no palpable masses or adenopathy.  No masses in left breast  Abd: positive bowel sounds in all four quadrants.  No tenderness to palpation.  No hepatomegaly.   Extremities: No lower extremity edema.   Neuro: grossly intact.   Mood and affect is stable.        Laboratory/Imaging Studies   12/14/2021 07:08 1/5/2022 09:24 1/26/2022 07:56   Sodium 138 136 139   Potassium 3.7 3.5 3.4   Chloride 103 103 103   Carbon Dioxide 24 24 26    Urea Nitrogen 10 9 8   Creatinine 0.74 0.65 (L) 0.69   GFR Estimate >90 >90 >90   Calcium 8.9 9.1 9.1   Anion Gap 11 9 10   Albumin 3.6 3.3 (L) 3.4   Protein Total 7.3 7.3 7.0   Bilirubin Total 0.5 0.4 0.5   Alkaline Phosphatase 90 85 85   ALT 30 28 26   AST 13 18 16   Glucose 198 (H) 163 (H) 152 (H)   WBC 5.1 5.1 4.7   Hemoglobin 14.4 13.3 12.7 (L)   Hematocrit 44.4 41.7 39.9 (L)   Platelet Count 267 428 287   RBC Count 5.52 5.15 4.83   MCV 80 81 83          10/26/2021 Breast MRI:  Irregular mass right breast with persistent invasion of the nipple and pectoralis muscle measuring 3.3 x 2.6 x 3.3 cm, unchanged from prior.  No significant change in right axillary lymphadenopathy.  Two stable right internal mammary lymph nodes.     10/27/2021 Right breast mastectomy and right axillary lymph node dissection:  1.  Right breast inferior flap margin, small focus of invasive carcinoma in the dermis measuring 1 mm.  2.  Right axillary lymph nodes:  - metastatic carcinoma to 15/44 lymph nodes.  Largest metastasis measures 2.1 cm and shows extranodal extension.  6 of the nodes demonstrate extranodal extension.  3.  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  He is s/p treatment with 3.5 months neoadjuvant amcenestrant, right breast mastectomy, and right ALND.     1.  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  Pathology of the right breast shows a 3.2 cm carcinoma with invasion of the skin, nipple and pectoralis  muscle.  15/22 axillary lymph nodes were positive.  Notably, the pathology showed no evidence of response to either neoadjuvant endocrine therapy in either the breast or the lymph nodes.  Pathologic staging is T4bN3b or stage IIIb.  PET in Nov 2021 showed no metastatic disease. Patient is aware he has a number of biomarkers which suggest little benefit to chemotherapy including initial high hormone receptor staining, a Mammaprint of +0.29, and low Ki-67.  In addition the mitotic grading of his excised tumor is low.  On the other hand, he has a large number of positive lymph nodes which conveys a high risk of distant recurrence and he had little to no response to neoadjuvant endocrine therapy.  Was heavily discussed and plan was for adjuvant TC, radiation and 10 years of endocrine therapy.      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  Labs sufficient for C3 today.  He has had mild fatigue, hiccups as his main side effect  Discussed plan for four cycles, then proceed with radiation. He has appt with Dr Paredes in Feb.     2.  S/p right breast mastectomy and right axillary lymph node dissection:  Given these surgeries and plan for christiano irradiation, he is at high risk for lymphedema.  Doing daily PT with good ROM, has lymphedema appt.      3.  hiccups 2/2 to the Dex. Given he had no HARMEET, he stopped the Dex.  Still had hiccups.  Dex IV 20 mg reduced to 12 mg for today.  Discussed zofran/compazine if needed for HARMEET.  Will try Pepcid this week given the burping.      4. CV: VSS, continues on Hyzaar     5.  FEN: eating/drinking well.  Creat/lytes stable.  weight stable.     6. Endo: elevated glucose- last couple labs, without DEX oral.  His recent Hgb A1c was 6.2, prediabetic.  He has been trying to watch his sugars, suggested checking home BS as well.      45 minutes spent on the date of the encounter doing chart review, review of test results, interpretation of tests, patient visit, documentation, discussion  with other provider(s) and discussion with family.         Ingrid Rolon PA-C

## 2022-01-26 NOTE — NURSING NOTE
"Oncology Rooming Note    January 26, 2022 8:07 AM   Ronna Collins is a 71 year old male who presents for:    Chief Complaint   Patient presents with     Blood Draw     Labs drawn via PIV by RN in lab. VS taken     Oncology Clinic Visit     Return; Breast Ca     Initial Vitals: BP (!) 148/79   Pulse 94   Temp 97.7  F (36.5  C) (Oral)   Resp 16   Wt 78.6 kg (173 lb 4.8 oz)   SpO2 94%   BMI 26.35 kg/m   Estimated body mass index is 26.35 kg/m  as calculated from the following:    Height as of 10/27/21: 1.727 m (5' 8\").    Weight as of this encounter: 78.6 kg (173 lb 4.8 oz). Body surface area is 1.94 meters squared.  No Pain (0) Comment: Data Unavailable   No LMP for male patient.  Allergies reviewed: Yes  Medications reviewed: Yes    Medications: Medication refills not needed today.  Pharmacy name entered into [email or web address]: [id number] [address] AT [facility]    Clinical concerns: Patient states there are no new concerns to discuss with provider.  Ingrid was not notified.         Cailin Owens CMA              "

## 2022-01-26 NOTE — PROGRESS NOTES
Infusion Nursing Note:  Ronna Collins presents today for C3D1 Taxotere-Cytoxan-Onpro Neulasta.    Patient seen by provider today: Yes: ANGEL Chavis   present during visit today: Not Applicable.    Note: Pt saw provider prior to infusion, ok for treatment.  Pt requests ice on hands and feet during infusion.      Intravenous Access:  Peripheral IV placed in lab.    Treatment Conditions:  Lab Results   Component Value Date    HGB 12.7 (L) 01/26/2022    WBC 4.7 01/26/2022    ANEU 2.9 07/08/2021    ANEUTAUTO 2.9 01/26/2022     01/26/2022      Lab Results   Component Value Date     01/26/2022    POTASSIUM 3.4 01/26/2022    MAG 2.3 06/22/2021    CR 0.69 01/26/2022    PROSPER 9.1 01/26/2022    BILITOTAL 0.5 01/26/2022    ALBUMIN 3.4 01/26/2022    ALT 26 01/26/2022    AST 16 01/26/2022     Results reviewed, labs MET treatment parameters, ok to proceed with treatment.      Post Infusion Assessment:  Patient tolerated infusion without incident.  Blood return noted pre and post infusion.  Site patent and intact, free from redness, edema or discomfort.  No evidence of extravasations.  Access discontinued per protocol.   Neulasta Onpro On-Body injector applied to L arm at 1050.  Writer discussed Neulasta injection would start on 1/27 at 1:50PM, approximately 27 hours after application applied today.  Written and Verbal instruction reviewed with patient.  Pt instructed when the dose delivery starts, it will take about 45 minutes to complete.  Pt aware Neulasta Onpro On-Body should have green flashing light and to call triage or on-call MD if injector flashes red or appears to be leaking. Pt aware to keep Onpro On-Body Neulasta 4 inches away from electrical equipment and to avoid showering 4 hours prior to injection.       Discharge Plan:   Prescription refills given for DEX and OTC Pepcid.  Discharge instructions reviewed with: Patient.  Patient and/or family verbalized understanding of discharge  instructions and all questions answered.  AVS to patient via Thing[employer].  Patient will return 2/15 for MD and 2/16 for infusion.   Patient discharged in stable condition accompanied by: self.  Departure Mode: Ambulatory.    Verito Leonardo RN

## 2022-01-30 ENCOUNTER — NURSE TRIAGE (OUTPATIENT)
Dept: NURSING | Facility: CLINIC | Age: 72
End: 2022-01-30
Payer: COMMERCIAL

## 2022-01-30 ENCOUNTER — TELEPHONE (OUTPATIENT)
Dept: ONCOLOGY | Facility: CLINIC | Age: 72
End: 2022-01-30
Payer: COMMERCIAL

## 2022-01-30 ENCOUNTER — MYC MEDICAL ADVICE (OUTPATIENT)
Dept: ONCOLOGY | Facility: CLINIC | Age: 72
End: 2022-01-30
Payer: COMMERCIAL

## 2022-01-30 DIAGNOSIS — C50.121 MALIGNANT NEOPLASM OF CENTRAL PORTION OF RIGHT BREAST IN MALE, ESTROGEN RECEPTOR POSITIVE (H): Primary | ICD-10-CM

## 2022-01-30 DIAGNOSIS — Z17.0 MALIGNANT NEOPLASM OF CENTRAL PORTION OF RIGHT BREAST IN MALE, ESTROGEN RECEPTOR POSITIVE (H): Primary | ICD-10-CM

## 2022-01-31 ENCOUNTER — LAB (OUTPATIENT)
Dept: LAB | Facility: CLINIC | Age: 72
End: 2022-01-31
Attending: INTERNAL MEDICINE
Payer: COMMERCIAL

## 2022-01-31 DIAGNOSIS — Z17.0 MALIGNANT NEOPLASM OF CENTRAL PORTION OF RIGHT BREAST IN MALE, ESTROGEN RECEPTOR POSITIVE (H): ICD-10-CM

## 2022-01-31 DIAGNOSIS — C50.121 MALIGNANT NEOPLASM OF CENTRAL PORTION OF RIGHT BREAST IN MALE, ESTROGEN RECEPTOR POSITIVE (H): ICD-10-CM

## 2022-01-31 LAB
ALBUMIN SERPL-MCNC: 3.3 G/DL (ref 3.4–5)
ALP SERPL-CCNC: 100 U/L (ref 40–150)
ALT SERPL W P-5'-P-CCNC: 24 U/L (ref 0–70)
ANION GAP SERPL CALCULATED.3IONS-SCNC: 8 MMOL/L (ref 3–14)
AST SERPL W P-5'-P-CCNC: 18 U/L (ref 0–45)
BASOPHILS # BLD MANUAL: 0.2 10E3/UL (ref 0–0.2)
BASOPHILS NFR BLD MANUAL: 3 %
BILIRUB SERPL-MCNC: 0.6 MG/DL (ref 0.2–1.3)
BUN SERPL-MCNC: 15 MG/DL (ref 7–30)
CALCIUM SERPL-MCNC: 9.2 MG/DL (ref 8.5–10.1)
CHLORIDE BLD-SCNC: 104 MMOL/L (ref 94–109)
CO2 SERPL-SCNC: 25 MMOL/L (ref 20–32)
CREAT SERPL-MCNC: 0.71 MG/DL (ref 0.66–1.25)
EOSINOPHIL # BLD MANUAL: 0.2 10E3/UL (ref 0–0.7)
EOSINOPHIL NFR BLD MANUAL: 3 %
ERYTHROCYTE [DISTWIDTH] IN BLOOD BY AUTOMATED COUNT: 16.1 % (ref 10–15)
GFR SERPL CREATININE-BSD FRML MDRD: >90 ML/MIN/1.73M2
GLUCOSE BLD-MCNC: 196 MG/DL (ref 70–99)
HCT VFR BLD AUTO: 35.9 % (ref 40–53)
HGB BLD-MCNC: 11.7 G/DL (ref 13.3–17.7)
LYMPHOCYTES # BLD MANUAL: 0.6 10E3/UL (ref 0.8–5.3)
LYMPHOCYTES NFR BLD MANUAL: 12 %
MCH RBC QN AUTO: 26.8 PG (ref 26.5–33)
MCHC RBC AUTO-ENTMCNC: 32.6 G/DL (ref 31.5–36.5)
MCV RBC AUTO: 82 FL (ref 78–100)
METAMYELOCYTES # BLD MANUAL: 0.1 10E3/UL
METAMYELOCYTES NFR BLD MANUAL: 1 %
MONOCYTES # BLD MANUAL: 0.1 10E3/UL (ref 0–1.3)
MONOCYTES NFR BLD MANUAL: 1 %
NEUTROPHILS # BLD MANUAL: 4.1 10E3/UL (ref 1.6–8.3)
NEUTROPHILS NFR BLD MANUAL: 80 %
PLAT MORPH BLD: ABNORMAL
PLATELET # BLD AUTO: 130 10E3/UL (ref 150–450)
POTASSIUM BLD-SCNC: 3.5 MMOL/L (ref 3.4–5.3)
PROT SERPL-MCNC: 6.9 G/DL (ref 6.8–8.8)
RBC # BLD AUTO: 4.36 10E6/UL (ref 4.4–5.9)
RBC MORPH BLD: ABNORMAL
SODIUM SERPL-SCNC: 137 MMOL/L (ref 133–144)
WBC # BLD AUTO: 5.1 10E3/UL (ref 4–11)

## 2022-01-31 PROCEDURE — 36415 COLL VENOUS BLD VENIPUNCTURE: CPT

## 2022-01-31 PROCEDURE — 82040 ASSAY OF SERUM ALBUMIN: CPT

## 2022-01-31 PROCEDURE — 85027 COMPLETE CBC AUTOMATED: CPT

## 2022-01-31 PROCEDURE — 80053 COMPREHEN METABOLIC PANEL: CPT

## 2022-01-31 NOTE — CONFIDENTIAL NOTE
Brief Hematology Note    Received call from patient and his family regarding fever. Tmax today 103F. Patient is otherwise asymptomatic. Describes fevers after previous cycles of chemotherapy but states that fevers have been intermittent for the last 3 days.     Reviewed recent labs, no documented neutropenia in response to chemotherapy previously.  Today is C3D4 taxotere-cytoxan-onpro neulasta.     Discussed risks and benefits of presenting to the ED for evaluation given most likely this fever represents a drug reaction in response to therapy. At this time patient feels comfortable staying at home and presenting for labs in the morning.     Educated patient that if patient experiences any new symptoms including changes in mental status, chest pain, cough, shortness of breath that he should present to the ED for evaluation.    CBC with diff and CMP ordered    Roberth Turner MD  Hematology Fellow

## 2022-01-31 NOTE — TELEPHONE ENCOUNTER
[facility] Triage    Edis message: fever started Friday and goes up to 103, called after hours and Dr. Turner placed orders for CBC and CMP and eval in ED.  Patient did not go to ED.      Patient has fever last night of 103 and rotating tyl and IB and broke a sweat during the night, and this am 98.2 and then temp went up to 99.8 and took tyl again.  No other symptoms.    Advised he come in for his CBC and CMP today.    Patient would like a call from Dr. Sr or April Farris.    Tammy Rolon saw him last.  Paged Ingrid to advise further    Routing to Remberto Chavis and April Farris    Spoke to Ingrid: He should get his labs and as long he doesn't have any other symptoms - this might be his norm after his infusions.  We will call him if his labs are not within a range that is acceptable.    1046 - spoke to Chair - he is coming in for labs and will wait for a call if they are abnormal.  He does not have any UTI, COVID, Lung symptoms.  He feels good.  Advised we will call him if abnormal labs.    Routing to Ingrid, April Juárez

## 2022-01-31 NOTE — TELEPHONE ENCOUNTER
S-(situation): Call from patient  3rd infusion on Wed. Patient says he usually gets a fever that may last up to 48 hrs after his infusion.     This Friday he started running a fever and today his highest temp about one hour ago was 103 F.    Patient denies shivering, diarrhea or upper respiratory symptoms.    Cell: [phone]    B-(background): cancer of right male breast      A-(assessment): may need to be evaluated tonight.    Patient says he will head to the ED but wants to have on-call paged to make sure he really needs to go. Patient says he will go back home if he does not need to stay at the ED for an eval.    7:57 pm paged marcello on-call to call FNA back. Repaged 8:13 pm. Call back from Dr. Turner who says she spoke to patient and with the following recommendations: Ok to head back home; CBC ordered for tomorrow; most likely fever related to his infusion; patient to go to the ED with concerning symptoms.    R-(recommendations): Go to ED.    Reviewed care advice with caller per RN triage protocol guideline.  Advised to call back with worsening symptoms, concerns or questions.   Caller verbalized understanding.          Judith Haque RN/[facility]    Reason for Disposition    [1] Neutropenia known or suspected (e.g., recent cancer chemotherapy) AND [2] fever > 100.4 F (38.0 C)    Additional Information    Negative: Shock suspected (e.g., cold/pale/clammy skin, too weak to stand, low BP, rapid pulse)    Negative: Difficult to awaken or acting confused (e.g., disoriented, slurred speech)    Negative: Bluish (or gray) lips or face now    Negative: New onset rash with many purple (or blood-colored) spots or dots    Negative: Sounds like a life-threatening emergency to the triager    Negative: Fever > 104 F (40 C)    Protocols used: CANCER - FEVER-A-

## 2022-01-31 NOTE — NURSING NOTE
Chief Complaint   Patient presents with     Blood Draw     Labs drawn via vpt by RN in lab.      Carmen Gandhi RN

## 2022-02-08 DIAGNOSIS — R50.9 FEVER, UNSPECIFIED FEVER CAUSE: Primary | ICD-10-CM

## 2022-02-08 DIAGNOSIS — Z51.11 ENCOUNTER FOR ANTINEOPLASTIC CHEMOTHERAPY: ICD-10-CM

## 2022-02-08 DIAGNOSIS — I77.6 AORTITIS (H): ICD-10-CM

## 2022-02-08 PROBLEM — C50.929: Status: ACTIVE | Noted: 2021-06-04

## 2022-02-09 ENCOUNTER — LAB (OUTPATIENT)
Dept: LAB | Facility: CLINIC | Age: 72
End: 2022-02-09
Attending: INTERNAL MEDICINE
Payer: COMMERCIAL

## 2022-02-09 ENCOUNTER — ANCILLARY PROCEDURE (OUTPATIENT)
Dept: CT IMAGING | Facility: CLINIC | Age: 72
End: 2022-02-09
Attending: INTERNAL MEDICINE
Payer: COMMERCIAL

## 2022-02-09 DIAGNOSIS — I77.6 AORTITIS (H): ICD-10-CM

## 2022-02-09 DIAGNOSIS — R50.9 FEVER, UNSPECIFIED FEVER CAUSE: ICD-10-CM

## 2022-02-09 DIAGNOSIS — Z51.11 ENCOUNTER FOR ANTINEOPLASTIC CHEMOTHERAPY: ICD-10-CM

## 2022-02-09 LAB
BASOPHILS # BLD AUTO: 0.1 10E3/UL (ref 0–0.2)
BASOPHILS NFR BLD AUTO: 1 %
CRP SERPL-MCNC: 16.9 MG/L (ref 0–8)
EOSINOPHIL # BLD AUTO: 0 10E3/UL (ref 0–0.7)
EOSINOPHIL NFR BLD AUTO: 0 %
ERYTHROCYTE [DISTWIDTH] IN BLOOD BY AUTOMATED COUNT: 17.2 % (ref 10–15)
ERYTHROCYTE [SEDIMENTATION RATE] IN BLOOD BY WESTERGREN METHOD: 67 MM/HR (ref 0–20)
HCT VFR BLD AUTO: 42 % (ref 40–53)
HGB BLD-MCNC: 13.5 G/DL (ref 13.3–17.7)
IMM GRANULOCYTES # BLD: 0.2 10E3/UL
IMM GRANULOCYTES NFR BLD: 2 %
LYMPHOCYTES # BLD AUTO: 1.7 10E3/UL (ref 0.8–5.3)
LYMPHOCYTES NFR BLD AUTO: 17 %
MCH RBC QN AUTO: 27.3 PG (ref 26.5–33)
MCHC RBC AUTO-ENTMCNC: 32.1 G/DL (ref 31.5–36.5)
MCV RBC AUTO: 85 FL (ref 78–100)
MONOCYTES # BLD AUTO: 0.8 10E3/UL (ref 0–1.3)
MONOCYTES NFR BLD AUTO: 8 %
NEUTROPHILS # BLD AUTO: 7.2 10E3/UL (ref 1.6–8.3)
NEUTROPHILS NFR BLD AUTO: 72 %
NRBC # BLD AUTO: 0 10E3/UL
NRBC BLD AUTO-RTO: 0 /100
PLATELET # BLD AUTO: 99 10E3/UL (ref 150–450)
RBC # BLD AUTO: 4.95 10E6/UL (ref 4.4–5.9)
WBC # BLD AUTO: 9.9 10E3/UL (ref 4–11)

## 2022-02-09 PROCEDURE — 85025 COMPLETE CBC W/AUTO DIFF WBC: CPT | Performed by: PATHOLOGY

## 2022-02-09 PROCEDURE — 86140 C-REACTIVE PROTEIN: CPT | Performed by: PATHOLOGY

## 2022-02-09 PROCEDURE — 71260 CT THORAX DX C+: CPT | Mod: GC | Performed by: RADIOLOGY

## 2022-02-09 PROCEDURE — 85652 RBC SED RATE AUTOMATED: CPT | Performed by: PATHOLOGY

## 2022-02-09 PROCEDURE — 36415 COLL VENOUS BLD VENIPUNCTURE: CPT | Performed by: PATHOLOGY

## 2022-02-09 RX ORDER — IOPAMIDOL 755 MG/ML
83 INJECTION, SOLUTION INTRAVASCULAR ONCE
Status: COMPLETED | OUTPATIENT
Start: 2022-02-09 | End: 2022-02-09

## 2022-02-09 RX ADMIN — IOPAMIDOL 83 ML: 755 INJECTION, SOLUTION INTRAVASCULAR at 13:48

## 2022-02-11 ENCOUNTER — ONCOLOGY VISIT (OUTPATIENT)
Dept: ONCOLOGY | Facility: CLINIC | Age: 72
End: 2022-02-11
Attending: SURGERY
Payer: COMMERCIAL

## 2022-02-11 VITALS
DIASTOLIC BLOOD PRESSURE: 75 MMHG | OXYGEN SATURATION: 98 % | BODY MASS INDEX: 25.96 KG/M2 | HEIGHT: 68 IN | TEMPERATURE: 98.2 F | SYSTOLIC BLOOD PRESSURE: 116 MMHG | WEIGHT: 171.3 LBS | HEART RATE: 102 BPM

## 2022-02-11 DIAGNOSIS — Z17.0 MALIGNANT NEOPLASM OF CENTRAL PORTION OF RIGHT BREAST IN MALE, ESTROGEN RECEPTOR POSITIVE (H): Primary | ICD-10-CM

## 2022-02-11 DIAGNOSIS — C50.121 MALIGNANT NEOPLASM OF CENTRAL PORTION OF RIGHT BREAST IN MALE, ESTROGEN RECEPTOR POSITIVE (H): Primary | ICD-10-CM

## 2022-02-11 PROCEDURE — 99213 OFFICE O/P EST LOW 20 MIN: CPT | Performed by: SURGERY

## 2022-02-11 PROCEDURE — G0463 HOSPITAL OUTPT CLINIC VISIT: HCPCS

## 2022-02-11 ASSESSMENT — PAIN SCALES - GENERAL: PAINLEVEL: NO PAIN (0)

## 2022-02-11 ASSESSMENT — MIFFLIN-ST. JEOR: SCORE: 1508.89

## 2022-02-11 NOTE — PROGRESS NOTES
"FOLLOW-UP  Feb 11, 2022    Ronna Collins is a 71 year old male who returns for follow-up for    Cancer Staging  Malignant neoplasm of male breast (H)  Staging form: Breast, AJCC 8th Edition  - Clinical stage from 6/8/2021: Stage IIIB (cT4b, cN3b, cM0, G2, ER+, NM+, HER2-) - Signed by Mikaela Sr MD on 8/30/2021  - Pathologic: Stage IIIA (pT4b, pN3b, cM0, G2, ER+, NM+, HER2-) - Signed by Nida Snider MD on 11/11/2021      Treatment to date:  1. Enrolled on ISPY-2   2. Neoadjuvant amcenestrant (7/1/2021 to ongoing)  3. Right simple mastectomy and right axillary lymph node dissection, levels I & II (10/27/2021)  4. Adjuvant taxotere and cyclophosphamide (12/14/2021 to ongoing)    HPI:    Since his last visit, he has been undergoing adjuvant systemic therapy.  He has been tolerating this reasonably well. He did have a fever with cycle 3.  Cycle 4 is coming up next week.    He has been meeting with PT; minimal % difference in volume measurement between the 2 sides. Range of motion is good. No limitations.    /75   Pulse 102   Temp 98.2  F (36.8  C) (Oral)   Ht 1.715 m (5' 7.52\")   Wt 77.7 kg (171 lb 4.8 oz)   SpO2 98%   BMI 26.42 kg/m     Physical Exam  Constitutional:       Appearance: He is well-developed.   Pulmonary:      Effort: Pulmonary effort is normal. No respiratory distress.   Chest:   Breasts:      Right: No mass, skin change, axillary adenopathy or supraclavicular adenopathy.      Left: Normal. No swelling, inverted nipple, mass, nipple discharge, skin change, axillary adenopathy or supraclavicular adenopathy.        Comments: Surgical absence of right breast. Incision well healed. Skin flap flat.  Lymphadenopathy:      Upper Body:      Right upper body: No supraclavicular, axillary, pectoral or epitrochlear adenopathy.      Left upper body: No supraclavicular, axillary, pectoral or epitrochlear adenopathy.      Comments: No lymphedema in bilateral upper " extremities.    Skin:     General: Skin is warm and dry.          INVESTIGATIONS:    CT chest (2/9/2022) showed:  FINDINGS:  The central tracheobronchial tree is patent. Esophagus is mildly patulous. Heart size is normal. Atherosclerotic changes of the aortic arch. No pericardial effusion. Normal three vessel arch anatomy. No axillary adenopathy. No mediastinal adenopathy. Thyroid lobes are normal  No suspiciously pulmonary nodules. No airspace consolidation. No pleural effusion or pneumothorax.  Visualized portions of the upper abdomen are unremarkable.   No suspicious osseus lesions. Post surgical changes of right mastectomy with axillary lymph node dissection.   IMPRESSION:   1. No acute cardiopulmonary findings.   2. Post surgical changes of right mastectomy and axillary node dissection.    ASSESSMENT:    Ronna Collins is a 71 year old male with RIGHT breast cancer, 4 months s/p surgery.    I personally reviewed the CT above.    He is doing well.  We discussed continuing physical therapy exercises, likely on a lifelong basis, to continue to maintain his shoulder range of motion. We discussed he should wear the compression sleeve even in the absence of swelling if his arm will be immobile for a long period of time, such as during a long car ride or a flight. Ronna Collins's wife asked about a longterm physical therapy follow up and I recommended a referral to Dr Waters in rehabilitation medicine within our cancer center.  I will see him on an as needed basis.    Total time spent with the patient was 20 minutes, of which 75% was counseling.     PLAN:  1. Referral to Dr Waters  2. Continue lymphedema clinic   3. Adjuvant radiation following completion of chemotherapy - has upcoming appointment with Dr Paredes  4. Follow up with me NATHAN Snider MD MSc [facility] FACS  Assistant Professor of Surgery  Division of Surgical Oncology  [facility]     22 minutes spent on the date of the encounter doing  chart review, review of test results, interpretation of tests, patient visit, documentation and discussion with family.

## 2022-02-11 NOTE — NURSING NOTE
"Oncology Rooming Note    February 11, 2022 1:16 PM   Ronna Collins is a 71 year old male who presents for:    Chief Complaint   Patient presents with     Oncology Clinic Visit     Pt is here for a New Eval for Breast Cancer     Initial Vitals: Blood Pressure 116/75   Pulse 102   Temperature 98.2  F (36.8  C) (Oral)   Height 1.675 m (5' 5.95\")   Weight 77.7 kg (171 lb 4.8 oz)   Oxygen Saturation 98%   Body Mass Index 27.69 kg/m   Estimated body mass index is 27.69 kg/m  as calculated from the following:    Height as of this encounter: 1.675 m (5' 5.95\").    Weight as of this encounter: 77.7 kg (171 lb 4.8 oz). Body surface area is 1.9 meters squared.  No Pain (0) Comment: Data Unavailable   No LMP for male patient.  Allergies reviewed: Yes  Medications reviewed: Yes    Medications: Medication refills not needed today.  Pharmacy name entered into [employer]: [id number] - [place], [place] - [facility] [address] AT [facility]    Clinical concerns: none       Linda De Leon MA            "

## 2022-02-11 NOTE — LETTER
"  2/11/2022         RE: Ronna Collins  [address]  [facility] [address]        Dear Colleague,    Thank you for referring your patient, Ronna Collins, to the [facility]. Please see a copy of my visit note below.    FOLLOW-UP  Feb 11, 2022    Ronna Collins is a 71 year old male who returns for follow-up for    Cancer Staging  Malignant neoplasm of male breast (H)  Staging form: Breast, AJCC 8th Edition  - Clinical stage from 6/8/2021: Stage IIIB (cT4b, cN3b, cM0, G2, ER+, IA+, HER2-) - Signed by Mikaela Sr MD on 8/30/2021  - Pathologic: Stage IIIA (pT4b, pN3b, cM0, G2, ER+, IA+, HER2-) - Signed by Nida Snider MD on 11/11/2021      Treatment to date:  1. Enrolled on ISPY-2   2. Neoadjuvant amcenestrant (7/1/2021 to ongoing)  3. Right simple mastectomy and right axillary lymph node dissection, levels I & II (10/27/2021)  4. Adjuvant taxotere and cyclophosphamide (12/14/2021 to ongoing)    HPI:    Since his last visit, he has been undergoing adjuvant systemic therapy.  He has been tolerating this reasonably well. He did have a fever with cycle 3.  Cycle 4 is coming up next week.    He has been meeting with PT; minimal % difference in volume measurement between the 2 sides. Range of motion is good. No limitations.    /75   Pulse 102   Temp 98.2  F (36.8  C) (Oral)   Ht 1.715 m (5' 7.52\")   Wt 77.7 kg (171 lb 4.8 oz)   SpO2 98%   BMI 26.42 kg/m     Physical Exam  Constitutional:       Appearance: He is well-developed.   Pulmonary:      Effort: Pulmonary effort is normal. No respiratory distress.   Chest:   Breasts:      Right: No mass, skin change, axillary adenopathy or supraclavicular adenopathy.      Left: Normal. No swelling, inverted nipple, mass, nipple discharge, skin change, axillary adenopathy or supraclavicular adenopathy.        Comments: Surgical absence of right breast. Incision well healed. Skin flap " flat.  Lymphadenopathy:      Upper Body:      Right upper body: No supraclavicular, axillary, pectoral or epitrochlear adenopathy.      Left upper body: No supraclavicular, axillary, pectoral or epitrochlear adenopathy.      Comments: No lymphedema in bilateral upper extremities.    Skin:     General: Skin is warm and dry.          INVESTIGATIONS:    CT chest (2/9/2022) showed:  FINDINGS:  The central tracheobronchial tree is patent. Esophagus is mildly patulous. Heart size is normal. Atherosclerotic changes of the aortic arch. No pericardial effusion. Normal three vessel arch anatomy. No axillary adenopathy. No mediastinal adenopathy. Thyroid lobes are normal  No suspiciously pulmonary nodules. No airspace consolidation. No pleural effusion or pneumothorax.  Visualized portions of the upper abdomen are unremarkable.   No suspicious osseus lesions. Post surgical changes of right mastectomy with axillary lymph node dissection.   IMPRESSION:   1. No acute cardiopulmonary findings.   2. Post surgical changes of right mastectomy and axillary node dissection.    ASSESSMENT:    Ronna Collins is a 71 year old male with RIGHT breast cancer, 4 months s/p surgery.    I personally reviewed the CT above.    He is doing well.  We discussed continuing physical therapy exercises, likely on a lifelong basis, to continue to maintain his shoulder range of motion. We discussed he should wear the compression sleeve even in the absence of swelling if his arm will be immobile for a long period of time, such as during a long car ride or a flight. Ronna Collins's wife asked about a longterm physical therapy follow up and I recommended a referral to Dr Waters in rehabilitation medicine within our cancer center.  I will see him on an as needed basis.    Total time spent with the patient was 20 minutes, of which 75% was counseling.     PLAN:  1. Referral to Dr Waters  2. Continue lymphedema clinic   3. Adjuvant radiation following  completion of chemotherapy - has upcoming appointment with Dr Reggie Dubose. Follow up with me PRN      22 minutes spent on the date of the encounter doing chart review, review of test results, interpretation of tests, patient visit, documentation and discussion with family.        Again, thank you for allowing me to participate in the care of your patient.      Sincerely,    Nida Snider MD

## 2022-02-14 NOTE — PROGRESS NOTES
Oncology Visit:  Date on this visit: 2/15/2022    Diagnosis: Clinical prognostic stage IIIb, [id number], grade 2, ER positive, NE positive, HER-2 negative right breast cancer.    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  He noted discomfort and hardening of the skin of the right nipple in late April/early May, 2021.  He noted a seborrheic keratosis of the right nipple and remembered he had a similar skin lesion of the arm 3 months earlier.  However, he subsequently noted a mass in the same area.  Right breast skin punch biopsy performed by dermatology was c/w grade 2 invasive ductal carcinoma with associated DCIS.  Invasive carcinoma was ER positive 100% and NE positive 70%, with tumor invading the dermis.  HER-2 was negative by IHC (score 1+).  Diagnostic mammogram and ultrasound showed a retroareolar right breast mass measuring 2.9 cm with associated nipple retraction and enlarged, abnormal right axillary lymph nodes.  Right breast biopsy was again c/w grade 2 invasive ductal carcinoma, ER strong in 98% of tumor cell nuclei, HER-2 negative.  Ki-67 was 15%.    He elected to screen for the ISPY-2 clinical trial.  MRI breast on 6/18/2021 showed the biopsy proven right breast cancer to measure 3.9 cm in maximum dimension with invasion of the nipple and the pectoralis muscle as well as at least 4 abnormal level 1 and 2 right axillary lymph nodes and a tiny 0.4 cm right internal mammary lymph node.  Mammoprint returned low risk with a score of +0.29.  He elected for treatment on the endocrine optimization protocol of ISPY-2 and was randomized to treatment with amcenestrant.  He was on treatment with  amcenestrant from 7/1/2021 - 10/26/2021.  At our visit in 01/2021, both right axillary node and right breast tumor palpated more firm then prior.  Breast MRI was stable, however, due to clinical concerns for progression, decision was made  to proceed with surgery.   Right breast mastectomy and right axillary lymph node dissection was performed by Dr. Snider on 10/27/2021.  Pathology showed grade 2 carcinoma of the right breast measuring 3.2 cm with invasion of the dermis, nipple and the pectoralis muscle.  Ki-67 of the excised tumor was 7%.  15/44 right axillary lymph nodes were positive with 6 of the lymph nodes demonstrating extranodal extension.  The largest lymph node metastasis measured 2.1 cm.    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  C2 was given on 1/5/2021 and C3 on 1/26/2021.      Interval History:   Karina comes into clinic today for evaluation prior to cycle #3 of Taxotere and cyclophosphamide.  He had significant fever following cycle 2.  The fever onset was on January 28, approximately 4 days after receiving chemotherapy.  On that day the fever was around 101 degrees.  It peaked on January 30 at 103 degrees, and declined thereafter.  In entirety, the fever lasted approximately 9 days.  White blood cell count checked on 1/31 was within normal limits.  During that time he noted increased episodes of low back pain.  He has chronic intermittent low back pain.  He believes the increased episodes were due to spending more time on the couch in the setting of fever.  He also had increased fatigue.  Despite the fatigue he has continued to work full-time.  He takes rests during the day, which he has never previously had to do.      He has had no mouth sores.  He reports significant dry skin but no rash.  He has had no cough, shortness of breath, heart palpitations, or chest pain.  He had mild nausea throughout the last cycle.  It was not bothersome enough for him and to take an antiemetic.  He did not have any episodes of vomiting.  He has had no symptoms of neuropathy.  The remainder of a complete 12 point review of systems was reviewed with patient was negative with exception that mentioned above.    Past  Medical/Surgical History:  Past Medical History:   Diagnosis Date     Chronic sinusitis      Hypertension 2002     Past Surgical History:   Procedure Laterality Date     DISSECT LYMPH NODE AXILLA Right 10/27/2021    Procedure: RIGHT Axillary Lymph Node Dissection;  Surgeon: Nida Snider MD;  Location: [facility] OR     MASTECTOMY SIMPLE Right 10/27/2021    Procedure: RIGHT Simple Mastectomy;  Surgeon: Nida Snider MD;  Location: [facility] OR     Allergies:  Allergies as of 02/15/2022     (No Known Allergies)     Current Medications:  Current Outpatient Medications   Medication Sig Dispense Refill     dexamethasone (DECADRON) 4 MG tablet Take 1 tablet (4 mg) by mouth daily (with breakfast) Start morning AFTER Docetaxel dose and continue for 2 additional doses. 3 tablet 3     LORazepam (ATIVAN) 0.5 MG tablet Take 1 tablet (0.5 mg) by mouth every 4 hours as needed (Anxiety, Nausea/Vomiting or Sleep) (Patient not taking: Reported on 12/14/2021) 30 tablet 3     losartan-hydrochlorothiazide (HYZAAR) 100-25 MG tablet Take 1 tablet by mouth every morning 90 tablet 1     ondansetron (ZOFRAN) 8 MG tablet Take 1 tablet (8 mg) by mouth every 8 hours as needed for nausea (Patient not taking: Reported on 12/14/2021) 30 tablet 3     prochlorperazine (COMPAZINE) 10 MG tablet Take 0.5 tablets (5 mg) by mouth every 6 hours as needed (Nausea/Vomiting) (Patient not taking: Reported on 12/14/2021) 30 tablet 3      Family and Social History:  See initial consultation dated 6/8/2021 for further details.  Breast Actionable and Breast Expanded panels were both negative for pathogenic germline mutations.    Physical Exam:  /73 (BP Location: Left arm)   Pulse 104   Temp 97.9  F (36.6  C) (Oral)   Resp 16   Wt 77.5 kg (170 lb 12.8 oz)   SpO2 98%   BMI 26.34 kg/m    General:  Well appearing adult male in NAD.  HEENT:  Normocephalic.  Sclera anicteric.  MMM.  No lesions of the oropharynx. (+) alopecia  Lymph:  No palpable  cervical, supraclavicular, or axillary LAD.  Chest:  CTA bilaterally.  No wheezes or crackles.  CV:  RRR.  Nl S1 and S2.    Abd:  Soft/ND.    Ext:  No pitting edema of the bilateral lower extremities.    Neuro:  Cranial nerves grossly intact.  Psych:  Mood and affect appear normal.    Laboratory/Imaging Studies  2/15/2022 Labs:  WBC is wnl.  Hemoglobin is low at 12.2 g/dL  Platelets are wnl.    ESR  67 (2/9) --- 85 (2/15)    2/9/2022 CT chest:  No acute cardiopulmonary findings.    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  He is s/p treatment with 3.5 months neoadjuvant amcenestrant, right breast mastectomy, right ALND, and 3 cycles of adjuvant Taxotere and cyclophosphamide.    1.  Right breast cancer:    He has now completed 3 cycles of Taxotere and cyclophosphamide.   This last cycle was complicated by fever in the 102-103 range without associated neutropenia.  I suspect the fever is secondary to Neulasta.  Neulasta has a warning for potential aortitis and should be considered in patients with fever, back pain, and elevated inflammatory markers without evidence of infection.  He has all of these symptoms.  ESR and CRP were checked on 2/9 and were elevated at 67 and 16.9 respectively.  CT chest was without evidence of aortitis.  Spoke to on-call Rheumatology, Dr. Cruz, who recommends PET/CT if concerned for arteritis.  Repeat ESR today is 85.  I recommend proceeding with the PET/CT.  If PET/CT demonstrates evidence on inflammation of the aorta, will need to treat with corticosteroids.    Plan to proceed with 4th cycle of TC tomorrow without Neulasta support.  He will receive dexamethasone 12 mg IV as a premedication and will take oral dexamethasone in the 3 days after treatment.  We spent quite some time today discussing risk of neutropenic fever without Neulasta.  If he  experiences fever of 100.4 or greater, must come in to have blood counts checked, and if neutropenic will need hospitalization for IV antibiotics.  He expressed his understanding.    This is the last planned cycle of chemotherapy.  Plan is now for adjuvant radiation.  He has follow up appointment already scheduled with Dr. Paredes.  We will initiate treatment with letrozole at same time as radiation and abemaciclib upon completion of radiation.  Plan to administer abemaciclib for 2 years.  Letrozole a total of 10 years.  I recommend starting letrozole in 4 weeks, around mid-March.  He was previously on this medication and tolerated without complication.  I will see him back upon completion of radiation to start abemaciclib.    2.  Fever:  Likely secondary to Neulasta as above.  Will discontinue Neulasta.  Counseled on neutropenic fever.    3.  Nausea:  Grade 1.  Continue antiemetics prn.    4.  Fatigue:  Accumulating fatigue with chemotherapy is common.  Consider cancer rehab upon completion of treatment.      5.  Follow Up:  Return to clinic in approximately 10 weeks for visit with me.    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  I spent 60 minutes on the date of the encounter doing chart review, review of test results, interpretation of tests, patient visit and documentation.

## 2022-02-15 ENCOUNTER — APPOINTMENT (OUTPATIENT)
Dept: LAB | Facility: CLINIC | Age: 72
End: 2022-02-15
Attending: INTERNAL MEDICINE
Payer: COMMERCIAL

## 2022-02-15 ENCOUNTER — ONCOLOGY VISIT (OUTPATIENT)
Dept: ONCOLOGY | Facility: CLINIC | Age: 72
End: 2022-02-15
Attending: INTERNAL MEDICINE
Payer: COMMERCIAL

## 2022-02-15 VITALS
SYSTOLIC BLOOD PRESSURE: 119 MMHG | BODY MASS INDEX: 26.34 KG/M2 | TEMPERATURE: 97.9 F | RESPIRATION RATE: 16 BRPM | WEIGHT: 170.8 LBS | DIASTOLIC BLOOD PRESSURE: 73 MMHG | OXYGEN SATURATION: 98 % | HEART RATE: 104 BPM

## 2022-02-15 DIAGNOSIS — Z51.11 ENCOUNTER FOR ANTINEOPLASTIC CHEMOTHERAPY: ICD-10-CM

## 2022-02-15 DIAGNOSIS — C50.121 MALIGNANT NEOPLASM OF CENTRAL PORTION OF RIGHT BREAST IN MALE, ESTROGEN RECEPTOR POSITIVE (H): Primary | ICD-10-CM

## 2022-02-15 DIAGNOSIS — C50.929 MALIGNANT NEOPLASM OF MALE BREAST, UNSPECIFIED ESTROGEN RECEPTOR STATUS, UNSPECIFIED LATERALITY, UNSPECIFIED SITE OF BREAST (H): ICD-10-CM

## 2022-02-15 DIAGNOSIS — I77.6 AORTITIS (H): ICD-10-CM

## 2022-02-15 DIAGNOSIS — R50.2 DRUG-INDUCED FEVER: ICD-10-CM

## 2022-02-15 DIAGNOSIS — Z17.0 MALIGNANT NEOPLASM OF CENTRAL PORTION OF RIGHT BREAST IN MALE, ESTROGEN RECEPTOR POSITIVE (H): Primary | ICD-10-CM

## 2022-02-15 LAB
BASOPHILS # BLD AUTO: 0.1 10E3/UL (ref 0–0.2)
BASOPHILS NFR BLD AUTO: 1 %
EOSINOPHIL # BLD AUTO: 0 10E3/UL (ref 0–0.7)
EOSINOPHIL NFR BLD AUTO: 0 %
ERYTHROCYTE [DISTWIDTH] IN BLOOD BY AUTOMATED COUNT: 17.5 % (ref 10–15)
ERYTHROCYTE [SEDIMENTATION RATE] IN BLOOD BY WESTERGREN METHOD: 85 MM/HR (ref 0–20)
HCT VFR BLD AUTO: 38.6 % (ref 40–53)
HGB BLD-MCNC: 12.2 G/DL (ref 13.3–17.7)
HOLD SPECIMEN: NORMAL
HOLD SPECIMEN: NORMAL
IMM GRANULOCYTES # BLD: 0 10E3/UL
IMM GRANULOCYTES NFR BLD: 0 %
LYMPHOCYTES # BLD AUTO: 1.4 10E3/UL (ref 0.8–5.3)
LYMPHOCYTES NFR BLD AUTO: 24 %
MCH RBC QN AUTO: 27.3 PG (ref 26.5–33)
MCHC RBC AUTO-ENTMCNC: 31.6 G/DL (ref 31.5–36.5)
MCV RBC AUTO: 86 FL (ref 78–100)
MONOCYTES # BLD AUTO: 0.5 10E3/UL (ref 0–1.3)
MONOCYTES NFR BLD AUTO: 8 %
NEUTROPHILS # BLD AUTO: 4.1 10E3/UL (ref 1.6–8.3)
NEUTROPHILS NFR BLD AUTO: 67 %
NRBC # BLD AUTO: 0 10E3/UL
NRBC BLD AUTO-RTO: 0 /100
PLATELET # BLD AUTO: 243 10E3/UL (ref 150–450)
RBC # BLD AUTO: 4.47 10E6/UL (ref 4.4–5.9)
WBC # BLD AUTO: 6.1 10E3/UL (ref 4–11)

## 2022-02-15 PROCEDURE — 99215 OFFICE O/P EST HI 40 MIN: CPT | Performed by: INTERNAL MEDICINE

## 2022-02-15 PROCEDURE — G0463 HOSPITAL OUTPT CLINIC VISIT: HCPCS

## 2022-02-15 PROCEDURE — 85025 COMPLETE CBC W/AUTO DIFF WBC: CPT

## 2022-02-15 PROCEDURE — 85652 RBC SED RATE AUTOMATED: CPT

## 2022-02-15 PROCEDURE — 36415 COLL VENOUS BLD VENIPUNCTURE: CPT | Performed by: INTERNAL MEDICINE

## 2022-02-15 RX ORDER — NALOXONE HYDROCHLORIDE 0.4 MG/ML
0.2 INJECTION, SOLUTION INTRAMUSCULAR; INTRAVENOUS; SUBCUTANEOUS
Status: CANCELLED | OUTPATIENT
Start: 2022-02-16

## 2022-02-15 RX ORDER — ALBUTEROL SULFATE 90 UG/1
1-2 AEROSOL, METERED RESPIRATORY (INHALATION)
Status: CANCELLED
Start: 2022-02-16

## 2022-02-15 RX ORDER — LORAZEPAM 2 MG/ML
0.5 INJECTION INTRAMUSCULAR EVERY 4 HOURS PRN
Status: CANCELLED | OUTPATIENT
Start: 2022-02-16

## 2022-02-15 RX ORDER — ALBUTEROL SULFATE 0.83 MG/ML
2.5 SOLUTION RESPIRATORY (INHALATION)
Status: CANCELLED | OUTPATIENT
Start: 2022-02-16

## 2022-02-15 RX ORDER — HEPARIN SODIUM (PORCINE) LOCK FLUSH IV SOLN 100 UNIT/ML 100 UNIT/ML
5 SOLUTION INTRAVENOUS
Status: CANCELLED | OUTPATIENT
Start: 2022-02-16

## 2022-02-15 RX ORDER — PALONOSETRON 0.05 MG/ML
0.25 INJECTION, SOLUTION INTRAVENOUS ONCE
Status: CANCELLED
Start: 2022-02-16 | End: 2022-02-16

## 2022-02-15 RX ORDER — HEPARIN SODIUM,PORCINE 10 UNIT/ML
5 VIAL (ML) INTRAVENOUS
Status: CANCELLED | OUTPATIENT
Start: 2022-02-16

## 2022-02-15 RX ORDER — MEPERIDINE HYDROCHLORIDE 25 MG/ML
25 INJECTION INTRAMUSCULAR; INTRAVENOUS; SUBCUTANEOUS EVERY 30 MIN PRN
Status: CANCELLED | OUTPATIENT
Start: 2022-02-16

## 2022-02-15 RX ORDER — DEXAMETHASONE 4 MG/1
4 TABLET ORAL
Qty: 3 TABLET | Refills: 3 | Status: SHIPPED | OUTPATIENT
Start: 2022-02-15 | End: 2022-03-09

## 2022-02-15 RX ORDER — EPINEPHRINE 1 MG/ML
0.3 INJECTION, SOLUTION INTRAMUSCULAR; SUBCUTANEOUS EVERY 5 MIN PRN
Status: CANCELLED | OUTPATIENT
Start: 2022-02-16

## 2022-02-15 RX ORDER — METHYLPREDNISOLONE SODIUM SUCCINATE 125 MG/2ML
125 INJECTION, POWDER, LYOPHILIZED, FOR SOLUTION INTRAMUSCULAR; INTRAVENOUS
Status: CANCELLED
Start: 2022-02-16

## 2022-02-15 RX ORDER — DIPHENHYDRAMINE HYDROCHLORIDE 50 MG/ML
50 INJECTION INTRAMUSCULAR; INTRAVENOUS
Status: CANCELLED
Start: 2022-02-16

## 2022-02-15 ASSESSMENT — PAIN SCALES - GENERAL: PAINLEVEL: NO PAIN (0)

## 2022-02-15 NOTE — NURSING NOTE
"Oncology Rooming Note    February 15, 2022 8:29 AM   Ronna Collins is a 71 year old male who presents for:    Chief Complaint   Patient presents with     Blood Draw     Labs drawn via  by RN. Vitals taken.     Oncology Clinic Visit     breast cancer     Initial Vitals: /73 (BP Location: Left arm)   Pulse 104   Temp 97.9  F (36.6  C) (Oral)   Resp 16   Wt 77.5 kg (170 lb 12.8 oz)   SpO2 98%   BMI 26.34 kg/m   Estimated body mass index is 26.34 kg/m  as calculated from the following:    Height as of 2/11/22: 1.715 m (5' 7.52\").    Weight as of this encounter: 77.5 kg (170 lb 12.8 oz). Body surface area is 1.92 meters squared.  No Pain (0) Comment: Data Unavailable   No LMP for male patient.  Allergies reviewed: Yes  Medications reviewed: Yes    Medications: Medication refills not needed today.  Pharmacy name entered into [employer]: [id number] - [place] [id number] [facility] [address] AT [facility]    Clinical concerns: none       Samia New CMA            "

## 2022-02-15 NOTE — NURSING NOTE
Chief Complaint   Patient presents with     Blood Draw     Labs drawn via  by RN. Vitals taken.     Labs collected from venipuncture by RN. Vitals taken. Checked in for appointment(s).    Kristen Ardon RN

## 2022-02-15 NOTE — LETTER
2/15/2022         RE: Ronna Collins  [address]  [facility] [address]        Dear Colleague,    Thank you for referring your patient, Ronna Collins, to the [facility]. Please see a copy of my visit note below.    Oncology Visit:  Date on this visit: 2/15/2022    Diagnosis: Clinical prognostic stage IIIb, [id number], grade 2, ER positive, TX positive, HER-2 negative right breast cancer.    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  He noted discomfort and hardening of the skin of the right nipple in late April/early May, 2021.  He noted a seborrheic keratosis of the right nipple and remembered he had a similar skin lesion of the arm 3 months earlier.  However, he subsequently noted a mass in the same area.  Right breast skin punch biopsy performed by dermatology was c/w grade 2 invasive ductal carcinoma with associated DCIS.  Invasive carcinoma was ER positive 100% and TX positive 70%, with tumor invading the dermis.  HER-2 was negative by IHC (score 1+).  Diagnostic mammogram and ultrasound showed a retroareolar right breast mass measuring 2.9 cm with associated nipple retraction and enlarged, abnormal right axillary lymph nodes.  Right breast biopsy was again c/w grade 2 invasive ductal carcinoma, ER strong in 98% of tumor cell nuclei, HER-2 negative.  Ki-67 was 15%.    He elected to screen for the ISPY-2 clinical trial.  MRI breast on 6/18/2021 showed the biopsy proven right breast cancer to measure 3.9 cm in maximum dimension with invasion of the nipple and the pectoralis muscle as well as at least 4 abnormal level 1 and 2 right axillary lymph nodes and a tiny 0.4 cm right internal mammary lymph node.  Mammoprint returned low risk with a score of +0.29.  He elected for treatment on the endocrine optimization protocol of ISPY-2 and was randomized to treatment with amcenestrant.   He was on treatment with  amcenestrant from 7/1/2021 - 10/26/2021.  At our visit in 01/2021, both right axillary node and right breast tumor palpated more firm then prior.  Breast MRI was stable, however, due to clinical concerns for progression, decision was made to proceed with surgery.   Right breast mastectomy and right axillary lymph node dissection was performed by Dr. Snider on 10/27/2021.  Pathology showed grade 2 carcinoma of the right breast measuring 3.2 cm with invasion of the dermis, nipple and the pectoralis muscle.  Ki-67 of the excised tumor was 7%.  15/44 right axillary lymph nodes were positive with 6 of the lymph nodes demonstrating extranodal extension.  The largest lymph node metastasis measured 2.1 cm.    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  C2 was given on 1/5/2021 and C3 on 1/26/2021.      Interval History:  Dr. Collins comes into clinic today for evaluation prior to cycle #3 of Taxotere and cyclophosphamide.  He had significant fever following cycle 2.  The fever onset was on January 28, approximately 4 days after receiving chemotherapy.  On that day the fever was around 101 degrees.  It peaked on January 30 at 103 degrees, and declined thereafter.  In entirety, the fever lasted approximately 9 days.  White blood cell count checked on 1/31 was within normal limits.  During that time he noted increased episodes of low back pain.  He has chronic intermittent low back pain.  He believes the increased episodes were due to spending more time on the couch in the setting of fever.  He also had increased fatigue.  Despite the fatigue he has continued to work full-time.  He takes rests during the day, which he has never previously had to do.      He has had no mouth sores.  He reports significant dry skin but no rash.  He has had no cough, shortness of breath, heart palpitations, or chest pain.  He had mild nausea throughout the last cycle.  It was not bothersome  enough for him and to take an antiemetic.  He did not have any episodes of vomiting.  He has had no symptoms of neuropathy.  The remainder of a complete 12 point review of systems was reviewed with patient was negative with exception that mentioned above.    Past Medical/Surgical History:  Past Medical History:   Diagnosis Date     Chronic sinusitis      Hypertension 2002     Past Surgical History:   Procedure Laterality Date     DISSECT LYMPH NODE AXILLA Right 10/27/2021    Procedure: RIGHT Axillary Lymph Node Dissection;  Surgeon: Nida Snider MD;  Location: [facility] OR     MASTECTOMY SIMPLE Right 10/27/2021    Procedure: RIGHT Simple Mastectomy;  Surgeon: Nida Snider MD;  Location: [facility] OR     Allergies:  Allergies as of 02/15/2022     (No Known Allergies)     Current Medications:  Current Outpatient Medications   Medication Sig Dispense Refill     dexamethasone (DECADRON) 4 MG tablet Take 1 tablet (4 mg) by mouth daily (with breakfast) Start morning AFTER Docetaxel dose and continue for 2 additional doses. 3 tablet 3     LORazepam (ATIVAN) 0.5 MG tablet Take 1 tablet (0.5 mg) by mouth every 4 hours as needed (Anxiety, Nausea/Vomiting or Sleep) (Patient not taking: Reported on 12/14/2021) 30 tablet 3     losartan-hydrochlorothiazide (HYZAAR) 100-25 MG tablet Take 1 tablet by mouth every morning 90 tablet 1     ondansetron (ZOFRAN) 8 MG tablet Take 1 tablet (8 mg) by mouth every 8 hours as needed for nausea (Patient not taking: Reported on 12/14/2021) 30 tablet 3     prochlorperazine (COMPAZINE) 10 MG tablet Take 0.5 tablets (5 mg) by mouth every 6 hours as needed (Nausea/Vomiting) (Patient not taking: Reported on 12/14/2021) 30 tablet 3      Family and Social History:  See initial consultation dated 6/8/2021 for further details.  Breast Actionable and Breast Expanded panels were both negative for pathogenic germline mutations.    Physical Exam:  /73 (BP Location: Left arm)   Pulse 104   Temp  97.9  F (36.6  C) (Oral)   Resp 16   Wt 77.5 kg (170 lb 12.8 oz)   SpO2 98%   BMI 26.34 kg/m    General:  Well appearing adult male in NAD.  HEENT:  Normocephalic.  Sclera anicteric.  MMM.  No lesions of the oropharynx. (+) alopecia  Lymph:  No palpable cervical, supraclavicular, or axillary LAD.  Chest:  CTA bilaterally.  No wheezes or crackles.  CV:  RRR.  Nl S1 and S2.    Abd:  Soft/ND.    Ext:  No pitting edema of the bilateral lower extremities.    Neuro:  Cranial nerves grossly intact.  Psych:  Mood and affect appear normal.    Laboratory/Imaging Studies  2/15/2022 Labs:  WBC is wnl.  Hemoglobin is low at 12.2 g/dL  Platelets are wnl.    ESR  67 (2/9) --- 85 (2/15)    2/9/2022 CT chest:  No acute cardiopulmonary findings.    ASSESSMENT/PLAN:  Jamie Collins is a 70 yo male with pathologic stage IIIb, [id number], grade 2, ER positive, MT positive, HER-2 negative invasive ductal carcinoma of the right breast.  He is s/p treatment with 3.5 months neoadjuvant amcenestrant, right breast mastectomy, right ALND, and 3 cycles of adjuvant Taxotere and cyclophosphamide.    1.  Right breast cancer:    He has now completed 3 cycles of Taxotere and cyclophosphamide.   This last cycle was complicated by fever in the 102-103 range without associated neutropenia.  I suspect the fever is secondary to Neulasta.  Neulasta has a warning for potential aortitis and should be considered in patients with fever, back pain, and elevated inflammatory markers without evidence of infection.  He has all of these symptoms.  ESR and CRP were checked on 2/9 and were elevated at 67 and 16.9 respectively.  CT chest was without evidence of aortitis.  Spoke to on-call Rheumatology, Dr. Cruz, who recommends PET/CT if concerned for arteritis.  Repeat ESR today is 85.  I recommend proceeding with the PET/CT.  If PET/CT demonstrates evidence on inflammation of the aorta, will need to treat with corticosteroids.    Plan to proceed with 4th cycle of TC  tomorrow without Neulasta support.  He will receive dexamethasone 12 mg IV as a premedication and will take oral dexamethasone in the 3 days after treatment.  We spent quite some time today discussing risk of neutropenic fever without Neulasta.  If he experiences fever of 100.4 or greater, must come in to have blood counts checked, and if neutropenic will need hospitalization for IV antibiotics.  He expressed his understanding.    This is the last planned cycle of chemotherapy.  Plan is now for adjuvant radiation.  He has follow up appointment already scheduled with Dr. Paredes.  We will initiate treatment with letrozole at same time as radiation and abemaciclib upon completion of radiation.  Plan to administer abemaciclib for 2 years.  Letrozole a total of 10 years.  I recommend starting letrozole in 4 weeks, around mid-March.  He was previously on this medication and tolerated without complication.  I will see him back upon completion of radiation to start abemaciclib.    2.  Fever:  Likely secondary to Neulasta as above.  Will discontinue Neulasta.  Counseled on neutropenic fever.    3.  Nausea:  Grade 1.  Continue antiemetics prn.    4.  Fatigue:  Accumulating fatigue with chemotherapy is common.  Consider cancer rehab upon completion of treatment.      5.  Follow Up:  Return to clinic in approximately 10 weeks for visit with me.    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  I spent 60 minutes on the date of the encounter doing chart review, review of test results, interpretation of tests, patient visit and documentation.            Again, thank you for allowing me to participate in the care of your patient.      Sincerely,    Mikaela Sr MD

## 2022-02-16 ENCOUNTER — INFUSION THERAPY VISIT (OUTPATIENT)
Dept: ONCOLOGY | Facility: CLINIC | Age: 72
End: 2022-02-16
Attending: INTERNAL MEDICINE
Payer: COMMERCIAL

## 2022-02-16 VITALS
TEMPERATURE: 98 F | DIASTOLIC BLOOD PRESSURE: 78 MMHG | HEART RATE: 104 BPM | RESPIRATION RATE: 16 BRPM | OXYGEN SATURATION: 99 % | SYSTOLIC BLOOD PRESSURE: 132 MMHG

## 2022-02-16 DIAGNOSIS — I77.6 AORTITIS (H): ICD-10-CM

## 2022-02-16 DIAGNOSIS — Z51.11 ENCOUNTER FOR ANTINEOPLASTIC CHEMOTHERAPY: Primary | ICD-10-CM

## 2022-02-16 DIAGNOSIS — C50.929 MALIGNANT NEOPLASM OF MALE BREAST, UNSPECIFIED ESTROGEN RECEPTOR STATUS, UNSPECIFIED LATERALITY, UNSPECIFIED SITE OF BREAST (H): ICD-10-CM

## 2022-02-16 PROCEDURE — 96417 CHEMO IV INFUS EACH ADDL SEQ: CPT

## 2022-02-16 PROCEDURE — 96413 CHEMO IV INFUSION 1 HR: CPT

## 2022-02-16 PROCEDURE — 258N000003 HC RX IP 258 OP 636: Performed by: INTERNAL MEDICINE

## 2022-02-16 PROCEDURE — 96375 TX/PRO/DX INJ NEW DRUG ADDON: CPT

## 2022-02-16 PROCEDURE — 250N000011 HC RX IP 250 OP 636: Performed by: INTERNAL MEDICINE

## 2022-02-16 PROCEDURE — 250N000009 HC RX 250: Performed by: INTERNAL MEDICINE

## 2022-02-16 RX ORDER — PALONOSETRON 0.05 MG/ML
0.25 INJECTION, SOLUTION INTRAVENOUS ONCE
Status: COMPLETED | OUTPATIENT
Start: 2022-02-16 | End: 2022-02-16

## 2022-02-16 RX ADMIN — FAMOTIDINE 20 MG: 10 INJECTION INTRAVENOUS at 07:46

## 2022-02-16 RX ADMIN — CYCLOPHOSPHAMIDE 1165 MG: 1 INJECTION, POWDER, FOR SOLUTION INTRAVENOUS; ORAL at 09:21

## 2022-02-16 RX ADMIN — DEXAMETHASONE SODIUM PHOSPHATE 12 MG: 10 INJECTION, SOLUTION INTRAMUSCULAR; INTRAVENOUS at 07:47

## 2022-02-16 RX ADMIN — SODIUM CHLORIDE 250 ML: 9 INJECTION, SOLUTION INTRAVENOUS at 07:40

## 2022-02-16 RX ADMIN — DOCETAXEL 146 MG: 20 INJECTION, SOLUTION, CONCENTRATE INTRAVENOUS at 08:18

## 2022-02-16 RX ADMIN — PALONOSETRON HYDROCHLORIDE 0.25 MG: 0.25 INJECTION INTRAVENOUS at 07:41

## 2022-02-16 ASSESSMENT — PAIN SCALES - GENERAL: PAINLEVEL: NO PAIN (0)

## 2022-02-16 NOTE — PROGRESS NOTES
Infusion Nursing Note:  Ronna Collins presents today for Cycle 4 Day 1 Taxotere and Cytoxan    Patient seen by provider today: No   present during visit today: Not Applicable.    Note:     Pt was seen by Dr. Sr yesterday; no issues or concerns overnight.   Pt will NOT receive Neulasta on-body today- see Remberto's note for details.        Intravenous Access:  Peripheral IV placed.    Treatment Conditions:  Lab Results   Component Value Date    HGB 12.2 (L) 02/15/2022    WBC 6.1 02/15/2022    ANEU 4.1 01/31/2022    ANEUTAUTO 4.1 02/15/2022     02/15/2022      Results reviewed, labs MET treatment parameters, ok to proceed with treatment.      Post Infusion Assessment:  Patient tolerated infusion without incident.  Blood return noted pre and post infusion.  No evidence of extravasations.  Access discontinued per protocol.       Discharge Plan:   Discharge instructions reviewed with: Patient.  Copy of AVS reviewed with patient and/or family.  Patient will return 2/18 for a scan and then follow up with Dr. Paredes and for his next course of treatment.   Patient discharged in stable condition accompanied by: self.  Departure Mode: Ambulatory.      Fariha Dunn RN

## 2022-02-18 ENCOUNTER — HOSPITAL ENCOUNTER (OUTPATIENT)
Dept: PET IMAGING | Facility: CLINIC | Age: 72
Discharge: HOME | End: 2022-02-18
Attending: INTERNAL MEDICINE | Admitting: INTERNAL MEDICINE
Payer: COMMERCIAL

## 2022-02-18 DIAGNOSIS — I77.6 AORTITIS (H): ICD-10-CM

## 2022-02-18 DIAGNOSIS — Z17.0 MALIGNANT NEOPLASM OF CENTRAL PORTION OF RIGHT BREAST IN MALE, ESTROGEN RECEPTOR POSITIVE (H): ICD-10-CM

## 2022-02-18 DIAGNOSIS — C50.121 MALIGNANT NEOPLASM OF CENTRAL PORTION OF RIGHT BREAST IN MALE, ESTROGEN RECEPTOR POSITIVE (H): ICD-10-CM

## 2022-02-18 PROCEDURE — A9552 F18 FDG: HCPCS | Performed by: INTERNAL MEDICINE

## 2022-02-18 PROCEDURE — 343N000001 HC RX 343: Performed by: INTERNAL MEDICINE

## 2022-02-18 PROCEDURE — 78816 PET IMAGE W/CT FULL BODY: CPT | Mod: PS

## 2022-02-18 PROCEDURE — 78816 PET IMAGE W/CT FULL BODY: CPT | Mod: 26 | Performed by: STUDENT IN AN ORGANIZED HEALTH CARE EDUCATION/TRAINING PROGRAM

## 2022-02-18 RX ADMIN — FLUDEOXYGLUCOSE F-18 10.15 MCI.: 500 INJECTION, SOLUTION INTRAVENOUS at 07:26

## 2022-02-19 ENCOUNTER — MYC MEDICAL ADVICE (OUTPATIENT)
Dept: ONCOLOGY | Facility: CLINIC | Age: 72
End: 2022-02-19

## 2022-02-19 ENCOUNTER — LAB (OUTPATIENT)
Dept: LAB | Facility: CLINIC | Age: 72
End: 2022-02-19
Attending: INTERNAL MEDICINE
Payer: COMMERCIAL

## 2022-02-19 DIAGNOSIS — Z17.0 MALIGNANT NEOPLASM OF CENTRAL PORTION OF RIGHT BREAST IN MALE, ESTROGEN RECEPTOR POSITIVE (H): Primary | ICD-10-CM

## 2022-02-19 DIAGNOSIS — C50.121 MALIGNANT NEOPLASM OF CENTRAL PORTION OF RIGHT BREAST IN MALE, ESTROGEN RECEPTOR POSITIVE (H): Primary | ICD-10-CM

## 2022-02-19 LAB
BASOPHILS # BLD AUTO: 0 10E3/UL (ref 0–0.2)
BASOPHILS NFR BLD AUTO: 1 %
EOSINOPHIL # BLD AUTO: 0 10E3/UL (ref 0–0.7)
EOSINOPHIL NFR BLD AUTO: 0 %
ERYTHROCYTE [DISTWIDTH] IN BLOOD BY AUTOMATED COUNT: 17.1 % (ref 10–15)
ERYTHROCYTE [SEDIMENTATION RATE] IN BLOOD BY WESTERGREN METHOD: 89 MM/HR (ref 0–20)
HCT VFR BLD AUTO: 36.7 % (ref 40–53)
HGB BLD-MCNC: 11.7 G/DL (ref 13.3–17.7)
IMM GRANULOCYTES # BLD: 0 10E3/UL
IMM GRANULOCYTES NFR BLD: 1 %
LYMPHOCYTES # BLD AUTO: 0.6 10E3/UL (ref 0.8–5.3)
LYMPHOCYTES NFR BLD AUTO: 19 %
MCH RBC QN AUTO: 27.6 PG (ref 26.5–33)
MCHC RBC AUTO-ENTMCNC: 31.9 G/DL (ref 31.5–36.5)
MCV RBC AUTO: 87 FL (ref 78–100)
MONOCYTES # BLD AUTO: 0.1 10E3/UL (ref 0–1.3)
MONOCYTES NFR BLD AUTO: 3 %
NEUTROPHILS # BLD AUTO: 2.4 10E3/UL (ref 1.6–8.3)
NEUTROPHILS NFR BLD AUTO: 76 %
NRBC # BLD AUTO: 0 10E3/UL
NRBC BLD AUTO-RTO: 0 /100
PLATELET # BLD AUTO: 137 10E3/UL (ref 150–450)
RBC # BLD AUTO: 4.24 10E6/UL (ref 4.4–5.9)
WBC # BLD AUTO: 3.2 10E3/UL (ref 4–11)

## 2022-02-19 PROCEDURE — 36415 COLL VENOUS BLD VENIPUNCTURE: CPT | Performed by: INTERNAL MEDICINE

## 2022-02-19 PROCEDURE — 85025 COMPLETE CBC W/AUTO DIFF WBC: CPT | Performed by: INTERNAL MEDICINE

## 2022-02-19 PROCEDURE — 85652 RBC SED RATE AUTOMATED: CPT | Performed by: INTERNAL MEDICINE

## 2022-02-19 NOTE — NURSING NOTE
Chief Complaint   Patient presents with     Blood Draw     Labs drawn via  by rn in lab.     Labs collected from venipuncture by RN.     Jt Cheng RN

## 2022-02-21 ENCOUNTER — LAB (OUTPATIENT)
Dept: LAB | Facility: CLINIC | Age: 72
End: 2022-02-21
Attending: INTERNAL MEDICINE
Payer: COMMERCIAL

## 2022-02-21 DIAGNOSIS — C50.121 MALIGNANT NEOPLASM OF CENTRAL PORTION OF RIGHT BREAST IN MALE, ESTROGEN RECEPTOR POSITIVE (H): ICD-10-CM

## 2022-02-21 DIAGNOSIS — Z17.0 MALIGNANT NEOPLASM OF CENTRAL PORTION OF RIGHT BREAST IN MALE, ESTROGEN RECEPTOR POSITIVE (H): ICD-10-CM

## 2022-02-21 LAB
ALBUMIN UR-MCNC: NEGATIVE MG/DL
APPEARANCE UR: CLEAR
BILIRUB UR QL STRIP: NEGATIVE
COLOR UR AUTO: YELLOW
GLUCOSE UR STRIP-MCNC: NEGATIVE MG/DL
HGB UR QL STRIP: NEGATIVE
KETONES UR STRIP-MCNC: NEGATIVE MG/DL
LEUKOCYTE ESTERASE UR QL STRIP: NEGATIVE
NITRATE UR QL: NEGATIVE
PH UR STRIP: 8 [PH] (ref 5–7)
RBC URINE: <1 /HPF
SP GR UR STRIP: 1.01 (ref 1–1.03)
UROBILINOGEN UR STRIP-MCNC: NORMAL MG/DL
WBC URINE: 1 /HPF

## 2022-02-21 PROCEDURE — 81001 URINALYSIS AUTO W/SCOPE: CPT | Performed by: PATHOLOGY

## 2022-02-21 PROCEDURE — 87086 URINE CULTURE/COLONY COUNT: CPT | Mod: 90 | Performed by: PATHOLOGY

## 2022-02-21 PROCEDURE — 99000 SPECIMEN HANDLING OFFICE-LAB: CPT | Performed by: PATHOLOGY

## 2022-02-21 NOTE — TELEPHONE ENCOUNTER
[facility] Triage    MyChart Message: Ongoing discussion with Dr. Sr    Fever after 3rd infusion and now after 4th starting on Friday.  Has not had a fever above 100.3.  The highest it has gotten was 100.1 at 4 pm yesterday.  Took Ibuprofen at 8 am 400 mg yesterday.    No blood in urine  No back pain  No pain upon urination or burning  No urgency  Frequency - but he has been hydrating very well, due to his increase in hydration.  No dribbling  Feels like he empties his bladder    Patient is requesting a UA/UC even though no symptoms indicating this.  The Neulasta was suspended and did not have it after this infusion.    2/19/21 WBC 3.2 and ANC 2.4 - PET scan showed thickening in bladder wall per patient.  Patient's family friend oncologist at home suggested potential residual urine left and to check a UA/UC.    Placed order for UA/UC and sent patient to scheduling to set up appt.    Routing to Dr. Sr and April Farris

## 2022-02-22 LAB — BACTERIA UR CULT: NO GROWTH

## 2022-02-23 ENCOUNTER — PATIENT OUTREACH (OUTPATIENT)
Dept: ONCOLOGY | Facility: CLINIC | Age: 72
End: 2022-02-23
Payer: COMMERCIAL

## 2022-02-23 DIAGNOSIS — R50.9 FEVER, UNSPECIFIED FEVER CAUSE: ICD-10-CM

## 2022-02-23 DIAGNOSIS — Z17.0 MALIGNANT NEOPLASM OF CENTRAL PORTION OF RIGHT BREAST IN MALE, ESTROGEN RECEPTOR POSITIVE (H): Primary | ICD-10-CM

## 2022-02-23 DIAGNOSIS — C50.121 MALIGNANT NEOPLASM OF CENTRAL PORTION OF RIGHT BREAST IN MALE, ESTROGEN RECEPTOR POSITIVE (H): Primary | ICD-10-CM

## 2022-02-23 NOTE — PROGRESS NOTES
Called patient to check on his fevers.   He reports having low grade fevers since Friday, Tmax last 24 hrs 99.1. He is managing the temps with 500 mg Tylenol 3x day.   Denies any additional symptoms.   Discussed if his temp is >100.4  wants him to have a CBC drawn.   Verbalized understanding and agreement with plan.

## 2022-02-25 ENCOUNTER — PATIENT OUTREACH (OUTPATIENT)
Dept: ONCOLOGY | Facility: CLINIC | Age: 72
End: 2022-02-25
Payer: COMMERCIAL

## 2022-02-25 NOTE — PROGRESS NOTES
Department of Radiation Oncology  [address] [address]  [phone]       Consultation Note    Name: Ronna Collins MRN: [id number]   : 1950   Date of Service: Mar 2, 2022 Referring: Dr. Snider/Dr. Sr      Reason for consultation: Discussion of postmastectomy radiation treatment for management of cT4b N3b M0 -> yp T4b N3b M0 infiltrating ductal carcinoma of the right breast, ER positive, OK positive, HER-2 negative.     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      His oncologic history started back in spring 2021 when noted right nipple discomfort and hardening for 6 weeks followed by development of a progressing underlying mass. He went to urgent care and had a CT chest on 2021 which demonstrated a right subareolar irregular mass measuring 3 x 2.9 cm adherent to the pectoralis major muscle as well as two enlarged right axillary lymph nodes measuring 1.8 and 1.6 cm, and another node below the pec minor. A skin punch biopsy on 2021 by Dermatologist Dr. El demonstrated ([id number]) invasive ductal adenocarcinoma, [place] grade 2, with evident dermal invasion, and associated ductal carcinoma in situ with calcification, ER positive (100%), OK positive (70%), HER-2 negative (IHC 1+).    He then had a bilateral mammogram with tomosynthesis on 6/3/2021 which showed a spiculated right hyperdense retroareolar mass with a few calcification and retraction of the right nipple.  Targeted right breast ultrasound demonstrated a spiculated hypoechoic mass measuring 2.9 x 2.5 x 2.6 cm, invading the overlying skin and abutting the pectoralis muscle with no definite invasion.  There were at least 2 enlarged, irregular abnormal  appearing right axillary lymph nodes measuring 1.6 x 1.5 x 1.4 cm and 2.1 x 1.1 x 1.3 cm, respectively. US guided biopsy was obtained and again revealed ([id number]) invasive ductal carcinoma, [place] grade 2, with dermal invasion and associated ductal carcinoma in situ.  ER positive (98%), KS positive (95%), HER-2 none-amplified (FISH), Ki-67 proliferation index: 15%.     Staging PET/CT on 6/4/21 showed a hypermetabolic mass in the right nipple-areolar complex measuring 3.3 x 2.4 x 3.6 cm (SUV max 9.2), and multiple hypermetabolic right axillary lymphadenopathy (SUV max 7.4). There was no evidence of contralateral disease or distant metastases.     He was seen by Dr. Snider on 6/4/2021. Exam was notable for a 2.5 cm firm mass fixed to the overlying right nipple-areolar complex but mobile to the underlying chest wall as well as a nipple mass without ulceration. Palpation of the right axilla revealed 2 firm mobile lymph nodes measuring 2 cm and 1 cm respectively and pectoral adenopathy.      Mr. Collins then met with Dr. Sr at medical oncology on 6/8/2021 at which time different management options were discussed with the patient. The patient was interested in I-SPY-2 clinical trial.     He went to [facility] for a second opinion. MRI of the breast showed the mass to measure 3.7 x 3.5 x 3.7 cm with involvement of the overlying skin/nipple areolar complex and pec major muscles but not beyond. Besides the known right axillary adenopathy, there was a level I node in the left axilla with mild cortical thickening. FNA of the right axillary lymph node on 6/11/2021 (NR-) was consistent with metastatic adenocarcinoma.  FNA of the left axillary lymph node was negative for malignancy.     On 6/18/2021, he underwent bilateral breast MRI as part of the I-SPY protocol. The biopsy-proven right breast mass measured 3.9 x 3.4 cm with invasion of the nipple and underlying pectoralis musculature. Multiple right axillary  lymphadenopathy with the largest measuring 2.2 x 1.3 cm, one noted to be bordering level II/III. A tiny 0.4 x 0.2 cm right internal mammary lymph node, likely benign.     NGS on 6/28/2021 showed no evidence of genetic abnormalities. MammaPrint came back as low risk (score +0.29).  As such, he was eligible for Endocrine Optimization Protocol (EOP). He ultimately was randomized to treatment with Amcenestrant and started this on 7/1/2021.    Interval MRI and US showed gradual small decrease in the size of the mass and no significant change in adenopathy. MRI also commented on 2 stable internal mammary nodes below the 2nd and 3rd ribs  Exam by Dr. Sr in September and October showed enlargement of the retroareolar mass. His case was discussed at multidisciplinary tumor board recommendation for proceeding with surgery.     He underwent right simple mastectomy and axillary lymph node dissection on 10/27/2021 by Dr. Snider. Intraoperatively, a disc of pectoralis major muscle was removed en bloc with the specimen; this area was marked with large hemo clips. An additional inferior margin was removed due to presence of tumor in the initial resected inferior margin. Numerous palpable nodes were present in level I/II, all of which were removed. There was no palpable node in level III, so dissection medial to the pec minor was not performed.    Surgical pathology ([id number]) revealed invasive ductal carcinoma, [place] grade 2, measuring 3.2 cm in greatest dimension, with tumor infiltrating lymphocytes comprise 20% of the mass volume. There was no apparent response to neoadjuvant endocrine therapy. Tumor invaded the dermis and skeletal muscles. There was angiolymphatic invasion dermal lymphatic invasion. The initially removed inferior margin had a small focus of carcinoma present in the dermis, measuring 1 mm in linear extent. The final closest margin to invasive carcinoma was deep/posterior (muscle) at 4 mm. Ki67 was 7%. A  total of 15/44 lymph nodes were involved by carcinoma, with the size of largest metastatic deposit being 21 mm with evident extranodal extension present in 6 lymph nodes.  Final stage was [id number].    Given significant residual disease, he was restaged with PET CT on 11/22/2021 which showed post-op changes with no evidence of distant metastases. His case was re-discussed at tumor board and he was ultimately recommended was to proceed with adjuvant chemotherapy despite low Mammaprint and Ki-67.     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 He then consulted with Dr. Mckay at the [facility], Dr. Farrell at [facility], and Dr. Cordero in [facility], who all thought given the unknown benefit of chemotherapy in this case, avoiding the risks of treatment with an anthracycline is preferable. Thus he proceeded with treatment with 4 cycles of Taxotere and Cyclophosphamide (12/14/2021- 2/16/2022). His 3rd cycle was complicated by fever without neutropenia. He had elevated inflammatory markers and therefore was evaluated with PET/CT to rule out arteritis per Rheumatology recommendation. Imaging was unremarkable.     He is now referred to us for discussion of postmastectomy radiation therapy.     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 He states that he is doing well but does have some concerns about the cause of the low-grade fever he was experiencing after his fourth chemo infusion. Otherwise he is doing well and has recovered well after his surgery. He has full range of motion in his shoulder and is not experiencing any lymphedema. He has met with physical therapy/lyphedema therapy and does have a compression sleeve prescribed.     Past Medical History:  Past Medical History:   Diagnosis Date     Chronic sinusitis      Hypertension 2002   bladder wall thickening and prostate enlargement noted on CT scan    Past  Surgical History:  Past Surgical History:   Procedure Laterality Date     DISSECT LYMPH NODE AXILLA Right 10/27/2021    Procedure: RIGHT Axillary Lymph Node Dissection;  Surgeon: Nida Snider MD;  Location: [facility] OR     MASTECTOMY SIMPLE Right 10/27/2021    Procedure: RIGHT Simple Mastectomy;  Surgeon: Nida Snider MD;  Location: [facility] OR       Chemotherapy History:  Per HPI    Radiation History:  None     Implanted Cardiac Devices: No    Medications:  Current Outpatient Medications   Medication     dexamethasone (DECADRON) 4 MG tablet     LORazepam (ATIVAN) 0.5 MG tablet     losartan-hydrochlorothiazide (HYZAAR) 100-25 MG tablet     ondansetron (ZOFRAN) 8 MG tablet     prochlorperazine (COMPAZINE) 10 MG tablet     No current facility-administered medications for this visit.       Allergies:   No Known Allergies    Social History:  Tobacco: Former smoker, quit since 2000 (25 PYH), quit cigar about a year ago  Alcohol: Occasionally  Employment: He is a  at [employer]  Has 2 daughters, one is an otolaryngologist practicing in [place], the other is a .  .    Family History:  Maternal uncle: Colorectal cancer    Review of Systems   A 10-point review of systems was performed. Pertinent findings are noted in the HPI.    Physical Exam   ECOG Status: 0  /70   Pulse 114   Wt 76.7 kg (169 lb 3.2 oz)   SpO2 98%   BMI 26.09 kg/m      PHYSICAL EXAMINATION:    Gen: Alert, in NAD  Eyes: EOMI, sclera anicteric, PERRL  HENT      Head: NC/AT, alopecia, consistent with recent chemotherapy     Ears: No external auricular lesions     Nose/sinus: No rhinorrhea or epistaxis     Oral Cavity/Oropharynx: MMM, no thrush noted  Pulm: Breathing comfortably on room air, no audible wheezes or ronchi  CV: Well-perfused, no cyanosis  Abdominal: Soft, nondistended  Skin: Normal color and turgor  Chest wall: right nipple areolar complex absent. Surgical scar across the right chest  wall. No suspicious skin nodules or discoloration.   Lymph nodes: no palpable axillary or SCV adenopathy  Neurologic/MSK: grossly intact.   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 He was enrolled into I-SPY 2 trial and randomized to Amcenestrant arm.  He then proceeded with right simple mastectomy and axillary lymph node dissection, with finding of minimal treatment response and thus received 4 cycles of adjuvant chemotherapy.    Plan   We reviewed the imaging and pathologic findings in detail. Given the initial advanced stage (tumor involving skin and muscle, multiple axillary nodes extending into level II/III, 2 abnormal internal mammary nodes), and lack of response to neoadjuvant therapy, Dr. Parada understands that he is at a high risk of developing locoregional recurrence. He is in agreement with a course of adjuvant radiation therapy to improve local control.    We discussed that his treatment field will include the right chest wall, axilla level I/II/III, right SCV fossa and internal mammary chain. We recommend a hypofractionated course of approximately 42.5 Gy in 16 fractions to the above areas. Given the significant disease in the breast with skin/muscle involvement and presence of dermal lymphatic and angiolymphatic invasion, we plan to boost the chest wall with an additional 1000 cGy in 4 fractions. The undissected IM nodes will also be boosted to a higher dose of approximately 1500 cGy in 6 fractions. At least one of the nodes is still visible on recent CT scan.     We discussed the rationale, logistics, alternatives and potential acute and chronic related side  "effects.     In particular, we discussed the risk of lymphedema given the large number of nodes taking during the dissection and the plan for comprehensive christiano irradiation. We encouraged him to follow sleeve/exercise recommendations by the lymphedema clinic.     We had an extensive conversation about potential long term side effects of the critical organs. Cardiac toxicity is less of a concern given that he has right-sided disease. However, the need to include internal mammary chain and treat the gross disease to a higher dose does increase radiation exposure of his lung. For that reason, we plan to use deep inspiration breath hold technique to maximally spare his lung.     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 We discussed the difference in physical properties of the photon and proton. Proton therapy is expected to reduce the dose to the lung compared to the photon plan. What's unclear is whether it will translate into clinically apparent benefit.  Professor Collins expressed interest in completing his radiation treatment here at the [facility] as it is understandably more convenient from a logistic standpoint. We did encourage him to explore the possibility of Proton at [facility]. He would like to pursue his radiation therapy options in a \"parallel fashion\" before making a final decision.     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 In the meantime, I did reach out to [facility]. 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 Gualberto Brody last summer. A follow up and simulation will be set up at [facility] in the near future.     The patient and his wife/family had many questions during our conversation that were answered to their satisfaction and verbalized understanding.       I reviewed patient's chart, internal/external medical records, imaging studies (including actual images), labs " and pathology reports.  I interviewed and counseled the patient face to face.  I additionally discussed the case with patient's referring physicians and care team (Dr. Frank and Dr. Brody at [facility]).      120 minutes were spent on the date of the encounter doing chart review, history and exam, documentation and further activities as noted above.       Polina Paredes MD      Patient Care Team:  Stewart Henry MD as PCP - General (Family Practice)  Jeremy Varela MD as MD (Dermatology)  Kayla Buitrago MD as MD (Internal Medicine)  Stewart Henry MD as Assigned PCP  Nida Snider MD as MD (Surgery)  Delvis Brown, RN as Research Personnel  Nida Snider MD as Assigned Surgical Provider  Mikaela Sr MD as Assigned Cancer Care Provider  Mikaela Sr MD as MD (Breast Oncology)  Carmina Farris, RN as Specialty Care Coordinator (Breast Oncology)  Ana Mak Laura A REINHARDT, LUCIA G

## 2022-02-25 NOTE — PROGRESS NOTES
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   His last dose of tylenol was Wed at 4pm. Temps have ranged from 98-8-99.2 since Wed.   He will continue to monitor temps over the weekend, if the temp is >100.4 he will call triage and ask to have the on call provider paged. He has lab orders for CBC in chart however, over the weekend the results would not be monitored.   He is scheduled with Dr Paredes on 3/2, he has question on the plan if she continues to have low grade fevers. Advised that we will address this issue next week if he does have fevers, the temp range over the last 2 days will likely not change any plans.   He verbalized understanding and agreement with the plan.      April Farris MSN, RN, OCN  RN Care Coordinator  [facility]  [phone]

## 2022-03-01 ENCOUNTER — PATIENT OUTREACH (OUTPATIENT)
Dept: ONCOLOGY | Facility: CLINIC | Age: 72
End: 2022-03-01
Payer: COMMERCIAL

## 2022-03-01 NOTE — PROGRESS NOTES
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 He wants to have a CBC drawn tomorrow to make sure his labs are coming up.     Message sent to scheduling.   Message sent to patient confirming he can have a CBC drawn tomorrow when he sees Dr. Paredes.         April Farris MSN, RN, OCN  RN Care Coordinator  [facility]  [phone]

## 2022-03-02 ENCOUNTER — LAB (OUTPATIENT)
Dept: LAB | Facility: CLINIC | Age: 72
End: 2022-03-02
Attending: INTERNAL MEDICINE
Payer: COMMERCIAL

## 2022-03-02 ENCOUNTER — MYC MEDICAL ADVICE (OUTPATIENT)
Dept: ONCOLOGY | Facility: CLINIC | Age: 72
End: 2022-03-02

## 2022-03-02 ENCOUNTER — OFFICE VISIT (OUTPATIENT)
Dept: RADIATION ONCOLOGY | Facility: CLINIC | Age: 72
End: 2022-03-02
Attending: PHYSICIAN ASSISTANT
Payer: COMMERCIAL

## 2022-03-02 VITALS
HEART RATE: 114 BPM | OXYGEN SATURATION: 98 % | BODY MASS INDEX: 26.09 KG/M2 | DIASTOLIC BLOOD PRESSURE: 70 MMHG | WEIGHT: 169.2 LBS | SYSTOLIC BLOOD PRESSURE: 108 MMHG

## 2022-03-02 DIAGNOSIS — C50.121 MALIGNANT NEOPLASM OF CENTRAL PORTION OF RIGHT BREAST IN MALE, ESTROGEN RECEPTOR POSITIVE (H): Primary | ICD-10-CM

## 2022-03-02 DIAGNOSIS — R50.9 FEVER, UNSPECIFIED FEVER CAUSE: ICD-10-CM

## 2022-03-02 DIAGNOSIS — Z17.0 MALIGNANT NEOPLASM OF CENTRAL PORTION OF RIGHT BREAST IN MALE, ESTROGEN RECEPTOR POSITIVE (H): Primary | ICD-10-CM

## 2022-03-02 DIAGNOSIS — Z17.0 MALIGNANT NEOPLASM OF CENTRAL PORTION OF RIGHT BREAST IN MALE, ESTROGEN RECEPTOR POSITIVE (H): ICD-10-CM

## 2022-03-02 DIAGNOSIS — C50.121 MALIGNANT NEOPLASM OF CENTRAL PORTION OF RIGHT BREAST IN MALE, ESTROGEN RECEPTOR POSITIVE (H): ICD-10-CM

## 2022-03-02 DIAGNOSIS — C50.929 MALIGNANT NEOPLASM OF MALE BREAST, UNSPECIFIED ESTROGEN RECEPTOR STATUS, UNSPECIFIED LATERALITY, UNSPECIFIED SITE OF BREAST (H): ICD-10-CM

## 2022-03-02 LAB
ALBUMIN UR-MCNC: NEGATIVE MG/DL
APPEARANCE UR: CLEAR
BASOPHILS # BLD AUTO: 0 10E3/UL (ref 0–0.2)
BASOPHILS NFR BLD AUTO: 1 %
BILIRUB UR QL STRIP: NEGATIVE
COLOR UR AUTO: YELLOW
EOSINOPHIL # BLD AUTO: 0 10E3/UL (ref 0–0.7)
EOSINOPHIL NFR BLD AUTO: 0 %
ERYTHROCYTE [DISTWIDTH] IN BLOOD BY AUTOMATED COUNT: 15.8 % (ref 10–15)
GLUCOSE UR STRIP-MCNC: NEGATIVE MG/DL
HCT VFR BLD AUTO: 36.5 % (ref 40–53)
HGB BLD-MCNC: 11.8 G/DL (ref 13.3–17.7)
HGB UR QL STRIP: NEGATIVE
IMM GRANULOCYTES # BLD: 0 10E3/UL
IMM GRANULOCYTES NFR BLD: 1 %
KETONES UR STRIP-MCNC: NEGATIVE MG/DL
LEUKOCYTE ESTERASE UR QL STRIP: NEGATIVE
LYMPHOCYTES # BLD AUTO: 1 10E3/UL (ref 0.8–5.3)
LYMPHOCYTES NFR BLD AUTO: 23 %
MCH RBC QN AUTO: 27.1 PG (ref 26.5–33)
MCHC RBC AUTO-ENTMCNC: 32.3 G/DL (ref 31.5–36.5)
MCV RBC AUTO: 84 FL (ref 78–100)
MONOCYTES # BLD AUTO: 0.8 10E3/UL (ref 0–1.3)
MONOCYTES NFR BLD AUTO: 19 %
MUCOUS THREADS #/AREA URNS LPF: PRESENT /LPF
NEUTROPHILS # BLD AUTO: 2.4 10E3/UL (ref 1.6–8.3)
NEUTROPHILS NFR BLD AUTO: 56 %
NITRATE UR QL: NEGATIVE
NRBC # BLD AUTO: 0 10E3/UL
NRBC BLD AUTO-RTO: 0 /100
PH UR STRIP: 7 [PH] (ref 5–7)
PLATELET # BLD AUTO: 213 10E3/UL (ref 150–450)
RBC # BLD AUTO: 4.35 10E6/UL (ref 4.4–5.9)
RBC URINE: 1 /HPF
SP GR UR STRIP: 1 (ref 1–1.03)
SPERM #/AREA URNS HPF: PRESENT /HPF
UROBILINOGEN UR STRIP-MCNC: NORMAL MG/DL
WBC # BLD AUTO: 4.2 10E3/UL (ref 4–11)
WBC URINE: 2 /HPF

## 2022-03-02 PROCEDURE — 81001 URINALYSIS AUTO W/SCOPE: CPT | Performed by: PATHOLOGY

## 2022-03-02 PROCEDURE — 87040 BLOOD CULTURE FOR BACTERIA: CPT | Mod: 90 | Performed by: PATHOLOGY

## 2022-03-02 PROCEDURE — 85025 COMPLETE CBC W/AUTO DIFF WBC: CPT | Performed by: PATHOLOGY

## 2022-03-02 PROCEDURE — 36415 COLL VENOUS BLD VENIPUNCTURE: CPT | Performed by: PATHOLOGY

## 2022-03-02 PROCEDURE — 99000 SPECIMEN HANDLING OFFICE-LAB: CPT | Performed by: PATHOLOGY

## 2022-03-02 PROCEDURE — G0463 HOSPITAL OUTPT CLINIC VISIT: HCPCS | Performed by: RADIOLOGY

## 2022-03-02 ASSESSMENT — ENCOUNTER SYMPTOMS
WEIGHT LOSS: 0
SEIZURES: 0
DIARRHEA: 0
HEMATURIA: 0
BRUISES/BLEEDS EASILY: 0
FREQUENCY: 1
VOMITING: 0
NERVOUS/ANXIOUS: 0
DEPRESSION: 0
DIZZINESS: 0
TINGLING: 1
FALLS: 0
FEVER: 1
DOUBLE VISION: 0
BACK PAIN: 0
SORE THROAT: 0
BLOOD IN STOOL: 0
CONSTIPATION: 0
DIAPHORESIS: 0
COUGH: 0
BLURRED VISION: 0
SHORTNESS OF BREATH: 0
NECK PAIN: 0
HEADACHES: 0
EYE PAIN: 0
CHILLS: 0
NAUSEA: 0
INSOMNIA: 0

## 2022-03-02 NOTE — PROGRESS NOTES
HPI    INITIAL PATIENT ASSESSMENT    Diagnosis: Breast cancer, Rt mastectomy 10/27/21    Prior radiation therapy: None    Prior chemotherapy: Neoadjuvant amcenestrant started 7/1/2021- Oct. 2021, Chemo Cytoxan/Taxotere 12/14/21-2/16/22    Prior hormonal therapy:No    Pain Eval:  Denies    Psychosocial  Living arrangements: wife  Fall Risk: independent   referral needs: Not needed    Advanced Directive: Yes - Location: At home  Implantable Cardiac Device? No    Nurse face-to-face time: Level 4:  15 min face to face time    Review of Systems   Constitutional: Positive for fever (Chemo r/t low grade fever x 2 weeks, Dr. Sr is aware.) and malaise/fatigue (Increased fatigue with last round of chemo). Negative for chills, diaphoresis (Couple night sweats r/t fever) and weight loss.   HENT: Negative for ear pain, nosebleeds and sore throat.    Eyes: Negative for blurred vision, double vision and pain.   Respiratory: Negative for cough and shortness of breath.    Cardiovascular: Negative for chest pain and leg swelling.   Gastrointestinal: Negative for blood in stool, constipation, diarrhea, nausea and vomiting.   Genitourinary: Positive for frequency (R/T hydrating well). Negative for hematuria and urgency.   Musculoskeletal: Negative for back pain, falls, joint pain and neck pain.   Skin: Negative for rash.   Neurological: Positive for tingling (Occ. neuropathy in bilateral hands). Negative for dizziness, seizures and headaches.   Endo/Heme/Allergies: Does not bruise/bleed easily.   Psychiatric/Behavioral: Negative for depression. The patient is not nervous/anxious and does not have insomnia.

## 2022-03-02 NOTE — PROGRESS NOTES
Oncology Visit:  Date on this visit: Mar 3, 2022    Diagnosis: Clinical prognostic stage IIIb, [id number], grade 2, ER positive, MI positive, HER-2 negative right breast cancer.    Primary Physician: Stewart Henry     History Of Present Illness:   Karina is a 71 year old male with right breast cancer.  He noted discomfort and hardening of the skin of the right nipple in late April/early May, 2021.  He noted a seborrheic keratosis of the right nipple and remembered he had a similar skin lesion of the arm 3 months earlier.  However, he subsequently noted a mass in the same area.  Right breast skin punch biopsy performed by dermatology was c/w grade 2 invasive ductal carcinoma with associated DCIS.  Invasive carcinoma was ER positive 100% and MI positive 70%, with tumor invading the dermis.  HER-2 was negative by IHC (score 1+).  Diagnostic mammogram and ultrasound showed a retroareolar right breast mass measuring 2.9 cm with associated nipple retraction and enlarged, abnormal right axillary lymph nodes.  Right breast biopsy was again c/w grade 2 invasive ductal carcinoma, ER strong in 98% of tumor cell nuclei, HER-2 negative.  Ki-67 was 15%.    He elected to screen for the ISPY-2 clinical trial.  MRI breast on 6/18/2021 showed the biopsy proven right breast cancer to measure 3.9 cm in maximum dimension with invasion of the nipple and the pectoralis muscle as well as at least 4 abnormal level 1 and 2 right axillary lymph nodes and a tiny 0.4 cm right internal mammary lymph node.  Mammoprint returned low risk with a score of +0.29.  He elected for treatment on the endocrine optimization protocol of ISPY-2 and was randomized to treatment with amcenestrant.  He was on treatment with  amcenestrant from 7/1/2021 - 10/26/2021.  At our visit in 01/2021, both right axillary node and right breast tumor palpated more firm then prior.  Breast MRI was stable, however, due to clinical concerns for progression, decision was  made to proceed with surgery.   Right breast mastectomy and right axillary lymph node dissection was performed by Dr. Snider on 10/27/2021.  Pathology showed grade 2 carcinoma of the right breast measuring 3.2 cm with invasion of the dermis, nipple and the pectoralis muscle.  Ki-67 of the excised tumor was 7%.  15/44 right axillary lymph nodes were positive with 6 of the lymph nodes demonstrating extranodal extension.  The largest lymph node metastasis measured 2.1 cm.    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  C2 was given on 1/5/2022 and C3 on 1/26/2022.  Cycle 4 given on 2/16/22.     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 He notes starting on 2/18 he started to have low grade fevers develop over the course of the day. Temperature will start at 98.7 and increase to T max of 100.3 over the course of the day. He has had no symptoms of infection. He was taking tylenol for the first 5 days; now none for last 8 days. He notes in the past 3 days or so the T max is trending down.     He was more tired post cycle 4 otherwise no other changes from cycle 3.     He has no other concerns besides this today.     Past Medical/Surgical History:  Past Medical History:   Diagnosis Date     Breast cancer (H) 05/2021     Chronic sinusitis      Hypertension 2002     Past Surgical History:   Procedure Laterality Date     DISSECT LYMPH NODE AXILLA Right 10/27/2021    Procedure: RIGHT Axillary Lymph Node Dissection;  Surgeon: Nida Snider MD;  Location: [facility] OR     MASTECTOMY SIMPLE Right 10/27/2021    Procedure: RIGHT Simple Mastectomy;  Surgeon: Nida Snider MD;  Location: [facility] OR     Allergies:  Allergies as of 03/03/2022     (No Known Allergies)     Current Medications:  Current Outpatient Medications   Medication Sig Dispense Refill     dexamethasone (DECADRON) 4 MG tablet Take 1 tablet (4 mg) by mouth daily (with breakfast) Start morning AFTER Docetaxel dose and  "continue for 2 additional doses. 3 tablet 3     losartan-hydrochlorothiazide (HYZAAR) 100-25 MG tablet Take 1 tablet by mouth every morning 90 tablet 1     LORazepam (ATIVAN) 0.5 MG tablet Take 1 tablet (0.5 mg) by mouth every 4 hours as needed (Anxiety, Nausea/Vomiting or Sleep) (Patient not taking: Reported on 12/14/2021) 30 tablet 3     ondansetron (ZOFRAN) 8 MG tablet Take 1 tablet (8 mg) by mouth every 8 hours as needed for nausea (Patient not taking: Reported on 12/14/2021) 30 tablet 3     prochlorperazine (COMPAZINE) 10 MG tablet Take 0.5 tablets (5 mg) by mouth every 6 hours as needed (Nausea/Vomiting) (Patient not taking: Reported on 12/14/2021) 30 tablet 3      Genetics Breast Actionable and Breast Expanded panels were both negative for pathogenic germline mutations.    Physical Exam:  /79 (BP Location: Right arm, Patient Position: Sitting, Cuff Size: Adult Regular)   Pulse 113   Temp 97.4  F (36.3  C) (Tympanic)   Resp 14   Ht 1.715 m (5' 7.52\")   Wt 76.5 kg (168 lb 11.2 oz)   SpO2 98%   BMI 26.02 kg/m     Wt Readings from Last 4 Encounters:   03/03/22 76.5 kg (168 lb 11.2 oz)   03/02/22 76.7 kg (169 lb 3.2 oz)   02/15/22 77.5 kg (170 lb 12.8 oz)   02/11/22 77.7 kg (171 lb 4.8 oz)   General:  Well appearing adult male in NAD.  HEENT:  Normocephalic.  Sclera anicteric. (+) alopecia  Lymph:  No palpable cervical, supraclavicular, or axillary LAD.  Chest:  CTA bilaterally.  No wheezes or crackles. R CW incision is well healed without any erythema or induration   CV:  RRR.  Nl S1 and S2.    Abd:  Soft/ND. + BS   Ext:  No pitting edema of the bilateral lower extremities.    Neuro:  Cranial nerves grossly intact.  Psych:  Mood and affect appear normal.    Laboratory/Imaging Studies   3/2/2022 12:17   WBC 4.2   Hemoglobin 11.8 (L)   Hematocrit 36.5 (L)   Platelet Count 213   RBC Count 4.35 (L)   MCV 84   MCH 27.1   MCHC 32.3   RDW 15.8 (H)   % Neutrophils 56   % Lymphocytes 23   % Monocytes 19   % " Eosinophils 0   % Basophils 1   Absolute Basophils 0.0   Absolute Eosinophils 0.0   Absolute Immature Granulocytes 0.0   Absolute Lymphocytes 1.0   Absolute Monocytes 0.8   % Immature Granulocytes 1   Absolute Neutrophils 2.4   Absolute NRBCs 0.0   NRBCs per 100 WBC 0      2/21/2022 15:14 3/2/2022 12:18   Color Urine Yellow Yellow   Appearance Urine Clear Clear   Glucose Urine Negative Negative   Bilirubin Urine Negative Negative   Ketones Urine Negative Negative   Specific Gravity Urine 1.015 1.005   pH Urine 8.0 (H) 7.0   Protein Albumin Urine Negative Negative   Urobilinogen mg/dL Normal Normal   Nitrite Urine Negative Negative   Blood Urine Negative Negative   Leukocyte Esterase Urine Negative Negative   WBC Urine 1 2   RBC Urine <1 1   sperm  Present (A)   Mucus Urine  Present (A)     UC 2/21/22 NGTD    Blood culture 3/2/22 NGTD     PET 2/18  IMPRESSION: In this patient with history of right breast invasive  ductal carcinoma, status post mastectomy, axillary node dissection,  and adjuvant chemotherapy, findings are compatible with complete  response.     1. No suspicious focal FDG uptake identified.  2. Decreased FDG activity about the surgical sites, now less than  liver background, compatible with resolving postsurgical inflammatory  uptake.  3. Unchanged prostatomegaly. Bladder wall thickening again  demonstrated, suggesting chronic bladder outlet obstruction.      ASSESSMENT/PLAN:  Jamie Collins is a 72 yo male with pathologic stage IIIb, [id number], grade 2, ER positive, NM positive, HER-2 negative invasive ductal carcinoma of the right breast.  He is s/p treatment with 3.5 months neoadjuvant amcenestrant, right breast mastectomy, right ALND, and 4 cycles of adjuvant Taxotere and cyclophosphamide.    1.  Right breast cancer:    He has now completed 4 cycles of Taxotere and cyclophosphamide. Plan to start adjuvant radiation in about 10 days. We will initiate treatment with letrozole at same time as radiation and  abemaciclib upon completion of radiation.  Plan to administer abemaciclib for 2 years.  Letrozole for a total of 10 years.     2.  Fever: Reviewed negative PET scan, UA x 2, UC, BC, and now recovered CBC. He has been pan-scanned and has no focal infectious symptoms so I would not recommend any further imaging or testing unless he develops fever >100.4 or new symptoms. Reviewed plan to recheck CRP and ESR next week. If these remain elevated will send to Rheumatology to evaluate for inflammatory condition like PMR or vasculitis.       Nay Blandon PA-C

## 2022-03-02 NOTE — PROGRESS NOTES
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   Discussed with Dr. Sr who recommends CBC, Blood culture, UA/UC and SANDRA visit.   Dr. Collins agrees with plan, message sent to scheduling.

## 2022-03-02 NOTE — LETTER
3/2/2022         RE: Ronna Collins  [address]  [facility] [address]        Dear Colleague,    Thank you for referring your patient, Ronna Collins, to the [facility] RADIATION ONCOLOGY. Please see a copy of my visit note below.       Department of Radiation Oncology  [facility]  [address] [place] [address]  [phone]       Consultation Note    Name: Ronna Collins MRN: [id number]   : 1950   Date of Service: Mar 2, 2022 Referring: Dr. Snider/Dr. 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      His oncologic history started back in spring 2021 when noted right nipple discomfort and hardening for 6 weeks followed by development of a progressing underlying mass. He went to urgent care and had a CT chest on 2021 which demonstrated a right subareolar irregular mass measuring 3 x 2.9 cm adherent to the pectoralis major muscle as well as two enlarged right axillary lymph nodes measuring 1.8 and 1.6 cm, and another node below the pec minor. A skin punch biopsy on 2021 by Dermatologist Dr. El demonstrated ([id number]) invasive ductal adenocarcinoma, Angeles grade 2, with evident dermal invasion, and associated ductal carcinoma in situ with calcification, ER positive (100%), MS positive (70%), HER-2 negative (IHC 1+).    He then had a bilateral mammogram with tomosynthesis on 6/3/2021 which showed a spiculated right hyperdense retroareolar mass with a few calcification and retraction of  the right nipple.  Targeted right breast ultrasound demonstrated a spiculated hypoechoic mass measuring 2.9 x 2.5 x 2.6 cm, invading the overlying skin and abutting the pectoralis muscle with no definite invasion.  There were at least 2 enlarged, irregular abnormal appearing right axillary lymph nodes measuring 1.6 x 1.5 x 1.4 cm and 2.1 x 1.1 x 1.3 cm, respectively. US guided biopsy was obtained and again revealed ([id number]) invasive ductal carcinoma, Angeles grade 2, with dermal invasion and associated ductal carcinoma in situ.  ER positive (98%), NH positive (95%), HER-2 none-amplified (FISH), Ki-67 proliferation index: 15%.     Staging PET/CT on 6/4/21 showed a hypermetabolic mass in the right nipple-areolar complex measuring 3.3 x 2.4 x 3.6 cm (SUV max 9.2), and multiple hypermetabolic right axillary lymphadenopathy (SUV max 7.4). There was no evidence of contralateral disease or distant metastases.     He was seen by Dr. Snider on 6/4/2021. Exam was notable for a 2.5 cm firm mass fixed to the overlying right nipple-areolar complex but mobile to the underlying chest wall as well as a nipple mass without ulceration. Palpation of the right axilla revealed 2 firm mobile lymph nodes measuring 2 cm and 1 cm respectively and pectoral adenopathy.      Mr. Collins then met with Dr. Sr at medical oncology on 6/8/2021 at which time different management options were discussed with the patient. The patient was interested in I-SPY-2 clinical trial.     He went to [facility] for a second opinion. MRI of the breast showed the mass to measure 3.7 x 3.5 x 3.7 cm with involvement of the overlying skin/nipple areolar complex and pec major muscles but not beyond. Besides the known right axillary adenopathy, there was a level I node in the left axilla with mild cortical thickening. FNA of the right axillary lymph node on 6/11/2021 (NR-) was consistent with metastatic adenocarcinoma.  FNA of the left axillary lymph  node was negative for malignancy.     On 6/18/2021, he underwent bilateral breast MRI as part of the I-SPY protocol. The biopsy-proven right breast mass measured 3.9 x 3.4 cm with invasion of the nipple and underlying pectoralis musculature. Multiple right axillary lymphadenopathy with the largest measuring 2.2 x 1.3 cm, one noted to be bordering level II/III. A tiny 0.4 x 0.2 cm right internal mammary lymph node, likely benign.     NGS on 6/28/2021 showed no evidence of genetic abnormalities. MammaPrint came back as low risk (score +0.29).  As such, he was eligible for Endocrine Optimization Protocol (EOP). He ultimately was randomized to treatment with Amcenestrant and started this on 7/1/2021.    Interval MRI and US showed gradual small decrease in the size of the mass and no significant change in adenopathy. MRI also commented on 2 stable internal mammary nodes below the 2nd and 3rd ribs  Exam by Dr. Sr in September and October showed enlargement of the retroareolar mass. His case was discussed at multidisciplinary tumor board recommendation for proceeding with surgery.     He underwent right simple mastectomy and axillary lymph node dissection on 10/27/2021 by Dr. Snider. Intraoperatively, a disc of pectoralis major muscle was removed en bloc with the specimen; this area was marked with large hemo clips. An additional inferior margin was removed due to presence of tumor in the initial resected inferior margin. Numerous palpable nodes were present in level I/II, all of which were removed. There was no palpable node in level III, so dissection medial to the pec minor was not performed.    Surgical pathology ([id number]) revealed invasive ductal carcinoma, Henderson grade 2, measuring 3.2 cm in greatest dimension, with tumor infiltrating lymphocytes comprise 20% of the mass volume. There was no apparent response to neoadjuvant endocrine therapy. Tumor invaded the dermis and skeletal muscles. There was  angiolymphatic invasion dermal lymphatic invasion. The initially removed inferior margin had a small focus of carcinoma present in the dermis, measuring 1 mm in linear extent. The final closest margin to invasive carcinoma was deep/posterior (muscle) at 4 mm. Ki67 was 7%. A total of 15/44 lymph nodes were involved by carcinoma, with the size of largest metastatic deposit being 21 mm with evident extranodal extension present in 6 lymph nodes.  Final stage was [id number].    Given significant residual disease, he was restaged with PET CT on 11/22/2021 which showed post-op changes with no evidence of distant metastases. His case was re-discussed at tumor board and he was ultimately recommended was to proceed with adjuvant chemotherapy despite low Mammaprint and Ki-67.     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 He then consulted with Dr. Mckay at the [facility], Dr. Farrell at [facility], [facility] who all thought given the unknown benefit of chemotherapy in this case, avoiding the risks of treatment with an anthracycline is preferable. Thus he proceeded with treatment with 4 cycles of Taxotere and Cyclophosphamide (12/14/2021- 2/16/2022). His 3rd cycle was complicated by fever without neutropenia. He had elevated inflammatory markers and therefore was evaluated with PET/CT to rule out arteritis per Rheumatology recommendation. Imaging was unremarkable.     He is now referred to us for discussion of postmastectomy radiation therapy.     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 He states that he is doing well but does have some concerns about the cause of the low-grade fever he was experiencing after his fourth chemo infusion. Otherwise he is doing well and has recovered well after his surgery. He has full range of motion in his shoulder and is not experiencing any lymphedema. He has met with  physical therapy/lyphedema therapy and does have a compression sleeve prescribed.     Past Medical History:  Past Medical History:   Diagnosis Date     Chronic sinusitis      Hypertension 2002   bladder wall thickening and prostate enlargement noted on CT scan    Past Surgical History:  Past Surgical History:   Procedure Laterality Date     DISSECT LYMPH NODE AXILLA Right 10/27/2021    Procedure: RIGHT Axillary Lymph Node Dissection;  Surgeon: Nida Snider MD;  Location: [facility] OR     MASTECTOMY SIMPLE Right 10/27/2021    Procedure: RIGHT Simple Mastectomy;  Surgeon: Nida Snider MD;  Location: [facility] OR       Chemotherapy History:  Per HPI    Radiation History:  None     Implanted Cardiac Devices: No    Medications:  Current Outpatient Medications   Medication     dexamethasone (DECADRON) 4 MG tablet     LORazepam (ATIVAN) 0.5 MG tablet     losartan-hydrochlorothiazide (HYZAAR) 100-25 MG tablet     ondansetron (ZOFRAN) 8 MG tablet     prochlorperazine (COMPAZINE) 10 MG tablet     No current facility-administered medications for this visit.       Allergies:   No Known Allergies    Social History:  Tobacco: Former smoker, quit since 2000 (25 PYH), quit cigar about a year ago  Alcohol: Occasionally  Employment: He is a  at [employer]  Has 2 daughters, one is an otolaryngologist practicing in [place], the other is a .  .    Family History:  Maternal uncle: Colorectal cancer    Review of Systems   A 10-point review of systems was performed. Pertinent findings are noted in the HPI.    Physical Exam   ECOG Status: 0  /70   Pulse 114   Wt 76.7 kg (169 lb 3.2 oz)   SpO2 98%   BMI 26.09 kg/m      PHYSICAL EXAMINATION:    Gen: Alert, in NAD  Eyes: EOMI, sclera anicteric, PERRL  HENT      Head: NC/AT, alopecia, consistent with recent chemotherapy     Ears: No external auricular lesions     Nose/sinus: No rhinorrhea or epistaxis     Oral Cavity/Oropharynx: MMM,  no thrush noted  Pulm: Breathing comfortably on room air, no audible wheezes or ronchi  CV: Well-perfused, no cyanosis  Abdominal: Soft, nondistended  Skin: Normal color and turgor  Chest wall: right nipple areolar complex absent. Surgical scar across the right chest wall. No suspicious skin nodules or discoloration.   Lymph nodes: no palpable axillary or SCV adenopathy  Neurologic/MSK: grossly intact.   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 He was enrolled into I-SPY 2 trial and randomized to Amcenestrant arm.  He then proceeded with right simple mastectomy and axillary lymph node dissection, with finding of minimal treatment response and thus received 4 cycles of adjuvant chemotherapy.    Plan   We reviewed the imaging and pathologic findings in detail. Given the initial advanced stage (tumor involving skin and muscle, multiple axillary nodes extending into level II/III, 2 abnormal internal mammary nodes), and lack of response to neoadjuvant therapy, Dr. Parada understands that he is at a high risk of developing locoregional recurrence. He is in agreement with a course of adjuvant radiation therapy to improve local control.    We discussed that his treatment field will include the right chest wall, axilla level I/II/III, right SCV fossa and internal mammary chain. We recommend a hypofractionated course of approximately 42.5 Gy in 16 fractions to the above areas. Given the significant disease in the breast with skin/muscle involvement and presence of dermal lymphatic and angiolymphatic invasion, we plan to boost the chest wall with an additional 1000 cGy in 4 fractions. The  "undissected IM nodes will also be boosted to a higher dose of approximately 1500 cGy in 6 fractions. At least one of the nodes is still visible on recent CT scan.     We discussed the rationale, logistics, alternatives and potential acute and chronic related side effects.     In particular, we discussed the risk of lymphedema given the large number of nodes taking during the dissection and the plan for comprehensive christiano irradiation. We encouraged him to follow sleeve/exercise recommendations by the lymphedema clinic.     We had an extensive conversation about potential long term side effects of the critical organs. Cardiac toxicity is less of a concern given that he has right-sided disease. However, the need to include internal mammary chain and treat the gross disease to a higher dose does increase radiation exposure of his lung. For that reason, we plan to use deep inspiration breath hold technique to maximally spare his lung.     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 We discussed the difference in physical properties of the photon and proton. Proton therapy is expected to reduce the dose to the lung compared to the photon plan. What's unclear is whether it will translate into clinically apparent benefit.  Professor Collins expressed interest in completing his radiation treatment here at the [facility] as it is understandably more convenient from a logistic standpoint. We did encourage him to explore the possibility of Proton at [place]. He would like to pursue his radiation therapy options in a \"parallel fashion\" before making a final decision.     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 In the meantime, I did reach out to [facility]. 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 Gualberto Brody last summer. A follow up and simulation will be set up at [place] in the near future. "     The patient and his wife/family had many questions during our conversation that were answered to their satisfaction and verbalized understanding.       I reviewed patient's chart, internal/external medical records, imaging studies (including actual images), labs and pathology reports.  I interviewed and counseled the patient face to face.  I additionally discussed the case with patient's referring physicians and care team (Dr. Frank and Dr. Brody at [facility]).      120 minutes were spent on the date of the encounter doing chart review, history and exam, documentation and further activities as noted above.       Polina Paredes MD    CC  Patient Care Team:  Stewart Henry MD as PCP - General (Family Practice)  Jeremy Varela MD as MD (Dermatology)  Kayla Buitrago MD as MD (Internal Medicine)  Stewart Henry MD as Assigned PCP  Nida Snider MD as MD (Surgery)  Delvis Brown, RN as Research Personnel  Nida Snider MD as Assigned Surgical Provider  Mikaela Sr MD as Assigned Cancer Care Provider  Mikaela Sr MD as MD (Breast Oncology)  Carmina Farris, ORIN as Specialty Care Coordinator (Breast Oncology)  Ana Mak Laura A REINHARDT, LUCIA G HPI    INITIAL PATIENT ASSESSMENT    Diagnosis: Breast cancer, Rt mastectomy 10/27/21    Prior radiation therapy: None    Prior chemotherapy: Neoadjuvant amcenestrant started 7/1/2021- Oct. 2021, Chemo Cytoxan/Taxotere 12/14/21-2/16/22    Prior hormonal therapy:No    Pain Eval:  Denies    Psychosocial  Living arrangements: wife  Fall Risk: independent   referral needs: Not needed    Advanced Directive: Yes - Location: At home  Implantable Cardiac Device? No    Nurse face-to-face time: Level 4:  15 min face to face time    Review of Systems   Constitutional: Positive for fever (Chemo r/t low grade fever x 2 weeks, Dr. Sr is aware.) and malaise/fatigue (Increased  fatigue with last round of chemo). Negative for chills, diaphoresis (Couple night sweats r/t fever) and weight loss.   HENT: Negative for ear pain, nosebleeds and sore throat.    Eyes: Negative for blurred vision, double vision and pain.   Respiratory: Negative for cough and shortness of breath.    Cardiovascular: Negative for chest pain and leg swelling.   Gastrointestinal: Negative for blood in stool, constipation, diarrhea, nausea and vomiting.   Genitourinary: Positive for frequency (R/T hydrating well). Negative for hematuria and urgency.   Musculoskeletal: Negative for back pain, falls, joint pain and neck pain.   Skin: Negative for rash.   Neurological: Positive for tingling (Occ. neuropathy in bilateral hands). Negative for dizziness, seizures and headaches.   Endo/Heme/Allergies: Does not bruise/bleed easily.   Psychiatric/Behavioral: Negative for depression. The patient is not nervous/anxious and does not have insomnia.                    Again, thank you for allowing me to participate in the care of your patient.      Sincerely,    Polina Paredes MD

## 2022-03-03 ENCOUNTER — ONCOLOGY VISIT (OUTPATIENT)
Dept: ONCOLOGY | Facility: CLINIC | Age: 72
End: 2022-03-03
Attending: PHYSICIAN ASSISTANT
Payer: COMMERCIAL

## 2022-03-03 ENCOUNTER — APPOINTMENT (OUTPATIENT)
Dept: RADIATION ONCOLOGY | Facility: CLINIC | Age: 72
End: 2022-03-03
Attending: RADIOLOGY
Payer: COMMERCIAL

## 2022-03-03 VITALS
HEIGHT: 68 IN | TEMPERATURE: 97.4 F | SYSTOLIC BLOOD PRESSURE: 121 MMHG | WEIGHT: 168.7 LBS | RESPIRATION RATE: 14 BRPM | HEART RATE: 113 BPM | OXYGEN SATURATION: 98 % | DIASTOLIC BLOOD PRESSURE: 79 MMHG | BODY MASS INDEX: 25.57 KG/M2

## 2022-03-03 DIAGNOSIS — R50.9 FEVER, UNSPECIFIED FEVER CAUSE: ICD-10-CM

## 2022-03-03 DIAGNOSIS — C50.121 MALIGNANT NEOPLASM OF CENTRAL PORTION OF RIGHT BREAST IN MALE, ESTROGEN RECEPTOR POSITIVE (H): Primary | ICD-10-CM

## 2022-03-03 DIAGNOSIS — Z17.0 MALIGNANT NEOPLASM OF CENTRAL PORTION OF RIGHT BREAST IN MALE, ESTROGEN RECEPTOR POSITIVE (H): Primary | ICD-10-CM

## 2022-03-03 PROCEDURE — 77290 THER RAD SIMULAJ FIELD CPLX: CPT | Mod: 26 | Performed by: RADIOLOGY

## 2022-03-03 PROCEDURE — 77334 RADIATION TREATMENT AID(S): CPT | Mod: 26 | Performed by: RADIOLOGY

## 2022-03-03 PROCEDURE — 99214 OFFICE O/P EST MOD 30 MIN: CPT | Performed by: PHYSICIAN ASSISTANT

## 2022-03-03 PROCEDURE — 77290 THER RAD SIMULAJ FIELD CPLX: CPT | Performed by: RADIOLOGY

## 2022-03-03 PROCEDURE — G0463 HOSPITAL OUTPT CLINIC VISIT: HCPCS

## 2022-03-03 PROCEDURE — 77263 THER RADIOLOGY TX PLNG CPLX: CPT | Performed by: RADIOLOGY

## 2022-03-03 PROCEDURE — 77334 RADIATION TREATMENT AID(S): CPT | Performed by: RADIOLOGY

## 2022-03-03 ASSESSMENT — PAIN SCALES - GENERAL: PAINLEVEL: NO PAIN (0)

## 2022-03-03 NOTE — NURSING NOTE
"Oncology Rooming Note    March 3, 2022 3:57 PM   Ronna Collins is a 71 year old male who presents for:    Chief Complaint   Patient presents with     Oncology Clinic Visit     BREAST CANCER     Initial Vitals: /79 (BP Location: Right arm, Patient Position: Sitting, Cuff Size: Adult Regular)   Pulse 113   Temp 97.4  F (36.3  C) (Tympanic)   Resp 14   Wt 76.5 kg (168 lb 11.2 oz)   SpO2 98%   BMI 26.02 kg/m   Estimated body mass index is 26.02 kg/m  as calculated from the following:    Height as of 2/11/22: 1.715 m (5' 7.52\").    Weight as of this encounter: 76.5 kg (168 lb 11.2 oz). Body surface area is 1.91 meters squared.  No Pain (0) Comment: Data Unavailable   No LMP for male patient.  Allergies reviewed: Yes  Medications reviewed: Yes    Medications: Medication refills not needed today.  Pharmacy name entered into webme: [id number] [address] AT [facility]    Clinical concerns: None.        Renay Cuevas CMA            "

## 2022-03-03 NOTE — LETTER
3/3/2022         RE: Ronna Collins  [address]  [facility] [address]      Oncology Visit:  Date on this visit: Mar 3, 2022    Diagnosis: Clinical prognostic stage IIIb, [id number], grade 2, ER positive, NJ positive, HER-2 negative right breast cancer.    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  He noted discomfort and hardening of the skin of the right nipple in late April/early May, 2021.  He noted a seborrheic keratosis of the right nipple and remembered he had a similar skin lesion of the arm 3 months earlier.  However, he subsequently noted a mass in the same area.  Right breast skin punch biopsy performed by dermatology was c/w grade 2 invasive ductal carcinoma with associated DCIS.  Invasive carcinoma was ER positive 100% and NJ positive 70%, with tumor invading the dermis.  HER-2 was negative by IHC (score 1+).  Diagnostic mammogram and ultrasound showed a retroareolar right breast mass measuring 2.9 cm with associated nipple retraction and enlarged, abnormal right axillary lymph nodes.  Right breast biopsy was again c/w grade 2 invasive ductal carcinoma, ER strong in 98% of tumor cell nuclei, HER-2 negative.  Ki-67 was 15%.    He elected to screen for the ISPY-2 clinical trial.  MRI breast on 6/18/2021 showed the biopsy proven right breast cancer to measure 3.9 cm in maximum dimension with invasion of the nipple and the pectoralis muscle as well as at least 4 abnormal level 1 and 2 right axillary lymph nodes and a tiny 0.4 cm right internal mammary lymph node.  Mammoprint returned low risk with a score of +0.29.  He elected for treatment on the endocrine optimization protocol of ISPY-2 and was randomized to treatment with amcenestrant.  He was on treatment with  amcenestrant from 7/1/2021 - 10/26/2021.  At our visit in 01/2021, both right axillary node and right breast tumor palpated more firm then prior.   Breast MRI was stable, however, due to clinical concerns for progression, decision was made to proceed with surgery.   Right breast mastectomy and right axillary lymph node dissection was performed by Dr. Snider on 10/27/2021.  Pathology showed grade 2 carcinoma of the right breast measuring 3.2 cm with invasion of the dermis, nipple and the pectoralis muscle.  Ki-67 of the excised tumor was 7%.  15/44 right axillary lymph nodes were positive with 6 of the lymph nodes demonstrating extranodal extension.  The largest lymph node metastasis measured 2.1 cm.    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  C2 was given on 1/5/2022 and C3 on 1/26/2022.  Cycle 4 given on 2/16/22.     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 He notes starting on 2/18 he started to have low grade fevers develop over the course of the day. Temperature will start at 98.7 and increase to T max of 100.3 over the course of the day. He has had no symptoms of infection. He was taking tylenol for the first 5 days; now none for last 8 days. He notes in the past 3 days or so the T max is trending down.     He was more tired post cycle 4 otherwise no other changes from cycle 3.     He has no other concerns besides this today.     Past Medical/Surgical History:  Past Medical History:   Diagnosis Date     Breast cancer (H) 05/2021     Chronic sinusitis      Hypertension 2002     Past Surgical History:   Procedure Laterality Date     DISSECT LYMPH NODE AXILLA Right 10/27/2021    Procedure: RIGHT Axillary Lymph Node Dissection;  Surgeon: Nida Snider MD;  Location: [facility] OR     MASTECTOMY SIMPLE Right 10/27/2021    Procedure: RIGHT Simple Mastectomy;  Surgeon: Nida Snider MD;  Location: [facility] OR     Allergies:  Allergies as of 03/03/2022     (No Known Allergies)     Current Medications:  Current Outpatient Medications   Medication Sig Dispense Refill     dexamethasone (DECADRON) 4 MG tablet Take 1  "tablet (4 mg) by mouth daily (with breakfast) Start morning AFTER Docetaxel dose and continue for 2 additional doses. 3 tablet 3     losartan-hydrochlorothiazide (HYZAAR) 100-25 MG tablet Take 1 tablet by mouth every morning 90 tablet 1     LORazepam (ATIVAN) 0.5 MG tablet Take 1 tablet (0.5 mg) by mouth every 4 hours as needed (Anxiety, Nausea/Vomiting or Sleep) (Patient not taking: Reported on 12/14/2021) 30 tablet 3     ondansetron (ZOFRAN) 8 MG tablet Take 1 tablet (8 mg) by mouth every 8 hours as needed for nausea (Patient not taking: Reported on 12/14/2021) 30 tablet 3     prochlorperazine (COMPAZINE) 10 MG tablet Take 0.5 tablets (5 mg) by mouth every 6 hours as needed (Nausea/Vomiting) (Patient not taking: Reported on 12/14/2021) 30 tablet 3      Genetics Breast Actionable and Breast Expanded panels were both negative for pathogenic germline mutations.    Physical Exam:  /79 (BP Location: Right arm, Patient Position: Sitting, Cuff Size: Adult Regular)   Pulse 113   Temp 97.4  F (36.3  C) (Tympanic)   Resp 14   Ht 1.715 m (5' 7.52\")   Wt 76.5 kg (168 lb 11.2 oz)   SpO2 98%   BMI 26.02 kg/m     Wt Readings from Last 4 Encounters:   03/03/22 76.5 kg (168 lb 11.2 oz)   03/02/22 76.7 kg (169 lb 3.2 oz)   02/15/22 77.5 kg (170 lb 12.8 oz)   02/11/22 77.7 kg (171 lb 4.8 oz)   General:  Well appearing adult male in NAD.  HEENT:  Normocephalic.  Sclera anicteric. (+) alopecia  Lymph:  No palpable cervical, supraclavicular, or axillary LAD.  Chest:  CTA bilaterally.  No wheezes or crackles. R CW incision is well healed without any erythema or induration   CV:  RRR.  Nl S1 and S2.    Abd:  Soft/ND. + BS   Ext:  No pitting edema of the bilateral lower extremities.    Neuro:  Cranial nerves grossly intact.  Psych:  Mood and affect appear normal.    Laboratory/Imaging Studies   3/2/2022 12:17   WBC 4.2   Hemoglobin 11.8 (L)   Hematocrit 36.5 (L)   Platelet Count 213   RBC Count 4.35 (L)   MCV 84   MCH 27.1 "   MCHC 32.3   RDW 15.8 (H)   % Neutrophils 56   % Lymphocytes 23   % Monocytes 19   % Eosinophils 0   % Basophils 1   Absolute Basophils 0.0   Absolute Eosinophils 0.0   Absolute Immature Granulocytes 0.0   Absolute Lymphocytes 1.0   Absolute Monocytes 0.8   % Immature Granulocytes 1   Absolute Neutrophils 2.4   Absolute NRBCs 0.0   NRBCs per 100 WBC 0      2/21/2022 15:14 3/2/2022 12:18   Color Urine Yellow Yellow   Appearance Urine Clear Clear   Glucose Urine Negative Negative   Bilirubin Urine Negative Negative   Ketones Urine Negative Negative   Specific Gravity Urine 1.015 1.005   pH Urine 8.0 (H) 7.0   Protein Albumin Urine Negative Negative   Urobilinogen mg/dL Normal Normal   Nitrite Urine Negative Negative   Blood Urine Negative Negative   Leukocyte Esterase Urine Negative Negative   WBC Urine 1 2   RBC Urine <1 1   sperm  Present (A)   Mucus Urine  Present (A)     UC 2/21/22 NGTD    Blood culture 3/2/22 NGTD     PET 2/18  IMPRESSION: In this patient with history of right breast invasive  ductal carcinoma, status post mastectomy, axillary node dissection,  and adjuvant chemotherapy, findings are compatible with complete  response.     1. No suspicious focal FDG uptake identified.  2. Decreased FDG activity about the surgical sites, now less than  liver background, compatible with resolving postsurgical inflammatory  uptake.  3. Unchanged prostatomegaly. Bladder wall thickening again  demonstrated, suggesting chronic bladder outlet obstruction.      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  He is s/p treatment with 3.5 months neoadjuvant amcenestrant, right breast mastectomy, right ALND, and 4 cycles of adjuvant Taxotere and cyclophosphamide.    1.  Right breast cancer:    He has now completed 4 cycles of Taxotere and cyclophosphamide. Plan to start adjuvant radiation in  about 10 days. We will initiate treatment with letrozole at same time as radiation and abemaciclib upon completion of radiation.  Plan to administer abemaciclib for 2 years.  Letrozole for a total of 10 years.     2.  Fever: Reviewed negative PET scan, UA x 2, UC, BC, and now recovered CBC. He has been pan-scanned and has no focal infectious symptoms so I would not recommend any further imaging or testing unless he develops fever >100.4 or new symptoms. Reviewed plan to recheck CRP and ESR next week. If these remain elevated will send to Rheumatology to evaluate for inflammatory condition like PMR or vasculitis.       TRINY Santa PA-C

## 2022-03-07 LAB — BACTERIA BLD CULT: NO GROWTH

## 2022-03-08 NOTE — PROGRESS NOTES
RECORDS STATUS - BREAST    RECORDS REQUESTED FROM: [facility]/ [place]    DATE REQUESTED: 3/15/2022   NOTES DETAILS STATUS   OFFICE NOTE from referring provider Complete Epic   Nida Snider MD   OFFICE NOTE from medical oncologist Complete 3/3/2022   Malignant neoplasm of central portion of right breast in male, estrogen receptor positive (H)    More in [facility]   OFFICE NOTE from surgeon Complete See info below   OFFICE NOTE from radiation oncologist     DISCHARGE SUMMARY from hospital     DISCHARGE REPORT from the ER     OPERATIVE REPORT Complete 10/27/2021   RIGHT Simple Mastectomy   MEDICATION LIST Complete [facility]   CLINICAL TRIAL TREATMENTS TO DATE     LABS     REQUEST BLOCKS FOR ALL BREAST CANCER PTS     PATHOLOGY REPORTS  (Tissue diagnosis, Stage, ER/PA percentage positive and intensity of staining, HER2 IHC, FISH, and all biopsies from breast and any distant metastasis)                 Complete 10/27/2021   RIGHT Simple Mastectomy   GENONOMIC TESTING     TYPE:   (Next Generation Sequencing, including Foundation One testing, and Oncotype score) Complete 10/27/2021 Her 2 Sunshine Fish and Oncotype    IMAGING (NEED IMAGES & REPORT)     CT SCANS Complete CT Chest 2/9/2022   MRI Complete MRI Breast 10/26/2021 more in PACS   MAMMO     ULTRASOUND Complete US Breast Right 10/18/2021   PET Complete 2/18/2022 more in PACS   BONE SCAN     BRAIN MRI       Action    Action Taken 3/8/2022 3:18pm REGINA   I called pt - unavailable.     I called [place] IMG Dept [phone] option 2- they will push imaging over to PACS.     3/14/2022 8:31AM REGINA   I called [place] again..

## 2022-03-09 DIAGNOSIS — C50.121 MALIGNANT NEOPLASM OF CENTRAL PORTION OF RIGHT BREAST IN MALE, ESTROGEN RECEPTOR POSITIVE (H): Primary | ICD-10-CM

## 2022-03-09 DIAGNOSIS — Z17.0 MALIGNANT NEOPLASM OF CENTRAL PORTION OF RIGHT BREAST IN MALE, ESTROGEN RECEPTOR POSITIVE (H): Primary | ICD-10-CM

## 2022-03-09 RX ORDER — LETROZOLE 2.5 MG/1
2.5 TABLET, FILM COATED ORAL DAILY
Qty: 90 TABLET | Refills: 3 | Status: SHIPPED | OUTPATIENT
Start: 2022-03-09 | End: 2022-07-12

## 2022-03-11 ENCOUNTER — HOSPITAL ENCOUNTER (OUTPATIENT)
Dept: PHYSICAL THERAPY | Facility: CLINIC | Age: 72
Discharge: HOME | End: 2022-03-11
Attending: SURGERY | Admitting: SURGERY
Payer: COMMERCIAL

## 2022-03-11 PROCEDURE — 97140 MANUAL THERAPY 1/> REGIONS: CPT | Mod: GP | Performed by: PHYSICAL THERAPIST

## 2022-03-11 PROCEDURE — 97110 THERAPEUTIC EXERCISES: CPT | Mod: GP | Performed by: PHYSICAL THERAPIST

## 2022-03-11 PROCEDURE — 97535 SELF CARE MNGMENT TRAINING: CPT | Mod: GP | Performed by: PHYSICAL THERAPIST

## 2022-03-11 NOTE — PROGRESS NOTES
[facility]    Outpatient Physical Therapy Progress Note  Patient: Ronna Collins  : 1950    Beginning/End Dates of Reporting Period:  22 to 3/11/22    Referring Provider: Dr. Nida Snider    Therapy Diagnosis: MICHELE (nearly resolved), R UE lymphedema     Client Self Report: -April 8 will stay in [place] for Proton beam radiation therapy to axilla (lower dosage and more targeted for less collateral damage at the lungs) at [facility].  He will start the Letrozole next week as he has completed his chemo.  The 4th cycle was diffcult due to fever for 2 weeks. He was very fatigued but the last 4-5 days were better.     Objective Measurements:  Objective Measure: MICHELE  Details: tension upper arm but no palpable cord  Objective Measure: Volume/edema  Details: 1737.27mL, increased from start of 4.9% and 7.8% swelling compared to R today.     Objective Measure: AROM  Details: functional today     Goals:  Goal Identifier edema   Goal Description Patient will understand and express independence with lymphedema risk reduction program including but not limited to skincare, exercise, GCB PRN, MLD   Target Date 22   Date Met  22   Progress (detail required for progress note): Date extended as it has not been met, not doing massage daily     Goal Identifier shoulder ROM   Goal Description Pt to express understanding and independence with acute postsurgical precautions re: RUQ   Target Date 21   Date Met  12/10/21   Progress (detail required for progress note):       Goal Identifier HEP   Goal Description Pt will be independent with exercise program to for R UE strength and ROM to allow for improved overhead lifting and prevent shoulder injury   Target Date 22   Date Met  22   Progress (detail required for progress note):       Goal Identifier Maintenance   Goal Description Pt  will continue with exercise program to prevent loss of ROM and use compression as appropriate if signs of edema to prevent Volume R UE from increase greater than 5% over L.    Target Date 05/30/21   Date Met      Progress (detail required for progress note):  Currently about 8% larger       Plan:  Continue therapy per current plan of care. Pt to been seen in 6 weeks after completing radiation, will increase use of compression sleeve during day. Recommend 1x/week for 6 weeks once returns    Discharge:  No

## 2022-03-15 ENCOUNTER — PRE VISIT (OUTPATIENT)
Dept: ONCOLOGY | Facility: CLINIC | Age: 72
End: 2022-03-15

## 2022-03-15 ENCOUNTER — ONCOLOGY VISIT (OUTPATIENT)
Dept: ONCOLOGY | Facility: CLINIC | Age: 72
End: 2022-03-15
Attending: SURGERY
Payer: COMMERCIAL

## 2022-03-15 VITALS
SYSTOLIC BLOOD PRESSURE: 100 MMHG | RESPIRATION RATE: 16 BRPM | BODY MASS INDEX: 26.56 KG/M2 | DIASTOLIC BLOOD PRESSURE: 65 MMHG | HEART RATE: 90 BPM | WEIGHT: 172.2 LBS | OXYGEN SATURATION: 99 %

## 2022-03-15 DIAGNOSIS — Z17.0 MALIGNANT NEOPLASM OF CENTRAL PORTION OF RIGHT BREAST IN MALE, ESTROGEN RECEPTOR POSITIVE (H): Primary | ICD-10-CM

## 2022-03-15 DIAGNOSIS — C50.121 MALIGNANT NEOPLASM OF CENTRAL PORTION OF RIGHT BREAST IN MALE, ESTROGEN RECEPTOR POSITIVE (H): Primary | ICD-10-CM

## 2022-03-15 DIAGNOSIS — I89.0 LYMPHEDEMA: ICD-10-CM

## 2022-03-15 DIAGNOSIS — L90.5 SCAR TISSUE: ICD-10-CM

## 2022-03-15 PROCEDURE — G0463 HOSPITAL OUTPT CLINIC VISIT: HCPCS

## 2022-03-15 PROCEDURE — 99205 OFFICE O/P NEW HI 60 MIN: CPT | Mod: GC | Performed by: STUDENT IN AN ORGANIZED HEALTH CARE EDUCATION/TRAINING PROGRAM

## 2022-03-15 ASSESSMENT — PAIN SCALES - GENERAL: PAINLEVEL: NO PAIN (0)

## 2022-03-15 NOTE — PROGRESS NOTES
"Oncology Rooming Note    March 15, 2022 10:07 AM   Ronna Collins is a 71 year old male who presents for:    Chief Complaint   Patient presents with     Oncology Clinic Visit     Initial Vitals: There were no vitals taken for this visit. Estimated body mass index is 26.02 kg/m  as calculated from the following:    Height as of 3/3/22: 1.715 m (5' 7.52\").    Weight as of 3/3/22: 76.5 kg (168 lb 11.2 oz). There is no height or weight on file to calculate BSA.  Data Unavailable Comment: Data Unavailable   No LMP for male patient.  Allergies reviewed: Yes  Medications reviewed: Yes    Medications: Medication refills not needed today.  Pharmacy name entered into [facility]: [id number] [place] [id number] AT [facility]    Clinical concerns:  doctor was notified.      Eva Marroquin CMA            "

## 2022-03-15 NOTE — PROGRESS NOTES
PM[facility]R Clinic Note     Patient Name: Ronna Collins : 1950 Medical Record: [id number]     Requesting Physician/clinician: Nida Snider MD (surgical oncology)           History of Present Illness:     Ronna Collins is a 71 year old male who was recently diagnosed w/ right breast cancer (clinical prognostic stage IIIb, [id number], grade 2, ER positive, CO positive, HER-2 negative), now s/p right breast mastectomy and right axillary lymph node dissection (10/27/2021). He is followed by Dr. Sr in heme/onc and Dr. Snider in surgical oncology. Please see below for a detailed timeline of oncologic hx. He presents to clinic today for initial evaluation and management of right UE lymphedema. 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    Dr. Collins reports that he started noticing some RUE lymphedema s/p right breast mastectomy and right axillary lymph node dissection in 2021. He reports that the lymphedema is in the right forearm. No lymphedema noted in the upper arm, neck, or chest. He was subsequently referred to lymphedema therapy. Of note, his wife reports that on initial measurements, his right arm circumference was actually slightly less than the left, even though he is right hand dominant. Denies feeling of heaviness in the arm. Denies any pain in the RUE. Denies any hx of cellulitis in the RUE or chest wall.    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 He was last seen on 3/11/2022, where his right arm was noted to be 7.8% larger compared to contralateral side. Between lymphedema therapy sessions, he has also been doing home exercises/stretches, ROM, some yoga exercises, breathing exercises, and manual lymphatic drainage. During his 3rd and 4th chemo cycle, he was having chronic low grade temp, and was not consistent w/ the exercises, but otherwise he typically does these once a day for 10-15 min. He has been consistently wearing his RUE  compression sleeve everyday of the past 3-4 days, since his last lymphedema therapy session. Prior to that, he wore it inconsistently. He has a Juzo 20-30mmHg RUE compression sleeve. Only has 1 compression sleeve, and he feels that it is getting loose, stating that it is sliding down. He also has a compression gauntlet and b/l LE compression sleeves, but does not use these. Lymphedema therapist did briefly mention about a lymphedema pump, but noted that he did not need one at the present time.     In terms of ROM, Dr. Collins feels that he has 100% ROM, but does report tightness in the axilla when raising arm above head. He also endorses numbness around the mastectomy incision site, but notes that it is slowly improving.     Dr. Chiari mentions that he is aware of the increased risk of lymphedema exacerbation w/ starting proton beam radiation therapy on 3/21/22.     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 He is independent in all ADL/IADLs. Has a very supportive family.          ONCOLOGIC HISTORY  - First noticed discomfort and hardening of the skin surrounding right nipple in late April/early May 2021. Noted seborrheic keratosis of the right nipple, and had similar lesion of the arm 3 months earlier. Subsequently noted a mass at the same site.  - Right breast skin punch biopsy performed by derm was c/w grade 2 invasive ductal carcinoma w/ associated DCIS. Invasive carcinoma was ER positive 100% and RI positive 70%, w/ tumor invading the dermis. HER-2 negative by IHC (score 1+).   - Diagnostic mammogram and ultrasound showed a retroareolar right breast mass measuring 2.9cm w/ associated nipple retraction and enlarged, abnormal right axillary lymph nodes. Right breast biopsy was again, c/w grade 2 invasive ductal carcinoma, ER strong in 98% of tumor cell nuclei, HER-2 negative, Ki-67 was 15%.    - Elected to screen for the ISPY-2 clinical trial.  - 6/18/2021 MRI breast showed  biopsy proven right breast cancer to measure 3.9 cm in max dimension w/ invasion of the nipple and pectoralis muscle as well as at least 4 abnormal level 1 and 2 right axillary lymph nodes and a tiny 0/4 cm right internal mammary lymph node. Mammoprint returned low risk w/ a score of +0.29.   - Elected for tx on the endocrine optimization protocol of ISPY-2, and was randomized to tx w/ amcenestrant. Was on amcenestrant from 7/1/2021-10/26/2021.  - 10/27/2021 underwent right breast mastectomy and right axillary lymph node dissection w/ Dr. Sndier. Pathology showed grade 2 carcinoma of the right breast measuring 3.2 cm w/ invasion of the dermis, nipple, and the pectoralis muscle. Ki-67 of the excised tumor was 7%. 15/44 right axillary lymph nodes were positive w/ 6 of the lymph nodes demonstrating extranodal extension. Largest lymph node metastasis measured 2.1 cm.   - 12/14/2021 began tx w/ Taxotere and cyclophosphamide.  - 1/5/2021 C2 given. Had significant fever following cycle 2.  - 1/26/2021 C3 given  - 2/16/2022 C4 given   - Plan is to start adjuvant radiation soon. Heme/onc planning to initiate tx w/ letrozole at the same time as radiation and abemaciclib upon completion of radiation. Plan to administer abemaciclib for 2 years. Letrozole for a total of 10 years.   - Starting proton beam radiation 3/21/2022. 15 days over 3 weeks at the [facility].              Past Medical and Surgical History:     Past Medical History:   Diagnosis Date     Breast cancer (H) 05/2021     Chronic sinusitis      Hypertension 2002     Past Surgical History:   Procedure Laterality Date     DISSECT LYMPH NODE AXILLA Right 10/27/2021    Procedure: RIGHT Axillary Lymph Node Dissection;  Surgeon: Nida Snider MD;  Location: [facility] OR     MASTECTOMY SIMPLE Right 10/27/2021    Procedure: RIGHT Simple Mastectomy;  Surgeon: Nida Snider MD;  Location:  OR            Social History:     Social History     Tobacco Use     Smoking  status: Former Smoker     Packs/day: 0.50     Years: 20.00     Pack years: 10.00     Types: Cigarettes     Start date: 1976     Quit date: 2000     Years since quittin.5     Smokeless tobacco: Never Used   Substance Use Topics     Alcohol use: Yes     Comment: occ       Marital Status: , lives w/ his wife  Family support: Good family support, daughter is ENT surgeon in [place]  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 Ambulating w/o assistive device. Is driving. 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     ADLs: Independent  Assistive devices: None  iADLs (medication management and finances): Independent  Hand dominance: Right hand dominant  Driving: Is driving           Family History:     Family History   Problem Relation Age of Onset     Diabetes Brother      Hypertension Brother      Bladder Cancer Brother      Anesthesia Reaction No family hx of      Deep Vein Thrombosis (DVT) No family hx of             Medications:     Current Outpatient Medications   Medication Sig Dispense Refill     letrozole (FEMARA) 2.5 MG tablet Take 1 tablet (2.5 mg) by mouth daily 90 tablet 3     losartan-hydrochlorothiazide (HYZAAR) 100-25 MG tablet Take 1 tablet by mouth every morning 90 tablet 1            Allergies:     No Known Allergies           ROS:     A focused ROS is negative other than the symptoms noted above in the HPI.      Constitutional: denies any fevers, chills, any recent weight loss  Eyes: denies changes in visual acuity   Ears, Nose, Throat: denies any difficulty swallowing   Cardiovascular: denies any exertional chest pain or palpitation   Respiratory: denies dyspnea   Gastrointestinal: denies any nausea, vomiting, abdominal pain, diarrhea or constipation  Genitourinary: denies any dysuria,  "hematuria, frequency or urgency   Musculoskeletal: denies any muscle pain, joint pain, neck pain or back pain   Neurologic: denies any headache, changes in motor or sensory function, no loss of balance or vertigo   Psychiatric: denies mood changes; sleeps OK             Physical Examination:     VITAL SIGNS: /65   Pulse 90   Resp 16   Wt 78.1 kg (172 lb 3.2 oz)   SpO2 99%   BMI 26.56 kg/m    BMI: Estimated body mass index is 26.56 kg/m  as calculated from the following:    Height as of 3/3/22: 1.715 m (5' 7.52\").    Weight as of this encounter: 78.1 kg (172 lb 3.2 oz).    Gen: NAD, pleasant and cooperative   HEENT: MMM  Cardio: regular pulse, well perfused  Pulm: non-labored breathing on room air  Abd: benign  Ext: Subtle lymphedema in right forearm. Increased lymphedema over the dorsum of the hand and along lateral aspect of proximal forearm. Negative Stemmers sign. No lymphedema noted in neck or chest. No enlarged lymph nodes palpated. No cording noted.   Neuro/MSK: RUE full active ROM. No increased tone. 5/5 strength throughout.               Laboratory/Imaging:     PET and CT of neck, chest, abdomen, and pelvis 2/18/2022  IMPRESSION: In this patient with history of right breast invasive  ductal carcinoma, status post mastectomy, axillary node dissection,  and adjuvant chemotherapy, findings are compatible with complete  response.  1. No suspicious focal FDG uptake identified.  2. Decreased FDG activity about the surgical sites, now less than  liver background, compatible with resolving postsurgical inflammatory  uptake.  3. Unchanged prostatomegaly. Bladder wall thickening again  demonstrated, suggesting chronic bladder outlet obstruction.    PET and CT of neck, chest, abdomen, and pelvis 11/22/2021  IMPRESSION: In this patient with a history of invasive ductal  carcinoma of the breast status post right mastectomy and lymph node  dissection:   1. Postoperative changes of right mastectomy and right " axillary lymph  node dissection with mild associated uptake and soft tissue  thickening, favor postoperative changes. No definite recurrent or  residual disease. No focal nodular uptake to rule in recurrent  disease.   2. Prostatomegaly and diffuse bladder wall thickening likely related  to chronic bladder outlet obstruction.           Assessment/Plan:     Ronna Collins is a 71 year old male who was recently diagnosed w/ right breast cancer (clinical prognostic stage IIIb, [id number], grade 2, ER positive, MI positive, HER-2 negative), now s/p right breast mastectomy and right axillary lymph node dissection (10/27/2021). He is followed by Dr. Sr in heme/onc and Dr. Snider in surgical oncology. Please see below for a detailed timeline of oncologic hx. He presents to clinic today for initial evaluation and management of right UE lymphedema.     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 Since his current RUE compression sleeve is loose and not fitting well, will place a new orthotics order (for [place]) so that he can be fitted for both a new RUE and gauntlet compression garments. Discussed that compression garments should be replaced every 3-6 months, and that it might be good to have a couple pairs on hand to swap out from day to day.     He is currently working w/ lymphedema therapy once a month. Dr. Waters will plan to reach out to Renetta Dempsey (PT) to see if the frequency of therapies could be increased to weekly. Encouraged him to continue w/ twice a day stretching, ROM exercises, and manual lymphatic drainage.      Discussed w/ Dr. Collins the signs and symptoms of cellulitis, and that if there are  concerns, to reach out to the clinic or urgent care, as he may need to be prescribed a short course of antibiotics. Also discussed that there may be skin sensitivities w/ starting the proton beam radiation therapy, and to monitor his skin closely.     1. Patient education: In depth discussion and education was provided about the assessment and implications of each of the below recommendations for management. Patient indicated readiness to learn, all questions were answered and understanding of material presented was confirmed.   2. Work-up: None  3. Therapy/equipment/braces: Orthotics referral (for [place]) placed so that he can be re-fitted for RUE and gauntlet compression garments. Encouraged him to wear compression garments every day. Dr. Waters will also reach out to Renetta Dempsey (PT) to see if it would be possible to increase frequency of lymphedema therapy to weekly (currently sees her once a month).    4. Medications: None  5. Interventions: Monitor for s/s of cellulitis. If there are concerns, informed him to call the clinic or go to urgent care to be evaluated. Also discussed that he should monitor skin closely for sensitivities once he starts proton beam radiation therapy.   6. Referral / follow up with other providers: None  7. Follow up: RTC w/ Dr. Waters in 2 months (after he is done with proton beam radiation therapy, which is set to start on 3/21/22)         America Miguel MD  Resident Physician, PGY-2  Physical Medicine & Rehabilitation  [facility]    Physician Attestation   I, Celsa Waters MD, saw this patient and agree with the findings and plan of care as documented in the note.      Items personally reviewed/procedural attestation: vitals, labs and imaging and agree with the interpretation documented in the note.    Celsa Waters MD  Physical Medicine & Rehabilitation      90 minutes spent on the date of the encounter doing chart review, history and exam, documentation and further  activities as noted above.

## 2022-03-15 NOTE — PATIENT INSTRUCTIONS
1. Dr. Waters will reach out to PT to see if it would be possible to increase the frequency of lymphedema therapy to once a week.   2. Placed orthotics referral to have you re-fitted for both right upper extremity compression sleeve and gauntlet.  3. Monitor for skin sensitivities once you start proton beam radiation.  4. Monitor skin for signs and symptoms of cellulitis. If there is concern, please call our office, or go to nearest urgent care for evaluation.  5. Return to clinic to see Dr. Waters in 2 months.

## 2022-03-15 NOTE — LETTER
3/15/2022         RE: Ronna Collins  [address]  [facility] [address]        Dear Colleague,    Thank you for referring your patient, Ronna Collins, to the [facility] [place]. Please see a copy of my visit note below.           PM&R Clinic Note     Patient Name: Ronna Collins : 1950 Medical Record: [id number]     Requesting Physician/clinician: Nida Snider MD (surgical oncology)           History of Present Illness:     Ronna Collins is a 71 year old male who was recently diagnosed w/ right breast cancer (clinical prognostic stage IIIb, [id number], grade 2, ER positive, UT positive, HER-2 negative), now s/p right breast mastectomy and right axillary lymph node dissection (10/27/2021). He is followed by Dr. Sr in heme/onc and Dr. Snider in surgical oncology. Please see below for a detailed timeline of oncologic hx. He presents to clinic today for initial evaluation and management of right UE lymphedema. 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    Dr. Collins reports that he started noticing some RUE lymphedema s/p right breast mastectomy and right axillary lymph node dissection in 2021. He reports that the lymphedema is in the right forearm. No lymphedema noted in the upper arm, neck, or chest. He was subsequently referred to lymphedema therapy. Of note, his wife reports that on initial measurements, his right arm circumference was actually slightly less than the left, even though he is right hand dominant. Denies feeling of heaviness in the arm. Denies any pain in the RUE. Denies any hx of cellulitis in the RUE or chest wall.    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 He was last seen on 3/11/2022, where his right arm was noted to be 7.8% larger compared to contralateral side. Between lymphedema therapy sessions, he has also been doing home exercises/stretches, ROM, some yoga exercises, breathing  exercises, and manual lymphatic drainage. During his 3rd and 4th chemo cycle, he was having chronic low grade temp, and was not consistent w/ the exercises, but otherwise he typically does these once a day for 10-15 min. He has been consistently wearing his RUE compression sleeve everyday of the past 3-4 days, since his last lymphedema therapy session. Prior to that, he wore it inconsistently. He has a Juzo 20-30mmHg RUE compression sleeve. Only has 1 compression sleeve, and he feels that it is getting loose, stating that it is sliding down. He also has a compression gauntlet and b/l LE compression sleeves, but does not use these. Lymphedema therapist did briefly mention about a lymphedema pump, but noted that he did not need one at the present time.     In terms of ROM, Dr. Collins feels that he has 100% ROM, but does report tightness in the axilla when raising arm above head. He also endorses numbness around the mastectomy incision site, but notes that it is slowly improving.     Dr. Chiari mentions that he is aware of the increased risk of lymphedema exacerbation w/ starting proton beam radiation therapy on 3/21/22.     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 He is independent in all ADL/IADLs. Has a very supportive family.          ONCOLOGIC HISTORY  - First noticed discomfort and hardening of the skin surrounding right nipple in late April/early May 2021. Noted seborrheic keratosis of the right nipple, and had similar lesion of the arm 3 months earlier. Subsequently noted a mass at the same site.  - Right breast skin punch biopsy performed by derm was c/w grade 2 invasive ductal carcinoma w/ associated DCIS. Invasive carcinoma was ER positive 100% and DE positive 70%, w/ tumor invading the dermis. HER-2 negative by IHC (score 1+).   - Diagnostic mammogram and ultrasound showed a retroareolar right breast mass measuring 2.9cm w/ associated nipple retraction and enlarged,  abnormal right axillary lymph nodes. Right breast biopsy was again, c/w grade 2 invasive ductal carcinoma, ER strong in 98% of tumor cell nuclei, HER-2 negative, Ki-67 was 15%.    - Elected to screen for the ISPY-2 clinical trial.  - 6/18/2021 MRI breast showed biopsy proven right breast cancer to measure 3.9 cm in max dimension w/ invasion of the nipple and pectoralis muscle as well as at least 4 abnormal level 1 and 2 right axillary lymph nodes and a tiny 0/4 cm right internal mammary lymph node. Mammoprint returned low risk w/ a score of +0.29.   - Elected for tx on the endocrine optimization protocol of ISPY-2, and was randomized to tx w/ amcenestrant. Was on amcenestrant from 7/1/2021-10/26/2021.  - 10/27/2021 underwent right breast mastectomy and right axillary lymph node dissection w/ Dr. Snider. Pathology showed grade 2 carcinoma of the right breast measuring 3.2 cm w/ invasion of the dermis, nipple, and the pectoralis muscle. Ki-67 of the excised tumor was 7%. 15/44 right axillary lymph nodes were positive w/ 6 of the lymph nodes demonstrating extranodal extension. Largest lymph node metastasis measured 2.1 cm.   - 12/14/2021 began tx w/ Taxotere and cyclophosphamide.  - 1/5/2021 C2 given. Had significant fever following cycle 2.  - 1/26/2021 C3 given  - 2/16/2022 C4 given   - Plan is to start adjuvant radiation soon. Heme/onc planning to initiate tx w/ letrozole at the same time as radiation and abemaciclib upon completion of radiation. Plan to administer abemaciclib for 2 years. Letrozole for a total of 10 years.   - Starting proton beam radiation 3/21/2022. 15 days over 3 weeks at the [facility].              Past Medical and Surgical History:     Past Medical History:   Diagnosis Date     Breast cancer (H) 05/2021     Chronic sinusitis      Hypertension 2002     Past Surgical History:   Procedure Laterality Date     DISSECT LYMPH NODE AXILLA Right 10/27/2021    Procedure: RIGHT Axillary Lymph Node  Dissection;  Surgeon: Nida Snider MD;  Location: [facility] OR     MASTECTOMY SIMPLE Right 10/27/2021    Procedure: RIGHT Simple Mastectomy;  Surgeon: Nida Snider MD;  Location:  OR            Social History:     Social History     Tobacco Use     Smoking status: Former Smoker     Packs/day: 0.50     Years: 20.00     Pack years: 10.00     Types: Cigarettes     Start date: 1976     Quit date: 2000     Years since quittin.5     Smokeless tobacco: Never Used   Substance Use Topics     Alcohol use: Yes     Comment: occ       Marital Status: , lives w/ his wife  Family support: Good family support, daughter is ENT surgeon in [place]  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 Ambulating w/o assistive device. Is driving. 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     ADLs: Independent  Assistive devices: None  iADLs (medication management and finances): Independent  Hand dominance: Right hand dominant  Driving: Is driving           Family History:     Family History   Problem Relation Age of Onset     Diabetes Brother      Hypertension Brother      Bladder Cancer Brother      Anesthesia Reaction No family hx of      Deep Vein Thrombosis (DVT) No family hx of             Medications:     Current Outpatient Medications   Medication Sig Dispense Refill     letrozole (FEMARA) 2.5 MG tablet Take 1 tablet (2.5 mg) by mouth daily 90 tablet 3     losartan-hydrochlorothiazide (HYZAAR) 100-25 MG tablet Take 1 tablet by mouth every morning 90 tablet 1            Allergies:     No Known Allergies           ROS:     A focused ROS is negative other than the symptoms noted above in the HPI.      Constitutional: denies any fevers, chills, any recent weight loss  Eyes: denies changes in visual acuity   Ears,  "Nose, Throat: denies any difficulty swallowing   Cardiovascular: denies any exertional chest pain or palpitation   Respiratory: denies dyspnea   Gastrointestinal: denies any nausea, vomiting, abdominal pain, diarrhea or constipation  Genitourinary: denies any dysuria, hematuria, frequency or urgency   Musculoskeletal: denies any muscle pain, joint pain, neck pain or back pain   Neurologic: denies any headache, changes in motor or sensory function, no loss of balance or vertigo   Psychiatric: denies mood changes; sleeps OK             Physical Examination:     VITAL SIGNS: /65   Pulse 90   Resp 16   Wt 78.1 kg (172 lb 3.2 oz)   SpO2 99%   BMI 26.56 kg/m    BMI: Estimated body mass index is 26.56 kg/m  as calculated from the following:    Height as of 3/3/22: 1.715 m (5' 7.52\").    Weight as of this encounter: 78.1 kg (172 lb 3.2 oz).    Gen: NAD, pleasant and cooperative   HEENT: MMM  Cardio: regular pulse, well perfused  Pulm: non-labored breathing on room air  Abd: benign  Ext: Subtle lymphedema in right forearm. Increased lymphedema over the dorsum of the hand and along lateral aspect of proximal forearm. Negative Stemmers sign. No lymphedema noted in neck or chest. No enlarged lymph nodes palpated. No cording noted.   Neuro/MSK: RUE full active ROM. No increased tone. 5/5 strength throughout.               Laboratory/Imaging:     PET and CT of neck, chest, abdomen, and pelvis 2/18/2022  IMPRESSION: In this patient with history of right breast invasive  ductal carcinoma, status post mastectomy, axillary node dissection,  and adjuvant chemotherapy, findings are compatible with complete  response.  1. No suspicious focal FDG uptake identified.  2. Decreased FDG activity about the surgical sites, now less than  liver background, compatible with resolving postsurgical inflammatory  uptake.  3. Unchanged prostatomegaly. Bladder wall thickening again  demonstrated, suggesting chronic bladder outlet " obstruction.    PET and CT of neck, chest, abdomen, and pelvis 11/22/2021  IMPRESSION: In this patient with a history of invasive ductal  carcinoma of the breast status post right mastectomy and lymph node  dissection:   1. Postoperative changes of right mastectomy and right axillary lymph  node dissection with mild associated uptake and soft tissue  thickening, favor postoperative changes. No definite recurrent or  residual disease. No focal nodular uptake to rule in recurrent  disease.   2. Prostatomegaly and diffuse bladder wall thickening likely related  to chronic bladder outlet obstruction.           Assessment/Plan:     Ronna Collins is a 71 year old male who was recently diagnosed w/ right breast cancer (clinical prognostic stage IIIb, [id number], grade 2, ER positive, WA positive, HER-2 negative), now s/p right breast mastectomy and right axillary lymph node dissection (10/27/2021). He is followed by Dr. Sr in heme/onc and Dr. Snider in surgical oncology. Please see below for a detailed timeline of oncologic hx. He presents to clinic today for initial evaluation and management of right UE lymphedema.     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 Since his current RUE compression sleeve is loose and not fitting well, will place a new orthotics order (for [place]) so that he can be fitted for both a new RUE and gauntlet compression garments. Discussed that compression garments should be replaced every 3-6 months, and that it might be good to have a couple pairs on hand to swap out from day to day.     He is currently working w/ lymphedema therapy once a month. Dr. Waters will plan to reach out  to Renetta Dempsey (PT) to see if the frequency of therapies could be increased to weekly. Encouraged him to continue w/ twice a day stretching, ROM exercises, and manual lymphatic drainage.      Discussed w/ Dr. Collins the signs and symptoms of cellulitis, and that if there are concerns, to reach out to the clinic or urgent care, as he may need to be prescribed a short course of antibiotics. Also discussed that there may be skin sensitivities w/ starting the proton beam radiation therapy, and to monitor his skin closely.     1. Patient education: In depth discussion and education was provided about the assessment and implications of each of the below recommendations for management. Patient indicated readiness to learn, all questions were answered and understanding of material presented was confirmed.   2. Work-up: None  3. Therapy/equipment/braces: Orthotics referral (for [place]) placed so that he can be re-fitted for RUE and gauntlet compression garments. Encouraged him to wear compression garments every day. Dr. Waters will also reach out to Renetta Dempsey (PT) to see if it would be possible to increase frequency of lymphedema therapy to weekly (currently sees her once a month).    4. Medications: None  5. Interventions: Monitor for s/s of cellulitis. If there are concerns, informed him to call the clinic or go to urgent care to be evaluated. Also discussed that he should monitor skin closely for sensitivities once he starts proton beam radiation therapy.   6. Referral / follow up with other providers: None  7. Follow up: RTC w/ Dr. Waters in 2 months (after he is done with proton beam radiation therapy, which is set to start on 3/21/22)         America Miguel MD  Resident Physician, PGY-2  Physical Medicine & Rehabilitation  [facility]    Physician Attestation   I, Celsa Waters MD, saw this patient and agree with the findings and plan of care as documented in the note.      Items personally  "reviewed/procedural attestation: vitals, labs and imaging and agree with the interpretation documented in the note.    Celsa Waters MD  Physical Medicine & Rehabilitation      90 minutes spent on the date of the encounter doing chart review, history and exam, documentation and further activities as noted above.      Oncology Rooming Note    March 15, 2022 10:07 AM   Ronna Collins is a 71 year old male who presents for:    Chief Complaint   Patient presents with     Oncology Clinic Visit     Initial Vitals: There were no vitals taken for this visit. Estimated body mass index is 26.02 kg/m  as calculated from the following:    Height as of 3/3/22: 1.715 m (5' 7.52\").    Weight as of 3/3/22: 76.5 kg (168 lb 11.2 oz). There is no height or weight on file to calculate BSA.  Data Unavailable Comment: Data Unavailable   No LMP for male patient.  Allergies reviewed: Yes  Medications reviewed: Yes    Medications: Medication refills not needed today.  Pharmacy name entered into [facility]: [id number] [address] AT [facility]    Clinical concerns:  doctor was notified.      Eva Marroquin [facility]                Again, thank you for allowing me to participate in the care of your patient.        Sincerely,        Celsa Waters MD    "

## 2022-03-16 ENCOUNTER — INFUSION THERAPY VISIT (OUTPATIENT)
Dept: ONCOLOGY | Facility: CLINIC | Age: 72
End: 2022-03-16
Attending: INTERNAL MEDICINE
Payer: COMMERCIAL

## 2022-03-16 ENCOUNTER — APPOINTMENT (OUTPATIENT)
Dept: LAB | Facility: CLINIC | Age: 72
End: 2022-03-16
Attending: INTERNAL MEDICINE
Payer: COMMERCIAL

## 2022-03-16 VITALS
RESPIRATION RATE: 16 BRPM | HEART RATE: 92 BPM | SYSTOLIC BLOOD PRESSURE: 137 MMHG | OXYGEN SATURATION: 98 % | WEIGHT: 172.9 LBS | BODY MASS INDEX: 26.66 KG/M2 | TEMPERATURE: 98.5 F | DIASTOLIC BLOOD PRESSURE: 74 MMHG

## 2022-03-16 DIAGNOSIS — R50.9 FEVER, UNSPECIFIED FEVER CAUSE: Primary | ICD-10-CM

## 2022-03-16 DIAGNOSIS — C50.121 MALIGNANT NEOPLASM OF CENTRAL PORTION OF RIGHT BREAST IN MALE, ESTROGEN RECEPTOR POSITIVE (H): ICD-10-CM

## 2022-03-16 DIAGNOSIS — Z17.0 MALIGNANT NEOPLASM OF CENTRAL PORTION OF RIGHT BREAST IN MALE, ESTROGEN RECEPTOR POSITIVE (H): ICD-10-CM

## 2022-03-16 LAB
CRP SERPL-MCNC: <2.9 MG/L (ref 0–8)
ERYTHROCYTE [SEDIMENTATION RATE] IN BLOOD BY WESTERGREN METHOD: 8 MM/HR (ref 0–20)

## 2022-03-16 PROCEDURE — 85652 RBC SED RATE AUTOMATED: CPT

## 2022-03-16 PROCEDURE — 250N000011 HC RX IP 250 OP 636: Performed by: INTERNAL MEDICINE

## 2022-03-16 PROCEDURE — 86140 C-REACTIVE PROTEIN: CPT

## 2022-03-16 PROCEDURE — 36415 COLL VENOUS BLD VENIPUNCTURE: CPT

## 2022-03-16 PROCEDURE — 96402 CHEMO HORMON ANTINEOPL SQ/IM: CPT

## 2022-03-16 RX ADMIN — LEUPROLIDE ACETATE 7.5 MG: KIT at 07:56

## 2022-03-16 ASSESSMENT — PAIN SCALES - GENERAL: PAINLEVEL: NO PAIN (0)

## 2022-03-16 NOTE — NURSING NOTE
Chief Complaint   Patient presents with     Blood Draw     Labs drawn via  by rn in lab. VS taken.     Labs collected from venipuncture by RN. Vitals taken. Checked in for appointment(s).    Jt Cheng RN

## 2022-03-16 NOTE — PATIENT INSTRUCTIONS
[facility] Triage and after hours / weekends / holidays:  [phone]    Please call the triage or after hours line if you experience a temperature greater than or equal to 100.4, shaking chills, have uncontrolled nausea, vomiting and/or diarrhea, dizziness, shortness of breath, chest pain, bleeding, unexplained bruising, or if you have any other new/concerning symptoms, questions or concerns.      If you are having any concerning symptoms or wish to speak to a provider before your next infusion visit, please call your care coordinator or triage to notify them so we can adequately serve you.     If you need a refill on a narcotic prescription or other medication, please call before your infusion appointment.                 March 2022 Sunday Monday Tuesday Wednesday Thursday Friday Saturday             1     2    LAB  11:45 AM   (15 min.)   [facility]   [facility]   1:00 PM   (90 min.)   Polina Paredes MD   [facility] 3    CT SIM   2:00 PM   (60 min.)   Polina Paredes MD   [facility]    RETURN   3:45 PM   (45 min.)   Nay Blandon PA-C   [facility] 4     5       6     7     8     9     10     11    LYMPHEDEMA TREATMENT   8:00 AM   (75 min.)   Renetta Dempsey PT   [facility] 12       13     14     15    NEW  10:00 AM   (60 min.)   Celsa Waters MD   [facility] 16    LAB PERIPHERAL   7:00 AM   (15 min.)   [facility] DRAW   [facility]    ONC INFUSION 0.5 HR (30 MIN)   7:30 AM   (30 min.)    ONC INFUSION NURSE   [facility] 17     18     19       20     21     22     23     24     25     26       27     28     29     30     31 April 2022 Sunday Monday Tuesday Wednesday Thursday Friday Saturday                            1     2        3     4     5     6     7     8     9       10     11     12     13     14     15     16       17     18     19     20     21     22    LYMPHEDEMA TREATMENT   9:00 AM   (60 min.)   Renetta Dempsey PT   [facility] 23       24     25     26    RETURN   8:00 AM   (30 min.)   Mikaela Sr MD   [facility] 27     28     29     30                     Lab Results:  No results found for this or any previous visit (from the past 12 hour(s)).

## 2022-03-16 NOTE — PROGRESS NOTES
Infusion Nursing Note:  Jonathan Collins presents today for Day 1 Cycle 1 monthly Lupron   Patient seen by provider today: No   present during visit today: Not Applicable.    Note: Patient presents to infusion feeling well. Patient denies pain and states no acute complaints or concerns needing to be addressed today. Patient confirms he started his Letrozole this morning and will start radiation Monday. Patient denied questions or concerns about new medication today. Patient states he has self educated about medication-Pro Hoop Strength.iMER literature given. Mechanism of action, monthly schedule, and side effects reviewed.     ESR and CRP are the only labs ordered for today.   TORB. 0742. 3/16/2022. Nay ORTIZ. Haresh Kathleen RN. No extra labs needed for today    Intravenous Access:  No Intravenous access at this visit.    Treatment Conditions:  Results for JONATHAN COLLINS (MRN [id number]) as of 3/16/2022 08:36   Ref. Range 3/16/2022 07:41   CRP Inflammation Latest Ref Range: 0.0 - 8.0 mg/L <2.9   Sed Rate Latest Ref Range: 0 - 20 mm/hr 8         Post Infusion Assessment:  Patient tolerated 1 R side Ventrogluteal Lupron IM injection without incident. No blood aspirated prior to administration.       Discharge Plan:   Patient declined prescription refills.  Discharge instructions reviewed with: Patient.  Patient and/or family verbalized understanding of discharge instructions and all questions answered.  AVS to patient via [employer].  Patient will return in 1 month for next appointment. IB sent to scheduling to make next appts.  Patient discharged in stable condition accompanied by: self.  Departure Mode: Ambulatory.  Face to Face time: 0 minutes.      Haresh Kathleen RN

## 2022-03-22 ENCOUNTER — MYC MEDICAL ADVICE (OUTPATIENT)
Dept: ONCOLOGY | Facility: CLINIC | Age: 72
End: 2022-03-22
Payer: COMMERCIAL

## 2022-03-23 NOTE — TELEPHONE ENCOUNTER
[facility] Triage    MyChart Message: Diarrhea    3 days ago had 5-6 small bowel movements - watery  Yesterday 3 times  Passing gas  Today is bm is normal  Eating, drinking, no change in diet, urinating normally  No fever  No pain  No blood in stool  No bloating  No light headed   No dizziness  No new antibiotics  No cramping  Started Letrozole, reviewed side effects and diarrhea is not on the list.     Reviewed Ambulatory Care Protocol Grade 1.  Reviewed when to call back.    Emeterio verbalized understanding of instructions and will call back if issues arise.    Routing to Nay Blandon

## 2022-04-10 DIAGNOSIS — Z17.0 MALIGNANT NEOPLASM OF CENTRAL PORTION OF RIGHT BREAST IN MALE, ESTROGEN RECEPTOR POSITIVE (H): Primary | ICD-10-CM

## 2022-04-10 DIAGNOSIS — C50.121 MALIGNANT NEOPLASM OF CENTRAL PORTION OF RIGHT BREAST IN MALE, ESTROGEN RECEPTOR POSITIVE (H): Primary | ICD-10-CM

## 2022-04-13 ENCOUNTER — INFUSION THERAPY VISIT (OUTPATIENT)
Dept: ONCOLOGY | Facility: CLINIC | Age: 72
End: 2022-04-13
Attending: INTERNAL MEDICINE
Payer: COMMERCIAL

## 2022-04-13 VITALS
RESPIRATION RATE: 12 BRPM | SYSTOLIC BLOOD PRESSURE: 138 MMHG | DIASTOLIC BLOOD PRESSURE: 79 MMHG | OXYGEN SATURATION: 99 % | TEMPERATURE: 97.9 F | HEART RATE: 86 BPM

## 2022-04-13 DIAGNOSIS — C50.121 MALIGNANT NEOPLASM OF CENTRAL PORTION OF RIGHT BREAST IN MALE, ESTROGEN RECEPTOR POSITIVE (H): Primary | ICD-10-CM

## 2022-04-13 DIAGNOSIS — Z17.0 MALIGNANT NEOPLASM OF CENTRAL PORTION OF RIGHT BREAST IN MALE, ESTROGEN RECEPTOR POSITIVE (H): Primary | ICD-10-CM

## 2022-04-13 PROCEDURE — 96402 CHEMO HORMON ANTINEOPL SQ/IM: CPT

## 2022-04-13 PROCEDURE — 250N000011 HC RX IP 250 OP 636: Performed by: INTERNAL MEDICINE

## 2022-04-13 RX ADMIN — LEUPROLIDE ACETATE 7.5 MG: KIT at 07:42

## 2022-04-13 ASSESSMENT — PAIN SCALES - GENERAL: PAINLEVEL: NO PAIN (0)

## 2022-04-13 NOTE — PROGRESS NOTES
Infusion Nursing Note:  Ronna Collins presents today for Lupron.    Patient seen by provider today: No   present during visit today: Not Applicable.    Note: Patient denies the following: fevers, body aches, chills, headaches, vision changes, dizziness, chest pain, shortness of breath, nausea, vomiting, constipation, abdominal pain, rashes, bruising/bleeding, mouth sores, swelling or pain in the legs. Ok for treatment.       Intravenous Access:  No Intravenous access/labs at this visit.    Treatment Conditions:  Not Applicable.      Post Infusion Assessment:  Patient tolerated injection into Left Ventrogluteal without incident.       Discharge Plan:   Patient declined prescription refills.  Discharge instructions reviewed with: Patient.  Patient and/or family verbalized understanding of discharge instructions and all questions answered.  AVS to patient via [employer].  Patient will return 4/26 to see provider.   Patient discharged in stable condition accompanied by: self.  Departure Mode: Ambulatory.    Verito Leonardo RN

## 2022-04-22 ENCOUNTER — HOSPITAL ENCOUNTER (OUTPATIENT)
Dept: PHYSICAL THERAPY | Facility: CLINIC | Age: 72
Setting detail: THERAPIES SERIES
Discharge: HOME | End: 2022-04-22
Attending: FAMILY MEDICINE
Payer: COMMERCIAL

## 2022-04-22 PROCEDURE — 97140 MANUAL THERAPY 1/> REGIONS: CPT | Mod: GP | Performed by: PHYSICAL THERAPIST

## 2022-04-22 PROCEDURE — 97110 THERAPEUTIC EXERCISES: CPT | Mod: GP | Performed by: PHYSICAL THERAPIST

## 2022-04-25 ENCOUNTER — DOCUMENTATION ONLY (OUTPATIENT)
Dept: ONCOLOGY | Facility: CLINIC | Age: 72
End: 2022-04-25
Payer: COMMERCIAL

## 2022-04-25 ENCOUNTER — TELEPHONE (OUTPATIENT)
Dept: ONCOLOGY | Facility: CLINIC | Age: 72
End: 2022-04-25
Payer: COMMERCIAL

## 2022-04-25 DIAGNOSIS — C50.121 MALIGNANT NEOPLASM OF CENTRAL PORTION OF RIGHT BREAST IN MALE, ESTROGEN RECEPTOR POSITIVE (H): Primary | ICD-10-CM

## 2022-04-25 DIAGNOSIS — Z17.0 MALIGNANT NEOPLASM OF CENTRAL PORTION OF RIGHT BREAST IN MALE, ESTROGEN RECEPTOR POSITIVE (H): Primary | ICD-10-CM

## 2022-04-25 NOTE — PROGRESS NOTES
Oncology Visit:  Date on this visit: 4/26/2022    Diagnosis: h/o clinical prognostic stage IIIb, [id number], grade 2, ER positive, [place] positive, HER-2 negative right breast cancer, [id number].    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  He noted discomfort and hardening of the skin of the right nipple in late April/early May, 2021.  He noted a seborrheic keratosis of the right nipple and remembered he had a similar skin lesion of the arm 3 months earlier.  However, he subsequently noted a mass in the same area.  Right breast skin punch biopsy performed by dermatology was c/w grade 2 invasive ductal carcinoma with associated DCIS.  Invasive carcinoma was ER positive 100% and MN positive 70%, with tumor invading the dermis.  HER-2 was negative by IHC (score 1+).  Diagnostic mammogram and ultrasound showed a retroareolar right breast mass measuring 2.9 cm with associated nipple retraction and enlarged, abnormal right axillary lymph nodes.  Right breast biopsy was again c/w grade 2 invasive ductal carcinoma, ER strong in 98% of tumor cell nuclei, HER-2 negative.  Ki-67 was 15%.    He elected to screen for the ISPY-2 clinical trial.  MRI breast on 6/18/2021 showed the biopsy proven right breast cancer to measure 3.9 cm in maximum dimension with invasion of the nipple and the pectoralis muscle as well as at least 4 abnormal level 1 and 2 right axillary lymph nodes and a tiny 0.4 cm right internal mammary lymph node.  Mammoprint returned low risk with a score of +0.29.  He elected for treatment on the endocrine optimization protocol of ISPY-2 and was randomized to treatment with amcenestrant.  He was on treatment with  amcenestrant from 7/1/2021 - 10/26/2021.  At our visit in 01/2021, both right axillary node and right breast tumor palpated more firm then prior.  Breast MRI was stable, however, due to clinical concerns for progression,  decision was made to proceed with surgery.   Right breast mastectomy and right axillary lymph node dissection was performed by Dr. Snider on 10/27/2021.  Pathology showed grade 2 carcinoma of the right breast measuring 3.2 cm with invasion of the dermis, nipple and the pectoralis muscle.  Ki-67 of the excised tumor was 7%.  15/44 right axillary lymph nodes were positive with 6 of the lymph nodes demonstrating extranodal extension.  The largest lymph node metastasis measured 2.1 cm.  Oncotype dx recurrence score was 50.    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  His course was complicated by fevers persistent throughout treatment.  He received radiation (4800 cGy in 15 fractions) to the right chest wall and regional lymph nodes from 3/21/2022 - 4/8/2022.  He has been on treatment with lupron and letrozole since 3/16/2022.    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    He tolerated radiation therapy, which he completed on 4/8/22, well. He has been diligent about applying moisturizers and has not had any skin toxicity. He does feel that he experienced some fatigue with the radiation (not as severe as that associated with prior chemotherapy), which has improved slightly.    The fevers he was experiencing with chemotherapy ultimately spontaneously resolved. They did persist for about three weeks after completing chemotherapy.    He has an exophytic skin lesion overlying the L posterior scalp, which he and more so his wife are concerned about. It did start bleeding (without significant known trauma) recently. They feel it showed up after chemotherapy and has grown since then. They have a dermatology appt scheduled at the end of May, and they wonder whether this can be moved up.    He is experiencing some mild joint pains, mostly involving the right knee. Otherwise he has some stiffness of both legs, mostly involving the  large proximal muscles. The right knee tends to be a relatively sharp pain that typically occurs at night and not with activity.     He has continued to tolerate lupron and letrozole reasonably well. He wonders whether lupron can be administered every 3 months instead of monthly.    He continues to wear a compression sleeve on his right arm, but has not had any noticeable edema. He notes that when measured, it has been 5-10% larger than the opposite arm, so he has been diligent about wearing the sleeve to prevent any worsening.     We discussed potential side effects and close monitoring needed for initiation of abemaciclib. He is traveling to visit his granddaughter, leaving on May 10th for a total of about two weeks. We discussed coordinating necessary laboratory studies.  The remainder of a complete 12 point review of systems was reviewed with the patient and was negative with the exception of that mentioned above.    Past Medical/Surgical History:  Past Medical History:   Diagnosis Date     Breast cancer (H) 05/2021     Chronic sinusitis      Hypertension 2002     Past Surgical History:   Procedure Laterality Date     DISSECT LYMPH NODE AXILLA Right 10/27/2021    Procedure: RIGHT Axillary Lymph Node Dissection;  Surgeon: Nida Snider MD;  Location: [facility]     MASTECTOMY SIMPLE Right 10/27/2021    Procedure: RIGHT Simple Mastectomy;  Surgeon: Nida Snider MD;  Location: [facility] OR     Allergies:  Allergies as of 04/26/2022     (No Known Allergies)     Current Medications:  Current Outpatient Medications   Medication Sig Dispense Refill     letrozole (FEMARA) 2.5 MG tablet Take 1 tablet (2.5 mg) by mouth daily 90 tablet 3     losartan-hydrochlorothiazide (HYZAAR) 100-25 MG tablet Take 1 tablet by mouth every morning 90 tablet 1      Family and Social History:  See initial consultation dated 6/8/2021 for further details.  Breast Actionable and Breast Expanded panels were both negative for pathogenic  germline mutations.    Physical Exam:  BP (!) 149/89   Pulse 92   Temp 98.5  F (36.9  C) (Oral)   Wt 76.7 kg (169 lb)   SpO2 100%   BMI 26.06 kg/m    General:  Well appearing adult male in NAD.  Alert and oriented x 3.  HEENT:  Normocephalic.  Sclera anicteric.  MMM.  No lesions of the oropharynx. (+) alopecia; a 0.5 cm exophytic skin lesion overlies the left posterior scalp with associating scab but no active bleeding. It is somewhat irregular in shape, flesh-pink colored. No scaling.   Lymph:  No palpable cervical, supraclavicular, or axillary LAD.  Chest:  CTA bilaterally.  No wheezes or crackles. Mastectomy scar well-healed. No masses of the right chest wall.  CV:  RRR.  Nl S1 and S2.    Abd:  Soft/ND.    Ext:  No pitting edema of the bilateral lower extremities.    Neuro:  Cranial nerves grossly intact.  Psych:  Mood and affect appear normal.    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  He is s/p treatment with 3.5 months neoadjuvant amcenestrant, right breast mastectomy, right ALND, 4 cycles Taxotere and cyclophosphamide, and radiation.  He is on treatment with lupron and letrozole and now adding abemaciclib.    1.  Right breast cancer:    Since last visit, he has completed a course of radiation.  Plan at this time is to add abemaciclib to lupron and letrozole.  We reviewed abemaciclib is a pill taken twice daily.  Two years of abemaciclib added to adjuvant endocrine therapy has been shown to improve both 3 year disease free survival and overall survival in patients with node positive, ER positive, breast cancer.  Common side effects include nausea, diarrhea, and cytopenias. We reviewed the use of imodium to use prn diarrhea.  We will need to monitor blood counts every 2 weeks for the first two  "months and we will work around his existing travel plans.  I would like him initially to be seen monthly in clinic to assess tolerance.  If he is tolerating the medication well these visits can later be spaced to every 2-3 months.    We also reviewed surveillance plan.  We discussed NCCN and other national guidelines do not recommend scheduled imaging for surveillance of breast cancer.  However, he is aware of high risk of recurrence and we reviewed last PET/CT showed \"scattered left axillary lymph nodes with mild FDG activity\", bladder wall thickening, and mild uptake throughout the bone marrow.  Based on these indeterminate findings, I recommend obtaining a follow up CT C/A/P w/ contrast in early September (approximately 6 months after the last).    2.  Fever:  Resolved after discontinuing chemotherapy and therefore likely associated to this. His inflammatory markers normalized, which also supports fever was induced by chemotherapy.    3.  Fatigue & weakness:  Accumulating fatigue with chemotherapy is common.  His weakness and loss of strength are more likely due to deconditioning and sarcopenia. He is working with a physical therapist, he declines need for occupational therapy.  He is also looking into other strengthening programs for patients finishing cancer treatment.    4. Skin lesion overlying the L scalp: Will send photos of the lesion to his dermatology group to see whether sooner follow-up is necessary.    5.  RUE lymphedema:  Will continue to wear a compression sleeve, self massage prn.    6.  Follow Up:  Labs and Lupron injection 5/9.  Lupron every 4 weeks x 4.  Labs in 4 weeks.  SANDRA visit in 4 and 8 weeks.  Visit with me in 12 weeks.  CT C/A/P w/ contrast in early September.    Patient was seen and discussed with oncology fellow, Dr. Bo.  I have edited the above note to reflect our joint assessment and plan.  I spent 45 minutes on the date of the encounter doing chart review, review of test results, " interpretation of tests, patient visit, documentation and discussion with other provider(s).

## 2022-04-25 NOTE — TELEPHONE ENCOUNTER
Prior Authorization Not Needed per Insurance    Medication: Verzenio PA Not Needed  Insurance Company: [employer] - Phone [phone] Fax [phone]  Expected CoPay:      Pharmacy Filling the Rx: [facility] - [place], [place] - [facility] [address]  Pharmacy Notified:    Patient Notified:              Delma Esteves  Oncology Pharmacy Liaison II  [email or web address]  Phone: [phone]  Fax: [phone]

## 2022-04-25 NOTE — TELEPHONE ENCOUNTER
PA Initiation    Medication: Verzenio PA   Insurance Company: [employer] - Phone [phone] Fax [phone]  Pharmacy Filling the Rx:    Filling Pharmacy Phone:    Filling Pharmacy Fax:    Start Date: 4/25/2022    [id number]      Delma Esteves  Oncology Pharmacy Liaison II  [email or web address]  Phone: [phone]  Fax: [phone]

## 2022-04-26 ENCOUNTER — TELEPHONE (OUTPATIENT)
Dept: ONCOLOGY | Facility: CLINIC | Age: 72
End: 2022-04-26
Payer: COMMERCIAL

## 2022-04-26 ENCOUNTER — ONCOLOGY VISIT (OUTPATIENT)
Dept: ONCOLOGY | Facility: CLINIC | Age: 72
End: 2022-04-26
Attending: INTERNAL MEDICINE
Payer: COMMERCIAL

## 2022-04-26 VITALS
SYSTOLIC BLOOD PRESSURE: 149 MMHG | DIASTOLIC BLOOD PRESSURE: 89 MMHG | OXYGEN SATURATION: 100 % | WEIGHT: 169 LBS | BODY MASS INDEX: 26.06 KG/M2 | HEART RATE: 92 BPM | TEMPERATURE: 98.5 F

## 2022-04-26 DIAGNOSIS — C50.929 MALIGNANT NEOPLASM OF MALE BREAST, UNSPECIFIED ESTROGEN RECEPTOR STATUS, UNSPECIFIED LATERALITY, UNSPECIFIED SITE OF BREAST (H): Primary | ICD-10-CM

## 2022-04-26 DIAGNOSIS — M62.81 GENERALIZED MUSCLE WEAKNESS: ICD-10-CM

## 2022-04-26 DIAGNOSIS — R59.1 LYMPHADENOPATHY: ICD-10-CM

## 2022-04-26 DIAGNOSIS — I89.0 LYMPHEDEMA OF RIGHT ARM: ICD-10-CM

## 2022-04-26 DIAGNOSIS — L98.9 LESION OF SKIN OF SCALP: ICD-10-CM

## 2022-04-26 DIAGNOSIS — Z17.0 MALIGNANT NEOPLASM OF CENTRAL PORTION OF RIGHT BREAST IN MALE, ESTROGEN RECEPTOR POSITIVE (H): Primary | ICD-10-CM

## 2022-04-26 DIAGNOSIS — Z71.89 ENCOUNTER FOR MEDICATION COUNSELING: ICD-10-CM

## 2022-04-26 DIAGNOSIS — C50.121 MALIGNANT NEOPLASM OF CENTRAL PORTION OF RIGHT BREAST IN MALE, ESTROGEN RECEPTOR POSITIVE (H): Primary | ICD-10-CM

## 2022-04-26 DIAGNOSIS — C50.929 MALIGNANT NEOPLASM OF MALE BREAST, UNSPECIFIED ESTROGEN RECEPTOR STATUS, UNSPECIFIED LATERALITY, UNSPECIFIED SITE OF BREAST (H): ICD-10-CM

## 2022-04-26 DIAGNOSIS — R53.83 FATIGUE DUE TO TREATMENT: ICD-10-CM

## 2022-04-26 DIAGNOSIS — R94.8 ABNORMAL POSITRON EMISSION TOMOGRAPHY (PET) SCAN: ICD-10-CM

## 2022-04-26 PROCEDURE — 99215 OFFICE O/P EST HI 40 MIN: CPT | Performed by: INTERNAL MEDICINE

## 2022-04-26 PROCEDURE — G0463 HOSPITAL OUTPT CLINIC VISIT: HCPCS

## 2022-04-26 RX ORDER — PROCHLORPERAZINE MALEATE 10 MG
5 TABLET ORAL EVERY 6 HOURS PRN
Qty: 30 TABLET | Refills: 2 | Status: SHIPPED | OUTPATIENT
Start: 2022-04-26 | End: 2023-10-31

## 2022-04-26 RX ORDER — LOPERAMIDE HCL 2 MG
CAPSULE ORAL
Qty: 30 CAPSULE | Refills: 0 | Status: SHIPPED | OUTPATIENT
Start: 2022-04-26

## 2022-04-26 RX ORDER — ONDANSETRON 8 MG/1
8 TABLET, FILM COATED ORAL EVERY 8 HOURS PRN
Qty: 30 TABLET | Refills: 3 | Status: SHIPPED | OUTPATIENT
Start: 2022-04-26 | End: 2022-11-29

## 2022-04-26 ASSESSMENT — PAIN SCALES - GENERAL: PAINLEVEL: NO PAIN (0)

## 2022-04-26 NOTE — NURSING NOTE
"Oncology Rooming Note    April 26, 2022 8:18 AM   Ronna Collins is a 71 year old male who presents for:    Chief Complaint   Patient presents with     Oncology Clinic Visit     Rtn for breast cancer     Initial Vitals: BP (!) 149/89   Pulse 92   Temp 98.5  F (36.9  C) (Oral)   Wt 76.7 kg (169 lb)   SpO2 100%   BMI 26.06 kg/m   Estimated body mass index is 26.06 kg/m  as calculated from the following:    Height as of 3/3/22: 1.715 m (5' 7.52\").    Weight as of this encounter: 76.7 kg (169 lb). Body surface area is 1.91 meters squared.  No Pain (0) Comment: Data Unavailable   No LMP for male patient.  Allergies reviewed: Yes  Medications reviewed: Yes    Medications: Medication refills not needed today.  Pharmacy name entered into [facility]: [id number] [address] AT [facility]    Clinical concerns: none       Marychuy Blake, EMT            "

## 2022-04-26 NOTE — LETTER
4/26/2022         RE: Ronna Collins  [address]  [place] [address]        Dear Colleague,    Thank you for referring your patient, Ronna Collins, to the [facility]. Please see a copy of my visit note below.    Oncology Visit:  Date on this visit: 4/26/2022    Diagnosis: h/o clinical prognostic stage IIIb, [id number], grade 2, ER positive, NE positive, HER-2 negative right breast cancer, [id number].    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  He noted discomfort and hardening of the skin of the right nipple in late April/early May, 2021.  He noted a seborrheic keratosis of the right nipple and remembered he had a similar skin lesion of the arm 3 months earlier.  However, he subsequently noted a mass in the same area.  Right breast skin punch biopsy performed by dermatology was c/w grade 2 invasive ductal carcinoma with associated DCIS.  Invasive carcinoma was ER positive 100% and NE positive 70%, with tumor invading the dermis.  HER-2 was negative by IHC (score 1+).  Diagnostic mammogram and ultrasound showed a retroareolar right breast mass measuring 2.9 cm with associated nipple retraction and enlarged, abnormal right axillary lymph nodes.  Right breast biopsy was again c/w grade 2 invasive ductal carcinoma, ER strong in 98% of tumor cell nuclei, HER-2 negative.  Ki-67 was 15%.    He elected to screen for the ISPY-2 clinical trial.  MRI breast on 6/18/2021 showed the biopsy proven right breast cancer to measure 3.9 cm in maximum dimension with invasion of the nipple and the pectoralis muscle as well as at least 4 abnormal level 1 and 2 right axillary lymph nodes and a tiny 0.4 cm right internal mammary lymph node.  Mammoprint returned low risk with a score of +0.29.  He elected for treatment on the endocrine optimization protocol of ISPY-2 and was randomized to treatment with  amcenestrant.  He was on treatment with  amcenestrant from 7/1/2021 - 10/26/2021.  At our visit in 01/2021, both right axillary node and right breast tumor palpated more firm then prior.  Breast MRI was stable, however, due to clinical concerns for progression, decision was made to proceed with surgery.   Right breast mastectomy and right axillary lymph node dissection was performed by Dr. Snider on 10/27/2021.  Pathology showed grade 2 carcinoma of the right breast measuring 3.2 cm with invasion of the dermis, nipple and the pectoralis muscle.  Ki-67 of the excised tumor was 7%.  15/44 right axillary lymph nodes were positive with 6 of the lymph nodes demonstrating extranodal extension.  The largest lymph node metastasis measured 2.1 cm.  Oncotype dx recurrence score was 50.     Karina received 4 cycles of Taxotere and cyclophosphamide from 12/14/2021 - 2/16/2022.  His course was complicated by fevers persistent throughout treatment.  He received radiation (4800 cGy in 15 fractions) to the right chest wall and regional lymph nodes from 3/21/2022 - 4/8/2022.  He has been on treatment with lupron and letrozole since 3/16/2022.    Interval History:   Karina is overall doing reasonably well. He presents today with his wife.    He tolerated radiation therapy, which he completed on 4/8/22, well. He has been diligent about applying moisturizers and has not had any skin toxicity. He does feel that he experienced some fatigue with the radiation (not as severe as that associated with prior chemotherapy), which has improved slightly.    The fevers he was experiencing with chemotherapy ultimately spontaneously resolved. They did persist for about three weeks after completing chemotherapy.    He has an exophytic skin lesion overlying the L posterior scalp, which he and more so his wife are concerned about. It did start bleeding (without significant known trauma) recently. They feel it showed up after chemotherapy and has grown  since then. They have a dermatology appt scheduled at the end of May, and they wonder whether this can be moved up.    He is experiencing some mild joint pains, mostly involving the right knee. Otherwise he has some stiffness of both legs, mostly involving the large proximal muscles. The right knee tends to be a relatively sharp pain that typically occurs at night and not with activity.     He has continued to tolerate lupron and letrozole reasonably well. He wonders whether lupron can be administered every 3 months instead of monthly.    He continues to wear a compression sleeve on his right arm, but has not had any noticeable edema. He notes that when measured, it has been 5-10% larger than the opposite arm, so he has been diligent about wearing the sleeve to prevent any worsening.     We discussed potential side effects and close monitoring needed for initiation of abemaciclib. He is traveling to visit his granddaughter, leaving on May 10th for a total of about two weeks. We discussed coordinating necessary laboratory studies.  The remainder of a complete 12 point review of systems was reviewed with the patient and was negative with the exception of that mentioned above.    Past Medical/Surgical History:  Past Medical History:   Diagnosis Date     Breast cancer (H) 05/2021     Chronic sinusitis      Hypertension 2002     Past Surgical History:   Procedure Laterality Date     DISSECT LYMPH NODE AXILLA Right 10/27/2021    Procedure: RIGHT Axillary Lymph Node Dissection;  Surgeon: Nida Snider MD;  Location: [facility] OR     MASTECTOMY SIMPLE Right 10/27/2021    Procedure: RIGHT Simple Mastectomy;  Surgeon: Nida Snider MD;  Location: [facility] OR     Allergies:  Allergies as of 04/26/2022     (No Known Allergies)     Current Medications:  Current Outpatient Medications   Medication Sig Dispense Refill     letrozole (FEMARA) 2.5 MG tablet Take 1 tablet (2.5 mg) by mouth daily 90 tablet 3      losartan-hydrochlorothiazide (HYZAAR) 100-25 MG tablet Take 1 tablet by mouth every morning 90 tablet 1      Family and Social History:  See initial consultation dated 6/8/2021 for further details.  Breast Actionable and Breast Expanded panels were both negative for pathogenic germline mutations.    Physical Exam:  BP (!) 149/89   Pulse 92   Temp 98.5  F (36.9  C) (Oral)   Wt 76.7 kg (169 lb)   SpO2 100%   BMI 26.06 kg/m    General:  Well appearing adult male in NAD.  Alert and oriented x 3.  HEENT:  Normocephalic.  Sclera anicteric.  MMM.  No lesions of the oropharynx. (+) alopecia; a 0.5 cm exophytic skin lesion overlies the left posterior scalp with associating scab but no active bleeding. It is somewhat irregular in shape, flesh-pink colored. No scaling.   Lymph:  No palpable cervical, supraclavicular, or axillary LAD.  Chest:  CTA bilaterally.  No wheezes or crackles. Mastectomy scar well-healed. No masses of the right chest wall.  CV:  RRR.  Nl S1 and S2.    Abd:  Soft/ND.    Ext:  No pitting edema of the bilateral lower extremities.    Neuro:  Cranial nerves grossly intact.  Psych:  Mood and affect appear normal.    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  He is s/p treatment with 3.5 months neoadjuvant amcenestrant, right breast mastectomy, right ALND, 4 cycles Taxotere and cyclophosphamide, and radiation.  He is on treatment with lupron and letrozole and now adding abemaciclib.    1.  Right breast cancer:    Since last visit, he has completed a course of radiation.  Plan at this time is to add abemaciclib to lupron and letrozole.  We reviewed abemaciclib is a pill taken twice daily.  Two years of abemaciclib added to adjuvant endocrine therapy has been shown to improve both 3 year disease free  "survival and overall survival in patients with node positive, ER positive, breast cancer.  Common side effects include nausea, diarrhea, and cytopenias. We reviewed the use of imodium to use prn diarrhea.  We will need to monitor blood counts every 2 weeks for the first two months and we will work around his existing travel plans.  I would like him initially to be seen monthly in clinic to assess tolerance.  If he is tolerating the medication well these visits can later be spaced to every 2-3 months.    We also reviewed surveillance plan.  We discussed NCCN and other national guidelines do not recommend scheduled imaging for surveillance of breast cancer.  However, he is aware of high risk of recurrence and we reviewed last PET/CT showed \"scattered left axillary lymph nodes with mild FDG activity\", bladder wall thickening, and mild uptake throughout the bone marrow.  Based on these indeterminate findings, I recommend obtaining a follow up CT C/A/P w/ contrast in early September (approximately 6 months after the last).    2.  Fever:  Resolved after discontinuing chemotherapy and therefore likely associated to this. His inflammatory markers normalized, which also supports fever was induced by chemotherapy.    3.  Fatigue & weakness:  Accumulating fatigue with chemotherapy is common.  His weakness and loss of strength are more likely due to deconditioning and sarcopenia. He is working with a physical therapist, he declines need for occupational therapy.  He is also looking into other strengthening programs for patients finishing cancer treatment.    4. Skin lesion overlying the L scalp: Will send photos of the lesion to his dermatology group to see whether sooner follow-up is necessary.    5.  RUE lymphedema:  Will continue to wear a compression sleeve, self massage prn.    6.  Follow Up:  Labs and Lupron injection 5/9.  Lupron every 4 weeks x 4.  Labs in 4 weeks.  SANDRA visit in 4 and 8 weeks.  Visit with me in 12 " weeks.  CT C/A/P w/ contrast in early September.    Patient was seen and discussed with oncology fellow, Dr. Bo.  I have edited the above note to reflect our joint assessment and plan.  I spent 45 minutes on the date of the encounter doing chart review, review of test results, interpretation of tests, patient visit, documentation and discussion with other provider(s).         Again, thank you for allowing me to participate in the care of your patient.        Sincerely,        Mikaela Sr MD

## 2022-04-26 NOTE — ORAL ONC MGMT
"Oral Chemotherapy Monitoring Program    Lab Monitoring Plan  CBC and CMP every 2 weeks for the first 2 months, then monthly and PRN  Labs drawn outside of [place]: No  Subjective/Objective:  Ronna Collins is a 71 year old male contacted by phone for an initial visit for oral chemotherapy education.  Patient is to begin Verzenio as soon as able.      ORAL CHEMOTHERAPY 4/25/2022 4/26/2022   Assessment Type Initial Work up New Teach;Refill   Diagnosis Code Breast Cancer Breast Cancer   Providers Dr. Remberto Sr   [facility]   Drug Name Verzenio (abemaciclib) Verzenio (abemaciclib)   Dose 150 mg 150 mg   Current Schedule BID BID   Cycle Details Continuous Continuous   Is the dose as ordered appropriate for the patient? - Yes       Vitals:  BP:   BP Readings from Last 1 Encounters:   04/26/22 (!) 149/89     Wt Readings from Last 1 Encounters:   04/26/22 76.7 kg (169 lb)     Estimated body surface area is 1.91 meters squared as calculated from the following:    Height as of 3/3/22: 1.715 m (5' 7.52\").    Weight as of an earlier encounter on 4/26/22: 76.7 kg (169 lb).    Labs:  No results found for NA within last 30 days.     No results found for K within last 30 days.     No results found for CA within last 30 days.     No results found for Mag within last 30 days.     No results found for Phos within last 30 days.     No results found for ALBUMIN within last 30 days.     No results found for BUN within last 30 days.     No results found for CR within last 30 days.     No results found for AST within last 30 days.     No results found for ALT within last 30 days.     No results found for BILITOTAL within last 30 days.     No results found for WBC within last 30 days.     No results found for HGB within last 30 days.     No results found for PLT within last 30 days.     No results found for ANC within last 30 days.        Assessment:  Patient is appropriate to start " therapy.    Plan:  Basic chemotherapy teaching was reviewed with the patient including indication, start date of therapy (as soon as possible), dose, administration, adverse effects, missed doses, food and drug interactions, monitoring, side effect management, office contact information, and safe handling. Written materials were provided and all questions answered.  Released prescription to [facility] today.    Follow-Up:  1 week follow-up for initial follow up assessment     Paula Mejia PharmD  Oral Chemotherapy Monitoring Program  [facility]  [phone]

## 2022-05-04 ENCOUNTER — TELEPHONE (OUTPATIENT)
Dept: ONCOLOGY | Facility: CLINIC | Age: 72
End: 2022-05-04
Payer: COMMERCIAL

## 2022-05-04 NOTE — ORAL ONC MGMT
Subjective/Objective:  Ronna Collins is a 71 year old male contacted by phone for a follow-up visit for oral chemotherapy.  Pt confirms they are taking Verzenio 150 mg twice daily. Pt reports no missed doses. Pt reports starting cycle on 4/26 evening. Pt reports tolerating the medication well. Pt denies side effects including nausea and vomiting or change in appetite. Patient does endorse some episodes of diarrhea, but these resolved with Imodium use. He also reports some abdominal cramping/fullness that feels like gas. I advised Simethicone use and to monitor/notify if worsens. Not significantly fatigued, but does reports gets more tired or panting during exercise. Reports not sleeping well and daughter suggested Melatonin- I informed there are no DDIs and this would be okay to try.        ORAL CHEMOTHERAPY 4/25/2022 4/26/2022 5/4/2022   Assessment Type Initial Work up New Teach;Refill Initial Follow up   Diagnosis Code Breast Cancer Breast Cancer Breast Cancer   Providers Dr. Remberto Sr   [facility]   Drug Name Verzenio (abemaciclib) Verzenio (abemaciclib) Verzenio (abemaciclib)   Dose 150 mg 150 mg 150 mg   Current Schedule BID BID BID   Cycle Details Continuous Continuous Continuous   Start Date of Last Cycle - - 4/26/2022   Doses missed in last 2 weeks - - 0   Adherence Assessment - - Adherent   Adverse Effects - - Diarrhea;Fatigue;Other (See Note for Details)   Diarrhea - - Grade 1   Pharmacist Intervention(diarrhea) - - Yes   Intervention(s) - - Patient education;OTC recommendation   Fatigue - - Grade 1   Pharmacist Intervention(fatigue) - - Yes   Intervention(s) - - Patient education   Other (See Note for Details) - - cramping/gas pain   Pharmacist intervention(other) - - Yes   Intervention(s) - - OTC recommendation   Any new drug interactions? - - No   Is the dose as ordered appropriate for the patient? - Yes Yes   Since the last time we  "talked, have you been hospitalized or used the emergency room? - - No       Vitals:  BP:   BP Readings from Last 1 Encounters:   04/26/22 (!) 149/89     Wt Readings from Last 1 Encounters:   04/26/22 76.7 kg (169 lb)     Estimated body surface area is 1.91 meters squared as calculated from the following:    Height as of 3/3/22: 1.715 m (5' 7.52\").    Weight as of 4/26/22: 76.7 kg (169 lb).    Labs:  q2 weeks for first 2 months, next appt 5/10/22      Assessment/Plan:  Pt is currently tolerating therapy well. Plan to continue Verzenio as prescribed. Pt verbalized understanding and agrees to plan. Encouraged pt to call with any issues or concerns.    Follow-Up:  5/10: labs  6/7: labs and provider visit    Refill Due:  Prior to 5/24/22    Mariela Moore, PharmD  Oral Chemotherapy Monitoring Program  [facility]  [phone]  May 4, 2022    "

## 2022-05-07 DIAGNOSIS — Z17.0 MALIGNANT NEOPLASM OF CENTRAL PORTION OF RIGHT BREAST IN MALE, ESTROGEN RECEPTOR POSITIVE (H): Primary | ICD-10-CM

## 2022-05-07 DIAGNOSIS — C50.121 MALIGNANT NEOPLASM OF CENTRAL PORTION OF RIGHT BREAST IN MALE, ESTROGEN RECEPTOR POSITIVE (H): Primary | ICD-10-CM

## 2022-05-09 ENCOUNTER — MYC MEDICAL ADVICE (OUTPATIENT)
Dept: FAMILY MEDICINE | Facility: CLINIC | Age: 72
End: 2022-05-09
Payer: COMMERCIAL

## 2022-05-09 ENCOUNTER — TELEPHONE (OUTPATIENT)
Dept: FAMILY MEDICINE | Facility: CLINIC | Age: 72
End: 2022-05-09
Payer: COMMERCIAL

## 2022-05-09 DIAGNOSIS — I10 BENIGN ESSENTIAL HYPERTENSION: ICD-10-CM

## 2022-05-09 NOTE — TELEPHONE ENCOUNTER
[facility] Call Center    Phone Message    May a detailed message be left on voicemail: yes     Reason for Call: Medication Refill Request    Has the patient contacted the pharmacy for the refill? Yes   Name of medication being requested: losartan-hydrochlorothiazide (HYZAAR) 100-25 MG tablet  Provider who prescribed the medication: Dr Henry  Pharmacy:    [id number] [address] AT [facility]      Date medication is needed: today  Pt is going out of town afternoon. Pt called his pharmacy and has been waiting since Thursday last week         Action Taken: Message routed to:  [facility] PCC    Travel Screening: Not Applicable

## 2022-05-10 ENCOUNTER — INFUSION THERAPY VISIT (OUTPATIENT)
Dept: ONCOLOGY | Facility: CLINIC | Age: 72
End: 2022-05-10
Attending: INTERNAL MEDICINE
Payer: COMMERCIAL

## 2022-05-10 ENCOUNTER — APPOINTMENT (OUTPATIENT)
Dept: LAB | Facility: CLINIC | Age: 72
End: 2022-05-10
Attending: INTERNAL MEDICINE
Payer: COMMERCIAL

## 2022-05-10 ENCOUNTER — DOCUMENTATION ONLY (OUTPATIENT)
Dept: ONCOLOGY | Facility: CLINIC | Age: 72
End: 2022-05-10

## 2022-05-10 ENCOUNTER — PATIENT OUTREACH (OUTPATIENT)
Dept: ONCOLOGY | Facility: CLINIC | Age: 72
End: 2022-05-10

## 2022-05-10 VITALS
WEIGHT: 164 LBS | OXYGEN SATURATION: 97 % | DIASTOLIC BLOOD PRESSURE: 85 MMHG | BODY MASS INDEX: 25.29 KG/M2 | SYSTOLIC BLOOD PRESSURE: 135 MMHG | HEART RATE: 78 BPM | RESPIRATION RATE: 14 BRPM

## 2022-05-10 DIAGNOSIS — Z17.0 MALIGNANT NEOPLASM OF CENTRAL PORTION OF RIGHT BREAST IN MALE, ESTROGEN RECEPTOR POSITIVE (H): Primary | ICD-10-CM

## 2022-05-10 DIAGNOSIS — C50.121 MALIGNANT NEOPLASM OF CENTRAL PORTION OF RIGHT BREAST IN MALE, ESTROGEN RECEPTOR POSITIVE (H): Primary | ICD-10-CM

## 2022-05-10 DIAGNOSIS — I10 BENIGN ESSENTIAL HYPERTENSION: ICD-10-CM

## 2022-05-10 LAB
ALBUMIN SERPL-MCNC: 3.6 G/DL (ref 3.4–5)
ALP SERPL-CCNC: 79 U/L (ref 40–150)
ALT SERPL W P-5'-P-CCNC: 25 U/L (ref 0–70)
ANION GAP SERPL CALCULATED.3IONS-SCNC: 5 MMOL/L (ref 3–14)
AST SERPL W P-5'-P-CCNC: 19 U/L (ref 0–45)
BASOPHILS # BLD AUTO: 0 10E3/UL (ref 0–0.2)
BASOPHILS NFR BLD AUTO: 1 %
BILIRUB SERPL-MCNC: 0.4 MG/DL (ref 0.2–1.3)
BUN SERPL-MCNC: 12 MG/DL (ref 7–30)
CALCIUM SERPL-MCNC: 9.9 MG/DL (ref 8.5–10.1)
CHLORIDE BLD-SCNC: 105 MMOL/L (ref 94–109)
CO2 SERPL-SCNC: 27 MMOL/L (ref 20–32)
CREAT SERPL-MCNC: 0.96 MG/DL (ref 0.66–1.25)
EOSINOPHIL # BLD AUTO: 0.2 10E3/UL (ref 0–0.7)
EOSINOPHIL NFR BLD AUTO: 5 %
ERYTHROCYTE [DISTWIDTH] IN BLOOD BY AUTOMATED COUNT: 12.4 % (ref 10–15)
GFR SERPL CREATININE-BSD FRML MDRD: 86 ML/MIN/1.73M2
GLUCOSE BLD-MCNC: 105 MG/DL (ref 70–99)
HCT VFR BLD AUTO: 40.3 % (ref 40–53)
HGB BLD-MCNC: 13.4 G/DL (ref 13.3–17.7)
IMM GRANULOCYTES # BLD: 0 10E3/UL
IMM GRANULOCYTES NFR BLD: 0 %
LYMPHOCYTES # BLD AUTO: 1 10E3/UL (ref 0.8–5.3)
LYMPHOCYTES NFR BLD AUTO: 28 %
MCH RBC QN AUTO: 27.7 PG (ref 26.5–33)
MCHC RBC AUTO-ENTMCNC: 33.3 G/DL (ref 31.5–36.5)
MCV RBC AUTO: 83 FL (ref 78–100)
MONOCYTES # BLD AUTO: 0.2 10E3/UL (ref 0–1.3)
MONOCYTES NFR BLD AUTO: 5 %
NEUTROPHILS # BLD AUTO: 2.3 10E3/UL (ref 1.6–8.3)
NEUTROPHILS NFR BLD AUTO: 61 %
NRBC # BLD AUTO: 0 10E3/UL
NRBC BLD AUTO-RTO: 0 /100
PLATELET # BLD AUTO: 182 10E3/UL (ref 150–450)
POTASSIUM BLD-SCNC: 4 MMOL/L (ref 3.4–5.3)
PROT SERPL-MCNC: 7.7 G/DL (ref 6.8–8.8)
RBC # BLD AUTO: 4.84 10E6/UL (ref 4.4–5.9)
SODIUM SERPL-SCNC: 137 MMOL/L (ref 133–144)
WBC # BLD AUTO: 3.7 10E3/UL (ref 4–11)

## 2022-05-10 PROCEDURE — 80053 COMPREHEN METABOLIC PANEL: CPT

## 2022-05-10 PROCEDURE — 250N000011 HC RX IP 250 OP 636: Performed by: INTERNAL MEDICINE

## 2022-05-10 PROCEDURE — 36415 COLL VENOUS BLD VENIPUNCTURE: CPT

## 2022-05-10 PROCEDURE — 85025 COMPLETE CBC W/AUTO DIFF WBC: CPT

## 2022-05-10 PROCEDURE — 82040 ASSAY OF SERUM ALBUMIN: CPT

## 2022-05-10 PROCEDURE — 96402 CHEMO HORMON ANTINEOPL SQ/IM: CPT

## 2022-05-10 RX ORDER — LOSARTAN POTASSIUM AND HYDROCHLOROTHIAZIDE 25; 100 MG/1; MG/1
1 TABLET ORAL EVERY MORNING
Qty: 90 TABLET | Refills: 0 | Status: SHIPPED | OUTPATIENT
Start: 2022-05-10 | End: 2022-06-07

## 2022-05-10 RX ORDER — LOSARTAN POTASSIUM AND HYDROCHLOROTHIAZIDE 25; 100 MG/1; MG/1
1 TABLET ORAL EVERY MORNING
Qty: 90 TABLET | Refills: 0 | Status: SHIPPED | OUTPATIENT
Start: 2022-05-10 | End: 2022-07-15

## 2022-05-10 RX ADMIN — LEUPROLIDE ACETATE 7.5 MG: KIT at 07:59

## 2022-05-10 ASSESSMENT — PAIN SCALES - GENERAL: PAINLEVEL: NO PAIN (0)

## 2022-05-10 NOTE — PATIENT INSTRUCTIONS
Contact Numbers  [facility]: [phone]    After Hours:  [phone]  Triage: [phone]    Please call the [facility] Triage line if you experience a temperature greater than or equal to 100.5, shaking chills, have uncontrolled nausea, vomiting and/or diarrhea, dizziness, shortness of breath, chest pain, bleeding, unexplained bruising, or if you have any other new/concerning symptoms, questions or concerns.     If it is after hours, weekends, or holidays, please call the main hospital  at  [phone] and ask to speak to the Oncology doctor on call.     If you are having any concerning symptoms or wish to speak to a provider before your next infusion visit, please call your care coordinator or triage to notify them so we can adequately serve you.     If you need a refill on a narcotic prescription or other medication, please call triage before your infusion appointment.       May 2022      [place] Monday Tuesday Wednesday Thursday Friday Saturday   1     2     3     4     5     6     7       8     9     10    LAB PERIPHERAL   7:30 AM   (15 min.)    [facility]RAW   [facility]    ONC INFUSION 0.5 HR (30 MIN)   8:00 AM   (30 min.)    ONC INFUSION NURSE   [facility] 11     12     13     14       15     16     17     18     19     20     21       22     23     24    RETURN   7:25 AM   (15 min.)   Tk Delgadillo MD   [facility] [place] 25    RETURN   7:45 AM   (30 min.)   Celsa Waters MD   [facility] 26     27    LYMPHEDEMA TREATMENT   8:00 AM   (60 min.)   Renetta Dempsey PT   [facility] 28       29     30     31 June 2022 Sunday Monday Tuesday Wednesday Thursday Friday Saturday                  1     2     3     4       5     6     7    LAB PERIPHERAL   7:30 AM   (15  min.)   [facility] LAB DRAW   [facility]    RETURN   7:45 AM   (45 min.)   Nay Blandon PA-C   [facility] 8     9     10     11       12     13     14     15     16     17     18       19     20     21     22     23     24     25       26     27     28     29     30                                 Lab Results:  Recent Results (from the past 12 hour(s))   Comprehensive metabolic panel    Collection Time: 05/10/22  7:44 AM   Result Value Ref Range    Sodium 137 133 - 144 mmol/L    Potassium 4.0 3.4 - 5.3 mmol/L    Chloride 105 94 - 109 mmol/L    Carbon Dioxide (CO2) 27 20 - 32 mmol/L    Anion Gap 5 3 - 14 mmol/L    Urea Nitrogen 12 7 - 30 mg/dL    Creatinine 0.96 0.66 - 1.25 mg/dL    Calcium 9.9 8.5 - 10.1 mg/dL    Glucose 105 (H) 70 - 99 mg/dL    Alkaline Phosphatase 79 40 - 150 U/L    AST 19 0 - 45 U/L    ALT 25 0 - 70 U/L    Protein Total 7.7 6.8 - 8.8 g/dL    Albumin 3.6 3.4 - 5.0 g/dL    Bilirubin Total 0.4 0.2 - 1.3 mg/dL    GFR Estimate 86 >60 mL/min/1.73m2   CBC with platelets and differential    Collection Time: 05/10/22  7:44 AM   Result Value Ref Range    WBC Count 3.7 (L) 4.0 - 11.0 10e3/uL    RBC Count 4.84 4.40 - 5.90 10e6/uL    Hemoglobin 13.4 13.3 - 17.7 g/dL    Hematocrit 40.3 40.0 - 53.0 %    MCV 83 78 - 100 fL    MCH 27.7 26.5 - 33.0 pg    MCHC 33.3 31.5 - 36.5 g/dL    RDW 12.4 10.0 - 15.0 %    Platelet Count 182 150 - 450 10e3/uL    % Neutrophils 61 %    % Lymphocytes 28 %    % Monocytes 5 %    % Eosinophils 5 %    % Basophils 1 %    % Immature Granulocytes 0 %    NRBCs per 100 WBC 0 <1 /100    Absolute Neutrophils 2.3 1.6 - 8.3 10e3/uL    Absolute Lymphocytes 1.0 0.8 - 5.3 10e3/uL    Absolute Monocytes 0.2 0.0 - 1.3 10e3/uL    Absolute Eosinophils 0.2 0.0 - 0.7 10e3/uL    Absolute Basophils 0.0 0.0 - 0.2 10e3/uL    Absolute Immature Granulocytes 0.0 <=0.4 10e3/uL    Absolute NRBCs 0.0 10e3/uL

## 2022-05-10 NOTE — TELEPHONE ENCOUNTER
losartan-hydrochlorothiazide (HYZAAR) 100-25 MG tablet   Take 1 tablet by mouth every morning      Last Written Prescription Date:  10/25/21  Last Fill Quantity: 90,   # refills: 1  Last Office Visit : 6/14/21  Future Office visit:  none    Routing refill request to provider for review/approval because:  Failed protocol. BP > 140/90 on 4/26/22 = 149/89    04/26/22 (!) 149/89

## 2022-05-10 NOTE — NURSING NOTE
Chief Complaint   Patient presents with     Blood Draw     Labs drawn via  by RN in lab. VS taken.      Labs drawn via venipuncture. Vital signs taken. Checked into next appointment.   Ana Morgan RN

## 2022-05-10 NOTE — PROGRESS NOTES
Oral Chemotherapy Monitoring Program    Lab Follow Up:  Reviewed labs completed 5/10 and sent [employer] message to patient.    ORAL CHEMOTHERAPY 4/25/2022 4/26/2022 5/4/2022 5/10/2022   Assessment Type Initial Work up New Teach;Refill Initial Follow up Lab Monitoring   Diagnosis Code Breast Cancer Breast Cancer Breast Cancer Breast Cancer   Providers Dr. Remberto Sr   [facility]   Drug Name Verzenio (abemaciclib) Verzenio (abemaciclib) Verzenio (abemaciclib) Verzenio (abemaciclib)   Dose 150 mg 150 mg 150 mg 150 mg   Current Schedule BID BID BID BID   Cycle Details Continuous Continuous Continuous Continuous   Start Date of Last Cycle - - 4/26/2022 -   Doses missed in last 2 weeks - - 0 -   Adherence Assessment - - Adherent -   Adverse Effects - - Diarrhea;Fatigue;Other (See Note for Details) -   Diarrhea - - Grade 1 -   Pharmacist Intervention(diarrhea) - - Yes -   Intervention(s) - - Patient education;OTC recommendation -   Fatigue - - Grade 1 -   Pharmacist Intervention(fatigue) - - Yes -   Intervention(s) - - Patient education -   Other (See Note for Details) - - cramping/gas pain -   Pharmacist intervention(other) - - Yes -   Intervention(s) - - OTC recommendation -   Any new drug interactions? - - No -   Is the dose as ordered appropriate for the patient? - Yes Yes -   Since the last time we talked, have you been hospitalized or used the emergency room? - - No -       Labs:  Last Comprehensive Metabolic Panel:  Sodium   Date Value Ref Range Status   05/10/2022 137 133 - 144 mmol/L Final   07/08/2021 139 133 - 144 mmol/L Final     Potassium   Date Value Ref Range Status   05/10/2022 4.0 3.4 - 5.3 mmol/L Final   07/08/2021 4.3 3.4 - 5.3 mmol/L Final     Chloride   Date Value Ref Range Status   05/10/2022 105 94 - 109 mmol/L Final   07/08/2021 107 94 - 109 mmol/L Final     Carbon Dioxide   Date Value Ref Range Status   07/08/2021 27 20 -  32 mmol/L Final     Carbon Dioxide (CO2)   Date Value Ref Range Status   05/10/2022 27 20 - 32 mmol/L Final     Anion Gap   Date Value Ref Range Status   05/10/2022 5 3 - 14 mmol/L Final   07/08/2021 4 3 - 14 mmol/L Final     Glucose   Date Value Ref Range Status   05/10/2022 105 (H) 70 - 99 mg/dL Final   07/08/2021 106 (H) 70 - 99 mg/dL Final     Urea Nitrogen   Date Value Ref Range Status   05/10/2022 12 7 - 30 mg/dL Final   07/08/2021 8 7 - 30 mg/dL Final     Creatinine   Date Value Ref Range Status   05/10/2022 0.96 0.66 - 1.25 mg/dL Final   07/08/2021 0.80 0.66 - 1.25 mg/dL Final     GFR Estimate   Date Value Ref Range Status   05/10/2022 86 >60 mL/min/1.73m2 Final     Comment:     Effective December 21, 2021 eGFRcr in adults is calculated using the 2021 CKD-EPI creatinine equation which includes age and gender (Glendy et al., NEJ, DOI: 10.1056/IKPBiz6617838)   07/08/2021 >90 >60 mL/min/[1.73_m2] Final     Comment:     Non  GFR Calc  Starting 12/18/2018, serum creatinine based estimated GFR (eGFR) will be   calculated using the Chronic Kidney Disease Epidemiology Collaboration   (CKD-EPI) equation.       Calcium   Date Value Ref Range Status   05/10/2022 9.9 8.5 - 10.1 mg/dL Final   07/08/2021 8.7 8.5 - 10.1 mg/dL Final     Bilirubin Total   Date Value Ref Range Status   05/10/2022 0.4 0.2 - 1.3 mg/dL Final   07/08/2021 0.5 0.2 - 1.3 mg/dL Final     Alkaline Phosphatase   Date Value Ref Range Status   05/10/2022 79 40 - 150 U/L Final   07/08/2021 61 40 - 150 U/L Final     ALT   Date Value Ref Range Status   05/10/2022 25 0 - 70 U/L Final   07/08/2021 45 0 - 70 U/L Final     AST   Date Value Ref Range Status   05/10/2022 19 0 - 45 U/L Final   07/08/2021 21 0 - 45 U/L Final       Lab Results   Component Value Date    WBC 3.7 05/10/2022    WBC 5.7 07/08/2021     Lab Results   Component Value Date    RBC 4.84 05/10/2022    RBC 5.24 07/08/2021     Lab Results   Component Value Date    HGB 13.4  05/10/2022    HGB 14.2 07/08/2021     Lab Results   Component Value Date    HCT 40.3 05/10/2022    HCT 43.9 07/08/2021     Lab Results   Component Value Date    MCV 83 05/10/2022    MCV 84 07/08/2021     Lab Results   Component Value Date    MCH 27.7 05/10/2022    MCH 27.1 07/08/2021     Lab Results   Component Value Date    MCHC 33.3 05/10/2022    MCHC 32.3 07/08/2021     Lab Results   Component Value Date    RDW 12.4 05/10/2022    RDW 12.2 07/08/2021     Lab Results   Component Value Date     05/10/2022     07/08/2021       Assessment/Plan:  No concerning abnormalities. Continue abemaciclib therapy as planned.    Follow-Up:  Message sent to scheduling to set up next lab draw  Review q2 week labs ~5/24    Karyn Dos Santos  Pharmacy Intern  [facility]  [phone]

## 2022-05-10 NOTE — PROGRESS NOTES
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    WBC 3.7  HGb 13.4  Plt 182  ANC 2.3    April Farris MSN, RN, OCN   RN Care Coordinator   [facility]

## 2022-05-17 DIAGNOSIS — C50.929 MALIGNANT NEOPLASM OF MALE BREAST, UNSPECIFIED ESTROGEN RECEPTOR STATUS, UNSPECIFIED LATERALITY, UNSPECIFIED SITE OF BREAST (H): Primary | ICD-10-CM

## 2022-05-18 DIAGNOSIS — C50.929 MALIGNANT NEOPLASM OF MALE BREAST, UNSPECIFIED ESTROGEN RECEPTOR STATUS, UNSPECIFIED LATERALITY, UNSPECIFIED SITE OF BREAST (H): Primary | ICD-10-CM

## 2022-05-24 ENCOUNTER — OFFICE VISIT (OUTPATIENT)
Dept: DERMATOLOGY | Facility: CLINIC | Age: 72
End: 2022-05-24

## 2022-05-24 ENCOUNTER — MYC MEDICAL ADVICE (OUTPATIENT)
Dept: ONCOLOGY | Facility: CLINIC | Age: 72
End: 2022-05-24

## 2022-05-24 ENCOUNTER — LAB (OUTPATIENT)
Dept: LAB | Facility: CLINIC | Age: 72
End: 2022-05-24
Attending: INTERNAL MEDICINE
Payer: COMMERCIAL

## 2022-05-24 DIAGNOSIS — C50.921 MALIGNANT NEOPLASM OF RIGHT BREAST IN MALE, ESTROGEN RECEPTOR POSITIVE, UNSPECIFIED SITE OF BREAST (H): Primary | ICD-10-CM

## 2022-05-24 DIAGNOSIS — L82.1 SK (SEBORRHEIC KERATOSIS): ICD-10-CM

## 2022-05-24 DIAGNOSIS — C50.929 MALIGNANT NEOPLASM OF MALE BREAST, UNSPECIFIED ESTROGEN RECEPTOR STATUS, UNSPECIFIED LATERALITY, UNSPECIFIED SITE OF BREAST (H): ICD-10-CM

## 2022-05-24 DIAGNOSIS — L99 AMYLOIDOSIS CUTIS (H): ICD-10-CM

## 2022-05-24 DIAGNOSIS — E85.4 AMYLOIDOSIS CUTIS (H): ICD-10-CM

## 2022-05-24 DIAGNOSIS — Z17.0 MALIGNANT NEOPLASM OF RIGHT BREAST IN MALE, ESTROGEN RECEPTOR POSITIVE, UNSPECIFIED SITE OF BREAST (H): Primary | ICD-10-CM

## 2022-05-24 LAB
ALBUMIN SERPL-MCNC: 3.9 G/DL (ref 3.4–5)
ALP SERPL-CCNC: 68 U/L (ref 40–150)
ALT SERPL W P-5'-P-CCNC: 24 U/L (ref 0–70)
ANION GAP SERPL CALCULATED.3IONS-SCNC: 7 MMOL/L (ref 3–14)
AST SERPL W P-5'-P-CCNC: 19 U/L (ref 0–45)
BASOPHILS # BLD MANUAL: 0 10E3/UL (ref 0–0.2)
BASOPHILS NFR BLD MANUAL: 1 %
BILIRUB SERPL-MCNC: 0.4 MG/DL (ref 0.2–1.3)
BUN SERPL-MCNC: 16 MG/DL (ref 7–30)
CALCIUM SERPL-MCNC: 10.1 MG/DL (ref 8.5–10.1)
CHLORIDE BLD-SCNC: 107 MMOL/L (ref 94–109)
CO2 SERPL-SCNC: 24 MMOL/L (ref 20–32)
CREAT SERPL-MCNC: 0.8 MG/DL (ref 0.66–1.25)
EOSINOPHIL # BLD MANUAL: 0.3 10E3/UL (ref 0–0.7)
EOSINOPHIL NFR BLD MANUAL: 10 %
ERYTHROCYTE [DISTWIDTH] IN BLOOD BY AUTOMATED COUNT: 12.7 % (ref 10–15)
GFR SERPL CREATININE-BSD FRML MDRD: >90 ML/MIN/1.73M2
GLUCOSE BLD-MCNC: 97 MG/DL (ref 70–99)
HCT VFR BLD AUTO: 38.3 % (ref 40–53)
HGB BLD-MCNC: 12.6 G/DL (ref 13.3–17.7)
LYMPHOCYTES # BLD MANUAL: 0.9 10E3/UL (ref 0.8–5.3)
LYMPHOCYTES NFR BLD MANUAL: 30 %
MCH RBC QN AUTO: 26.9 PG (ref 26.5–33)
MCHC RBC AUTO-ENTMCNC: 32.9 G/DL (ref 31.5–36.5)
MCV RBC AUTO: 82 FL (ref 78–100)
MONOCYTES # BLD MANUAL: 0 10E3/UL (ref 0–1.3)
MONOCYTES NFR BLD MANUAL: 1 %
NEUTROPHILS # BLD MANUAL: 1.7 10E3/UL (ref 1.6–8.3)
NEUTROPHILS NFR BLD MANUAL: 58 %
PLAT MORPH BLD: NORMAL
PLATELET # BLD AUTO: 155 10E3/UL (ref 150–450)
POTASSIUM BLD-SCNC: 4.2 MMOL/L (ref 3.4–5.3)
PROT SERPL-MCNC: 7.9 G/DL (ref 6.8–8.8)
RBC # BLD AUTO: 4.69 10E6/UL (ref 4.4–5.9)
RBC MORPH BLD: NORMAL
SODIUM SERPL-SCNC: 138 MMOL/L (ref 133–144)
WBC # BLD AUTO: 2.9 10E3/UL (ref 4–11)

## 2022-05-24 PROCEDURE — 36415 COLL VENOUS BLD VENIPUNCTURE: CPT

## 2022-05-24 PROCEDURE — 85014 HEMATOCRIT: CPT

## 2022-05-24 PROCEDURE — 85007 BL SMEAR W/DIFF WBC COUNT: CPT

## 2022-05-24 PROCEDURE — 99213 OFFICE O/P EST LOW 20 MIN: CPT | Mod: GC | Performed by: DERMATOLOGY

## 2022-05-24 PROCEDURE — 80053 COMPREHEN METABOLIC PANEL: CPT

## 2022-05-24 ASSESSMENT — PAIN SCALES - GENERAL: PAINLEVEL: NO PAIN (0)

## 2022-05-24 NOTE — PATIENT INSTRUCTIONS
Gentle Skin Care Recommendations    Below is a list of products our providers recommend for gentle skin care:    Bathing     Limiting bathing to a couple of times per week is the best but daily bathing is okay if this is your routine. Shower or bath is fine. Tips for keeping your bathing as gentle as possible:   - limit the time in the shower to 15 minutes or less   - bathe using warm but not hot water   - do not take bubble baths  - consider limiting soap to only the 'dirty' areas, like the armipts and groin as much as possible to prevent stripping your skin in other areas of their natural moisture  - do not vigiorously scrub when cleansing  - avoid any soaps with microbeads  - after showering, pat your skin dry with a towel, do not scrub  -  Make sure you are applying a good moisturizer after bathing every time  - use gentle soaps that are free of colors and scents and are not too strong of cleansers.     Examples of gentle cleansers:   - Mild bar soaps: Vanicream bar soap, Neutragena glycerin bar, Dove sensitive skin (fragrance-free)   - Mild liquid soaps: Cetaphil liquid cleanser, Vanicream liquid cleanser, CeraVe liquid cleanser    Moisturizing     It is important to moisturize at least twice per day to the whole body. It is a good idea to moisturize immediately after bathing while the skin is still damp in order to 'lock-in' the moisture.     Moisturizers come in a variety of types some of which are greasier, heavier, or lighter. The heaviest moisturizers are ointments, which are greasy like vaseline. The next best are creams, which are usually white and thick but absorb into the skin a little better. The lightest - and thus are not recommended - are lotions which are typically thin and contain more ingredients which can be irritating to your skin.     Examples of moisturizers:   - ointments: vaseline, CeraVe healing ointment, Vaniply, vegetable oil, coconut oil   - creams: CeraVe, Cetaphil restoraderm,  Vanicream, Neutrogena Norwegian Formula Hand Cream   - lotions: not recommended to use      Laundry     Be sure to use laundry products that are free of dyes and fragrances -- it is important to note that many laundry products that claim to be fragrance-free actually are not free of these.     Do not use laundry softeners or dryer sheets.     Laundry Products include:  - 7th generation Natural Laundry Detergent Liquid, 7th generation Free and Clear Natural Laundry Detergent packs, 7th generation Free and Clear natural 4x Concentrated Laundry Detergent, ECOS hypoallergenic laundry detergent, free & clear, ERA Ultra Era Free Liquid Laundry Detergent.       Other Gentle Skin Recommendations    Do not use products such as powders, perfumes, or colognes on your skin  Avoid saunas and steam baths - these temperatures are too hot   Avoid tight or  scratchy  clothing such as wool  Always wash new clothing before wearing them for the first time  Sometimes a humidifier or vaporizer, used at night can help the dry skin - but remember to keep it clean to void mold growth.  Avoid applying essential oils to the skin or diffusing in the home.

## 2022-05-24 NOTE — PROGRESS NOTES
[facility] Dermatology Note   Encounter Date: May 24, 2022  Office visit    Dermatology Problem List:  FBSE 05/24/22  # Invasive ductal adenocarcinoma, right breast, ER+, KY+, HER2-  - skin bx 6/2021 (Dr. Yanez, [facility])   - s/p right mastectomy, chemoradiation Taxol x 22, XRT 3/31/22, Abraxone x 8, SLNbx 6/2021  - abemaciclib, letrozole, leuprolide  - following with oncology ([facility])  # Nummular dermatitis, previously with impetiginization  - prior: mometasone 0.1 oint  - bacterial cx 6/21/19 [facility]  # Atopic dermatitis with chronic sinusitis and cat/dog allergy on prick testing 6/21/19   # Benign bx  - VK, R tricep bx 1/14/21  __________________________________    Assessment & Plan:    # Numerous seborrheic keratosis  * chronic uncomplicated  - lesions benign nature, no treatment is indicated at this time, will monitor for any clinical changes    # 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  - counseled on skin changes to monitor for include development of nodules and acute expanding inflammation around the scar    # Benign skin findings include seborrheic keratosis, lentigines, skin tags, benign nevi, and cherry angiomas  * chronic uncomplicated  - lesions benign nature, no treatment is indicated at this time, will monitor for any clinical changes    # Macular amyloidosis, 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 Delgadillo    Staff Involved:  Resident/Staff    Makenzie Roberts MD  Dermatology Resident  [facility]    Staff Physician Comments:   I saw and evaluated the patient with the resident and I agree with the assessment and plan.  I was present for the examination. I have  made edits if needed.    Tk Delgadillo MD  Staff Dermatologist and Dermatopathologist  , Department of Dermatology  __________________________________    HPI:  Mr. Ronna Collins is a(n) 71 year old male presenting for:  - multiple lesions of concern  - had breast cancer on right breast, had skin changes that patient thought was just a keratosis, but was changing with deep component, biopsied by Dr. Yanez and showed breast cancer  - underwent multiple lymph node biopsies, surgery, chemotherapy, radiation over the past year, recently completed radiation 3/2022  - has mild lymphedema, working with lymphedema clinic  - brown spots, some are new, some previously frozen    Lab results:  6/11/21 Left axillary US revealed two mildly prominent left 1 axillary lymph nodes with cortical thickening measuring 0.32 cm.  6/11/21 FNA right axillary adenopathy positive for metastatic carcinoma. FNA left axillary lymph node negative for metastatic carcinoma.    Physical exam:  General: in no acute distress, well-appearing  Skin: full skin  - skin type: light brown  - face, chest, back, arms, back: numerous scattered waxy stuck-on tan to brown macules and papules  - post-mastectomy scar on breast and reticulated hypo- and hyper-pigmented patches, diffuse sclerosis  - focal atrophic plaques in the right axilla  - scalp, trunk: scattered bright red vascular papules  - thin brown plaque on the upper back        Medications:  Current Outpatient Medications   Medication     abemaciclib (VERZENIO) 150 MG tablet     abemaciclib (VERZENIO) 150 MG tablet     letrozole (FEMARA) 2.5 MG tablet     loperamide (IMODIUM) 2 MG capsule     losartan-hydrochlorothiazide (HYZAAR) 100-25 MG tablet     losartan-hydrochlorothiazide (HYZAAR) 100-25 MG tablet     ondansetron (ZOFRAN) 8 MG tablet     prochlorperazine (COMPAZINE) 10 MG tablet     No current facility-administered medications for this visit.      Past Medical  History:   Patient Active Problem List   Diagnosis     High triglycerides     HTN (hypertension)     Malignant neoplasm of male breast (H)     Past Medical History:   Diagnosis Date     Breast cancer (H) 05/2021     Chronic sinusitis      Hypertension 2002       CC No referring provider defined for this encounter. on close of this encounter.

## 2022-05-24 NOTE — LETTER
5/24/2022       RE: Ronna Collins  [address]  [facility] [address]     Dear Colleague,    Thank you for referring your patient, Ronna Collins, to the [facility] DERMATOLOGY [facility] [place] at [facility]. Please see a copy of my visit note below.    [facility] Dermatology Note   Encounter Date: May 24, 2022  Office visit    Dermatology Problem List:  FBSE 05/24/22  # Invasive ductal adenocarcinoma, right breast, ER+, TN+, HER2-  - skin bx 6/2021 (Dr. Yanez, [facility])   - s/p right mastectomy, chemoradiation Taxol x 22, XRT 3/31/22, Abraxone x 8, SLNbx 6/2021  - abemaciclib, letrozole, leuprolide  - following with oncology ([facility], [employer])  # Nummular dermatitis, previously with impetiginization  - prior: mometasone 0.1 oint  - bacterial cx 6/21/19 MSSA  # Atopic dermatitis with chronic sinusitis and cat/dog allergy on prick testing 6/21/19   # Benign bx  - VK, R tricep bx 1/14/21  __________________________________    Assessment & Plan:    # Numerous seborrheic keratosis  * chronic uncomplicated  - lesions benign nature, no treatment is indicated at this time, will monitor for any clinical changes    # 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  - counseled on skin changes to monitor for include development of nodules and acute expanding inflammation around the scar    # Benign skin findings include seborrheic keratosis, lentigines, skin tags, benign nevi, and cherry angiomas  * chronic uncomplicated  - lesions benign nature, no treatment is indicated at this time, will monitor for any clinical changes    # Macular amyloidosis, 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 Delgadillo    Staff Involved:  Resident/Staff    Makenzie Roberts MD  Dermatology Resident  [facility]    Staff Physician Comments:   I saw and evaluated the patient with the resident and I agree with the assessment and plan.  I was present for the examination. I have made edits if needed.    Tk Delgadillo MD  Staff Dermatologist and Dermatopathologist  , Department of Dermatology  __________________________________    HPI:  Mr. Ronna Collins is a(n) 71 year old male presenting for:  - multiple lesions of concern  - had breast cancer on right breast, had skin changes that patient thought was just a keratosis, but was changing with deep component, biopsied by Dr. Yanez and showed breast cancer  - underwent multiple lymph node biopsies, surgery, chemotherapy, radiation over the past year, recently completed radiation 3/2022  - has mild lymphedema, working with lymphedema clinic  - brown spots, some are new, some previously frozen    Lab results:  6/11/21 Left axillary US revealed two mildly prominent left 1 axillary lymph nodes with cortical thickening measuring 0.32 cm.  6/11/21 FNA right axillary adenopathy positive for metastatic carcinoma. FNA left axillary lymph node negative for metastatic carcinoma.    Physical exam:  General: in no acute distress, well-appearing  Skin: full skin  - skin type: light brown  - face, chest, back, arms, back: numerous scattered waxy stuck-on tan to brown macules and papules  - post-mastectomy scar on breast and reticulated hypo- and hyper-pigmented patches, diffuse sclerosis  - focal atrophic plaques in the right axilla  - scalp, trunk: scattered bright red vascular papules  - thin brown plaque on the upper back        Medications:  Current Outpatient Medications   Medication     abemaciclib (VERZENIO) 150 MG tablet     abemaciclib (VERZENIO) 150 MG tablet     letrozole (FEMARA) 2.5  MG tablet     loperamide (IMODIUM) 2 MG capsule     losartan-hydrochlorothiazide (HYZAAR) 100-25 MG tablet     losartan-hydrochlorothiazide (HYZAAR) 100-25 MG tablet     ondansetron (ZOFRAN) 8 MG tablet     prochlorperazine (COMPAZINE) 10 MG tablet     No current facility-administered medications for this visit.      Past Medical History:   Patient Active Problem List   Diagnosis     High triglycerides     HTN (hypertension)     Malignant neoplasm of male breast (H)     Past Medical History:   Diagnosis Date     Breast cancer (H) 05/2021     Chronic sinusitis      Hypertension 2002        No referring provider defined for this encounter. on close of this encounter.

## 2022-05-24 NOTE — NURSING NOTE
Dermatology Rooming Note    Ronna Collins's goals for this visit include:   Chief Complaint   Patient presents with     Skin Check     Ronna is here today for a skin check. He is concerned about his scalp and back.     Claudette Zamarripa CMA

## 2022-05-24 NOTE — NURSING NOTE
Chief Complaint   Patient presents with     Labs Only     Labs drawn via  by RN.        Labs collected from venipuncture by RN.       Kristen Smith RN

## 2022-05-27 ENCOUNTER — HOSPITAL ENCOUNTER (OUTPATIENT)
Dept: PHYSICAL THERAPY | Facility: CLINIC | Age: 72
Setting detail: THERAPIES SERIES
Discharge: HOME | End: 2022-05-27
Attending: FAMILY MEDICINE
Payer: COMMERCIAL

## 2022-05-27 PROCEDURE — 97110 THERAPEUTIC EXERCISES: CPT | Mod: GP | Performed by: PHYSICAL THERAPIST

## 2022-05-27 PROCEDURE — 97140 MANUAL THERAPY 1/> REGIONS: CPT | Mod: GP | Performed by: PHYSICAL THERAPIST

## 2022-05-27 PROCEDURE — 97535 SELF CARE MNGMENT TRAINING: CPT | Mod: GP | Performed by: PHYSICAL THERAPIST

## 2022-05-27 NOTE — PROGRESS NOTES
[facility] Rehabilitation Service    Outpatient Physical Therapy Progress Note  Patient: Ronna Collins  : 1950    Beginning/End Dates of Reporting Period:  3/11/22 to 22    Referring Provider: Dr. Nida Snider    Therapy Diagnosis: MICHELE, decreased strength, R UQ edema (arm and trunk)     Client Self Report: [place] trip was great but didn't do as much massage and exercise.  He walked a lot, and did weights 3 times over the last 2 weeks.  Pt joined [address], goes this afternoon.  Knee still hurts when sleeping and thighs still tight but less than used to be.  On Verzenio and Letrozole.    Objective Measurements:  Objective Measure: MICHELE  Details: cording noted in the axilla, 2 cords, down to mid-upper arm, not painful, pecs tight  Objective Measure: Volume/edema  Details: 1656.99% very stable.  Objective Measure: AROM  Details: R flexion: 142, abduction: 150 and L flexion 155, abduction 156  Objective Measure: UE strength  Details: pt reports functional improvement    Goals:  Goal Identifier edema   Goal Description Patient will understand and express independence with lymphedema risk reduction program including but not limited to skincare, exercise, GCB PRN, MLD   Target Date 22   Date Met  22   Progress (detail required for progress note): Date extended as it has not been met, not doing massage daily     Goal Identifier shoulder ROM   Goal Description Pt to express understanding and independence with acute postsurgical precautions re: RUQ   Target Date 21   Date Met  12/10/21   Progress (detail required for progress note):       Goal Identifier HEP   Goal Description Pt will be independent with exercise program to for R UE strength and ROM to allow for improved overhead lifting and prevent shoulder injury   Target Date 22   Date Met  22   Progress (detail required for progress note):        Goal Identifier Maintenance   Goal Description Pt will continue with exercise program to prevent loss of ROM and use compression as appropriate if signs of edema to prevent Volume R UE from increase greater than 5% over L.    Target Date 05/30/21   Date Met  05/27/22   Progress (detail required for progress note): 4/22: swelling 4.8%, ROM preserved     Goal Identifier Strengthening program   Goal Description Pt will be independent with progressive UE and LE strengthening program to return to baseline function and stretching to prevent injury   Target Date 05/30/22   Date Met      Progress (detail required for progress note): ongoing, cues needed for form today, pt is also working with S2S     Goal Identifier Long term mangement   Goal Description Pt will maintain ROM greater than 140/150 for flex/abd and use garments as needed to prevent exacerbation of swelling over summer months.   Target Date 08/24/22   Date Met      Progress (detail required for progress note):       Plan:  Pt was only seen for 2 visits since last note. After completing radiation treatment he went to visit family in [place] for 2 weeks.  Edema of the R UE very stable, pt will mild trunk swelling that may be post-radiation or perhaps not noted prior to radiation. He is healing well from radiation but tight in the pec and trunk and  Noted more weakness. Pt has started with [id number] program. He will continue to benefit from manual therapist, edema management, and updating of stretching and strengthening programs 2x/mo for 6months as needed.      Discharge:  No

## 2022-06-06 NOTE — PROGRESS NOTES
Oncology Visit:  Date on this visit: Jun 7, 2022    Diagnosis: h/o clinical prognostic stage IIIb, [id number], grade 2, ER positive, SD positive, HER-2 negative right breast cancer, [id number].    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  He noted discomfort and hardening of the skin of the right nipple in late April/early May, 2021.  He noted a seborrheic keratosis of the right nipple and remembered he had a similar skin lesion of the arm 3 months earlier.  However, he subsequently noted a mass in the same area.  Right breast skin punch biopsy performed by dermatology was c/w grade 2 invasive ductal carcinoma with associated DCIS.  Invasive carcinoma was ER positive 100% and SD positive 70%, with tumor invading the dermis.  HER-2 was negative by IHC (score 1+).  Diagnostic mammogram and ultrasound showed a retroareolar right breast mass measuring 2.9 cm with associated nipple retraction and enlarged, abnormal right axillary lymph nodes.  Right breast biopsy was again c/w grade 2 invasive ductal carcinoma, ER strong in 98% of tumor cell nuclei, HER-2 negative.  Ki-67 was 15%.    He elected to screen for the ISPY-2 clinical trial.  MRI breast on 6/18/2021 showed the biopsy proven right breast cancer to measure 3.9 cm in maximum dimension with invasion of the nipple and the pectoralis muscle as well as at least 4 abnormal level 1 and 2 right axillary lymph nodes and a tiny 0.4 cm right internal mammary lymph node.  Mammoprint returned low risk with a score of +0.29.  He elected for treatment on the endocrine optimization protocol of ISPY-2 and was randomized to treatment with amcenestrant.  He was on treatment with  amcenestrant from 7/1/2021 - 10/26/2021.  At our visit in 01/2021, both right axillary node and right breast tumor palpated more firm then prior.  Breast MRI was stable, however, due to clinical concerns for progression,  decision was made to proceed with surgery.   Right breast mastectomy and right axillary lymph node dissection was performed by Dr. Snider on 10/27/2021.  Pathology showed grade 2 carcinoma of the right breast measuring 3.2 cm with invasion of the dermis, nipple and the pectoralis muscle.  Ki-67 of the excised tumor was 7%.  15/44 right axillary lymph nodes were positive with 6 of the lymph nodes demonstrating extranodal extension.  The largest lymph node metastasis measured 2.1 cm.  Oncotype dx recurrence score was 50.    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  His course was complicated by fevers persistent throughout treatment.  He received radiation (4800 cGy in 15 fractions) to the right chest wall and regional lymph nodes from 3/21/2022 - 4/8/2022.  He has been on treatment with lupron and letrozole since 3/16/2022. Verzino added on 04/26/22.     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 He is overall tolerating Verzino well. He recently started the survival to strength exercise program for long standing quad weakness which has been helpful. He reports new joint soreness in his hips and back since starting a more intense workout regimen. Reports poor sleep for last 10 days, frequently waking up throughout the night. He feels as though his thigh pain and muscle soreness is a contributing factor. Endorses nocturia. He does not take routine tylenol or sleep aids. RUE Lymphedema is well managed, with massaging, and compression.       Endorses loose stools 1-2x per week since starting Verzino. No abdominal pain, nausea, or vomiting. Appetite has been good. Drinking a lot of fluids. BP at home has been around 135/85.     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 Additionally, he has a trip to [facility] scheduled for August. He wonders about a second  Covid-19 booster timing.     No hot flashes. No fever or chills. No chest pain, cough, or shortness of breath. No urinary changes. No constipation. No headaches or vision changes.      ROS: 10 point ROS neg other than the symptoms noted above in the HPI.      Past Medical/Surgical History:  Past Medical History:   Diagnosis Date     Breast cancer (H) 05/2021     Chronic sinusitis      Hypertension 2002     Past Surgical History:   Procedure Laterality Date     DISSECT LYMPH NODE AXILLA Right 10/27/2021    Procedure: RIGHT Axillary Lymph Node Dissection;  Surgeon: Nida Snider MD;  Location: [facility] OR     MASTECTOMY SIMPLE Right 10/27/2021    Procedure: RIGHT Simple Mastectomy;  Surgeon: Nida Snider MD;  Location: [facility] OR     Allergies:  Allergies as of 06/07/2022     (No Known Allergies)     Current Medications:  Current Outpatient Medications   Medication Sig Dispense Refill     abemaciclib (VERZENIO) 150 MG tablet Take 1 tablet (150 mg) by mouth 2 times daily for 28 days 56 tablet 0     letrozole (FEMARA) 2.5 MG tablet Take 1 tablet (2.5 mg) by mouth daily 90 tablet 3     loperamide (IMODIUM) 2 MG capsule Start with 2 caps (4 mg), then take one cap (2 mg) after each diarrheal stool as needed. Do not use more than 8 caps (16 mg) per day. 30 capsule 0     losartan-hydrochlorothiazide (HYZAAR) 100-25 MG tablet Take 1 tablet by mouth every morning 90 tablet 0     losartan-hydrochlorothiazide (HYZAAR) 100-25 MG tablet Take 1 tablet by mouth every morning 90 tablet 0     ondansetron (ZOFRAN) 8 MG tablet Take 1 tablet (8 mg) by mouth every 8 hours as needed for nausea 30 tablet 3     prochlorperazine (COMPAZINE) 10 MG tablet Take 0.5 tablets (5 mg) by mouth every 6 hours as needed for nausea or vomiting 30 tablet 2      Genetics  Breast Actionable and Breast Expanded panels were both negative for pathogenic germline mutations.    Physical Exam:  BP (!) 146/96 (BP Location: Left arm, Patient Position:  Sitting, Cuff Size: Adult Regular)   Pulse 76   Temp 97.7  F (36.5  C) (Oral)   Resp 16   Wt 75.5 kg (166 lb 8 oz)   SpO2 99%   BMI 25.68 kg/m    General:  Well appearing adult male in NAD.  Alert and oriented x 3.  HEENT:  Normocephalic.  Sclera anicteric.  MMM.  No lesions of the oropharynx.   Lymph:  No palpable cervical, supraclavicular, or axillary LAD.  Chest:  CTA bilaterally.  No wheezes or crackles.   CV:  RRR.  Nl S1 and S2.    Abd:  Soft/ND.    Musculoskeletal: Moving all extremities equally. No point tenderness to palpation of the spine, SI joint, and hips.   Ext:  No pitting edema of the bilateral lower extremities.    Neuro:  Cranial nerves grossly intact.  Psych:  Mood and affect appear normal.    Laboratory/Imaging Studies  Most Recent 3 CBC's:  Recent Labs   Lab Test 06/07/22  0746 05/24/22  0737 05/10/22  0744 03/02/22  1217   WBC 2.9* 2.9* 3.7* 4.2   HGB 12.8* 12.6* 13.4 11.8*   MCV 82 82 83 84    155 182 213   ANEUTAUTO 1.5*  --  2.3 2.4       Most Recent 3 BMP's:  Recent Labs   Lab Test 06/07/22  0746 05/24/22  0737 05/10/22  0744    138 137   POTASSIUM 4.4 4.2 4.0   CHLORIDE 106 107 105   CO2 27 24 27   BUN 11 16 12   CR 0.77 0.80 0.96   ANIONGAP 7 7 5   PROSPER 10.2* 10.1 9.9   * 97 105*   PROTTOTAL 8.1 7.9 7.7   ALBUMIN 4.0 3.9 3.6     I reviewed the above labs today.      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  He is s/p treatment with 3.5 months neoadjuvant amcenestrant, right breast mastectomy, right ALND, 4 cycles Taxotere and cyclophosphamide, and radiation.  He is on treatment with lupron and letrozole and now abemaciclib.    1.  Right breast cancer:    Since last visit, he has started abemaciclib to lupron and letrozole. Plan for two years of treatment with abemaciclib. He is tolerating abemciclib with minimal toxicities with  the exception of 1-2 days/week of looser stools.   - Labs reviewed today, ANC 1.5. No infectious symptoms. Will recheck in 2 weeks prior to travel and per protocol for first 2 months of Verzino.   - RTC on 07/05/22 for labs and Nay as scheduled.   - Patient is due for Lupron today, 06/07/22. Unfortunately, this was missed during our patient visit, will call patient to administer either today or tomorrow.     2.  Fever:  Resolved after discontinuing chemotherapy and therefore likely associated to this. His inflammatory markers normalized, which also supports fever was induced by chemotherapy.    3.  Fatigue & weakness:  He is working with physical therapy and recently started the survival to strength program. He is feeling physically stronger since starting regular exercise. Reports new back and hip pain, likely secondary to increase in exercise. No known bone mets. Recommend tylenol scheduled at bedtime and aleve or ibuprofen at break through acute pain. Will continue to monitor.     4. Skin lesion overlying the L scalp: recent visit with dermatology. [employer] to be a seborrheic keratosis.     5.  RUE lymphedema:  Stable. Continue to wear a compression sleeve, self massage prn.    6. Hypercalcemia: Calcium 10.2 today. Encouraged good oral intake. Reviewed medications with patient. Declines use of calcium supplements or multivitamins. Will continue to monitor. Encouraged good hydration.     7. Insomnia: new onset of insomnia. Likely in part due to increase exercise and muscle pain. Recommend tylenol as above. If continues to be bothersome, could consider a sleep aid.      8. Travel: discussed Covid-19 infectious precautions. He have labs prior to travel at the end of June. Suspect his ANC will be within limits to travel. He has a trip planned to [place] in August. Will plan to get his 2nd Covid-19 booster mid-July prior to [place] travels.     9. Elevated BP: 146/96 in clinic today. Patient reports BP at home 130's/80's.  Will continue to monitor.     Tami Yuen PA-C    40 minutes spent on the date of the encounter doing chart review, interpretation of tests, patient visit, documentation, discussion with other provider(s) and discussion with family     The patient was seen in conjunction with Tami Yuen PA-C  who served as a scribe for today's visit. I have reviewed and edited the above note, and agree with the above findings and plan.    Nay Blandon PA-C

## 2022-06-07 ENCOUNTER — APPOINTMENT (OUTPATIENT)
Dept: LAB | Facility: CLINIC | Age: 72
End: 2022-06-07
Attending: PHYSICIAN ASSISTANT
Payer: COMMERCIAL

## 2022-06-07 ENCOUNTER — ONCOLOGY VISIT (OUTPATIENT)
Dept: ONCOLOGY | Facility: CLINIC | Age: 72
End: 2022-06-07
Attending: PHYSICIAN ASSISTANT
Payer: COMMERCIAL

## 2022-06-07 ENCOUNTER — MYC MEDICAL ADVICE (OUTPATIENT)
Dept: ONCOLOGY | Facility: CLINIC | Age: 72
End: 2022-06-07

## 2022-06-07 VITALS
BODY MASS INDEX: 25.68 KG/M2 | DIASTOLIC BLOOD PRESSURE: 96 MMHG | SYSTOLIC BLOOD PRESSURE: 146 MMHG | WEIGHT: 166.5 LBS | RESPIRATION RATE: 16 BRPM | HEART RATE: 76 BPM | OXYGEN SATURATION: 99 % | TEMPERATURE: 97.7 F

## 2022-06-07 DIAGNOSIS — Z17.0 MALIGNANT NEOPLASM OF CENTRAL PORTION OF RIGHT BREAST IN MALE, ESTROGEN RECEPTOR POSITIVE (H): Primary | ICD-10-CM

## 2022-06-07 DIAGNOSIS — C50.121 MALIGNANT NEOPLASM OF CENTRAL PORTION OF RIGHT BREAST IN MALE, ESTROGEN RECEPTOR POSITIVE (H): Primary | ICD-10-CM

## 2022-06-07 DIAGNOSIS — C50.929 MALIGNANT NEOPLASM OF MALE BREAST, UNSPECIFIED ESTROGEN RECEPTOR STATUS, UNSPECIFIED LATERALITY, UNSPECIFIED SITE OF BREAST (H): Primary | ICD-10-CM

## 2022-06-07 DIAGNOSIS — C50.929 MALIGNANT NEOPLASM OF MALE BREAST, UNSPECIFIED ESTROGEN RECEPTOR STATUS, UNSPECIFIED LATERALITY, UNSPECIFIED SITE OF BREAST (H): ICD-10-CM

## 2022-06-07 LAB
ALBUMIN SERPL-MCNC: 4 G/DL (ref 3.4–5)
ALP SERPL-CCNC: 73 U/L (ref 40–150)
ALT SERPL W P-5'-P-CCNC: 30 U/L (ref 0–70)
ANION GAP SERPL CALCULATED.3IONS-SCNC: 7 MMOL/L (ref 3–14)
AST SERPL W P-5'-P-CCNC: 18 U/L (ref 0–45)
BASOPHILS # BLD AUTO: 0 10E3/UL (ref 0–0.2)
BASOPHILS NFR BLD AUTO: 1 %
BILIRUB SERPL-MCNC: 0.4 MG/DL (ref 0.2–1.3)
BUN SERPL-MCNC: 11 MG/DL (ref 7–30)
CALCIUM SERPL-MCNC: 10.2 MG/DL (ref 8.5–10.1)
CHLORIDE BLD-SCNC: 106 MMOL/L (ref 94–109)
CO2 SERPL-SCNC: 27 MMOL/L (ref 20–32)
CREAT SERPL-MCNC: 0.77 MG/DL (ref 0.66–1.25)
EOSINOPHIL # BLD AUTO: 0.1 10E3/UL (ref 0–0.7)
EOSINOPHIL NFR BLD AUTO: 4 %
ERYTHROCYTE [DISTWIDTH] IN BLOOD BY AUTOMATED COUNT: 13.6 % (ref 10–15)
GFR SERPL CREATININE-BSD FRML MDRD: >90 ML/MIN/1.73M2
GLUCOSE BLD-MCNC: 114 MG/DL (ref 70–99)
HCT VFR BLD AUTO: 38.9 % (ref 40–53)
HGB BLD-MCNC: 12.8 G/DL (ref 13.3–17.7)
IMM GRANULOCYTES # BLD: 0 10E3/UL
IMM GRANULOCYTES NFR BLD: 0 %
LYMPHOCYTES # BLD AUTO: 1 10E3/UL (ref 0.8–5.3)
LYMPHOCYTES NFR BLD AUTO: 36 %
MCH RBC QN AUTO: 26.9 PG (ref 26.5–33)
MCHC RBC AUTO-ENTMCNC: 32.9 G/DL (ref 31.5–36.5)
MCV RBC AUTO: 82 FL (ref 78–100)
MONOCYTES # BLD AUTO: 0.3 10E3/UL (ref 0–1.3)
MONOCYTES NFR BLD AUTO: 9 %
NEUTROPHILS # BLD AUTO: 1.5 10E3/UL (ref 1.6–8.3)
NEUTROPHILS NFR BLD AUTO: 50 %
NRBC # BLD AUTO: 0 10E3/UL
NRBC BLD AUTO-RTO: 0 /100
PLATELET # BLD AUTO: 175 10E3/UL (ref 150–450)
POTASSIUM BLD-SCNC: 4.4 MMOL/L (ref 3.4–5.3)
PROT SERPL-MCNC: 8.1 G/DL (ref 6.8–8.8)
RBC # BLD AUTO: 4.76 10E6/UL (ref 4.4–5.9)
SODIUM SERPL-SCNC: 140 MMOL/L (ref 133–144)
WBC # BLD AUTO: 2.9 10E3/UL (ref 4–11)

## 2022-06-07 PROCEDURE — G0463 HOSPITAL OUTPT CLINIC VISIT: HCPCS

## 2022-06-07 PROCEDURE — 36415 COLL VENOUS BLD VENIPUNCTURE: CPT | Performed by: PHYSICIAN ASSISTANT

## 2022-06-07 PROCEDURE — 80053 COMPREHEN METABOLIC PANEL: CPT | Performed by: PHYSICIAN ASSISTANT

## 2022-06-07 PROCEDURE — 85025 COMPLETE CBC W/AUTO DIFF WBC: CPT | Performed by: PHYSICIAN ASSISTANT

## 2022-06-07 PROCEDURE — 99215 OFFICE O/P EST HI 40 MIN: CPT | Performed by: PHYSICIAN ASSISTANT

## 2022-06-07 PROCEDURE — 82040 ASSAY OF SERUM ALBUMIN: CPT | Performed by: PHYSICIAN ASSISTANT

## 2022-06-07 ASSESSMENT — PAIN SCALES - GENERAL: PAINLEVEL: NO PAIN (0)

## 2022-06-07 NOTE — LETTER
6/7/2022         RE: Ronna Collins  [address]  [facility] [address]      Oncology Visit:  Date on this visit: Jun 7, 2022    Diagnosis: h/o clinical prognostic stage IIIb, [id number], grade 2, ER positive, MO positive, HER-2 negative right breast cancer, [id number].    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  He noted discomfort and hardening of the skin of the right nipple in late April/early May, 2021.  He noted a seborrheic keratosis of the right nipple and remembered he had a similar skin lesion of the arm 3 months earlier.  However, he subsequently noted a mass in the same area.  Right breast skin punch biopsy performed by dermatology was c/w grade 2 invasive ductal carcinoma with associated DCIS.  Invasive carcinoma was ER positive 100% and MO positive 70%, with tumor invading the dermis.  HER-2 was negative by IHC (score 1+).  Diagnostic mammogram and ultrasound showed a retroareolar right breast mass measuring 2.9 cm with associated nipple retraction and enlarged, abnormal right axillary lymph nodes.  Right breast biopsy was again c/w grade 2 invasive ductal carcinoma, ER strong in 98% of tumor cell nuclei, HER-2 negative.  Ki-67 was 15%.    He elected to screen for the ISPY-2 clinical trial.  MRI breast on 6/18/2021 showed the biopsy proven right breast cancer to measure 3.9 cm in maximum dimension with invasion of the nipple and the pectoralis muscle as well as at least 4 abnormal level 1 and 2 right axillary lymph nodes and a tiny 0.4 cm right internal mammary lymph node.  Mammoprint returned low risk with a score of +0.29.  He elected for treatment on the endocrine optimization protocol of ISPY-2 and was randomized to treatment with amcenestrant.  He was on treatment with  amcenestrant from 7/1/2021 - 10/26/2021.  At our visit in 01/2021, both right axillary node and right breast tumor palpated more  firm then prior.  Breast MRI was stable, however, due to clinical concerns for progression, decision was made to proceed with surgery.   Right breast mastectomy and right axillary lymph node dissection was performed by Dr. Snider on 10/27/2021.  Pathology showed grade 2 carcinoma of the right breast measuring 3.2 cm with invasion of the dermis, nipple and the pectoralis muscle.  Ki-67 of the excised tumor was 7%.  15/44 right axillary lymph nodes were positive with 6 of the lymph nodes demonstrating extranodal extension.  The largest lymph node metastasis measured 2.1 cm.  Oncotype dx recurrence score was 50.    Jamie Collins received 4 cycles of Taxotere and cyclophosphamide from 12/14/2021 - 2/16/2022.  His course was complicated by fevers persistent throughout treatment.  He received radiation (4800 cGy in 15 fractions) to the right chest wall and regional lymph nodes from 3/21/2022 - 4/8/2022.  He has been on treatment with lupron and letrozole since 3/16/2022. Verzino added on 04/26/22.     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 He is overall tolerating Verzino well. He recently started the survival to strength exercise program for long standing quad weakness which has been helpful. He reports new joint soreness in his hips and back since starting a more intense workout regimen. Reports poor sleep for last 10 days, frequently waking up throughout the night. He feels as though his thigh pain and muscle soreness is a contributing factor. Endorses nocturia. He does not take routine tylenol or sleep aids. RUE Lymphedema is well managed, with massaging, and compression.       Endorses loose stools 1-2x per week since starting Verzino. No abdominal pain, nausea, or vomiting. Appetite has been good. Drinking a lot of fluids. BP at home has been around 135/85.     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  Additionally, he has a trip to [facility] scheduled for August. He wonders about a second Covid-19 booster timing.     No hot flashes. No fever or chills. No chest pain, cough, or shortness of breath. No urinary changes. No constipation. No headaches or vision changes.      ROS: 10 point ROS neg other than the symptoms noted above in the HPI.      Past Medical/Surgical History:  Past Medical History:   Diagnosis Date     Breast cancer (H) 05/2021     Chronic sinusitis      Hypertension 2002     Past Surgical History:   Procedure Laterality Date     DISSECT LYMPH NODE AXILLA Right 10/27/2021    Procedure: RIGHT Axillary Lymph Node Dissection;  Surgeon: Nida Snider MD;  Location: [facility] OR     MASTECTOMY SIMPLE Right 10/27/2021    Procedure: RIGHT Simple Mastectomy;  Surgeon: Nida Snider MD;  Location: [facility] OR     Allergies:  Allergies as of 06/07/2022     (No Known Allergies)     Current Medications:  Current Outpatient Medications   Medication Sig Dispense Refill     abemaciclib (VERZENIO) 150 MG tablet Take 1 tablet (150 mg) by mouth 2 times daily for 28 days 56 tablet 0     letrozole (FEMARA) 2.5 MG tablet Take 1 tablet (2.5 mg) by mouth daily 90 tablet 3     loperamide (IMODIUM) 2 MG capsule Start with 2 caps (4 mg), then take one cap (2 mg) after each diarrheal stool as needed. Do not use more than 8 caps (16 mg) per day. 30 capsule 0     losartan-hydrochlorothiazide (HYZAAR) 100-25 MG tablet Take 1 tablet by mouth every morning 90 tablet 0     losartan-hydrochlorothiazide (HYZAAR) 100-25 MG tablet Take 1 tablet by mouth every morning 90 tablet 0     ondansetron (ZOFRAN) 8 MG tablet Take 1 tablet (8 mg) by mouth every 8 hours as needed for nausea 30 tablet 3     prochlorperazine (COMPAZINE) 10 MG tablet Take 0.5 tablets (5 mg) by mouth every 6 hours as needed for nausea or vomiting 30 tablet 2      Genetics  Breast Actionable and Breast Expanded panels were both negative for pathogenic germline  mutations.    Physical Exam:  BP (!) 146/96 (BP Location: Left arm, Patient Position: Sitting, Cuff Size: Adult Regular)   Pulse 76   Temp 97.7  F (36.5  C) (Oral)   Resp 16   Wt 75.5 kg (166 lb 8 oz)   SpO2 99%   BMI 25.68 kg/m    General:  Well appearing adult male in NAD.  Alert and oriented x 3.  HEENT:  Normocephalic.  Sclera anicteric.  MMM.  No lesions of the oropharynx.   Lymph:  No palpable cervical, supraclavicular, or axillary LAD.  Chest:  CTA bilaterally.  No wheezes or crackles.   CV:  RRR.  Nl S1 and S2.    Abd:  Soft/ND.    Musculoskeletal: Moving all extremities equally. No point tenderness to palpation of the spine, SI joint, and hips.   Ext:  No pitting edema of the bilateral lower extremities.    Neuro:  Cranial nerves grossly intact.  Psych:  Mood and affect appear normal.    Laboratory/Imaging Studies  Most Recent 3 CBC's:  Recent Labs   Lab Test 06/07/22  0746 05/24/22  0737 05/10/22  0744 03/02/22  1217   WBC 2.9* 2.9* 3.7* 4.2   HGB 12.8* 12.6* 13.4 11.8*   MCV 82 82 83 84    155 182 213   ANEUTAUTO 1.5*  --  2.3 2.4       Most Recent 3 BMP's:  Recent Labs   Lab Test 06/07/22  0746 05/24/22  0737 05/10/22  0744    138 137   POTASSIUM 4.4 4.2 4.0   CHLORIDE 106 107 105   CO2 27 24 27   BUN 11 16 12   CR 0.77 0.80 0.96   ANIONGAP 7 7 5   PROSPER 10.2* 10.1 9.9   * 97 105*   PROTTOTAL 8.1 7.9 7.7   ALBUMIN 4.0 3.9 3.6     I reviewed the above labs today.      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  He is s/p treatment with 3.5 months neoadjuvant amcenestrant, right breast mastectomy, right ALND, 4 cycles Taxotere and cyclophosphamide, and radiation.  He is on treatment with lupron and letrozole and now abemaciclib.    1.  Right breast cancer:    Since last visit, he has started abemaciclib to lupron and letrozole. Plan for two years of  treatment with abemaciclib. He is tolerating abemciclib with minimal toxicities with the exception of 1-2 days/week of looser stools.   - Labs reviewed today, ANC 1.5. No infectious symptoms. Will recheck in 2 weeks prior to travel and per protocol for first 2 months of Verzino.   - RTC on 07/05/22 for labs and Nay as scheduled.   - Patient is due for Lupron today, 06/07/22. Unfortunately, this was missed during our patient visit, will call patient to administer either today or tomorrow.     2.  Fever:  Resolved after discontinuing chemotherapy and therefore likely associated to this. His inflammatory markers normalized, which also supports fever was induced by chemotherapy.    3.  Fatigue & weakness:  He is working with physical therapy and recently started the survival to strength program. He is feeling physically stronger since starting regular exercise. Reports new back and hip pain, likely secondary to increase in exercise. No known bone mets. Recommend tylenol scheduled at bedtime and aleve or ibuprofen at break through acute pain. Will continue to monitor.     4. Skin lesion overlying the L scalp: recent visit with dermatology. [place] to be a seborrheic keratosis.     5.  RUE lymphedema:  Stable. Continue to wear a compression sleeve, self massage prn.    6. Hypercalcemia: Calcium 10.2 today. Encouraged good oral intake. Reviewed medications with patient. Declines use of calcium supplements or multivitamins. Will continue to monitor. Encouraged good hydration.     7. Insomnia: new onset of insomnia. Likely in part due to increase exercise and muscle pain. Recommend tylenol as above. If continues to be bothersome, could consider a sleep aid.      8. Travel: discussed Covid-19 infectious precautions. He have labs prior to travel at the end of June. Suspect his ANC will be within limits to travel. He has a trip planned to [facility] in August. Will plan to get his 2nd Covid-19 booster mid-July prior to [facility]  travels.     9. Elevated BP: 146/96 in clinic today. Patient reports BP at home 130's/80's. Will continue to monitor.     Tami Yuen PA-C    40 minutes spent on the date of the encounter doing chart review, interpretation of tests, patient visit, documentation, discussion with other provider(s) and discussion with family     The patient was seen in conjunction with Tami Yuen PA-C  who served as a scribe for today's visit. I have reviewed and edited the above note, and agree with the above findings and plan.    TRINY Santa PA-C

## 2022-06-07 NOTE — NURSING NOTE
Chief Complaint   Patient presents with     Oncology Clinic Visit     Breast Cancer     Blood Draw     Labs drawn via  by RN. Vitals taken.     Labs drawn with  by RN. Vitals taken. /96. MD notified. Patient checked into next appointment.    Kelli Martin RN

## 2022-06-08 ENCOUNTER — INFUSION THERAPY VISIT (OUTPATIENT)
Dept: ONCOLOGY | Facility: CLINIC | Age: 72
End: 2022-06-08
Attending: INTERNAL MEDICINE
Payer: COMMERCIAL

## 2022-06-08 VITALS
SYSTOLIC BLOOD PRESSURE: 150 MMHG | OXYGEN SATURATION: 100 % | TEMPERATURE: 97.6 F | DIASTOLIC BLOOD PRESSURE: 82 MMHG | HEART RATE: 90 BPM

## 2022-06-08 DIAGNOSIS — Z17.0 MALIGNANT NEOPLASM OF CENTRAL PORTION OF RIGHT BREAST IN MALE, ESTROGEN RECEPTOR POSITIVE (H): Primary | ICD-10-CM

## 2022-06-08 DIAGNOSIS — C50.121 MALIGNANT NEOPLASM OF CENTRAL PORTION OF RIGHT BREAST IN MALE, ESTROGEN RECEPTOR POSITIVE (H): Primary | ICD-10-CM

## 2022-06-08 PROCEDURE — 250N000011 HC RX IP 250 OP 636: Performed by: INTERNAL MEDICINE

## 2022-06-08 PROCEDURE — 96402 CHEMO HORMON ANTINEOPL SQ/IM: CPT

## 2022-06-08 RX ADMIN — LEUPROLIDE ACETATE 7.5 MG: KIT at 08:17

## 2022-06-08 ASSESSMENT — PAIN SCALES - GENERAL: PAINLEVEL: NO PAIN (0)

## 2022-06-08 NOTE — PROGRESS NOTES
Infusion Nursing Note:  Ronna Collins presents today for Cycle 4 Day 1 Lupron.    Patient seen by provider today: No   present during visit today: No, not applicable.     Note: Evaluated by Nay ORTIZ yesterday.  Reports no changes.        Post Infusion Assessment:  Patient tolerated Lupron IM injection without incident to LEFT ventroglute.       Discharge Plan:   AVS to patient via MYCHART.  Patient will return 7/5/22 labs/SANDRA/Lupron for next appointment.   Patient discharged in stable condition accompanied by: self.  Departure Mode: Ambulatory.  Face to Face time: 0.      Eva Long RN

## 2022-06-14 DIAGNOSIS — C50.929 MALIGNANT NEOPLASM OF MALE BREAST, UNSPECIFIED ESTROGEN RECEPTOR STATUS, UNSPECIFIED LATERALITY, UNSPECIFIED SITE OF BREAST (H): Primary | ICD-10-CM

## 2022-06-15 ENCOUNTER — ONCOLOGY VISIT (OUTPATIENT)
Dept: ONCOLOGY | Facility: CLINIC | Age: 72
End: 2022-06-15
Attending: STUDENT IN AN ORGANIZED HEALTH CARE EDUCATION/TRAINING PROGRAM
Payer: COMMERCIAL

## 2022-06-15 VITALS
BODY MASS INDEX: 25.55 KG/M2 | OXYGEN SATURATION: 99 % | SYSTOLIC BLOOD PRESSURE: 148 MMHG | TEMPERATURE: 98.4 F | HEART RATE: 102 BPM | WEIGHT: 165.7 LBS | DIASTOLIC BLOOD PRESSURE: 89 MMHG

## 2022-06-15 DIAGNOSIS — I89.0 LYMPHEDEMA OF RIGHT ARM: ICD-10-CM

## 2022-06-15 DIAGNOSIS — L90.5 SCAR TISSUE: ICD-10-CM

## 2022-06-15 DIAGNOSIS — M62.81 GENERALIZED MUSCLE WEAKNESS: ICD-10-CM

## 2022-06-15 DIAGNOSIS — R53.83 FATIGUE DUE TO TREATMENT: ICD-10-CM

## 2022-06-15 DIAGNOSIS — C50.929 MALIGNANT NEOPLASM OF MALE BREAST, UNSPECIFIED ESTROGEN RECEPTOR STATUS, UNSPECIFIED LATERALITY, UNSPECIFIED SITE OF BREAST (H): Primary | ICD-10-CM

## 2022-06-15 PROCEDURE — 99215 OFFICE O/P EST HI 40 MIN: CPT | Performed by: STUDENT IN AN ORGANIZED HEALTH CARE EDUCATION/TRAINING PROGRAM

## 2022-06-15 PROCEDURE — G0463 HOSPITAL OUTPT CLINIC VISIT: HCPCS

## 2022-06-15 ASSESSMENT — PAIN SCALES - GENERAL: PAINLEVEL: MILD PAIN (3)

## 2022-06-15 NOTE — PATIENT INSTRUCTIONS
Orthotics referral for right arm compression garments was ordered to be done at [facility].   As we discussed, recommend continuing to wear your compression garment daily as tolerated.   Re-implement your regular massage and stretching at least 3-4 times per week.   For your hip pain, you can try Aleve scheduled with food daily for a few days and then as needed. As discussed, you can also try Voltaren gel up to four times daily as needed for pain.   Recommend starting 1-2 protein shakes daily to help with supplementation. [employer] protein shakes or [employer] protein shakes are options. Would recommend 30-60 grams of protein in addition to your diet daily.  Follow up with Dr. Waters in 4-6 months.

## 2022-06-15 NOTE — PROGRESS NOTES
[facility]   PM&R clinic note        Interval history:     Ronna Collins presents to clinic today for follow up reg his rehab needs.   He has h/o right breast cancer (clinical prognostic stage IIIb, [id number], grade 2, ER positive, FL positive, HER-2 negative), now s/p right breast mastectomy and right axillary lymph node dissection (10/27/2021).   Was last seen in clinic on 3/15/22.  Recommendations included:  1. Patient education: In depth discussion and education was provided about the assessment and implications of each of the below recommendations for management. Patient indicated readiness to learn, all questions were answered and understanding of material presented was confirmed.   2. Work-up: None  3. Therapy/equipment/braces: Orthotics referral (for Marcella) placed so that he can be re-fitted for RUE and gauntlet compression garments. Encouraged him to wear compression garments every day. Dr. Waters will also reach out to Renetta Dempsey (PT) to see if it would be possible to increase frequency of lymphedema therapy to weekly (currently sees her once a month).    4. Medications: None  5. Interventions: Monitor for s/s of cellulitis. If there are concerns, informed him to call the clinic or go to urgent care to be evaluated. Also discussed that he should monitor skin closely for sensitivities once he starts proton beam radiation therapy.   6. Referral / follow up with other providers: None  7. Follow up: RTC w/ Dr. Waters in 2 months (after he is done with proton beam radiation therapy, which is set to start on 3/21/22)    ONCOLOGIC HISTORY  - First noticed discomfort and hardening of the skin surrounding right nipple in late April/early May 2021. Noted seborrheic keratosis of the right nipple, and had similar lesion of the arm 3 months earlier. Subsequently noted a mass at the same site.  - Right breast skin punch biopsy performed by derm was c/w grade 2 invasive ductal  carcinoma w/ associated DCIS. Invasive carcinoma was ER positive 100% and SC positive 70%, w/ tumor invading the dermis. HER-2 negative by IHC (score 1+).   - Diagnostic mammogram and ultrasound showed a retroareolar right breast mass measuring 2.9cm w/ associated nipple retraction and enlarged, abnormal right axillary lymph nodes. Right breast biopsy was again, c/w grade 2 invasive ductal carcinoma, ER strong in 98% of tumor cell nuclei, HER-2 negative, Ki-67 was 15%.    - Elected to screen for the ISPY-2 clinical trial.  - 6/18/2021 MRI breast showed biopsy proven right breast cancer to measure 3.9 cm in max dimension w/ invasion of the nipple and pectoralis muscle as well as at least 4 abnormal level 1 and 2 right axillary lymph nodes and a tiny 0/4 cm right internal mammary lymph node. Mammoprint returned low risk w/ a score of +0.29.   - Elected for tx on the endocrine optimization protocol of ISPY-2, and was randomized to tx w/ amcenestrant. Was on amcenestrant from 7/1/2021-10/26/2021.  - 10/27/2021 underwent right breast mastectomy and right axillary lymph node dissection w/ Dr. Snider. Pathology showed grade 2 carcinoma of the right breast measuring 3.2 cm w/ invasion of the dermis, nipple, and the pectoralis muscle. Ki-67 of the excised tumor was 7%. 15/44 right axillary lymph nodes were positive w/ 6 of the lymph nodes demonstrating extranodal extension. Largest lymph node metastasis measured 2.1 cm.   - 12/14/2021 began tx w/ Taxotere and cyclophosphamide.  - 1/5/2021 C2 given. Had significant fever following cycle 2.  - 1/26/2021 C3 given  - 2/16/2022 C4 given   - Plan is to start adjuvant radiation soon. Heme/onc planning to initiate tx w/ letrozole at the same time as radiation and abemaciclib upon completion of radiation. Plan to administer abemaciclib for 2 years. Letrozole for a total of 10 years.   - Starting proton beam radiation 3/21/2022. 15 days over 3 weeks at the [facility].   - Saw Nay  Jamia for follow up visit on 6/7/22. Patient continues on Verzino. Has trip to [place] coming up for conference, and trip to [facility] in August. Also on treatment with lupron and letrozol. Plan for 2 years of treatment with Verzino. Working with PT and recently started survival to strength program. New back and hip pain, likely secondary to increase in exercise.    Symptoms,  Patient was seen for a return visit this morning. He continued to see lymphedema therapy, Renetta Dempsey once every other month, and has been discharged from therapy with instructions to continue a home regimen as tolerated.  He complains of right thigh pain that started with Verzino and possibly physical therapy. This pain has disturbed his sleep. This has been going for about 3 weeks.   He has started the strength to survival program where a lot of the focus is on core strength and also lower extremity/general strengthening. He has tried tylenol for the pain, which works occasionally but not very effective.   His right arm swelling is stable, and he is down to one sleeve now. He is planning to go to [facility] to get refitted.       Therapies/HEP,  Has been discharged from lymphedema therapy, and has been continuing with MLD and stretching.      Functionally,   No changes since last visit.      Social history is unchanged.      Medications:  Current Outpatient Medications   Medication Sig Dispense Refill     [START ON 6/21/2022] abemaciclib (VERZENIO) 150 MG tablet Take 1 tablet (150 mg) by mouth 2 times daily for 28 days 56 tablet 0     abemaciclib (VERZENIO) 150 MG tablet Take 1 tablet (150 mg) by mouth 2 times daily for 28 days 56 tablet 0     letrozole (FEMARA) 2.5 MG tablet Take 1 tablet (2.5 mg) by mouth daily 90 tablet 3     loperamide (IMODIUM) 2 MG capsule Start with 2 caps (4 mg), then take one cap (2 mg) after each diarrheal stool as needed. Do not use more than 8 caps (16 mg) per day. 30 capsule 0     losartan-hydrochlorothiazide  (HYZAAR) 100-25 MG tablet Take 1 tablet by mouth every morning 90 tablet 0     ondansetron (ZOFRAN) 8 MG tablet Take 1 tablet (8 mg) by mouth every 8 hours as needed for nausea 30 tablet 3     prochlorperazine (COMPAZINE) 10 MG tablet Take 0.5 tablets (5 mg) by mouth every 6 hours as needed for nausea or vomiting 30 tablet 2              Physical Exam:   There were no vitals taken for this visit.     Gen: NAD, pleasant and cooperative   HEENT: MMM  Cardio: well perfused  Pulm: non-labored breathing on room air  Abd: benign  Ext: Subtle lymphedema in right forearm, improved since his last visit. Improved lymphedema over the dorsum of the hand and along lateral aspect of proximal forearm. Negative Stemmers sign. No lymphedema noted in neck, mild subtle swelling in right lateral chest wall. No enlarged lymph nodes palpated. No cording noted.   Neuro/MSK: RUE full active ROM. No increased tone. 5/5 strength throughout.      Labs/Imaging:  Lab Results   Component Value Date    WBC 2.9 (L) 06/07/2022    HGB 12.8 (L) 06/07/2022    HCT 38.9 (L) 06/07/2022    MCV 82 06/07/2022     06/07/2022     Lab Results   Component Value Date     06/07/2022    POTASSIUM 4.4 06/07/2022    CHLORIDE 106 06/07/2022    CO2 27 06/07/2022     (H) 06/07/2022     Lab Results   Component Value Date    GFRESTIMATED >90 06/07/2022    GFRESTBLACK >90 07/08/2021     Lab Results   Component Value Date    AST 18 06/07/2022    ALT 30 06/07/2022    GGT 28 06/23/2021    ALKPHOS 73 06/07/2022    BILITOTAL 0.4 06/07/2022     Lab Results   Component Value Date    INR 1.12 06/14/2021     Lab Results   Component Value Date    BUN 11 06/07/2022    CR 0.77 06/07/2022              Assessment/Plan   Ronna Collins presents to clinic today for follow up reg his rehab needs.   He has h/o right breast cancer (clinical prognostic stage IIIb, [id number], grade 2, ER positive, OR positive, HER-2 negative), now s/p right breast mastectomy and right  axillary lymph node dissection (10/27/2021). Was last seen in clinic on 3/15/22. As discussed with , his hip pain (R>L) is likely due to a combination of his current treatment regimen and his strengthening program. He is going to check in with Oncology about need to adjust treatment regimen based on how he his symptoms are over the next few weeks. Agree with Oncology on etiology of his pain, and recommend taking aleve scheduled for a few days with food, and then prn as needed. In addition, he was informed that he could try voltaren gel QID prn. A new script was written for right upper extremity compression garment as he is in need of replacements. We discussed that it is fine for him to wait until his trip in August to get chest compression garment. He should continue daily MLD/stretching as tolerated. Will plan a return visit in 4-6 months. Patient is in agreement with the above plan.      1. Patient education: In depth discussion and education was provided about the assessment and implications of each of the below recommendations for management. Patient indicated readiness to learn, all questions were answered and understanding of material presented was confirmed.   2. Therapy/equipment/braces: Orthotics referral (for Guadalupe's) placed so that he can be re-fitted for RUE and gauntlet compression garments and any adjustments in anticipation of his trip in August to [facility]. Encouraged him to continue to wear compression garments every day. Re-implement regular MLD techniques and stretching. Continue physical therapy program.  3. For right hip pain, take anti-inflammatory- Aleve or Ibuprofen scheduled for a few days, then as needed. Can also use Voltaren gel QID prn for pain relief. Likely arthralgias/pain from current chemotherapy regimen and training combined.  4. Continue to monitor weight. Would recommend starting 1-2 protein shakes daily (30-60 grams) in addition to diet. Discussed [employer] protein or [employer]  [facility] [employer] as options.   5. Interventions: Monitor for s/s of cellulitis. If there are concerns, he should call the clinic or go to urgent care to be evaluated.   8. Referral / follow up with other providers: None  9. Follow up: RTC in 4-6 months.      Celsa Waters MD  Physical Medicine & Rehabilitation      50 minutes spent on the date of the encounter doing chart review, history and exam, documentation and further activities as noted above.

## 2022-06-15 NOTE — NURSING NOTE
"Oncology Rooming Note    Marie 15, 2022 8:43 AM   Ronna Collins is a 71 year old male who presents for:    Chief Complaint   Patient presents with     Oncology Clinic Visit     Malignant neoplasm of central portion of right breast     Initial Vitals: BP (!) 148/89   Pulse 102   Temp 98.4  F (36.9  C) (Oral)   Wt 75.2 kg (165 lb 11.2 oz)   SpO2 99%   BMI 25.55 kg/m   Estimated body mass index is 25.55 kg/m  as calculated from the following:    Height as of 3/3/22: 1.715 m (5' 7.52\").    Weight as of this encounter: 75.2 kg (165 lb 11.2 oz). Body surface area is 1.89 meters squared.  Mild Pain (3) Comment: Data Unavailable   No LMP for male patient.  Allergies reviewed: Yes  Medications reviewed: Yes    Medications: Medication refills not needed today.  Pharmacy name entered into [employer]:    [id number] - [place], [place] - [address] [facility] - [place], [place] - [address]    Clinical concerns: none       Luci Urbano"

## 2022-06-15 NOTE — LETTER
6/15/2022         RE: Ronna Collins  [address]  [facility] [address]        Dear Colleague,    Thank you for referring your patient, Ronna Collins, to the [facility]. Please see a copy of my visit note below.    [facility]   PM&R clinic note        Interval history:     Ronna Collins presents to clinic today for follow up reg his rehab needs.   He has h/o right breast cancer (clinical prognostic stage IIIb, [id number], grade 2, ER positive, NH positive, HER-2 negative), now s/p right breast mastectomy and right axillary lymph node dissection (10/27/2021).   Was last seen in clinic on 3/15/22.  Recommendations included:  1. Patient education: In depth discussion and education was provided about the assessment and implications of each of the below recommendations for management. Patient indicated readiness to learn, all questions were answered and understanding of material presented was confirmed.   2. Work-up: None  3. Therapy/equipment/braces: Orthotics referral (for Marcella) placed so that he can be re-fitted for RUE and gauntlet compression garments. Encouraged him to wear compression garments every day. Dr. Waters will also reach out to Renetta Dempsey (PT) to see if it would be possible to increase frequency of lymphedema therapy to weekly (currently sees her once a month).    4. Medications: None  5. Interventions: Monitor for s/s of cellulitis. If there are concerns, informed him to call the clinic or go to urgent care to be evaluated. Also discussed that he should monitor skin closely for sensitivities once he starts proton beam radiation therapy.   6. Referral / follow up with other providers: None  7. Follow up: RTC w/ Dr. Waters in 2 months (after he is done with proton beam radiation therapy, which is set to start on 3/21/22)    ONCOLOGIC HISTORY  - First noticed discomfort and hardening of the skin surrounding right  nipple in late April/early May 2021. Noted seborrheic keratosis of the right nipple, and had similar lesion of the arm 3 months earlier. Subsequently noted a mass at the same site.  - Right breast skin punch biopsy performed by derm was c/w grade 2 invasive ductal carcinoma w/ associated DCIS. Invasive carcinoma was ER positive 100% and VA positive 70%, w/ tumor invading the dermis. HER-2 negative by IHC (score 1+).   - Diagnostic mammogram and ultrasound showed a retroareolar right breast mass measuring 2.9cm w/ associated nipple retraction and enlarged, abnormal right axillary lymph nodes. Right breast biopsy was again, c/w grade 2 invasive ductal carcinoma, ER strong in 98% of tumor cell nuclei, HER-2 negative, Ki-67 was 15%.    - Elected to screen for the ISPY-2 clinical trial.  - 6/18/2021 MRI breast showed biopsy proven right breast cancer to measure 3.9 cm in max dimension w/ invasion of the nipple and pectoralis muscle as well as at least 4 abnormal level 1 and 2 right axillary lymph nodes and a tiny 0/4 cm right internal mammary lymph node. Mammoprint returned low risk w/ a score of +0.29.   - Elected for tx on the endocrine optimization protocol of ISPY-2, and was randomized to tx w/ amcenestrant. Was on amcenestrant from 7/1/2021-10/26/2021.  - 10/27/2021 underwent right breast mastectomy and right axillary lymph node dissection w/ Dr. Snider. Pathology showed grade 2 carcinoma of the right breast measuring 3.2 cm w/ invasion of the dermis, nipple, and the pectoralis muscle. Ki-67 of the excised tumor was 7%. 15/44 right axillary lymph nodes were positive w/ 6 of the lymph nodes demonstrating extranodal extension. Largest lymph node metastasis measured 2.1 cm.   - 12/14/2021 began tx w/ Taxotere and cyclophosphamide.  - 1/5/2021 C2 given. Had significant fever following cycle 2.  - 1/26/2021 C3 given  - 2/16/2022 C4 given   - Plan is to start adjuvant radiation soon. Heme/onc planning to initiate tx w/  letrozole at the same time as radiation and abemaciclib upon completion of radiation. Plan to administer abemaciclib for 2 years. Letrozole for a total of 10 years.   - Starting proton beam radiation 3/21/2022. 15 days over 3 weeks at the [facility].   - Saw Nay Blandon for follow up visit on 6/7/22. Patient continues on Verzino. Has trip to [place] coming up for conference, and trip to [facility] in August. Also on treatment with lupron and letrozol. Plan for 2 years of treatment with Verzino. Working with PT and recently started survival to strength program. New back and hip pain, likely secondary to increase in exercise.    Symptoms,  Patient was seen for a return visit this morning. He continued to see lymphedema therapy, Renetta Dempsey once every other month, and has been discharged from therapy with instructions to continue a home regimen as tolerated.  He complains of right thigh pain that started with Verzino and possibly physical therapy. This pain has disturbed his sleep. This has been going for about 3 weeks.   He has started the strength to survival program where a lot of the focus is on core strength and also lower extremity/general strengthening. He has tried tylenol for the pain, which works occasionally but not very effective.   His right arm swelling is stable, and he is down to one sleeve now. He is planning to go to [facility] to get refitted.       Therapies/HEP,  Has been discharged from lymphedema therapy, and has been continuing with MLD and stretching.      Functionally,   No changes since last visit.      Social history is unchanged.      Medications:  Current Outpatient Medications   Medication Sig Dispense Refill     [START ON 6/21/2022] abemaciclib (VERZENIO) 150 MG tablet Take 1 tablet (150 mg) by mouth 2 times daily for 28 days 56 tablet 0     abemaciclib (VERZENIO) 150 MG tablet Take 1 tablet (150 mg) by mouth 2 times daily for 28 days 56 tablet 0     letrozole (FEMARA) 2.5 MG tablet Take 1  tablet (2.5 mg) by mouth daily 90 tablet 3     loperamide (IMODIUM) 2 MG capsule Start with 2 caps (4 mg), then take one cap (2 mg) after each diarrheal stool as needed. Do not use more than 8 caps (16 mg) per day. 30 capsule 0     losartan-hydrochlorothiazide (HYZAAR) 100-25 MG tablet Take 1 tablet by mouth every morning 90 tablet 0     ondansetron (ZOFRAN) 8 MG tablet Take 1 tablet (8 mg) by mouth every 8 hours as needed for nausea 30 tablet 3     prochlorperazine (COMPAZINE) 10 MG tablet Take 0.5 tablets (5 mg) by mouth every 6 hours as needed for nausea or vomiting 30 tablet 2              Physical Exam:   There were no vitals taken for this visit.     Gen: NAD, pleasant and cooperative   HEENT: MMM  Cardio: well perfused  Pulm: non-labored breathing on room air  Abd: benign  Ext: Subtle lymphedema in right forearm, improved since his last visit. Improved lymphedema over the dorsum of the hand and along lateral aspect of proximal forearm. Negative Stemmers sign. No lymphedema noted in neck, mild subtle swelling in right lateral chest wall. No enlarged lymph nodes palpated. No cording noted.   Neuro/MSK: RUE full active ROM. No increased tone. 5/5 strength throughout.      Labs/Imaging:  Lab Results   Component Value Date    WBC 2.9 (L) 06/07/2022    HGB 12.8 (L) 06/07/2022    HCT 38.9 (L) 06/07/2022    MCV 82 06/07/2022     06/07/2022     Lab Results   Component Value Date     06/07/2022    POTASSIUM 4.4 06/07/2022    CHLORIDE 106 06/07/2022    CO2 27 06/07/2022     (H) 06/07/2022     Lab Results   Component Value Date    GFRESTIMATED >90 06/07/2022    GFRESTBLACK >90 07/08/2021     Lab Results   Component Value Date    AST 18 06/07/2022    ALT 30 06/07/2022    GGT 28 06/23/2021    ALKPHOS 73 06/07/2022    BILITOTAL 0.4 06/07/2022     Lab Results   Component Value Date    INR 1.12 06/14/2021     Lab Results   Component Value Date    BUN 11 06/07/2022    CR 0.77 06/07/2022               Assessment/Plan   Jessicanatali Collins presents to clinic today for follow up reg his rehab needs.   He has h/o right breast cancer (clinical prognostic stage IIIb, [id number], grade 2, ER positive, AR positive, HER-2 negative), now s/p right breast mastectomy and right axillary lymph node dissection (10/27/2021). Was last seen in clinic on 3/15/22. As discussed with , his hip pain (R>L) is likely due to a combination of his current treatment regimen and his strengthening program. He is going to check in with Oncology about need to adjust treatment regimen based on how he his symptoms are over the next few weeks. Agree with Oncology on etiology of his pain, and recommend taking aleve scheduled for a few days with food, and then prn as needed. In addition, he was informed that he could try voltaren gel QID prn. A new script was written for right upper extremity compression garment as he is in need of replacements. We discussed that it is fine for him to wait until his trip in August to get chest compression garment. He should continue daily MLD/stretching as tolerated. Will plan a return visit in 4-6 months. Patient is in agreement with the above plan.      1. Patient education: In depth discussion and education was provided about the assessment and implications of each of the below recommendations for management. Patient indicated readiness to learn, all questions were answered and understanding of material presented was confirmed.   2. Therapy/equipment/braces: Orthotics referral (for Guadalupe's) placed so that he can be re-fitted for RUE and gauntlet compression garments and any adjustments in anticipation of his trip in August to [facility]. Encouraged him to continue to wear compression garments every day. Re-implement regular MLD techniques and stretching. Continue physical therapy program.  3. For right hip pain, take anti-inflammatory- Aleve or Ibuprofen scheduled for a few days, then as needed. Can also use  Voltaren gel QID prn for pain relief. Likely arthralgias/pain from current chemotherapy regimen and training combined.  4. Continue to monitor weight. Would recommend starting 1-2 protein shakes daily (30-60 grams) in addition to diet. Discussed Premier protein or [employer] Corepower shakes as options.   5. Interventions: Monitor for s/s of cellulitis. If there are concerns, he should call the clinic or go to urgent care to be evaluated.   8. Referral / follow up with other providers: None  9. Follow up: RTC in 4-6 months.      Celsa Waters MD  Physical Medicine & Rehabilitation      50 minutes spent on the date of the encounter doing chart review, history and exam, documentation and further activities as noted above.

## 2022-06-20 ENCOUNTER — TELEPHONE (OUTPATIENT)
Dept: ONCOLOGY | Facility: CLINIC | Age: 72
End: 2022-06-20

## 2022-06-20 ENCOUNTER — MYC MEDICAL ADVICE (OUTPATIENT)
Dept: ONCOLOGY | Facility: CLINIC | Age: 72
End: 2022-06-20

## 2022-06-20 ENCOUNTER — LAB (OUTPATIENT)
Dept: LAB | Facility: CLINIC | Age: 72
End: 2022-06-20
Attending: INTERNAL MEDICINE
Payer: COMMERCIAL

## 2022-06-20 DIAGNOSIS — C50.929 MALIGNANT NEOPLASM OF MALE BREAST, UNSPECIFIED ESTROGEN RECEPTOR STATUS, UNSPECIFIED LATERALITY, UNSPECIFIED SITE OF BREAST (H): ICD-10-CM

## 2022-06-20 LAB
ALBUMIN SERPL-MCNC: 3.8 G/DL (ref 3.4–5)
ALP SERPL-CCNC: 69 U/L (ref 40–150)
ALT SERPL W P-5'-P-CCNC: 29 U/L (ref 0–70)
ANION GAP SERPL CALCULATED.3IONS-SCNC: 7 MMOL/L (ref 3–14)
AST SERPL W P-5'-P-CCNC: 19 U/L (ref 0–45)
BASOPHILS # BLD AUTO: 0 10E3/UL (ref 0–0.2)
BASOPHILS NFR BLD AUTO: 1 %
BILIRUB SERPL-MCNC: 0.4 MG/DL (ref 0.2–1.3)
BUN SERPL-MCNC: 12 MG/DL (ref 7–30)
CALCIUM SERPL-MCNC: 10 MG/DL (ref 8.5–10.1)
CHLORIDE BLD-SCNC: 104 MMOL/L (ref 94–109)
CO2 SERPL-SCNC: 26 MMOL/L (ref 20–32)
CREAT SERPL-MCNC: 0.76 MG/DL (ref 0.66–1.25)
EOSINOPHIL # BLD AUTO: 0.1 10E3/UL (ref 0–0.7)
EOSINOPHIL NFR BLD AUTO: 4 %
ERYTHROCYTE [DISTWIDTH] IN BLOOD BY AUTOMATED COUNT: 14.5 % (ref 10–15)
GFR SERPL CREATININE-BSD FRML MDRD: >90 ML/MIN/1.73M2
GLUCOSE BLD-MCNC: 108 MG/DL (ref 70–99)
HCT VFR BLD AUTO: 35.7 % (ref 40–53)
HGB BLD-MCNC: 11.9 G/DL (ref 13.3–17.7)
IMM GRANULOCYTES # BLD: 0 10E3/UL
IMM GRANULOCYTES NFR BLD: 0 %
LYMPHOCYTES # BLD AUTO: 1.1 10E3/UL (ref 0.8–5.3)
LYMPHOCYTES NFR BLD AUTO: 32 %
MCH RBC QN AUTO: 27.2 PG (ref 26.5–33)
MCHC RBC AUTO-ENTMCNC: 33.3 G/DL (ref 31.5–36.5)
MCV RBC AUTO: 82 FL (ref 78–100)
MONOCYTES # BLD AUTO: 0.3 10E3/UL (ref 0–1.3)
MONOCYTES NFR BLD AUTO: 10 %
NEUTROPHILS # BLD AUTO: 1.8 10E3/UL (ref 1.6–8.3)
NEUTROPHILS NFR BLD AUTO: 53 %
NRBC # BLD AUTO: 0 10E3/UL
NRBC BLD AUTO-RTO: 0 /100
PLATELET # BLD AUTO: 179 10E3/UL (ref 150–450)
POTASSIUM BLD-SCNC: 4.3 MMOL/L (ref 3.4–5.3)
PROT SERPL-MCNC: 7.6 G/DL (ref 6.8–8.8)
RBC # BLD AUTO: 4.38 10E6/UL (ref 4.4–5.9)
SODIUM SERPL-SCNC: 137 MMOL/L (ref 133–144)
WBC # BLD AUTO: 3.3 10E3/UL (ref 4–11)

## 2022-06-20 PROCEDURE — 80053 COMPREHEN METABOLIC PANEL: CPT

## 2022-06-20 PROCEDURE — 85025 COMPLETE CBC W/AUTO DIFF WBC: CPT

## 2022-06-20 PROCEDURE — 36415 COLL VENOUS BLD VENIPUNCTURE: CPT

## 2022-06-20 NOTE — NURSING NOTE
Chief Complaint   Patient presents with     Labs Only     Blood drawn via VPT by LPN.      ROMEO Koch LPN

## 2022-06-20 NOTE — TELEPHONE ENCOUNTER
Oral Chemotherapy Monitoring Program    Placed call to patient in follow up of recent lab draw in relation to abemaciclib therapy.    Left message to please call back if any questions or concerns with results arose. No patient or drug names were mentioned. A Front Desk HQ message was sent to the patient to notify them to continue to take abemaciclib as directed and to reach out with questions.     Follow up on 7/5 with Jamia velásquez.     Nabeel Blankenship  Pharmacy Intern  Oral Chemotherapy Monitoring Program  [facility]  [phone]    *Please route/copy any questions to [facility] Oral Chemo Pharmacist pool.*

## 2022-06-28 ENCOUNTER — ONCOLOGY VISIT (OUTPATIENT)
Dept: ONCOLOGY | Facility: CLINIC | Age: 72
End: 2022-06-28
Attending: INTERNAL MEDICINE
Payer: COMMERCIAL

## 2022-06-28 VITALS
BODY MASS INDEX: 26.05 KG/M2 | TEMPERATURE: 97.7 F | WEIGHT: 168.9 LBS | OXYGEN SATURATION: 99 % | SYSTOLIC BLOOD PRESSURE: 141 MMHG | HEART RATE: 82 BPM | RESPIRATION RATE: 18 BRPM | DIASTOLIC BLOOD PRESSURE: 95 MMHG

## 2022-06-28 DIAGNOSIS — C50.121 MALIGNANT NEOPLASM OF CENTRAL PORTION OF RIGHT BREAST IN MALE, ESTROGEN RECEPTOR POSITIVE (H): Primary | ICD-10-CM

## 2022-06-28 DIAGNOSIS — Z17.0 MALIGNANT NEOPLASM OF CENTRAL PORTION OF RIGHT BREAST IN MALE, ESTROGEN RECEPTOR POSITIVE (H): Primary | ICD-10-CM

## 2022-06-28 DIAGNOSIS — M47.816 SPONDYLOSIS OF LUMBAR SPINE: ICD-10-CM

## 2022-06-28 DIAGNOSIS — F19.982 DRUG-INDUCED INSOMNIA (H): ICD-10-CM

## 2022-06-28 DIAGNOSIS — Z23 COVID-19 VACCINE ADMINISTERED: ICD-10-CM

## 2022-06-28 DIAGNOSIS — D70.1 CHEMOTHERAPY-INDUCED NEUTROPENIA (H): ICD-10-CM

## 2022-06-28 DIAGNOSIS — T45.1X5A CHEMOTHERAPY-INDUCED NEUTROPENIA (H): ICD-10-CM

## 2022-06-28 DIAGNOSIS — M79.651 PAIN OF RIGHT THIGH: ICD-10-CM

## 2022-06-28 PROCEDURE — G0463 HOSPITAL OUTPT CLINIC VISIT: HCPCS

## 2022-06-28 PROCEDURE — 0054A HC ADMIN COVID VAC PFIZER TRS-SUCR, BOOSTER: CPT | Performed by: INTERNAL MEDICINE

## 2022-06-28 PROCEDURE — 99215 OFFICE O/P EST HI 40 MIN: CPT | Performed by: INTERNAL MEDICINE

## 2022-06-28 PROCEDURE — 91305 HC RX IP 250 OP 636: CPT | Performed by: INTERNAL MEDICINE

## 2022-06-28 PROCEDURE — G0463 HOSPITAL OUTPT CLINIC VISIT: HCPCS | Mod: 25

## 2022-06-28 PROCEDURE — 250N000011 HC RX IP 250 OP 636: Performed by: INTERNAL MEDICINE

## 2022-06-28 RX ORDER — DIPHENHYDRAMINE HYDROCHLORIDE 50 MG/ML
50 INJECTION INTRAMUSCULAR; INTRAVENOUS
Status: DISCONTINUED | OUTPATIENT
Start: 2022-06-28 | End: 2022-07-10 | Stop reason: HOSPADM

## 2022-06-28 RX ORDER — DIPHENHYDRAMINE HCL 25 MG
50 CAPSULE ORAL
Status: DISCONTINUED | OUTPATIENT
Start: 2022-06-28 | End: 2022-07-10 | Stop reason: HOSPADM

## 2022-06-28 RX ORDER — EPINEPHRINE 1 MG/ML
0.3 INJECTION, SOLUTION INTRAMUSCULAR; SUBCUTANEOUS EVERY 5 MIN PRN
Status: DISCONTINUED | OUTPATIENT
Start: 2022-06-28 | End: 2022-07-10 | Stop reason: HOSPADM

## 2022-06-28 RX ORDER — ZOLPIDEM TARTRATE 5 MG/1
5 TABLET ORAL
Qty: 60 TABLET | Refills: 5 | Status: SHIPPED | OUTPATIENT
Start: 2022-06-28 | End: 2022-11-29

## 2022-06-28 RX ADMIN — BNT162B2 30 MCG: 0.23 INJECTION, SUSPENSION INTRAMUSCULAR at 10:45

## 2022-06-28 ASSESSMENT — PAIN SCALES - GENERAL: PAINLEVEL: MILD PAIN (2)

## 2022-06-28 NOTE — PROGRESS NOTES
Oncology Visit:  Date on this visit: 6/28/2022    Diagnosis: h/o clinical prognostic stage IIIb, [id number], grade 2, ER positive, CO positive, HER-2 negative right breast cancer, [id number].    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  He noted discomfort and hardening of the skin of the right nipple in late April/early May, 2021.  He noted a seborrheic keratosis of the right nipple and remembered he had a similar skin lesion of the arm 3 months earlier.  However, he subsequently noted a mass in the same area.  Right breast skin punch biopsy performed by dermatology was c/w grade 2 invasive ductal carcinoma with associated DCIS.  Invasive carcinoma was ER positive 100% and CO positive 70%, with tumor invading the dermis.  HER-2 was negative by IHC (score 1+).  Diagnostic mammogram and ultrasound showed a retroareolar right breast mass measuring 2.9 cm with associated nipple retraction and enlarged, abnormal right axillary lymph nodes.  Right breast biopsy was again c/w grade 2 invasive ductal carcinoma, ER strong in 98% of tumor cell nuclei, HER-2 negative.  Ki-67 was 15%.    He elected to screen for the ISPY-2 clinical trial.  MRI breast on 6/18/2021 showed the biopsy proven right breast cancer to measure 3.9 cm in maximum dimension with invasion of the nipple and the pectoralis muscle as well as at least 4 abnormal level 1 and 2 right axillary lymph nodes and a tiny 0.4 cm right internal mammary lymph node.  Mammoprint returned low risk with a score of +0.29.  He elected for treatment on the endocrine optimization protocol of ISPY-2 and was randomized to treatment with amcenestrant.  He was on treatment with  amcenestrant from 7/1/2021 - 10/26/2021.  At our visit in 01/2021, both right axillary node and right breast tumor palpated more firm then prior.  Breast MRI was stable, however, due to clinical concerns for progression,  decision was made to proceed with surgery.   Right breast mastectomy and right axillary lymph node dissection was performed by Dr. Snider on 10/27/2021.  Pathology showed grade 2 carcinoma of the right breast measuring 3.2 cm with invasion of the dermis, nipple and the pectoralis muscle.  Ki-67 of the excised tumor was 7%.  15/44 right axillary lymph nodes were positive with 6 of the lymph nodes demonstrating extranodal extension.  The largest lymph node metastasis measured 2.1 cm.  Oncotype dx recurrence score was 50.     Karina received 4 cycles of Taxotere and cyclophosphamide from 12/14/2021 - 2/16/2022.  His course was complicated by fevers persistent throughout treatment.  He received radiation (4800 cGy in 15 fractions) to the right chest wall and regional lymph nodes from 3/21/2022 - 4/8/2022.  He has been on treatment with lupron and letrozole since 3/16/2022.  Abemaciclib was added 4/26/2022.    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  Pain is in the right lateral hip, wrapping around to the inner right thigh.  Pain is worse when lying down at night.  Pain awakens patient from sleep.  No improvement with ibuprofen.  Has been present for a couple of months.    He also reports insomnia, both difficulty falling asleep and staying asleep.  Has daytime fatigue.  Is taking a daytime nap daily.  Has tried non-medical means of treating insomnia such as deep breathing and meditation.  Has tried melatonin 3 mg PO at bedtime without relief.  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  Is considering a trip to [facility] in August.  He is concerned about low WBC and infection risk.  Has questions today about risk of recurrence and signs and symptoms of recurrence.  The remainder of a complete 12 point review of systems was reviewed with the patient and was negative with the exception of that mentioned  above.    Past Medical/Surgical History:  Past Medical History:   Diagnosis Date     Breast cancer (H) 05/2021     Chronic sinusitis      Hypertension 2002     Past Surgical History:   Procedure Laterality Date     DISSECT LYMPH NODE AXILLA Right 10/27/2021    Procedure: RIGHT Axillary Lymph Node Dissection;  Surgeon: Nida Snider MD;  Location: [facility] OR     MASTECTOMY SIMPLE Right 10/27/2021    Procedure: RIGHT Simple Mastectomy;  Surgeon: Nida Snider MD;  Location: [facility] OR     Allergies:  Allergies as of 06/28/2022     (No Known Allergies)     Current Medications:  Current Outpatient Medications   Medication Sig Dispense Refill     abemaciclib (VERZENIO) 150 MG tablet Take 1 tablet (150 mg) by mouth 2 times daily for 28 days 56 tablet 0     letrozole (FEMARA) 2.5 MG tablet Take 1 tablet (2.5 mg) by mouth daily 90 tablet 3     loperamide (IMODIUM) 2 MG capsule Start with 2 caps (4 mg), then take one cap (2 mg) after each diarrheal stool as needed. Do not use more than 8 caps (16 mg) per day. 30 capsule 0     losartan-hydrochlorothiazide (HYZAAR) 100-25 MG tablet Take 1 tablet by mouth every morning 90 tablet 0     ondansetron (ZOFRAN) 8 MG tablet Take 1 tablet (8 mg) by mouth every 8 hours as needed for nausea 30 tablet 3     prochlorperazine (COMPAZINE) 10 MG tablet Take 0.5 tablets (5 mg) by mouth every 6 hours as needed for nausea or vomiting 30 tablet 2      Family and Social History:  See initial consultation dated 6/8/2021 for further details.  Breast Actionable and Breast Expanded panels were both negative for pathogenic germline mutations.    Physical Exam:  BP (!) 141/95   Pulse 82   Temp 97.7  F (36.5  C) (Oral)   Resp 18   Wt 76.6 kg (168 lb 14.4 oz)   SpO2 99%   BMI 26.05 kg/m    General:  Well appearing adult male in NAD.  Alert and oriented x 3.  HEENT:  Normocephalic.  Sclera anicteric.  MMM.  No lesions of the oropharynx.   Lymph:  No palpable cervical, supraclavicular, or  "axillary LAD.  Chest:  CTA bilaterally.  No wheezes or crackles. Mastectomy scar well-healed. No masses of the right chest wall.  CV:  RRR.  Nl S1 and S2.    Abd:  Soft/ND.    Ext:  No pitting edema of the bilateral lower extremities.    Neuro:  Cranial nerves grossly intact.  Psych:  Mood and affect appear normal.    Laboratory/Imaging Studies  6/20/2022 Labs:  Electrolytes, kidney function and liver function are wnl.    WBC is low at 3.3, ANC is wnl at 1.8  Hemoglobin is low at 11.9 g/dL  Platelets are wnl.    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  He is s/p treatment with 3.5 months neoadjuvant amcenestrant, right breast mastectomy, right ALND, 4 cycles Taxotere and cyclophosphamide, and radiation.  He is on treatment with abemaciclib, lupron and letrozol.    1.  Right breast cancer:    On treatment with abemaciclib, lupron and letrozole since 4/26/2022.  He has loose stools on the medication, despite this, he is willing to continue it.  Plan to continue abemaciclib for a total of 2 years.      Last PET/CT in 02/2022 showed \"scattered left axillary lymph nodes with mild FDG activity\", bladder wall thickening, and mild uptake throughout the bone marrow.  Based on these indeterminate findings, I recommend obtaining a follow up CT C/A/P w/ contrast in early September (6 months after the last).    We discussed given extensive lymph node involvement, he is at high risk of disease recurrence.  We again reviewed the roles of abemaciclib, lupron, and letrozole in reducing the risk of recurrence.  We discussed common sites of breast cancer metastasis include the bone, liver, lung, and brain, with the bones being most common.  Signs of symptoms of bone metastases include pain characterized as constant and worsening over time, wakes patient from sleep, and not relieved with " "NSAIDs.    2.  Right hip pain:  We discussed this is likely radicular pain from the low back rather than true hip pain.  We reviewed images of PET/CT from 02/2022 together in clinic.  There is some spondylosis of L3 on L4.  My recommendation is for a trial of physical therapy.  If no improvement with physical therapy, would recommend obtaining an MRI of the L-spine.  Recommend continuing NSAIDs prn in the meantime.    3.  Insomnia:  Refractory to melatonin.  Ambien 5 mg PO at bedtime prn prescribed today.  Also recommended the sleep program developed at [facility] entitled, \"Say Jia to Insomnia\".    4.  Chemotherapy induced neutropenia:  He again expressed concern regarding long term neutropenia from abemaciclib and infection risk.  We discussed goal of keeping ANC greater than 1.0 as this decreases risk of complication of infection.  As long as ANC is >1.0, I am comfortable with him traveling.  We reviewed infectious precautions such as masking on the plane, etc.    5.  COVID-19 booster vaccination:  He has not yet received his second booster vaccination.  I recommend he receive it today in preparation for upcoming travel.  He agreed.    6.  Fatigue & weakness:  Ongoing work with physical therapy and is also in a survival to strength program.  His exercise tolerance is improving.  He continues to feel fatigued, likely due to insomnia.    7.  RUE lymphedema:  Will continue to wear a compression sleeve, self massage prn.    8.  Follow Up:  Labs, visit with Nay and Tricia man on 7/5.    45 minutes spent on the date of the encounter doing chart review, review of test results, interpretation of tests, patient visit and documentation       "

## 2022-06-28 NOTE — NURSING NOTE
"Oncology Rooming Note    June 28, 2022 9:25 AM   Ronna Collins is a 71 year old male who presents for:    Chief Complaint   Patient presents with     Oncology Clinic Visit     Malignant neoplasm of central portion of right breast in male, estrogen receptor positive      Initial Vitals: BP (!) 141/95   Pulse 82   Temp 97.7  F (36.5  C) (Oral)   Resp 18   Wt 76.6 kg (168 lb 14.4 oz)   SpO2 99%   BMI 26.05 kg/m   Estimated body mass index is 26.05 kg/m  as calculated from the following:    Height as of 3/3/22: 1.715 m (5' 7.52\").    Weight as of this encounter: 76.6 kg (168 lb 14.4 oz). Body surface area is 1.91 meters squared.  Mild Pain (2) Comment: Data Unavailable   No LMP for male patient.  Allergies reviewed: Yes  Medications reviewed: Yes    Medications: Medication refills not needed today.  Pharmacy name entered into [facility]:    [id number] - [place], [place] - [facility] [facility] [address] [facility] - [place], [place] - [address]    Clinical concerns: right leg pain, worse at night per wife      Shonan Yanez, [facility]            "

## 2022-06-28 NOTE — LETTER
6/28/2022         RE: Ronna Collins  [address]  [facility] [address]        Dear Colleague,    Thank you for referring your patient, Ronna Collins, to the [facility]. Please see a copy of my visit note below.    Oncology Visit:  Date on this visit: 6/28/2022    Diagnosis: h/o clinical prognostic stage IIIb, [id number], grade 2, ER positive, NV positive, HER-2 negative right breast cancer, [id number].    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  He noted discomfort and hardening of the skin of the right nipple in late April/early May, 2021.  He noted a seborrheic keratosis of the right nipple and remembered he had a similar skin lesion of the arm 3 months earlier.  However, he subsequently noted a mass in the same area.  Right breast skin punch biopsy performed by dermatology was c/w grade 2 invasive ductal carcinoma with associated DCIS.  Invasive carcinoma was ER positive 100% and NV positive 70%, with tumor invading the dermis.  HER-2 was negative by IHC (score 1+).  Diagnostic mammogram and ultrasound showed a retroareolar right breast mass measuring 2.9 cm with associated nipple retraction and enlarged, abnormal right axillary lymph nodes.  Right breast biopsy was again c/w grade 2 invasive ductal carcinoma, ER strong in 98% of tumor cell nuclei, HER-2 negative.  Ki-67 was 15%.    He elected to screen for the ISPY-2 clinical trial.  MRI breast on 6/18/2021 showed the biopsy proven right breast cancer to measure 3.9 cm in maximum dimension with invasion of the nipple and the pectoralis muscle as well as at least 4 abnormal level 1 and 2 right axillary lymph nodes and a tiny 0.4 cm right internal mammary lymph node.  Mammoprint returned low risk with a score of +0.29.  He elected for treatment on the endocrine optimization protocol of ISPY-2 and was randomized to treatment with  amcenestrant.  He was on treatment with  amcenestrant from 7/1/2021 - 10/26/2021.  At our visit in 01/2021, both right axillary node and right breast tumor palpated more firm then prior.  Breast MRI was stable, however, due to clinical concerns for progression, decision was made to proceed with surgery.   Right breast mastectomy and right axillary lymph node dissection was performed by Dr. Snider on 10/27/2021.  Pathology showed grade 2 carcinoma of the right breast measuring 3.2 cm with invasion of the dermis, nipple and the pectoralis muscle.  Ki-67 of the excised tumor was 7%.  15/44 right axillary lymph nodes were positive with 6 of the lymph nodes demonstrating extranodal extension.  The largest lymph node metastasis measured 2.1 cm.  Oncotype dx recurrence score was 50.    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  His course was complicated by fevers persistent throughout treatment.  He received radiation (4800 cGy in 15 fractions) to the right chest wall and regional lymph nodes from 3/21/2022 - 4/8/2022.  He has been on treatment with lupron and letrozole since 3/16/2022.  Abemaciclib was added 4/26/2022.    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  Pain is in the right lateral hip, wrapping around to the inner right thigh.  Pain is worse when lying down at night.  Pain awakens patient from sleep.  No improvement with ibuprofen.  Has been present for a couple of months.    He also reports insomnia, both difficulty falling asleep and staying asleep.  Has daytime fatigue.  Is taking a daytime nap daily.  Has tried non-medical means of treating insomnia such as deep breathing and meditation.  Has tried melatonin 3 mg PO at bedtime without relief.  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  Is considering a trip to [facility] in August.  He is concerned about  low WBC and infection risk.  Has questions today about risk of recurrence and signs and symptoms of recurrence.  The remainder of a complete 12 point review of systems was reviewed with the patient and was negative with the exception of that mentioned above.    Past Medical/Surgical History:  Past Medical History:   Diagnosis Date     Breast cancer (H) 05/2021     Chronic sinusitis      Hypertension 2002     Past Surgical History:   Procedure Laterality Date     DISSECT LYMPH NODE AXILLA Right 10/27/2021    Procedure: RIGHT Axillary Lymph Node Dissection;  Surgeon: Nida Sinder MD;  Location: [facility] OR     MASTECTOMY SIMPLE Right 10/27/2021    Procedure: RIGHT Simple Mastectomy;  Surgeon: Nida Snider MD;  Location: [facility] OR     Allergies:  Allergies as of 06/28/2022     (No Known Allergies)     Current Medications:  Current Outpatient Medications   Medication Sig Dispense Refill     abemaciclib (VERZENIO) 150 MG tablet Take 1 tablet (150 mg) by mouth 2 times daily for 28 days 56 tablet 0     letrozole (FEMARA) 2.5 MG tablet Take 1 tablet (2.5 mg) by mouth daily 90 tablet 3     loperamide (IMODIUM) 2 MG capsule Start with 2 caps (4 mg), then take one cap (2 mg) after each diarrheal stool as needed. Do not use more than 8 caps (16 mg) per day. 30 capsule 0     losartan-hydrochlorothiazide (HYZAAR) 100-25 MG tablet Take 1 tablet by mouth every morning 90 tablet 0     ondansetron (ZOFRAN) 8 MG tablet Take 1 tablet (8 mg) by mouth every 8 hours as needed for nausea 30 tablet 3     prochlorperazine (COMPAZINE) 10 MG tablet Take 0.5 tablets (5 mg) by mouth every 6 hours as needed for nausea or vomiting 30 tablet 2      Family and Social History:  See initial consultation dated 6/8/2021 for further details.  Breast Actionable and Breast Expanded panels were both negative for pathogenic germline mutations.    Physical Exam:  BP (!) 141/95   Pulse 82   Temp 97.7  F (36.5  C) (Oral)   Resp 18   Wt 76.6 kg (168  "lb 14.4 oz)   SpO2 99%   BMI 26.05 kg/m    General:  Well appearing adult male in NAD.  Alert and oriented x 3.  HEENT:  Normocephalic.  Sclera anicteric.  MMM.  No lesions of the oropharynx.   Lymph:  No palpable cervical, supraclavicular, or axillary LAD.  Chest:  CTA bilaterally.  No wheezes or crackles. Mastectomy scar well-healed. No masses of the right chest wall.  CV:  RRR.  Nl S1 and S2.    Abd:  Soft/ND.    Ext:  No pitting edema of the bilateral lower extremities.    Neuro:  Cranial nerves grossly intact.  Psych:  Mood and affect appear normal.    Laboratory/Imaging Studies  6/20/2022 Labs:  Electrolytes, kidney function and liver function are wnl.    WBC is low at 3.3, ANC is wnl at 1.8  Hemoglobin is low at 11.9 g/dL  Platelets are wnl.    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  He is s/p treatment with 3.5 months neoadjuvant amcenestrant, right breast mastectomy, right ALND, 4 cycles Taxotere and cyclophosphamide, and radiation.  He is on treatment with abemaciclib, lupron and letrozol.    1.  Right breast cancer:    On treatment with abemaciclib, lupron and letrozole since 4/26/2022.  He has loose stools on the medication, despite this, he is willing to continue it.  Plan to continue abemaciclib for a total of 2 years.      Last PET/CT in 02/2022 showed \"scattered left axillary lymph nodes with mild FDG activity\", bladder wall thickening, and mild uptake throughout the bone marrow.  Based on these indeterminate findings, I recommend obtaining a follow up CT C/A/P w/ contrast in early September (6 months after the last).    We discussed given extensive lymph node involvement, he is at high risk of disease recurrence.  We again reviewed the roles of abemaciclib, lupron, and letrozole in reducing the risk of recurrence.  We discussed common sites of breast cancer " "metastasis include the bone, liver, lung, and brain, with the bones being most common.  Signs of symptoms of bone metastases include pain characterized as constant and worsening over time, wakes patient from sleep, and not relieved with NSAIDs.    2.  Right hip pain:  We discussed this is likely radicular pain from the low back rather than true hip pain.  We reviewed images of PET/CT from 02/2022 together in clinic.  There is some spondylosis of L3 on L4.  My recommendation is for a trial of physical therapy.  If no improvement with physical therapy, would recommend obtaining an MRI of the L-spine.  Recommend continuing NSAIDs prn in the meantime.    3.  Insomnia:  Refractory to melatonin.  Ambien 5 mg PO at bedtime prn prescribed today.  Also recommended the sleep program developed at [employer] entitled, \"Say Jia to Insomnia\".    4.  Chemotherapy induced neutropenia:  He again expressed concern regarding long term neutropenia from abemaciclib and infection risk.  We discussed goal of keeping ANC greater than 1.0 as this decreases risk of complication of infection.  As long as ANC is >1.0, I am comfortable with him traveling.  We reviewed infectious precautions such as masking on the plane, etc.    5.  COVID-19 booster vaccination:  He has not yet received his second booster vaccination.  I recommend he receive it today in preparation for upcoming travel.  He agreed.    6.  Fatigue & weakness:  Ongoing work with physical therapy and is also in a survival to strength program.  His exercise tolerance is improving.  He continues to feel fatigued, likely due to insomnia.    7.  RUE lymphedema:  Will continue to wear a compression sleeve, self massage prn.    8.  Follow Up:  Labs, visit with Rick man on 7/5.    45 minutes spent on the date of the encounter doing chart review, review of test results, interpretation of tests, patient visit and documentation         Again, thank you " for allowing me to participate in the care of your patient.      Sincerely,    Mikaela Sr MD

## 2022-06-28 NOTE — NURSING NOTE
Pfizer vaccine for COVID-19 administered in left deltoid. Patient tolerated well and was discharged after 15 minute observation period. VIS provided. Consent form scanned to chart.    Neri Slater on 6/28/2022 at 10:50 AM

## 2022-07-05 ENCOUNTER — APPOINTMENT (OUTPATIENT)
Dept: LAB | Facility: CLINIC | Age: 72
End: 2022-07-05
Attending: PHYSICIAN ASSISTANT
Payer: COMMERCIAL

## 2022-07-05 ENCOUNTER — ONCOLOGY VISIT (OUTPATIENT)
Dept: ONCOLOGY | Facility: CLINIC | Age: 72
End: 2022-07-05
Attending: PHYSICIAN ASSISTANT
Payer: COMMERCIAL

## 2022-07-05 VITALS
WEIGHT: 167.4 LBS | TEMPERATURE: 97.9 F | BODY MASS INDEX: 25.82 KG/M2 | HEART RATE: 87 BPM | RESPIRATION RATE: 16 BRPM | SYSTOLIC BLOOD PRESSURE: 150 MMHG | OXYGEN SATURATION: 96 % | DIASTOLIC BLOOD PRESSURE: 88 MMHG

## 2022-07-05 DIAGNOSIS — C50.121 MALIGNANT NEOPLASM OF CENTRAL PORTION OF RIGHT BREAST IN MALE, ESTROGEN RECEPTOR POSITIVE (H): Primary | ICD-10-CM

## 2022-07-05 DIAGNOSIS — Z17.0 MALIGNANT NEOPLASM OF CENTRAL PORTION OF RIGHT BREAST IN MALE, ESTROGEN RECEPTOR POSITIVE (H): Primary | ICD-10-CM

## 2022-07-05 LAB
ALBUMIN SERPL-MCNC: 3.6 G/DL (ref 3.4–5)
ALP SERPL-CCNC: 68 U/L (ref 40–150)
ALT SERPL W P-5'-P-CCNC: 27 U/L (ref 0–70)
ANION GAP SERPL CALCULATED.3IONS-SCNC: 11 MMOL/L (ref 3–14)
AST SERPL W P-5'-P-CCNC: 18 U/L (ref 0–45)
BASOPHILS # BLD AUTO: 0 10E3/UL (ref 0–0.2)
BASOPHILS NFR BLD AUTO: 1 %
BILIRUB SERPL-MCNC: 0.4 MG/DL (ref 0.2–1.3)
BUN SERPL-MCNC: 15 MG/DL (ref 7–30)
CALCIUM SERPL-MCNC: 9.6 MG/DL (ref 8.5–10.1)
CHLORIDE BLD-SCNC: 106 MMOL/L (ref 94–109)
CO2 SERPL-SCNC: 23 MMOL/L (ref 20–32)
CREAT SERPL-MCNC: 0.8 MG/DL (ref 0.66–1.25)
EOSINOPHIL # BLD AUTO: 0.2 10E3/UL (ref 0–0.7)
EOSINOPHIL NFR BLD AUTO: 6 %
ERYTHROCYTE [DISTWIDTH] IN BLOOD BY AUTOMATED COUNT: 15.5 % (ref 10–15)
GFR SERPL CREATININE-BSD FRML MDRD: >90 ML/MIN/1.73M2
GLUCOSE BLD-MCNC: 134 MG/DL (ref 70–99)
HCT VFR BLD AUTO: 34.3 % (ref 40–53)
HGB BLD-MCNC: 11.5 G/DL (ref 13.3–17.7)
IMM GRANULOCYTES # BLD: 0 10E3/UL
IMM GRANULOCYTES NFR BLD: 0 %
LYMPHOCYTES # BLD AUTO: 1.2 10E3/UL (ref 0.8–5.3)
LYMPHOCYTES NFR BLD AUTO: 38 %
MCH RBC QN AUTO: 28.1 PG (ref 26.5–33)
MCHC RBC AUTO-ENTMCNC: 33.5 G/DL (ref 31.5–36.5)
MCV RBC AUTO: 84 FL (ref 78–100)
MONOCYTES # BLD AUTO: 0.2 10E3/UL (ref 0–1.3)
MONOCYTES NFR BLD AUTO: 7 %
NEUTROPHILS # BLD AUTO: 1.5 10E3/UL (ref 1.6–8.3)
NEUTROPHILS NFR BLD AUTO: 48 %
NRBC # BLD AUTO: 0 10E3/UL
NRBC BLD AUTO-RTO: 0 /100
PLATELET # BLD AUTO: 172 10E3/UL (ref 150–450)
POTASSIUM BLD-SCNC: 4 MMOL/L (ref 3.4–5.3)
PROT SERPL-MCNC: 7.3 G/DL (ref 6.8–8.8)
RBC # BLD AUTO: 4.09 10E6/UL (ref 4.4–5.9)
SODIUM SERPL-SCNC: 140 MMOL/L (ref 133–144)
WBC # BLD AUTO: 3.1 10E3/UL (ref 4–11)

## 2022-07-05 PROCEDURE — G0463 HOSPITAL OUTPT CLINIC VISIT: HCPCS

## 2022-07-05 PROCEDURE — 99214 OFFICE O/P EST MOD 30 MIN: CPT | Performed by: PHYSICIAN ASSISTANT

## 2022-07-05 PROCEDURE — 36415 COLL VENOUS BLD VENIPUNCTURE: CPT | Performed by: PHYSICIAN ASSISTANT

## 2022-07-05 PROCEDURE — 85004 AUTOMATED DIFF WBC COUNT: CPT | Performed by: PHYSICIAN ASSISTANT

## 2022-07-05 PROCEDURE — 80053 COMPREHEN METABOLIC PANEL: CPT | Performed by: PHYSICIAN ASSISTANT

## 2022-07-05 PROCEDURE — 250N000011 HC RX IP 250 OP 636: Performed by: INTERNAL MEDICINE

## 2022-07-05 PROCEDURE — 96402 CHEMO HORMON ANTINEOPL SQ/IM: CPT

## 2022-07-05 RX ADMIN — LEUPROLIDE ACETATE 7.5 MG: KIT at 08:44

## 2022-07-05 ASSESSMENT — PAIN SCALES - GENERAL: PAINLEVEL: MILD PAIN (2)

## 2022-07-05 NOTE — NURSING NOTE
"Oncology Rooming Note    July 5, 2022 8:02 AM   Ronna Collins is a 71 year old male who presents for:    Chief Complaint   Patient presents with     Labs Only     Labs drawn via vpt by Rn in lab, vitals completed.      Oncology Clinic Visit     Breast cancer     Initial Vitals: BP (!) 150/88 (BP Location: Right arm)   Pulse 87   Temp 97.9  F (36.6  C) (Oral)   Resp 16   Wt 75.9 kg (167 lb 6.4 oz)   SpO2 96%   BMI 25.82 kg/m   Estimated body mass index is 25.82 kg/m  as calculated from the following:    Height as of 3/3/22: 1.715 m (5' 7.52\").    Weight as of this encounter: 75.9 kg (167 lb 6.4 oz). Body surface area is 1.9 meters squared.  Mild Pain (2) Comment: Data Unavailable   No LMP for male patient.  Allergies reviewed: Yes  Medications reviewed: Yes    Medications: Medication refills not needed today.  Pharmacy name entered into [facility]:    [id number] - [place], [place] - [facility] [address] [facility] - [place], [place] - [address]    Clinical concerns: lingering pain in bilateral thighs, R>L       Samia New CMA            "

## 2022-07-05 NOTE — NURSING NOTE
Lupron given in right ventrogluteal muscle in clinic, pt tolerated. See MAR for details.    Samia New CMA on 7/5/2022 at 8:45 AM

## 2022-07-05 NOTE — NURSING NOTE
Chief Complaint   Patient presents with     Labs Only     Labs drawn via vpt by Rn in lab, vitals completed.      Pt was checked in for next appt.    Mya Abbott RN

## 2022-07-05 NOTE — LETTER
7/5/2022         RE: Ronna Collins  [address]  [facility] [address]      Oncology Visit:  Date on this visit: Jul 5, 2022    Diagnosis: h/o clinical prognostic stage IIIb, [id number], grade 2, ER positive, PA positive, HER-2 negative right breast cancer, [id number].    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  He noted discomfort and hardening of the skin of the right nipple in late April/early May, 2021.  He noted a seborrheic keratosis of the right nipple and remembered he had a similar skin lesion of the arm 3 months earlier.  However, he subsequently noted a mass in the same area.  Right breast skin punch biopsy performed by dermatology was c/w grade 2 invasive ductal carcinoma with associated DCIS.  Invasive carcinoma was ER positive 100% and PA positive 70%, with tumor invading the dermis.  HER-2 was negative by IHC (score 1+).  Diagnostic mammogram and ultrasound showed a retroareolar right breast mass measuring 2.9 cm with associated nipple retraction and enlarged, abnormal right axillary lymph nodes.  Right breast biopsy was again c/w grade 2 invasive ductal carcinoma, ER strong in 98% of tumor cell nuclei, HER-2 negative.  Ki-67 was 15%.    He elected to screen for the ISPY-2 clinical trial.  MRI breast on 6/18/2021 showed the biopsy proven right breast cancer to measure 3.9 cm in maximum dimension with invasion of the nipple and the pectoralis muscle as well as at least 4 abnormal level 1 and 2 right axillary lymph nodes and a tiny 0.4 cm right internal mammary lymph node.  Mammoprint returned low risk with a score of +0.29.  He elected for treatment on the endocrine optimization protocol of ISPY-2 and was randomized to treatment with amcenestrant.  He was on treatment with  amcenestrant from 7/1/2021 - 10/26/2021.  At our visit in 01/2021, both right axillary node and right breast tumor palpated more  firm then prior.  Breast MRI was stable, however, due to clinical concerns for progression, decision was made to proceed with surgery.   Right breast mastectomy and right axillary lymph node dissection was performed by Dr. Snider on 10/27/2021.  Pathology showed grade 2 carcinoma of the right breast measuring 3.2 cm with invasion of the dermis, nipple and the pectoralis muscle.  Ki-67 of the excised tumor was 7%.  15/44 right axillary lymph nodes were positive with 6 of the lymph nodes demonstrating extranodal extension.  The largest lymph node metastasis measured 2.1 cm.  Oncotype dx recurrence score was 50.     Karina received 4 cycles of Taxotere and cyclophosphamide from 12/14/2021 - 2/16/2022.  His course was complicated by fevers persistent throughout treatment.  He received radiation (4800 cGy in 15 fractions) to the right chest wall and regional lymph nodes from 3/21/2022 - 4/8/2022.  He has been on treatment with lupron and letrozole since 3/16/2022.  Abemaciclib was added 4/26/2022.    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 He reports no new concerns since his visit with Dr. Sr last week. He continues to have bilateral anterior hip and thigh pain, R >L. Dr. Sr referred him to PT which starts later this week. Legs feel weaker.    He started to read the insomnia book and is finding this helpful. He tried ambien a few times and it was effective. His energy level has overall been okay.     No nausea. Not hot flashes. Bowels are stable. No new or different pain or lumps/bumps. No fevers/chills or infectious concerns.       Past Medical/Surgical History:  Past Medical History:   Diagnosis Date     Breast cancer (H) 05/2021     Chronic sinusitis      Hypertension 2002     Past Surgical History:   Procedure Laterality Date     DISSECT LYMPH NODE AXILLA Right 10/27/2021    Procedure: RIGHT Axillary Lymph Node Dissection;  Surgeon: Nida Snider MD;  Location: [facility] OR     MASTECTOMY SIMPLE  Right 10/27/2021    Procedure: RIGHT Simple Mastectomy;  Surgeon: Nida Snider MD;  Location:  OR     Allergies:  Allergies as of 07/05/2022     (No Known Allergies)     Current Medications:  Current Outpatient Medications   Medication Sig Dispense Refill     abemaciclib (VERZENIO) 150 MG tablet Take 1 tablet (150 mg) by mouth 2 times daily for 28 days 56 tablet 0     letrozole (FEMARA) 2.5 MG tablet Take 1 tablet (2.5 mg) by mouth daily 90 tablet 3     loperamide (IMODIUM) 2 MG capsule Start with 2 caps (4 mg), then take one cap (2 mg) after each diarrheal stool as needed. Do not use more than 8 caps (16 mg) per day. (Patient not taking: Reported on 6/28/2022) 30 capsule 0     losartan-hydrochlorothiazide (HYZAAR) 100-25 MG tablet Take 1 tablet by mouth every morning 90 tablet 0     MELATONIN PO        Naproxen Sodium (ALEVE PO)        ondansetron (ZOFRAN) 8 MG tablet Take 1 tablet (8 mg) by mouth every 8 hours as needed for nausea (Patient not taking: Reported on 6/28/2022) 30 tablet 3     prochlorperazine (COMPAZINE) 10 MG tablet Take 0.5 tablets (5 mg) by mouth every 6 hours as needed for nausea or vomiting (Patient not taking: Reported on 6/28/2022) 30 tablet 2     zolpidem (AMBIEN) 5 MG tablet Take 1 tablet (5 mg) by mouth nightly as needed for sleep 60 tablet 5      Genetics Breast Actionable and Breast Expanded panels were both negative for pathogenic germline mutations.    Physical Exam:  BP (!) 150/88 (BP Location: Right arm)   Pulse 87   Temp 97.9  F (36.6  C) (Oral)   Resp 16   Wt 75.9 kg (167 lb 6.4 oz)   SpO2 96%   BMI 25.82 kg/m    General:  Well appearing adult male in NAD.  Alert and oriented x 3.  HEENT:  Normocephalic.  Sclera anicteric.   Lymph:  No palpable cervical, supraclavicular, or axillary LAD.  Chest:  CTA bilaterally.  No wheezes or crackles. Mastectomy scar well-healed. No masses of the right chest wall.  CV:  RRR.  Nl S1 and S2.    Abd:  Soft/ND/NT. +BS  Ext:  No  "pitting edema of the bilateral lower extremities.    Neuro:  Cranial nerves grossly intact. Strength intact and equal lower extremities   Psych:  Mood and affect appear normal.    Laboratory/Imaging Studies   07/05/22 07:50   Sodium 140   Potassium 4.0   Chloride 106   Carbon Dioxide 23   Urea Nitrogen 15   Creatinine 0.80   GFR Estimate >90   Calcium 9.6   Anion Gap 11   Albumin 3.6   Protein Total 7.3   Alkaline Phosphatase 68   ALT 27   AST 18   Bilirubin Total 0.4   Glucose 134 (H)   WBC 3.1 (L)   Hemoglobin 11.5 (L)   Hematocrit 34.3 (L)   Platelet Count 172   RBC Count 4.09 (L)   MCV 84   MCH 28.1   MCHC 33.5   RDW 15.5 (H)   % Neutrophils 48   % Lymphocytes 38   % Monocytes 7   % Eosinophils 6   % Basophils 1   Absolute Basophils 0.0   Absolute Eosinophils 0.2   Absolute Immature Granulocytes 0.0   Absolute Lymphocytes 1.2   Absolute Monocytes 0.2   % Immature Granulocytes 0   Absolute Neutrophils 1.5 (L)   Absolute NRBCs 0.0   NRBCs per 100 WBC 0       ASSESSMENT/PLAN:  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  He is s/p treatment with 3.5 months neoadjuvant amcenestrant, right breast mastectomy, right ALND, 4 cycles Taxotere and cyclophosphamide, and radiation.  He is on treatment with abemaciclib, lupron and letrozol.    1.  Right breast cancer:    On treatment with abemaciclib, lupron and letrozole since 4/26/2022.  He is tolerating well besides intermittent loose stools. Has mild leukopenia and anemia otherwise no notable lab abnormalities. Plan to continue abemaciclib for a total of 2 years.  Will continue monthly follow-up for now.     Last PET/CT in 02/2022 showed \"scattered left axillary lymph nodes with mild FDG activity\", bladder wall thickening, and mild uptake throughout the bone marrow.  Based on these indeterminate findings, he has follow up CT C/A/P w/ contrast in early " "September (6 months after the last).    2.  Fatigue & weakness:  Ongoing work with physical therapy and is also in a survival to strength program.  His exercise tolerance is improving.     3.  Back pain with radiating discomfort to anterior thighs:  Is taking tylenol scheduled at bedtime and ibuprofen prn. Starting PT later this week.     4.  RUE lymphedema:  Will continue to wear a compression sleeve, self massage prn.    5.  Insomnia: He is using ambien 5 mg PO PRN but is working through  the book \"Say Jia to Insomnia\", a sleep program developed at [facility].     TRINY Santa PA-C  "

## 2022-07-05 NOTE — PROGRESS NOTES
Oncology Visit:  Date on this visit: Jul 5, 2022    Diagnosis: h/o clinical prognostic stage IIIb, [id number], grade 2, ER positive, TN positive, HER-2 negative right breast cancer, [id number].    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  He noted discomfort and hardening of the skin of the right nipple in late April/early May, 2021.  He noted a seborrheic keratosis of the right nipple and remembered he had a similar skin lesion of the arm 3 months earlier.  However, he subsequently noted a mass in the same area.  Right breast skin punch biopsy performed by dermatology was c/w grade 2 invasive ductal carcinoma with associated DCIS.  Invasive carcinoma was ER positive 100% and TN positive 70%, with tumor invading the dermis.  HER-2 was negative by IHC (score 1+).  Diagnostic mammogram and ultrasound showed a retroareolar right breast mass measuring 2.9 cm with associated nipple retraction and enlarged, abnormal right axillary lymph nodes.  Right breast biopsy was again c/w grade 2 invasive ductal carcinoma, ER strong in 98% of tumor cell nuclei, HER-2 negative.  Ki-67 was 15%.    He elected to screen for the ISPY-2 clinical trial.  MRI breast on 6/18/2021 showed the biopsy proven right breast cancer to measure 3.9 cm in maximum dimension with invasion of the nipple and the pectoralis muscle as well as at least 4 abnormal level 1 and 2 right axillary lymph nodes and a tiny 0.4 cm right internal mammary lymph node.  Mammoprint returned low risk with a score of +0.29.  He elected for treatment on the endocrine optimization protocol of ISPY-2 and was randomized to treatment with amcenestrant.  He was on treatment with  amcenestrant from 7/1/2021 - 10/26/2021.  At our visit in 01/2021, both right axillary node and right breast tumor palpated more firm then prior.  Breast MRI was stable, however, due to clinical concerns for progression,  decision was made to proceed with surgery.   Right breast mastectomy and right axillary lymph node dissection was performed by Dr. Snider on 10/27/2021.  Pathology showed grade 2 carcinoma of the right breast measuring 3.2 cm with invasion of the dermis, nipple and the pectoralis muscle.  Ki-67 of the excised tumor was 7%.  15/44 right axillary lymph nodes were positive with 6 of the lymph nodes demonstrating extranodal extension.  The largest lymph node metastasis measured 2.1 cm.  Oncotype dx recurrence score was 50.    Jamie Collins received 4 cycles of Taxotere and cyclophosphamide from 12/14/2021 - 2/16/2022.  His course was complicated by fevers persistent throughout treatment.  He received radiation (4800 cGy in 15 fractions) to the right chest wall and regional lymph nodes from 3/21/2022 - 4/8/2022.  He has been on treatment with lupron and letrozole since 3/16/2022.  Abemaciclib was added 4/26/2022.    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 He reports no new concerns since his visit with Dr. Sr last week. He continues to have bilateral anterior hip and thigh pain, R >L. Dr. Sr referred him to PT which starts later this week. Legs feel weaker.    He started to read the insomnia book and is finding this helpful. He tried ambien a few times and it was effective. His energy level has overall been okay.     No nausea. Not hot flashes. Bowels are stable. No new or different pain or lumps/bumps. No fevers/chills or infectious concerns.       Past Medical/Surgical History:  Past Medical History:   Diagnosis Date     Breast cancer (H) 05/2021     Chronic sinusitis      Hypertension 2002     Past Surgical History:   Procedure Laterality Date     DISSECT LYMPH NODE AXILLA Right 10/27/2021    Procedure: RIGHT Axillary Lymph Node Dissection;  Surgeon: Nida Snider MD;  Location: [facility] OR     MASTECTOMY SIMPLE Right 10/27/2021    Procedure: RIGHT Simple Mastectomy;  Surgeon: Nida Snider MD;   Location: [facility] OR     Allergies:  Allergies as of 07/05/2022     (No Known Allergies)     Current Medications:  Current Outpatient Medications   Medication Sig Dispense Refill     abemaciclib (VERZENIO) 150 MG tablet Take 1 tablet (150 mg) by mouth 2 times daily for 28 days 56 tablet 0     letrozole (FEMARA) 2.5 MG tablet Take 1 tablet (2.5 mg) by mouth daily 90 tablet 3     loperamide (IMODIUM) 2 MG capsule Start with 2 caps (4 mg), then take one cap (2 mg) after each diarrheal stool as needed. Do not use more than 8 caps (16 mg) per day. (Patient not taking: Reported on 6/28/2022) 30 capsule 0     losartan-hydrochlorothiazide (HYZAAR) 100-25 MG tablet Take 1 tablet by mouth every morning 90 tablet 0     MELATONIN PO        Naproxen Sodium (ALEVE PO)        ondansetron (ZOFRAN) 8 MG tablet Take 1 tablet (8 mg) by mouth every 8 hours as needed for nausea (Patient not taking: Reported on 6/28/2022) 30 tablet 3     prochlorperazine (COMPAZINE) 10 MG tablet Take 0.5 tablets (5 mg) by mouth every 6 hours as needed for nausea or vomiting (Patient not taking: Reported on 6/28/2022) 30 tablet 2     zolpidem (AMBIEN) 5 MG tablet Take 1 tablet (5 mg) by mouth nightly as needed for sleep 60 tablet 5      Genetics Breast Actionable and Breast Expanded panels were both negative for pathogenic germline mutations.    Physical Exam:  BP (!) 150/88 (BP Location: Right arm)   Pulse 87   Temp 97.9  F (36.6  C) (Oral)   Resp 16   Wt 75.9 kg (167 lb 6.4 oz)   SpO2 96%   BMI 25.82 kg/m    General:  Well appearing adult male in NAD.  Alert and oriented x 3.  HEENT:  Normocephalic.  Sclera anicteric.   Lymph:  No palpable cervical, supraclavicular, or axillary LAD.  Chest:  CTA bilaterally.  No wheezes or crackles. Mastectomy scar well-healed. No masses of the right chest wall.  CV:  RRR.  Nl S1 and S2.    Abd:  Soft/ND/NT. +BS  Ext:  No pitting edema of the bilateral lower extremities.    Neuro:  Cranial nerves grossly intact.  "Strength intact and equal lower extremities   Psych:  Mood and affect appear normal.    Laboratory/Imaging Studies   07/05/22 07:50   Sodium 140   Potassium 4.0   Chloride 106   Carbon Dioxide 23   Urea Nitrogen 15   Creatinine 0.80   GFR Estimate >90   Calcium 9.6   Anion Gap 11   Albumin 3.6   Protein Total 7.3   Alkaline Phosphatase 68   ALT 27   AST 18   Bilirubin Total 0.4   Glucose 134 (H)   WBC 3.1 (L)   Hemoglobin 11.5 (L)   Hematocrit 34.3 (L)   Platelet Count 172   RBC Count 4.09 (L)   MCV 84   MCH 28.1   MCHC 33.5   RDW 15.5 (H)   % Neutrophils 48   % Lymphocytes 38   % Monocytes 7   % Eosinophils 6   % Basophils 1   Absolute Basophils 0.0   Absolute Eosinophils 0.2   Absolute Immature Granulocytes 0.0   Absolute Lymphocytes 1.2   Absolute Monocytes 0.2   % Immature Granulocytes 0   Absolute Neutrophils 1.5 (L)   Absolute NRBCs 0.0   NRBCs per 100 WBC 0       ASSESSMENT/PLAN:  Jamie Collins is a 70 yo male with pathologic stage IIIb, [id number], grade 2, ER positive, [place] positive, HER-2 negative invasive ductal carcinoma of the right breast.  He is s/p treatment with 3.5 months neoadjuvant amcenestrant, right breast mastectomy, right ALND, 4 cycles Taxotere and cyclophosphamide, and radiation.  He is on treatment with abemaciclib, lupron and letrozol.    1.  Right breast cancer:    On treatment with abemaciclib, lupron and letrozole since 4/26/2022.  He is tolerating well besides intermittent loose stools. Has mild leukopenia and anemia otherwise no notable lab abnormalities. Plan to continue abemaciclib for a total of 2 years.  Will continue monthly follow-up for now.     Last PET/CT in 02/2022 showed \"scattered left axillary lymph nodes with mild FDG activity\", bladder wall thickening, and mild uptake throughout the bone marrow.  Based on these indeterminate findings, he has follow up CT C/A/P w/ contrast in early September (6 months after the last).    2.  Fatigue & weakness:  Ongoing work with physical " "therapy and is also in a survival to strength program.  His exercise tolerance is improving.     3.  Back pain with radiating discomfort to anterior thighs:  Is taking tylenol scheduled at bedtime and ibuprofen prn. Starting PT later this week.     4.  RUE lymphedema:  Will continue to wear a compression sleeve, self massage prn.    5.  Insomnia: He is using ambien 5 mg PO PRN but is working through  the book \"Say Jia to Insomnia\", a sleep program developed at [facility].     Nay Blandon PA-C       "

## 2022-07-06 DIAGNOSIS — C50.929 MALIGNANT NEOPLASM OF MALE BREAST, UNSPECIFIED ESTROGEN RECEPTOR STATUS, UNSPECIFIED LATERALITY, UNSPECIFIED SITE OF BREAST (H): Primary | ICD-10-CM

## 2022-07-07 ENCOUNTER — THERAPY VISIT (OUTPATIENT)
Dept: PHYSICAL THERAPY | Facility: CLINIC | Age: 72
End: 2022-07-07
Attending: INTERNAL MEDICINE
Payer: COMMERCIAL

## 2022-07-07 DIAGNOSIS — M47.816 SPONDYLOSIS OF LUMBAR SPINE: ICD-10-CM

## 2022-07-07 DIAGNOSIS — M54.16 LUMBAR RADICULOPATHY: ICD-10-CM

## 2022-07-07 DIAGNOSIS — M79.651 PAIN OF RIGHT THIGH: ICD-10-CM

## 2022-07-07 PROCEDURE — 97161 PT EVAL LOW COMPLEX 20 MIN: CPT | Mod: GP | Performed by: PHYSICAL THERAPIST

## 2022-07-07 PROCEDURE — 97110 THERAPEUTIC EXERCISES: CPT | Mod: GP | Performed by: PHYSICAL THERAPIST

## 2022-07-07 NOTE — PROGRESS NOTES
Physical Therapy Initial Evaluation  Subjective:  The history is provided by the patient. No  was used.   Therapist Generated HPI Evaluation  Problem details: Pt was diagnosed with breast cancer in June 2021, had surgery in chemo in the fall, through March, radiation after that, completed by early April 2022. Is currently on various hormone medications. Had a fair amount of discomfort during this time, and more specifically around April 2022 began to notice some pain in his R thigh. Saw oncologist for check-up last week (June 2022) and reviewed some earlier scans which showed some lumbar degenerative disease and suspected low back pathology versus metastasis. Referred to PT. Pt is currently about FDC through a Survival to Strength program. Walks 45 min daily. Also undergoing lymphedema therapy. .         Type of problem:  Lumbar.    This is a new condition.  Condition occurred with:  Other reason.  Where condition occurred: other.  Patient reports pain:  Lumbar spine right and lumbar spine left (only felt sitting at computer for extended periods).  Pain is described as aching and is constant.  Pain radiates to:  Thigh right (more medial). Pain is worse during the night.  Since onset symptoms are unchanged.  Exacerbated by: stairs.  and relieved by other and activity/movement (massaging thigh with roller, general exercise).      Restrictions due to condition include:  Working in normal job without restrictions.  Barriers include:  None as reported by patient.    Patient Health History         Pain is reported as 2/10 on pain scale.  General health as reported by patient is good.  Pertinent medical history includes: cancer and high blood pressure (R breast cancer treatment completed spring 2022).   Red flags:  Pain at rest/night.  Medical allergies: none.   Surgeries include:  Cancer surgery. Other surgery history details: R breast cancer.    Current medications:  Hormone replacement therapy  and high blood pressure medication.    Current occupation is .   Primary job tasks include:  Computer work and prolonged sitting.                                    Objective:  Standing Alignment:        Lumbar:  Lordosis decr                           Lumbar/SI Evaluation    Lumbar Myotomes:  normal                Lumbar Dermtomes:  not assessed                                                                         Piotr Lumbar Evaluation    Posture:  Sitting: poor    Lordosis: Reduced    Correction of Posture: better    Movement Loss:  Flexion (Flex): nil  Extension (EXT): min  Side Glide R (SG R): nil  Side Glide L (SG L): nil  Test Movements:        EIL: During: no effect  After: no effect  Mechanical Response: no effect  Repeat EIL: During: no effect  After: no effect  Mechanical Response: IncROM      Static Tests:          Lying Prone in Extension: P R thigh/NW    Conclusion: derangement  Principle of Treatment:  Posture Correction: lumbar support    Extension: EIL 10-15 reps every 2-3 hrs                                           ROS    Assessment/Plan:    Patient is a 71 year old male with lumbar complaints.    Patient has the following significant findings with corresponding treatment plan.                Diagnosis 1:  R lumbar radiculopathy  Pain -  manual therapy, self management and education  Decreased ROM/flexibility - manual therapy, therapeutic exercise, therapeutic activity and home program  Inflammation - self management/home program  Decreased function - therapeutic activities and home program  Impaired posture - neuro re-education, therapeutic activities and home program    Therapy Evaluation Codes:   1) History comprised of:   Personal factors that impact the plan of care:      None.    Comorbidity factors that impact the plan of care are:      Cancer.     Medications impacting care: None.  2) Examination of Body Systems comprised of:   Body structures and functions  that impact the plan of care:      Hip and Lumbar spine.   Activity limitations that impact the plan of care are:      Stairs and Sleeping.  3) Clinical presentation characteristics are:   Stable/Uncomplicated.  4) Decision-Making    Moderate complexity using standardized patient assessment instrument and/or measureable assessment of functional outcome.  Cumulative Therapy Evaluation is: Low complexity.    Previous and current functional limitations:  (See Goal Flow Sheet for this information)    Short term and Long term goals: (See Goal Flow Sheet for this information)     Communication ability:  Patient appears to be able to clearly communicate and understand verbal and written communication and follow directions correctly.  Treatment Explanation - The following has been discussed with the patient:   RX ordered/plan of care  Anticipated outcomes  Possible risks and side effects  This patient would benefit from PT intervention to resume normal activities.   Rehab potential is excellent.    Frequency:  1 X week, once daily  Duration:  for 6 weeks  Discharge Plan:  Achieve all LTG.  Independent in home treatment program.  Reach maximal therapeutic benefit.    Please refer to the daily flowsheet for treatment today, total treatment time and time spent performing 1:1 timed codes.

## 2022-07-08 ENCOUNTER — MYC MEDICAL ADVICE (OUTPATIENT)
Dept: FAMILY MEDICINE | Facility: CLINIC | Age: 72
End: 2022-07-08

## 2022-07-08 DIAGNOSIS — Z00.00 HEALTH CARE MAINTENANCE: Primary | ICD-10-CM

## 2022-07-08 DIAGNOSIS — E78.1 HIGH TRIGLYCERIDES: ICD-10-CM

## 2022-07-11 NOTE — TELEPHONE ENCOUNTER
Lets do glucose too  We can use last vitals for BMI and BP  We don't normally do body fat, I don't have the capability to do that and generally it is ok to leave blank in my recollection  He can just report to us his waist size

## 2022-07-12 DIAGNOSIS — C50.929 MALIGNANT NEOPLASM OF MALE BREAST, UNSPECIFIED ESTROGEN RECEPTOR STATUS, UNSPECIFIED LATERALITY, UNSPECIFIED SITE OF BREAST (H): Primary | ICD-10-CM

## 2022-07-12 RX ORDER — LETROZOLE 2.5 MG/1
2.5 TABLET, FILM COATED ORAL DAILY
Qty: 28 TABLET | Refills: 0 | Status: SHIPPED | OUTPATIENT
Start: 2022-07-19 | End: 2022-11-29

## 2022-07-14 ENCOUNTER — THERAPY VISIT (OUTPATIENT)
Dept: PHYSICAL THERAPY | Facility: CLINIC | Age: 72
End: 2022-07-14
Payer: COMMERCIAL

## 2022-07-14 DIAGNOSIS — M54.16 LUMBAR RADICULOPATHY: Primary | ICD-10-CM

## 2022-07-14 PROCEDURE — 97110 THERAPEUTIC EXERCISES: CPT | Mod: GP | Performed by: PHYSICAL THERAPIST

## 2022-07-14 PROCEDURE — 97140 MANUAL THERAPY 1/> REGIONS: CPT | Mod: GP | Performed by: PHYSICAL THERAPIST

## 2022-07-14 NOTE — PROGRESS NOTES
Subjective:  HPI  Physical Exam                    Objective:  System    Physical Exam    General     ROS    Assessment/Plan:    SUBJECTIVE  Subjective changes as noted by pt:  Performing the pressups 3-4x/day.   As a result, the pain is less often, pain feels moved from thigh and lateral thigh to medial thigh and a bit closer to knee. Sleeping better. Overall feels that he's improved, feels he's sitting up straighter.        Current pain level: 0/10     Changes in function:  Yes (See Goal flowsheet attached for changes in current functional level)     Adverse reaction to treatment or activity:  None    OBJECTIVE  Changes in objective findings:  Yes, See physical exam section and/or daily flowsheet for response to repeated movements.           ASSESSMENT  Ronna continues to require intervention to meet STG and LTG's: PT  Patient is progressing as expected.  Response to therapy has shown an improvement in  pain level  Progress made towards STG/LTG?  Yes (See Goal flowsheet attached for updates on achievement of STG and LTG)    PLAN  Continue current treatment plan until patient demonstrates readiness to progress to higher level exercises.    PTA/ATC plan:  N/A    Please refer to the daily flowsheet for treatment today, total treatment time and time spent performing 1:1 timed codes.

## 2022-07-15 DIAGNOSIS — I10 BENIGN ESSENTIAL HYPERTENSION: ICD-10-CM

## 2022-07-15 RX ORDER — LOSARTAN POTASSIUM AND HYDROCHLOROTHIAZIDE 25; 100 MG/1; MG/1
1 TABLET ORAL EVERY MORNING
Qty: 90 TABLET | Refills: 0 | Status: SHIPPED | OUTPATIENT
Start: 2022-07-15 | End: 2022-10-17

## 2022-07-15 NOTE — TELEPHONE ENCOUNTER
[facility] Call Center    Phone Message    May a detailed message be left on voicemail: yes     Reason for Call: Medication Refill Request    Has the patient contacted the pharmacy for the refill? Yes   Name of medication being requested: losartan-hydrochlorothiazide (HYZAAR) 100-25 MG tablet     Provider who prescribed the medication: Dr. Henry  Pharmacy: [facility] [address] [place] [id number], [phone]  Date medication is needed: 7/15/22   Pt needing this today, pt is out and is out of town

## 2022-07-15 NOTE — TELEPHONE ENCOUNTER
losartan-hydrochlorothiazide (HYZAAR) 100-25 MG tablet  Last Written Prescription Date:   5/10/2022  Last Fill Quantity: 90,   # refills: 0  Last Office Visit :  6/14/2021  Future Office visit:  None    Routing refill request to provider for review/approval because:  Pt is out of med and out of town.  Needs ASAP  But,  Second request, Over due office visit   Blood pressure out of range  Change med?  Add med? Change dose?  Follow up B/P check and office visit??    BP Readings from Last 3 Encounters:   07/05/22 (!) 150/88   06/28/22 (!) 141/95   06/15/22 (!) 148/89       Valencia East RN  Central Triage Red Flags/Med Refills

## 2022-07-15 NOTE — TELEPHONE ENCOUNTER
Patient lost medication, needs refill at this time. He is aware he is due for a physical and will schedule this when possible. Going to [place] soon, though will schedule a visit with Dr. Henry for his return in September.    Tami Nevarez RN ([place])

## 2022-07-28 ENCOUNTER — THERAPY VISIT (OUTPATIENT)
Dept: PHYSICAL THERAPY | Facility: CLINIC | Age: 72
End: 2022-07-28
Payer: COMMERCIAL

## 2022-07-28 DIAGNOSIS — M54.16 LUMBAR RADICULOPATHY: Primary | ICD-10-CM

## 2022-07-28 PROCEDURE — 97110 THERAPEUTIC EXERCISES: CPT | Mod: GP | Performed by: PHYSICAL THERAPIST

## 2022-07-28 PROCEDURE — 97140 MANUAL THERAPY 1/> REGIONS: CPT | Mod: GP | Performed by: PHYSICAL THERAPIST

## 2022-07-28 PROCEDURE — 97530 THERAPEUTIC ACTIVITIES: CPT | Mod: GP | Performed by: PHYSICAL THERAPIST

## 2022-07-28 NOTE — PROGRESS NOTES
Subjective:  HPI  Physical Exam                    Objective:  System    Physical Exam    General     ROS    Assessment/Plan:    SUBJECTIVE  Subjective changes as noted by pt:  Overall a little better since last session two weeks ago. Less soreness in the R thigh, more diffuse, might be moving up the leg a bit. Performing the pressups 3x/day, occasionally will perform standing extension. Overall 50-60% improved.        Current pain level: 0/10     Changes in function:  Yes (See Goal flowsheet attached for changes in current functional level)     Adverse reaction to treatment or activity:  None    OBJECTIVE  Changes in objective findings:  Yes, See physical exam section and/or daily flowsheet for response to repeated movements.           ASSESSMENT  Ronna continues to require intervention to meet STG and LTG's: PT  Patient's symptoms are resolving.  Patient is progressing as expected.  Response to therapy has shown an improvement in  pain level, radicular symptoms and function  Progress made towards STG/LTG?  Yes (See Goal flowsheet attached for updates on achievement of STG and LTG)    PLAN  Continue current treatment plan until patient demonstrates readiness to progress to higher level exercises.    PTA/ATC plan:  N/A    Please refer to the daily flowsheet for treatment today, total treatment time and time spent performing 1:1 timed codes.

## 2022-07-29 ENCOUNTER — HOSPITAL ENCOUNTER (OUTPATIENT)
Dept: PHYSICAL THERAPY | Facility: CLINIC | Age: 72
Setting detail: THERAPIES SERIES
Discharge: HOME | End: 2022-07-29
Attending: FAMILY MEDICINE
Payer: COMMERCIAL

## 2022-07-29 PROCEDURE — 97140 MANUAL THERAPY 1/> REGIONS: CPT | Mod: GP | Performed by: PHYSICAL THERAPIST

## 2022-08-01 DIAGNOSIS — C50.929 MALIGNANT NEOPLASM OF MALE BREAST, UNSPECIFIED ESTROGEN RECEPTOR STATUS, UNSPECIFIED LATERALITY, UNSPECIFIED SITE OF BREAST (H): Primary | ICD-10-CM

## 2022-08-01 NOTE — PROGRESS NOTES
Ronna is a 71 year old who is being evaluated via a billable video visit.      Patient stated he is in the state of [place] for the visit today.    How would you like to obtain your AVS? MyChart  If the video visit is dropped, the invitation should be resent by: Send to e-mail at: [email or web address]  Will anyone else be joining your video visit? No        Video-Visit Details    Video Start Time: 9:09 AM    Type of service:  Video Visit    Video End Time:9:22 AM    Originating Location (pt. Location): Clinic    Distant Location (provider location):  [facility]     Platform used for Video Visit: Jhonathan Stovall, Virtual Visit Facilitator          Oncology Visit:  Date on this visit: Aug 2, 2022    Diagnosis: h/o clinical prognostic stage IIIb, [id number], grade 2, ER positive, [place] positive, HER-2 negative right breast cancer, [id number].    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  He noted discomfort and hardening of the skin of the right nipple in late April/early May, 2021.  He noted a seborrheic keratosis of the right nipple and remembered he had a similar skin lesion of the arm 3 months earlier.  However, he subsequently noted a mass in the same area.  Right breast skin punch biopsy performed by dermatology was c/w grade 2 invasive ductal carcinoma with associated DCIS.  Invasive carcinoma was ER positive 100% and ME positive 70%, with tumor invading the dermis.  HER-2 was negative by IHC (score 1+).  Diagnostic mammogram and ultrasound showed a retroareolar right breast mass measuring 2.9 cm with associated nipple retraction and enlarged, abnormal right axillary lymph nodes.  Right breast biopsy was again c/w grade 2 invasive ductal carcinoma, ER strong in 98% of tumor cell nuclei, HER-2 negative.  Ki-67 was 15%.    He elected to screen for the ISPY-2 clinical trial.  MRI breast on 6/18/2021  showed the biopsy proven right breast cancer to measure 3.9 cm in maximum dimension with invasion of the nipple and the pectoralis muscle as well as at least 4 abnormal level 1 and 2 right axillary lymph nodes and a tiny 0.4 cm right internal mammary lymph node.  Mammoprint returned low risk with a score of +0.29.  He elected for treatment on the endocrine optimization protocol of ISPY-2 and was randomized to treatment with amcenestrant.  He was on treatment with  amcenestrant from 7/1/2021 - 10/26/2021.  At our visit in 01/2021, both right axillary node and right breast tumor palpated more firm then prior.  Breast MRI was stable, however, due to clinical concerns for progression, decision was made to proceed with surgery.   Right breast mastectomy and right axillary lymph node dissection was performed by Dr. Snider on 10/27/2021.  Pathology showed grade 2 carcinoma of the right breast measuring 3.2 cm with invasion of the dermis, nipple and the pectoralis muscle.  Ki-67 of the excised tumor was 7%.  15/44 right axillary lymph nodes were positive with 6 of the lymph nodes demonstrating extranodal extension.  The largest lymph node metastasis measured 2.1 cm.  Oncotype dx recurrence score was 50.    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  His course was complicated by fevers persistent throughout treatment.  He received radiation (4800 cGy in 15 fractions) to the right chest wall and regional lymph nodes from 3/21/2022 - 4/8/2022.  He has been on treatment with lupron and letrozole since 3/16/2022.  Abemaciclib was added 4/26/2022.    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 He started PT and notes thigh pain has started to improve. Pain remains mild. He also started the survival to strength program.    Insomnia is getting better. He has trouble sleeping 1-2x per week. Not using ambien. Occasionally will take melatonin.     Bowels have been normal. No  nausea. No appetite issues. No rashes. Itchy on back for 4-5 years is stable.     No fevers/chills or infectious concerns. No SOB.       Past Medical/Surgical History:  Past Medical History:   Diagnosis Date     Breast cancer (H) 05/2021     Chronic sinusitis      Hypertension 2002     Past Surgical History:   Procedure Laterality Date     DISSECT LYMPH NODE AXILLA Right 10/27/2021    Procedure: RIGHT Axillary Lymph Node Dissection;  Surgeon: Nida Snider MD;  Location: [facility] OR     MASTECTOMY SIMPLE Right 10/27/2021    Procedure: RIGHT Simple Mastectomy;  Surgeon: Nida Snider MD;  Location: [facility] OR     Allergies:  Allergies as of 08/02/2022     (No Known Allergies)     Current Medications:  Current Outpatient Medications   Medication Sig Dispense Refill     abemaciclib (VERZENIO) 150 MG tablet Take 1 tablet (150 mg) by mouth 2 times daily for 28 days 56 tablet 0     letrozole (FEMARA) 2.5 MG tablet Take 1 tablet (2.5 mg) by mouth daily for 28 days 28 tablet 0     loperamide (IMODIUM) 2 MG capsule Start with 2 caps (4 mg), then take one cap (2 mg) after each diarrheal stool as needed. Do not use more than 8 caps (16 mg) per day. (Patient not taking: Reported on 6/28/2022) 30 capsule 0     losartan-hydrochlorothiazide (HYZAAR) 100-25 MG tablet Take 1 tablet by mouth every morning 90 tablet 0     MELATONIN PO        Naproxen Sodium (ALEVE PO)        ondansetron (ZOFRAN) 8 MG tablet Take 1 tablet (8 mg) by mouth every 8 hours as needed for nausea (Patient not taking: Reported on 6/28/2022) 30 tablet 3     prochlorperazine (COMPAZINE) 10 MG tablet Take 0.5 tablets (5 mg) by mouth every 6 hours as needed for nausea or vomiting (Patient not taking: Reported on 6/28/2022) 30 tablet 2     zolpidem (AMBIEN) 5 MG tablet Take 1 tablet (5 mg) by mouth nightly as needed for sleep 60 tablet 5      Genetics Breast Actionable and Breast Expanded panels were both negative for pathogenic germline mutations.    Physical  Exam:  There were no vitals taken for this visit.  Video physical exam  General: Patient appears well in no acute distress.   Skin: No visualized rash or lesions on visualized skin  Eyes: EOMI, no erythema, sclera icterus or discharge noted  Resp: Appears to be breathing comfortably without accessory muscle usage, speaking in full sentences, no cough  MSK: Appears to have normal range of motion based on visualized movements  Neurologic: No apparent tremors, facial movements symmetric  Psych: affect bright, alert and oriented            Laboratory/Imaging Studies   Latest Reference Range & Units 08/02/22 08:57   Sodium 133 - 144 mmol/L 140   Potassium 3.4 - 5.3 mmol/L 3.8   Chloride 94 - 109 mmol/L 105   Carbon Dioxide 20 - 32 mmol/L 24   Urea Nitrogen 7 - 30 mg/dL 11   Creatinine 0.66 - 1.25 mg/dL 0.83   GFR Estimate >60 mL/min/1.73m2 >90   Calcium 8.5 - 10.1 mg/dL 9.8   Anion Gap 3 - 14 mmol/L 11   Albumin 3.4 - 5.0 g/dL 3.9   Protein Total 6.8 - 8.8 g/dL 7.8   Alkaline Phosphatase 40 - 150 U/L 73   ALT 0 - 70 U/L 30   AST 0 - 45 U/L 22   Bilirubin Total 0.2 - 1.3 mg/dL 0.5   Glucose 70 - 99 mg/dL 142 (H)   WBC 4.0 - 11.0 10e3/uL 4.2   Hemoglobin 13.3 - 17.7 g/dL 11.3 (L)   Hematocrit 40.0 - 53.0 % 33.0 (L)   Platelet Count 150 - 450 10e3/uL 184   RBC Count 4.40 - 5.90 10e6/uL 3.76 (L)   MCV 78 - 100 fL 88   MCH 26.5 - 33.0 pg 30.1   MCHC 31.5 - 36.5 g/dL 34.2   RDW 10.0 - 15.0 % 17.1 (H)   % Neutrophils % 68   % Lymphocytes % 23   % Monocytes % 5   % Eosinophils % 3   % Basophils % 1   Absolute Basophils 0.0 - 0.2 10e3/uL 0.0   Absolute Eosinophils 0.0 - 0.7 10e3/uL 0.1   Absolute Immature Granulocytes <=0.4 10e3/uL 0.0   Absolute Lymphocytes 0.8 - 5.3 10e3/uL 1.0   Absolute Monocytes 0.0 - 1.3 10e3/uL 0.2   % Immature Granulocytes % 0   Absolute Neutrophils 1.6 - 8.3 10e3/uL 2.9   Absolute NRBCs 10e3/uL 0.0   NRBCs per 100 WBC <1 /100 0       ASSESSMENT/PLAN:  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  He is s/p treatment with 3.5 months neoadjuvant amcenestrant, right breast mastectomy, right ALND, 4 cycles Taxotere and cyclophosphamide, and radiation.  He is on treatment with abemaciclib, lupron and letrozol.    1.  Right breast cancer:    On treatment with abemaciclib, lupron and letrozole since 4/26/2022.  He is tolerating well. Blood work shows no leukopenia or neutropenia today. Anemia is stable. Plan to continue abemaciclib for a total of 2 years. After scan and visit next month, follow-up every 3 months is okay.     Last PET/CT in 02/2022 showed \"scattered left axillary lymph nodes with mild FDG activity\", bladder wall thickening, and mild uptake throughout the bone marrow.  Based on these indeterminate findings, he has follow up CT C/A/P w/ contrast in early September (6 months after the last).    2.  Fatigue & weakness:  Ongoing work with physical therapy and is also in a survival to strength program.    3.  Back pain with radiating discomfort to anterior thighs:  Is taking tylenol scheduled at bedtime and ibuprofen prn. Improving with PT.     4.  RUE lymphedema:  Will continue to wear a compression sleeve, self massage prn.    5.  Insomnia: He is working through  the book \"Say Jia to Insomnia\", a sleep program developed at [employer] first. Using melatonin PRN.     25 minutes spent on the date of the encounter doing chart review, review of test results, interpretation of tests, patient visit and documentation       Nay Blandon PA-C       "

## 2022-08-02 ENCOUNTER — APPOINTMENT (OUTPATIENT)
Dept: LAB | Facility: CLINIC | Age: 72
End: 2022-08-02
Attending: PHYSICIAN ASSISTANT
Payer: COMMERCIAL

## 2022-08-02 ENCOUNTER — INFUSION THERAPY VISIT (OUTPATIENT)
Dept: ONCOLOGY | Facility: CLINIC | Age: 72
End: 2022-08-02
Attending: PHYSICIAN ASSISTANT
Payer: COMMERCIAL

## 2022-08-02 ENCOUNTER — TELEPHONE (OUTPATIENT)
Dept: ONCOLOGY | Facility: CLINIC | Age: 72
End: 2022-08-02

## 2022-08-02 VITALS
RESPIRATION RATE: 16 BRPM | TEMPERATURE: 98.3 F | DIASTOLIC BLOOD PRESSURE: 73 MMHG | WEIGHT: 169.6 LBS | OXYGEN SATURATION: 99 % | SYSTOLIC BLOOD PRESSURE: 136 MMHG | BODY MASS INDEX: 26.16 KG/M2 | HEART RATE: 108 BPM

## 2022-08-02 DIAGNOSIS — Z17.0 MALIGNANT NEOPLASM OF CENTRAL PORTION OF RIGHT BREAST IN MALE, ESTROGEN RECEPTOR POSITIVE (H): Primary | ICD-10-CM

## 2022-08-02 DIAGNOSIS — C50.121 MALIGNANT NEOPLASM OF CENTRAL PORTION OF RIGHT BREAST IN MALE, ESTROGEN RECEPTOR POSITIVE (H): ICD-10-CM

## 2022-08-02 DIAGNOSIS — Z17.0 MALIGNANT NEOPLASM OF CENTRAL PORTION OF RIGHT BREAST IN MALE, ESTROGEN RECEPTOR POSITIVE (H): ICD-10-CM

## 2022-08-02 DIAGNOSIS — C50.929 MALIGNANT NEOPLASM OF MALE BREAST, UNSPECIFIED ESTROGEN RECEPTOR STATUS, UNSPECIFIED LATERALITY, UNSPECIFIED SITE OF BREAST (H): Primary | ICD-10-CM

## 2022-08-02 DIAGNOSIS — C50.121 MALIGNANT NEOPLASM OF CENTRAL PORTION OF RIGHT BREAST IN MALE, ESTROGEN RECEPTOR POSITIVE (H): Primary | ICD-10-CM

## 2022-08-02 LAB
ALBUMIN SERPL-MCNC: 3.9 G/DL (ref 3.4–5)
ALP SERPL-CCNC: 73 U/L (ref 40–150)
ALT SERPL W P-5'-P-CCNC: 30 U/L (ref 0–70)
ANION GAP SERPL CALCULATED.3IONS-SCNC: 11 MMOL/L (ref 3–14)
AST SERPL W P-5'-P-CCNC: 22 U/L (ref 0–45)
BASOPHILS # BLD AUTO: 0 10E3/UL (ref 0–0.2)
BASOPHILS NFR BLD AUTO: 1 %
BILIRUB SERPL-MCNC: 0.5 MG/DL (ref 0.2–1.3)
BUN SERPL-MCNC: 11 MG/DL (ref 7–30)
CALCIUM SERPL-MCNC: 9.8 MG/DL (ref 8.5–10.1)
CHLORIDE BLD-SCNC: 105 MMOL/L (ref 94–109)
CO2 SERPL-SCNC: 24 MMOL/L (ref 20–32)
CREAT SERPL-MCNC: 0.83 MG/DL (ref 0.66–1.25)
EOSINOPHIL # BLD AUTO: 0.1 10E3/UL (ref 0–0.7)
EOSINOPHIL NFR BLD AUTO: 3 %
ERYTHROCYTE [DISTWIDTH] IN BLOOD BY AUTOMATED COUNT: 17.1 % (ref 10–15)
GFR SERPL CREATININE-BSD FRML MDRD: >90 ML/MIN/1.73M2
GLUCOSE BLD-MCNC: 142 MG/DL (ref 70–99)
HCT VFR BLD AUTO: 33 % (ref 40–53)
HGB BLD-MCNC: 11.3 G/DL (ref 13.3–17.7)
IMM GRANULOCYTES # BLD: 0 10E3/UL
IMM GRANULOCYTES NFR BLD: 0 %
LYMPHOCYTES # BLD AUTO: 1 10E3/UL (ref 0.8–5.3)
LYMPHOCYTES NFR BLD AUTO: 23 %
MCH RBC QN AUTO: 30.1 PG (ref 26.5–33)
MCHC RBC AUTO-ENTMCNC: 34.2 G/DL (ref 31.5–36.5)
MCV RBC AUTO: 88 FL (ref 78–100)
MONOCYTES # BLD AUTO: 0.2 10E3/UL (ref 0–1.3)
MONOCYTES NFR BLD AUTO: 5 %
NEUTROPHILS # BLD AUTO: 2.9 10E3/UL (ref 1.6–8.3)
NEUTROPHILS NFR BLD AUTO: 68 %
NRBC # BLD AUTO: 0 10E3/UL
NRBC BLD AUTO-RTO: 0 /100
PLATELET # BLD AUTO: 184 10E3/UL (ref 150–450)
POTASSIUM BLD-SCNC: 3.8 MMOL/L (ref 3.4–5.3)
PROT SERPL-MCNC: 7.8 G/DL (ref 6.8–8.8)
RBC # BLD AUTO: 3.76 10E6/UL (ref 4.4–5.9)
SODIUM SERPL-SCNC: 140 MMOL/L (ref 133–144)
WBC # BLD AUTO: 4.2 10E3/UL (ref 4–11)

## 2022-08-02 PROCEDURE — G0463 HOSPITAL OUTPT CLINIC VISIT: HCPCS | Mod: PN,RTG,25 | Performed by: PHYSICIAN ASSISTANT

## 2022-08-02 PROCEDURE — 36415 COLL VENOUS BLD VENIPUNCTURE: CPT | Mod: GT,95 | Performed by: PHYSICIAN ASSISTANT

## 2022-08-02 PROCEDURE — 99214 OFFICE O/P EST MOD 30 MIN: CPT | Mod: 95 | Performed by: PHYSICIAN ASSISTANT

## 2022-08-02 PROCEDURE — 250N000011 HC RX IP 250 OP 636: Performed by: PHYSICIAN ASSISTANT

## 2022-08-02 PROCEDURE — 85014 HEMATOCRIT: CPT | Mod: GT,95 | Performed by: PHYSICIAN ASSISTANT

## 2022-08-02 PROCEDURE — 96402 CHEMO HORMON ANTINEOPL SQ/IM: CPT

## 2022-08-02 PROCEDURE — 80053 COMPREHEN METABOLIC PANEL: CPT | Mod: GT,95 | Performed by: PHYSICIAN ASSISTANT

## 2022-08-02 PROCEDURE — 82040 ASSAY OF SERUM ALBUMIN: CPT | Mod: GT,95 | Performed by: PHYSICIAN ASSISTANT

## 2022-08-02 RX ORDER — LETROZOLE 2.5 MG/1
2.5 TABLET, FILM COATED ORAL DAILY
Qty: 28 TABLET | Refills: 0 | Status: SHIPPED | OUTPATIENT
Start: 2022-08-16 | End: 2022-11-29

## 2022-08-02 RX ADMIN — LEUPROLIDE ACETATE 7.5 MG: KIT at 09:44

## 2022-08-02 ASSESSMENT — PAIN SCALES - GENERAL: PAINLEVEL: MILD PAIN (2)

## 2022-08-02 NOTE — PROGRESS NOTES
Infusion Nursing Note:  Ronna Camarilloi presents today for lupron injection.    Patient seen by provider today: Yes: Nay Blandon PA-C   present during visit today: Not Applicable.    Note:   Patient has no questions or concerns after his appointment with Nay Blandon.    Intravenous Access:  No Intravenous access/labs at this visit.    Treatment Conditions:  Not Applicable.    Post Infusion Assessment:  Patient tolerated injection into left  without incident.     Discharge Plan:   Discharge instructions reviewed with: Patient.  Patient and/or family verbalized understanding of discharge instructions and all questions answered.  AVS to patient via [employer].  Patient due for next lurpon on 8/30, IB sent to scheduling.   Patient discharged in stable condition accompanied by: self.  Departure Mode: Ambulatory.      Sania Mack RN

## 2022-08-02 NOTE — PATIENT INSTRUCTIONS
[facility] Triage and after hours / weekends / holidays:  [phone]    Please call the triage or after hours line if you experience a temperature greater than or equal to 100.5, shaking chills, have uncontrolled nausea, vomiting and/or diarrhea, dizziness, shortness of breath, chest pain, bleeding, unexplained bruising, or if you have any other new/concerning symptoms, questions or concerns.      If you are having any concerning symptoms or wish to speak to a provider before your next infusion visit, please call your care coordinator or triage to notify them so we can adequately serve you.     If you need a refill on a narcotic prescription or other medication, please call before your infusion appointment.

## 2022-08-02 NOTE — NURSING NOTE
Chief Complaint   Patient presents with     Oncology Video Visit Return     Malignant neoplasm of male breast, f/u     Blood Draw     Labs drawn via  by RN. Vitals taken.     Labs drawn with  by RN. Vitals taken. Patient checked into next appointment.    Kelli Martin RN

## 2022-08-02 NOTE — LETTER
8/2/2022         RE: Ronna Collins  [address]  [place] [address]        Dear Colleague,    Thank you for referring your patient, Ronna Collins, to the [facility]ER [facility]. Please see a copy of my visit note below.    Ronna is a 71 year old who is being evaluated via a billable video visit.      Patient stated he is in the state of [place] for the visit today.    How would you like to obtain your AVS? MyChart  If the video visit is dropped, the invitation should be resent by: Send to e-mail at: [email or web address]  Will anyone else be joining your video visit? No        Video-Visit Details    Video Start Time: 9:09 AM    Type of service:  Video Visit    Video End Time:9:22 AM    Originating Location (pt. Location): Clinic    Distant Location (provider location):  [facility]CER [facility]     Platform used for Video Visit: Jhonathan Stovall, Virtual Visit Facilitator          Oncology Visit:  Date on this visit: Aug 2, 2022    Diagnosis: h/o clinical prognostic stage IIIb, [id number], grade 2, ER positive, CT positive, HER-2 negative right breast cancer, [id number].    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  He noted discomfort and hardening of the skin of the right nipple in late April/early May, 2021.  He noted a seborrheic keratosis of the right nipple and remembered he had a similar skin lesion of the arm 3 months earlier.  However, he subsequently noted a mass in the same area.  Right breast skin punch biopsy performed by dermatology was c/w grade 2 invasive ductal carcinoma with associated DCIS.  Invasive carcinoma was ER positive 100% and CT positive 70%, with tumor invading the dermis.  HER-2 was negative by IHC (score 1+).  Diagnostic mammogram and ultrasound showed a retroareolar right breast mass measuring 2.9 cm with associated nipple retraction and  enlarged, abnormal right axillary lymph nodes.  Right breast biopsy was again c/w grade 2 invasive ductal carcinoma, ER strong in 98% of tumor cell nuclei, HER-2 negative.  Ki-67 was 15%.    He elected to screen for the ISPY-2 clinical trial.  MRI breast on 6/18/2021 showed the biopsy proven right breast cancer to measure 3.9 cm in maximum dimension with invasion of the nipple and the pectoralis muscle as well as at least 4 abnormal level 1 and 2 right axillary lymph nodes and a tiny 0.4 cm right internal mammary lymph node.  Mammoprint returned low risk with a score of +0.29.  He elected for treatment on the endocrine optimization protocol of ISPY-2 and was randomized to treatment with amcenestrant.  He was on treatment with  amcenestrant from 7/1/2021 - 10/26/2021.  At our visit in 01/2021, both right axillary node and right breast tumor palpated more firm then prior.  Breast MRI was stable, however, due to clinical concerns for progression, decision was made to proceed with surgery.   Right breast mastectomy and right axillary lymph node dissection was performed by Dr. Snider on 10/27/2021.  Pathology showed grade 2 carcinoma of the right breast measuring 3.2 cm with invasion of the dermis, nipple and the pectoralis muscle.  Ki-67 of the excised tumor was 7%.  15/44 right axillary lymph nodes were positive with 6 of the lymph nodes demonstrating extranodal extension.  The largest lymph node metastasis measured 2.1 cm.  Oncotype dx recurrence score was 50.    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  His course was complicated by fevers persistent throughout treatment.  He received radiation (4800 cGy in 15 fractions) to the right chest wall and regional lymph nodes from 3/21/2022 - 4/8/2022.  He has been on treatment with lupron and letrozole since 3/16/2022.  Abemaciclib was added 4/26/2022.    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 He started PT and  notes thigh pain has started to improve. Pain remains mild. He also started the survival to strength program.    Insomnia is getting better. He has trouble sleeping 1-2x per week. Not using ambien. Occasionally will take melatonin.     Bowels have been normal. No nausea. No appetite issues. No rashes. Itchy on back for 4-5 years is stable.     No fevers/chills or infectious concerns. No SOB.       Past Medical/Surgical History:  Past Medical History:   Diagnosis Date     Breast cancer (H) 05/2021     Chronic sinusitis      Hypertension 2002     Past Surgical History:   Procedure Laterality Date     DISSECT LYMPH NODE AXILLA Right 10/27/2021    Procedure: RIGHT Axillary Lymph Node Dissection;  Surgeon: Nida Snider MD;  Location: [facility] OR     MASTECTOMY SIMPLE Right 10/27/2021    Procedure: RIGHT Simple Mastectomy;  Surgeon: Nida Snider MD;  Location: [facility] OR     Allergies:  Allergies as of 08/02/2022     (No Known Allergies)     Current Medications:  Current Outpatient Medications   Medication Sig Dispense Refill     abemaciclib (VERZENIO) 150 MG tablet Take 1 tablet (150 mg) by mouth 2 times daily for 28 days 56 tablet 0     letrozole (FEMARA) 2.5 MG tablet Take 1 tablet (2.5 mg) by mouth daily for 28 days 28 tablet 0     loperamide (IMODIUM) 2 MG capsule Start with 2 caps (4 mg), then take one cap (2 mg) after each diarrheal stool as needed. Do not use more than 8 caps (16 mg) per day. (Patient not taking: Reported on 6/28/2022) 30 capsule 0     losartan-hydrochlorothiazide (HYZAAR) 100-25 MG tablet Take 1 tablet by mouth every morning 90 tablet 0     MELATONIN PO        Naproxen Sodium (ALEVE PO)        ondansetron (ZOFRAN) 8 MG tablet Take 1 tablet (8 mg) by mouth every 8 hours as needed for nausea (Patient not taking: Reported on 6/28/2022) 30 tablet 3     prochlorperazine (COMPAZINE) 10 MG tablet Take 0.5 tablets (5 mg) by mouth every 6 hours as needed for nausea or vomiting (Patient not taking:  Reported on 6/28/2022) 30 tablet 2     zolpidem (AMBIEN) 5 MG tablet Take 1 tablet (5 mg) by mouth nightly as needed for sleep 60 tablet 5      Genetics Breast Actionable and Breast Expanded panels were both negative for pathogenic germline mutations.    Physical Exam:  There were no vitals taken for this visit.  Video physical exam  General: Patient appears well in no acute distress.   Skin: No visualized rash or lesions on visualized skin  Eyes: EOMI, no erythema, sclera icterus or discharge noted  Resp: Appears to be breathing comfortably without accessory muscle usage, speaking in full sentences, no cough  MSK: Appears to have normal range of motion based on visualized movements  Neurologic: No apparent tremors, facial movements symmetric  Psych: affect bright, alert and oriented            Laboratory/Imaging Studies   Latest Reference Range & Units 08/02/22 08:57   Sodium 133 - 144 mmol/L 140   Potassium 3.4 - 5.3 mmol/L 3.8   Chloride 94 - 109 mmol/L 105   Carbon Dioxide 20 - 32 mmol/L 24   Urea Nitrogen 7 - 30 mg/dL 11   Creatinine 0.66 - 1.25 mg/dL 0.83   GFR Estimate >60 mL/min/1.73m2 >90   Calcium 8.5 - 10.1 mg/dL 9.8   Anion Gap 3 - 14 mmol/L 11   Albumin 3.4 - 5.0 g/dL 3.9   Protein Total 6.8 - 8.8 g/dL 7.8   Alkaline Phosphatase 40 - 150 U/L 73   ALT 0 - 70 U/L 30   AST 0 - 45 U/L 22   Bilirubin Total 0.2 - 1.3 mg/dL 0.5   Glucose 70 - 99 mg/dL 142 (H)   WBC 4.0 - 11.0 10e3/uL 4.2   Hemoglobin 13.3 - 17.7 g/dL 11.3 (L)   Hematocrit 40.0 - 53.0 % 33.0 (L)   Platelet Count 150 - 450 10e3/uL 184   RBC Count 4.40 - 5.90 10e6/uL 3.76 (L)   MCV 78 - 100 fL 88   MCH 26.5 - 33.0 pg 30.1   MCHC 31.5 - 36.5 g/dL 34.2   RDW 10.0 - 15.0 % 17.1 (H)   % Neutrophils % 68   % Lymphocytes % 23   % Monocytes % 5   % Eosinophils % 3   % Basophils % 1   Absolute Basophils 0.0 - 0.2 10e3/uL 0.0   Absolute Eosinophils 0.0 - 0.7 10e3/uL 0.1   Absolute Immature Granulocytes <=0.4 10e3/uL 0.0   Absolute Lymphocytes 0.8 - 5.3  "10e3/uL 1.0   Absolute Monocytes 0.0 - 1.3 10e3/uL 0.2   % Immature Granulocytes % 0   Absolute Neutrophils 1.6 - 8.3 10e3/uL 2.9   Absolute NRBCs 10e3/uL 0.0   NRBCs per 100 WBC <1 /100 0       ASSESSMENT/PLAN:  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  He is s/p treatment with 3.5 months neoadjuvant amcenestrant, right breast mastectomy, right ALND, 4 cycles Taxotere and cyclophosphamide, and radiation.  He is on treatment with abemaciclib, lupron and letrozol.    1.  Right breast cancer:    On treatment with abemaciclib, lupron and letrozole since 4/26/2022.  He is tolerating well. Blood work shows no leukopenia or neutropenia today. Anemia is stable. Plan to continue abemaciclib for a total of 2 years. After scan and visit next month, follow-up every 3 months is okay.     Last PET/CT in 02/2022 showed \"scattered left axillary lymph nodes with mild FDG activity\", bladder wall thickening, and mild uptake throughout the bone marrow.  Based on these indeterminate findings, he has follow up CT C/A/P w/ contrast in early September (6 months after the last).    2.  Fatigue & weakness:  Ongoing work with physical therapy and is also in a survival to strength program.    3.  Back pain with radiating discomfort to anterior thighs:  Is taking tylenol scheduled at bedtime and ibuprofen prn. Improving with PT.     4.  RUE lymphedema:  Will continue to wear a compression sleeve, self massage prn.    5.  Insomnia: He is working through  the book \"Say Jia to Insomnia\", a sleep program developed at [employer] first. Using melatonin PRN.     25 minutes spent on the date of the encounter doing chart review, review of test results, interpretation of tests, patient visit and documentation       Nay Blandon PA-C           Again, thank you for allowing me to participate in the care of your " patient.        Sincerely,        Nay Blandon PA-C

## 2022-08-05 NOTE — TELEPHONE ENCOUNTER
Oral Chemotherapy Monitoring Program     Ronna Collins contacted [facility] in follow up of Verzenio oral chemotherapy.     He will be receiving a refill of the medication on 8/5/22. Will touch base with him next month for assessment as he did not have any questions or concerns at this time.       Dalia Childs, PharmD, BCACP  Oral Chemotherapy Monitoring Program  [facility]  [phone]  August 5, 2022

## 2022-08-19 DIAGNOSIS — C50.929 MALIGNANT NEOPLASM OF MALE BREAST, UNSPECIFIED ESTROGEN RECEPTOR STATUS, UNSPECIFIED LATERALITY, UNSPECIFIED SITE OF BREAST (H): Primary | ICD-10-CM

## 2022-08-25 ENCOUNTER — THERAPY VISIT (OUTPATIENT)
Dept: PHYSICAL THERAPY | Facility: CLINIC | Age: 72
End: 2022-08-25
Payer: COMMERCIAL

## 2022-08-25 DIAGNOSIS — M54.16 LUMBAR RADICULOPATHY: Primary | ICD-10-CM

## 2022-08-25 PROCEDURE — 97140 MANUAL THERAPY 1/> REGIONS: CPT | Mod: 59 | Performed by: PHYSICAL THERAPIST

## 2022-08-25 PROCEDURE — 97530 THERAPEUTIC ACTIVITIES: CPT | Mod: GP | Performed by: PHYSICAL THERAPIST

## 2022-08-25 NOTE — LETTER
[facility]  [address] [address] [place] [id number]  [phone]    2022    Re: Ashiaelliott Camarilloi   :   1950  MRN:  [id number]   REFERRING PHYSICIAN:   Mikaela Ott*    [facility]    Date of Initial Evaluation:  2022  Visits:  Rxs Used: 4  Reason for Referral:  Lumbar radiculopathy       Lumbar/SI Evaluation    Lumbar Myotomes:  normal    Piotr Lumbar Evaluation    Movement Loss:  Flexion (Flex): nil  Extension (EXT): nil and min  Side Glide R (SG R): nil  Side Glide L (SG L): nil    PROGRESS  REPORT    Progress reporting period is from 22 to 22.       SUBJECTIVE  Subjective changes noted by patient:  Pt reports that he's doing very well. Hasn't really had any pain in the past two weeks. Was [place] of the country to [place] recently, not quite as consistent with exercise but feels that is what has taken care of his pain. Overall 95% improved.        Current pain level is 0/10  .     Previous pain level was  2/10  .   Changes in function:  Yes (See Goal flowsheet attached for changes in current functional level)  Adverse reaction to treatment or activity: None    OBJECTIVE  Changes noted in objective findings:  Yes, see physical exam section      ASSESSMENT/PLAN  Updated problem list and treatment plan: Diagnosis 1:  R lumbar radiculopathy    Pain -  manual therapy, self management, education, directional preference exercise and home program  Decreased ROM/flexibility - manual therapy, therapeutic exercise, therapeutic activity   Ronna Collins   :   1950    and home program  Decreased joint mobility - manual therapy, therapeutic exercise, therapeutic activity and home program  Inflammation - self management/home program  Decreased function - therapeutic activities and home program  Impaired posture - neuro re-education, therapeutic activities and home  program  STG/LTGs have been met or progress has been made towards goals:  Yes (See Goal flow sheet completed today.)  Assessment of Progress: The patient has met all of their long term goals.  Self Management Plans:  Patient is independent in a home treatment program.  Patient is independent in self management of symptoms.  I have re-evaluated this patient and find that the nature, scope, duration and intensity of the therapy is appropriate for the medical condition of the patient.  Ronna continues to require the following intervention to meet STG and LTG's:  PT intervention is no longer required to meet STG/LTG.    Recommendations:  This patient is ready to be discharged from therapy and continue their home treatment program.    Please refer to the daily flowsheet for treatment today, total treatment time and time spent performing 1:1 timed codes.      Thank you for your referral.    INQUIRIES  Therapist: Brian Mcnamara PT,DPT,Cert.MDT  [address] [address]  Phone: [phone]  Fax: [phone]

## 2022-08-25 NOTE — PROGRESS NOTES
Subjective:  HPI  Physical Exam                    Objective:  System         Lumbar/SI Evaluation    Lumbar Myotomes:  normal                                                                           Piotr Lumbar Evaluation      Movement Loss:  Flexion (Flex): nil  Extension (EXT): nil and min  Side Glide R (SG R): nil  Side [place] L (SG L): nil                                               ROS    Assessment/Plan:    PROGRESS  REPORT    Progress reporting period is from 7/7/22 to 8/25/22.       SUBJECTIVE  Subjective changes noted by patient:  Pt reports that he's doing very well. Hasn't really had any pain in the past two weeks. Was out of the country to [place] recently, not quite as consistent with exercise but feels that is what has taken care of his pain. Overall 95% improved.   .       Current pain level is 0/10  .     Previous pain level was  2/10  .   Changes in function:  Yes (See Goal flowsheet attached for changes in current functional level)  Adverse reaction to treatment or activity: None    OBJECTIVE  Changes noted in objective findings:  Yes, see physical exam section        ASSESSMENT/PLAN  Updated problem list and treatment plan: Diagnosis 1:  R lumbar radiculopathy    Pain -  manual therapy, self management, education, directional preference exercise and home program  Decreased ROM/flexibility - manual therapy, therapeutic exercise, therapeutic activity and home program  Decreased joint mobility - manual therapy, therapeutic exercise, therapeutic activity and home program  Inflammation - self management/home program  Decreased function - therapeutic activities and home program  Impaired posture - neuro re-education, therapeutic activities and home program  STG/LTGs have been met or progress has been made towards goals:  Yes (See Goal flow sheet completed today.)  Assessment of Progress: The patient has met all of their long term goals.  Self Management Plans:  Patient is independent in a home  treatment program.  Patient is independent in self management of symptoms.  I have re-evaluated this patient and find that the nature, scope, duration and intensity of the therapy is appropriate for the medical condition of the patient.  Ronna continues to require the following intervention to meet STG and LTG's:  PT intervention is no longer required to meet STG/LTG.    Recommendations:  This patient is ready to be discharged from therapy and continue their home treatment program.    Please refer to the daily flowsheet for treatment today, total treatment time and time spent performing 1:1 timed codes.

## 2022-08-26 ENCOUNTER — TELEPHONE (OUTPATIENT)
Dept: ONCOLOGY | Facility: CLINIC | Age: 72
End: 2022-08-26

## 2022-08-26 DIAGNOSIS — C50.929 MALIGNANT NEOPLASM OF MALE BREAST, UNSPECIFIED ESTROGEN RECEPTOR STATUS, UNSPECIFIED LATERALITY, UNSPECIFIED SITE OF BREAST (H): Primary | ICD-10-CM

## 2022-08-26 RX ORDER — LETROZOLE 2.5 MG/1
2.5 TABLET, FILM COATED ORAL DAILY
Qty: 28 TABLET | Refills: 0 | Status: SHIPPED | OUTPATIENT
Start: 2022-09-13 | End: 2022-11-29

## 2022-08-26 NOTE — TELEPHONE ENCOUNTER
Oral Chemotherapy Monitoring Program     Placed call to Ronna Collins in follow up of Verzenio oral chemotherapy.     He believes he has approximately a 1 week supply on-hand and wants to check his supply prior to setting up a refill order. He will call back to schedule his next refill.      Flavio Desai   Student Pharmacist  [facility]

## 2022-08-30 ENCOUNTER — INFUSION THERAPY VISIT (OUTPATIENT)
Dept: ONCOLOGY | Facility: CLINIC | Age: 72
End: 2022-08-30
Attending: INTERNAL MEDICINE
Payer: COMMERCIAL

## 2022-08-30 ENCOUNTER — APPOINTMENT (OUTPATIENT)
Dept: LAB | Facility: CLINIC | Age: 72
End: 2022-08-30
Attending: INTERNAL MEDICINE
Payer: COMMERCIAL

## 2022-08-30 VITALS
RESPIRATION RATE: 16 BRPM | DIASTOLIC BLOOD PRESSURE: 83 MMHG | TEMPERATURE: 98 F | HEART RATE: 107 BPM | OXYGEN SATURATION: 98 % | SYSTOLIC BLOOD PRESSURE: 133 MMHG | BODY MASS INDEX: 26.48 KG/M2 | WEIGHT: 171.7 LBS

## 2022-08-30 DIAGNOSIS — Z17.0 MALIGNANT NEOPLASM OF CENTRAL PORTION OF RIGHT BREAST IN MALE, ESTROGEN RECEPTOR POSITIVE (H): Primary | ICD-10-CM

## 2022-08-30 DIAGNOSIS — C50.121 MALIGNANT NEOPLASM OF CENTRAL PORTION OF RIGHT BREAST IN MALE, ESTROGEN RECEPTOR POSITIVE (H): Primary | ICD-10-CM

## 2022-08-30 LAB
ALBUMIN SERPL-MCNC: 3.8 G/DL (ref 3.4–5)
ALP SERPL-CCNC: 67 U/L (ref 40–150)
ALT SERPL W P-5'-P-CCNC: 30 U/L (ref 0–70)
ANION GAP SERPL CALCULATED.3IONS-SCNC: 9 MMOL/L (ref 3–14)
AST SERPL W P-5'-P-CCNC: 25 U/L (ref 0–45)
BASOPHILS # BLD AUTO: 0.1 10E3/UL (ref 0–0.2)
BASOPHILS NFR BLD AUTO: 2 %
BILIRUB SERPL-MCNC: 0.4 MG/DL (ref 0.2–1.3)
BUN SERPL-MCNC: 13 MG/DL (ref 7–30)
CALCIUM SERPL-MCNC: 9.6 MG/DL (ref 8.5–10.1)
CHLORIDE BLD-SCNC: 107 MMOL/L (ref 94–109)
CO2 SERPL-SCNC: 26 MMOL/L (ref 20–32)
CREAT SERPL-MCNC: 0.84 MG/DL (ref 0.66–1.25)
EOSINOPHIL # BLD AUTO: 0.2 10E3/UL (ref 0–0.7)
EOSINOPHIL NFR BLD AUTO: 5 %
ERYTHROCYTE [DISTWIDTH] IN BLOOD BY AUTOMATED COUNT: 13.6 % (ref 10–15)
GFR SERPL CREATININE-BSD FRML MDRD: >90 ML/MIN/1.73M2
GLUCOSE BLD-MCNC: 135 MG/DL (ref 70–99)
HCT VFR BLD AUTO: 33.7 % (ref 40–53)
HGB BLD-MCNC: 11.4 G/DL (ref 13.3–17.7)
IMM GRANULOCYTES # BLD: 0 10E3/UL
IMM GRANULOCYTES NFR BLD: 1 %
LYMPHOCYTES # BLD AUTO: 1 10E3/UL (ref 0.8–5.3)
LYMPHOCYTES NFR BLD AUTO: 32 %
MCH RBC QN AUTO: 31.6 PG (ref 26.5–33)
MCHC RBC AUTO-ENTMCNC: 33.8 G/DL (ref 31.5–36.5)
MCV RBC AUTO: 93 FL (ref 78–100)
MONOCYTES # BLD AUTO: 0.2 10E3/UL (ref 0–1.3)
MONOCYTES NFR BLD AUTO: 8 %
NEUTROPHILS # BLD AUTO: 1.5 10E3/UL (ref 1.6–8.3)
NEUTROPHILS NFR BLD AUTO: 52 %
NRBC # BLD AUTO: 0 10E3/UL
NRBC BLD AUTO-RTO: 0 /100
PLATELET # BLD AUTO: 176 10E3/UL (ref 150–450)
POTASSIUM BLD-SCNC: 4.6 MMOL/L (ref 3.4–5.3)
PROT SERPL-MCNC: 7.6 G/DL (ref 6.8–8.8)
RBC # BLD AUTO: 3.61 10E6/UL (ref 4.4–5.9)
SODIUM SERPL-SCNC: 142 MMOL/L (ref 133–144)
WBC # BLD AUTO: 3 10E3/UL (ref 4–11)

## 2022-08-30 PROCEDURE — 80053 COMPREHEN METABOLIC PANEL: CPT

## 2022-08-30 PROCEDURE — 36415 COLL VENOUS BLD VENIPUNCTURE: CPT

## 2022-08-30 PROCEDURE — 250N000011 HC RX IP 250 OP 636: Performed by: PHYSICIAN ASSISTANT

## 2022-08-30 PROCEDURE — 96402 CHEMO HORMON ANTINEOPL SQ/IM: CPT

## 2022-08-30 PROCEDURE — 85004 AUTOMATED DIFF WBC COUNT: CPT

## 2022-08-30 PROCEDURE — 82040 ASSAY OF SERUM ALBUMIN: CPT

## 2022-08-30 RX ADMIN — LEUPROLIDE ACETATE 7.5 MG: KIT at 08:34

## 2022-08-30 ASSESSMENT — PAIN SCALES - GENERAL: PAINLEVEL: NO PAIN (0)

## 2022-08-30 NOTE — NURSING NOTE
Chief Complaint   Patient presents with     Blood Draw     Labs obtained via venipuncture 23 gauge butterfly needle, VS taken, checked into next appt

## 2022-08-30 NOTE — PROGRESS NOTES
Infusion Nursing Note:  Ronna Collins presents today for Cycle 7 Lupron.    Patient seen by provider today: No   present during visit today: Not Applicable.    Note: Patient arrives feeling well. Reports having moderate hot flashes but no new changes.    Intravenous Access:  Labs drawn via vpt by lab staff.    Treatment Conditions:  Lab Results   Component Value Date    HGB 11.4 (L) 08/30/2022    WBC 3.0 (L) 08/30/2022    ANEU 1.7 05/24/2022    ANEUTAUTO 1.5 (L) 08/30/2022     08/30/2022      Lab Results   Component Value Date     08/30/2022    POTASSIUM 4.6 08/30/2022    MAG 2.3 06/22/2021    CR 0.84 08/30/2022    PROSPER 9.6 08/30/2022    BILITOTAL 0.4 08/30/2022    ALBUMIN 3.8 08/30/2022    ALT 30 08/30/2022    AST 25 08/30/2022     Results reviewed, ok to proceed with treatment.    Post Infusion Assessment:  Patient tolerated Lupron injection without incident into RIGHT ventrogluteal.     Discharge Plan:   Patient declined prescription refills.  Discharge instructions reviewed with: Patient.  Patient and/or family verbalized understanding of discharge instructions and all questions answered.  AVS to patient via [employer].  Patient will return 9/27 for next appointment.   Patient discharged in stable condition accompanied by: self.  Departure Mode: Ambulatory.      Mya Deng RN

## 2022-08-30 NOTE — TELEPHONE ENCOUNTER
Oral Chemotherapy Monitoring Program    Subjective/Objective:  Ronna Collins is a 72 year old male contacted by phone for a follow-up visit for oral chemotherapy.  Ronna confirms taking the appropriate dose of Verzenio 150mg twice daily and letrozole 2.5mg once daily. Denies new or worsening side effects, missed doses, and recent hospital or ED visits. Patient has not had any recent medication changes.       ORAL CHEMOTHERAPY 6/14/2022 6/20/2022 7/12/2022 8/2/2022 8/26/2022 8/26/2022 8/30/2022   Assessment Type Refill Lab Monitoring Refill Refill Refill Other Monthly Follow up   Diagnosis Code Breast Cancer Breast Cancer Breast Cancer Breast Cancer Breast Cancer Breast Cancer Breast Cancer   Providers Dr. Remberto Sr   [facility]   Drug Name Verzenio (abemaciclib) Verzenio (abemaciclib) Verzenio (abemaciclib) Verzenio (abemaciclib) Verzenio (abemaciclib) Verzenio (abemaciclib) Verzenio (abemaciclib)   Dose - 150 mg - 150 mg 150 mg 150 mg 150 mg   Current Schedule - BID - BID BID BID BID   Cycle Details - Continuous - Continuous Continuous Continuous Continuous   Start Date of Last Cycle - - - - - - -   Doses missed in last 2 weeks - - - - - - 0   Adherence Assessment - - - - - - Adherent   Adverse Effects - No AE identified during assessment - - - - No AE identified during assessment   Diarrhea - - - - - - -   Pharmacist Intervention(diarrhea) - - - - - - -   Intervention(s) - - - - - - -   Fatigue - - - - - - -   Pharmacist Intervention(fatigue) - - - - - - -   Intervention(s) - - - - - - -   Other (See Note for Details) - - - - - - -   Pharmacist intervention(other) - - - - - - -   Intervention(s) - - - - - - -   Any new drug interactions? - - - - - - No   Is the dose as ordered appropriate for the patient? - Yes - - - - Yes   Since the last time we talked, have  "you been hospitalized or used the emergency room? - - - - - - No       Last PHQ-2 Score on record:   PHQ-2 ( 1999 [employer]) 5/24/2022 10/25/2021   Q1: Little interest or pleasure in doing things 0 0   Q2: Feeling down, depressed or hopeless 0 0   PHQ-2 Score 0 0   PHQ-2 Total Score (12-17 Years)- Positive if 3 or more points; Administer PHQ-A if positive - 0       Vitals:  BP:   BP Readings from Last 1 Encounters:   08/30/22 133/83     Wt Readings from Last 1 Encounters:   08/30/22 77.9 kg (171 lb 11.2 oz)     Estimated body surface area is 1.93 meters squared as calculated from the following:    Height as of 3/3/22: 1.715 m (5' 7.52\").    Weight as of 8/30/22: 77.9 kg (171 lb 11.2 oz).    Labs:  _  Result Component Current Result Ref Range   Sodium 142 (8/30/2022) 133 - 144 mmol/L     _  Result Component Current Result Ref Range   Potassium 4.6 (8/30/2022) 3.4 - 5.3 mmol/L     _  Result Component Current Result Ref Range   Calcium 9.6 (8/30/2022) 8.5 - 10.1 mg/dL     No results found for Mag within last 30 days.     No results found for Phos within last 30 days.     _  Result Component Current Result Ref Range   Albumin 3.8 (8/30/2022) 3.4 - 5.0 g/dL     _  Result Component Current Result Ref Range   Urea Nitrogen 13 (8/30/2022) 7 - 30 mg/dL     _  Result Component Current Result Ref Range   Creatinine 0.84 (8/30/2022) 0.66 - 1.25 mg/dL     _  Result Component Current Result Ref Range   AST 25 (8/30/2022) 0 - 45 U/L     _  Result Component Current Result Ref Range   ALT 30 (8/30/2022) 0 - 70 U/L     _  Result Component Current Result Ref Range   Bilirubin Total 0.4 (8/30/2022) 0.2 - 1.3 mg/dL     _  Result Component Current Result Ref Range   WBC Count 3.0 (L) (8/30/2022) 4.0 - 11.0 10e3/uL     _  Result Component Current Result Ref Range   Hemoglobin 11.4 (L) (8/30/2022) 13.3 - 17.7 g/dL     _  Result Component Current Result Ref Range   Platelet Count 176 (8/30/2022) 150 - 450 10e3/uL     No results found for ANC " within last 30 days.     _  Result Component Current Result Ref Range   Absolute Neutrophils 1.5 (L) (8/30/2022) 1.6 - 8.3 10e3/uL          Assessment/Plan:  Ronna is tolerating Verzenio well. Labs from today are stable. Continue therapy as planned.    Follow-Up:  9/9: Labs      Refill Due:  [facility] to deliver Verzenio on 9/2.    Flavio Desai   Student Pharmacist  [facility]

## 2022-09-09 ENCOUNTER — HOSPITAL ENCOUNTER (OUTPATIENT)
Dept: PHYSICAL THERAPY | Facility: CLINIC | Age: 72
Setting detail: THERAPIES SERIES
Discharge: HOME | End: 2022-09-09
Attending: FAMILY MEDICINE
Payer: COMMERCIAL

## 2022-09-09 ENCOUNTER — ANCILLARY PROCEDURE (OUTPATIENT)
Dept: CT IMAGING | Facility: CLINIC | Age: 72
End: 2022-09-09
Attending: INTERNAL MEDICINE
Payer: COMMERCIAL

## 2022-09-09 ENCOUNTER — LAB (OUTPATIENT)
Dept: LAB | Facility: CLINIC | Age: 72
End: 2022-09-09
Attending: INTERNAL MEDICINE
Payer: COMMERCIAL

## 2022-09-09 DIAGNOSIS — C50.929 MALIGNANT NEOPLASM OF MALE BREAST, UNSPECIFIED ESTROGEN RECEPTOR STATUS, UNSPECIFIED LATERALITY, UNSPECIFIED SITE OF BREAST (H): ICD-10-CM

## 2022-09-09 DIAGNOSIS — C50.121 MALIGNANT NEOPLASM OF CENTRAL PORTION OF RIGHT BREAST IN MALE, ESTROGEN RECEPTOR POSITIVE (H): ICD-10-CM

## 2022-09-09 DIAGNOSIS — R94.8 ABNORMAL POSITRON EMISSION TOMOGRAPHY (PET) SCAN: ICD-10-CM

## 2022-09-09 DIAGNOSIS — Z17.0 MALIGNANT NEOPLASM OF CENTRAL PORTION OF RIGHT BREAST IN MALE, ESTROGEN RECEPTOR POSITIVE (H): ICD-10-CM

## 2022-09-09 DIAGNOSIS — R59.1 LYMPHADENOPATHY: ICD-10-CM

## 2022-09-09 LAB
ALBUMIN SERPL BCG-MCNC: 4.4 G/DL (ref 3.5–5.2)
ALP SERPL-CCNC: 67 U/L (ref 40–129)
ALT SERPL W P-5'-P-CCNC: 21 U/L (ref 10–50)
ANION GAP SERPL CALCULATED.3IONS-SCNC: 13 MMOL/L (ref 7–15)
AST SERPL W P-5'-P-CCNC: 27 U/L (ref 10–50)
BASOPHILS # BLD AUTO: 0 10E3/UL (ref 0–0.2)
BASOPHILS NFR BLD AUTO: 1 %
BILIRUB SERPL-MCNC: 0.3 MG/DL
BUN SERPL-MCNC: 15.9 MG/DL (ref 8–23)
CALCIUM SERPL-MCNC: 10.1 MG/DL (ref 8.8–10.2)
CHLORIDE SERPL-SCNC: 103 MMOL/L (ref 98–107)
CREAT SERPL-MCNC: 0.97 MG/DL (ref 0.67–1.17)
DEPRECATED HCO3 PLAS-SCNC: 23 MMOL/L (ref 22–29)
EOSINOPHIL # BLD AUTO: 0.2 10E3/UL (ref 0–0.7)
EOSINOPHIL NFR BLD AUTO: 6 %
ERYTHROCYTE [DISTWIDTH] IN BLOOD BY AUTOMATED COUNT: 12.7 % (ref 10–15)
GFR SERPL CREATININE-BSD FRML MDRD: 84 ML/MIN/1.73M2
GLUCOSE SERPL-MCNC: 91 MG/DL (ref 70–99)
HCT VFR BLD AUTO: 33.3 % (ref 40–53)
HGB BLD-MCNC: 11.5 G/DL (ref 13.3–17.7)
IMM GRANULOCYTES # BLD: 0 10E3/UL
IMM GRANULOCYTES NFR BLD: 0 %
LYMPHOCYTES # BLD AUTO: 1.1 10E3/UL (ref 0.8–5.3)
LYMPHOCYTES NFR BLD AUTO: 35 %
MCH RBC QN AUTO: 32 PG (ref 26.5–33)
MCHC RBC AUTO-ENTMCNC: 34.5 G/DL (ref 31.5–36.5)
MCV RBC AUTO: 93 FL (ref 78–100)
MONOCYTES # BLD AUTO: 0.3 10E3/UL (ref 0–1.3)
MONOCYTES NFR BLD AUTO: 10 %
NEUTROPHILS # BLD AUTO: 1.5 10E3/UL (ref 1.6–8.3)
NEUTROPHILS NFR BLD AUTO: 48 %
NRBC # BLD AUTO: 0 10E3/UL
NRBC BLD AUTO-RTO: 0 /100
PLATELET # BLD AUTO: 167 10E3/UL (ref 150–450)
POTASSIUM SERPL-SCNC: 4.3 MMOL/L (ref 3.4–5.3)
PROT SERPL-MCNC: 7.2 G/DL (ref 6.4–8.3)
RBC # BLD AUTO: 3.59 10E6/UL (ref 4.4–5.9)
SODIUM SERPL-SCNC: 139 MMOL/L (ref 136–145)
WBC # BLD AUTO: 3.2 10E3/UL (ref 4–11)

## 2022-09-09 PROCEDURE — 71260 CT THORAX DX C+: CPT | Mod: GC | Performed by: RADIOLOGY

## 2022-09-09 PROCEDURE — 97535 SELF CARE MNGMENT TRAINING: CPT | Mod: GP | Performed by: PHYSICAL THERAPIST

## 2022-09-09 PROCEDURE — 80053 COMPREHEN METABOLIC PANEL: CPT

## 2022-09-09 PROCEDURE — 85004 AUTOMATED DIFF WBC COUNT: CPT

## 2022-09-09 PROCEDURE — 97140 MANUAL THERAPY 1/> REGIONS: CPT | Mod: GP | Performed by: PHYSICAL THERAPIST

## 2022-09-09 PROCEDURE — 36415 COLL VENOUS BLD VENIPUNCTURE: CPT

## 2022-09-09 PROCEDURE — 74177 CT ABD & PELVIS W/CONTRAST: CPT | Mod: GC | Performed by: RADIOLOGY

## 2022-09-09 RX ORDER — IOPAMIDOL 755 MG/ML
95 INJECTION, SOLUTION INTRAVASCULAR ONCE
Status: COMPLETED | OUTPATIENT
Start: 2022-09-09 | End: 2022-09-09

## 2022-09-09 RX ADMIN — IOPAMIDOL 95 ML: 755 INJECTION, SOLUTION INTRAVASCULAR at 09:26

## 2022-09-09 NOTE — PROGRESS NOTES
[facility] Rehabilitation Service    Outpatient Physical Therapy Progress Note  Patient: Ronna Collins  : 1950    Beginning/End Dates of Reporting Period:  22 to 22    Referring Provider: Dr. Nida Snider    Therapy Diagnosis: MICHELE, R UE lymphedema, chest tightness     Client Self Report: Went to [place], that was fine.  Worked up to 10# weights with the S2S program.  Feeling stronger.    Objective Measurements:  Objective Measure: MICHELE     Objective Measure: Volume/edema  Details: R 1738.43mL, L 1644.41mL about 5.4% swelling. Both volumes increased due to hypertrophy.  No pitting but unable to pinch as small fold of skin on the R medial forearm  Objective Measure: AROM  Details: R flexion: 146 abduction: 152  Objective Measure: UE strength  Details: Doing well with S@S program, up to 10#    Goals:  Goal Identifier edema   Goal Description Patient will understand and express independence with lymphedema risk reduction program including but not limited to skincare, exercise, GCB PRN, MLD   Target Date 22   Date Met  22   Progress (detail required for progress note): Date extended as it has not been met, not doing massage daily     Goal Identifier shoulder ROM   Goal Description Pt to express understanding and independence with acute postsurgical precautions re: RUQ   Target Date 21   Date Met  12/10/21   Progress (detail required for progress note):       Goal Identifier HEP   Goal Description Pt will be independent with exercise program to for R UE strength and ROM to allow for improved overhead lifting and prevent shoulder injury   Target Date 22   Date Met  22   Progress (detail required for progress note):       Goal Identifier Maintenance   Goal Description Pt will continue with exercise program to prevent loss of ROM and use compression as appropriate if signs of edema to prevent Volume  R UE from increase greater than 5% over L.    Target Date 21   Date Met  22   Progress (detail required for progress note): : swelling 4.8%, ROM preserved     Goal Identifier Strengthening program   Goal Description Pt will be independent with progressive UE and LE strengthening program to return to baseline function and stretching to prevent injury   Target Date 22   Date Met  22 (working with S2S)   Progress (detail required for progress note): ongoing, cues needed for form today, pt is also working with S2S     Goal Identifier Long term mangement   Goal Description Pt will maintain ROM greater than 140/150 for flex/abd and use garments as needed to prevent exacerbation of swelling over summer months.   Target Date 22   Date Met      Progress (detail required for progress note): 22 goal date extended, travel to [facility] in 2 week. ROM is more than maintained but improved     Goal Identifier     Goal Description     Target Date     Date Met      Progress (detail required for progress note):       Goal Identifier     Goal Description     Target Date     Date Met      Progress (detail required for progress note):         Plan:  Continue therapy per current plan of care. Pt to be seen 1x/mo. He reports Nov-Dec will involve travel so likely will not be able to attend therapy then but will be seen in October and then next year.  Will get new order from provider as previous will  in Oct. Pt is managing swelling well but having some increase this visit. May consider more aggressive compression pending home management over the next month. Pt reminded of daily R UQ stretching and daily MLD.  He is doing well with his Survival 2 Strength program.    Discharge:  No

## 2022-09-09 NOTE — NURSING NOTE
Chief Complaint   Patient presents with     Lab Only     Labs drawn with piv start by rn.       Labs drawn with PIV start by rn.  Pt tolerated well.      Monie Ibrahim RN

## 2022-09-11 NOTE — PROGRESS NOTES
Oncology Visit:  Date on this visit: Sep 12, 2022    Diagnosis: h/o clinical prognostic stage IIIb, [id number], grade 2, ER positive, GA positive, HER-2 negative right breast cancer, [id number].    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  He noted discomfort and hardening of the skin of the right nipple in late April/early May, 2021.  He noted a seborrheic keratosis of the right nipple and remembered he had a similar skin lesion of the arm 3 months earlier.  However, he subsequently noted a mass in the same area.  Right breast skin punch biopsy performed by dermatology was c/w grade 2 invasive ductal carcinoma with associated DCIS.  Invasive carcinoma was ER positive 100% and GA positive 70%, with tumor invading the dermis.  HER-2 was negative by IHC (score 1+).  Diagnostic mammogram and ultrasound showed a retroareolar right breast mass measuring 2.9 cm with associated nipple retraction and enlarged, abnormal right axillary lymph nodes.  Right breast biopsy was again c/w grade 2 invasive ductal carcinoma, ER strong in 98% of tumor cell nuclei, HER-2 negative.  Ki-67 was 15%.    He elected to screen for the ISPY-2 clinical trial.  MRI breast on 6/18/2021 showed the biopsy proven right breast cancer to measure 3.9 cm in maximum dimension with invasion of the nipple and the pectoralis muscle as well as at least 4 abnormal level 1 and 2 right axillary lymph nodes and a tiny 0.4 cm right internal mammary lymph node.  Mammoprint returned low risk with a score of +0.29.  He elected for treatment on the endocrine optimization protocol of ISPY-2 and was randomized to treatment with amcenestrant.  He was on treatment with  amcenestrant from 7/1/2021 - 10/26/2021.  At our visit in 01/2021, both right axillary node and right breast tumor palpated more firm then prior.  Breast MRI was stable, however, due to clinical concerns for progression,  decision was made to proceed with surgery.   Right breast mastectomy and right axillary lymph node dissection was performed by Dr. Snider on 10/27/2021.  Pathology showed grade 2 carcinoma of the right breast measuring 3.2 cm with invasion of the dermis, nipple and the pectoralis muscle.  Ki-67 of the excised tumor was 7%.  15/44 right axillary lymph nodes were positive with 6 of the lymph nodes demonstrating extranodal extension.  The largest lymph node metastasis measured 2.1 cm.  Oncotype dx recurrence score was 50.    Jamie Collins received 4 cycles of Taxotere and cyclophosphamide from 12/14/2021 - 2/16/2022.  His course was complicated by fevers persistent throughout treatment.  He received radiation (4800 cGy in 15 fractions) to the right chest wall and regional lymph nodes from 3/21/2022 - 4/8/2022.  He has been on treatment with lupron and letrozole since 3/16/2022.  Abemaciclib was added 4/26/2022.    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  He is currently on treatment with abemaciclib, Lupron, and letrozole.  In general, he is tolerating this treatment okay.  He is having 2-3 hot flashes per day.  At least 1 of these occurs during the night, and is bothersome, as it wakes him from sleep and cuases difficulty falling back asleep.  He denies bone or joint aches or pains.  He has had no mood disorder.  He would like to discuss some of the findings on his CT scan including the patulous esophagus and the esophageal thickening.  In the area where he had the scalp lesion removed on the right forehead there is regrowth of the lesion there and he is wondering if he should return to see dermatology.  He denies fevers, chills, or infection complaints.  He has no cough, shortness of breath, or chest pain.  He has no abdominal complaints.  He specifically denies acid reflux, but does report infrequent episodes of dysphagia with associated regurgitation.  He continues to  work with a lymphedema therapist for lymphedema and cording prevention.  The remainder of a complete 12 point review of systems was reviewed with patient was negative with exception that mentioned above.    Past Medical/Surgical History:  Past Medical History:   Diagnosis Date     Breast cancer (H) 05/2021     Chronic sinusitis      Hypertension 2002     Past Surgical History:   Procedure Laterality Date     DISSECT LYMPH NODE AXILLA Right 10/27/2021    Procedure: RIGHT Axillary Lymph Node Dissection;  Surgeon: Nida Snider MD;  Location: [facility] OR     MASTECTOMY SIMPLE Right 10/27/2021    Procedure: RIGHT Simple Mastectomy;  Surgeon: Nida Snider MD;  Location: [facility] OR     Allergies:  Allergies as of 09/12/2022     (No Known Allergies)     Current Medications:  Current Outpatient Medications   Medication Sig Dispense Refill     [START ON 9/13/2022] abemaciclib (VERZENIO) 150 MG tablet Take 1 tablet (150 mg) by mouth 2 times daily for 28 days 56 tablet 0     abemaciclib (VERZENIO) 150 MG tablet Take 1 tablet (150 mg) by mouth 2 times daily for 28 days 56 tablet 0     [START ON 9/13/2022] letrozole (FEMARA) 2.5 MG tablet Take 1 tablet (2.5 mg) by mouth daily for 28 days 28 tablet 0     letrozole (FEMARA) 2.5 MG tablet Take 1 tablet (2.5 mg) by mouth daily for 28 days 28 tablet 0     letrozole (FEMARA) 2.5 MG tablet Take 1 tablet (2.5 mg) by mouth daily for 28 days 28 tablet 0     loperamide (IMODIUM) 2 MG capsule Start with 2 caps (4 mg), then take one cap (2 mg) after each diarrheal stool as needed. Do not use more than 8 caps (16 mg) per day. (Patient not taking: Reported on 6/28/2022) 30 capsule 0     losartan-hydrochlorothiazide (HYZAAR) 100-25 MG tablet Take 1 tablet by mouth every morning 90 tablet 0     MELATONIN PO        Naproxen Sodium (ALEVE PO)        ondansetron (ZOFRAN) 8 MG tablet Take 1 tablet (8 mg) by mouth every 8 hours as needed for nausea (Patient not taking: Reported on 6/28/2022) 30  tablet 3     prochlorperazine (COMPAZINE) 10 MG tablet Take 0.5 tablets (5 mg) by mouth every 6 hours as needed for nausea or vomiting (Patient not taking: Reported on 6/28/2022) 30 tablet 2     zolpidem (AMBIEN) 5 MG tablet Take 1 tablet (5 mg) by mouth nightly as needed for sleep (Patient not taking: Reported on 8/30/2022) 60 tablet 5      Genetics Breast Actionable and Breast Expanded panels were both negative for pathogenic germline mutations.    Physical Exam:  BP (!) 138/91 (BP Location: Left arm, Patient Position: Sitting, Cuff Size: Adult Regular)   Pulse 81   Temp 97.4  F (36.3  C) (Tympanic)   Resp 16   Wt 79.1 kg (174 lb 6.4 oz)   SpO2 100%   BMI 26.90 kg/m    General:  Well appearing adult male in NAD.  Alert and oriented x 3.  HEENT:  Normocephalic.  Sclera anicteric.  MMM.  No lesions of the oropharynx.  Lymph:  No palpable cervical, supraclavicular, or axillary LAD.  Chest:  CTA bilaterally.  No wheezes or crackles.  CV:  RRR.  Nl S1 and S2.  No m/r/g.  Chest wall:  Right mastectomy.  There is no significant fibrosis or palpable mass of the right chest wall.    Abd:  Soft/ND.   Ext:  No pitting edema of the bilateral lower extremities.  Musculo:  Full ROM of the RUE.  (+) right axillary cording.  Wearing a compression sleeve.  No evidence of lymphedema.  Neuro:  Cranial nerves grossly intact.  Gait stable.  Psych:  Mood and affect appear normal.  Skin:  Darkened patch upper back with excoriations.  Darkened patch lower back.  Numerous seborrheic keratoses.    Laboratory/Imaging Studies:  9/9/2022 Labs:  Electrolytes, kidney, and liver function are wnl.  WBC is low at 3.2; ANC is low at 1.5  Hemoglobin is low at 11.5 g/dL  Platelets are low at 167    9/9/2022 CT C/A/P:  1. Stable postsurgical changes of right mastectomy and right axillary node dissection. No evidence of local or distant recurrent disease.  2. Patulous fluid-filled esophagus, which may be seen in the setting  of esophageal  dysmotility.    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  He is s/p treatment with 3.5 months neoadjuvant amcenestrant, right breast mastectomy, right ALND, 4 cycles Taxotere and cyclophosphamide, and radiation.  He is on treatment with abemaciclib, lupron and letrozol.    1.  Right breast cancer:    On treatment with abemaciclib, lupron and letrozole since 4/26/2022.  9/9 blood work with neutropenia, but within range to continue at the current dose. Anemia is stable. Plan to continue abemaciclib for a total of 2 years.     We reviewed the results of CT C/A/P performed last week which is without lymphadenopathy or bone abnormalities.  There was persistent evidence of bladder wall thickening.  There is no evidence of recurrent breast cancer.  We reviewed that surveillance whole body imaging is not typically recommended as it has not been shown to improve breast cancer outcomes.  That being said, he continues to have some indeterminate findings on his CT and would be reasonable to repeat a CT in 1 year.    2.  Bladder wall thickening:  Will refer to Urology for further evaluation.  Discussed typical work up may include urine cytology and/or cystoscopy.  We reviewed h/o cyclophosphamide treatment which can be a bladder irritant.    3.  Fatigue & weakness:  Improving.  Ongoing work with physical therapy and in a survival to strength program.  Patient requested new referral, which was placed.    4.  RUE lymphedema/cording:  Lymphedema appears well controlled today.  Persistent symptoms of cording.  Per his request another referral to lymphedema therapy was placed.  Will continue to wear a compression sleeve, self massage prn.    5.  Hot flashes:  Bothersome at night.  Gabapentin 300 mg PO at bedtime prescribed today.  Potential side effects of drowsiness and lightheadedness were  discussed.    6.  Numerous skin lesions/upper back hyperpigmentation and excoriation:  Agree that annual dermatology general skin exam is indicated.  Dermatology referral placed for skin exam as well as evaluation of hyperpigmentation/pruritis.    7.  Patulous esophagus/occasional regurgitation and dysphagia:  Referral placed for EGD evaluation.    8.  Follow Up:  - Labs and Lupron every 4 weeks x 6 starting 9/27.  - Endoscopy with GI within 1-2 months.  - Dermatology visit within 1-2 months.  - physical/lymphedema therapy referral placed.  - visit with Urology for evaluation of bladder wall thickening within 1 month.    - SANDRA visit in 3 months  - visit with me in 6 months      45 minutes spent on the date of the encounter doing chart review, review of test results, interpretation of tests, patient visit, documentation and discussion with family

## 2022-09-12 ENCOUNTER — TELEPHONE (OUTPATIENT)
Dept: GASTROENTEROLOGY | Facility: CLINIC | Age: 72
End: 2022-09-12

## 2022-09-12 ENCOUNTER — ONCOLOGY VISIT (OUTPATIENT)
Dept: ONCOLOGY | Facility: CLINIC | Age: 72
End: 2022-09-12
Attending: INTERNAL MEDICINE
Payer: COMMERCIAL

## 2022-09-12 ENCOUNTER — HOSPITAL ENCOUNTER (OUTPATIENT)
Facility: AMBULATORY SURGERY CENTER | Age: 72
End: 2022-09-12
Attending: SPECIALIST | Admitting: SPECIALIST
Payer: COMMERCIAL

## 2022-09-12 VITALS
OXYGEN SATURATION: 100 % | WEIGHT: 174.4 LBS | BODY MASS INDEX: 26.9 KG/M2 | HEART RATE: 81 BPM | TEMPERATURE: 97.4 F | DIASTOLIC BLOOD PRESSURE: 91 MMHG | SYSTOLIC BLOOD PRESSURE: 138 MMHG | RESPIRATION RATE: 16 BRPM

## 2022-09-12 DIAGNOSIS — Z00.00 ROUTINE HEALTH MAINTENANCE: ICD-10-CM

## 2022-09-12 DIAGNOSIS — N95.1 MENOPAUSAL SYNDROME (HOT FLASHES): ICD-10-CM

## 2022-09-12 DIAGNOSIS — R13.19 ESOPHAGEAL DYSPHAGIA: ICD-10-CM

## 2022-09-12 DIAGNOSIS — T45.1X5A CHEMOTHERAPY-INDUCED NEUTROPENIA (H): ICD-10-CM

## 2022-09-12 DIAGNOSIS — N32.89 BLADDER WALL THICKENING: ICD-10-CM

## 2022-09-12 DIAGNOSIS — C50.121 MALIGNANT NEOPLASM OF CENTRAL PORTION OF RIGHT BREAST IN MALE, ESTROGEN RECEPTOR POSITIVE (H): Primary | ICD-10-CM

## 2022-09-12 DIAGNOSIS — K22.89 PATULOUS LOWER ESOPHAGEAL SPHINCTER: ICD-10-CM

## 2022-09-12 DIAGNOSIS — M62.81 GENERALIZED MUSCLE WEAKNESS: ICD-10-CM

## 2022-09-12 DIAGNOSIS — Z17.0 MALIGNANT NEOPLASM OF CENTRAL PORTION OF RIGHT BREAST IN MALE, ESTROGEN RECEPTOR POSITIVE (H): Primary | ICD-10-CM

## 2022-09-12 DIAGNOSIS — I89.0 LYMPHEDEMA OF RIGHT UPPER EXTREMITY: ICD-10-CM

## 2022-09-12 DIAGNOSIS — D70.1 CHEMOTHERAPY-INDUCED NEUTROPENIA (H): ICD-10-CM

## 2022-09-12 DIAGNOSIS — C50.929 MALIGNANT NEOPLASM OF MALE BREAST, UNSPECIFIED ESTROGEN RECEPTOR STATUS, UNSPECIFIED LATERALITY, UNSPECIFIED SITE OF BREAST (H): ICD-10-CM

## 2022-09-12 PROCEDURE — 99215 OFFICE O/P EST HI 40 MIN: CPT | Performed by: INTERNAL MEDICINE

## 2022-09-12 PROCEDURE — G0463 HOSPITAL OUTPT CLINIC VISIT: HCPCS

## 2022-09-12 RX ORDER — GABAPENTIN 300 MG/1
300 CAPSULE ORAL AT BEDTIME
Qty: 30 CAPSULE | Refills: 11 | Status: SHIPPED | OUTPATIENT
Start: 2022-09-12 | End: 2022-11-29

## 2022-09-12 ASSESSMENT — PAIN SCALES - GENERAL: PAINLEVEL: NO PAIN (0)

## 2022-09-12 NOTE — TELEPHONE ENCOUNTER
Screening Questions    BlueKIND OF PREP RedLOCATION [review exclusion criteria] GreenSEDATION TYPE      1. Are you active on mychart? y    2. What insurance is in the chart? [employer]     3.   Ordering/Referring Provider: Mikaela Sr MD       4. BMI   (If greater than 40 review exclusion criteria [PAC APPT IF [MAC] @ [facility])  26.9  [If yes, BMI OVER 40-EXTENDED PREP]      **(Sedation review/consideration needed)**  Do you have a legal guardian or Medical Power of    and/or are you able to give consent for your medical care?     Can give consent    5. Have you had a positive covid test in the last 90 days?   n -     6.  Are you currently on dialysis?   n [ If yes, G-PREP & HOSPITAL setting ONLY]     7.  Do you have chronic kidney disease?  n [ If yes, G-PREP ]    8.   Do you have a diagnosis of diabetes?   n   [ If yes, G-PREP ]    9.  On a regular basis do you go 3-5 days between bowel movements?   n   [ If yes, EXTENDED PREP]    10.  Are you taking any prescription pain medications on a routine schedule?    n -  [ If yes, EXTENDED PREP] [If yes, MAC]      11.   Do you have any chemical dependencies such as alcohol, street drugs, or methadone?    n [If yes, MAC]    12.   Do you have any history of post-traumatic stress syndrome, severe anxiety or history of psychosis?    n  [If yes, MAC]    13.  [FEMALES] Are you currently pregnant? n    If yes, how many weeks?       Respiratory/Heart Screening:  [If yes to any of the following HOSPITAL setting only]     14. Do you have Pulmonary Hypertension [Lungs]?   n       15. Do you have UNCONTROLLED asthma?   n     16.  Do you use daily home oxygen?  n      17. Do you have mod to severe Obstructive Sleep Apnea?         (OKAY @ [facility]  [facility]  [facility]  [facility]  [facility]  [facility] - if pt is not on OXYGEN)  n      18.   Have you had a heart or lung transplant?   n      19.   Have you had a stroke or Transient ischemic attack (TIA - aka  mini stroke ) within 6 months?  (If yes,  please review exclusion criteria)  n     20.   In the past 6 months, have you had any heart related issues including cardiomyopathy or heart attack?   n           If yes, did it require cardiac stenting or other implantable device?         21.   Do you have any implantable devices in your body (pacemaker, defib, LVAD)? (If yes, please review exclusion criteria)  n   22.  Do you take the medication Phentermine?     Yes-> Hold for 7 days before procedure.  Please consult your prescribing provider if you have questions about holding this medication.     No-> Continue to next question.    23. Do you take nitroglycerin?   n           If yes, how often?   (if yes, HOSPITAL setting ONLY)    24.  Are you currently taking any blood thinners?    [If yes, INFORM patient to follw up w/ ORDERING PROVIDER FOR BRIDGING INSTRUCTIONS]     n    25.   Do you transfer independently?                (If NO, please HOSPITAL setting ONLY)  y    26.   Preferred LOCAL Pharmacy for Pre Prescription:         [facility][id number] [address] AT [facility] & [address]      Scheduling Details  (Please ask for phone number if not scheduled by patient)      Caller : Ronna Collins    Date of Procedure: 10/25  Surgeon: Selvin  Location: [facility]        Sedation Type: MODERATE l   Conscious Sedation- Needs  for 6 hours after the procedure  MAC/General-Needs  for 24 hours after procedure    n :[Pre-op Required] at [facility] and OR for MAC sedation   (advise patient they will need a pre-op WITH IN 30 DAYS of procedure date)     Type of Procedure Scheduled:   Upper Endoscopy [EGD]    Which Colonoscopy Prep was Sent?:    -     CHARISSE CF PATIENTS & GROEN'S PATIENTS NEEDS EXTENDED PREP       Informed patient they will need an adult  y  Cannot take any type of public or medical transportation alone    Pre-Procedure Covid test to be completed at [facility] or Externally: home test   **INFORMED OF HOME TESTING & LAB OPTION**        Confirmed Nurse will call to complete assessment y    Additional comments:

## 2022-09-12 NOTE — LETTER
9/12/2022         RE: Ronna Collins  [address]  [facility] [address]        Dear Colleague,    Thank you for referring your patient, Ronna Collins, to the [facility]. Please see a copy of my visit note below.    Oncology Visit:  Date on this visit: Sep 12, 2022    Diagnosis: h/o clinical prognostic stage IIIb, [id number], grade 2, ER positive, NM positive, HER-2 negative right breast cancer, [id number].    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  He noted discomfort and hardening of the skin of the right nipple in late April/early May, 2021.  He noted a seborrheic keratosis of the right nipple and remembered he had a similar skin lesion of the arm 3 months earlier.  However, he subsequently noted a mass in the same area.  Right breast skin punch biopsy performed by dermatology was c/w grade 2 invasive ductal carcinoma with associated DCIS.  Invasive carcinoma was ER positive 100% and NM positive 70%, with tumor invading the dermis.  HER-2 was negative by IHC (score 1+).  Diagnostic mammogram and ultrasound showed a retroareolar right breast mass measuring 2.9 cm with associated nipple retraction and enlarged, abnormal right axillary lymph nodes.  Right breast biopsy was again c/w grade 2 invasive ductal carcinoma, ER strong in 98% of tumor cell nuclei, HER-2 negative.  Ki-67 was 15%.    He elected to screen for the ISPY-2 clinical trial.  MRI breast on 6/18/2021 showed the biopsy proven right breast cancer to measure 3.9 cm in maximum dimension with invasion of the nipple and the pectoralis muscle as well as at least 4 abnormal level 1 and 2 right axillary lymph nodes and a tiny 0.4 cm right internal mammary lymph node.  Mammoprint returned low risk with a score of +0.29.  He elected for treatment on the endocrine optimization protocol of ISPY-2 and was randomized to treatment  with amcenestrant.  He was on treatment with  amcenestrant from 7/1/2021 - 10/26/2021.  At our visit in 01/2021, both right axillary node and right breast tumor palpated more firm then prior.  Breast MRI was stable, however, due to clinical concerns for progression, decision was made to proceed with surgery.   Right breast mastectomy and right axillary lymph node dissection was performed by Dr. Snider on 10/27/2021.  Pathology showed grade 2 carcinoma of the right breast measuring 3.2 cm with invasion of the dermis, nipple and the pectoralis muscle.  Ki-67 of the excised tumor was 7%.  15/44 right axillary lymph nodes were positive with 6 of the lymph nodes demonstrating extranodal extension.  The largest lymph node metastasis measured 2.1 cm.  Oncotype dx recurrence score was 50.    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  His course was complicated by fevers persistent throughout treatment.  He received radiation (4800 cGy in 15 fractions) to the right chest wall and regional lymph nodes from 3/21/2022 - 4/8/2022.  He has been on treatment with lupron and letrozole since 3/16/2022.  Abemaciclib was added 4/26/2022.    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  He is currently on treatment with abemaciclib, Lupron, and letrozole.  In general, he is tolerating this treatment okay.  He is having 2-3 hot flashes per day.  At least 1 of these occurs during the night, and is bothersome, as it wakes him from sleep and cuases difficulty falling back asleep.  He denies bone or joint aches or pains.  He has had no mood disorder.  He would like to discuss some of the findings on his CT scan including the patulous esophagus and the esophageal thickening.  In the area where he had the scalp lesion removed on the right forehead there is regrowth of the lesion there and he is wondering if he should return to see dermatology.  He  denies fevers, chills, or infection complaints.  He has no cough, shortness of breath, or chest pain.  He has no abdominal complaints.  He specifically denies acid reflux, but does report infrequent episodes of dysphagia with associated regurgitation.  He continues to work with a lymphedema therapist for lymphedema and cording prevention.  The remainder of a complete 12 point review of systems was reviewed with patient was negative with exception that mentioned above.    Past Medical/Surgical History:  Past Medical History:   Diagnosis Date     Breast cancer (H) 05/2021     Chronic sinusitis      Hypertension 2002     Past Surgical History:   Procedure Laterality Date     DISSECT LYMPH NODE AXILLA Right 10/27/2021    Procedure: RIGHT Axillary Lymph Node Dissection;  Surgeon: Nida Snider MD;  Location: [facility] OR     MASTECTOMY SIMPLE Right 10/27/2021    Procedure: RIGHT Simple Mastectomy;  Surgeon: Nida Snider MD;  Location: [facility] OR     Allergies:  Allergies as of 09/12/2022     (No Known Allergies)     Current Medications:  Current Outpatient Medications   Medication Sig Dispense Refill     [START ON 9/13/2022] abemaciclib (VERZENIO) 150 MG tablet Take 1 tablet (150 mg) by mouth 2 times daily for 28 days 56 tablet 0     abemaciclib (VERZENIO) 150 MG tablet Take 1 tablet (150 mg) by mouth 2 times daily for 28 days 56 tablet 0     [START ON 9/13/2022] letrozole (FEMARA) 2.5 MG tablet Take 1 tablet (2.5 mg) by mouth daily for 28 days 28 tablet 0     letrozole (FEMARA) 2.5 MG tablet Take 1 tablet (2.5 mg) by mouth daily for 28 days 28 tablet 0     letrozole (FEMARA) 2.5 MG tablet Take 1 tablet (2.5 mg) by mouth daily for 28 days 28 tablet 0     loperamide (IMODIUM) 2 MG capsule Start with 2 caps (4 mg), then take one cap (2 mg) after each diarrheal stool as needed. Do not use more than 8 caps (16 mg) per day. (Patient not taking: Reported on 6/28/2022) 30 capsule 0     losartan-hydrochlorothiazide (HYZAAR)  100-25 MG tablet Take 1 tablet by mouth every morning 90 tablet 0     MELATONIN PO        Naproxen Sodium (ALEVE PO)        ondansetron (ZOFRAN) 8 MG tablet Take 1 tablet (8 mg) by mouth every 8 hours as needed for nausea (Patient not taking: Reported on 6/28/2022) 30 tablet 3     prochlorperazine (COMPAZINE) 10 MG tablet Take 0.5 tablets (5 mg) by mouth every 6 hours as needed for nausea or vomiting (Patient not taking: Reported on 6/28/2022) 30 tablet 2     zolpidem (AMBIEN) 5 MG tablet Take 1 tablet (5 mg) by mouth nightly as needed for sleep (Patient not taking: Reported on 8/30/2022) 60 tablet 5      Genetics Breast Actionable and Breast Expanded panels were both negative for pathogenic germline mutations.    Physical Exam:  BP (!) 138/91 (BP Location: Left arm, Patient Position: Sitting, Cuff Size: Adult Regular)   Pulse 81   Temp 97.4  F (36.3  C) (Tympanic)   Resp 16   Wt 79.1 kg (174 lb 6.4 oz)   SpO2 100%   BMI 26.90 kg/m    General:  Well appearing adult male in NAD.  Alert and oriented x 3.  HEENT:  Normocephalic.  Sclera anicteric.  MMM.  No lesions of the oropharynx.  Lymph:  No palpable cervical, supraclavicular, or axillary LAD.  Chest:  CTA bilaterally.  No wheezes or crackles.  CV:  RRR.  Nl S1 and S2.  No m/r/g.  Chest wall:  Right mastectomy.  There is no significant fibrosis or palpable mass of the right chest wall.    Abd:  Soft/ND.   Ext:  No pitting edema of the bilateral lower extremities.  Musculo:  Full ROM of the RUE.  (+) right axillary cording.  Wearing a compression sleeve.  No evidence of lymphedema.  Neuro:  Cranial nerves grossly intact.  Gait stable.  Psych:  Mood and affect appear normal.  Skin:  Darkened patch upper back with excoriations.  Darkened patch lower back.  Numerous seborrheic keratoses.    Laboratory/Imaging Studies:  9/9/2022 Labs:  Electrolytes, kidney, and liver function are wnl.  WBC is low at 3.2; ANC is low at 1.5  Hemoglobin is low at 11.5 g/dL  Platelets  are low at 167    9/9/2022 CT C/A/P:  1. Stable postsurgical changes of right mastectomy and right axillary node dissection. No evidence of local or distant recurrent disease.  2. Patulous fluid-filled esophagus, which may be seen in the setting  of esophageal dysmotility.    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  He is s/p treatment with 3.5 months neoadjuvant amcenestrant, right breast mastectomy, right ALND, 4 cycles Taxotere and cyclophosphamide, and radiation.  He is on treatment with abemaciclib, lupron and letrozol.    1.  Right breast cancer:    On treatment with abemaciclib, lupron and letrozole since 4/26/2022.  9/9 blood work with neutropenia, but within range to continue at the current dose. Anemia is stable. Plan to continue abemaciclib for a total of 2 years.     We reviewed the results of CT C/A/P performed last week which is without lymphadenopathy or bone abnormalities.  There was persistent evidence of bladder wall thickening.  There is no evidence of recurrent breast cancer.  We reviewed that surveillance whole body imaging is not typically recommended as it has not been shown to improve breast cancer outcomes.  That being said, he continues to have some indeterminate findings on his CT and would be reasonable to repeat a CT in 1 year.    2.  Bladder wall thickening:  Will refer to Urology for further evaluation.  Discussed typical work up may include urine cytology and/or cystoscopy.  We reviewed h/o cyclophosphamide treatment which can be a bladder irritant.    3.  Fatigue & weakness:  Improving.  Ongoing work with physical therapy and in a survival to strength program.  Patient requested new referral, which was placed.    4.  RUE lymphedema/cording:  Lymphedema appears well controlled today.  Persistent symptoms of cording.  Per his request another  referral to lymphedema therapy was placed.  Will continue to wear a compression sleeve, self massage prn.    5.  Hot flashes:  Bothersome at night.  Gabapentin 300 mg PO at bedtime prescribed today.  Potential side effects of drowsiness and lightheadedness were discussed.    6.  Numerous skin lesions/upper back hyperpigmentation and excoriation:  Agree that annual dermatology general skin exam is indicated.  Dermatology referral placed for skin exam as well as evaluation of hyperpigmentation/pruritis.    7.  Patulous esophagus/occasional regurgitation and dysphagia:  Referral placed for EGD evaluation.    8.  Follow Up:  - Labs and Lupron every 4 weeks x 6 starting 9/27.  - Endoscopy with GI within 1-2 months.  - Dermatology visit within 1-2 months.  - physical/lymphedema therapy referral placed.  - visit with Urology for evaluation of bladder wall thickening within 1 month.    - SANDRA visit in 3 months  - visit with me in 6 months      45 minutes spent on the date of the encounter doing chart review, review of test results, interpretation of tests, patient visit, documentation and discussion with family         Again, thank you for allowing me to participate in the care of your patient.      Sincerely,    Mikaela Sr MD

## 2022-09-21 NOTE — TELEPHONE ENCOUNTER
MEDICAL RECORDS REQUEST   [facility]  Urology Clinic  [address] [place] [address]  PHONE: [phone]  Fax: [phone]        FUTURE VISIT INFORMATION                                                   Ronna Collins, : 1950 scheduled for future visit at [facility]    APPOINTMENT INFORMATION:    Date: 2022    Provider:  Samia Castillo CNP    Reason for Visit/Diagnosis: bladder thicking    RECORDS REQUESTED FOR VISIT                                                     NOTES  STATUS/DETAILS   OFFICE NOTE from other specialist  yes, 2022 -- Mikaela Sr MD @ [facility]   MEDICATION LIST  yes   LABS     URINALYSIS (UA)  yes   IMAGING (IMAGES & REPORT)  yes, 2022 -- CT CHEST ABD PALVIS  2022, 2021 -- PET ONCOLOGY WHOLE BODY       PRE-VISIT CHECKLIST      Record collection complete Yes   Appointment appropriately scheduled           (right time/right provider) Yes   Joint diagnostic appointment coordinated correctly          (ensure right order & amount of time) Yes   MyChart activation Yes   Questionnaire complete If no, please explain PENDING

## 2022-09-23 ENCOUNTER — TELEPHONE (OUTPATIENT)
Dept: ONCOLOGY | Facility: CLINIC | Age: 72
End: 2022-09-23

## 2022-09-23 DIAGNOSIS — C50.929 MALIGNANT NEOPLASM OF MALE BREAST, UNSPECIFIED ESTROGEN RECEPTOR STATUS, UNSPECIFIED LATERALITY, UNSPECIFIED SITE OF BREAST (H): Primary | ICD-10-CM

## 2022-09-23 RX ORDER — LETROZOLE 2.5 MG/1
2.5 TABLET, FILM COATED ORAL DAILY
Qty: 28 TABLET | Refills: 0 | Status: SHIPPED | OUTPATIENT
Start: 2022-10-11 | End: 2022-11-29

## 2022-09-23 NOTE — TELEPHONE ENCOUNTER
Oral Chemotherapy Monitoring Program    Subjective/Objective:  Ronna Collins is a 72 year old male contacted by phone for a follow-up visit for oral chemotherapy.  Karina confirms taking the appropriate dose of Verzenio 150 mg BID. Denies new or worsening side effects, missed doses, and recent hospital or ED visits. Patient has not had any recent medication changes.     ORAL CHEMOTHERAPY 6/20/2022 7/12/2022 8/2/2022 8/26/2022 8/26/2022 8/30/2022 9/23/2022   Assessment Type Lab Monitoring Refill Refill Refill Other Monthly Follow up Refill;Monthly Follow up   Diagnosis Code Breast Cancer Breast Cancer Breast Cancer Breast Cancer Breast Cancer Breast Cancer Breast Cancer   Providers Dr. Remberto Sr   [facility]   Drug Name Verzenio (abemaciclib) Verzenio (abemaciclib) Verzenio (abemaciclib) Verzenio (abemaciclib) Verzenio (abemaciclib) Verzenio (abemaciclib) Verzenio (abemaciclib)   Dose 150 mg - 150 mg 150 mg 150 mg 150 mg 150 mg   Current Schedule BID - BID BID BID BID BID   Cycle Details Continuous - Continuous Continuous Continuous Continuous Continuous   Start Date of Last Cycle - - - - - - -   Doses missed in last 2 weeks - - - - - 0 0   Adherence Assessment - - - - - Adherent Adherent   Adverse Effects No AE identified during assessment - - - - No AE identified during assessment No AE identified during assessment   Diarrhea - - - - - - -   Pharmacist Intervention(diarrhea) - - - - - - -   Intervention(s) - - - - - - -   Fatigue - - - - - - -   Pharmacist Intervention(fatigue) - - - - - - -   Intervention(s) - - - - - - -   Other (See Note for Details) - - - - - - -   Pharmacist intervention(other) - - - - - - -   Intervention(s) - - - - - - -   Any new drug interactions? - - - - - No No   Is the dose as ordered appropriate for the patient? Yes - - - - Yes -   Since  "the last time we talked, have you been hospitalized or used the emergency room? - - - - - No -       Last PHQ-2 Score on record:   PHQ-2 ( 1999 Pfizer) 5/24/2022 10/25/2021   Q1: Little interest or pleasure in doing things 0 0   Q2: Feeling down, depressed or hopeless 0 0   PHQ-2 Score 0 0   PHQ-2 Total Score (12-17 Years)- Positive if 3 or more points; Administer PHQ-A if positive - 0       Vitals:  BP:   BP Readings from Last 1 Encounters:   09/12/22 (!) 138/91     Wt Readings from Last 1 Encounters:   09/12/22 79.1 kg (174 lb 6.4 oz)     Estimated body surface area is 1.94 meters squared as calculated from the following:    Height as of 3/3/22: 1.715 m (5' 7.52\").    Weight as of 9/12/22: 79.1 kg (174 lb 6.4 oz).    Labs:  _  Result Component Current Result Ref Range   Sodium 139 (9/9/2022) 136 - 145 mmol/L     _  Result Component Current Result Ref Range   Potassium 4.3 (9/9/2022) 3.4 - 5.3 mmol/L     _  Result Component Current Result Ref Range   Calcium 10.1 (9/9/2022) 8.8 - 10.2 mg/dL     No results found for Mag within last 30 days.     No results found for Phos within last 30 days.     _  Result Component Current Result Ref Range   Albumin 4.4 (9/9/2022) 3.5 - 5.2 g/dL     _  Result Component Current Result Ref Range   Urea Nitrogen 15.9 (9/9/2022) 8.0 - 23.0 mg/dL     _  Result Component Current Result Ref Range   Creatinine 0.97 (9/9/2022) 0.67 - 1.17 mg/dL     _  Result Component Current Result Ref Range   AST 27 (9/9/2022) 10 - 50 U/L     _  Result Component Current Result Ref Range   ALT 21 (9/9/2022) 10 - 50 U/L     _  Result Component Current Result Ref Range   Bilirubin Total 0.3 (9/9/2022) <=1.2 mg/dL     _  Result Component Current Result Ref Range   WBC Count 3.2 (L) (9/9/2022) 4.0 - 11.0 10e3/uL     _  Result Component Current Result Ref Range   Hemoglobin 11.5 (L) (9/9/2022) 13.3 - 17.7 g/dL     _  Result Component Current Result Ref Range   Platelet Count 167 (9/9/2022) 150 - 450 10e3/uL "     No results found for ANC within last 30 days.     _  Result Component Current Result Ref Range   Absolute Neutrophils 1.5 (L) (9/9/2022) 1.6 - 8.3 10e3/uL        Assessment/Plan:  Karina is tolerating Verzenio well. PDC=0.98, no adherence concerns. Will transition to quarterly assessments.    Follow-Up:  9/26: Labs  12/22/2: Quarterly assessment    Refill Due:  [facility] will deliver on 10/03/22.    Татьяна Anglin, PharmD  Hematology/Oncology Clinical Pharmacist  [facility]  [facility]  [phone]

## 2022-09-26 ENCOUNTER — APPOINTMENT (OUTPATIENT)
Dept: LAB | Facility: CLINIC | Age: 72
End: 2022-09-26
Attending: INTERNAL MEDICINE
Payer: COMMERCIAL

## 2022-09-26 ENCOUNTER — INFUSION THERAPY VISIT (OUTPATIENT)
Dept: ONCOLOGY | Facility: CLINIC | Age: 72
End: 2022-09-26
Attending: INTERNAL MEDICINE
Payer: COMMERCIAL

## 2022-09-26 ENCOUNTER — TELEPHONE (OUTPATIENT)
Dept: GASTROENTEROLOGY | Facility: CLINIC | Age: 72
End: 2022-09-26

## 2022-09-26 ENCOUNTER — MYC MEDICAL ADVICE (OUTPATIENT)
Dept: ONCOLOGY | Facility: CLINIC | Age: 72
End: 2022-09-26

## 2022-09-26 VITALS
BODY MASS INDEX: 26.62 KG/M2 | OXYGEN SATURATION: 100 % | RESPIRATION RATE: 16 BRPM | WEIGHT: 172.6 LBS | DIASTOLIC BLOOD PRESSURE: 84 MMHG | HEART RATE: 93 BPM | SYSTOLIC BLOOD PRESSURE: 123 MMHG | TEMPERATURE: 98.1 F

## 2022-09-26 DIAGNOSIS — C50.121 MALIGNANT NEOPLASM OF CENTRAL PORTION OF RIGHT BREAST IN MALE, ESTROGEN RECEPTOR POSITIVE (H): Primary | ICD-10-CM

## 2022-09-26 DIAGNOSIS — Z17.0 MALIGNANT NEOPLASM OF CENTRAL PORTION OF RIGHT BREAST IN MALE, ESTROGEN RECEPTOR POSITIVE (H): Primary | ICD-10-CM

## 2022-09-26 LAB
ALBUMIN SERPL BCG-MCNC: 4.4 G/DL (ref 3.5–5.2)
ALP SERPL-CCNC: 71 U/L (ref 40–129)
ALT SERPL W P-5'-P-CCNC: 19 U/L (ref 10–50)
ANION GAP SERPL CALCULATED.3IONS-SCNC: 14 MMOL/L (ref 7–15)
AST SERPL W P-5'-P-CCNC: 22 U/L (ref 10–50)
BASOPHILS # BLD AUTO: 0.1 10E3/UL (ref 0–0.2)
BASOPHILS NFR BLD AUTO: 2 %
BILIRUB SERPL-MCNC: 0.3 MG/DL
BUN SERPL-MCNC: 15.7 MG/DL (ref 8–23)
CALCIUM SERPL-MCNC: 10.3 MG/DL (ref 8.8–10.2)
CHLORIDE SERPL-SCNC: 101 MMOL/L (ref 98–107)
CREAT SERPL-MCNC: 0.89 MG/DL (ref 0.67–1.17)
DEPRECATED HCO3 PLAS-SCNC: 23 MMOL/L (ref 22–29)
EOSINOPHIL # BLD AUTO: 0.2 10E3/UL (ref 0–0.7)
EOSINOPHIL NFR BLD AUTO: 8 %
ERYTHROCYTE [DISTWIDTH] IN BLOOD BY AUTOMATED COUNT: 12.2 % (ref 10–15)
GFR SERPL CREATININE-BSD FRML MDRD: >90 ML/MIN/1.73M2
GLUCOSE SERPL-MCNC: 138 MG/DL (ref 70–99)
HCT VFR BLD AUTO: 34 % (ref 40–53)
HGB BLD-MCNC: 11.7 G/DL (ref 13.3–17.7)
IMM GRANULOCYTES # BLD: 0 10E3/UL
IMM GRANULOCYTES NFR BLD: 0 %
LYMPHOCYTES # BLD AUTO: 0.9 10E3/UL (ref 0.8–5.3)
LYMPHOCYTES NFR BLD AUTO: 30 %
MCH RBC QN AUTO: 31.8 PG (ref 26.5–33)
MCHC RBC AUTO-ENTMCNC: 34.4 G/DL (ref 31.5–36.5)
MCV RBC AUTO: 92 FL (ref 78–100)
MONOCYTES # BLD AUTO: 0.2 10E3/UL (ref 0–1.3)
MONOCYTES NFR BLD AUTO: 6 %
NEUTROPHILS # BLD AUTO: 1.5 10E3/UL (ref 1.6–8.3)
NEUTROPHILS NFR BLD AUTO: 54 %
NRBC # BLD AUTO: 0 10E3/UL
NRBC BLD AUTO-RTO: 0 /100
PLATELET # BLD AUTO: 151 10E3/UL (ref 150–450)
POTASSIUM SERPL-SCNC: 3.9 MMOL/L (ref 3.4–5.3)
PROT SERPL-MCNC: 7.4 G/DL (ref 6.4–8.3)
RBC # BLD AUTO: 3.68 10E6/UL (ref 4.4–5.9)
SODIUM SERPL-SCNC: 138 MMOL/L (ref 136–145)
WBC # BLD AUTO: 2.8 10E3/UL (ref 4–11)

## 2022-09-26 PROCEDURE — 250N000011 HC RX IP 250 OP 636: Performed by: INTERNAL MEDICINE

## 2022-09-26 PROCEDURE — 85025 COMPLETE CBC W/AUTO DIFF WBC: CPT

## 2022-09-26 PROCEDURE — 36415 COLL VENOUS BLD VENIPUNCTURE: CPT

## 2022-09-26 PROCEDURE — 96402 CHEMO HORMON ANTINEOPL SQ/IM: CPT

## 2022-09-26 PROCEDURE — 80053 COMPREHEN METABOLIC PANEL: CPT

## 2022-09-26 RX ADMIN — LEUPROLIDE ACETATE 7.5 MG: KIT at 08:42

## 2022-09-26 ASSESSMENT — PAIN SCALES - GENERAL: PAINLEVEL: NO PAIN (0)

## 2022-09-26 NOTE — PROGRESS NOTES
Infusion Nursing Note:  Ronna Collins presents today for cycle 8 day 1 leuprolide injection.    Patient seen by provider today: No   present during visit today: Not Applicable.    Note: Pt comes to infusion today with no questions or concerns.  Pt has no pain today and denies any need for intervention at this appointment.  Pt has been afebrile and denies signs and symptoms of infection including: cough, SOB, sore throat, diarrhea, vomiting, rash, or pain with urination.  Pt wishes to proceed with today's planned treatment.    Intravenous Access:  No Intravenous access/labs at this visit.    Treatment Conditions:  Lab Results   Component Value Date    HGB 11.7 (L) 09/26/2022    WBC 2.8 (L) 09/26/2022    ANEU 1.7 05/24/2022    ANEUTAUTO 1.5 (L) 09/26/2022     09/26/2022      Lab Results   Component Value Date     09/26/2022    POTASSIUM 3.9 09/26/2022    MAG 2.3 06/22/2021    CR 0.89 09/26/2022    PROSPER 10.3 (H) 09/26/2022    BILITOTAL 0.3 09/26/2022    ALBUMIN 4.4 09/26/2022    ALT 19 09/26/2022    AST 22 09/26/2022       Post Infusion Assessment:  Patient tolerated lupron depot injection to LEFT ventrogluteal site without incident.     Discharge Plan:   Patient declined prescription refills.  Discharge instructions reviewed with: Patient.  Patient and/or family verbalized understanding of discharge instructions and all questions answered.  AVS to patient via [employer].  Patient will return 10/25/22 for next appointment.   Patient discharged in stable condition accompanied by: self.  Departure Mode: Ambulatory.      Emily Meese, RN

## 2022-09-26 NOTE — PATIENT INSTRUCTIONS
[facility] Triage and after hours / weekends / holidays:  [phone]    Please call the triage or after hours line if you experience a temperature greater than or equal to 100.4, shaking chills, have uncontrolled nausea, vomiting and/or diarrhea, dizziness, shortness of breath, chest pain, bleeding, unexplained bruising, or if you have any other new/concerning symptoms, questions or concerns.      If you are having any concerning symptoms or wish to speak to a provider before your next infusion visit, please call your care coordinator or triage to notify them so we can adequately serve you.     If you need a refill on a narcotic prescription or other medication, please call before your infusion appointment.                September 2022 Sunday Monday Tuesday Wednesday Thursday Friday Saturday                       1     2     3       4     5     6     7     8     9    LYMPHEDEMA TREATMENT   8:00 AM   (60 min.)   Renetta Dempsey PT   [facility]    LAB PERIPHERAL   9:00 AM   (15 min.)   [facility]   [facility]    CT CHEST/ABDOMEN/PELVIS W   9:20 AM   (20 min.)   [facility]   [facility] [place] 10       11     12    RETURN   8:45 AM   (30 min.)   Mikaela Sr MD   [facility] 13     14     15     16     17       18     19     20     21     22     23     24       25     26    LAB PERIPHERAL   7:45 AM   (15 min.)   [facility]   [facility]    ONC INFUSION 0.5 HR (30 MIN)   8:30 AM   (30 min.)    ONC INFUSION NURSE   [facility] 27     28     29     30 October 2022 Sunday Monday Tuesday Wednesday Thursday Friday Saturday                                 1       2     3     4     5     6     7     8       9     10     11     12     13     14     15        16     17     18     19     20     21    LYMPHEDEMA TREATMENT   8:00 AM   (60 min.)   Renetta Dempsey PT   [facility] 22       23     24     25    LAB PERIPHERAL   7:15 AM   (15 min.)   [facility]   [facility]    ONC INFUSION 0.5 HR (30 MIN)   8:00 AM   (30 min.)    ONC INFUSION NURSE   [facility]    ESOPHAGOGASTRODUODENOSCOPY, WITH CO2 INSUFFLATION   8:15 AM   Jeremy Montalvo MD    OR    Outpatient Visit   8:15 AM   Jeremy Montalvo MD   [facility] 26     27     28     29       30     31                                                Lab Results:  Recent Results (from the past 12 hour(s))   Comprehensive metabolic panel    Collection Time: 09/26/22  8:20 AM   Result Value Ref Range    Sodium 138 136 - 145 mmol/L    Potassium 3.9 3.4 - 5.3 mmol/L    Chloride 101 98 - 107 mmol/L    Carbon Dioxide (CO2) 23 22 - 29 mmol/L    Anion Gap 14 7 - 15 mmol/L    Urea Nitrogen 15.7 8.0 - 23.0 mg/dL    Creatinine 0.89 0.67 - 1.17 mg/dL    Calcium 10.3 (H) 8.8 - 10.2 mg/dL    Glucose 138 (H) 70 - 99 mg/dL    Alkaline Phosphatase 71 40 - 129 U/L    AST 22 10 - 50 U/L    ALT 19 10 - 50 U/L    Protein Total 7.4 6.4 - 8.3 g/dL    Albumin 4.4 3.5 - 5.2 g/dL    Bilirubin Total 0.3 <=1.2 mg/dL    GFR Estimate >90 >60 mL/min/1.73m2   CBC with platelets and differential    Collection Time: 09/26/22  8:20 AM   Result Value Ref Range    WBC Count 2.8 (L) 4.0 - 11.0 10e3/uL    RBC Count 3.68 (L) 4.40 - 5.90 10e6/uL    Hemoglobin 11.7 (L) 13.3 - 17.7 g/dL    Hematocrit 34.0 (L) 40.0 - 53.0 %    MCV 92 78 - 100 fL    MCH 31.8 26.5 - 33.0 pg    MCHC 34.4 31.5 - 36.5 g/dL    RDW 12.2 10.0 - 15.0 %    Platelet Count 151 150 - 450 10e3/uL    % Neutrophils 54 %    % Lymphocytes 30 %    % Monocytes 6 %    % Eosinophils 8 %    % Basophils 2 %    % Immature Granulocytes 0 %    NRBCs per 100 WBC 0 <1 /100     Absolute Neutrophils 1.5 (L) 1.6 - 8.3 10e3/uL    Absolute Lymphocytes 0.9 0.8 - 5.3 10e3/uL    Absolute Monocytes 0.2 0.0 - 1.3 10e3/uL    Absolute Eosinophils 0.2 0.0 - 0.7 10e3/uL    Absolute Basophils 0.1 0.0 - 0.2 10e3/uL    Absolute Immature Granulocytes 0.0 <=0.4 10e3/uL    Absolute NRBCs 0.0 10e3/uL

## 2022-09-26 NOTE — TELEPHONE ENCOUNTER
Caller: Ronna 'VV'    Procedure: EGD    Date, Location, and Surgeon of Procedure Cancelled: 10/25, [facility]    Ordering Provider:Mikaela Sr MD     Reason for cancel (please be detailed, any staff messages or encounters to note?): will be rescheduling along w/ upcoming colonoscopy referral (which has not yet been created)        Rescheduled: N     If rescheduled:    Date:    Location:    Prep Resent: (changes to prep?)   Covid Test Rescheduled:    Note any change or update to original order/sedation: was CS

## 2022-09-26 NOTE — NURSING NOTE
Chief Complaint   Patient presents with     Blood Draw     Vitals taken, venipuncture labs drawn, checked into next appt     /84 (BP Location: Left arm, Patient Position: Sitting, Cuff Size: Adult Regular)   Pulse 93   Temp 98.1  F (36.7  C) (Oral)   Resp 16   Wt 78.3 kg (172 lb 9.6 oz)   SpO2 100%   BMI 26.62 kg/m    Shashi Rene RN on 9/26/2022 at 8:11 AM

## 2022-09-27 DIAGNOSIS — Z12.11 COLON CANCER SCREENING: Primary | ICD-10-CM

## 2022-09-28 ENCOUNTER — TELEPHONE (OUTPATIENT)
Dept: GASTROENTEROLOGY | Facility: CLINIC | Age: 72
End: 2022-09-28

## 2022-09-28 NOTE — TELEPHONE ENCOUNTER
Caller: Ronna Collins      Procedure: Doubles    Date, Location, and Surgeon of Procedure Cancelled: 10/25,[facility]        Reason for cancel (please be detailed, any staff messages or encounters to note?): Patient request        Rescheduled: y     If rescheduled:    Date: 10/28   Location:    Prep Resent: y(changes to prep?)   Covid Test Rescheduled:    Note any change or update to original order/sedation:

## 2022-10-13 DIAGNOSIS — M89.9 DISORDER OF BONE AND CARTILAGE: ICD-10-CM

## 2022-10-13 DIAGNOSIS — M94.9 DISORDER OF BONE AND CARTILAGE: ICD-10-CM

## 2022-10-13 DIAGNOSIS — C50.121 MALIGNANT NEOPLASM OF CENTRAL PORTION OF RIGHT BREAST IN MALE, ESTROGEN RECEPTOR POSITIVE (H): ICD-10-CM

## 2022-10-13 DIAGNOSIS — Z79.811 LONG TERM (CURRENT) USE OF AROMATASE INHIBITORS: Primary | ICD-10-CM

## 2022-10-13 DIAGNOSIS — Z17.0 MALIGNANT NEOPLASM OF CENTRAL PORTION OF RIGHT BREAST IN MALE, ESTROGEN RECEPTOR POSITIVE (H): ICD-10-CM

## 2022-10-13 DIAGNOSIS — T88.7XXA MEDICATION SIDE EFFECTS: ICD-10-CM

## 2022-10-13 RX ORDER — HEPARIN SODIUM (PORCINE) LOCK FLUSH IV SOLN 100 UNIT/ML 100 UNIT/ML
5 SOLUTION INTRAVENOUS
Status: CANCELLED | OUTPATIENT
Start: 2022-10-24

## 2022-10-13 RX ORDER — EPINEPHRINE 1 MG/ML
0.3 INJECTION, SOLUTION INTRAMUSCULAR; SUBCUTANEOUS EVERY 5 MIN PRN
Status: CANCELLED | OUTPATIENT
Start: 2022-10-24

## 2022-10-13 RX ORDER — METHYLPREDNISOLONE SODIUM SUCCINATE 125 MG/2ML
125 INJECTION, POWDER, LYOPHILIZED, FOR SOLUTION INTRAMUSCULAR; INTRAVENOUS
Status: CANCELLED
Start: 2022-10-24

## 2022-10-13 RX ORDER — HEPARIN SODIUM,PORCINE 10 UNIT/ML
5 VIAL (ML) INTRAVENOUS
Status: CANCELLED | OUTPATIENT
Start: 2022-10-24

## 2022-10-13 RX ORDER — ALBUTEROL SULFATE 0.83 MG/ML
2.5 SOLUTION RESPIRATORY (INHALATION)
Status: CANCELLED | OUTPATIENT
Start: 2022-10-24

## 2022-10-13 RX ORDER — MEPERIDINE HYDROCHLORIDE 25 MG/ML
25 INJECTION INTRAMUSCULAR; INTRAVENOUS; SUBCUTANEOUS EVERY 30 MIN PRN
Status: CANCELLED | OUTPATIENT
Start: 2022-10-24

## 2022-10-13 RX ORDER — ZOLEDRONIC ACID 0.04 MG/ML
4 INJECTION, SOLUTION INTRAVENOUS ONCE
Status: CANCELLED | OUTPATIENT
Start: 2022-10-24 | End: 2022-10-24

## 2022-10-13 RX ORDER — DIPHENHYDRAMINE HYDROCHLORIDE 50 MG/ML
50 INJECTION INTRAMUSCULAR; INTRAVENOUS
Status: CANCELLED
Start: 2022-10-24

## 2022-10-13 RX ORDER — ALBUTEROL SULFATE 90 UG/1
1-2 AEROSOL, METERED RESPIRATORY (INHALATION)
Status: CANCELLED
Start: 2022-10-24

## 2022-10-14 ENCOUNTER — ANCILLARY PROCEDURE (OUTPATIENT)
Dept: BONE DENSITY | Facility: CLINIC | Age: 72
End: 2022-10-14
Attending: INTERNAL MEDICINE
Payer: COMMERCIAL

## 2022-10-14 DIAGNOSIS — M94.9 DISORDER OF BONE AND CARTILAGE: ICD-10-CM

## 2022-10-14 DIAGNOSIS — M89.9 DISORDER OF BONE AND CARTILAGE: ICD-10-CM

## 2022-10-14 DIAGNOSIS — Z79.811 LONG TERM (CURRENT) USE OF AROMATASE INHIBITORS: ICD-10-CM

## 2022-10-14 PROCEDURE — 77080 DXA BONE DENSITY AXIAL: CPT | Performed by: RADIOLOGY

## 2022-10-15 ENCOUNTER — HEALTH MAINTENANCE LETTER (OUTPATIENT)
Age: 72
End: 2022-10-15

## 2022-10-15 DIAGNOSIS — C50.929 MALIGNANT NEOPLASM OF MALE BREAST, UNSPECIFIED ESTROGEN RECEPTOR STATUS, UNSPECIFIED LATERALITY, UNSPECIFIED SITE OF BREAST (H): Primary | ICD-10-CM

## 2022-10-16 DIAGNOSIS — I10 BENIGN ESSENTIAL HYPERTENSION: ICD-10-CM

## 2022-10-17 ENCOUNTER — MYC REFILL (OUTPATIENT)
Dept: FAMILY MEDICINE | Facility: CLINIC | Age: 72
End: 2022-10-17

## 2022-10-17 DIAGNOSIS — I10 BENIGN ESSENTIAL HYPERTENSION: ICD-10-CM

## 2022-10-17 NOTE — TELEPHONE ENCOUNTER
losartan-hydrochlorothiazide (HYZAAR) 100-25 MG tablet      Last Written Prescription Date:  7/15/22  Last Fill Quantity: 90,   # refills: 0  Last Office Visit : 6/14/21  Future Office visit:  None scheduled    Routing refill request to provider for review/approval because:  Received 90 day vincenzo refill with last fill

## 2022-10-18 RX ORDER — LOSARTAN POTASSIUM AND HYDROCHLOROTHIAZIDE 25; 100 MG/1; MG/1
1 TABLET ORAL EVERY MORNING
Qty: 90 TABLET | Refills: 0 | Status: SHIPPED | OUTPATIENT
Start: 2022-10-18 | End: 2023-01-18

## 2022-10-18 NOTE — TELEPHONE ENCOUNTER
Called patient. Due for physical as last visit was 06/2021. Patient schedule annual physical for 11/04/22 at 8:00 AM with PCP Dr Henry.    Shaye Haque, Clinic , 10/18/22 at 7:56 AM

## 2022-10-19 DIAGNOSIS — C50.929 MALIGNANT NEOPLASM OF MALE BREAST, UNSPECIFIED ESTROGEN RECEPTOR STATUS, UNSPECIFIED LATERALITY, UNSPECIFIED SITE OF BREAST (H): Primary | ICD-10-CM

## 2022-10-19 RX ORDER — LETROZOLE 2.5 MG/1
2.5 TABLET, FILM COATED ORAL DAILY
Qty: 28 TABLET | Refills: 0 | Status: SHIPPED | OUTPATIENT
Start: 2022-10-19 | End: 2022-11-29

## 2022-10-19 RX ORDER — LOSARTAN POTASSIUM AND HYDROCHLOROTHIAZIDE 25; 100 MG/1; MG/1
1 TABLET ORAL EVERY MORNING
Qty: 90 TABLET | Refills: 0 | OUTPATIENT
Start: 2022-10-19

## 2022-10-19 NOTE — TELEPHONE ENCOUNTER
losartan-hydrochlorothiazide (HYZAAR) 100-25 MG tablet 90 tablet 0 10/18/2022     Addressed/ refilled in a different encounter 10/18/22

## 2022-10-21 ENCOUNTER — HOSPITAL ENCOUNTER (OUTPATIENT)
Dept: PHYSICAL THERAPY | Facility: CLINIC | Age: 72
Setting detail: THERAPIES SERIES
Discharge: HOME | End: 2022-10-21
Attending: FAMILY MEDICINE
Payer: COMMERCIAL

## 2022-10-21 PROCEDURE — 97140 MANUAL THERAPY 1/> REGIONS: CPT | Mod: GP | Performed by: PHYSICAL THERAPIST

## 2022-10-21 RX ORDER — BISACODYL 5 MG
TABLET, DELAYED RELEASE (ENTERIC COATED) ORAL
Qty: 4 TABLET | Refills: 0 | Status: SHIPPED | OUTPATIENT
Start: 2022-10-21 | End: 2022-11-29

## 2022-10-21 NOTE — PROGRESS NOTES
[facility]    Outpatient Physical Therapy Progress Note  Patient: Ronna Collins  : 1950    Beginning/End Dates of Reporting Period:  22 to 10/21/22    Referring Provider: April Snider, new order on 22 to continue therapy by Dr. 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  Pt is having preventive health colonscopy and endoscopy (saw something on a scan) up coming    Objective Measurements:  Objective Measure: MICHELE  Details: not palpable  Objective Measure: Volume/edema  Details: R 1736mL, L 1642mL 5.4% swelling. Same as 6 weeks ago on both arms, same difference between.  Objective Measure: AROM  Details: R flexion: 148 (passive 154) abduction: 160; L flexion: 158 abduction 170  Objective Measure: UE strength  Details: Continues with personal training  Objective Measure: Chest measurements  Details: under axilla: 96cm; Nipple line 96.9cm     Goals:  Goal Identifier Edema   Goal Description Pt will maintain limb volumes with in 6% of each demonstrating appropriate management of edema as B volume may increase as he continues to strengthen   Target Date 23   Date Met      Progress (detail required for progress note):       Goal Identifier Tissue   Goal Description Pt will be independent with self massage for lymph drainage and fibrotic tissue mobility (cupping) to prevent any loss of ROM (maintain standing AROM flexion >145, abduction >155) to prevent functional deficits.   Target Date 23   Date Met      Progress (detail required for progress note):       Goal Identifier HEP   Goal Description Pt will continue lifelong chest and shoulder stretching program to promote improved mobility and prevent disability.   Target Date 23    Date Met      Progress (detail required for progress note):       Plan:  Changes to therapy plan of care: Pt had met previous goals. Plan of care reviewed today. Pt will continue to benefit form education, exercise, manual therapies, self-care training, and home program for decreased tissue extensibility affecting shoulder mechanics and ROM limiting overhead function as well as chronic lymphedema with risk of exacerbation. New goals have been set at previous goals were met.  Pt to be seen quarterly unless needing to return sooner due to exacerbation or new symptoms that he is unable to manage with home program.  Next visit currently scheduled for 1/20/23     Discharge:  No

## 2022-10-23 DIAGNOSIS — Z17.0 MALIGNANT NEOPLASM OF CENTRAL PORTION OF RIGHT BREAST IN MALE, ESTROGEN RECEPTOR POSITIVE (H): Primary | ICD-10-CM

## 2022-10-23 DIAGNOSIS — C50.121 MALIGNANT NEOPLASM OF CENTRAL PORTION OF RIGHT BREAST IN MALE, ESTROGEN RECEPTOR POSITIVE (H): Primary | ICD-10-CM

## 2022-10-25 ENCOUNTER — INFUSION THERAPY VISIT (OUTPATIENT)
Dept: ONCOLOGY | Facility: CLINIC | Age: 72
End: 2022-10-25
Attending: INTERNAL MEDICINE
Payer: COMMERCIAL

## 2022-10-25 ENCOUNTER — MYC MEDICAL ADVICE (OUTPATIENT)
Dept: ONCOLOGY | Facility: CLINIC | Age: 72
End: 2022-10-25

## 2022-10-25 ENCOUNTER — APPOINTMENT (OUTPATIENT)
Dept: LAB | Facility: CLINIC | Age: 72
End: 2022-10-25
Attending: INTERNAL MEDICINE
Payer: COMMERCIAL

## 2022-10-25 VITALS
WEIGHT: 174 LBS | HEART RATE: 95 BPM | DIASTOLIC BLOOD PRESSURE: 73 MMHG | BODY MASS INDEX: 26.83 KG/M2 | RESPIRATION RATE: 16 BRPM | TEMPERATURE: 98.2 F | SYSTOLIC BLOOD PRESSURE: 121 MMHG | OXYGEN SATURATION: 100 %

## 2022-10-25 DIAGNOSIS — Z79.811 LONG TERM (CURRENT) USE OF AROMATASE INHIBITORS: ICD-10-CM

## 2022-10-25 DIAGNOSIS — T88.7XXA MEDICATION SIDE EFFECTS: ICD-10-CM

## 2022-10-25 DIAGNOSIS — Z17.0 MALIGNANT NEOPLASM OF CENTRAL PORTION OF RIGHT BREAST IN MALE, ESTROGEN RECEPTOR POSITIVE (H): Primary | ICD-10-CM

## 2022-10-25 DIAGNOSIS — M94.9 DISORDER OF BONE AND CARTILAGE: ICD-10-CM

## 2022-10-25 DIAGNOSIS — C50.121 MALIGNANT NEOPLASM OF CENTRAL PORTION OF RIGHT BREAST IN MALE, ESTROGEN RECEPTOR POSITIVE (H): Primary | ICD-10-CM

## 2022-10-25 DIAGNOSIS — M89.9 DISORDER OF BONE AND CARTILAGE: ICD-10-CM

## 2022-10-25 LAB
ALBUMIN SERPL BCG-MCNC: 4.3 G/DL (ref 3.5–5.2)
ALP SERPL-CCNC: 74 U/L (ref 40–129)
ALT SERPL W P-5'-P-CCNC: 24 U/L (ref 10–50)
ANION GAP SERPL CALCULATED.3IONS-SCNC: 14 MMOL/L (ref 7–15)
AST SERPL W P-5'-P-CCNC: 21 U/L (ref 10–50)
BASOPHILS # BLD AUTO: 0 10E3/UL (ref 0–0.2)
BASOPHILS NFR BLD AUTO: 1 %
BILIRUB SERPL-MCNC: 0.3 MG/DL
BUN SERPL-MCNC: 11.2 MG/DL (ref 8–23)
CALCIUM SERPL-MCNC: 10 MG/DL (ref 8.8–10.2)
CHLORIDE SERPL-SCNC: 104 MMOL/L (ref 98–107)
CREAT SERPL-MCNC: 0.81 MG/DL (ref 0.67–1.17)
DEPRECATED HCO3 PLAS-SCNC: 21 MMOL/L (ref 22–29)
EOSINOPHIL # BLD AUTO: 0.2 10E3/UL (ref 0–0.7)
EOSINOPHIL NFR BLD AUTO: 7 %
ERYTHROCYTE [DISTWIDTH] IN BLOOD BY AUTOMATED COUNT: 12.1 % (ref 10–15)
GFR SERPL CREATININE-BSD FRML MDRD: >90 ML/MIN/1.73M2
GLUCOSE SERPL-MCNC: 167 MG/DL (ref 70–99)
HCT VFR BLD AUTO: 33.4 % (ref 40–53)
HGB BLD-MCNC: 11.5 G/DL (ref 13.3–17.7)
IMM GRANULOCYTES # BLD: 0 10E3/UL
IMM GRANULOCYTES NFR BLD: 0 %
LYMPHOCYTES # BLD AUTO: 1 10E3/UL (ref 0.8–5.3)
LYMPHOCYTES NFR BLD AUTO: 29 %
MCH RBC QN AUTO: 31.4 PG (ref 26.5–33)
MCHC RBC AUTO-ENTMCNC: 34.4 G/DL (ref 31.5–36.5)
MCV RBC AUTO: 91 FL (ref 78–100)
MONOCYTES # BLD AUTO: 0.3 10E3/UL (ref 0–1.3)
MONOCYTES NFR BLD AUTO: 8 %
NEUTROPHILS # BLD AUTO: 1.8 10E3/UL (ref 1.6–8.3)
NEUTROPHILS NFR BLD AUTO: 55 %
NRBC # BLD AUTO: 0 10E3/UL
NRBC BLD AUTO-RTO: 0 /100
PLATELET # BLD AUTO: 167 10E3/UL (ref 150–450)
POTASSIUM SERPL-SCNC: 3.7 MMOL/L (ref 3.4–5.3)
PROT SERPL-MCNC: 7.2 G/DL (ref 6.4–8.3)
RBC # BLD AUTO: 3.66 10E6/UL (ref 4.4–5.9)
SODIUM SERPL-SCNC: 139 MMOL/L (ref 136–145)
WBC # BLD AUTO: 3.3 10E3/UL (ref 4–11)

## 2022-10-25 PROCEDURE — 96402 CHEMO HORMON ANTINEOPL SQ/IM: CPT

## 2022-10-25 PROCEDURE — 85025 COMPLETE CBC W/AUTO DIFF WBC: CPT

## 2022-10-25 PROCEDURE — 80053 COMPREHEN METABOLIC PANEL: CPT

## 2022-10-25 PROCEDURE — 258N000003 HC RX IP 258 OP 636: Performed by: INTERNAL MEDICINE

## 2022-10-25 PROCEDURE — 36415 COLL VENOUS BLD VENIPUNCTURE: CPT

## 2022-10-25 PROCEDURE — 96365 THER/PROPH/DIAG IV INF INIT: CPT

## 2022-10-25 PROCEDURE — 250N000011 HC RX IP 250 OP 636: Performed by: INTERNAL MEDICINE

## 2022-10-25 RX ORDER — DIPHENHYDRAMINE HYDROCHLORIDE 50 MG/ML
50 INJECTION INTRAMUSCULAR; INTRAVENOUS
Status: CANCELLED
Start: 2023-04-23

## 2022-10-25 RX ORDER — ZOLEDRONIC ACID 0.04 MG/ML
4 INJECTION, SOLUTION INTRAVENOUS ONCE
Status: COMPLETED | OUTPATIENT
Start: 2022-10-25 | End: 2022-10-25

## 2022-10-25 RX ORDER — ALBUTEROL SULFATE 0.83 MG/ML
2.5 SOLUTION RESPIRATORY (INHALATION)
Status: CANCELLED | OUTPATIENT
Start: 2023-04-23

## 2022-10-25 RX ORDER — EPINEPHRINE 1 MG/ML
0.3 INJECTION, SOLUTION INTRAMUSCULAR; SUBCUTANEOUS EVERY 5 MIN PRN
Status: CANCELLED | OUTPATIENT
Start: 2023-04-23

## 2022-10-25 RX ORDER — ZOLEDRONIC ACID 0.04 MG/ML
4 INJECTION, SOLUTION INTRAVENOUS ONCE
Status: CANCELLED | OUTPATIENT
Start: 2023-04-23 | End: 2023-04-23

## 2022-10-25 RX ORDER — METHYLPREDNISOLONE SODIUM SUCCINATE 125 MG/2ML
125 INJECTION, POWDER, LYOPHILIZED, FOR SOLUTION INTRAMUSCULAR; INTRAVENOUS
Status: CANCELLED
Start: 2023-04-23

## 2022-10-25 RX ORDER — MEPERIDINE HYDROCHLORIDE 25 MG/ML
25 INJECTION INTRAMUSCULAR; INTRAVENOUS; SUBCUTANEOUS EVERY 30 MIN PRN
Status: CANCELLED | OUTPATIENT
Start: 2023-04-23

## 2022-10-25 RX ORDER — ALBUTEROL SULFATE 90 UG/1
1-2 AEROSOL, METERED RESPIRATORY (INHALATION)
Status: CANCELLED
Start: 2023-04-23

## 2022-10-25 RX ORDER — HEPARIN SODIUM (PORCINE) LOCK FLUSH IV SOLN 100 UNIT/ML 100 UNIT/ML
5 SOLUTION INTRAVENOUS
Status: CANCELLED | OUTPATIENT
Start: 2023-04-23

## 2022-10-25 RX ORDER — HEPARIN SODIUM,PORCINE 10 UNIT/ML
5 VIAL (ML) INTRAVENOUS
Status: CANCELLED | OUTPATIENT
Start: 2023-04-23

## 2022-10-25 RX ADMIN — ZOLEDRONIC ACID 4 MG: 0.04 INJECTION, SOLUTION INTRAVENOUS at 08:31

## 2022-10-25 RX ADMIN — LEUPROLIDE ACETATE 7.5 MG: KIT at 08:28

## 2022-10-25 RX ADMIN — SODIUM CHLORIDE 250 ML: 9 INJECTION, SOLUTION INTRAVENOUS at 08:31

## 2022-10-25 ASSESSMENT — PAIN SCALES - GENERAL: PAINLEVEL: NO PAIN (0)

## 2022-10-25 NOTE — PROGRESS NOTES
Infusion Nursing Note:  Ronna Collins presents today for New zometa and Lupron.    Patient seen by provider today: No   present during visit today: Not Applicable.    Note: Patient states he has received information on new zometa today, confirms he is taking calcium with vitamin D supplements. Has been feeling well at home, denies concerns today.    Intravenous Access:  Peripheral IV placed.    Treatment Conditions:  Lab Results   Component Value Date    HGB 11.5 (L) 10/25/2022    WBC 3.3 (L) 10/25/2022    ANEU 1.7 05/24/2022    ANEUTAUTO 1.8 10/25/2022     10/25/2022      Lab Results   Component Value Date     10/25/2022    POTASSIUM 3.7 10/25/2022    MAG 2.3 06/22/2021    CR 0.81 10/25/2022    PROSPER 10.0 10/25/2022    BILITOTAL 0.3 10/25/2022    ALBUMIN 4.3 10/25/2022    ALT 24 10/25/2022    AST 21 10/25/2022     Results reviewed, labs MET treatment parameters, ok to proceed with treatment.    Post Infusion Assessment:  Patient tolerated infusion without incident.  Patient tolerated injection without incident to RIGHT ventrogluteal site.  Access discontinued per protocol.     Discharge Plan:   Patient declined prescription refills.  AVS to patient via [employer].  Patient will return 11/15 for next appointment.   Patient discharged in stable condition accompanied by: self.  Departure Mode: Ambulatory.  Face to Face time: 0.      Lola King RN

## 2022-10-27 ENCOUNTER — ANESTHESIA EVENT (OUTPATIENT)
Dept: SURGERY | Facility: AMBULATORY SURGERY CENTER | Age: 72
End: 2022-10-27
Payer: COMMERCIAL

## 2022-10-28 ENCOUNTER — ANESTHESIA (OUTPATIENT)
Dept: SURGERY | Facility: AMBULATORY SURGERY CENTER | Age: 72
End: 2022-10-28
Payer: COMMERCIAL

## 2022-10-28 ENCOUNTER — HOSPITAL ENCOUNTER (OUTPATIENT)
Facility: AMBULATORY SURGERY CENTER | Age: 72
Discharge: HOME | End: 2022-10-28
Attending: INTERNAL MEDICINE
Payer: COMMERCIAL

## 2022-10-28 VITALS
DIASTOLIC BLOOD PRESSURE: 83 MMHG | OXYGEN SATURATION: 98 % | SYSTOLIC BLOOD PRESSURE: 125 MMHG | TEMPERATURE: 97 F | HEART RATE: 78 BPM | RESPIRATION RATE: 16 BRPM

## 2022-10-28 VITALS — HEART RATE: 72 BPM

## 2022-10-28 DIAGNOSIS — K26.9 DUODENAL ULCER: Primary | ICD-10-CM

## 2022-10-28 DIAGNOSIS — Z12.11 COLON CANCER SCREENING: ICD-10-CM

## 2022-10-28 LAB
COLONOSCOPY: NORMAL
UPPER GI ENDOSCOPY: NORMAL

## 2022-10-28 PROCEDURE — 88312 SPECIAL STAINS GROUP 1: CPT | Performed by: PATHOLOGY

## 2022-10-28 PROCEDURE — G8918 PT W/O PREOP ORDER IV AB PRO: HCPCS

## 2022-10-28 PROCEDURE — 45378 DIAGNOSTIC COLONOSCOPY: CPT

## 2022-10-28 PROCEDURE — G8907 PT DOC NO EVENTS ON DISCHARG: HCPCS

## 2022-10-28 PROCEDURE — 88342 IMHCHEM/IMCYTCHM 1ST ANTB: CPT | Performed by: PATHOLOGY

## 2022-10-28 PROCEDURE — 88305 TISSUE EXAM BY PATHOLOGIST: CPT | Performed by: PATHOLOGY

## 2022-10-28 PROCEDURE — 43239 EGD BIOPSY SINGLE/MULTIPLE: CPT

## 2022-10-28 RX ORDER — PROPOFOL 10 MG/ML
INJECTION, EMULSION INTRAVENOUS CONTINUOUS PRN
Status: DISCONTINUED | OUTPATIENT
Start: 2022-10-28 | End: 2022-10-28

## 2022-10-28 RX ORDER — ONDANSETRON 2 MG/ML
4 INJECTION INTRAMUSCULAR; INTRAVENOUS EVERY 6 HOURS PRN
Status: DISCONTINUED | OUTPATIENT
Start: 2022-10-28 | End: 2022-10-29 | Stop reason: HOSPADM

## 2022-10-28 RX ORDER — ONDANSETRON 4 MG/1
4 TABLET, ORALLY DISINTEGRATING ORAL EVERY 6 HOURS PRN
Status: DISCONTINUED | OUTPATIENT
Start: 2022-10-28 | End: 2022-10-29 | Stop reason: HOSPADM

## 2022-10-28 RX ORDER — SODIUM CHLORIDE, SODIUM LACTATE, POTASSIUM CHLORIDE, CALCIUM CHLORIDE 600; 310; 30; 20 MG/100ML; MG/100ML; MG/100ML; MG/100ML
INJECTION, SOLUTION INTRAVENOUS CONTINUOUS
Status: DISCONTINUED | OUTPATIENT
Start: 2022-10-28 | End: 2022-10-29 | Stop reason: HOSPADM

## 2022-10-28 RX ORDER — LIDOCAINE HYDROCHLORIDE 20 MG/ML
INJECTION, SOLUTION INFILTRATION; PERINEURAL PRN
Status: DISCONTINUED | OUTPATIENT
Start: 2022-10-28 | End: 2022-10-28

## 2022-10-28 RX ORDER — MEPERIDINE HYDROCHLORIDE 25 MG/ML
12.5 INJECTION INTRAMUSCULAR; INTRAVENOUS; SUBCUTANEOUS
Status: DISCONTINUED | OUTPATIENT
Start: 2022-10-28 | End: 2022-10-29 | Stop reason: HOSPADM

## 2022-10-28 RX ORDER — ONDANSETRON 2 MG/ML
4 INJECTION INTRAMUSCULAR; INTRAVENOUS
Status: DISCONTINUED | OUTPATIENT
Start: 2022-10-28 | End: 2022-10-29 | Stop reason: HOSPADM

## 2022-10-28 RX ORDER — NALOXONE HYDROCHLORIDE 0.4 MG/ML
0.2 INJECTION, SOLUTION INTRAMUSCULAR; INTRAVENOUS; SUBCUTANEOUS
Status: DISCONTINUED | OUTPATIENT
Start: 2022-10-28 | End: 2022-10-29 | Stop reason: HOSPADM

## 2022-10-28 RX ORDER — FLUMAZENIL 0.1 MG/ML
0.2 INJECTION, SOLUTION INTRAVENOUS
Status: ACTIVE | OUTPATIENT
Start: 2022-10-28 | End: 2022-10-28

## 2022-10-28 RX ORDER — PROCHLORPERAZINE MALEATE 5 MG
5 TABLET ORAL EVERY 6 HOURS PRN
Status: DISCONTINUED | OUTPATIENT
Start: 2022-10-28 | End: 2022-10-29 | Stop reason: HOSPADM

## 2022-10-28 RX ORDER — PROPOFOL 10 MG/ML
INJECTION, EMULSION INTRAVENOUS PRN
Status: DISCONTINUED | OUTPATIENT
Start: 2022-10-28 | End: 2022-10-28

## 2022-10-28 RX ORDER — NALOXONE HYDROCHLORIDE 0.4 MG/ML
0.4 INJECTION, SOLUTION INTRAMUSCULAR; INTRAVENOUS; SUBCUTANEOUS
Status: DISCONTINUED | OUTPATIENT
Start: 2022-10-28 | End: 2022-10-29 | Stop reason: HOSPADM

## 2022-10-28 RX ORDER — LIDOCAINE 40 MG/G
CREAM TOPICAL
Status: DISCONTINUED | OUTPATIENT
Start: 2022-10-28 | End: 2022-10-29 | Stop reason: HOSPADM

## 2022-10-28 RX ORDER — ONDANSETRON 2 MG/ML
4 INJECTION INTRAMUSCULAR; INTRAVENOUS EVERY 30 MIN PRN
Status: DISCONTINUED | OUTPATIENT
Start: 2022-10-28 | End: 2022-10-29 | Stop reason: HOSPADM

## 2022-10-28 RX ORDER — ONDANSETRON 4 MG/1
4 TABLET, ORALLY DISINTEGRATING ORAL EVERY 30 MIN PRN
Status: DISCONTINUED | OUTPATIENT
Start: 2022-10-28 | End: 2022-10-29 | Stop reason: HOSPADM

## 2022-10-28 RX ADMIN — PROPOFOL 150 MCG/KG/MIN: 10 INJECTION, EMULSION INTRAVENOUS at 10:30

## 2022-10-28 RX ADMIN — LIDOCAINE HYDROCHLORIDE 100 MG: 20 INJECTION, SOLUTION INFILTRATION; PERINEURAL at 10:30

## 2022-10-28 RX ADMIN — PROPOFOL 100 MG: 10 INJECTION, EMULSION INTRAVENOUS at 10:30

## 2022-10-28 RX ADMIN — Medication 100 MCG: at 10:47

## 2022-10-28 RX ADMIN — SODIUM CHLORIDE, SODIUM LACTATE, POTASSIUM CHLORIDE, CALCIUM CHLORIDE: 600; 310; 30; 20 INJECTION, SOLUTION INTRAVENOUS at 10:24

## 2022-10-28 ASSESSMENT — LIFESTYLE VARIABLES: TOBACCO_USE: 1

## 2022-10-28 NOTE — ANESTHESIA PREPROCEDURE EVALUATION
Anesthesia Pre-Procedure Evaluation    Patient: Ronna Collins   MRN: [id number] : 1950        Procedure : Procedure(s):  COLONOSCOPY, WITH CO2 INSUFFLATION  ESOPHAGOGASTRODUODENOSCOPY, WITH CO2 INSUFFLATION          Past Medical History:   Diagnosis Date     Breast cancer (H) 2021     Chronic sinusitis      Hypertension       Past Surgical History:   Procedure Laterality Date     DISSECT LYMPH NODE AXILLA Right 10/27/2021    Procedure: RIGHT Axillary Lymph Node Dissection;  Surgeon: Nida Snider MD;  Location: [facility] OR     MASTECTOMY SIMPLE Right 10/27/2021    Procedure: RIGHT Simple Mastectomy;  Surgeon: Nida Snider MD;  Location: [facility] OR      No Known Allergies   Social History     Tobacco Use     Smoking status: Former     Packs/day: 0.50     Years: 20.00     Pack years: 10.00     Types: Cigarettes     Start date: 1976     Quit date: 2000     Years since quittin.1     Smokeless tobacco: Never   Substance Use Topics     Alcohol use: Yes     Comment: occ      Wt Readings from Last 1 Encounters:   10/25/22 78.9 kg (174 lb)        Anesthesia Evaluation   Pt has had prior anesthetic. Type: General.        ROS/MED HX  ENT/Pulmonary:  - neg pulmonary ROS   (+) tobacco use, Past use, 10  Pack-Year Hx,      Neurologic:  - neg neurologic ROS     Cardiovascular:     (+) Dyslipidemia hypertension-----    METS/Exercise Tolerance:     Hematologic:  - neg hematologic  ROS     Musculoskeletal:  - neg musculoskeletal ROS     GI/Hepatic:  - neg GI/hepatic ROS     Renal/Genitourinary:  - neg Renal ROS     Endo:  - neg endo ROS     Psychiatric/Substance Use:  - neg psychiatric ROS     Infectious Disease:  - neg infectious disease ROS     Malignancy:  - neg malignancy ROS     Other:  - neg other ROS          Physical Exam    Airway  airway exam normal           Respiratory Devices and Support         Dental  no notable dental history         Cardiovascular   cardiovascular exam normal           Pulmonary   pulmonary exam normal                OUTSIDE LABS:  CBC:   Lab Results   Component Value Date    WBC 3.3 (L) 10/25/2022    WBC 2.8 (L) 09/26/2022    HGB 11.5 (L) 10/25/2022    HGB 11.7 (L) 09/26/2022    HCT 33.4 (L) 10/25/2022    HCT 34.0 (L) 09/26/2022     10/25/2022     09/26/2022     BMP:   Lab Results   Component Value Date     10/25/2022     09/26/2022    POTASSIUM 3.7 10/25/2022    POTASSIUM 3.9 09/26/2022    CHLORIDE 104 10/25/2022    CHLORIDE 101 09/26/2022    CO2 21 (L) 10/25/2022    CO2 23 09/26/2022    BUN 11.2 10/25/2022    BUN 15.7 09/26/2022    CR 0.81 10/25/2022    CR 0.89 09/26/2022     (H) 10/25/2022     (H) 09/26/2022     COAGS:   Lab Results   Component Value Date    PTT 44 (H) 06/14/2021    INR 1.12 06/14/2021     POC: No results found for: BGM, HCG, HCGS  HEPATIC:   Lab Results   Component Value Date    ALBUMIN 4.3 10/25/2022    PROTTOTAL 7.2 10/25/2022    ALT 24 10/25/2022    AST 21 10/25/2022    GGT 28 06/23/2021    ALKPHOS 74 10/25/2022    BILITOTAL 0.3 10/25/2022     OTHER:   Lab Results   Component Value Date    A1C 6.2 (H) 10/18/2021    PROSPER 10.0 10/25/2022    MAG 2.3 06/22/2021    TSH 1.03 06/14/2021    T4 1.18 06/14/2021    CRP <2.9 03/16/2022    SED 8 03/16/2022       Anesthesia Plan    ASA Status:  3   NPO Status:  NPO Appropriate    Anesthesia Type: MAC.     - Reason for MAC: chronic cardiopulmonary disease   Induction: Intravenous, Propofol.   Maintenance: TIVA.        Consents    Anesthesia Plan(s) and associated risks, benefits, and realistic alternatives discussed. Questions answered and patient/representative(s) expressed understanding.    - Discussed:     - Discussed with:  Patient      - Extended Intubation/Ventilatory Support Discussed: No.      - Patient is DNR/DNI Status: No    Use of blood products discussed: No .     Postoperative Care    Post procedure pain management: None required.   PONV prophylaxis:  Ondansetron (or other 5HT-3), Background Propofol Infusion     Comments:           H&P reviewed: Unable to attach H&P to encounter due to EHR limitations. H&P Update: appropriate H&P reviewed, patient examined. No interval changes since H&P (within 30 days).         Harjit Watson MD

## 2022-10-28 NOTE — ANESTHESIA CARE TRANSFER NOTE
Patient: Ronna Collins    Procedure: Procedure(s):  COLONOSCOPY, WITH CO2 INSUFFLATION  ESOPHAGOGASTRODUODENOSCOPY, WITH CO2 INSUFFLATION  ESOPHAGOGASTRODUODENOSCOPY, WITH BIOPSY       Diagnosis: Patulous lower esophageal sphincter [K22.89]  Diagnosis Additional Information: No value filed.    Anesthesia Type:   MAC     Note:    Oropharynx: oropharynx clear of all foreign objects  Level of Consciousness: drowsy  Oxygen Supplementation: room air    Independent Airway: airway patency satisfactory and stable  Dentition: dentition unchanged  Vital Signs Stable: post-procedure vital signs reviewed and stable  Report to RN Given: handoff report given  Patient transferred to: Phase II    Handoff Report: Identifed the Patient, Identified the Reponsible Provider, Reviewed the pertinent medical history, Discussed the surgical course, Reviewed Intra-OP anesthesia mangement and issues during anesthesia, Set expectations for post-procedure period and Allowed opportunity for questions and acknowledgement of understanding      Vitals:  Vitals Value Taken Time   BP     Temp     Pulse     Resp     SpO2         Electronically Signed By: ZINA Gomez CRNA  October 28, 2022  11:19 AM

## 2022-10-28 NOTE — H&P
[facility] Anesthesia Pre-op History and Physical    Ronna Collins MRN# [id number]   Age: 72 year old YOB: 1950            Date of Exam 10/28/2022         Primary care provider: Stewart Henry         Chief Complaint and/or Reason for Procedure:     CRC screening.  Patulous esophagus on CT - no dysphagia - some belching which is new.         Active problem list:     Patient Active Problem List    Diagnosis Date Noted     Long term (current) use of aromatase inhibitors 10/13/2022     Priority: Medium     Disorder of bone and cartilage 10/13/2022     Priority: Medium     Medication side effects 10/13/2022     Priority: Medium     Lumbar radiculopathy 07/07/2022     Priority: Medium     Malignant neoplasm of male breast (H) 06/04/2021     Priority: Medium     Check 11.22 Remberto       HTN (hypertension) 12/29/2014     Priority: Medium     High triglycerides 09/24/2012     Priority: Medium            Medications (include herbals and vitamins):   Any Plavix use in the last 7 days? No     Current Outpatient Medications   Medication Sig     abemaciclib (VERZENIO) 150 MG tablet Take 1 tablet (150 mg) by mouth 2 times daily for 28 days     bisacodyl (DULCOLAX) 5 MG EC tablet Take 2 tablets at 3 pm the day before your procedure. If your procedure is before 11 am, take 2 additional tablets at 11 pm. If your procedure is after 11 am, take 2 additional tablets at 6 am. For additional instructions refer to your colonoscopy prep instructions.     gabapentin (NEURONTIN) 300 MG capsule Take 1 capsule (300 mg) by mouth At Bedtime     letrozole (FEMARA) 2.5 MG tablet Take 1 tablet (2.5 mg) by mouth daily for 28 days     loperamide (IMODIUM) 2 MG capsule Start with 2 caps (4 mg), then take one cap (2 mg) after each diarrheal stool as needed. Do not use more than 8 caps (16 mg) per day.     losartan-hydrochlorothiazide (HYZAAR) 100-25 MG tablet Take 1 tablet by mouth every morning Please keep the  appointment on 11/4/22.     MELATONIN PO      Naproxen Sodium (ALEVE PO)      ondansetron (ZOFRAN) 8 MG tablet Take 1 tablet (8 mg) by mouth every 8 hours as needed for nausea     polyethylene glycol (GOLYTELY) 236 g suspension The night before the exam at 6 pm drink an 8-ounce glass every 15 minutes until the jug is half empty. If you arrive before 11 AM: Drink the other half of the Golytely jug at 11 PM night before procedure. If you arrive after 11 AM: Drink the other half of the Golytely jug at 6 AM day of procedure. For additional instructions refer to your colonoscopy prep instructions.     zolpidem (AMBIEN) 5 MG tablet Take 1 tablet (5 mg) by mouth nightly as needed for sleep     abemaciclib (VERZENIO) 150 MG tablet Take 1 tablet (150 mg) by mouth 2 times daily for 28 days     letrozole (FEMARA) 2.5 MG tablet Take 1 tablet (2.5 mg) by mouth daily for 28 days     letrozole (FEMARA) 2.5 MG tablet Take 1 tablet (2.5 mg) by mouth daily for 28 days (Patient not taking: Reported on 9/12/2022)     letrozole (FEMARA) 2.5 MG tablet Take 1 tablet (2.5 mg) by mouth daily for 28 days     letrozole (FEMARA) 2.5 MG tablet Take 1 tablet (2.5 mg) by mouth daily for 28 days     prochlorperazine (COMPAZINE) 10 MG tablet Take 0.5 tablets (5 mg) by mouth every 6 hours as needed for nausea or vomiting (Patient not taking: Reported on 6/28/2022)     Current Facility-Administered Medications   Medication     lactated ringers infusion     lidocaine (LMX4) kit     lidocaine (LMX4) kit     lidocaine 1 % 0.1-1 mL     lidocaine 1 % 0.1-1 mL     ondansetron (ZOFRAN) injection 4 mg     sodium chloride (PF) 0.9% PF flush 3 mL     sodium chloride (PF) 0.9% PF flush 3 mL     sodium chloride (PF) 0.9% PF flush 3 mL     sodium chloride (PF) 0.9% PF flush 3 mL             Allergies:    No Known Allergies  Allergy to Latex? No  Allergy to tape?   No  Intolerances:             Physical Exam:   All vitals have been reviewed  Patient Vitals for the  past 8 hrs:   BP Temp Temp src Pulse Resp SpO2   10/28/22 1013 107/83 97  F (36.1  C) Temporal 97 16 98 %     No intake/output data recorded.  Lungs:   No increased work of breathing, good air exchange, clear to auscultation bilaterally, no crackles or wheezing     Cardiovascular:   normal S1 and S2             Lab / Radiology Results:            Anesthetic risk and/or ASA classification:     2  Annemarie Noel DO

## 2022-10-31 NOTE — ANESTHESIA POSTPROCEDURE EVALUATION
Patient: Ronna Collins    Procedure: Procedure(s):  COLONOSCOPY, WITH CO2 INSUFFLATION  ESOPHAGOGASTRODUODENOSCOPY, WITH CO2 INSUFFLATION  ESOPHAGOGASTRODUODENOSCOPY, WITH BIOPSY       Anesthesia Type:  MAC    Note:  Disposition: Outpatient   Postop Pain Control: Uneventful            Sign Out: Well controlled pain   PONV: No   Neuro/Psych: Uneventful            Sign Out: Acceptable/Baseline neuro status   Airway/Respiratory: Uneventful            Sign Out: Acceptable/Baseline resp. status   CV/Hemodynamics: Uneventful            Sign Out: Acceptable CV status; No obvious hypovolemia; No obvious fluid overload   Other NRE: NONE   DID A NON-ROUTINE EVENT OCCUR? No           Last vitals:  Vitals Value Taken Time   /83 10/28/22 1140   Temp     Pulse 78 10/28/22 1140   Resp 16 10/28/22 1140   SpO2 98 % 10/28/22 1140       Electronically Signed By: Harjit Watson MD  October 31, 2022  7:03 AM

## 2022-11-03 LAB
PATH REPORT.ADDENDUM SPEC: NORMAL
PATH REPORT.COMMENTS IMP SPEC: NORMAL
PATH REPORT.COMMENTS IMP SPEC: NORMAL
PATH REPORT.FINAL DX SPEC: NORMAL
PATH REPORT.GROSS SPEC: NORMAL
PATH REPORT.MICROSCOPIC SPEC OTHER STN: NORMAL
PATH REPORT.RELEVANT HX SPEC: NORMAL
PHOTO IMAGE: NORMAL

## 2022-11-04 ENCOUNTER — ONCOLOGY VISIT (OUTPATIENT)
Dept: ONCOLOGY | Facility: CLINIC | Age: 72
End: 2022-11-04
Attending: STUDENT IN AN ORGANIZED HEALTH CARE EDUCATION/TRAINING PROGRAM
Payer: COMMERCIAL

## 2022-11-04 ENCOUNTER — OFFICE VISIT (OUTPATIENT)
Dept: FAMILY MEDICINE | Facility: CLINIC | Age: 72
End: 2022-11-04
Payer: COMMERCIAL

## 2022-11-04 VITALS
DIASTOLIC BLOOD PRESSURE: 89 MMHG | TEMPERATURE: 98 F | WEIGHT: 173.06 LBS | OXYGEN SATURATION: 99 % | HEART RATE: 83 BPM | RESPIRATION RATE: 16 BRPM | HEIGHT: 68 IN | SYSTOLIC BLOOD PRESSURE: 133 MMHG | BODY MASS INDEX: 26.23 KG/M2

## 2022-11-04 VITALS
SYSTOLIC BLOOD PRESSURE: 133 MMHG | HEIGHT: 68 IN | DIASTOLIC BLOOD PRESSURE: 89 MMHG | WEIGHT: 173.1 LBS | BODY MASS INDEX: 26.24 KG/M2 | HEART RATE: 83 BPM | OXYGEN SATURATION: 99 %

## 2022-11-04 DIAGNOSIS — H61.23 EXCESSIVE CERUMEN IN BOTH EAR CANALS: ICD-10-CM

## 2022-11-04 DIAGNOSIS — Z17.0 MALIGNANT NEOPLASM OF CENTRAL PORTION OF RIGHT BREAST IN MALE, ESTROGEN RECEPTOR POSITIVE (H): Primary | ICD-10-CM

## 2022-11-04 DIAGNOSIS — C50.021: ICD-10-CM

## 2022-11-04 DIAGNOSIS — I89.0 LYMPHEDEMA OF RIGHT UPPER EXTREMITY: ICD-10-CM

## 2022-11-04 DIAGNOSIS — C50.121 MALIGNANT NEOPLASM OF CENTRAL PORTION OF RIGHT BREAST IN MALE, ESTROGEN RECEPTOR POSITIVE (H): Primary | ICD-10-CM

## 2022-11-04 DIAGNOSIS — R93.41 ABNORMAL CT SCAN, BLADDER: Primary | ICD-10-CM

## 2022-11-04 DIAGNOSIS — K21.9 GASTROESOPHAGEAL REFLUX DISEASE WITHOUT ESOPHAGITIS: ICD-10-CM

## 2022-11-04 DIAGNOSIS — I10 BENIGN ESSENTIAL HYPERTENSION: ICD-10-CM

## 2022-11-04 LAB
ALBUMIN UR-MCNC: NEGATIVE MG/DL
APPEARANCE UR: CLEAR
BILIRUB UR QL STRIP: NEGATIVE
COLOR UR AUTO: YELLOW
GLUCOSE UR STRIP-MCNC: NEGATIVE MG/DL
HGB UR QL STRIP: NEGATIVE
HYALINE CASTS: 4 /LPF
KETONES UR STRIP-MCNC: NEGATIVE MG/DL
LEUKOCYTE ESTERASE UR QL STRIP: NEGATIVE
NITRATE UR QL: NEGATIVE
PH UR STRIP: 6.5 [PH] (ref 5–7)
RBC URINE: 1 /HPF
SP GR UR STRIP: 1.02 (ref 1–1.03)
UROBILINOGEN UR STRIP-MCNC: NORMAL MG/DL
WBC URINE: 1 /HPF

## 2022-11-04 PROCEDURE — G0463 HOSPITAL OUTPT CLINIC VISIT: HCPCS

## 2022-11-04 PROCEDURE — 69209 REMOVE IMPACTED EAR WAX UNI: CPT | Mod: 50 | Performed by: FAMILY MEDICINE

## 2022-11-04 PROCEDURE — 99397 PER PM REEVAL EST PAT 65+ YR: CPT | Mod: 25 | Performed by: FAMILY MEDICINE

## 2022-11-04 PROCEDURE — 81001 URINALYSIS AUTO W/SCOPE: CPT | Performed by: PATHOLOGY

## 2022-11-04 PROCEDURE — 99215 OFFICE O/P EST HI 40 MIN: CPT | Performed by: STUDENT IN AN ORGANIZED HEALTH CARE EDUCATION/TRAINING PROGRAM

## 2022-11-04 RX ORDER — FAMOTIDINE 40 MG/1
40 TABLET, FILM COATED ORAL DAILY
Qty: 90 TABLET | Refills: 3 | Status: SHIPPED | OUTPATIENT
Start: 2022-11-04 | End: 2023-11-21

## 2022-11-04 ASSESSMENT — PAIN SCALES - GENERAL: PAINLEVEL: NO PAIN (0)

## 2022-11-04 NOTE — LETTER
11/4/2022         RE: Ronna Collins  [address]  [facility] [address]        Dear Colleague,    Thank you for referring your patient, Ronna Collins, to the [facility]. Please see a copy of my visit note below.    [facility]   PM&R clinic note        Interval history:     Ronna Collins presents to clinic today for follow up reg his rehab needs.   He has h/o  right breast cancer (clinical prognostic stage IIIb, [id number], grade 2, ER positive, OR positive, HER-2 negative), now s/p right breast mastectomy and right axillary lymph node dissection (10/27/2021).   Was last seen in clinic on 6/15/22.  Recommendations included   2.           Therapy/equipment/braces: Orthotics referral (for [facility]) placed so that he can be re-fitted for RUE and gauntlet compression garments and any adjustments in anticipation of his trip in August to [facility]. Encouraged him to continue to wear compression garments every day. Re-implement regular MLD techniques and stretching. Continue physical therapy program.  3. For right hip pain, take anti-inflammatory- Aleve or Ibuprofen scheduled for a few days, then as needed. Can also use Voltaren gel QID prn for pain relief. Likely arthralgias/pain from current chemotherapy regimen and training combined.  4. Continue to monitor weight. Would recommend starting 1-2 protein shakes daily (30-60 grams) in addition to diet. Discussed Premier protein or [employer]life Corepower shakes as options.   5. Interventions: Monitor for s/s of cellulitis. If there are concerns, he should call the clinic or go to urgent care to be evaluated.   8. Referral / follow up with other providers: None  9. Follow up: RTC in 4-6 months.    ONCOLOGIC HISTORY  - First noticed discomfort and hardening of the skin surrounding right nipple in late April/early May 2021. Noted seborrheic keratosis of the right nipple, and had similar  lesion of the arm 3 months earlier. Subsequently noted a mass at the same site.  - Right breast skin punch biopsy performed by derm was c/w grade 2 invasive ductal carcinoma w/ associated DCIS. Invasive carcinoma was ER positive 100% and ID positive 70%, w/ tumor invading the dermis. HER-2 negative by IHC (score 1+).   - Diagnostic mammogram and ultrasound showed a retroareolar right breast mass measuring 2.9cm w/ associated nipple retraction and enlarged, abnormal right axillary lymph nodes. Right breast biopsy was again, c/w grade 2 invasive ductal carcinoma, ER strong in 98% of tumor cell nuclei, HER-2 negative, Ki-67 was 15%.    - Elected to screen for the ISPY-2 clinical trial.  - 6/18/2021 MRI breast showed biopsy proven right breast cancer to measure 3.9 cm in max dimension w/ invasion of the nipple and pectoralis muscle as well as at least 4 abnormal level 1 and 2 right axillary lymph nodes and a tiny 0/4 cm right internal mammary lymph node. Mammoprint returned low risk w/ a score of +0.29.   - Elected for tx on the endocrine optimization protocol of ISPY-2, and was randomized to tx w/ amcenestrant. Was on amcenestrant from 7/1/2021-10/26/2021.  - 10/27/2021 underwent right breast mastectomy and right axillary lymph node dissection w/ Dr. Snider. Pathology showed grade 2 carcinoma of the right breast measuring 3.2 cm w/ invasion of the dermis, nipple, and the pectoralis muscle. Ki-67 of the excised tumor was 7%. 15/44 right axillary lymph nodes were positive w/ 6 of the lymph nodes demonstrating extranodal extension. Largest lymph node metastasis measured 2.1 cm.   - 12/14/2021 began tx w/ Taxotere and cyclophosphamide.  - 1/5/2021 C2 given. Had significant fever following cycle 2.  - 1/26/2021 C3 given  - 2/16/2022 C4 given   - Plan is to start adjuvant radiation soon. Heme/onc planning to initiate tx w/ letrozole at the same time as radiation and abemaciclib upon completion of radiation. Plan to administer  abemaciclib for 2 years. Letrozole for a total of 10 years.   - Starting proton beam radiation 3/21/2022. 15 days over 3 weeks at the [facility].   - Saw Nay Blandon for follow up visit on 6/7/22. Patient continues on Verzino. Has trip to [place] coming up for conference, and trip to [facility] in August. Also on treatment with lupron and letrozol. Plan for 2 years of treatment with Verzino. Working with PT and recently started survival to strength program. New back and hip pain, likely secondary to increase in exercise.  - Saw Dr. Sr on 9/12/22 for follow up. Continues on treatment with abemaciclib, plan to continue for total of 2 years. CT C/A/P without lymphadenopathy or abnormalities.      Symptoms,  Patient was seen today for a return visit. He has been doing very well and overall his measurements have been stable in right upper extremity. He continues to follow with Renetta Dempsey, PT for lymphedema management. He continues to wear his right compression sleeve daily. He denies any pain or discomfort and feels like his right shoulder ROM has also been stable and full upon demonstration in clinic today. Denies any other concerns.      Therapies/HEP,  Continues with outpatient lymphedema therapy.      Functionally,   No changes.      Social history is unchanged.        Medications:  Current Outpatient Medications   Medication Sig Dispense Refill     abemaciclib (VERZENIO) 150 MG tablet Take 1 tablet (150 mg) by mouth 2 times daily for 28 days 56 tablet 0     abemaciclib (VERZENIO) 150 MG tablet Take 1 tablet (150 mg) by mouth 2 times daily for 28 days 56 tablet 0     famotidine (PEPCID) 40 MG tablet Take 1 tablet (40 mg) by mouth daily 90 tablet 3     letrozole (FEMARA) 2.5 MG tablet Take 1 tablet (2.5 mg) by mouth daily for 28 days 28 tablet 0     letrozole (FEMARA) 2.5 MG tablet Take 1 tablet (2.5 mg) by mouth daily for 28 days 28 tablet 0     loperamide (IMODIUM) 2 MG capsule Start with 2 caps (4 mg), then  take one cap (2 mg) after each diarrheal stool as needed. Do not use more than 8 caps (16 mg) per day. 30 capsule 0     losartan-hydrochlorothiazide (HYZAAR) 100-25 MG tablet Take 1 tablet by mouth every morning Please keep the appointment on 11/4/22. 90 tablet 0     MELATONIN PO        Naproxen Sodium (ALEVE PO)        prochlorperazine (COMPAZINE) 10 MG tablet Take 0.5 tablets (5 mg) by mouth every 6 hours as needed for nausea or vomiting 30 tablet 2     bisacodyl (DULCOLAX) 5 MG EC tablet Take 2 tablets at 3 pm the day before your procedure. If your procedure is before 11 am, take 2 additional tablets at 11 pm. If your procedure is after 11 am, take 2 additional tablets at 6 am. For additional instructions refer to your colonoscopy prep instructions. (Patient not taking: Reported on 11/4/2022) 4 tablet 0     gabapentin (NEURONTIN) 300 MG capsule Take 1 capsule (300 mg) by mouth At Bedtime (Patient not taking: Reported on 11/4/2022) 30 capsule 11     letrozole (FEMARA) 2.5 MG tablet Take 1 tablet (2.5 mg) by mouth daily for 28 days (Patient not taking: Reported on 9/12/2022) 28 tablet 0     letrozole (FEMARA) 2.5 MG tablet Take 1 tablet (2.5 mg) by mouth daily for 28 days 28 tablet 0     letrozole (FEMARA) 2.5 MG tablet Take 1 tablet (2.5 mg) by mouth daily for 28 days 28 tablet 0     omeprazole (PRILOSEC) 20 MG DR capsule Take 1 capsule (20 mg) by mouth 2 times daily (Patient not taking: Reported on 11/4/2022) 60 capsule 3     ondansetron (ZOFRAN) 8 MG tablet Take 1 tablet (8 mg) by mouth every 8 hours as needed for nausea (Patient not taking: Reported on 11/4/2022) 30 tablet 3     polyethylene glycol (GOLYTELY) 236 g suspension The night before the exam at 6 pm drink an 8-ounce glass every 15 minutes until the jug is half empty. If you arrive before 11 AM: Drink the other half of the [employer] jug at 11 PM night before procedure. If you arrive after 11 AM: Drink the other half of the [employer] jug at 6 AM day of  "procedure. For additional instructions refer to your colonoscopy prep instructions. (Patient not taking: Reported on 11/4/2022) 4000 mL 0     zolpidem (AMBIEN) 5 MG tablet Take 1 tablet (5 mg) by mouth nightly as needed for sleep (Patient not taking: Reported on 11/4/2022) 60 tablet 5              Physical Exam:   /89   Pulse 83   Temp 98  F (36.7  C)   Resp 16   Ht 1.727 m (5' 7.99\")   Wt 78.5 kg (173 lb 1 oz)   SpO2 99%   BMI 26.32 kg/m    Gen: NAD, pleasant and cooperative   HEENT: MMM  Cardio: well perfused  Pulm: non-labored breathing on room air  Abd: benign  Ext: Lymphedema in right forearm significantly improved since his last visit. Improved lymphedema over the dorsum of the hand and along lateral aspect of proximal forearm. Negative Stemmers sign. No lymphedema noted in neck, mild subtle swelling in right lateral chest wall. No enlarged lymph nodes palpated. No cording noted.   Neuro/MSK: RUE full active ROM. No increased tone.     Labs/Imaging:  Lab Results   Component Value Date    WBC 3.3 (L) 10/25/2022    HGB 11.5 (L) 10/25/2022    HCT 33.4 (L) 10/25/2022    MCV 91 10/25/2022     10/25/2022     Lab Results   Component Value Date     10/25/2022    POTASSIUM 3.7 10/25/2022    CHLORIDE 104 10/25/2022    CO2 21 (L) 10/25/2022     (H) 10/25/2022     Lab Results   Component Value Date    GFRESTIMATED >90 10/25/2022    GFRESTBLACK >90 07/08/2021     Lab Results   Component Value Date    AST 21 10/25/2022    ALT 24 10/25/2022    GGT 28 06/23/2021    ALKPHOS 74 10/25/2022    BILITOTAL 0.3 10/25/2022     Lab Results   Component Value Date    INR 1.12 06/14/2021     Lab Results   Component Value Date    BUN 11.2 10/25/2022    CR 0.81 10/25/2022              Assessment/Plan   Ronna Collins presents to clinic today for follow up reg his rehab needs.   He has h/o  right breast cancer (clinical prognostic stage IIIb, [id number], grade 2, ER positive, [place] positive, HER-2 negative), " now s/p right breast mastectomy and right axillary lymph node dissection (10/27/2021). Was last seen in clinic on 6/15/22. Please see below recommendations from today's visit. New order for compression placed. Over all patient is doing very well. RTC in 6 months.      1. Therapy/equipment/braces:  1. Continue lymphedema therapy.  2. Continue daily compression, new order placed for compression garments.  3. Continue daily stretches/massage per therapist.   2. Follow up: 6 months.      Celsa Waters MD  Physical Medicine & Rehabilitation    50 minutes spent on the date of the encounter doing chart review, history and exam, documentation and further activities as noted above.

## 2022-11-04 NOTE — PROGRESS NOTES
"  Assessment & Plan     Abnormal CT scan, bladder  UA, keep Uro appt  - UA with Micro reflex to Culture; Future  - UA with Micro reflex to Culture    Benign essential hypertension  Due  - Lipid panel reflex to direct LDL Fasting; Future  - Hemoglobin A1c; Future  - TSH with free T4 reflex; Future    Gastroesophageal reflux disease without esophagitis  Rvwd EGD together  - famotidine (PEPCID) 40 MG tablet; Take 1 tablet (40 mg) by mouth daily    Excessive cerumen in both ear canals  If still [place] after wax gone, let us know we can set up hearing test he knows  - REMOVE IMPACTED CERUMEN    Cancer cares per [place], rvwd in detail    Exercises routinely, going to [employer] soon, still works      45 minutes spent on the date of the encounter doing chart review, history and exam, documentation and further activities per the note    BMI:   Estimated body mass index is 26.32 kg/m  as calculated from the following:    Height as of this encounter: 1.727 m (5' 8\").    Weight as of this encounter: 78.5 kg (173 lb 1.6 oz).     Stewart Henry MD  [facility] [place]    Mary Friend is a 72 year old, presenting for the following health issues:  Physical (Pt is here for physical.)      HPI Here for physical  Doing well, rvwd cancer course  Also just got zometa for bone heatlh  Skin dry I suggest aquaphilic  EGD showed ulcer; no sx on ROS. Pt would like to avoid ppi, will try pepcid  Subjective [place]  Thick bladder on CT, sees Uro soon, will do UA (neg March)  H/o dyslipidemia, recheck  Past Medical History:   Diagnosis Date     Breast cancer (H) 05/2021     Chronic sinusitis      Hypertension 2002     Past Surgical History:   Procedure Laterality Date     COLONOSCOPY WITH CO2 INSUFFLATION N/A 10/28/2022    Procedure: COLONOSCOPY, WITH CO2 INSUFFLATION;  Surgeon: Annemarie Noel DO;  Location: [facility] OR     COMBINED ESOPHAGOSCOPY, GASTROSCOPY, DUODENOSCOPY (EGD) WITH CO2 INSUFFLATION N/A " 10/28/2022    Procedure: ESOPHAGOGASTRODUODENOSCOPY, WITH CO2 INSUFFLATION;  Surgeon: Annemarie Noel DO;  Location: [facility] OR     DISSECT LYMPH NODE AXILLA Right 10/27/2021    Procedure: RIGHT Axillary Lymph Node Dissection;  Surgeon: Nida Snider MD;  Location: [facility] OR     ESOPHAGOSCOPY, GASTROSCOPY, DUODENOSCOPY (EGD), COMBINED N/A 10/28/2022    Procedure: ESOPHAGOGASTRODUODENOSCOPY, WITH BIOPSY;  Surgeon: Annemarie Noel DO;  Location: [facility] OR     MASTECTOMY SIMPLE Right 10/27/2021    Procedure: RIGHT Simple Mastectomy;  Surgeon: Nida Snider MD;  Location: [facility] OR     Current Outpatient Medications   Medication     abemaciclib (VERZENIO) 150 MG tablet     abemaciclib (VERZENIO) 150 MG tablet     famotidine (PEPCID) 40 MG tablet     letrozole (FEMARA) 2.5 MG tablet     letrozole (FEMARA) 2.5 MG tablet     loperamide (IMODIUM) 2 MG capsule     losartan-hydrochlorothiazide (HYZAAR) 100-25 MG tablet     MELATONIN PO     Naproxen Sodium (ALEVE PO)     prochlorperazine (COMPAZINE) 10 MG tablet     bisacodyl (DULCOLAX) 5 MG EC tablet     gabapentin (NEURONTIN) 300 MG capsule     letrozole (FEMARA) 2.5 MG tablet     letrozole (FEMARA) 2.5 MG tablet     letrozole (FEMARA) 2.5 MG tablet     omeprazole (PRILOSEC) 20 MG DR capsule     ondansetron (ZOFRAN) 8 MG tablet     polyethylene glycol (GOLYTELY) 236 g suspension     zolpidem (AMBIEN) 5 MG tablet     No current facility-administered medications for this visit.     No Known Allergies  Family History   Problem Relation Age of Onset     Diabetes Brother      Hypertension Brother      Bladder Cancer Brother      Anesthesia Reaction No family hx of      Deep Vein Thrombosis (DVT) No family hx of      Melanoma No family hx of      Skin Cancer No family hx of      Social History     Socioeconomic History     Marital status:      Spouse name: Melanie     Number of children: 2     Years of education: Not on file     Highest education level: Not on file  "  Occupational History     Not on file   Tobacco Use     Smoking status: Former     Packs/day: 0.50     Years: 20.00     Pack years: 10.00     Types: Cigarettes     Start date: 1976     Quit date: 2000     Years since quittin.1     Smokeless tobacco: Never   Substance and Sexual Activity     Alcohol use: Yes     Comment: occ     Drug use: No     Sexual activity: Yes     Partners: Female     Birth control/protection: None   Other Topics Concern     Not on file   Social History Narrative     Not on file     Social Determinants of Health     Financial Resource Strain: Not on file   Food Insecurity: Not on file   Transportation Needs: Not on file   Physical Activity: Not on file   Stress: Not on file   Social Connections: Not on file   Intimate Partner Violence: Not At Risk     Fear of Current or Ex-Partner: No     Emotionally Abused: No     Physically Abused: No     Sexually Abused: No   Housing Stability: Not on file     Pt thinks had flu/covid this , shots for, not in records, he will check his drugstore, and get them if did not have, discussed              Review of Systems   Ten point ROS otherwise negative      Objective    /89   Pulse 83   Ht 1.727 m (5' 8\")   Wt 78.5 kg (173 lb 1.6 oz)   SpO2 99%   BMI 26.32 kg/m    Body mass index is 26.32 kg/m .  Physical Exam   GENERAL: healthy, alert and no distress  EYES: Eyes grossly normal to inspection, PERRL and conjunctivae and sclerae normal  HENT: ear canals and TM's normal, nose and mouth without ulcers or lesions (wax, washed out)  NECK: no adenopathy, no asymmetry, masses, or scars and thyroid normal to palpation  RESP: lungs clear to auscultation - no rales, rhonchi or wheezes  CV: regular rate and rhythm, normal S1 S2, no S3 or S4, no murmur, click or rub, no peripheral edema and peripheral pulses strong  ABDOMEN: soft, nontender, no hepatosplenomegaly, no masses and bowel sounds normal   (male): normal male genitalia without " lesions or urethral discharge, no hernia  MS: no gross musculoskeletal defects noted, no edema  SKIN: no suspicious lesions or rashes  NEURO: Normal strength and tone, mentation intact and speech normal  PSYCH: mentation appears normal, affect normal/bright

## 2022-11-04 NOTE — PROGRESS NOTES
Medicare Annual Wellness Questionnaire:  This 72 year old year old male presents for a Medicare Wellness Exam.    Fall Risk Assessment:  Have you fallen 2 or more times in the last year?  No    How many times were you injured due to a fall in the last year?  N/A    PHQ-2:  Over the last 2 weeks, how often have you been bothered by feeling down, depressed, or hopeless?  Not at all (0)     Over the last 2 weeks, how often have you had little interest or pleasure in doing things?  Not at all (0)     Social History:  What is your marital status?      Who lives in your household?  Self and wife    Does your home have loose rugs in the hallway:     No    Does your home have grab bars in the bathroom:    No    Does your home have handrails on the stairs?  No     Does your home have poorly lit areas?    No    Do you feel threatened or controlled by a partner, ex-partner or anyone in your life?   No    Has anyone hurt you physically, for example by pushing, hitting, slapping or kicking you or forcing you to have sex?   No    Do you need help with the phone, transportation, shopping, preparing meals, housework, laundry, medications or managing money?   No    Sexual Health:  Are you sexually active?    No    If yes, with men, women, or both?   Men Women Both    If yes, how many partners?  N/A    If yes, are you using condoms?    N/A    Have you had any sexually transmitted infections in the last year?   No    Do you have any sexual concerns?    No    General Health Assessment:  Have you noticed any hearing difficulties?   No    Do you wear hearing aids?   No    Have you seen a hearing professional such as an audiologist in the last 1 year?   No    Do you have vision difficulty?    No    Do you wear glasses or contacts?   Yes     Have you seen an eye doctor in the last 1 year?   Yes     How many servings of fruits and vegetables do you eat a day?  Fruit: 2  Vegetables: 3    How often do you exercise in a  week?  Twice    How long and what kind of exercise do you do?  Strength training    Tobacco and Alcohol History:  Do you use tobacco/nicotine products?    No    If yes, please list the method of use and average weekly consumption?  N/A    Do you use any other drugs?   No         Do you drink alcohol?   Yes     If you drink alcohol, how many drinks per week?  1    Advance Directive:  Have you completed an Advance Directives document?  Yes     If yes, have you given a copy to the clinic?   No    Do you need information on Advance Directives?   No    JOSE Robin at 11:22 AM on 11/4/2022

## 2022-11-04 NOTE — PATIENT INSTRUCTIONS
Continue lymphedema therapy.  Continue daily compression, new order placed for compression garments.  Continue daily stretches/massage per therapist.

## 2022-11-04 NOTE — NURSING NOTE
"Ronna Collins is a 72 year old male patient that presents today in clinic for the following:    Chief Complaint   Patient presents with     Physical     Pt is here for physical.     The patient's allergies and medications were reviewed as noted. A set of vitals were recorded as noted without incident: /89   Pulse 83   Ht 1.727 m (5' 8\")   Wt 78.5 kg (173 lb 1.6 oz)   SpO2 99%   BMI 26.32 kg/m  . The patient does not have any other questions for the provider.    Kristen Acosta, EMT at 8:13 AM on 11/4/2022  "

## 2022-11-04 NOTE — PROGRESS NOTES
[facility]   PM&R clinic note        Interval history:     Ronna Collins presents to clinic today for follow up reg his rehab needs.   He has h/o  right breast cancer (clinical prognostic stage IIIb, [id number], grade 2, ER positive, NE positive, HER-2 negative), now s/p right breast mastectomy and right axillary lymph node dissection (10/27/2021).   Was last seen in clinic on 6/15/22.  Recommendations included   2.           Therapy/equipment/braces: Orthotics referral (for [facility]) placed so that he can be re-fitted for RUE and gauntlet compression garments and any adjustments in anticipation of his trip in August to [facility]. Encouraged him to continue to wear compression garments every day. Re-implement regular MLD techniques and stretching. Continue physical therapy program.  3. For right hip pain, take anti-inflammatory- Aleve or Ibuprofen scheduled for a few days, then as needed. Can also use Voltaren gel QID prn for pain relief. Likely arthralgias/pain from current chemotherapy regimen and training combined.  4. Continue to monitor weight. Would recommend starting 1-2 protein shakes daily (30-60 grams) in addition to diet. Discussed Premier protein or [employer] Fairlife Corepower shakes as options.   5. Interventions: Monitor for s/s of cellulitis. If there are concerns, he should call the clinic or go to urgent care to be evaluated.   8. Referral / follow up with other providers: None  9. Follow up: RTC in 4-6 months.    ONCOLOGIC HISTORY  - First noticed discomfort and hardening of the skin surrounding right nipple in late April/early May 2021. Noted seborrheic keratosis of the right nipple, and had similar lesion of the arm 3 months earlier. Subsequently noted a mass at the same site.  - Right breast skin punch biopsy performed by derm was c/w grade 2 invasive ductal carcinoma w/ associated DCIS. Invasive carcinoma was ER positive 100% and NE positive 70%, w/ tumor  invading the dermis. HER-2 negative by IHC (score 1+).   - Diagnostic mammogram and ultrasound showed a retroareolar right breast mass measuring 2.9cm w/ associated nipple retraction and enlarged, abnormal right axillary lymph nodes. Right breast biopsy was again, c/w grade 2 invasive ductal carcinoma, ER strong in 98% of tumor cell nuclei, HER-2 negative, Ki-67 was 15%.    - Elected to screen for the ISPY-2 clinical trial.  - 6/18/2021 MRI breast showed biopsy proven right breast cancer to measure 3.9 cm in max dimension w/ invasion of the nipple and pectoralis muscle as well as at least 4 abnormal level 1 and 2 right axillary lymph nodes and a tiny 0/4 cm right internal mammary lymph node. Mammoprint returned low risk w/ a score of +0.29.   - Elected for tx on the endocrine optimization protocol of ISPY-2, and was randomized to tx w/ amcenestrant. Was on amcenestrant from 7/1/2021-10/26/2021.  - 10/27/2021 underwent right breast mastectomy and right axillary lymph node dissection w/ Dr. Snider. Pathology showed grade 2 carcinoma of the right breast measuring 3.2 cm w/ invasion of the dermis, nipple, and the pectoralis muscle. Ki-67 of the excised tumor was 7%. 15/44 right axillary lymph nodes were positive w/ 6 of the lymph nodes demonstrating extranodal extension. Largest lymph node metastasis measured 2.1 cm.   - 12/14/2021 began tx w/ Taxotere and cyclophosphamide.  - 1/5/2021 C2 given. Had significant fever following cycle 2.  - 1/26/2021 C3 given  - 2/16/2022 C4 given   - Plan is to start adjuvant radiation soon. Heme/onc planning to initiate tx w/ letrozole at the same time as radiation and abemaciclib upon completion of radiation. Plan to administer abemaciclib for 2 years. Letrozole for a total of 10 years.   - Starting proton beam radiation 3/21/2022. 15 days over 3 weeks at the [facility].   - Saw Nay Blandon for follow up visit on 6/7/22. Patient continues on Verzino. Has trip to [place] coming up  for conference, and trip to [facility] in August. Also on treatment with lupron and letrozol. Plan for 2 years of treatment with Verzino. Working with PT and recently started survival to strength program. New back and hip pain, likely secondary to increase in exercise.  - Saw Dr. Sr on 9/12/22 for follow up. Continues on treatment with abemaciclib, plan to continue for total of 2 years. CT C/A/P without lymphadenopathy or abnormalities.      Symptoms,  Patient was seen today for a return visit. He has been doing very well and overall his measurements have been stable in right upper extremity. He continues to follow with Renetta Dempsey PT for lymphedema management. He continues to wear his right compression sleeve daily. He denies any pain or discomfort and feels like his right shoulder ROM has also been stable and full upon demonstration in clinic today. Denies any other concerns.      Therapies/HEP,  Continues with outpatient lymphedema therapy.      Functionally,   No changes.      Social history is unchanged.        Medications:  Current Outpatient Medications   Medication Sig Dispense Refill     abemaciclib (VERZENIO) 150 MG tablet Take 1 tablet (150 mg) by mouth 2 times daily for 28 days 56 tablet 0     abemaciclib (VERZENIO) 150 MG tablet Take 1 tablet (150 mg) by mouth 2 times daily for 28 days 56 tablet 0     famotidine (PEPCID) 40 MG tablet Take 1 tablet (40 mg) by mouth daily 90 tablet 3     letrozole (FEMARA) 2.5 MG tablet Take 1 tablet (2.5 mg) by mouth daily for 28 days 28 tablet 0     letrozole (FEMARA) 2.5 MG tablet Take 1 tablet (2.5 mg) by mouth daily for 28 days 28 tablet 0     loperamide (IMODIUM) 2 MG capsule Start with 2 caps (4 mg), then take one cap (2 mg) after each diarrheal stool as needed. Do not use more than 8 caps (16 mg) per day. 30 capsule 0     losartan-hydrochlorothiazide (HYZAAR) 100-25 MG tablet Take 1 tablet by mouth every morning Please keep the appointment on 11/4/22. 90 tablet  0     MELATONIN PO        Naproxen Sodium (ALEVE PO)        prochlorperazine (COMPAZINE) 10 MG tablet Take 0.5 tablets (5 mg) by mouth every 6 hours as needed for nausea or vomiting 30 tablet 2     bisacodyl (DULCOLAX) 5 MG EC tablet Take 2 tablets at 3 pm the day before your procedure. If your procedure is before 11 am, take 2 additional tablets at 11 pm. If your procedure is after 11 am, take 2 additional tablets at 6 am. For additional instructions refer to your colonoscopy prep instructions. (Patient not taking: Reported on 11/4/2022) 4 tablet 0     gabapentin (NEURONTIN) 300 MG capsule Take 1 capsule (300 mg) by mouth At Bedtime (Patient not taking: Reported on 11/4/2022) 30 capsule 11     letrozole (FEMARA) 2.5 MG tablet Take 1 tablet (2.5 mg) by mouth daily for 28 days (Patient not taking: Reported on 9/12/2022) 28 tablet 0     letrozole (FEMARA) 2.5 MG tablet Take 1 tablet (2.5 mg) by mouth daily for 28 days 28 tablet 0     letrozole (FEMARA) 2.5 MG tablet Take 1 tablet (2.5 mg) by mouth daily for 28 days 28 tablet 0     omeprazole (PRILOSEC) 20 MG DR capsule Take 1 capsule (20 mg) by mouth 2 times daily (Patient not taking: Reported on 11/4/2022) 60 capsule 3     ondansetron (ZOFRAN) 8 MG tablet Take 1 tablet (8 mg) by mouth every 8 hours as needed for nausea (Patient not taking: Reported on 11/4/2022) 30 tablet 3     polyethylene glycol (GOLYTELY) 236 g suspension The night before the exam at 6 pm drink an 8-ounce glass every 15 minutes until the jug is half empty. If you arrive before 11 AM: Drink the other half of the Golytely jug at 11 PM night before procedure. If you arrive after 11 AM: Drink the other half of the Golytely jug at 6 AM day of procedure. For additional instructions refer to your colonoscopy prep instructions. (Patient not taking: Reported on 11/4/2022) 4000 mL 0     zolpidem (AMBIEN) 5 MG tablet Take 1 tablet (5 mg) by mouth nightly as needed for sleep (Patient not taking: Reported on  "11/4/2022) 60 tablet 5              Physical Exam:   /89   Pulse 83   Temp 98  F (36.7  C)   Resp 16   Ht 1.727 m (5' 7.99\")   Wt 78.5 kg (173 lb 1 oz)   SpO2 99%   BMI 26.32 kg/m    Gen: NAD, pleasant and cooperative   HEENT: MMM  Cardio: well perfused  Pulm: non-labored breathing on room air  Abd: benign  Ext: Lymphedema in right forearm significantly improved since his last visit. Improved lymphedema over the dorsum of the hand and along lateral aspect of proximal forearm. Negative Stemmers sign. No lymphedema noted in neck, mild subtle swelling in right lateral chest wall. No enlarged lymph nodes palpated. No cording noted.   Neuro/MSK: RUE full active ROM. No increased tone.     Labs/Imaging:  Lab Results   Component Value Date    WBC 3.3 (L) 10/25/2022    HGB 11.5 (L) 10/25/2022    HCT 33.4 (L) 10/25/2022    MCV 91 10/25/2022     10/25/2022     Lab Results   Component Value Date     10/25/2022    POTASSIUM 3.7 10/25/2022    CHLORIDE 104 10/25/2022    CO2 21 (L) 10/25/2022     (H) 10/25/2022     Lab Results   Component Value Date    GFRESTIMATED >90 10/25/2022    GFRESTBLACK >90 07/08/2021     Lab Results   Component Value Date    AST 21 10/25/2022    ALT 24 10/25/2022    GGT 28 06/23/2021    ALKPHOS 74 10/25/2022    BILITOTAL 0.3 10/25/2022     Lab Results   Component Value Date    INR 1.12 06/14/2021     Lab Results   Component Value Date    BUN 11.2 10/25/2022    CR 0.81 10/25/2022              Assessment/Plan   Ashiabillnatali Collins presents to clinic today for follow up reg his rehab needs.   He has h/o  right breast cancer (clinical prognostic stage IIIb, [id number], grade 2, ER positive, WV positive, HER-2 negative), now s/p right breast mastectomy and right axillary lymph node dissection (10/27/2021). Was last seen in clinic on 6/15/22. Please see below recommendations from today's visit. New order for compression placed. Over all patient is doing very well. RTC in 6 " months.      1. Therapy/equipment/braces:  1. Continue lymphedema therapy.  2. Continue daily compression, new order placed for compression garments.  3. Continue daily stretches/massage per therapist.   2. Follow up: 6 months.      Celsa Waters MD  Physical Medicine & Rehabilitation      50 minutes spent on the date of the encounter doing chart review, history and exam, documentation and further activities as noted above.

## 2022-11-04 NOTE — NURSING NOTE
Ronna Collins received a bilateral ear wash today in clinic at the request of Dr. Henry. The patient's ears were assessed for cerumen using an otoscope. A significant amount of cerumen was observed in both ears. After this, a solution of hydrogen peroxide and water was sprayed into the patient s ears. A curette was also used to remove cerumen, A significant amount of cerumen was removed from both ears. After the ear wash, the tympanic membranes were visible in both ears. The patient tolerated the ear wash well, no redness or irritation was observed.     JOSE Robin at 11:08 AM on 11/4/2022

## 2022-11-04 NOTE — NURSING NOTE
"Oncology Rooming Note    November 4, 2022 11:06 AM   Ronna Collins is a 72 year old male who presents for:    Chief Complaint   Patient presents with     Oncology Clinic Visit     [facility] RETURN - BREAST CANCER     Initial Vitals: /89   Pulse 83   Temp 98  F (36.7  C)   Resp 16   Ht 1.727 m (5' 7.99\")   Wt 78.5 kg (173 lb 1 oz)   SpO2 99%   BMI 26.32 kg/m   Estimated body mass index is 26.32 kg/m  as calculated from the following:    Height as of this encounter: 1.727 m (5' 7.99\").    Weight as of this encounter: 78.5 kg (173 lb 1 oz). Body surface area is 1.94 meters squared.  No Pain (0) Comment: Data Unavailable   No LMP for male patient.  Allergies reviewed: Yes  Medications reviewed: Yes    Medications: Medication refills not needed today.  Pharmacy name entered into [facility]:    [id number] - [place], [place] - [address] AT [facility] - [place], [place] - [address]   [id number] - [place], [place] - [address] AT [facility] [address]    Guillermo Espinoza LPN            "

## 2022-11-14 NOTE — PROGRESS NOTES
Oncology Visit:  Date on this visit: Nov 15, 2022    Diagnosis: h/o clinical prognostic stage IIIb, [id number], grade 2, ER positive, RI positive, HER-2 negative right breast cancer, [id number].    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  He noted discomfort and hardening of the skin of the right nipple in late April/early May, 2021.  He noted a seborrheic keratosis of the right nipple and remembered he had a similar skin lesion of the arm 3 months earlier.  However, he subsequently noted a mass in the same area.  Right breast skin punch biopsy performed by dermatology was c/w grade 2 invasive ductal carcinoma with associated DCIS.  Invasive carcinoma was ER positive 100% and RI positive 70%, with tumor invading the dermis.  HER-2 was negative by IHC (score 1+).  Diagnostic mammogram and ultrasound showed a retroareolar right breast mass measuring 2.9 cm with associated nipple retraction and enlarged, abnormal right axillary lymph nodes.  Right breast biopsy was again c/w grade 2 invasive ductal carcinoma, ER strong in 98% of tumor cell nuclei, HER-2 negative.  Ki-67 was 15%.    He elected to screen for the ISPY-2 clinical trial.  MRI breast on 6/18/2021 showed the biopsy proven right breast cancer to measure 3.9 cm in maximum dimension with invasion of the nipple and the pectoralis muscle as well as at least 4 abnormal level 1 and 2 right axillary lymph nodes and a tiny 0.4 cm right internal mammary lymph node.  Mammoprint returned low risk with a score of +0.29.  He elected for treatment on the endocrine optimization protocol of ISPY-2 and was randomized to treatment with amcenestrant.  He was on treatment with  amcenestrant from 7/1/2021 - 10/26/2021.  At our visit in 01/2021, both right axillary node and right breast tumor palpated more firm then prior.  Breast MRI was stable, however, due to clinical concerns for progression,  decision was made to proceed with surgery.   Right breast mastectomy and right axillary lymph node dissection was performed by Dr. Snider on 10/27/2021.  Pathology showed grade 2 carcinoma of the right breast measuring 3.2 cm with invasion of the dermis, nipple and the pectoralis muscle.  Ki-67 of the excised tumor was 7%.  15/44 right axillary lymph nodes were positive with 6 of the lymph nodes demonstrating extranodal extension.  The largest lymph node metastasis measured 2.1 cm.  Oncotype dx recurrence score was 50.     Karina received 4 cycles of Taxotere and cyclophosphamide from 12/14/2021 - 2/16/2022.  His course was complicated by fevers persistent throughout treatment.  He received radiation (4800 cGy in 15 fractions) to the right chest wall and regional lymph nodes from 3/21/2022 - 4/8/2022.  He has been on treatment with lupron and letrozole since 3/16/2022.  Abemaciclib was added 4/26/2022.    Interval History:   Jamie Collins comes into clinic today for routine breast cancer follow-up.  He is currently on treatment with abemaciclib, Lupron, and letrozole. He is doing well. He has had a little more difficulty with insomnia in the last few weeks but he believes this is due to his wife being out of town. He is still managing insomnia conservatively without ambien. He had first dose of Zometa a few weeks ago and did have flu like symptoms for 3-4 days. He also had to do colonoscopy prep as part of that so he had more diarrhea than usual. No nausea. No lumps/bumps. No issues with lymphedema. No pain.     Skin is still dry and itchy. He is seeing Dermatology in January. He is going to start Pepcid AC upon the recommendation of his PCP after EGD findings.      Past Medical/Surgical History:  Past Medical History:   Diagnosis Date     Breast cancer (H) 05/2021     Chronic sinusitis      Hypertension 2002     Past Surgical History:   Procedure Laterality Date     COLONOSCOPY WITH CO2 INSUFFLATION N/A 10/28/2022     Procedure: COLONOSCOPY, WITH CO2 INSUFFLATION;  Surgeon: Annemarie Noel DO;  Location: [facility] OR     COMBINED ESOPHAGOSCOPY, GASTROSCOPY, DUODENOSCOPY (EGD) WITH CO2 INSUFFLATION N/A 10/28/2022    Procedure: ESOPHAGOGASTRODUODENOSCOPY, WITH CO2 INSUFFLATION;  Surgeon: Annemarie Noel DO;  Location: [facility]     DISSECT LYMPH NODE AXILLA Right 10/27/2021    Procedure: RIGHT Axillary Lymph Node Dissection;  Surgeon: Nida Snider MD;  Location: [facility] OR     ESOPHAGOSCOPY, GASTROSCOPY, DUODENOSCOPY (EGD), COMBINED N/A 10/28/2022    Procedure: ESOPHAGOGASTRODUODENOSCOPY, WITH BIOPSY;  Surgeon: Annemarie Noel DO;  Location: [facility]     MASTECTOMY SIMPLE Right 10/27/2021    Procedure: RIGHT Simple Mastectomy;  Surgeon: Nida Snider MD;  Location: [facility] OR     Allergies:  Allergies as of 11/15/2022     (No Known Allergies)     Current Medications:  Current Outpatient Medications   Medication Sig Dispense Refill     abemaciclib (VERZENIO) 150 MG tablet Take 1 tablet (150 mg) by mouth 2 times daily for 28 days 56 tablet 0     bisacodyl (DULCOLAX) 5 MG EC tablet Take 2 tablets at 3 pm the day before your procedure. If your procedure is before 11 am, take 2 additional tablets at 11 pm. If your procedure is after 11 am, take 2 additional tablets at 6 am. For additional instructions refer to your colonoscopy prep instructions. (Patient not taking: Reported on 11/4/2022) 4 tablet 0     famotidine (PEPCID) 40 MG tablet Take 1 tablet (40 mg) by mouth daily 90 tablet 3     gabapentin (NEURONTIN) 300 MG capsule Take 1 capsule (300 mg) by mouth At Bedtime (Patient not taking: Reported on 11/4/2022) 30 capsule 11     letrozole (FEMARA) 2.5 MG tablet Take 1 tablet (2.5 mg) by mouth daily for 28 days 28 tablet 0     letrozole (FEMARA) 2.5 MG tablet Take 1 tablet (2.5 mg) by mouth daily for 28 days 28 tablet 0     letrozole (FEMARA) 2.5 MG tablet Take 1 tablet (2.5 mg) by mouth daily for 28 days (Patient not taking: Reported on  9/12/2022) 28 tablet 0     letrozole (FEMARA) 2.5 MG tablet Take 1 tablet (2.5 mg) by mouth daily for 28 days 28 tablet 0     letrozole (FEMARA) 2.5 MG tablet Take 1 tablet (2.5 mg) by mouth daily for 28 days 28 tablet 0     loperamide (IMODIUM) 2 MG capsule Start with 2 caps (4 mg), then take one cap (2 mg) after each diarrheal stool as needed. Do not use more than 8 caps (16 mg) per day. 30 capsule 0     losartan-hydrochlorothiazide (HYZAAR) 100-25 MG tablet Take 1 tablet by mouth every morning Please keep the appointment on 11/4/22. 90 tablet 0     MELATONIN PO        Naproxen Sodium (ALEVE PO)        omeprazole (PRILOSEC) 20 MG DR capsule Take 1 capsule (20 mg) by mouth 2 times daily (Patient not taking: Reported on 11/4/2022) 60 capsule 3     ondansetron (ZOFRAN) 8 MG tablet Take 1 tablet (8 mg) by mouth every 8 hours as needed for nausea (Patient not taking: Reported on 11/4/2022) 30 tablet 3     polyethylene glycol (GOLYTELY) 236 g suspension The night before the exam at 6 pm drink an 8-ounce glass every 15 minutes until the jug is half empty. If you arrive before 11 AM: Drink the other half of the Golytely jug at 11 PM night before procedure. If you arrive after 11 AM: Drink the other half of the Golytely jug at 6 AM day of procedure. For additional instructions refer to your colonoscopy prep instructions. (Patient not taking: Reported on 11/4/2022) 4000 mL 0     prochlorperazine (COMPAZINE) 10 MG tablet Take 0.5 tablets (5 mg) by mouth every 6 hours as needed for nausea or vomiting 30 tablet 2     zolpidem (AMBIEN) 5 MG tablet Take 1 tablet (5 mg) by mouth nightly as needed for sleep (Patient not taking: Reported on 11/4/2022) 60 tablet 5      Genetics Breast Actionable and Breast Expanded panels were both negative for pathogenic germline mutations.    Physical Exam:  /82 (BP Location: Left arm, Patient Position: Chair, Cuff Size: Adult Regular)   Pulse 80   Temp 97.8  F (36.6  C) (Oral)   Resp 18    Wt 77.6 kg (171 lb)   SpO2 99%   BMI 26.01 kg/m    General:  Well appearing adult male in NAD.  Alert and oriented x 3.  HEENT:  Normocephalic.  Sclera anicteric.   Lymph:  No palpable cervical, supraclavicular, or axillary LAD.  Chest:  CTA bilaterally.  No wheezes or crackles.  CV:  RRR.   Chest wall:  Right mastectomy. No palpable mass R chest wall.   Abd:  Soft/ND/NT +BS   Ext:  No pitting edema of the bilateral lower extremities.  Neuro:  Cranial nerves grossly intact.  Gait stable.  Psych:  Mood and affect appear normal.  Skin: No rashes on exposed skin.     Laboratory/Imaging Studies:   11/15/22 08:07   Sodium 136   Potassium 4.2   Chloride 103   Carbon Dioxide (CO2) 23   Urea Nitrogen 12.4   Creatinine 0.90   GFR Estimate >90   Calcium 9.8   Anion Gap 10   Albumin 4.4   Protein Total 7.2   Alkaline Phosphatase 71   ALT 16   AST 18   Bilirubin Total 0.3   Cholesterol 179   Glucose 108 (H)   HDL Cholesterol 68   LDL Cholesterol Calculated 77   Non HDL Cholesterol 111   Triglycerides 168 (H)   WBC 4.0   Hemoglobin 10.5 (L)   Hematocrit 30.8 (L)   Platelet Count 157   RBC Count 3.33 (L)   MCV 93   MCH 31.5   MCHC 34.1   RDW 12.8   % Neutrophils 68   % Lymphocytes 20   % Monocytes 8   % Eosinophils 2   % Basophils 1   Absolute Basophils 0.0   Absolute Eosinophils 0.1   Absolute Immature Granulocytes 0.0   Absolute Lymphocytes 0.8   Absolute Monocytes 0.3   % Immature Granulocytes 1   Absolute Neutrophils 2.8   Absolute NRBCs 0.0   NRBCs per 100 WBC 0       DEXA 10/14/22  Narrative & Impression   HISTORY:    Long-term use of aromatase inhibitors     COMPARISON:  None     Age: 72 years.  Height: 67 inches  Weight: 172 pounds  Sex: Male  Ethnicity:   Referring Provider: ROMI CRAWFORD     Image quality: Adequate     Lumbar spine T-score in region of L1-L3 = -0.7      HIPS:  Mean total hip T-score: -0.8  Left femoral neck T-score = -1.2  Right femoral neck T-score= -1.5      FRAX:  10 year  probability of major osteoporotic fracture: 6.3%  10 year probability of hip fracture: 1.7%  The 10 year probability of fracture may be lower than reported if the  patient has received treatment. FRAX data should be disregarded in  patient's taking bisphosphonates.     World Health Organization definition of osteoporosis and osteopenia  for  women:   Normal: T-score at or above -1.0  Low Bone Mass (Osteopenia): T-score between -1.0 and -2.5.   Osteoporosis: T-score at or below -2.5   T-scores are reported for postmenopausal women and men over 50 years  of age.                                                                      IMPRESSION:  Low bone mass (osteopenia). Consider follow-up DEXA in  approximately 2 years.         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  He is s/p treatment with 3.5 months neoadjuvant amcenestrant, right breast mastectomy, right ALND, 4 cycles Taxotere and cyclophosphamide, and radiation.  He is on treatment with abemaciclib, lupron and letrozol.    1.  Right breast cancer:    On treatment with abemaciclib, lupron and letrozole since 4/26/2022.  Has had waxing and waning anemia and leukopenia from this which we continue to monitor. Plan to continue abemaciclib for a total of 2 years.     Next Lupron 11/29. He is traveling to [facility] in December so Lupron is scheduled after he returns on 12/30.     2.  Bladder wall thickening seen on CT: Seeing Urology early January.      3.  Fatigue & weakness:  Improving.  Ongoing work with physical therapy and in a survival to strength program. Seeing Dr. Waters as well.     4.  RUE lymphedema/cording:  Well controlled. He was recently seen by lymphedema and new compression sleeves were prescribed by Dr. Waters.     5. Insomnia: Managed conservatively with sleep hygiene techniques. Has ambien to take PRN.     6.   Numerous skin lesions: Seeing Dermatology in early January for skin check and evaluation of hyperpigmentation/pruritis.    7.  Patulous esophagus/occasional regurgitation and dysphagia: Had upper and lower endoscopies end of October. Colonoscopy benign. EGD showed gastritis and distal esphogatitis and duodenal ulceration without evidence of H. Pylori, dysplasia, or fungal elements. Being treated by GI with PPI BID x 6 weeks then once daily. He was apprehensive to take PPI so PCP recommended Pepcid BID.     Nay Blandon PA-C

## 2022-11-15 ENCOUNTER — ONCOLOGY VISIT (OUTPATIENT)
Dept: ONCOLOGY | Facility: CLINIC | Age: 72
End: 2022-11-15
Attending: INTERNAL MEDICINE
Payer: COMMERCIAL

## 2022-11-15 ENCOUNTER — APPOINTMENT (OUTPATIENT)
Dept: LAB | Facility: CLINIC | Age: 72
End: 2022-11-15
Attending: INTERNAL MEDICINE
Payer: COMMERCIAL

## 2022-11-15 VITALS
RESPIRATION RATE: 18 BRPM | OXYGEN SATURATION: 99 % | DIASTOLIC BLOOD PRESSURE: 82 MMHG | HEART RATE: 80 BPM | SYSTOLIC BLOOD PRESSURE: 124 MMHG | BODY MASS INDEX: 26.01 KG/M2 | TEMPERATURE: 97.8 F | WEIGHT: 171 LBS

## 2022-11-15 DIAGNOSIS — C50.929 MALIGNANT NEOPLASM OF MALE BREAST, UNSPECIFIED ESTROGEN RECEPTOR STATUS, UNSPECIFIED LATERALITY, UNSPECIFIED SITE OF BREAST (H): ICD-10-CM

## 2022-11-15 DIAGNOSIS — C50.121 MALIGNANT NEOPLASM OF CENTRAL PORTION OF RIGHT BREAST IN MALE, ESTROGEN RECEPTOR POSITIVE (H): Primary | ICD-10-CM

## 2022-11-15 DIAGNOSIS — Z17.0 MALIGNANT NEOPLASM OF CENTRAL PORTION OF RIGHT BREAST IN MALE, ESTROGEN RECEPTOR POSITIVE (H): Primary | ICD-10-CM

## 2022-11-15 DIAGNOSIS — I10 BENIGN ESSENTIAL HYPERTENSION: ICD-10-CM

## 2022-11-15 LAB
ALBUMIN SERPL BCG-MCNC: 4.4 G/DL (ref 3.5–5.2)
ALP SERPL-CCNC: 71 U/L (ref 40–129)
ALT SERPL W P-5'-P-CCNC: 16 U/L (ref 10–50)
ANION GAP SERPL CALCULATED.3IONS-SCNC: 10 MMOL/L (ref 7–15)
AST SERPL W P-5'-P-CCNC: 18 U/L (ref 10–50)
BASOPHILS # BLD AUTO: 0 10E3/UL (ref 0–0.2)
BASOPHILS NFR BLD AUTO: 1 %
BILIRUB SERPL-MCNC: 0.3 MG/DL
BUN SERPL-MCNC: 12.4 MG/DL (ref 8–23)
CALCIUM SERPL-MCNC: 9.8 MG/DL (ref 8.8–10.2)
CHLORIDE SERPL-SCNC: 103 MMOL/L (ref 98–107)
CHOLEST SERPL-MCNC: 179 MG/DL
CREAT SERPL-MCNC: 0.9 MG/DL (ref 0.67–1.17)
DEPRECATED HCO3 PLAS-SCNC: 23 MMOL/L (ref 22–29)
EOSINOPHIL # BLD AUTO: 0.1 10E3/UL (ref 0–0.7)
EOSINOPHIL NFR BLD AUTO: 2 %
ERYTHROCYTE [DISTWIDTH] IN BLOOD BY AUTOMATED COUNT: 12.8 % (ref 10–15)
GFR SERPL CREATININE-BSD FRML MDRD: >90 ML/MIN/1.73M2
GLUCOSE SERPL-MCNC: 108 MG/DL (ref 70–99)
HBA1C MFR BLD: 6.1 %
HCT VFR BLD AUTO: 30.8 % (ref 40–53)
HDLC SERPL-MCNC: 68 MG/DL
HGB BLD-MCNC: 10.5 G/DL (ref 13.3–17.7)
IMM GRANULOCYTES # BLD: 0 10E3/UL
IMM GRANULOCYTES NFR BLD: 1 %
LDLC SERPL CALC-MCNC: 77 MG/DL
LYMPHOCYTES # BLD AUTO: 0.8 10E3/UL (ref 0.8–5.3)
LYMPHOCYTES NFR BLD AUTO: 20 %
MCH RBC QN AUTO: 31.5 PG (ref 26.5–33)
MCHC RBC AUTO-ENTMCNC: 34.1 G/DL (ref 31.5–36.5)
MCV RBC AUTO: 93 FL (ref 78–100)
MONOCYTES # BLD AUTO: 0.3 10E3/UL (ref 0–1.3)
MONOCYTES NFR BLD AUTO: 8 %
NEUTROPHILS # BLD AUTO: 2.8 10E3/UL (ref 1.6–8.3)
NEUTROPHILS NFR BLD AUTO: 68 %
NONHDLC SERPL-MCNC: 111 MG/DL
NRBC # BLD AUTO: 0 10E3/UL
NRBC BLD AUTO-RTO: 0 /100
PLATELET # BLD AUTO: 157 10E3/UL (ref 150–450)
POTASSIUM SERPL-SCNC: 4.2 MMOL/L (ref 3.4–5.3)
PROT SERPL-MCNC: 7.2 G/DL (ref 6.4–8.3)
RBC # BLD AUTO: 3.33 10E6/UL (ref 4.4–5.9)
SODIUM SERPL-SCNC: 136 MMOL/L (ref 136–145)
TRIGL SERPL-MCNC: 168 MG/DL
TSH SERPL DL<=0.005 MIU/L-ACNC: 1.81 UIU/ML (ref 0.3–4.2)
WBC # BLD AUTO: 4 10E3/UL (ref 4–11)

## 2022-11-15 PROCEDURE — 85014 HEMATOCRIT: CPT | Performed by: PHYSICIAN ASSISTANT

## 2022-11-15 PROCEDURE — 36415 COLL VENOUS BLD VENIPUNCTURE: CPT | Performed by: PHYSICIAN ASSISTANT

## 2022-11-15 PROCEDURE — 84443 ASSAY THYROID STIM HORMONE: CPT | Performed by: PHYSICIAN ASSISTANT

## 2022-11-15 PROCEDURE — 99214 OFFICE O/P EST MOD 30 MIN: CPT | Performed by: PHYSICIAN ASSISTANT

## 2022-11-15 PROCEDURE — G0463 HOSPITAL OUTPT CLINIC VISIT: HCPCS

## 2022-11-15 PROCEDURE — 80053 COMPREHEN METABOLIC PANEL: CPT | Performed by: PHYSICIAN ASSISTANT

## 2022-11-15 PROCEDURE — 85041 AUTOMATED RBC COUNT: CPT | Performed by: PHYSICIAN ASSISTANT

## 2022-11-15 PROCEDURE — 83036 HEMOGLOBIN GLYCOSYLATED A1C: CPT | Performed by: PHYSICIAN ASSISTANT

## 2022-11-15 PROCEDURE — 80061 LIPID PANEL: CPT | Performed by: PHYSICIAN ASSISTANT

## 2022-11-15 ASSESSMENT — PAIN SCALES - GENERAL: PAINLEVEL: NO PAIN (0)

## 2022-11-15 NOTE — LETTER
11/15/2022         RE: Ronna Collins  [address]  [facility] [address]      Oncology Visit:  Date on this visit: Nov 15, 2022    Diagnosis: h/o clinical prognostic stage IIIb, [id number], grade 2, ER positive, WI positive, HER-2 negative right breast cancer, [id number].    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  He noted discomfort and hardening of the skin of the right nipple in late April/early May, 2021.  He noted a seborrheic keratosis of the right nipple and remembered he had a similar skin lesion of the arm 3 months earlier.  However, he subsequently noted a mass in the same area.  Right breast skin punch biopsy performed by dermatology was c/w grade 2 invasive ductal carcinoma with associated DCIS.  Invasive carcinoma was ER positive 100% and WI positive 70%, with tumor invading the dermis.  HER-2 was negative by IHC (score 1+).  Diagnostic mammogram and ultrasound showed a retroareolar right breast mass measuring 2.9 cm with associated nipple retraction and enlarged, abnormal right axillary lymph nodes.  Right breast biopsy was again c/w grade 2 invasive ductal carcinoma, ER strong in 98% of tumor cell nuclei, HER-2 negative.  Ki-67 was 15%.    He elected to screen for the ISPY-2 clinical trial.  MRI breast on 6/18/2021 showed the biopsy proven right breast cancer to measure 3.9 cm in maximum dimension with invasion of the nipple and the pectoralis muscle as well as at least 4 abnormal level 1 and 2 right axillary lymph nodes and a tiny 0.4 cm right internal mammary lymph node.  Mammoprint returned low risk with a score of +0.29.  He elected for treatment on the endocrine optimization protocol of ISPY-2 and was randomized to treatment with amcenestrant.  He was on treatment with  amcenestrant from 7/1/2021 - 10/26/2021.  At our visit in 01/2021, both right axillary node and right breast tumor palpated more  firm then prior.  Breast MRI was stable, however, due to clinical concerns for progression, decision was made to proceed with surgery.   Right breast mastectomy and right axillary lymph node dissection was performed by Dr. Snider on 10/27/2021.  Pathology showed grade 2 carcinoma of the right breast measuring 3.2 cm with invasion of the dermis, nipple and the pectoralis muscle.  Ki-67 of the excised tumor was 7%.  15/44 right axillary lymph nodes were positive with 6 of the lymph nodes demonstrating extranodal extension.  The largest lymph node metastasis measured 2.1 cm.  Oncotype dx recurrence score was 50.     Karina received 4 cycles of Taxotere and cyclophosphamide from 12/14/2021 - 2/16/2022.  His course was complicated by fevers persistent throughout treatment.  He received radiation (4800 cGy in 15 fractions) to the right chest wall and regional lymph nodes from 3/21/2022 - 4/8/2022.  He has been on treatment with lupron and letrozole since 3/16/2022.  Abemaciclib was added 4/26/2022.    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  He is currently on treatment with abemaciclib, Lupron, and letrozole. He is doing well. He has had a little more difficulty with insomnia in the last few weeks but he believes this is due to his wife being out of town. He is still managing insomnia conservatively without ambien. He had first dose of Zometa a few weeks ago and did have flu like symptoms for 3-4 days. He also had to do colonoscopy prep as part of that so he had more diarrhea than usual. No nausea. No lumps/bumps. No issues with lymphedema. No pain.     Skin is still dry and itchy. He is seeing Dermatology in January. He is going to start Pepcid AC upon the recommendation of his PCP after EGD findings.      Past Medical/Surgical History:  Past Medical History:   Diagnosis Date     Breast cancer (H) 05/2021     Chronic sinusitis      Hypertension 2002     Past Surgical  History:   Procedure Laterality Date     COLONOSCOPY WITH CO2 INSUFFLATION N/A 10/28/2022    Procedure: COLONOSCOPY, WITH CO2 INSUFFLATION;  Surgeon: Annemarie Noel DO;  Location: [facility]     COMBINED ESOPHAGOSCOPY, GASTROSCOPY, DUODENOSCOPY (EGD) WITH CO2 INSUFFLATION N/A 10/28/2022    Procedure: ESOPHAGOGASTRODUODENOSCOPY, WITH CO2 INSUFFLATION;  Surgeon: Annemarie Noel DO;  Location: [facility]     DISSECT LYMPH NODE AXILLA Right 10/27/2021    Procedure: RIGHT Axillary Lymph Node Dissection;  Surgeon: Nida Snider MD;  Location: [facility]     ESOPHAGOSCOPY, GASTROSCOPY, DUODENOSCOPY (EGD), COMBINED N/A 10/28/2022    Procedure: ESOPHAGOGASTRODUODENOSCOPY, WITH BIOPSY;  Surgeon: Annemarie Noel DO;  Location: [facility]     MASTECTOMY SIMPLE Right 10/27/2021    Procedure: RIGHT Simple Mastectomy;  Surgeon: Nida Snider MD;  Location: [facility]     Allergies:  Allergies as of 11/15/2022     (No Known Allergies)     Current Medications:  Current Outpatient Medications   Medication Sig Dispense Refill     abemaciclib (VERZENIO) 150 MG tablet Take 1 tablet (150 mg) by mouth 2 times daily for 28 days 56 tablet 0     bisacodyl (DULCOLAX) 5 MG EC tablet Take 2 tablets at 3 pm the day before your procedure. If your procedure is before 11 am, take 2 additional tablets at 11 pm. If your procedure is after 11 am, take 2 additional tablets at 6 am. For additional instructions refer to your colonoscopy prep instructions. (Patient not taking: Reported on 11/4/2022) 4 tablet 0     famotidine (PEPCID) 40 MG tablet Take 1 tablet (40 mg) by mouth daily 90 tablet 3     gabapentin (NEURONTIN) 300 MG capsule Take 1 capsule (300 mg) by mouth At Bedtime (Patient not taking: Reported on 11/4/2022) 30 capsule 11     letrozole (FEMARA) 2.5 MG tablet Take 1 tablet (2.5 mg) by mouth daily for 28 days 28 tablet 0     letrozole (FEMARA) 2.5 MG tablet Take 1 tablet (2.5 mg) by mouth daily for 28 days 28 tablet 0     letrozole (FEMARA) 2.5  MG tablet Take 1 tablet (2.5 mg) by mouth daily for 28 days (Patient not taking: Reported on 9/12/2022) 28 tablet 0     letrozole (FEMARA) 2.5 MG tablet Take 1 tablet (2.5 mg) by mouth daily for 28 days 28 tablet 0     letrozole (FEMARA) 2.5 MG tablet Take 1 tablet (2.5 mg) by mouth daily for 28 days 28 tablet 0     loperamide (IMODIUM) 2 MG capsule Start with 2 caps (4 mg), then take one cap (2 mg) after each diarrheal stool as needed. Do not use more than 8 caps (16 mg) per day. 30 capsule 0     losartan-hydrochlorothiazide (HYZAAR) 100-25 MG tablet Take 1 tablet by mouth every morning Please keep the appointment on 11/4/22. 90 tablet 0     MELATONIN PO        Naproxen Sodium (ALEVE PO)        omeprazole (PRILOSEC) 20 MG DR capsule Take 1 capsule (20 mg) by mouth 2 times daily (Patient not taking: Reported on 11/4/2022) 60 capsule 3     ondansetron (ZOFRAN) 8 MG tablet Take 1 tablet (8 mg) by mouth every 8 hours as needed for nausea (Patient not taking: Reported on 11/4/2022) 30 tablet 3     polyethylene glycol (GOLYTELY) 236 g suspension The night before the exam at 6 pm drink an 8-ounce glass every 15 minutes until the jug is half empty. If you arrive before 11 AM: Drink the other half of the Golytely jug at 11 PM night before procedure. If you arrive after 11 AM: Drink the other half of the Golytely jug at 6 AM day of procedure. For additional instructions refer to your colonoscopy prep instructions. (Patient not taking: Reported on 11/4/2022) 4000 mL 0     prochlorperazine (COMPAZINE) 10 MG tablet Take 0.5 tablets (5 mg) by mouth every 6 hours as needed for nausea or vomiting 30 tablet 2     zolpidem (AMBIEN) 5 MG tablet Take 1 tablet (5 mg) by mouth nightly as needed for sleep (Patient not taking: Reported on 11/4/2022) 60 tablet 5      Genetics Breast Actionable and Breast Expanded panels were both negative for pathogenic germline mutations.    Physical Exam:  /82 (BP Location: Left arm, Patient  Position: Chair, Cuff Size: Adult Regular)   Pulse 80   Temp 97.8  F (36.6  C) (Oral)   Resp 18   Wt 77.6 kg (171 lb)   SpO2 99%   BMI 26.01 kg/m    General:  Well appearing adult male in NAD.  Alert and oriented x 3.  HEENT:  Normocephalic.  Sclera anicteric.   Lymph:  No palpable cervical, supraclavicular, or axillary LAD.  Chest:  CTA bilaterally.  No wheezes or crackles.  CV:  RRR.   Chest wall:  Right mastectomy. No palpable mass R chest wall.   Abd:  Soft/ND/NT +BS   Ext:  No pitting edema of the bilateral lower extremities.  Neuro:  Cranial nerves grossly intact.  Gait stable.  Psych:  Mood and affect appear normal.  Skin: No rashes on exposed skin.     Laboratory/Imaging Studies:   11/15/22 08:07   Sodium 136   Potassium 4.2   Chloride 103   Carbon Dioxide (CO2) 23   Urea Nitrogen 12.4   Creatinine 0.90   GFR Estimate >90   Calcium 9.8   Anion Gap 10   Albumin 4.4   Protein Total 7.2   Alkaline Phosphatase 71   ALT 16   AST 18   Bilirubin Total 0.3   Cholesterol 179   Glucose 108 (H)   HDL Cholesterol 68   LDL Cholesterol Calculated 77   Non HDL Cholesterol 111   Triglycerides 168 (H)   WBC 4.0   Hemoglobin 10.5 (L)   Hematocrit 30.8 (L)   Platelet Count 157   RBC Count 3.33 (L)   MCV 93   MCH 31.5   MCHC 34.1   RDW 12.8   % Neutrophils 68   % Lymphocytes 20   % Monocytes 8   % Eosinophils 2   % Basophils 1   Absolute Basophils 0.0   Absolute Eosinophils 0.1   Absolute Immature Granulocytes 0.0   Absolute Lymphocytes 0.8   Absolute Monocytes 0.3   % Immature Granulocytes 1   Absolute Neutrophils 2.8   Absolute NRBCs 0.0   NRBCs per 100 WBC 0       DEXA 10/14/22  Narrative & Impression   HISTORY:    Long-term use of aromatase inhibitors     COMPARISON:  None     Age: 72 years.  Height: 67 inches  Weight: 172 pounds  Sex: Male  Ethnicity:   Referring Provider: ROMI CRAWFORD     Image quality: Adequate     Lumbar spine T-score in region of L1-L3 = -0.7      HIPS:  Mean total hip T-score:  -0.8  Left femoral neck T-score = -1.2  Right femoral neck T-score= -1.5      FRAX:  10 year probability of major osteoporotic fracture: 6.3%  10 year probability of hip fracture: 1.7%  The 10 year probability of fracture may be lower than reported if the  patient has received treatment. FRAX data should be disregarded in  patient's taking bisphosphonates.     World Health Organization definition of osteoporosis and osteopenia  for  women:   Normal: T-score at or above -1.0  Low Bone Mass (Osteopenia): T-score between -1.0 and -2.5.   Osteoporosis: T-score at or below -2.5   T-scores are reported for postmenopausal women and men over 50 years  of age.                                                                      IMPRESSION:  Low bone mass (osteopenia). Consider follow-up DEXA in  approximately 2 years.         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  He is s/p treatment with 3.5 months neoadjuvant amcenestrant, right breast mastectomy, right ALND, 4 cycles Taxotere and cyclophosphamide, and radiation.  He is on treatment with abemaciclib, lupron and letrozol.    1.  Right breast cancer:    On treatment with abemaciclib, lupron and letrozole since 4/26/2022.  Has had waxing and waning anemia and leukopenia from this which we continue to monitor. Plan to continue abemaciclib for a total of 2 years.     Next Lupron 11/29. He is traveling to [facility] in December so Lupron is scheduled after he returns on 12/30.     2.  Bladder wall thickening seen on CT: Seeing Urology early January.      3.  Fatigue & weakness:  Improving.  Ongoing work with physical therapy and in a survival to strength program. Seeing Dr. Waters as well.     4.  RUE lymphedema/cording:  Well controlled. He was recently seen by lymphedema and new compression sleeves were prescribed by Dr. Waters.     5.  Insomnia: Managed conservatively with sleep hygiene techniques. Has ambien to take PRN.     6.  Numerous skin lesions: Seeing Dermatology in early January for skin check and evaluation of hyperpigmentation/pruritis.    7.  Patulous esophagus/occasional regurgitation and dysphagia: Had upper and lower endoscopies end of October. Colonoscopy benign. EGD showed gastritis and distal esphogatitis and duodenal ulceration without evidence of H. Pylori, dysplasia, or fungal elements. Being treated by GI with PPI BID x 6 weeks then once daily. He was apprehensive to take PPI so PCP recommended Pepcid BID.     TRINY Santa PA-C

## 2022-11-15 NOTE — NURSING NOTE
Chief Complaint   Patient presents with     Oncology Clinic Visit     Malignant neoplasm of male breast      Blood Draw     Labs drawn via  by VAT. VS taken.     Labs collected from venipuncture by VAT. Vitals taken. Checked in for appointment(s).    Delvis Alexis RN

## 2022-11-22 DIAGNOSIS — C50.929 MALIGNANT NEOPLASM OF MALE BREAST, UNSPECIFIED ESTROGEN RECEPTOR STATUS, UNSPECIFIED LATERALITY, UNSPECIFIED SITE OF BREAST (H): Primary | ICD-10-CM

## 2022-11-22 RX ORDER — LETROZOLE 2.5 MG/1
2.5 TABLET, FILM COATED ORAL DAILY
Qty: 28 TABLET | Refills: 0 | Status: SHIPPED | OUTPATIENT
Start: 2022-11-22 | End: 2023-09-18

## 2022-11-29 ENCOUNTER — INFUSION THERAPY VISIT (OUTPATIENT)
Dept: ONCOLOGY | Facility: CLINIC | Age: 72
End: 2022-11-29
Attending: INTERNAL MEDICINE
Payer: COMMERCIAL

## 2022-11-29 VITALS
DIASTOLIC BLOOD PRESSURE: 75 MMHG | HEART RATE: 92 BPM | RESPIRATION RATE: 14 BRPM | OXYGEN SATURATION: 99 % | SYSTOLIC BLOOD PRESSURE: 124 MMHG | TEMPERATURE: 98.3 F

## 2022-11-29 DIAGNOSIS — C50.929 MALIGNANT NEOPLASM OF MALE BREAST, UNSPECIFIED ESTROGEN RECEPTOR STATUS, UNSPECIFIED LATERALITY, UNSPECIFIED SITE OF BREAST (H): Primary | ICD-10-CM

## 2022-11-29 DIAGNOSIS — C50.121 MALIGNANT NEOPLASM OF CENTRAL PORTION OF RIGHT BREAST IN MALE, ESTROGEN RECEPTOR POSITIVE (H): Primary | ICD-10-CM

## 2022-11-29 DIAGNOSIS — Z17.0 MALIGNANT NEOPLASM OF CENTRAL PORTION OF RIGHT BREAST IN MALE, ESTROGEN RECEPTOR POSITIVE (H): Primary | ICD-10-CM

## 2022-11-29 PROCEDURE — 96402 CHEMO HORMON ANTINEOPL SQ/IM: CPT

## 2022-11-29 PROCEDURE — 250N000011 HC RX IP 250 OP 636: Performed by: PHYSICIAN ASSISTANT

## 2022-11-29 RX ORDER — LETROZOLE 2.5 MG/1
2.5 TABLET, FILM COATED ORAL DAILY
Qty: 90 TABLET | Refills: 3 | Status: SHIPPED | OUTPATIENT
Start: 2022-11-29 | End: 2023-09-18

## 2022-11-29 RX ADMIN — LEUPROLIDE ACETATE 7.5 MG: KIT at 09:25

## 2022-11-29 ASSESSMENT — PAIN SCALES - GENERAL: PAINLEVEL: NO PAIN (0)

## 2022-11-29 NOTE — PROGRESS NOTES
Infusion Nursing Note:  Ronna TUAN Collins presents today for Lupron.    Patient seen by provider today: No   present during visit today: Not Applicable.    Note: Patient denies the following: fevers, body aches, chills, headaches, vision changes, dizziness, chest pain, shortness of breath, nausea, vomiting, constipation, abdominal pain, rashes, bruising/bleeding, mouth sores, swelling or pain in the legs. Ok for treatment.     -Pt will be leaving for [facility] in a few weeks and needs his Femara refilled by 12/14  -IB to [facility] who did respond that they will contact the pt and deliver on 12/2 along with his Verzenio    Intravenous Access:  No Intravenous access/labs at this visit.    Treatment Conditions:  Not Applicable.      Post Infusion Assessment:  Patient tolerated injection into Left Ventrogluteal without incident.       Discharge Plan:   Prescription refills will be delivered to pt's [facility] per [facility].  Discharge instructions reviewed with: Patient.  Patient and/or family verbalized understanding of discharge instructions and all questions answered.  AVS to patient via [employer].  Patient will return 12/30 for next appointment.   Patient discharged in stable condition accompanied by: self.  Departure Mode: Ambulatory.    Verito Leonardo RN

## 2022-12-12 ENCOUNTER — PRE VISIT (OUTPATIENT)
Dept: UROLOGY | Facility: CLINIC | Age: 72
End: 2022-12-12

## 2022-12-12 NOTE — TELEPHONE ENCOUNTER
Reason for visit: consult      Dx/Hx/Sx: bladder wall thickening     Records/imaging/labs/orders: in epic     At Rooming: standard

## 2022-12-28 ENCOUNTER — TELEPHONE (OUTPATIENT)
Dept: ONCOLOGY | Facility: CLINIC | Age: 72
End: 2022-12-28

## 2022-12-28 DIAGNOSIS — C50.929 MALIGNANT NEOPLASM OF MALE BREAST, UNSPECIFIED ESTROGEN RECEPTOR STATUS, UNSPECIFIED LATERALITY, UNSPECIFIED SITE OF BREAST (H): Primary | ICD-10-CM

## 2022-12-28 NOTE — TELEPHONE ENCOUNTER
Oral Chemotherapy Monitoring Program     Placed call to patient in follow up of oral chemotherapy. Left message requesting call back. No drug names were mentioned. Will update when response received.     Grace Myhre, PharmD  Hematology/Oncology Clinical Pharmacist  [facility]  [facility]  [phone]

## 2022-12-29 NOTE — TELEPHONE ENCOUNTER
Oral Chemotherapy Monitoring Program    Subjective/Objective:  Ronna Collins is a 72 year old male contacted by phone for a follow-up visit for oral chemotherapy. Karina confirms taking the appropriate dose of Verzenio 150 mg BID. Denies missed doses and recent hospital or ED visits. Patient has not had any recent medication changes. He has notcied some increasing nausea and diarrhea lately. He recently visited [facility] so he is suspicisios that the diarrhea could be related to the change in water and diet from the trip, he is taking Imodium which is helpful. He asked about nausea treatment and we reviewed prochlorperazine and ondansetron- he has prochlorperazine on hand at home and will try this.     ORAL CHEMOTHERAPY 9/26/2022 10/19/2022 10/25/2022 11/22/2022 11/29/2022 12/28/2022 12/29/2022   Assessment Type Lab Monitoring Refill Lab Monitoring Refill Refill Left Voicemail Quarterly Follow up   Diagnosis Code Breast Cancer Breast Cancer Breast Cancer Breast Cancer Breast Cancer Breast Cancer Breast Cancer   Providers Dr. Remberto Sr   [facility]   Drug Name Verzenio (abemaciclib) Verzenio (abemaciclib) Verzenio (abemaciclib) Verzenio (abemaciclib) - Verzenio (abemaciclib) Verzenio (abemaciclib)   Dose 150 mg 150 mg 150 mg 150 mg - 150 mg 150 mg   Current Schedule BID BID BID BID - BID BID   Cycle Details Continuous Continuous Continuous Continuous - Continuous Continuous   Start Date of Last Cycle - - - - - - -   Doses missed in last 2 weeks - - - - - - 0   Adherence Assessment - - - - - - Adherent   Adverse Effects - - - - - - Nausea;Diarrhea   Nausea - - - - - - Grade 1   Pharmacist Intervention(nausea) - - - - - - Yes   Intervention(s) - - - - - - Rx medication recommendation   Diarrhea - - - - - - Grade 1   Pharmacist Intervention(diarrhea) - - - - - - Yes  "  Intervention(s) - - - - - - Patient education   Fatigue - - - - - - -   Pharmacist Intervention(fatigue) - - - - - - -   Intervention(s) - - - - - - -   Other (See Note for Details) - - - - - - -   Pharmacist intervention(other) - - - - - - -   Intervention(s) - - - - - - -   Any new drug interactions? - - - - - - No   Is the dose as ordered appropriate for the patient? - - - - - - Yes   Since the last time we talked, have you been hospitalized or used the emergency room? - - - - - - -       Last PHQ-2 Score on record:   PHQ-2 ( 1999 [employer]) 11/4/2022 5/24/2022   Q1: Little interest or pleasure in doing things 0 0   Q2: Feeling down, depressed or hopeless 0 0   PHQ-2 Score 0 0   PHQ-2 Total Score (12-17 Years)- Positive if 3 or more points; Administer PHQ-A if positive - -       Vitals:  BP:   BP Readings from Last 1 Encounters:   11/29/22 124/75     Wt Readings from Last 1 Encounters:   11/15/22 77.6 kg (171 lb)     Estimated body surface area is 1.93 meters squared as calculated from the following:    Height as of 11/4/22: 1.727 m (5' 7.99\").    Weight as of 11/15/22: 77.6 kg (171 lb).      Assessment/Plan:  Karina is tolerating Verzenio. PDC=0.98, no adherence concerns.     Follow-Up:  12/30 labs    Refill Due:  [facility] will deliver 12/30    Татьяна Anglin PharmD  Hematology/Oncology Clinical Pharmacist  [facility]  [facility]  [phone]    "

## 2022-12-30 ENCOUNTER — INFUSION THERAPY VISIT (OUTPATIENT)
Dept: ONCOLOGY | Facility: CLINIC | Age: 72
End: 2022-12-30
Attending: INTERNAL MEDICINE
Payer: COMMERCIAL

## 2022-12-30 ENCOUNTER — MYC MEDICAL ADVICE (OUTPATIENT)
Dept: ONCOLOGY | Facility: CLINIC | Age: 72
End: 2022-12-30

## 2022-12-30 ENCOUNTER — APPOINTMENT (OUTPATIENT)
Dept: LAB | Facility: CLINIC | Age: 72
End: 2022-12-30
Attending: INTERNAL MEDICINE
Payer: COMMERCIAL

## 2022-12-30 VITALS
SYSTOLIC BLOOD PRESSURE: 132 MMHG | WEIGHT: 173.2 LBS | HEART RATE: 92 BPM | TEMPERATURE: 97.5 F | BODY MASS INDEX: 26.34 KG/M2 | DIASTOLIC BLOOD PRESSURE: 79 MMHG | RESPIRATION RATE: 18 BRPM | OXYGEN SATURATION: 98 %

## 2022-12-30 DIAGNOSIS — Z17.0 MALIGNANT NEOPLASM OF CENTRAL PORTION OF RIGHT BREAST IN MALE, ESTROGEN RECEPTOR POSITIVE (H): Primary | ICD-10-CM

## 2022-12-30 DIAGNOSIS — C50.121 MALIGNANT NEOPLASM OF CENTRAL PORTION OF RIGHT BREAST IN MALE, ESTROGEN RECEPTOR POSITIVE (H): Primary | ICD-10-CM

## 2022-12-30 LAB
ALBUMIN SERPL BCG-MCNC: 4.6 G/DL (ref 3.5–5.2)
ALP SERPL-CCNC: 52 U/L (ref 40–129)
ALT SERPL W P-5'-P-CCNC: 21 U/L (ref 10–50)
ANION GAP SERPL CALCULATED.3IONS-SCNC: 11 MMOL/L (ref 7–15)
AST SERPL W P-5'-P-CCNC: 21 U/L (ref 10–50)
BASOPHILS # BLD AUTO: 0 10E3/UL (ref 0–0.2)
BASOPHILS NFR BLD AUTO: 1 %
BILIRUB SERPL-MCNC: 0.4 MG/DL
BUN SERPL-MCNC: 11.2 MG/DL (ref 8–23)
CALCIUM SERPL-MCNC: 10.3 MG/DL (ref 8.8–10.2)
CHLORIDE SERPL-SCNC: 103 MMOL/L (ref 98–107)
CREAT SERPL-MCNC: 0.88 MG/DL (ref 0.67–1.17)
DEPRECATED HCO3 PLAS-SCNC: 25 MMOL/L (ref 22–29)
EOSINOPHIL # BLD AUTO: 0.1 10E3/UL (ref 0–0.7)
EOSINOPHIL NFR BLD AUTO: 4 %
ERYTHROCYTE [DISTWIDTH] IN BLOOD BY AUTOMATED COUNT: 13.6 % (ref 10–15)
GFR SERPL CREATININE-BSD FRML MDRD: >90 ML/MIN/1.73M2
GLUCOSE SERPL-MCNC: 103 MG/DL (ref 70–99)
HCT VFR BLD AUTO: 33.1 % (ref 40–53)
HGB BLD-MCNC: 11.3 G/DL (ref 13.3–17.7)
IMM GRANULOCYTES # BLD: 0 10E3/UL
IMM GRANULOCYTES NFR BLD: 0 %
LYMPHOCYTES # BLD AUTO: 0.9 10E3/UL (ref 0.8–5.3)
LYMPHOCYTES NFR BLD AUTO: 30 %
MCH RBC QN AUTO: 31.5 PG (ref 26.5–33)
MCHC RBC AUTO-ENTMCNC: 34.1 G/DL (ref 31.5–36.5)
MCV RBC AUTO: 92 FL (ref 78–100)
MONOCYTES # BLD AUTO: 0.2 10E3/UL (ref 0–1.3)
MONOCYTES NFR BLD AUTO: 7 %
NEUTROPHILS # BLD AUTO: 1.6 10E3/UL (ref 1.6–8.3)
NEUTROPHILS NFR BLD AUTO: 58 %
NRBC # BLD AUTO: 0 10E3/UL
NRBC BLD AUTO-RTO: 0 /100
PLATELET # BLD AUTO: 177 10E3/UL (ref 150–450)
POTASSIUM SERPL-SCNC: 3.9 MMOL/L (ref 3.4–5.3)
PROT SERPL-MCNC: 7.5 G/DL (ref 6.4–8.3)
RBC # BLD AUTO: 3.59 10E6/UL (ref 4.4–5.9)
SODIUM SERPL-SCNC: 139 MMOL/L (ref 136–145)
WBC # BLD AUTO: 2.8 10E3/UL (ref 4–11)

## 2022-12-30 PROCEDURE — 36415 COLL VENOUS BLD VENIPUNCTURE: CPT

## 2022-12-30 PROCEDURE — 85025 COMPLETE CBC W/AUTO DIFF WBC: CPT

## 2022-12-30 PROCEDURE — 250N000011 HC RX IP 250 OP 636: Performed by: PHYSICIAN ASSISTANT

## 2022-12-30 PROCEDURE — 96402 CHEMO HORMON ANTINEOPL SQ/IM: CPT

## 2022-12-30 PROCEDURE — 80053 COMPREHEN METABOLIC PANEL: CPT

## 2022-12-30 RX ADMIN — LEUPROLIDE ACETATE 7.5 MG: KIT at 08:35

## 2022-12-30 ASSESSMENT — PAIN SCALES - GENERAL: PAINLEVEL: NO PAIN (0)

## 2022-12-30 NOTE — NURSING NOTE
Chief Complaint   Patient presents with     Blood Draw     Labs drawn via  by RN in lab. VS Taken.      Labs drawn from PIV placed by RN. Line flushed with saline. Vitals taken. Pt checked in for appointment(s).    Regina Miranda RN

## 2022-12-30 NOTE — TELEPHONE ENCOUNTER
Oral Chemotherapy Monitoring Program  Lab Follow Up    Reviewed CBC and CMP lab results from today.    ORAL CHEMOTHERAPY 10/19/2022 10/25/2022 11/22/2022 11/29/2022 12/28/2022 12/29/2022 12/30/2022   Assessment Type Refill Lab Monitoring Refill Refill Left Voicemail Quarterly Follow up Lab Monitoring   Diagnosis Code Breast Cancer Breast Cancer Breast Cancer Breast Cancer Breast Cancer Breast Cancer Breast Cancer   Providers Dr. Remberto Sr   [facility]   Drug Name Verzenio (abemaciclib) Verzenio (abemaciclib) Verzenio (abemaciclib) - Verzenio (abemaciclib) Verzenio (abemaciclib) Verzenio (abemaciclib)   Dose 150 mg 150 mg 150 mg - 150 mg 150 mg 150 mg   Current Schedule BID BID BID - BID BID BID   Cycle Details Continuous Continuous Continuous - Continuous Continuous Continuous   Start Date of Last Cycle - - - - - - -   Doses missed in last 2 weeks - - - - - 0 -   Adherence Assessment - - - - - Adherent -   Adverse Effects - - - - - Nausea;Diarrhea -   Nausea - - - - - Grade 1 -   Pharmacist Intervention(nausea) - - - - - Yes -   Intervention(s) - - - - - Rx medication recommendation -   Diarrhea - - - - - Grade 1 -   Pharmacist Intervention(diarrhea) - - - - - Yes -   Intervention(s) - - - - - Patient education -   Fatigue - - - - - - -   Pharmacist Intervention(fatigue) - - - - - - -   Intervention(s) - - - - - - -   Other (See Note for Details) - - - - - - -   Pharmacist intervention(other) - - - - - - -   Intervention(s) - - - - - - -   Any new drug interactions? - - - - - No -   Is the dose as ordered appropriate for the patient? - - - - - Yes -   Since the last time we talked, have you been hospitalized or used the emergency room? - - - - - - -       Labs:  _  Result Component Current Result Ref Range   Sodium 139 (12/30/2022) 136 - 145 mmol/L     _  Result Component  Current Result Ref Range   Potassium 3.9 (12/30/2022) 3.4 - 5.3 mmol/L     _  Result Component Current Result Ref Range   Calcium 10.3 (H) (12/30/2022) 8.8 - 10.2 mg/dL     No results found for Mag within last 30 days.     No results found for Phos within last 30 days.     _  Result Component Current Result Ref Range   Albumin 4.6 (12/30/2022) 3.5 - 5.2 g/dL     _  Result Component Current Result Ref Range   Urea Nitrogen 11.2 (12/30/2022) 8.0 - 23.0 mg/dL     _  Result Component Current Result Ref Range   Creatinine 0.88 (12/30/2022) 0.67 - 1.17 mg/dL     _  Result Component Current Result Ref Range   AST 21 (12/30/2022) 10 - 50 U/L     _  Result Component Current Result Ref Range   ALT 21 (12/30/2022) 10 - 50 U/L     _  Result Component Current Result Ref Range   Bilirubin Total 0.4 (12/30/2022) <=1.2 mg/dL     _  Result Component Current Result Ref Range   WBC Count 2.8 (L) (12/30/2022) 4.0 - 11.0 10e3/uL     _  Result Component Current Result Ref Range   Hemoglobin 11.3 (L) (12/30/2022) 13.3 - 17.7 g/dL     _  Result Component Current Result Ref Range   Platelet Count 177 (12/30/2022) 150 - 450 10e3/uL     No results found for ANC within last 30 days.     _  Result Component Current Result Ref Range   Absolute Neutrophils 1.6 (12/30/2022) 1.6 - 8.3 10e3/uL        Assessment & Plan:  No concerning abnormalities.    MyChart sent to patient with results    Follow-Up:  01/27: monthly labs    Grace Myhre, MarceloD  Hematology/Oncology Pharmacist  [facility]  [phone]

## 2022-12-30 NOTE — PROGRESS NOTES
Infusion Nursing Note:  Ronna Collins presents today for Cycle 11 Day 1 Lupron.    Patient seen by provider today: No   present during visit today: Not Applicable.    Note: Ronna comes into the clinic today doing well overall and has no concerns to address at this visit. He does not attest to any abnormal pain/SOB/chest tightness/signs of infection/dizziness/sensation changes/bruising/swelling/diarrhea/constipation/urinary issues/vision or hearing changes/fatigue. He was in [place] a few days ago an while he was there he mentions struggling with some nausea and vomiting which he attributes to the water he was drinking while there. He says that these symptoms have resolved since returning home and he is feeling much better.    Intravenous Access:  No Intravenous access/labs at this visit.    Treatment Conditions:  Not Applicable.    Post Infusion Assessment:  Patient tolerated Lupron injection without incident to right ventrogluteal.    Discharge Plan:   Patient declined prescription refills.  Discharge instructions reviewed with: Patient.  Patient and/or family verbalized understanding of discharge instructions and all questions answered.  AVS to patient via [employer].  Patient will return 1/27 for next appointment.   Patient discharged in stable condition accompanied by: self.  Departure Mode: Ambulatory.      Ritika Recinos RN

## 2022-12-30 NOTE — PATIENT INSTRUCTIONS
Latest Reference Range & Units 12/30/22 08:17   WBC 4.0 - 11.0 10e3/uL 2.8 (L)   Hemoglobin 13.3 - 17.7 g/dL 11.3 (L)   Hematocrit 40.0 - 53.0 % 33.1 (L)   Platelet Count 150 - 450 10e3/uL 177   RBC Count 4.40 - 5.90 10e6/uL 3.59 (L)   MCV 78 - 100 fL 92   MCH 26.5 - 33.0 pg 31.5   MCHC 31.5 - 36.5 g/dL 34.1   RDW 10.0 - 15.0 % 13.6   % Neutrophils % 58   % Lymphocytes % 30   % Monocytes % 7   % Eosinophils % 4   % Basophils % 1   Absolute Basophils 0.0 - 0.2 10e3/uL 0.0   Absolute Eosinophils 0.0 - 0.7 10e3/uL 0.1   Absolute Immature Granulocytes <=0.4 10e3/uL 0.0   Absolute Lymphocytes 0.8 - 5.3 10e3/uL 0.9   Absolute Monocytes 0.0 - 1.3 10e3/uL 0.2   % Immature Granulocytes % 0   Absolute Neutrophils 1.6 - 8.3 10e3/uL 1.6   Absolute NRBCs 10e3/uL 0.0   NRBCs per 100 WBC <1 /100 0       [facility] Triage and after hours / weekends / holidays:  [phone]    Please call the triage or after hours line if you experience a temperature greater than or equal to 100.4, shaking chills, have uncontrolled nausea, vomiting and/or diarrhea, dizziness, shortness of breath, chest pain, bleeding, unexplained bruising, or if you have any other new/concerning symptoms, questions or concerns.      If you are having any concerning symptoms or wish to speak to a provider before your next infusion visit, please call your care coordinator or triage to notify them so we can adequately serve you.     If you need a refill on a narcotic prescription or other medication, please call before your infusion appointment.

## 2023-01-02 NOTE — PROGRESS NOTES
Assessment and Plan:     Assessment: 72 year old male with a history of right breast cancer, s/p mastectomy, and family history of bladder cancer in his older brother, who was had anterior bladder wall thickening on imaging, as well as an enlarged prostate. History of elevated PSA which he opted not to follow up on several years ago due to controversy regarding reliability of PSA. Offered to have a new baseline checked now and he is ok with this. We discussed that since he has had no blood in his urine, suspicion of his bladder thickening being a mass is low. However, given his family history of bladder cancer, and his personal history of cancer, we discussed going ahead with a cystoscopy now and he would like to do this.     Plan:  -Will obtain another UA now, as well as a new baseline PSA.   -Schedule cystoscopy to evaluate bladder thickening.     Samia Castillo, CNP  Department of Urology           Chief Complaint:   Bladder wall thickening         History of Present Illness:    Ronna Collins is a 72 year old male with right breast cancer, s/p mastectomy, who presents for consult regarding bladder wall thickening on CT obtained 9/9/22. Prostate also noted to be enlarged. Recent UA from last month negative for microscopic hematuria. He previously saw Dr. Silva back in 2011 for elevated PSA and a biopsy was considered at that time. However, the utility of PSA was also questioned at that time, and he read a [employer] article which discussed this. He ultimately decided to forgo the biopsy and stop PSA checks altogether.     Regarding his CT findings, he denies any voiding symptoms. He feels he voids with a strong stream and empties his bladder completely. Denies gross hematuria. He does note that his older brother was diagnosed with bladder cancer within the past few years in the absence of any family history of this. He went through treatment and is now doing fine.          Past Medical History:  "    Past Medical History:   Diagnosis Date     Breast cancer (H) 05/2021     Chronic sinusitis      Hypertension 2002            Past Surgical History:     Past Surgical History:   Procedure Laterality Date     COLONOSCOPY WITH CO2 INSUFFLATION N/A 10/28/2022    Procedure: COLONOSCOPY, WITH CO2 INSUFFLATION;  Surgeon: Annemarie Noel DO;  Location: [facility]     COMBINED ESOPHAGOSCOPY, GASTROSCOPY, DUODENOSCOPY (EGD) WITH CO2 INSUFFLATION N/A 10/28/2022    Procedure: ESOPHAGOGASTRODUODENOSCOPY, WITH CO2 INSUFFLATION;  Surgeon: Annemarie Noel DO;  Location: [facility]     DISSECT LYMPH NODE AXILLA Right 10/27/2021    Procedure: RIGHT Axillary Lymph Node Dissection;  Surgeon: Nida Snider MD;  [facility]     ESOPHAGOSCOPY, GASTROSCOPY, DUODENOSCOPY (EGD), COMBINED N/A 10/28/2022    Procedure: ESOPHAGOGASTRODUODENOSCOPY, WITH BIOPSY;  Surgeon: Annemarie Noel DO;  Location: [facility]     MASTECTOMY SIMPLE Right 10/27/2021    Procedure: RIGHT Simple Mastectomy;  Surgeon: Nida Snider MD;  Location: [facility]            Medications     Current Outpatient Medications   Medication     abemaciclib (VERZENIO) 150 MG tablet     famotidine (PEPCID) 40 MG tablet     letrozole (FEMARA) 2.5 MG tablet     letrozole (FEMARA) 2.5 MG tablet     loperamide (IMODIUM) 2 MG capsule     losartan-hydrochlorothiazide (HYZAAR) 100-25 MG tablet     MELATONIN PO     Naproxen Sodium (ALEVE PO)     prochlorperazine (COMPAZINE) 10 MG tablet     No current facility-administered medications for this visit.            Allergies:   Patient has no known allergies.         Review of Systems:  From intake questionnaire   Negative 14 system review except as noted on HPI, nurse's note.         Physical Exam:   Patient is a 72 year old  male   Vitals: Blood pressure 125/82, pulse 101, height 1.727 m (5' 8\"), weight 77.1 kg (170 lb).  General Appearance Adult: Alert, no acute distress, oriented  Lungs: no respiratory distress, or pursed lip " breathing  Heart: No obvious jugular venous distension present  Abdomen: soft, nontender, no organomegaly or masses, Body mass index is 25.85 kg/m .  : deferred to cystoscopy      Labs and Pathology:    I personally reviewed all applicable laboratory data and went over findings with patient  Significant for:    CBC RESULTS:  Recent Labs   Lab Test 12/30/22  0817 11/15/22  0807 10/25/22  0737 09/26/22  0820   WBC 2.8* 4.0 3.3* 2.8*   HGB 11.3* 10.5* 11.5* 11.7*    157 167 151        BMP RESULTS:  Recent Labs   Lab Test 12/30/22  0817 11/15/22  0807 10/25/22  0737 09/26/22  0820 07/15/21  0859 07/08/21  0730 06/30/21  0943 06/14/21  1210 10/14/20  1008    136 139 138   < > 139 138 138 140   POTASSIUM 3.9 4.2 3.7 3.9   < > 4.3 3.7 3.6 4.4   CHLORIDE 103 103 104 101   < > 107 103 106 108   CO2 25 23 21* 23   < > 27 25 28 26   ANIONGAP 11 10 14 14   < > 4 9 3 5   * 108* 167* 138*   < > 106* 182* 116* 107*   BUN 11.2 12.4 11.2 15.7   < > 8 11 9 9   CR 0.88 0.90 0.81 0.89   < > 0.80 0.73 0.69 0.75   GFRESTIMATED >90 >90 >90 >90   < > >90 >90 >90 >90   GFRESTBLACK  --   --   --   --   --  >90 >90 >90 >90   PROSPER 10.3* 9.8 10.0 10.3*   < > 8.7 9.4 8.7 9.0    < > = values in this interval not displayed.       UA RESULTS:   Recent Labs   Lab Test 11/04/22  0920 03/02/22  1218 02/21/22  1514   SG 1.025 1.005 1.015   URINEPH 6.5 7.0 8.0*   NITRITE Negative Negative Negative   RBCU 1 1 <1   WBCU 1 2 1         PSA RESULTS  PSA   Date Value Ref Range Status   09/24/2012 3.42 0 - 4 ug/L Final     Comment:     PSA results are about 7% lower than our prior method due to a methodology   change   on August 30, 2011.   11/29/2011 4.17 (H) 0 - 4 ug/L Final     Comment:     PSA results are about 7% lower than our prior method due to a methodology   change   on August 30, 2011. To repeat testing by prior method contact the laboratory.   09/19/2011 6.26 (H) 0 - 4 ug/L Final     Comment:     PSA results are about 7% lower  than our prior method due to a methodology   change   on August 30, 2011. To repeat testing by prior method contact the laboratory.   07/08/2010 3.67 0 - 4 ug/L Final   06/23/2008 2.99 0 - 4 ug/L Final   10/23/2006 2.52 0 - 4 ug/L Final   11/28/2005 2.82 0 - 4 ug/L Final           Imaging:    I personally reviewed all applicable imaging and went over findings with patient.  Significant for:    CT 9/9/22:      Kidneys: No suspicious mass, obstructing calculus or hydronephrosis.   Nonspecific perinephric stranding.      Pelvis: Unchanged anterior bladder wall thickening. Prostate is  enlarged.

## 2023-01-03 ENCOUNTER — LAB (OUTPATIENT)
Dept: LAB | Facility: CLINIC | Age: 73
End: 2023-01-03
Payer: COMMERCIAL

## 2023-01-03 ENCOUNTER — PRE VISIT (OUTPATIENT)
Dept: UROLOGY | Facility: CLINIC | Age: 73
End: 2023-01-03

## 2023-01-03 ENCOUNTER — OFFICE VISIT (OUTPATIENT)
Dept: UROLOGY | Facility: CLINIC | Age: 73
End: 2023-01-03
Attending: INTERNAL MEDICINE
Payer: COMMERCIAL

## 2023-01-03 VITALS
WEIGHT: 170 LBS | SYSTOLIC BLOOD PRESSURE: 125 MMHG | BODY MASS INDEX: 25.76 KG/M2 | DIASTOLIC BLOOD PRESSURE: 82 MMHG | HEART RATE: 101 BPM | HEIGHT: 68 IN

## 2023-01-03 DIAGNOSIS — N40.0 ENLARGED PROSTATE: Primary | ICD-10-CM

## 2023-01-03 DIAGNOSIS — N32.89 BLADDER WALL THICKENING: ICD-10-CM

## 2023-01-03 LAB
ALBUMIN UR-MCNC: NEGATIVE MG/DL
APPEARANCE UR: CLEAR
BILIRUB UR QL STRIP: NEGATIVE
COLOR UR AUTO: NORMAL
GLUCOSE UR STRIP-MCNC: NEGATIVE MG/DL
HGB UR QL STRIP: NEGATIVE
KETONES UR STRIP-MCNC: NEGATIVE MG/DL
LEUKOCYTE ESTERASE UR QL STRIP: NEGATIVE
NITRATE UR QL: NEGATIVE
PH UR STRIP: 6 [PH] (ref 5–7)
PSA SERPL-MCNC: 0.45 NG/ML (ref 0–6.5)
RBC URINE: 1 /HPF
SP GR UR STRIP: 1.02 (ref 1–1.03)
SQUAMOUS EPITHELIAL: <1 /HPF
UROBILINOGEN UR STRIP-MCNC: NORMAL MG/DL
WBC URINE: 1 /HPF

## 2023-01-03 PROCEDURE — 99000 SPECIMEN HANDLING OFFICE-LAB: CPT | Performed by: PATHOLOGY

## 2023-01-03 PROCEDURE — 84153 ASSAY OF PSA TOTAL: CPT | Performed by: PATHOLOGY

## 2023-01-03 PROCEDURE — 87086 URINE CULTURE/COLONY COUNT: CPT | Mod: 90 | Performed by: PATHOLOGY

## 2023-01-03 PROCEDURE — 99213 OFFICE O/P EST LOW 20 MIN: CPT | Performed by: NURSE PRACTITIONER

## 2023-01-03 PROCEDURE — 81001 URINALYSIS AUTO W/SCOPE: CPT | Performed by: PATHOLOGY

## 2023-01-03 PROCEDURE — 36415 COLL VENOUS BLD VENIPUNCTURE: CPT | Performed by: PATHOLOGY

## 2023-01-03 ASSESSMENT — PAIN SCALES - GENERAL: PAINLEVEL: NO PAIN (0)

## 2023-01-03 NOTE — LETTER
1/3/2023       RE: Ronna Collins  [address]  [facility] [address]     Dear Colleague,    Thank you for referring your patient, Ronna Collins, to the [facility] [place] [facility]. Please see a copy of my visit note below.         Assessment and Plan:     Assessment: 72 year old male with a history of right breast cancer, s/p mastectomy, and family history of bladder cancer in his older brother, who was had anterior bladder wall thickening on imaging, as well as an enlarged prostate. History of elevated PSA which he opted not to follow up on several years ago due to controversy regarding reliability of PSA. Offered to have a new baseline checked now and he is ok with this. We discussed that since he has had no blood in his urine, suspicion of his bladder thickening being a mass is low. However, given his family history of bladder cancer, and his personal history of cancer, we discussed going ahead with a cystoscopy now and he would like to do this.     Plan:  -Will obtain another UA now, as well as a new baseline PSA.   -Schedule cystoscopy to evaluate bladder thickening.     Samia Castillo, CNP  Department of Urology           Chief Complaint:   Bladder wall thickening         History of Present Illness:    Ronna Collins is a 72 year old male with right breast cancer, s/p mastectomy, who presents for consult regarding bladder wall thickening on CT obtained 9/9/22. Prostate also noted to be enlarged. Recent UA from last month negative for microscopic hematuria. He previously saw Dr. Silva back in 2011 for elevated PSA and a biopsy was considered at that time. However, the utility of PSA was also questioned at that time, and he read a [employer] article which discussed this. He ultimately decided to forgo the biopsy and stop PSA checks altogether.     Regarding his CT findings, he denies any voiding symptoms.  He feels he voids with a strong stream and empties his bladder completely. Denies gross hematuria. He does note that his older brother was diagnosed with bladder cancer within the past few years in the absence of any family history of this. He went through treatment and is now doing fine.          Past Medical History:     Past Medical History:   Diagnosis Date     Breast cancer (H) 05/2021     Chronic sinusitis      Hypertension 2002            Past Surgical History:     Past Surgical History:   Procedure Laterality Date     COLONOSCOPY WITH CO2 INSUFFLATION N/A 10/28/2022    Procedure: COLONOSCOPY, WITH CO2 INSUFFLATION;  Surgeon: Annemarie Noel DO;  [facility]     COMBINED ESOPHAGOSCOPY, GASTROSCOPY, DUODENOSCOPY (EGD) WITH CO2 INSUFFLATION N/A 10/28/2022    Procedure: ESOPHAGOGASTRODUODENOSCOPY, WITH CO2 INSUFFLATION;  Surgeon: Annemarie Noel DO;  [facility]     DISSECT LYMPH NODE AXILLA Right 10/27/2021    Procedure: RIGHT Axillary Lymph Node Dissection;  Surgeon: Nida Snider MD;  [facility]     ESOPHAGOSCOPY, GASTROSCOPY, DUODENOSCOPY (EGD), COMBINED N/A 10/28/2022    Procedure: ESOPHAGOGASTRODUODENOSCOPY, WITH BIOPSY;  Surgeon: Annemarie Noel DO;  [facility]     MASTECTOMY SIMPLE Right 10/27/2021    Procedure: RIGHT Simple Mastectomy;  Surgeon: Nida Snider MD;  [facility]            Medications     Current Outpatient Medications   Medication     abemaciclib (VERZENIO) 150 MG tablet     famotidine (PEPCID) 40 MG tablet     letrozole (FEMARA) 2.5 MG tablet     letrozole (FEMARA) 2.5 MG tablet     loperamide (IMODIUM) 2 MG capsule     losartan-hydrochlorothiazide (HYZAAR) 100-25 MG tablet     MELATONIN PO     Naproxen Sodium (ALEVE PO)     prochlorperazine (COMPAZINE) 10 MG tablet     No current facility-administered medications for this visit.            Allergies:   Patient has no known allergies.         Review of Systems:  From intake questionnaire  "  Negative 14 system review except as noted on HPI, nurse's note.         Physical Exam:   Patient is a 72 year old  male   Vitals: Blood pressure 125/82, pulse 101, height 1.727 m (5' 8\"), weight 77.1 kg (170 lb).  General Appearance Adult: Alert, no acute distress, oriented  Lungs: no respiratory distress, or pursed lip breathing  Heart: No obvious jugular venous distension present  Abdomen: soft, nontender, no organomegaly or masses, Body mass index is 25.85 kg/m .  : deferred to cystoscopy      Labs and Pathology:    I personally reviewed all applicable laboratory data and went over findings with patient  Significant for:    CBC RESULTS:  Recent Labs   Lab Test 12/30/22  0817 11/15/22  0807 10/25/22  0737 09/26/22  0820   WBC 2.8* 4.0 3.3* 2.8*   HGB 11.3* 10.5* 11.5* 11.7*    157 167 151        BMP RESULTS:  Recent Labs   Lab Test 12/30/22  0817 11/15/22  0807 10/25/22  0737 09/26/22  0820 07/15/21  0859 07/08/21  0730 06/30/21  0943 06/14/21  1210 10/14/20  1008    136 139 138   < > 139 138 138 140   POTASSIUM 3.9 4.2 3.7 3.9   < > 4.3 3.7 3.6 4.4   CHLORIDE 103 103 104 101   < > 107 103 106 108   CO2 25 23 21* 23   < > 27 25 28 26   ANIONGAP 11 10 14 14   < > 4 9 3 5   * 108* 167* 138*   < > 106* 182* 116* 107*   BUN 11.2 12.4 11.2 15.7   < > 8 11 9 9   CR 0.88 0.90 0.81 0.89   < > 0.80 0.73 0.69 0.75   GFRESTIMATED >90 >90 >90 >90   < > >90 >90 >90 >90   GFRESTBLACK  --   --   --   --   --  >90 >90 >90 >90   PROSPER 10.3* 9.8 10.0 10.3*   < > 8.7 9.4 8.7 9.0    < > = values in this interval not displayed.       UA RESULTS:   Recent Labs   Lab Test 11/04/22  0920 03/02/22  1218 02/21/22  1514   SG 1.025 1.005 1.015   URINEPH 6.5 7.0 8.0*   NITRITE Negative Negative Negative   RBCU 1 1 <1   WBCU 1 2 1         PSA RESULTS  PSA   Date Value Ref Range Status   09/24/2012 3.42 0 - 4 ug/L Final     Comment:     PSA results are about 7% lower than our prior method due to a methodology   change   on " August 30, 2011.   11/29/2011 4.17 (H) 0 - 4 ug/L Final     Comment:     PSA results are about 7% lower than our prior method due to a methodology   change   on August 30, 2011. To repeat testing by prior method contact the laboratory.   09/19/2011 6.26 (H) 0 - 4 ug/L Final     Comment:     PSA results are about 7% lower than our prior method due to a methodology   change   on August 30, 2011. To repeat testing by prior method contact the laboratory.   07/08/2010 3.67 0 - 4 ug/L Final   06/23/2008 2.99 0 - 4 ug/L Final   10/23/2006 2.52 0 - 4 ug/L Final   11/28/2005 2.82 0 - 4 ug/L Final           Imaging:    I personally reviewed all applicable imaging and went over findings with patient.  Significant for:    CT 9/9/22:      Kidneys: No suspicious mass, obstructing calculus or hydronephrosis.   Nonspecific perinephric stranding.      Pelvis: Unchanged anterior bladder wall thickening. Prostate is  enlarged.

## 2023-01-03 NOTE — PATIENT INSTRUCTIONS
UROLOGY CLINIC VISIT PATIENT INSTRUCTIONS    -Go to the lab to have your PSA checked, as well as a urine sample.   -Follow up for a cystoscopy with the next available urologist to evaluate your bladder.     If you have any issues, questions or concerns in the meantime, do not hesitate to contact us at [phone] or via [email or web address].     Samia Castillo, CNP  Department of Urology

## 2023-01-03 NOTE — NURSING NOTE
"Chief Complaint   Patient presents with     Consult For     Bladder wall thickening on MRI       Blood pressure 125/82, pulse 101, height 1.727 m (5' 8\"), weight 77.1 kg (170 lb). Body mass index is 25.85 kg/m .    Patient Active Problem List   Diagnosis     High triglycerides     HTN (hypertension)     Malignant neoplasm of male breast (H)     Lumbar radiculopathy     Long term (current) use of aromatase inhibitors     Disorder of bone and cartilage     Medication side effects       No Known Allergies    Current Outpatient Medications   Medication Sig Dispense Refill     abemaciclib (VERZENIO) 150 MG tablet Take 1 tablet (150 mg) by mouth 2 times daily for 28 days 56 tablet 0     famotidine (PEPCID) 40 MG tablet Take 1 tablet (40 mg) by mouth daily 90 tablet 3     letrozole (FEMARA) 2.5 MG tablet Take 1 tablet (2.5 mg) by mouth daily for 360 days 90 tablet 3     letrozole (FEMARA) 2.5 MG tablet Take 1 tablet (2.5 mg) by mouth daily for 28 days 28 tablet 0     loperamide (IMODIUM) 2 MG capsule Start with 2 caps (4 mg), then take one cap (2 mg) after each diarrheal stool as needed. Do not use more than 8 caps (16 mg) per day. 30 capsule 0     losartan-hydrochlorothiazide (HYZAAR) 100-25 MG tablet Take 1 tablet by mouth every morning Please keep the appointment on 22. 90 tablet 0     MELATONIN PO        Naproxen Sodium (ALEVE PO)        prochlorperazine (COMPAZINE) 10 MG tablet Take 0.5 tablets (5 mg) by mouth every 6 hours as needed for nausea or vomiting 30 tablet 2       Social History     Tobacco Use     Smoking status: Former     Packs/day: 0.50     Years: 20.00     Pack years: 10.00     Types: Cigarettes     Start date: 1976     Quit date: 2000     Years since quittin.3     Smokeless tobacco: Never   Substance Use Topics     Alcohol use: Yes     Comment: occ     Drug use: No       Ana Shelley  1/3/2023  7:53 AM  "

## 2023-01-04 LAB — BACTERIA UR CULT: NORMAL

## 2023-01-06 ENCOUNTER — OFFICE VISIT (OUTPATIENT)
Dept: FAMILY MEDICINE | Facility: CLINIC | Age: 73
End: 2023-01-06
Payer: COMMERCIAL

## 2023-01-06 ENCOUNTER — OFFICE VISIT (OUTPATIENT)
Dept: UROLOGY | Facility: CLINIC | Age: 73
End: 2023-01-06
Payer: COMMERCIAL

## 2023-01-06 ENCOUNTER — TELEPHONE (OUTPATIENT)
Dept: FAMILY MEDICINE | Facility: CLINIC | Age: 73
End: 2023-01-06

## 2023-01-06 VITALS
DIASTOLIC BLOOD PRESSURE: 90 MMHG | HEART RATE: 87 BPM | WEIGHT: 170 LBS | SYSTOLIC BLOOD PRESSURE: 151 MMHG | BODY MASS INDEX: 25.76 KG/M2 | HEIGHT: 68 IN

## 2023-01-06 VITALS
TEMPERATURE: 98.3 F | SYSTOLIC BLOOD PRESSURE: 131 MMHG | HEIGHT: 68 IN | OXYGEN SATURATION: 97 % | WEIGHT: 173 LBS | HEART RATE: 101 BPM | DIASTOLIC BLOOD PRESSURE: 83 MMHG | BODY MASS INDEX: 26.22 KG/M2

## 2023-01-06 DIAGNOSIS — Z00.00 HEALTH CARE MAINTENANCE: ICD-10-CM

## 2023-01-06 DIAGNOSIS — N32.89 BLADDER WALL THICKENING: Primary | ICD-10-CM

## 2023-01-06 DIAGNOSIS — R05.1 ACUTE COUGH: Primary | ICD-10-CM

## 2023-01-06 DIAGNOSIS — Z80.52 FAMILY HISTORY OF BLADDER CANCER: ICD-10-CM

## 2023-01-06 PROBLEM — E11.9 DIABETES MELLITUS, TYPE 2 (H): Status: ACTIVE | Noted: 2023-01-06

## 2023-01-06 LAB
FLUAV RNA SPEC QL NAA+PROBE: NEGATIVE
FLUBV RNA RESP QL NAA+PROBE: NEGATIVE
RSV RNA SPEC NAA+PROBE: NEGATIVE
SARS-COV-2 RNA RESP QL NAA+PROBE: NEGATIVE

## 2023-01-06 PROCEDURE — 87637 SARSCOV2&INF A&B&RSV AMP PRB: CPT | Mod: 90 | Performed by: PATHOLOGY

## 2023-01-06 PROCEDURE — 91312 COVID-19 VACCINE BIVALENT BOOSTER 12+ (PFIZER): CPT | Performed by: FAMILY MEDICINE

## 2023-01-06 PROCEDURE — 52000 CYSTOURETHROSCOPY: CPT | Performed by: UROLOGY

## 2023-01-06 PROCEDURE — 99213 OFFICE O/P EST LOW 20 MIN: CPT | Mod: 25 | Performed by: FAMILY MEDICINE

## 2023-01-06 PROCEDURE — 99000 SPECIMEN HANDLING OFFICE-LAB: CPT | Performed by: PATHOLOGY

## 2023-01-06 PROCEDURE — 90662 IIV NO PRSV INCREASED AG IM: CPT | Performed by: FAMILY MEDICINE

## 2023-01-06 PROCEDURE — 0124A COVID-19 VACCINE BIVALENT BOOSTER 12+ (PFIZER): CPT | Performed by: FAMILY MEDICINE

## 2023-01-06 PROCEDURE — 90471 IMMUNIZATION ADMIN: CPT | Performed by: FAMILY MEDICINE

## 2023-01-06 RX ORDER — LIDOCAINE HYDROCHLORIDE 20 MG/ML
JELLY TOPICAL ONCE
Status: COMPLETED | OUTPATIENT
Start: 2023-01-06 | End: 2023-01-06

## 2023-01-06 RX ADMIN — LIDOCAINE HYDROCHLORIDE: 20 JELLY TOPICAL at 09:40

## 2023-01-06 ASSESSMENT — PAIN SCALES - GENERAL: PAINLEVEL: NO PAIN (0)

## 2023-01-06 NOTE — TELEPHONE ENCOUNTER
[facility] Call Center    Phone Message    May a detailed message be left on voicemail: yes     Reason for Call: Symptoms or Concerns     If patient has red-flag symptoms, warm transfer to triage line    Current symptom or concern: Persistent Wet Cough     Symptoms have been present for:  1 week(s)    Has patient previously been seen for this? No    Are there any new or worsening symptoms? No      Action Taken: Message routed to:  [facility] ([facility]    Travel Screening: Not Applicable

## 2023-01-06 NOTE — PATIENT INSTRUCTIONS
"Please follow-up as needed.    AFTER YOUR CYSTOSCOPY        You have just completed a cystoscopy, or \"cysto\", which allowed your physician to learn more about your bladder (or to remove a stent placed after surgery). We suggest that you continue to avoid caffeine, fruit juice, and alcohol for the next 24 hours, however, you are encouraged to return to your normal activities.         A few things that are considered normal after your cystoscopy:     * Small amount of bleeding (or spotting) that clears within the next 24 hours     * Slight burning sensation with urination     * Sensation to of needing to avoid more frequently     * The feeling of \"air\" in your urine     * Mild discomfort that is relieved with Tylenol        Please contact our office promptly if you:     * Develop a fever above 101 degrees     * Are unable to urinate     * Develop bright red blood that does not stop     * Severe pain or swelling         Please contact our office with any concerns or questions @ [phone]    AFTER YOUR CYSTOSCOPY        You have just completed a cystoscopy, or \"cysto\", which allowed your physician to learn more about your bladder (or to remove a stent placed after surgery). We suggest that you continue to avoid caffeine, fruit juice, and alcohol for the next 24 hours, however, you are encouraged to return to your normal activities.         A few things that are considered normal after your cystoscopy:     * Small amount of bleeding (or spotting) that clears within the next 24 hours     * Slight burning sensation with urination     * Sensation to of needing to avoid more frequently     * The feeling of \"air\" in your urine     * Mild discomfort that is relieved with Tylenol        Please contact our office promptly if you:     * Develop a fever above 101 degrees     * Are unable to urinate     * Develop bright red blood that does not stop     * Severe pain or swelling         Please contact our office with any concerns " or questions @[facility].

## 2023-01-06 NOTE — NURSING NOTE
Ronna Collins is a 72 year old male that presents in clinic today for the following:     Chief Complaint   Patient presents with     Cough     Pt states persistent cough for one week. Better at night; worse during day.        The patient's allergies and medications were reviewed. The patient's vitals were obtained without incident. The patient does not have any other questions for the provider.     Gloria Nichols, EMT at 12:42 PM on 1/6/2023.  Primary Care Clinic: [phone]

## 2023-01-06 NOTE — NURSING NOTE
"Chief Complaint   Patient presents with     Cystoscopy       Blood pressure (!) 151/90, pulse 87, height 1.727 m (5' 8\"), weight 77.1 kg (170 lb). Body mass index is 25.85 kg/m .    Patient Active Problem List   Diagnosis     High triglycerides     HTN (hypertension)     Malignant neoplasm of male breast (H)     Lumbar radiculopathy     Long term (current) use of aromatase inhibitors     Disorder of bone and cartilage     Medication side effects       No Known Allergies    Current Outpatient Medications   Medication Sig Dispense Refill     abemaciclib (VERZENIO) 150 MG tablet Take 1 tablet (150 mg) by mouth 2 times daily for 28 days 56 tablet 0     famotidine (PEPCID) 40 MG tablet Take 1 tablet (40 mg) by mouth daily 90 tablet 3     letrozole (FEMARA) 2.5 MG tablet Take 1 tablet (2.5 mg) by mouth daily for 360 days 90 tablet 3     letrozole (FEMARA) 2.5 MG tablet Take 1 tablet (2.5 mg) by mouth daily for 28 days 28 tablet 0     loperamide (IMODIUM) 2 MG capsule Start with 2 caps (4 mg), then take one cap (2 mg) after each diarrheal stool as needed. Do not use more than 8 caps (16 mg) per day. 30 capsule 0     losartan-hydrochlorothiazide (HYZAAR) 100-25 MG tablet Take 1 tablet by mouth every morning Please keep the appointment on 22. 90 tablet 0     MELATONIN PO        Naproxen Sodium (ALEVE PO)        prochlorperazine (COMPAZINE) 10 MG tablet Take 0.5 tablets (5 mg) by mouth every 6 hours as needed for nausea or vomiting 30 tablet 2       Social History     Tobacco Use     Smoking status: Former     Packs/day: 0.50     Years: 20.00     Pack years: 10.00     Types: Cigarettes     Start date: 1976     Quit date: 2000     Years since quittin.3     Smokeless tobacco: Never   Substance Use Topics     Alcohol use: Yes     Comment: occ     Drug use: No       Invasive Procedure Safety Checklist:    Procedure: Cystoscopy    Action: Complete sections and checkboxes as appropriate.    Pre-procedure:  1. " Patient ID Verified with 2 identifiers (Starr and  or MRN) : YES    2. Procedure and site verified with patient/designee (when able) : YES    3. Accurate consent documentation in medical record : YES    4. H&P (or appropriate assessment) documented in medical record : N/A  H&P must be up to 30 days prior to procedure an updated within 24 hours of                 Procedure as applicable.     5. Relevant diagnostic and radiology test results appropriately labeled and displayed as applicable : YES    6. Blood products, implants, devices, and/or special equipment available for the procedure as applicable : YES    7. Procedure site(s) marked with provider initials [Exclusions: none] : NO    8. Marking not required. Reason : Yes  Procedure does not require site marking    Time Out:     Time-Out performed immediately prior to starting procedure, including verbal and active participation of all team members addressing: YES    1. Correct patient identity.  2. Confirmed that the correct side and site are marked.  3. An accurate procedure to be done.  4. Agreement on the procedure to be done.  5. Correct patient position.  6. Relevant images and results are properly labeled and appropriately displayed.  7. The need to administer antibiotics or fluids for irrigation purposes during the procedure as applicable.  8. Safety precautions based on patient history or medication use.    During Procedure: Verification of correct person, site, and procedure occurs any time the responsibility for care of the patient is transferred to another member of the care team.    The following medication was given:     MEDICATION:  Lidocaine without epinephrine 2% jelly  ROUTE: urethral   SITE: urethral   DOSE: 10 mL  LOT #: [id number]  : [employer]  EXPIRATION DATE:   NDC#: [address]-1   Was there drug waste? No    Prior to med admin, verified patient identity using patient's name and date of birth.  Due  to med administration, patient instructed to remain in clinic for 15 minutes  afterwards, and to report any adverse reaction to me immediately.    Drug Amount Wasted:  None.  Vial/Syringe: Syringe      Romeo Sigala  1/6/2023  9:29 AM

## 2023-01-06 NOTE — TELEPHONE ENCOUNTER
Spoke with pt, states that he came back the trip from [place] on 12/28/22. Several days later he started cough, now cough with congestion. No fever or breathing problems. He agreed to be seen today.

## 2023-01-06 NOTE — PROGRESS NOTES
"  Assessment & Plan     Acute cough  Likely viral, call if worse  - Symptomatic Influenza A/B & SARS-CoV2 (COVID-19) Virus PCR Multiplex Nasopharyngeal; Future  - Symptomatic Influenza A/B & SARS-CoV2 (COVID-19) Virus PCR Multiplex Nasopharyngeal    Health care maintenance  Due, I suspect he has non covid/flu infxn so will boost while here  - COVID-19 VACCINE BIVALENT BOOSTER 12+ (PFIZER)  - INFLUENZA VACCINE 65+ (FLUZONE HD)      23 minutes spent on the date of the encounter doing chart review, history and exam, documentation and further activities per the note    BMI:   Estimated body mass index is 26.3 kg/m  as calculated from the following:    Height as of this encounter: 1.727 m (5' 8\").    Weight as of this encounter: 78.5 kg (173 lb).       No follow-ups on file.    Stewart Henry MD  [facility] [place] Friend is a 72 year old, presenting for the following health issues:  Cough (Pt states persistent cough for one week. Better at night; worse during day. Pt states blowing nose often. Pt states productive cough with mucus in throat. )      HPI Here for URI  Ten days  No fever, fatigue  Stuffy runny sneezy nose  No st, ear ache, or change taste smell  Did not get most recent flu covid shots  [place]  Some phlegm in a cough, might be pnd   No sob    No signifcant pulm history    Past Medical History:   Diagnosis Date     Breast cancer (H) 05/2021     Chronic sinusitis      Hypertension 2002     Past Surgical History:   Procedure Laterality Date     COLONOSCOPY WITH CO2 INSUFFLATION N/A 10/28/2022    Procedure: COLONOSCOPY, WITH CO2 INSUFFLATION;  Surgeon: Annemarie Noel DO;  Location:  OR     COMBINED ESOPHAGOSCOPY, GASTROSCOPY, DUODENOSCOPY (EGD) WITH CO2 INSUFFLATION N/A 10/28/2022    Procedure: ESOPHAGOGASTRODUODENOSCOPY, WITH CO2 INSUFFLATION;  Surgeon: Annemarie Noel DO;  Location:  OR     DISSECT LYMPH NODE AXILLA Right 10/27/2021    " "Procedure: RIGHT Axillary Lymph Node Dissection;  Surgeon: Nida Snider MD;  Location: [facility] OR     ESOPHAGOSCOPY, GASTROSCOPY, DUODENOSCOPY (EGD), COMBINED N/A 10/28/2022    Procedure: ESOPHAGOGASTRODUODENOSCOPY, WITH BIOPSY;  Surgeon: Annemarie Noel DO;  Location: [facility] OR     MASTECTOMY SIMPLE Right 10/27/2021    Procedure: RIGHT Simple Mastectomy;  Surgeon: Nida Snider MD;  Location: [facility] OR     Current Outpatient Medications   Medication     abemaciclib (VERZENIO) 150 MG tablet     famotidine (PEPCID) 40 MG tablet     letrozole (FEMARA) 2.5 MG tablet     letrozole (FEMARA) 2.5 MG tablet     loperamide (IMODIUM) 2 MG capsule     losartan-hydrochlorothiazide (HYZAAR) 100-25 MG tablet     MELATONIN PO     Naproxen Sodium (ALEVE PO)     prochlorperazine (COMPAZINE) 10 MG tablet     No current facility-administered medications for this visit.     No Known Allergies        Review of Systems         Objective    /83 (BP Location: Left arm, Patient Position: Sitting, Cuff Size: Adult Regular)   Pulse 101   Temp 98.3  F (36.8  C) (Oral)   Ht 1.727 m (5' 8\")   Wt 78.5 kg (173 lb)   SpO2 97%   BMI 26.30 kg/m    Body mass index is 26.3 kg/m .  Physical Exam   GENERAL: healthy, alert and no distress  NECK: no adenopathy, no asymmetry, masses, or scars and thyroid normal to palpation  RESP: lungs clear to auscultation - no rales, rhonchi or wheezes  CV: regular rate and rhythm, normal S1 S2, no S3 or S4, no murmur, click or rub, no peripheral edema and peripheral pulses strong  MS: no gross musculoskeletal defects noted, no edema            "

## 2023-01-06 NOTE — LETTER
1/6/2023       RE: Ronna Collins  [address]  [place] [address]     Dear Colleague,    Thank you for referring your patient, Ronna Collins, to the [facility] [place] at [facility]. Please see a copy of my visit note below.    Cystoscopy Note    PRE-PROCEDURE DIAGNOSIS:  Bladder wall thickening  Family history of bladder cancer      POST-PROCEDURE DIAGNOSIS:  Bladder wall thickening  Family history of bladder cancer    PROCEDURE:   Cystoscopy    HISTORY: Ronna Collins is a 72 year old male with bladder wall thickening and family history of bladder cancer.  UA demonstrates no microscopic hematuria.    DESCRIPTION OF PROCEDURE:  After informed consent was obtained, the patient was brought to the procedure room where they were placed in the modified lithotomy position with all pressure points well padded.  The patient was prepped and draped in a sterile fashion. A flexible cystoscope was introduced through a well-lubricated urethra. There were no urethral strictures. The bladder was entered. The urine was clear. Systematic cystoscopy was performed. There were no tumors, stones, or other abnormalities in the bladder. Scope was removed.    ASSESSMENT AND PLAN:  The cystoscopy today was normal.  This completes the hematuria workup.  Followup NATHAN Lozano MD  Reconstructive Urology

## 2023-01-09 ENCOUNTER — MYC MEDICAL ADVICE (OUTPATIENT)
Dept: UROLOGY | Facility: CLINIC | Age: 73
End: 2023-01-09

## 2023-01-11 NOTE — PROGRESS NOTES
Cystoscopy Note    PRE-PROCEDURE DIAGNOSIS:  Bladder wall thickening  Family history of bladder cancer      POST-PROCEDURE DIAGNOSIS:  Bladder wall thickening  Family history of bladder cancer    PROCEDURE:   Cystoscopy    HISTORY: Ronna Collins is a 72 year old male with bladder wall thickening and family history of bladder cancer.  UA demonstrates no microscopic hematuria.    DESCRIPTION OF PROCEDURE:  After informed consent was obtained, the patient was brought to the procedure room where they were placed in the modified lithotomy position with all pressure points well padded.  The patient was prepped and draped in a sterile fashion. A flexible cystoscope was introduced through a well-lubricated urethra. There were no urethral strictures. The bladder was entered. The urine was clear. Systematic cystoscopy was performed. There were no tumors, stones, or other abnormalities in the bladder. Scope was removed.    ASSESSMENT AND PLAN:  The cystoscopy today was normal.  This completes the hematuria workup.  Followup NATHAN Lozano MD  Reconstructive Urology

## 2023-01-14 DIAGNOSIS — C50.929 MALIGNANT NEOPLASM OF MALE BREAST, UNSPECIFIED ESTROGEN RECEPTOR STATUS, UNSPECIFIED LATERALITY, UNSPECIFIED SITE OF BREAST (H): Primary | ICD-10-CM

## 2023-01-15 DIAGNOSIS — I10 BENIGN ESSENTIAL HYPERTENSION: ICD-10-CM

## 2023-01-17 ENCOUNTER — OFFICE VISIT (OUTPATIENT)
Dept: DERMATOLOGY | Facility: CLINIC | Age: 73
End: 2023-01-17
Attending: INTERNAL MEDICINE
Payer: COMMERCIAL

## 2023-01-17 DIAGNOSIS — Z00.00 ROUTINE HEALTH MAINTENANCE: ICD-10-CM

## 2023-01-17 DIAGNOSIS — L85.3 XEROSIS CUTIS: ICD-10-CM

## 2023-01-17 DIAGNOSIS — D18.01 CHERRY ANGIOMA: ICD-10-CM

## 2023-01-17 DIAGNOSIS — L82.1 SEBORRHEIC KERATOSES: ICD-10-CM

## 2023-01-17 DIAGNOSIS — Z17.0 MALIGNANT NEOPLASM OF CENTRAL PORTION OF RIGHT BREAST IN MALE, ESTROGEN RECEPTOR POSITIVE (H): ICD-10-CM

## 2023-01-17 DIAGNOSIS — L84 CALLUS: ICD-10-CM

## 2023-01-17 DIAGNOSIS — C50.121 MALIGNANT NEOPLASM OF CENTRAL PORTION OF RIGHT BREAST IN MALE, ESTROGEN RECEPTOR POSITIVE (H): ICD-10-CM

## 2023-01-17 DIAGNOSIS — L30.9 DERMATITIS: Primary | ICD-10-CM

## 2023-01-17 PROCEDURE — 99214 OFFICE O/P EST MOD 30 MIN: CPT | Mod: GC | Performed by: DERMATOLOGY

## 2023-01-17 RX ORDER — TRIAMCINOLONE ACETONIDE 1 MG/G
OINTMENT TOPICAL 2 TIMES DAILY PRN
Qty: 80 G | Refills: 5 | Status: SHIPPED | OUTPATIENT
Start: 2023-01-17 | End: 2023-10-31

## 2023-01-17 ASSESSMENT — PAIN SCALES - GENERAL: PAINLEVEL: NO PAIN (0)

## 2023-01-17 NOTE — NURSING NOTE
Chief Complaint   Patient presents with     Skin Check     Areas of concern include back and legs     Keith Norris, EMT

## 2023-01-17 NOTE — PATIENT INSTRUCTIONS
- Triamcinolone ointment 1-2 times daily for rash on trunk and extremities  - Daily moisturizer as below  - Sarna Lotion or CeraVe with pramoxine for itching of the back  - Urea for the feet     Dry Skin    What is dry skin?  Common skin problem  Can be worse during the winter   Affects all ages  Occurs in people with or without other skin problems    What does it look like?  Fine lines in the skin become more visible   Rough feeling skin   Flaky skin  Most common on the arms and legs  Skin can become cracked, especially on the hands and feet    What are some problems caused by dry skin?   Itching  Rubbing or scratching can cause thickened, rough skin patches  Cracks in skin can be painful  Red, itchy, scaly skin (called eczema) can occur  Yellow crusting or pus could be signs of an infection    What causes dry skin?  A lack of water in the top layer of the skin  Too much soapy water,  hot water, or harsh chemicals  Aging and sun damage    How do I treat dry skin?  Shower or bathe daily for under ten minutes with lukewarm water and mild soap.  Pat yourself dry with a towel gently and leave your skin slightly damp.  Use moisturizing cream or ointment right away.  Avoid lotions.    What kind of mild soap should I be using?  Camay , Dove , Tone , Neutrogena , Purpose , or Oil of Olay   A non-detergent cleanser, like Cetaphil , can be used.    What should I stay away from?  Scented soaps   Bath oils    What moisturizers should I be using?  Cetaphil Cream,CeraVe Cream, Vanicream, Aquaphilic, Eucerin, Aquaphor, or Vaseline   Always apply after showering or bathing.  Reapply throughout the day, if possible.  If dry skin affects your hands, always reapply after handwashing.    What else should I know?  Using a humidifier during winter months may help.  If dry skin gets worse or if eczema develops, a steroid cream may be needed.

## 2023-01-17 NOTE — PROGRESS NOTES
[facility] Dermatology Note  Encounter Date: Jan 17, 2023  Office Visit     Dermatology Problem List:  FBSE 05/24/22  # Invasive ductal adenocarcinoma, right breast, ER+, IA+, HER2-  - skin bx 6/2021 (Dr. Yanez, [facility])   - s/p right mastectomy, chemoradiation Taxol x 22, XRT 3/31/22, Abraxone x 8, SLNbx 6/2021  - abemaciclib, letrozole, leuprolide  - following with oncology ([facility])  # Nummular dermatitis, previously with impetiginization  - current: triam ointment   - prior: mometasone 0.1 oint  - bacterial cx 6/21/19 [facility]  # Atopic dermatitis with chronic sinusitis and cat/dog allergy on prick testing 6/21/19   # Benign bx  - VK, R tricep bx 1/14/21  ____________________________________________    Assessment & Plan:   # 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  - Seborrheic keratoses  - Cherry hemangiomas  - Benign nevi  - Reassurance provided.  - ABCDEs of melanoma handout provided.  - Advised patient to return to clinic for any new or concerning lesions.      Procedures Performed:   None    Follow-up: 1 year, prn for new or changing lesions    Staff and Resident:     Ignacio Murdock MD  PGY-3 Dermatology  Pager: [phone]  I, Nati Church MD, saw this patient with the resident and agree with the resident s findings and plan of care as documented in the resident s note.    ____________________________________________    CC: Skin Check (Areas of concern include back and legs)    HPI:    Ronna Collins is a(n) 72 year old male who presents today as a return patient for skin check. Has a bit of rash on lower extremities and back. Does not moisturize regularly. Nothing bleeding, growing, or tender. Patient is otherwise feeling well, without additional skin concerns.    Labs Reviewed:  N/A    Physical Exam:  Vitals: There were no vitals taken for this visit.  SKIN: Total skin excluding the undergarment areas was performed. The exam included the head/face, neck, both arms, chest, back, abdomen, both legs, digits and/or nails.   - skin type: light brown  - face, chest, back, arms, back: numerous scattered waxy stuck-on tan to brown macules and papules  - post-mastectomy scar on breast and reticulated hypo- and hyper-pigmented patches, diffuse sclerosis  - focal atrophic plaques in the right axilla  - scalp, trunk: scattered bright red vascular papules  - thin brown plaque on the upper back  - eczematous nummular lichenified plaques and papules on bilateral lower extremities   - No other lesions of concern on areas examined.     Medications:  Current Outpatient Medications   Medication     abemaciclib (VERZENIO) 150 MG tablet     famotidine (PEPCID) 40 MG tablet     letrozole (FEMARA) 2.5 MG tablet     loperamide (IMODIUM) 2 MG capsule     losartan-hydrochlorothiazide (HYZAAR) 100-25 MG tablet     MELATONIN PO     Naproxen Sodium (ALEVE PO)     prochlorperazine (COMPAZINE) 10 MG tablet     letrozole (FEMARA) 2.5 MG tablet     No current facility-administered medications for this visit.      Past Medical History:   Patient Active Problem List   Diagnosis     High triglycerides     HTN (hypertension)     Malignant neoplasm of male breast (H)     Lumbar radiculopathy     Long term (current) use of aromatase inhibitors     Disorder of bone and cartilage     Medication side effects     Diabetes mellitus, type 2 (H)     Past Medical History:   Diagnosis Date     Breast cancer (H) 05/2021     Chronic  sinusitis      Hypertension 2002       CC Mikaela Sr MD  [address] [place] [address] on close of this encounter.

## 2023-01-17 NOTE — LETTER
1/17/2023       RE: Ronna Collins  [address]  [place] [address]     Dear Colleague,    Thank you for referring your patient, Ronna Collins, to the [facility]ERMAT[facility] [place] at [facility]. Please see a copy of my visit note below.    [facility] Dermatology Note  Encounter Date: Jan 17, 2023  Office Visit     Dermatology Problem List:  FBSE 05/24/22  # Invasive ductal adenocarcinoma, right breast, ER+, NH+, HER2-  - skin bx 6/2021 (Dr. Yanez, [facility])   - s/p right mastectomy, chemoradiation Taxol x 22, XRT 3/31/22, Abraxone x 8, SLNbx 6/2021  - abemaciclib, letrozole, leuprolide  - following with oncology ([facility])  # Nummular dermatitis, previously with impetiginization  - current: triam ointment   - prior: mometasone 0.1 oint  - bacterial cx 6/21/19 [facility]  # Atopic dermatitis with chronic sinusitis and cat/dog allergy on prick testing 6/21/19   # Benign bx  - VK, R tricep bx 1/14/21  ____________________________________________    Assessment & Plan:   # 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  - Seborrheic keratoses  - Cherry hemangiomas  - Benign nevi  - Reassurance provided.  - ABCDEs of melanoma handout provided.  - Advised patient to return to clinic for any new or concerning lesions.      Procedures Performed:   None    Follow-up: 1 year, prn for new or changing lesions    Staff and Resident:      Ignacio Murdock MD  PGY-3 Dermatology  Pager: [phone]  I, Nati Church MD, saw this patient with the resident and agree with the resident s findings and plan of care as documented in the resident s note.    ____________________________________________    CC: Skin Check (Areas of concern include back and legs)    HPI:  Mr. Ronna Collins is a(n) 72 year old male who presents today as a return patient for skin check. Has a bit of rash on lower extremities and back. Does not moisturize regularly. Nothing bleeding, growing, or tender. Patient is otherwise feeling well, without additional skin concerns.    Labs Reviewed:  N/A    Physical Exam:  Vitals: There were no vitals taken for this visit.  SKIN: Total skin excluding the undergarment areas was performed. The exam included the head/face, neck, both arms, chest, back, abdomen, both legs, digits and/or nails.   - skin type: light brown  - face, chest, back, arms, back: numerous scattered waxy stuck-on tan to brown macules and papules  - post-mastectomy scar on breast and reticulated hypo- and hyper-pigmented patches, diffuse sclerosis  - focal atrophic plaques in the right axilla  - scalp, trunk: scattered bright red vascular papules  - thin brown plaque on the upper back  - eczematous nummular lichenified plaques and papules on bilateral lower extremities   - No other lesions of concern on areas examined.     Medications:  Current Outpatient Medications   Medication     abemaciclib (VERZENIO) 150 MG tablet     famotidine (PEPCID) 40 MG tablet     letrozole (FEMARA) 2.5 MG tablet     loperamide (IMODIUM) 2 MG capsule     losartan-hydrochlorothiazide (HYZAAR) 100-25 MG tablet     MELATONIN PO     Naproxen Sodium (ALEVE PO)     prochlorperazine (COMPAZINE) 10 MG tablet     letrozole (FEMARA) 2.5 MG tablet     No current facility-administered medications for this visit.      Past Medical History:   Patient Active Problem List   Diagnosis     High triglycerides      HTN (hypertension)     Malignant neoplasm of male breast (H)     Lumbar radiculopathy     Long term (current) use of aromatase inhibitors     Disorder of bone and cartilage     Medication side effects     Diabetes mellitus, type 2 (H)     Past Medical History:   Diagnosis Date     Breast cancer (H) 05/2021     Chronic sinusitis      Hypertension 2002       CC Mikaela Sr MD  [address] [place] [address] on close of this encounter.

## 2023-01-18 ENCOUNTER — MYC REFILL (OUTPATIENT)
Dept: FAMILY MEDICINE | Facility: CLINIC | Age: 73
End: 2023-01-18
Payer: COMMERCIAL

## 2023-01-18 ENCOUNTER — MYC MEDICAL ADVICE (OUTPATIENT)
Dept: FAMILY MEDICINE | Facility: CLINIC | Age: 73
End: 2023-01-18
Payer: COMMERCIAL

## 2023-01-18 DIAGNOSIS — I10 BENIGN ESSENTIAL HYPERTENSION: ICD-10-CM

## 2023-01-18 RX ORDER — LOSARTAN POTASSIUM AND HYDROCHLOROTHIAZIDE 25; 100 MG/1; MG/1
TABLET ORAL
Qty: 90 TABLET | Refills: 3 | Status: SHIPPED | OUTPATIENT
Start: 2023-01-18 | End: 2023-10-31

## 2023-01-18 RX ORDER — LOSARTAN POTASSIUM AND HYDROCHLOROTHIAZIDE 25; 100 MG/1; MG/1
1 TABLET ORAL EVERY MORNING
Qty: 90 TABLET | Refills: 0 | Status: CANCELLED | OUTPATIENT
Start: 2023-01-18

## 2023-01-18 NOTE — TELEPHONE ENCOUNTER
losartan-hydrochlorothiazide (HYZAAR) 100-25 MG tablet: addressed in 1-15-23 RF encounter  And 1 18-23 my chart

## 2023-01-20 ENCOUNTER — HOSPITAL ENCOUNTER (OUTPATIENT)
Dept: PHYSICAL THERAPY | Facility: CLINIC | Age: 73
Setting detail: THERAPIES SERIES
Discharge: HOME | End: 2023-01-20
Attending: FAMILY MEDICINE
Payer: COMMERCIAL

## 2023-01-20 DIAGNOSIS — Z17.0 MALIGNANT NEOPLASM OF CENTRAL PORTION OF RIGHT BREAST IN MALE, ESTROGEN RECEPTOR POSITIVE (H): Primary | ICD-10-CM

## 2023-01-20 DIAGNOSIS — C50.929 MALIGNANT NEOPLASM OF MALE BREAST, UNSPECIFIED ESTROGEN RECEPTOR STATUS, UNSPECIFIED LATERALITY, UNSPECIFIED SITE OF BREAST (H): Primary | ICD-10-CM

## 2023-01-20 DIAGNOSIS — I89.0 LYMPHEDEMA OF RIGHT UPPER EXTREMITY: ICD-10-CM

## 2023-01-20 DIAGNOSIS — C50.121 MALIGNANT NEOPLASM OF CENTRAL PORTION OF RIGHT BREAST IN MALE, ESTROGEN RECEPTOR POSITIVE (H): Primary | ICD-10-CM

## 2023-01-20 PROCEDURE — 97535 SELF CARE MNGMENT TRAINING: CPT | Mod: GP | Performed by: PHYSICAL THERAPIST

## 2023-01-20 PROCEDURE — 97140 MANUAL THERAPY 1/> REGIONS: CPT | Mod: GP | Performed by: PHYSICAL THERAPIST

## 2023-01-20 NOTE — PROGRESS NOTES
[facility] Rehabilitation Service    Outpatient Physical Therapy Progress Note  Patient: Ronna Collins  : 1950    Beginning/End Dates of Reporting Period:  10/21/22 to 23    Referring Provider: Dr. Mikaela Sr    Therapy Diagnosis: R UE lymphedema, chest wall tightness     Client Self Report: Not really noticed any changes.  Seems to be doing ok.  Hasn't been doing as much with the massage, bought cups but hasn't been using. Feeling otherwise well, did travel to [facility]. He felt bad after the Zometa injection but his monthly Lupron is fine.    Objective Measurements:  Objective Measure: Volume  Details: 1847.21mL, increased about 6.4% from October and 12.8% swelling  Objective Measure: Edema  Details: R arm visibly increased though non-pitting. Tissue more dense near axilla above chest incision and also noted mild edema posteriorly side of trunk  Objective Measure: ROM  Details: not assessed  Objective Measure: Tissue  Details: Tight, adherence increased around the incision though not addressed in time today  Objective Measure: Chest measurements     Goals:  Goal Identifier Edema   Goal Description Pt will maintain limb volumes with in 6% of each demonstrating appropriate management of edema as B volume may increase as he continues to strengthen   Target Date 23   Date Met      Progress (detail required for progress note):       Goal Identifier Tissue   Goal Description Pt will be independent with self massage for lymph drainage and fibrotic tissue mobility (cupping) to prevent any loss of ROM (maintain standing AROM flexion >145, abduction >155) to prevent functional deficits.   Target Date 23   Date Met      Progress (detail required for progress note):       Goal Identifier HEP   Goal Description Pt will continue lifelong chest and shoulder stretching program to promote improved mobility and prevent  Telephone Encounter by Ely Bunn RN at 04/16/18 04:45 PM     Author:  Ely Bunn RN Service:  (none) Author Type:  Registered Nurse     Filed:  04/16/18 04:56 PM Encounter Date:  4/16/2018 Status:  Signed     :  Ely Bunn RN (Registered Nurse)            Last OV: 3/26/18  Patient states she saw a commercial that talked about pseudotumor that describes her symptoms.  Constant euphoria  Dizziness  Headaches  Tinnitus  Vomiting - 3 times since 3/3/18  Peripheral vision changes    Dr Rosas - please advise[PJ1.1M]        Revision History        User Key Date/Time User Provider Type Action    > PJ1.1 04/16/18 04:56 PM Ely Bunn RN Registered Nurse Sign    M - Manual             disability.   Target Date 09/01/23   Date Met      Progress (detail required for progress note):       Plan:  Changes to therapy plan of care: Plan was to see pt 1x/3mo however due to increased edema and pt needing to learn more and increase home program, will  Increase frequency for up to 1x/week during thsi quarter.      Discharge:  No

## 2023-01-23 DIAGNOSIS — Z17.0 MALIGNANT NEOPLASM OF CENTRAL PORTION OF RIGHT BREAST IN MALE, ESTROGEN RECEPTOR POSITIVE (H): Primary | ICD-10-CM

## 2023-01-23 DIAGNOSIS — C50.121 MALIGNANT NEOPLASM OF CENTRAL PORTION OF RIGHT BREAST IN MALE, ESTROGEN RECEPTOR POSITIVE (H): Primary | ICD-10-CM

## 2023-01-26 ENCOUNTER — LAB (OUTPATIENT)
Dept: LAB | Facility: CLINIC | Age: 73
End: 2023-01-26
Attending: INTERNAL MEDICINE
Payer: COMMERCIAL

## 2023-01-26 ENCOUNTER — INFUSION THERAPY VISIT (OUTPATIENT)
Dept: ONCOLOGY | Facility: CLINIC | Age: 73
End: 2023-01-26
Attending: INTERNAL MEDICINE
Payer: COMMERCIAL

## 2023-01-26 VITALS
SYSTOLIC BLOOD PRESSURE: 154 MMHG | DIASTOLIC BLOOD PRESSURE: 82 MMHG | BODY MASS INDEX: 26.72 KG/M2 | TEMPERATURE: 97.9 F | RESPIRATION RATE: 18 BRPM | HEART RATE: 83 BPM | OXYGEN SATURATION: 100 % | WEIGHT: 175.7 LBS

## 2023-01-26 DIAGNOSIS — C50.121 MALIGNANT NEOPLASM OF CENTRAL PORTION OF RIGHT BREAST IN MALE, ESTROGEN RECEPTOR POSITIVE (H): ICD-10-CM

## 2023-01-26 DIAGNOSIS — C50.929 MALIGNANT NEOPLASM OF MALE BREAST, UNSPECIFIED ESTROGEN RECEPTOR STATUS, UNSPECIFIED LATERALITY, UNSPECIFIED SITE OF BREAST (H): Primary | ICD-10-CM

## 2023-01-26 DIAGNOSIS — Z17.0 MALIGNANT NEOPLASM OF CENTRAL PORTION OF RIGHT BREAST IN MALE, ESTROGEN RECEPTOR POSITIVE (H): ICD-10-CM

## 2023-01-26 LAB
ALBUMIN SERPL BCG-MCNC: 4.5 G/DL (ref 3.5–5.2)
ALP SERPL-CCNC: 44 U/L (ref 40–129)
ALT SERPL W P-5'-P-CCNC: 20 U/L (ref 10–50)
ANION GAP SERPL CALCULATED.3IONS-SCNC: 10 MMOL/L (ref 7–15)
AST SERPL W P-5'-P-CCNC: 20 U/L (ref 10–50)
BASOPHILS # BLD AUTO: 0.1 10E3/UL (ref 0–0.2)
BASOPHILS NFR BLD AUTO: 2 %
BILIRUB SERPL-MCNC: 0.3 MG/DL
BUN SERPL-MCNC: 14.4 MG/DL (ref 8–23)
CALCIUM SERPL-MCNC: 9.9 MG/DL (ref 8.8–10.2)
CHLORIDE SERPL-SCNC: 105 MMOL/L (ref 98–107)
CREAT SERPL-MCNC: 0.85 MG/DL (ref 0.67–1.17)
DEPRECATED HCO3 PLAS-SCNC: 25 MMOL/L (ref 22–29)
EOSINOPHIL # BLD AUTO: 0.1 10E3/UL (ref 0–0.7)
EOSINOPHIL NFR BLD AUTO: 4 %
ERYTHROCYTE [DISTWIDTH] IN BLOOD BY AUTOMATED COUNT: 13.2 % (ref 10–15)
GFR SERPL CREATININE-BSD FRML MDRD: >90 ML/MIN/1.73M2
GLUCOSE SERPL-MCNC: 111 MG/DL (ref 70–99)
HCT VFR BLD AUTO: 32.8 % (ref 40–53)
HGB BLD-MCNC: 11.1 G/DL (ref 13.3–17.7)
IMM GRANULOCYTES # BLD: 0 10E3/UL
IMM GRANULOCYTES NFR BLD: 0 %
LYMPHOCYTES # BLD AUTO: 1.1 10E3/UL (ref 0.8–5.3)
LYMPHOCYTES NFR BLD AUTO: 33 %
MCH RBC QN AUTO: 31.7 PG (ref 26.5–33)
MCHC RBC AUTO-ENTMCNC: 33.8 G/DL (ref 31.5–36.5)
MCV RBC AUTO: 94 FL (ref 78–100)
MONOCYTES # BLD AUTO: 0.2 10E3/UL (ref 0–1.3)
MONOCYTES NFR BLD AUTO: 7 %
NEUTROPHILS # BLD AUTO: 1.8 10E3/UL (ref 1.6–8.3)
NEUTROPHILS NFR BLD AUTO: 54 %
NRBC # BLD AUTO: 0 10E3/UL
NRBC BLD AUTO-RTO: 0 /100
PLATELET # BLD AUTO: 172 10E3/UL (ref 150–450)
POTASSIUM SERPL-SCNC: 4.2 MMOL/L (ref 3.4–5.3)
PROT SERPL-MCNC: 7.5 G/DL (ref 6.4–8.3)
RBC # BLD AUTO: 3.5 10E6/UL (ref 4.4–5.9)
SODIUM SERPL-SCNC: 140 MMOL/L (ref 136–145)
WBC # BLD AUTO: 3.3 10E3/UL (ref 4–11)

## 2023-01-26 PROCEDURE — 36415 COLL VENOUS BLD VENIPUNCTURE: CPT

## 2023-01-26 PROCEDURE — 96402 CHEMO HORMON ANTINEOPL SQ/IM: CPT

## 2023-01-26 PROCEDURE — 85004 AUTOMATED DIFF WBC COUNT: CPT

## 2023-01-26 PROCEDURE — 250N000011 HC RX IP 250 OP 636: Performed by: INTERNAL MEDICINE

## 2023-01-26 PROCEDURE — 80053 COMPREHEN METABOLIC PANEL: CPT

## 2023-01-26 RX ADMIN — LEUPROLIDE ACETATE 7.5 MG: KIT at 10:48

## 2023-01-26 ASSESSMENT — PAIN SCALES - GENERAL: PAINLEVEL: NO PAIN (0)

## 2023-01-26 NOTE — NURSING NOTE
Chief Complaint   Patient presents with     Blood Draw     Labs collected from venipuncture by RN. Vitals taken. Checked in for appointment(s).      Naima Duenas RN

## 2023-01-26 NOTE — PROGRESS NOTES
Infusion Nursing Note:  Ronna Collins presents today for Day 1 Cycle 12 Lupron.    Patient seen by provider today: No   present during visit today: Not Applicable.    Note: Patient presents to infusion feeling well. Patient denies acute discomfort and states no acute complaints or concerns needing to be addressed today. Specifically, pt denies s/s of infection such as fever, sore throat, cough, chest pain, shortness of breath, body aches, chills, headache, increased nasal congestion, or changes in taste/smell.    IB sent to Dr. Sr to see if pts needs to have labs rechecked prior to provider appt on 2/7. Per oral chemo pharmacist Sisi, labs are to be done monthly.     Intravenous Access:  No Intravenous access at this visit.    Treatment Conditions:  Not Applicable.    Post Infusion Assessment:  Patient tolerated 1 injection via Left Ventrogluteal without incident. No blood aspirated prior to administration.     Discharge Plan:   Patient declined prescription refills.  Discharge instructions reviewed with: Patient.  Patient and/or family verbalized understanding of discharge instructions and all questions answered.  AVS to patient via [employer].  Patient will return 2/7 for next Dr. Sr appointment. IB sent to make monthly Lupron  Patient discharged in stable condition accompanied by: self.  Departure Mode: Ambulatory.  Face to Face time: 0 minutes.      Haresh Kathleen RN

## 2023-01-26 NOTE — PATIENT INSTRUCTIONS
[facility] Triage and after hours / weekends / holidays:  [phone]    Please call the triage or after hours line if you experience a temperature greater than or equal to 100.4, shaking chills, have uncontrolled nausea, vomiting and/or diarrhea, dizziness, shortness of breath, chest pain, bleeding, unexplained bruising, or if you have any other new/concerning symptoms, questions or concerns.      If you are having any concerning symptoms or wish to speak to a provider before your next infusion visit, please call your care coordinator or triage to notify them so we can adequately serve you.     If you need a refill on a narcotic prescription or other medication, please call before your infusion appointment.                 January 2023 Sunday Monday Tuesday Wednesday Thursday Friday Saturday   1     2     3    NEW UROLOGY   7:45 AM   (30 min.)   Samia Castillo CNP   [facility] [place]    LAB   9:00 AM   (15 min.)    LAB   [facility] 4     5     6    CYSTOSCOPY   8:15 AM   (15 min.)   Stewart Lozano MD   [facility] [place]    [facility] RETURN  12:15 PM   (30 min.)   Stewart Henry MD   [facility] Primary Care [facility] 7       8     9     10     11     12     13     14       15     16     17    NEW  10:15 AM   (15 min.)   Nati Church MD   [facility] Dermatology [facility] [place] 18     19     20    LYMPHEDEMA TREATMENT   8:00 AM   (60 min.)   Renetta Dempsey, PT   [facility] 21       22     23     24     25     26    LAB PERIPHERAL   9:30 AM   (15 min.)    [facility] LAB DRAW   [facility]    ONC INFUSION 0.5 HR (30 MIN)  10:00 AM   (30 min.)    ONC INFUSION NURSE   [facility] 27    LYMPHEDEMA TREATMENT   3:00 PM   (60 min.)   Renetta Dempsey, PT   [facility] Rehabilitation  [facility] 28       29 30 31 February 2023 Sunday Monday Tuesday Wednesday Thursday Friday Saturday                  1     2     3     4       5     6     7    LAB PERIPHERAL   7:30 AM   (15 min.)    [facility] LAB DRAW   [facility]    RETURN   8:00 AM   (30 min.)   Mikaela Sr MD [facility] 8     9     10    LYMPHEDEMA TREATMENT   8:00 AM   (60 min.)   Renetta Dempsey PT   [facility] 11       12     13     14     15     16     17     18       19     20     21     22     23     24     25       26     27     28                                           Lab Results:  Recent Results (from the past 12 hour(s))   Comprehensive metabolic panel    Collection Time: 01/26/23 10:20 AM   Result Value Ref Range    Sodium 140 136 - 145 mmol/L    Potassium 4.2 3.4 - 5.3 mmol/L    Chloride 105 98 - 107 mmol/L    Carbon Dioxide (CO2) 25 22 - 29 mmol/L    Anion Gap 10 7 - 15 mmol/L    Urea Nitrogen 14.4 8.0 - 23.0 mg/dL    Creatinine 0.85 0.67 - 1.17 mg/dL    Calcium 9.9 8.8 - 10.2 mg/dL    Glucose 111 (H) 70 - 99 mg/dL    Alkaline Phosphatase 44 40 - 129 U/L    AST 20 10 - 50 U/L    ALT 20 10 - 50 U/L    Protein Total 7.5 6.4 - 8.3 g/dL    Albumin 4.5 3.5 - 5.2 g/dL    Bilirubin Total 0.3 <=1.2 mg/dL    GFR Estimate >90 >60 mL/min/1.73m2   CBC with platelets and differential    Collection Time: 01/26/23 10:20 AM   Result Value Ref Range    WBC Count 3.3 (L) 4.0 - 11.0 10e3/uL    RBC Count 3.50 (L) 4.40 - 5.90 10e6/uL    Hemoglobin 11.1 (L) 13.3 - 17.7 g/dL    Hematocrit 32.8 (L) 40.0 - 53.0 %    MCV 94 78 - 100 fL    MCH 31.7 26.5 - 33.0 pg    MCHC 33.8 31.5 - 36.5 g/dL    RDW 13.2 10.0 - 15.0 %    Platelet Count 172 150 - 450 10e3/uL    % Neutrophils 54 %    % Lymphocytes 33 %    % Monocytes 7 %    % Eosinophils 4 %    % Basophils 2  %    % Immature Granulocytes 0 %    NRBCs per 100 WBC 0 <1 /100    Absolute Neutrophils 1.8 1.6 - 8.3 10e3/uL    Absolute Lymphocytes 1.1 0.8 - 5.3 10e3/uL    Absolute Monocytes 0.2 0.0 - 1.3 10e3/uL    Absolute Eosinophils 0.1 0.0 - 0.7 10e3/uL    Absolute Basophils 0.1 0.0 - 0.2 10e3/uL    Absolute Immature Granulocytes 0.0 <=0.4 10e3/uL    Absolute NRBCs 0.0 10e3/uL

## 2023-01-26 NOTE — PROGRESS NOTES
Vascular Access Services Notes:    Ultrasound-guided lab draw done without complication. Attempted x1 in the left upper forearm using a 22 gauge x 1.75 inch BB PIV needle. Lab RN was present to assist.    Time spent with pt - 30 minutes      TEODORA David, RN [facility]

## 2023-01-27 ENCOUNTER — MYC MEDICAL ADVICE (OUTPATIENT)
Dept: ONCOLOGY | Facility: CLINIC | Age: 73
End: 2023-01-27
Payer: COMMERCIAL

## 2023-01-27 ENCOUNTER — HOSPITAL ENCOUNTER (OUTPATIENT)
Dept: PHYSICAL THERAPY | Facility: CLINIC | Age: 73
Setting detail: THERAPIES SERIES
Discharge: HOME | End: 2023-01-27
Attending: INTERNAL MEDICINE
Payer: COMMERCIAL

## 2023-01-27 PROCEDURE — 97140 MANUAL THERAPY 1/> REGIONS: CPT | Mod: GP | Performed by: PHYSICAL THERAPIST

## 2023-01-27 PROCEDURE — 97535 SELF CARE MNGMENT TRAINING: CPT | Mod: GP | Performed by: PHYSICAL THERAPIST

## 2023-02-07 ENCOUNTER — ONCOLOGY VISIT (OUTPATIENT)
Dept: ONCOLOGY | Facility: CLINIC | Age: 73
End: 2023-02-07
Attending: INTERNAL MEDICINE
Payer: COMMERCIAL

## 2023-02-07 VITALS
RESPIRATION RATE: 14 BRPM | BODY MASS INDEX: 27.1 KG/M2 | HEIGHT: 68 IN | SYSTOLIC BLOOD PRESSURE: 153 MMHG | TEMPERATURE: 98.2 F | OXYGEN SATURATION: 99 % | DIASTOLIC BLOOD PRESSURE: 85 MMHG | WEIGHT: 178.8 LBS | HEART RATE: 87 BPM

## 2023-02-07 DIAGNOSIS — I89.0 LYMPHEDEMA OF RIGHT ARM: ICD-10-CM

## 2023-02-07 DIAGNOSIS — C50.121 MALIGNANT NEOPLASM OF CENTRAL PORTION OF RIGHT BREAST IN MALE, ESTROGEN RECEPTOR POSITIVE (H): Primary | ICD-10-CM

## 2023-02-07 DIAGNOSIS — C50.929 MALIGNANT NEOPLASM OF MALE BREAST, UNSPECIFIED ESTROGEN RECEPTOR STATUS, UNSPECIFIED LATERALITY, UNSPECIFIED SITE OF BREAST (H): ICD-10-CM

## 2023-02-07 DIAGNOSIS — Z79.811 LONG TERM CURRENT USE OF AROMATASE INHIBITOR: ICD-10-CM

## 2023-02-07 DIAGNOSIS — M85.89 OSTEOPENIA OF MULTIPLE SITES: ICD-10-CM

## 2023-02-07 DIAGNOSIS — Z17.0 MALIGNANT NEOPLASM OF CENTRAL PORTION OF RIGHT BREAST IN MALE, ESTROGEN RECEPTOR POSITIVE (H): Primary | ICD-10-CM

## 2023-02-07 PROCEDURE — 99214 OFFICE O/P EST MOD 30 MIN: CPT | Performed by: INTERNAL MEDICINE

## 2023-02-07 PROCEDURE — G0463 HOSPITAL OUTPT CLINIC VISIT: HCPCS

## 2023-02-07 PROCEDURE — G0463 HOSPITAL OUTPT CLINIC VISIT: HCPCS | Performed by: INTERNAL MEDICINE

## 2023-02-07 ASSESSMENT — PAIN SCALES - GENERAL: PAINLEVEL: NO PAIN (0)

## 2023-02-07 NOTE — PROGRESS NOTES
Oncology Visit:  Date on this visit: Feb 7, 2023    Diagnosis: h/o clinical prognostic stage IIIb, [id number], grade 2, ER positive, VT positive, HER-2 negative right breast cancer, [id number].    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  He noted discomfort and hardening of the skin of the right nipple in late April/early May, 2021.  He noted a seborrheic keratosis of the right nipple and remembered he had a similar skin lesion of the arm 3 months earlier.  However, he subsequently noted a mass in the same area.  Right breast skin punch biopsy performed by dermatology was c/w grade 2 invasive ductal carcinoma with associated DCIS.  Invasive carcinoma was ER positive 100% and VT positive 70%, with tumor invading the dermis.  HER-2 was negative by IHC (score 1+).  Diagnostic mammogram and ultrasound showed a retroareolar right breast mass measuring 2.9 cm with associated nipple retraction and enlarged, abnormal right axillary lymph nodes.  Right breast biopsy was again c/w grade 2 invasive ductal carcinoma, ER strong in 98% of tumor cell nuclei, HER-2 negative.  Ki-67 was 15%.    He elected to screen for the ISPY-2 clinical trial.  MRI breast on 6/18/2021 showed the biopsy proven right breast cancer to measure 3.9 cm in maximum dimension with invasion of the nipple and the pectoralis muscle as well as at least 4 abnormal level 1 and 2 right axillary lymph nodes and a tiny 0.4 cm right internal mammary lymph node.  Mammoprint returned low risk with a score of +0.29.  He elected for treatment on the endocrine optimization protocol of ISPY-2 and was randomized to treatment with amcenestrant.  He was on treatment with  amcenestrant from 7/1/2021 - 10/26/2021.  At our visit in 01/2021, both right axillary node and right breast tumor palpated more firm then prior.  Breast MRI was stable, however, due to clinical concerns for progression,  decision was made to proceed with surgery.   Right breast mastectomy and right axillary lymph node dissection was performed by Dr. Snider on 10/27/2021.  Pathology showed grade 2 carcinoma of the right breast measuring 3.2 cm with invasion of the dermis, nipple and the pectoralis muscle.  Ki-67 of the excised tumor was 7%.  15/44 right axillary lymph nodes were positive with 6 of the lymph nodes demonstrating extranodal extension.  The largest lymph node metastasis measured 2.1 cm.  Oncotype dx recurrence score was 50.    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  His course was complicated by fevers persistent throughout treatment.  He received radiation (4800 cGy in 15 fractions) to the right chest wall and regional lymph nodes from 3/21/2022 - 4/8/2022.  He has been on treatment with lupron and letrozole since 3/16/2022.  Abemaciclib was added 4/26/2022.    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 He reports some hot flashes at night, intermittently a few times a day but states these are manageable.  He had an endoscopy and colonoscopy without issue. Endoscopy showed gastric ulcers and he has been taking Pepcid with good relief. He was seen by dermatology, who saw no potentially cancerous findings, recommended a moisturizing cream for his back dry skin and steroid cream for his lower extremity eczema, both of which have been helping.    His primary concern is that three weeks ago he saw his physical therapist who found his right upper extremity to be approximately 10% greater in diameter than his left and recommended a lymphedema pump to help with this.     He continues to have insomnia which he has been able to manage with conservative measures and has not needed his ambien. He has been regularly strength training and going to PT and states the fatigue and weakness have significantly improved, he feels in much better physical health  overall. He reports some occasional diarrhea, approximately once a week. He takes imodium for this with good relief. He also states that when he has his Zometa infusion he experienced flu-like symptoms for several days and unwell for a few weeks, he was wondering about alternate therapies.    His primary questions for today were addressing the duration of his medications, any signs or symptoms of cancer re-occurrence, as well as if surveillance scans were warranted.    12-point ROS was otherwise negative.      Past Medical/Surgical History:  Past Medical History:   Diagnosis Date     Breast cancer (H) 05/2021     Chronic sinusitis      Hypertension 2002     Past Surgical History:   Procedure Laterality Date     COLONOSCOPY WITH CO2 INSUFFLATION N/A 10/28/2022    Procedure: COLONOSCOPY, WITH CO2 INSUFFLATION;  Surgeon: Annemarie Noel DO;  Location: [facility] OR     COMBINED ESOPHAGOSCOPY, GASTROSCOPY, DUODENOSCOPY (EGD) WITH CO2 INSUFFLATION N/A 10/28/2022    Procedure: ESOPHAGOGASTRODUODENOSCOPY, WITH CO2 INSUFFLATION;  Surgeon: Annemarie Noel DO;  Location: [facility] OR     DISSECT LYMPH NODE AXILLA Right 10/27/2021    Procedure: RIGHT Axillary Lymph Node Dissection;  Surgeon: Nida Snider MD;  Location: [facility] OR     ESOPHAGOSCOPY, GASTROSCOPY, DUODENOSCOPY (EGD), COMBINED N/A 10/28/2022    Procedure: ESOPHAGOGASTRODUODENOSCOPY, WITH BIOPSY;  Surgeon: Annemarie Noel DO;  Location: [facility] OR     MASTECTOMY SIMPLE Right 10/27/2021    Procedure: RIGHT Simple Mastectomy;  Surgeon: Nida Snider MD;  Location: [facility] OR     Allergies:  Allergies as of 02/07/2023     (No Known Allergies)     Current Medications:  Current Outpatient Medications   Medication Sig Dispense Refill     abemaciclib (VERZENIO) 150 MG tablet Take 1 tablet (150 mg) by mouth 2 times daily for 28 days 56 tablet 0     famotidine (PEPCID) 40 MG tablet Take 1 tablet (40 mg) by mouth daily 90 tablet 3     letrozole (FEMARA) 2.5 MG tablet Take 1 tablet  "(2.5 mg) by mouth daily for 360 days 90 tablet 3     letrozole (FEMARA) 2.5 MG tablet Take 1 tablet (2.5 mg) by mouth daily for 28 days 28 tablet 0     loperamide (IMODIUM) 2 MG capsule Start with 2 caps (4 mg), then take one cap (2 mg) after each diarrheal stool as needed. Do not use more than 8 caps (16 mg) per day. 30 capsule 0     losartan-hydrochlorothiazide (HYZAAR) 100-25 MG tablet TAKE 1 TABLET BY MOUTH EVERY MORNING 90 tablet 3     MELATONIN PO        Naproxen Sodium (ALEVE PO)        prochlorperazine (COMPAZINE) 10 MG tablet Take 0.5 tablets (5 mg) by mouth every 6 hours as needed for nausea or vomiting 30 tablet 2     triamcinolone (KENALOG) 0.1 % external ointment Apply topically 2 times daily as needed (rash) 80 g 5     urea (GORDONS UREA) 40 % external ointment For callused feet nightly 60 g 11      Genetics Breast Actionable and Breast Expanded panels were both negative for pathogenic germline mutations.    Physical Exam:  BP (!) 153/85 (BP Location: Left arm, Patient Position: Chair, Cuff Size: Adult Large)   Pulse 87   Temp 98.2  F (36.8  C) (Oral)   Resp 14   Ht 1.727 m (5' 7.99\")   Wt 81.1 kg (178 lb 12.8 oz)   SpO2 99%   BMI 27.19 kg/m    General:  Well appearing adult male in NAD.  Alert and oriented x 3.  HEENT:  Normocephalic.  Sclera anicteric.   Lymph:  No palpable cervical, supraclavicular, or axillary LAD.  Palpable fibrosis right axilla c/w known prior ALND.  No palpable mass.  Chest:  CTA bilaterally.  No wheezes or crackles.  CV:  RRR.  No audible m/r/g.  Chest wall:  Right mastectomy. No palpable mass R chest wall.   Abd:  Soft/ND/NT +BS   Ext:  No pitting edema of the bilateral lower extremities. (+) edema of the right upper extremity.  Neuro:  Cranial nerves grossly intact.  Gait stable.  Psych:  Mood and affect appear normal.  Skin: Hyperpigmented skin upper back with overlying excoriations.    Laboratory/Imaging Studies:  1/26/2023 Labs:  Electrolytes, kidney, and liver " function tests are wnl.  WBC is low at 3.3  Hemoglobin is low at 11.1 g/dL  Platelets are wnl.    ASSESSMENT/PLAN:   Karina is a 73 yo male with pathologic stage IIIb, [id number], grade 2, ER positive, NE positive, HER-2 negative invasive ductal carcinoma of the right breast.  He is s/p treatment with 3.5 months neoadjuvant amcenestrant, right breast mastectomy, right ALND, 4 cycles Taxotere and cyclophosphamide, and radiation.  He is on treatment with abemaciclib, lupron and letrozole.    1.  Right breast cancer:    On treatment with abemaciclib, lupron and letrozole since 4/26/2022.  He has leukopenia and anemia associated with abemaciclib. Blood counts are within range to continue at the current dose.  Plan to continue abemaciclib for a total of 2 years.   - He inquired about surveillance imaging.  We reviewed that scheduled CT C/A/P is not performed for breast cancer surveillance as it has not been shown to improve outcomes.  Given that 15/44 lymph nodes were positive, it is reasonable to add on CA 15-3 and CEA markers every 6 months to his lab tests for surveillance.  - Next Lupron is due 2/20/2023   - Continue monthly labs.  - Provider visit every 3 months.    2.  Bladder wall thickening seen on CT:   - He is s/p cystoscopy by Dr. Lozano on 1/6/2023.  Dr. Lozano's note was reviewed.  Cystoscopy was without abnormal findings.    - No further evaluation necessary.    3.  Fatigue & weakness, improved:  Continues in a survival to strength program. Seeing Dr. Waters as well.     4.  RUE lymphedema/cording: Increased lymphedema of the RUE since last visit. Lymphedema clinic has prescribed a lymphedema pump, which I am in agreement with. He has been fitted with compression sleeve.  Continue massage and follow up with lymphedema clinic prn.     5. Insomnia: Managed conservatively with sleep hygiene techniques. Has ambien to take PRN.     6.  Patulous esophagus/gastritis/esophagitis: Diagnosed with EGD 10/2022.    Continue Pepcid BID.    7.  Bone Health:  DEXA 10/2022 with osteopenia of multiple sites.  On every 6 month Zometa for prevention of breast cancer bone metastases.  Unfortunately this was poorly tolerated with a week of body aches, fevers, or chills.  We discussed change to Prolia, which he is in agreement with.    - Plan to administer Prolia around 4/17.    8.  Follow Up:  Labs and lupron 2/20, 3/20, and 4/17.  Labs and Prolia around 4/17  Provider visit in 3 months.       Patient was seen alongside MS4, Carlos Mares.  The medical student was personally supervised by me during the patient examination. I personally performed a physical exam and the medical decision-making. I made appropriate changes to the documentation and the assessment and plan based on my verification, exam, and medical decision making.    I personally sent 35 minutes spent on the date of the encounter doing chart review, review of test results, interpretation of tests, patient visit and documentation.

## 2023-02-07 NOTE — NURSING NOTE
"Oncology Rooming Note    February 7, 2023 8:44 AM   Ronna Collins is a 72 year old male who presents for:    Chief Complaint   Patient presents with     Oncology Clinic Visit     [facility] RETURN - BREAST CANCER     Initial Vitals: BP (!) 153/85 (BP Location: Left arm, Patient Position: Chair, Cuff Size: Adult Large)   Pulse 87   Temp 98.2  F (36.8  C) (Oral)   Resp 14   Ht 1.727 m (5' 7.99\")   Wt 81.1 kg (178 lb 12.8 oz)   SpO2 99%   BMI 27.19 kg/m   Estimated body mass index is 27.19 kg/m  as calculated from the following:    Height as of this encounter: 1.727 m (5' 7.99\").    Weight as of this encounter: 81.1 kg (178 lb 12.8 oz). Body surface area is 1.97 meters squared.  No Pain (0) Comment: Data Unavailable   No LMP for male patient.  Allergies reviewed: Yes  Medications reviewed: Yes    Medications: Medication refills not needed today.  Pharmacy name entered into [facility]:    [id number] - [place], [place] - [address] [facility] - [place], [place] - [address]  [id number] - [place], [place] - [address] AT [facility] [address]    Guillermo Espinoza LPN            "

## 2023-02-07 NOTE — LETTER
2/7/2023         RE: Ronna Collins  [address]  [facility] [address]        Dear Colleague,    Thank you for referring your patient, Ronna Collins, to the [facility]. Please see a copy of my visit note below.    Oncology Visit:  Date on this visit: Feb 7, 2023    Diagnosis: h/o clinical prognostic stage IIIb, [id number], grade 2, ER positive, GA positive, HER-2 negative right breast cancer, [id number].    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  He noted discomfort and hardening of the skin of the right nipple in late April/early May, 2021.  He noted a seborrheic keratosis of the right nipple and remembered he had a similar skin lesion of the arm 3 months earlier.  However, he subsequently noted a mass in the same area.  Right breast skin punch biopsy performed by dermatology was c/w grade 2 invasive ductal carcinoma with associated DCIS.  Invasive carcinoma was ER positive 100% and GA positive 70%, with tumor invading the dermis.  HER-2 was negative by IHC (score 1+).  Diagnostic mammogram and ultrasound showed a retroareolar right breast mass measuring 2.9 cm with associated nipple retraction and enlarged, abnormal right axillary lymph nodes.  Right breast biopsy was again c/w grade 2 invasive ductal carcinoma, ER strong in 98% of tumor cell nuclei, HER-2 negative.  Ki-67 was 15%.    He elected to screen for the ISPY-2 clinical trial.  MRI breast on 6/18/2021 showed the biopsy proven right breast cancer to measure 3.9 cm in maximum dimension with invasion of the nipple and the pectoralis muscle as well as at least 4 abnormal level 1 and 2 right axillary lymph nodes and a tiny 0.4 cm right internal mammary lymph node.  Mammoprint returned low risk with a score of +0.29.  He elected for treatment on the endocrine optimization protocol of ISPY-2 and was randomized to treatment with  amcenestrant.  He was on treatment with  amcenestrant from 7/1/2021 - 10/26/2021.  At our visit in 01/2021, both right axillary node and right breast tumor palpated more firm then prior.  Breast MRI was stable, however, due to clinical concerns for progression, decision was made to proceed with surgery.   Right breast mastectomy and right axillary lymph node dissection was performed by Dr. Snider on 10/27/2021.  Pathology showed grade 2 carcinoma of the right breast measuring 3.2 cm with invasion of the dermis, nipple and the pectoralis muscle.  Ki-67 of the excised tumor was 7%.  15/44 right axillary lymph nodes were positive with 6 of the lymph nodes demonstrating extranodal extension.  The largest lymph node metastasis measured 2.1 cm.  Oncotype dx recurrence score was 50.    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  His course was complicated by fevers persistent throughout treatment.  He received radiation (4800 cGy in 15 fractions) to the right chest wall and regional lymph nodes from 3/21/2022 - 4/8/2022.  He has been on treatment with lupron and letrozole since 3/16/2022.  Abemaciclib was added 4/26/2022.    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 He reports some hot flashes at night, intermittently a few times a day but states these are manageable.  He had an endoscopy and colonoscopy without issue. Endoscopy showed gastric ulcers and he has been taking Pepcid with good relief. He was seen by dermatology, who saw no potentially cancerous findings, recommended a moisturizing cream for his back dry skin and steroid cream for his lower extremity eczema, both of which have been helping.    His primary concern is that three weeks ago he saw his physical therapist who found his right upper extremity to be approximately 10% greater in diameter than his left and recommended a lymphedema pump to help with this.     He continues to have  insomnia which he has been able to manage with conservative measures and has not needed his ambien. He has been regularly strength training and going to PT and states the fatigue and weakness have significantly improved, he feels in much better physical health overall. He reports some occasional diarrhea, approximately once a week. He takes imodium for this with good relief. He also states that when he has his Zometa infusion he experienced flu-like symptoms for several days and unwell for a few weeks, he was wondering about alternate therapies.    His primary questions for today were addressing the duration of his medications, any signs or symptoms of cancer re-occurrence, as well as if surveillance scans were warranted.    12-point ROS was otherwise negative.      Past Medical/Surgical History:  Past Medical History:   Diagnosis Date     Breast cancer (H) 05/2021     Chronic sinusitis      Hypertension 2002     Past Surgical History:   Procedure Laterality Date     COLONOSCOPY WITH CO2 INSUFFLATION N/A 10/28/2022    Procedure: COLONOSCOPY, WITH CO2 INSUFFLATION;  Surgeon: Annemarie Noel DO;  Location: [facility] OR     COMBINED ESOPHAGOSCOPY, GASTROSCOPY, DUODENOSCOPY (EGD) WITH CO2 INSUFFLATION N/A 10/28/2022    Procedure: ESOPHAGOGASTRODUODENOSCOPY, WITH CO2 INSUFFLATION;  Surgeon: Annemarie Noel DO;  Location: [facility] OR     DISSECT LYMPH NODE AXILLA Right 10/27/2021    Procedure: RIGHT Axillary Lymph Node Dissection;  Surgeon: Nida Snider MD;  Location: [facility] OR     ESOPHAGOSCOPY, GASTROSCOPY, DUODENOSCOPY (EGD), COMBINED N/A 10/28/2022    Procedure: ESOPHAGOGASTRODUODENOSCOPY, WITH BIOPSY;  Surgeon: Annemarie Noel DO;  Location: [facility] OR     MASTECTOMY SIMPLE Right 10/27/2021    Procedure: RIGHT Simple Mastectomy;  Surgeon: Nida Snider MD;  Location: [facility] OR     Allergies:  Allergies as of 02/07/2023     (No Known Allergies)     Current Medications:  Current Outpatient Medications   Medication Sig  "Dispense Refill     abemaciclib (VERZENIO) 150 MG tablet Take 1 tablet (150 mg) by mouth 2 times daily for 28 days 56 tablet 0     famotidine (PEPCID) 40 MG tablet Take 1 tablet (40 mg) by mouth daily 90 tablet 3     letrozole (FEMARA) 2.5 MG tablet Take 1 tablet (2.5 mg) by mouth daily for 360 days 90 tablet 3     letrozole (FEMARA) 2.5 MG tablet Take 1 tablet (2.5 mg) by mouth daily for 28 days 28 tablet 0     loperamide (IMODIUM) 2 MG capsule Start with 2 caps (4 mg), then take one cap (2 mg) after each diarrheal stool as needed. Do not use more than 8 caps (16 mg) per day. 30 capsule 0     losartan-hydrochlorothiazide (HYZAAR) 100-25 MG tablet TAKE 1 TABLET BY MOUTH EVERY MORNING 90 tablet 3     MELATONIN PO        Naproxen Sodium (ALEVE PO)        prochlorperazine (COMPAZINE) 10 MG tablet Take 0.5 tablets (5 mg) by mouth every 6 hours as needed for nausea or vomiting 30 tablet 2     triamcinolone (KENALOG) 0.1 % external ointment Apply topically 2 times daily as needed (rash) 80 g 5     urea (GORDONS UREA) 40 % external ointment For callused feet nightly 60 g 11      Genetics Breast Actionable and Breast Expanded panels were both negative for pathogenic germline mutations.    Physical Exam:  BP (!) 153/85 (BP Location: Left arm, Patient Position: Chair, Cuff Size: Adult Large)   Pulse 87   Temp 98.2  F (36.8  C) (Oral)   Resp 14   Ht 1.727 m (5' 7.99\")   Wt 81.1 kg (178 lb 12.8 oz)   SpO2 99%   BMI 27.19 kg/m    General:  Well appearing adult male in NAD.  Alert and oriented x 3.  HEENT:  Normocephalic.  Sclera anicteric.   Lymph:  No palpable cervical, supraclavicular, or axillary LAD.  Palpable fibrosis right axilla c/w known prior ALND.  No palpable mass.  Chest:  CTA bilaterally.  No wheezes or crackles.  CV:  RRR.  No audible m/r/g.  Chest wall:  Right mastectomy. No palpable mass R chest wall.   Abd:  Soft/ND/NT +BS   Ext:  No pitting edema of the bilateral lower extremities. (+) edema of the right " upper extremity.  Neuro:  Cranial nerves grossly intact.  Gait stable.  Psych:  Mood and affect appear normal.  Skin: Hyperpigmented skin upper back with overlying excoriations.    Laboratory/Imaging Studies:  1/26/2023 Labs:  Electrolytes, kidney, and liver function tests are wnl.  WBC is low at 3.3  Hemoglobin is low at 11.1 g/dL  Platelets are wnl.    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  He is s/p treatment with 3.5 months neoadjuvant amcenestrant, right breast mastectomy, right ALND, 4 cycles Taxotere and cyclophosphamide, and radiation.  He is on treatment with abemaciclib, lupron and letrozole.    1.  Right breast cancer:    On treatment with abemaciclib, lupron and letrozole since 4/26/2022.  He has leukopenia and anemia associated with abemaciclib. Blood counts are within range to continue at the current dose.  Plan to continue abemaciclib for a total of 2 years.   - He inquired about surveillance imaging.  We reviewed that scheduled CT C/A/P is not performed for breast cancer surveillance as it has not been shown to improve outcomes.  Given that 15/44 lymph nodes were positive, it is reasonable to add on CA 15-3 and CEA markers every 6 months to his lab tests for surveillance.  - Next Lupron is due 2/20/2023   - Continue monthly labs.  - Provider visit every 3 months.    2.  Bladder wall thickening seen on CT:   - He is s/p cystoscopy by Dr. Lozano on 1/6/2023.  Dr. Lozano's note was reviewed.  Cystoscopy was without abnormal findings.    - No further evaluation necessary.    3.  Fatigue & weakness, improved:  Continues in a survival to strength program. Seeing Dr. Waters as well.     4.  RUE lymphedema/cording: Increased lymphedema of the RUE since last visit. Lymphedema clinic has prescribed a lymphedema pump, which I am in agreement with. He has been fitted with  compression sleeve.  Continue massage and follow up with lymphedema clinic prn.     5. Insomnia: Managed conservatively with sleep hygiene techniques. Has ambien to take PRN.     6.  Patulous esophagus/gastritis/esophagitis: Diagnosed with EGD 10/2022.   Continue Pepcid BID.    7.  Bone Health:  DEXA 10/2022 with osteopenia of multiple sites.  On every 6 month Zometa for prevention of breast cancer bone metastases.  Unfortunately this was poorly tolerated with a week of body aches, fevers, or chills.  We discussed change to Prolia, which he is in agreement with.    - Plan to administer Prolia around 4/17.    8.  Follow Up:  Labs and lupron 2/20, 3/20, and 4/17.  Labs and Prolia around 4/17  Provider visit in 3 months.       Patient was seen alongside MS4, Carlos Mares.  The medical student was personally supervised by me during the patient examination. I personally performed a physical exam and the medical decision-making. I made appropriate changes to the documentation and the assessment and plan based on my verification, exam, and medical decision making.    I personally sent 35 minutes spent on the date of the encounter doing chart review, review of test results, interpretation of tests, patient visit and documentation.      Mikaela Sr MD

## 2023-02-09 DIAGNOSIS — Z17.0 MALIGNANT NEOPLASM OF CENTRAL PORTION OF RIGHT BREAST IN MALE, ESTROGEN RECEPTOR POSITIVE (H): Primary | ICD-10-CM

## 2023-02-09 DIAGNOSIS — C50.121 MALIGNANT NEOPLASM OF CENTRAL PORTION OF RIGHT BREAST IN MALE, ESTROGEN RECEPTOR POSITIVE (H): Primary | ICD-10-CM

## 2023-02-10 ENCOUNTER — HOSPITAL ENCOUNTER (OUTPATIENT)
Dept: PHYSICAL THERAPY | Facility: CLINIC | Age: 73
Setting detail: THERAPIES SERIES
Discharge: HOME | End: 2023-02-10
Attending: INTERNAL MEDICINE
Payer: COMMERCIAL

## 2023-02-10 PROCEDURE — 97140 MANUAL THERAPY 1/> REGIONS: CPT | Mod: GP | Performed by: PHYSICAL THERAPIST

## 2023-02-10 PROCEDURE — 97535 SELF CARE MNGMENT TRAINING: CPT | Mod: GP | Performed by: PHYSICAL THERAPIST

## 2023-02-16 DIAGNOSIS — C50.929 MALIGNANT NEOPLASM OF MALE BREAST, UNSPECIFIED ESTROGEN RECEPTOR STATUS, UNSPECIFIED LATERALITY, UNSPECIFIED SITE OF BREAST (H): Primary | ICD-10-CM

## 2023-02-17 ENCOUNTER — HOSPITAL ENCOUNTER (OUTPATIENT)
Dept: PHYSICAL THERAPY | Facility: CLINIC | Age: 73
Setting detail: THERAPIES SERIES
Discharge: HOME | End: 2023-02-17
Payer: COMMERCIAL

## 2023-02-17 PROCEDURE — 97140 MANUAL THERAPY 1/> REGIONS: CPT | Mod: GP | Performed by: PHYSICAL THERAPIST

## 2023-02-24 ENCOUNTER — INFUSION THERAPY VISIT (OUTPATIENT)
Dept: ONCOLOGY | Facility: CLINIC | Age: 73
End: 2023-02-24
Attending: INTERNAL MEDICINE
Payer: COMMERCIAL

## 2023-02-24 ENCOUNTER — APPOINTMENT (OUTPATIENT)
Dept: LAB | Facility: CLINIC | Age: 73
End: 2023-02-24
Attending: INTERNAL MEDICINE
Payer: COMMERCIAL

## 2023-02-24 ENCOUNTER — HOSPITAL ENCOUNTER (OUTPATIENT)
Dept: PHYSICAL THERAPY | Facility: CLINIC | Age: 73
Setting detail: THERAPIES SERIES
Discharge: HOME | End: 2023-02-24
Payer: COMMERCIAL

## 2023-02-24 ENCOUNTER — MYC MEDICAL ADVICE (OUTPATIENT)
Dept: ONCOLOGY | Facility: CLINIC | Age: 73
End: 2023-02-24

## 2023-02-24 VITALS
RESPIRATION RATE: 18 BRPM | OXYGEN SATURATION: 99 % | TEMPERATURE: 98.1 F | DIASTOLIC BLOOD PRESSURE: 89 MMHG | WEIGHT: 179.6 LBS | SYSTOLIC BLOOD PRESSURE: 150 MMHG | HEART RATE: 89 BPM | BODY MASS INDEX: 27.31 KG/M2

## 2023-02-24 DIAGNOSIS — C50.121 MALIGNANT NEOPLASM OF CENTRAL PORTION OF RIGHT BREAST IN MALE, ESTROGEN RECEPTOR POSITIVE (H): Primary | ICD-10-CM

## 2023-02-24 DIAGNOSIS — C50.929 MALIGNANT NEOPLASM OF MALE BREAST, UNSPECIFIED ESTROGEN RECEPTOR STATUS, UNSPECIFIED LATERALITY, UNSPECIFIED SITE OF BREAST (H): ICD-10-CM

## 2023-02-24 DIAGNOSIS — Z17.0 MALIGNANT NEOPLASM OF CENTRAL PORTION OF RIGHT BREAST IN MALE, ESTROGEN RECEPTOR POSITIVE (H): Primary | ICD-10-CM

## 2023-02-24 DIAGNOSIS — C50.929 MALIGNANT NEOPLASM OF MALE BREAST, UNSPECIFIED ESTROGEN RECEPTOR STATUS, UNSPECIFIED LATERALITY, UNSPECIFIED SITE OF BREAST (H): Primary | ICD-10-CM

## 2023-02-24 LAB
ALBUMIN SERPL BCG-MCNC: 4.3 G/DL (ref 3.5–5.2)
ALP SERPL-CCNC: 45 U/L (ref 40–129)
ALT SERPL W P-5'-P-CCNC: 24 U/L (ref 10–50)
ANION GAP SERPL CALCULATED.3IONS-SCNC: 12 MMOL/L (ref 7–15)
AST SERPL W P-5'-P-CCNC: 28 U/L (ref 10–50)
BASOPHILS # BLD AUTO: 0.1 10E3/UL (ref 0–0.2)
BASOPHILS NFR BLD AUTO: 2 %
BILIRUB SERPL-MCNC: 0.2 MG/DL
BUN SERPL-MCNC: 9.6 MG/DL (ref 8–23)
CALCIUM SERPL-MCNC: 9.9 MG/DL (ref 8.8–10.2)
CANCER AG15-3 SERPL-ACNC: 40 U/ML
CEA SERPL-MCNC: 3.9 NG/ML
CHLORIDE SERPL-SCNC: 103 MMOL/L (ref 98–107)
CREAT SERPL-MCNC: 0.81 MG/DL (ref 0.67–1.17)
DEPRECATED HCO3 PLAS-SCNC: 22 MMOL/L (ref 22–29)
EOSINOPHIL # BLD AUTO: 0.2 10E3/UL (ref 0–0.7)
EOSINOPHIL NFR BLD AUTO: 6 %
ERYTHROCYTE [DISTWIDTH] IN BLOOD BY AUTOMATED COUNT: 12.5 % (ref 10–15)
GFR SERPL CREATININE-BSD FRML MDRD: >90 ML/MIN/1.73M2
GLUCOSE SERPL-MCNC: 121 MG/DL (ref 70–99)
HCT VFR BLD AUTO: 33.1 % (ref 40–53)
HGB BLD-MCNC: 11.3 G/DL (ref 13.3–17.7)
IMM GRANULOCYTES # BLD: 0 10E3/UL
IMM GRANULOCYTES NFR BLD: 1 %
LYMPHOCYTES # BLD AUTO: 1.1 10E3/UL (ref 0.8–5.3)
LYMPHOCYTES NFR BLD AUTO: 36 %
MCH RBC QN AUTO: 31.4 PG (ref 26.5–33)
MCHC RBC AUTO-ENTMCNC: 34.1 G/DL (ref 31.5–36.5)
MCV RBC AUTO: 92 FL (ref 78–100)
MONOCYTES # BLD AUTO: 0.3 10E3/UL (ref 0–1.3)
MONOCYTES NFR BLD AUTO: 9 %
NEUTROPHILS # BLD AUTO: 1.5 10E3/UL (ref 1.6–8.3)
NEUTROPHILS NFR BLD AUTO: 46 %
NRBC # BLD AUTO: 0 10E3/UL
NRBC BLD AUTO-RTO: 0 /100
PLATELET # BLD AUTO: 160 10E3/UL (ref 150–450)
POTASSIUM SERPL-SCNC: 4.4 MMOL/L (ref 3.4–5.3)
PROT SERPL-MCNC: 7.6 G/DL (ref 6.4–8.3)
RBC # BLD AUTO: 3.6 10E6/UL (ref 4.4–5.9)
SODIUM SERPL-SCNC: 137 MMOL/L (ref 136–145)
WBC # BLD AUTO: 3.2 10E3/UL (ref 4–11)

## 2023-02-24 PROCEDURE — 97140 MANUAL THERAPY 1/> REGIONS: CPT | Mod: GP | Performed by: PHYSICAL THERAPIST

## 2023-02-24 PROCEDURE — 82378 CARCINOEMBRYONIC ANTIGEN: CPT

## 2023-02-24 PROCEDURE — 36415 COLL VENOUS BLD VENIPUNCTURE: CPT

## 2023-02-24 PROCEDURE — 80053 COMPREHEN METABOLIC PANEL: CPT

## 2023-02-24 PROCEDURE — 96402 CHEMO HORMON ANTINEOPL SQ/IM: CPT

## 2023-02-24 PROCEDURE — 86300 IMMUNOASSAY TUMOR CA 15-3: CPT

## 2023-02-24 PROCEDURE — 250N000011 HC RX IP 250 OP 636: Performed by: INTERNAL MEDICINE

## 2023-02-24 PROCEDURE — 85025 COMPLETE CBC W/AUTO DIFF WBC: CPT

## 2023-02-24 RX ADMIN — LEUPROLIDE ACETATE 7.5 MG: KIT at 09:09

## 2023-02-24 ASSESSMENT — PAIN SCALES - GENERAL: PAINLEVEL: NO PAIN (0)

## 2023-02-24 NOTE — PROGRESS NOTES
Infusion Nursing Note:  Ronna Collins presents today for cycle 13 day 1 luprolide injection.  Patient seen by provider today: No   present during visit today: Not Applicable.    Note: Pt assessed and lupron administered by JOSE Ortiz.     Intravenous Access:  No Intravenous access at this visit.    Treatment Conditions:  Lab Results   Component Value Date    HGB 11.3 (L) 02/24/2023    WBC 3.2 (L) 02/24/2023    ANEU 1.7 05/24/2022    ANEUTAUTO 1.5 (L) 02/24/2023     02/24/2023      Lab Results   Component Value Date     02/24/2023    POTASSIUM 4.4 02/24/2023    MAG 2.3 06/22/2021    CR 0.81 02/24/2023    PROSPER 9.9 02/24/2023    BILITOTAL 0.2 02/24/2023    ALBUMIN 4.3 02/24/2023    ALT 24 02/24/2023    AST 28 02/24/2023       Post Infusion Assessment:  Patient tolerated injection to RIGHT ventrogluteal site without incident.     Discharge Plan:   Patient declined prescription refills.  AVS to patient via [facility].  Patient will return 03/23/23 for next appointment.   Patient discharged in stable condition accompanied by: self.  Departure Mode: Ambulatory.      Emily Meese, RN

## 2023-02-24 NOTE — NURSING NOTE
Chief Complaint   Patient presents with     Blood Draw     Labs drawn via  by RN in lab. VS taken.      Labs collected from venipuncture by vascular RN. Vitals taken. Checked in for appointment(s).    Regina Miranda RN

## 2023-02-24 NOTE — PROGRESS NOTES
Infusion Injection Note:  Ronna Collins presents today for Lupron Injection.    Patient seen by provider today: No   present during visit today: Not Applicable.    Patient declined to speak with an RN today.     Treatment Conditions:  Not Applicable.    Pre and Post Injection:  Lupron injection given to Right Ventrogluteal without incident.   Patient tolerated procedure well..    Discharge Plan:   Patient and/or family verbalized understanding of discharge instructions and all questions answered.    Hugo Scruggs, Clinic Assistant

## 2023-03-03 ENCOUNTER — HOSPITAL ENCOUNTER (OUTPATIENT)
Dept: PHYSICAL THERAPY | Facility: CLINIC | Age: 73
Setting detail: THERAPIES SERIES
Discharge: HOME | End: 2023-03-03
Payer: COMMERCIAL

## 2023-03-03 DIAGNOSIS — I89.0 LYMPHEDEMA OF RIGHT UPPER EXTREMITY: Primary | ICD-10-CM

## 2023-03-03 PROCEDURE — 97110 THERAPEUTIC EXERCISES: CPT | Mod: GP | Performed by: PHYSICAL THERAPIST

## 2023-03-03 PROCEDURE — 97140 MANUAL THERAPY 1/> REGIONS: CPT | Mod: GP | Performed by: PHYSICAL THERAPIST

## 2023-03-16 DIAGNOSIS — C50.929 MALIGNANT NEOPLASM OF MALE BREAST, UNSPECIFIED ESTROGEN RECEPTOR STATUS, UNSPECIFIED LATERALITY, UNSPECIFIED SITE OF BREAST (H): Primary | ICD-10-CM

## 2023-03-17 ENCOUNTER — HOSPITAL ENCOUNTER (OUTPATIENT)
Dept: PHYSICAL THERAPY | Facility: CLINIC | Age: 73
Setting detail: THERAPIES SERIES
Discharge: HOME | End: 2023-03-17
Attending: SURGERY
Payer: COMMERCIAL

## 2023-03-17 ENCOUNTER — TELEPHONE (OUTPATIENT)
Dept: ONCOLOGY | Facility: CLINIC | Age: 73
End: 2023-03-17
Payer: COMMERCIAL

## 2023-03-17 PROCEDURE — 97140 MANUAL THERAPY 1/> REGIONS: CPT | Mod: GP | Performed by: PHYSICAL THERAPIST

## 2023-03-17 NOTE — ORAL ONC MGMT
Oral Chemotherapy Monitoring Program    Subjective/Objective:  Ronna Collins is a 72 year old male contacted by phone for a follow-up visit for oral chemotherapy.  Ronna confirms taking abemaciclib 150 mg twice daily. He states he is tolerating this well, with no new or worsening adverse effects. Denies recent medication changes or missed doses of abemaciclib - PDC is 0.97, no concerns for adherence. He has a 2 week supply on hand.     ORAL CHEMOTHERAPY 12/30/2022 1/20/2023 1/27/2023 2/16/2023 2/24/2023 3/16/2023 3/17/2023   Assessment Type Lab Monitoring Refill Lab Monitoring Refill Lab Monitoring Refill Quarterly Follow up   Diagnosis Code Breast Cancer Breast Cancer Breast Cancer Breast Cancer Breast Cancer Breast Cancer Breast Cancer   Providers Dr. Remberto Sr   [facility]   Drug Name Verzenio (abemaciclib) Verzenio (abemaciclib) Verzenio (abemaciclib) Verzenio (abemaciclib) Verzenio (abemaciclib) Verzenio (abemaciclib) Verzenio (abemaciclib)   Dose 150 mg 150 mg 150 mg 150 mg 150 mg 150 mg 150 mg   Current Schedule BID BID BID BID BID BID BID   Cycle Details Continuous Continuous Continuous Continuous Continuous Continuous Continuous   Start Date of Last Cycle - - - - - - -   Planned next cycle start date - - - - - - 3/31/2023   Doses missed in last 2 weeks - - - - - - 0   Adherence Assessment - - - - - - Adherent   Adverse Effects - - - - - - No AE identified during assessment   Nausea - - - - - - -   Pharmacist Intervention(nausea) - - - - - - -   Intervention(s) - - - - - - -   Diarrhea - - - - - - -   Pharmacist Intervention(diarrhea) - - - - - - -   Intervention(s) - - - - - - -   Fatigue - - - - - - -   Pharmacist Intervention(fatigue) - - - - - - -   Intervention(s) - - - - - - -   Other (See Note for Details) - - - - - - -   Pharmacist  "intervention(other) - - - - - - -   Intervention(s) - - - - - - -   Any new drug interactions? - - - - - - No   Is the dose as ordered appropriate for the patient? - - - - - - Yes   Since the last time we talked, have you been hospitalized or used the emergency room? - - - - - - -       Last PHQ-2 Score on record:   PHQ-2 ( 1999 Pf[employer]) 1/2/2023 11/4/2022   Q1: Little interest or pleasure in doing things 0 0   Q2: Feeling down, depressed or hopeless 0 0   PHQ-2 Score 0 0   PHQ-2 Total Score (12-17 Years)- Positive if 3 or more points; Administer PHQ-A if positive - -   Q1: Little interest or pleasure in doing things Not at all -   Q2: Feeling down, depressed or hopeless Not at all -   PHQ-2 Score 0 -       Vitals:  BP:   BP Readings from Last 1 Encounters:   02/24/23 (!) 150/89     Wt Readings from Last 1 Encounters:   02/24/23 81.5 kg (179 lb 9.6 oz)     Estimated body surface area is 1.98 meters squared as calculated from the following:    Height as of 2/7/23: 1.727 m (5' 7.99\").    Weight as of 2/24/23: 81.5 kg (179 lb 9.6 oz).    Labs:  _  Result Component Current Result Ref Range   Sodium 137 (2/24/2023) 136 - 145 mmol/L     _  Result Component Current Result Ref Range   Potassium 4.4 (2/24/2023) 3.4 - 5.3 mmol/L     _  Result Component Current Result Ref Range   Calcium 9.9 (2/24/2023) 8.8 - 10.2 mg/dL     No results found for Mag within last 30 days.     No results found for Phos within last 30 days.     _  Result Component Current Result Ref Range   Albumin 4.3 (2/24/2023) 3.5 - 5.2 g/dL     _  Result Component Current Result Ref Range   Urea Nitrogen 9.6 (2/24/2023) 8.0 - 23.0 mg/dL     _  Result Component Current Result Ref Range   Creatinine 0.81 (2/24/2023) 0.67 - 1.17 mg/dL     _  Result Component Current Result Ref Range   AST 28 (2/24/2023) 10 - 50 U/L     _  Result Component Current Result Ref Range   ALT 24 (2/24/2023) 10 - 50 U/L     _  Result Component Current Result Ref Range   Bilirubin Total " 0.2 (2/24/2023) <=1.2 mg/dL     _  Result Component Current Result Ref Range   WBC Count 3.2 (L) (2/24/2023) 4.0 - 11.0 10e3/uL     _  Result Component Current Result Ref Range   Hemoglobin 11.3 (L) (2/24/2023) 13.3 - 17.7 g/dL     _  Result Component Current Result Ref Range   Platelet Count 160 (2/24/2023) 150 - 450 10e3/uL     No results found for ANC within last 30 days.     _  Result Component Current Result Ref Range   Absolute Neutrophils 1.5 (L) (2/24/2023) 1.6 - 8.3 10e3/uL          Assessment/Plan:  Ronna is tolerating abemaciclib well. Continue current therapy.     Follow-Up:  3/23: labs and infusion appt    Refill Due:  [facility] will deliver abemaciclib 3/29 for 3/31 cycle start    Reba Maria, PharmD, BCOP  Hematology/Oncology Clinical Pharmacist  [facility]  [facility]  [phone]

## 2023-03-23 ENCOUNTER — INFUSION THERAPY VISIT (OUTPATIENT)
Dept: ONCOLOGY | Facility: CLINIC | Age: 73
End: 2023-03-23
Attending: INTERNAL MEDICINE
Payer: COMMERCIAL

## 2023-03-23 ENCOUNTER — LAB (OUTPATIENT)
Dept: LAB | Facility: CLINIC | Age: 73
End: 2023-03-23
Attending: INTERNAL MEDICINE
Payer: COMMERCIAL

## 2023-03-23 VITALS
OXYGEN SATURATION: 99 % | SYSTOLIC BLOOD PRESSURE: 111 MMHG | TEMPERATURE: 98.1 F | DIASTOLIC BLOOD PRESSURE: 61 MMHG | BODY MASS INDEX: 26.1 KG/M2 | HEART RATE: 103 BPM | WEIGHT: 171.6 LBS | RESPIRATION RATE: 16 BRPM

## 2023-03-23 DIAGNOSIS — C50.121 MALIGNANT NEOPLASM OF CENTRAL PORTION OF RIGHT BREAST IN MALE, ESTROGEN RECEPTOR POSITIVE (H): ICD-10-CM

## 2023-03-23 DIAGNOSIS — E83.52 HYPERCALCEMIA: Primary | ICD-10-CM

## 2023-03-23 DIAGNOSIS — C50.929 MALIGNANT NEOPLASM OF MALE BREAST, UNSPECIFIED ESTROGEN RECEPTOR STATUS, UNSPECIFIED LATERALITY, UNSPECIFIED SITE OF BREAST (H): Primary | ICD-10-CM

## 2023-03-23 DIAGNOSIS — Z17.0 MALIGNANT NEOPLASM OF CENTRAL PORTION OF RIGHT BREAST IN MALE, ESTROGEN RECEPTOR POSITIVE (H): ICD-10-CM

## 2023-03-23 LAB
ALBUMIN SERPL BCG-MCNC: 4.6 G/DL (ref 3.5–5.2)
ALP SERPL-CCNC: 48 U/L (ref 40–129)
ALT SERPL W P-5'-P-CCNC: 22 U/L (ref 10–50)
ANION GAP SERPL CALCULATED.3IONS-SCNC: 15 MMOL/L (ref 7–15)
AST SERPL W P-5'-P-CCNC: 26 U/L (ref 10–50)
BASOPHILS # BLD AUTO: 0 10E3/UL (ref 0–0.2)
BASOPHILS NFR BLD AUTO: 1 %
BILIRUB SERPL-MCNC: 0.4 MG/DL
BUN SERPL-MCNC: 30.1 MG/DL (ref 8–23)
CALCIUM SERPL-MCNC: 11.1 MG/DL (ref 8.8–10.2)
CANCER AG15-3 SERPL-ACNC: 40 U/ML
CHLORIDE SERPL-SCNC: 98 MMOL/L (ref 98–107)
CREAT SERPL-MCNC: 1.46 MG/DL (ref 0.67–1.17)
DEPRECATED HCO3 PLAS-SCNC: 25 MMOL/L (ref 22–29)
EOSINOPHIL # BLD AUTO: 0.1 10E3/UL (ref 0–0.7)
EOSINOPHIL NFR BLD AUTO: 3 %
ERYTHROCYTE [DISTWIDTH] IN BLOOD BY AUTOMATED COUNT: 12.6 % (ref 10–15)
GFR SERPL CREATININE-BSD FRML MDRD: 51 ML/MIN/1.73M2
GLUCOSE SERPL-MCNC: 148 MG/DL (ref 70–99)
HCT VFR BLD AUTO: 34.6 % (ref 40–53)
HGB BLD-MCNC: 11.8 G/DL (ref 13.3–17.7)
IMM GRANULOCYTES # BLD: 0 10E3/UL
IMM GRANULOCYTES NFR BLD: 1 %
LYMPHOCYTES # BLD AUTO: 1.2 10E3/UL (ref 0.8–5.3)
LYMPHOCYTES NFR BLD AUTO: 31 %
MCH RBC QN AUTO: 31.5 PG (ref 26.5–33)
MCHC RBC AUTO-ENTMCNC: 34.1 G/DL (ref 31.5–36.5)
MCV RBC AUTO: 92 FL (ref 78–100)
MONOCYTES # BLD AUTO: 0.2 10E3/UL (ref 0–1.3)
MONOCYTES NFR BLD AUTO: 6 %
NEUTROPHILS # BLD AUTO: 2.2 10E3/UL (ref 1.6–8.3)
NEUTROPHILS NFR BLD AUTO: 58 %
NRBC # BLD AUTO: 0 10E3/UL
NRBC BLD AUTO-RTO: 0 /100
PLAT MORPH BLD: NORMAL
PLATELET # BLD AUTO: 152 10E3/UL (ref 150–450)
POTASSIUM SERPL-SCNC: 3.6 MMOL/L (ref 3.4–5.3)
PROT SERPL-MCNC: 8.3 G/DL (ref 6.4–8.3)
RBC # BLD AUTO: 3.75 10E6/UL (ref 4.4–5.9)
RBC MORPH BLD: NORMAL
SODIUM SERPL-SCNC: 138 MMOL/L (ref 136–145)
WBC # BLD AUTO: 3.8 10E3/UL (ref 4–11)

## 2023-03-23 PROCEDURE — 96402 CHEMO HORMON ANTINEOPL SQ/IM: CPT

## 2023-03-23 PROCEDURE — 36415 COLL VENOUS BLD VENIPUNCTURE: CPT

## 2023-03-23 PROCEDURE — 85025 COMPLETE CBC W/AUTO DIFF WBC: CPT

## 2023-03-23 PROCEDURE — 250N000011 HC RX IP 250 OP 636: Performed by: INTERNAL MEDICINE

## 2023-03-23 PROCEDURE — 80053 COMPREHEN METABOLIC PANEL: CPT

## 2023-03-23 PROCEDURE — 86300 IMMUNOASSAY TUMOR CA 15-3: CPT

## 2023-03-23 RX ADMIN — LEUPROLIDE ACETATE 7.5 MG: KIT at 08:50

## 2023-03-23 ASSESSMENT — PAIN SCALES - GENERAL: PAINLEVEL: NO PAIN (0)

## 2023-03-23 NOTE — NURSING NOTE
Chief Complaint   Patient presents with     Blood Draw     Labs drawn via  by vascular access. Vitals taken.     Labs drawn with  by vascular access. Vitals taken. Patient checked into next appointment.    Kelli Martin RN

## 2023-03-23 NOTE — PATIENT INSTRUCTIONS
[facility] Triage and after hours / weekends / holidays:  [phone]    Please call the triage or after hours line if you experience a temperature greater than or equal to 100.4, shaking chills, have uncontrolled nausea, vomiting and/or diarrhea, dizziness, shortness of breath, chest pain, bleeding, unexplained bruising, or if you have any other new/concerning symptoms, questions or concerns.      If you are having any concerning symptoms or wish to speak to a provider before your next infusion visit, please call your care coordinator or triage to notify them so we can adequately serve you.     If you need a refill on a narcotic prescription or other medication, please call before your infusion appointment.                March 2023 Sunday Monday Tuesday Wednesday Thursday Friday Saturday                  1     2     3    LYMPHEDEMA TREATMENT   8:00 AM   (60 min.)   Renetta Dempsey PT   [facility] 4       5     6     7     8     9     10     11       12     13     14     15     16     17    LYMPHEDEMA TREATMENT   8:00 AM   (60 min.)   Renetta Dempsey PT   [facility] 18       19     20     21     22     23    LAB PERIPHERAL   7:45 AM   (15 min.)    [facility] DRAW   [facility]    ONC INFUSION 0.5 HR (30 MIN)   8:30 AM   (30 min.)    ONC INFUSION NURSE   [facility] 24    LYMPHEDEMA TREATMENT   8:00 AM   (60 min.)   Renetta Dempsey PT   [facility] 25       26     27     28     29     30     31    LYMPHEDEMA TREATMENT   8:00 AM   (60 min.)   Renetta Dempsey PT   [facility]                    April 2023 Sunday Monday Tuesday Wednesday Thursday Friday Saturday                                 1       2     3     4     5     6      7    LYMPHEDEMA TREATMENT   8:00 AM   (60 min.)   Renetta Dempsey, PT   [facility] 8       9     10     11     12     13     14    LYMPHEDEMA TREATMENT   9:00 AM   (60 min.)   Renetta Dempsey, PT   [facility] 15       16     17     18     19     20    LAB PERIPHERAL   7:00 AM   (15 min.)   [facility] LAB DRAW   [facility]    ONC INFUSION 0.5 HR (30 MIN)   7:30 AM   (30 min.)    ONC INFUSION NURSE   [facility] 21     22       23     24     25     26     27     28     29       30                                                     Lab Results:  Recent Results (from the past 12 hour(s))   Comprehensive metabolic panel    Collection Time: 03/23/23  8:19 AM   Result Value Ref Range    Sodium 138 136 - 145 mmol/L    Potassium 3.6 3.4 - 5.3 mmol/L    Chloride 98 98 - 107 mmol/L    Carbon Dioxide (CO2) 25 22 - 29 mmol/L    Anion Gap 15 7 - 15 mmol/L    Urea Nitrogen 30.1 (H) 8.0 - 23.0 mg/dL    Creatinine 1.46 (H) 0.67 - 1.17 mg/dL    Calcium 11.1 (H) 8.8 - 10.2 mg/dL    Glucose 148 (H) 70 - 99 mg/dL    Alkaline Phosphatase 48 40 - 129 U/L    AST 26 10 - 50 U/L    ALT 22 10 - 50 U/L    Protein Total 8.3 6.4 - 8.3 g/dL    Albumin 4.6 3.5 - 5.2 g/dL    Bilirubin Total 0.4 <=1.2 mg/dL    GFR Estimate 51 (L) >60 mL/min/1.73m2   CBC with platelets and differential    Collection Time: 03/23/23  8:19 AM   Result Value Ref Range    WBC Count 3.8 (L) 4.0 - 11.0 10e3/uL    RBC Count 3.75 (L) 4.40 - 5.90 10e6/uL    Hemoglobin 11.8 (L) 13.3 - 17.7 g/dL    Hematocrit 34.6 (L) 40.0 - 53.0 %    MCV 92 78 - 100 fL    MCH 31.5 26.5 - 33.0 pg    MCHC 34.1 31.5 - 36.5 g/dL    RDW 12.6 10.0 - 15.0 %    Platelet Count 152 150 - 450 10e3/uL    % Neutrophils 58 %    % Lymphocytes 31 %    % Monocytes 6 %    % Eosinophils 3 %    % Basophils 1 %    % Immature Granulocytes 1 %    NRBCs  per 100 WBC 0 <1 /100    Absolute Neutrophils 2.2 1.6 - 8.3 10e3/uL    Absolute Lymphocytes 1.2 0.8 - 5.3 10e3/uL    Absolute Monocytes 0.2 0.0 - 1.3 10e3/uL    Absolute Eosinophils 0.1 0.0 - 0.7 10e3/uL    Absolute Basophils 0.0 0.0 - 0.2 10e3/uL    Absolute Immature Granulocytes 0.0 <=0.4 10e3/uL    Absolute NRBCs 0.0 10e3/uL   RBC and Platelet Morphology    Collection Time: 03/23/23  8:19 AM   Result Value Ref Range    Platelet Assessment  Automated Count Confirmed. Platelet morphology is normal.     Automated Count Confirmed. Platelet morphology is normal.    RBC Morphology Confirmed RBC Indices

## 2023-03-23 NOTE — PROGRESS NOTES
Infusion Injection Note:  Ronna Collins presents today for lupron injection.    Patient seen by provider today: No   present during visit today: Not Applicable.    Patient declined to speak with an RN today.     Treatment Conditions:  Not Applicable.    Pre and Post Injection:  Lupron injection given to Left Ventrogluteal without incident.   Patient tolerated procedure well..    Discharge Plan:   Patient and/or family verbalized understanding of discharge instructions and all questions answered.  Patient will return 4/20/2023 for next appointment.    Hugo Scruggs, Clinic Assistant

## 2023-03-24 ENCOUNTER — HOSPITAL ENCOUNTER (OUTPATIENT)
Dept: PHYSICAL THERAPY | Facility: CLINIC | Age: 73
Setting detail: THERAPIES SERIES
Discharge: HOME | End: 2023-03-24
Attending: INTERNAL MEDICINE
Payer: COMMERCIAL

## 2023-03-24 PROCEDURE — 97140 MANUAL THERAPY 1/> REGIONS: CPT | Mod: GP | Performed by: PHYSICAL THERAPIST

## 2023-03-24 PROCEDURE — 97110 THERAPEUTIC EXERCISES: CPT | Mod: GP | Performed by: PHYSICAL THERAPIST

## 2023-03-26 ENCOUNTER — HEALTH MAINTENANCE LETTER (OUTPATIENT)
Age: 73
End: 2023-03-26

## 2023-03-31 ENCOUNTER — HOSPITAL ENCOUNTER (OUTPATIENT)
Dept: PHYSICAL THERAPY | Facility: CLINIC | Age: 73
Setting detail: THERAPIES SERIES
Discharge: HOME | End: 2023-03-31
Attending: SURGERY
Payer: COMMERCIAL

## 2023-03-31 PROCEDURE — 97535 SELF CARE MNGMENT TRAINING: CPT | Mod: GP | Performed by: PHYSICAL THERAPIST

## 2023-03-31 PROCEDURE — 97140 MANUAL THERAPY 1/> REGIONS: CPT | Mod: GP | Performed by: PHYSICAL THERAPIST

## 2023-04-03 ENCOUNTER — ANCILLARY PROCEDURE (OUTPATIENT)
Dept: NUCLEAR MEDICINE | Facility: CLINIC | Age: 73
End: 2023-04-03
Attending: INTERNAL MEDICINE
Payer: COMMERCIAL

## 2023-04-03 DIAGNOSIS — Z17.0 MALIGNANT NEOPLASM OF CENTRAL PORTION OF RIGHT BREAST IN MALE, ESTROGEN RECEPTOR POSITIVE (H): ICD-10-CM

## 2023-04-03 DIAGNOSIS — C50.121 MALIGNANT NEOPLASM OF CENTRAL PORTION OF RIGHT BREAST IN MALE, ESTROGEN RECEPTOR POSITIVE (H): ICD-10-CM

## 2023-04-03 DIAGNOSIS — E83.52 HYPERCALCEMIA: ICD-10-CM

## 2023-04-03 PROCEDURE — 78306 BONE IMAGING WHOLE BODY: CPT | Performed by: RADIOLOGY

## 2023-04-03 PROCEDURE — A9503 TC99M MEDRONATE: HCPCS | Performed by: RADIOLOGY

## 2023-04-03 RX ORDER — TC 99M MEDRONATE 20 MG/10ML
20-30 INJECTION, POWDER, LYOPHILIZED, FOR SOLUTION INTRAVENOUS ONCE
Status: COMPLETED | OUTPATIENT
Start: 2023-04-03 | End: 2023-04-03

## 2023-04-03 RX ADMIN — TC 99M MEDRONATE 20.6 MILLICURIE: 20 INJECTION, POWDER, LYOPHILIZED, FOR SOLUTION INTRAVENOUS at 09:57

## 2023-04-05 DIAGNOSIS — N17.9 AKI (ACUTE KIDNEY INJURY) (H): ICD-10-CM

## 2023-04-05 DIAGNOSIS — E83.52 HYPERCALCEMIA: Primary | ICD-10-CM

## 2023-04-06 ENCOUNTER — MYC MEDICAL ADVICE (OUTPATIENT)
Dept: FAMILY MEDICINE | Facility: CLINIC | Age: 73
End: 2023-04-06
Payer: COMMERCIAL

## 2023-04-06 ENCOUNTER — MYC MEDICAL ADVICE (OUTPATIENT)
Dept: ONCOLOGY | Facility: CLINIC | Age: 73
End: 2023-04-06
Payer: COMMERCIAL

## 2023-04-06 DIAGNOSIS — T88.7XXA SIDE EFFECT OF DRUG: ICD-10-CM

## 2023-04-06 DIAGNOSIS — R05.3 CHRONIC COUGH: Primary | ICD-10-CM

## 2023-04-06 DIAGNOSIS — Z17.0 MALIGNANT NEOPLASM OF CENTRAL PORTION OF RIGHT BREAST IN MALE, ESTROGEN RECEPTOR POSITIVE (H): ICD-10-CM

## 2023-04-06 DIAGNOSIS — C50.121 MALIGNANT NEOPLASM OF CENTRAL PORTION OF RIGHT BREAST IN MALE, ESTROGEN RECEPTOR POSITIVE (H): ICD-10-CM

## 2023-04-06 NOTE — TELEPHONE ENCOUNTER
Oncology Nurse Triage- Cough    Situation:   Ronna reporting the symptom of Cough  (Pulmonary protective reflex that serves as a defense mechanism to clear the airways from both secretions and inhaled particles)    Background:   Treating Provider:   Dr. Sr    Date of last office visit: 2/7/23 Dr. Sr    Recent treatments?: On treatment with abemaciclib, lupron 3/23    Assessment:     When did the cough start? 4-5 weeks, persistent, hx of sinus issues, feels like sinus drainage causing cough  (Subacute cough=three to eight weeks)    Timing of symptoms- does it occur all day or only in the morning? Persistent all day every few hours, does have coughing a few times during night    Productive cough? No    Any chest pain: No  If YES, rating on scale?:0/10     Shortness of breath?: No    Any history of GERD/Acid Reflux, Asthma, Allergies, COPD, CHF, Smoking? Hx of reflux/ulcers, takes famotidine. Allergies seasonally not taking routine antihistamines.     Recent exposure to an ill person or travel? No    Any other associated symptoms?:   In ability to eat or drink?:  No  Sleeping Difficulties?:No    What home remedies/medications have already been tried? OTC budesonide and flonase nasal sprays. The flonase did not work, the budesonide worked but he ran out.     Recommendations:   Home Care Instructions:  -Take home medications as prescribed  -Drink clear liquids (unless restricted because of a cardiac dysfunction)  -Avoid environmental factors that worsen cough such as smoke, perfume, cold/dry air.   -Consider use of warm humidifier  -Self-Monitor for fever or any additional signs of infection and avoid contact with individuals who are sick.     Seek Emergency Care Immediately if any of the following occurs:     Sudden, unexpected increase in SOB at rest    Chest pain    Frothy pink sputum or bright red blood    Facial swelling    Changes in mental status    Routed to care team for further instruction: Pt  wants to know best treatment, if it is due to his medications for cancer? If he should use the budesomide nasal spray, see ENT, something different?    Per Dr. Sr: I recommend a CT chest w/o contrast to evaluate for abemaciclib induced pneumonitis (occurs in about 2% of patients taking the medication).  I placed order.  Please discuss with patient and send a message to scheduling.    Pt called with Dr. Sr's recommendations. Pt appreciative of follow up and plan.     Message sent to Scheduling to schedule CT.

## 2023-04-06 NOTE — TELEPHONE ENCOUNTER
Ok I think I have open spot Tuesday offer him that, in meantime if CT shows cause of cough can cancel

## 2023-04-07 ENCOUNTER — LAB (OUTPATIENT)
Dept: LAB | Facility: CLINIC | Age: 73
End: 2023-04-07
Attending: INTERNAL MEDICINE
Payer: COMMERCIAL

## 2023-04-07 ENCOUNTER — ANCILLARY PROCEDURE (OUTPATIENT)
Dept: CT IMAGING | Facility: CLINIC | Age: 73
End: 2023-04-07
Attending: INTERNAL MEDICINE
Payer: COMMERCIAL

## 2023-04-07 ENCOUNTER — HOSPITAL ENCOUNTER (OUTPATIENT)
Dept: PHYSICAL THERAPY | Facility: CLINIC | Age: 73
Setting detail: THERAPIES SERIES
Discharge: HOME | End: 2023-04-07
Attending: INTERNAL MEDICINE
Payer: COMMERCIAL

## 2023-04-07 VITALS
DIASTOLIC BLOOD PRESSURE: 77 MMHG | SYSTOLIC BLOOD PRESSURE: 133 MMHG | BODY MASS INDEX: 27.15 KG/M2 | WEIGHT: 178.5 LBS | HEART RATE: 87 BPM | RESPIRATION RATE: 18 BRPM | OXYGEN SATURATION: 100 %

## 2023-04-07 DIAGNOSIS — Z17.0 MALIGNANT NEOPLASM OF CENTRAL PORTION OF RIGHT BREAST IN MALE, ESTROGEN RECEPTOR POSITIVE (H): ICD-10-CM

## 2023-04-07 DIAGNOSIS — T88.7XXA SIDE EFFECT OF DRUG: ICD-10-CM

## 2023-04-07 DIAGNOSIS — R73.03 PREDIABETES: ICD-10-CM

## 2023-04-07 DIAGNOSIS — C50.121 MALIGNANT NEOPLASM OF CENTRAL PORTION OF RIGHT BREAST IN MALE, ESTROGEN RECEPTOR POSITIVE (H): ICD-10-CM

## 2023-04-07 DIAGNOSIS — C50.929 MALIGNANT NEOPLASM OF MALE BREAST, UNSPECIFIED ESTROGEN RECEPTOR STATUS, UNSPECIFIED LATERALITY, UNSPECIFIED SITE OF BREAST (H): ICD-10-CM

## 2023-04-07 DIAGNOSIS — R05.3 CHRONIC COUGH: ICD-10-CM

## 2023-04-07 LAB
ALBUMIN SERPL BCG-MCNC: 4.5 G/DL (ref 3.5–5.2)
ALP SERPL-CCNC: 47 U/L (ref 40–129)
ALT SERPL W P-5'-P-CCNC: 20 U/L (ref 10–50)
ANION GAP SERPL CALCULATED.3IONS-SCNC: 14 MMOL/L (ref 7–15)
AST SERPL W P-5'-P-CCNC: 18 U/L (ref 10–50)
BASOPHILS # BLD AUTO: 0.1 10E3/UL (ref 0–0.2)
BASOPHILS NFR BLD AUTO: 1 %
BILIRUB SERPL-MCNC: 0.4 MG/DL
BUN SERPL-MCNC: 18 MG/DL (ref 8–23)
CALCIUM SERPL-MCNC: 10.3 MG/DL (ref 8.8–10.2)
CHLORIDE SERPL-SCNC: 101 MMOL/L (ref 98–107)
CREAT SERPL-MCNC: 1.07 MG/DL (ref 0.67–1.17)
DEPRECATED HCO3 PLAS-SCNC: 20 MMOL/L (ref 22–29)
EOSINOPHIL # BLD AUTO: 0.1 10E3/UL (ref 0–0.7)
EOSINOPHIL NFR BLD AUTO: 4 %
ERYTHROCYTE [DISTWIDTH] IN BLOOD BY AUTOMATED COUNT: 12.8 % (ref 10–15)
GFR SERPL CREATININE-BSD FRML MDRD: 75 ML/MIN/1.73M2
GLUCOSE SERPL-MCNC: 108 MG/DL (ref 70–99)
HCT VFR BLD AUTO: 30.7 % (ref 40–53)
HGB BLD-MCNC: 10.5 G/DL (ref 13.3–17.7)
IMM GRANULOCYTES # BLD: 0 10E3/UL
IMM GRANULOCYTES NFR BLD: 0 %
LYMPHOCYTES # BLD AUTO: 1 10E3/UL (ref 0.8–5.3)
LYMPHOCYTES NFR BLD AUTO: 27 %
MCH RBC QN AUTO: 31.2 PG (ref 26.5–33)
MCHC RBC AUTO-ENTMCNC: 34.2 G/DL (ref 31.5–36.5)
MCV RBC AUTO: 91 FL (ref 78–100)
MONOCYTES # BLD AUTO: 0.3 10E3/UL (ref 0–1.3)
MONOCYTES NFR BLD AUTO: 9 %
NEUTROPHILS # BLD AUTO: 2.2 10E3/UL (ref 1.6–8.3)
NEUTROPHILS NFR BLD AUTO: 59 %
NRBC # BLD AUTO: 0 10E3/UL
NRBC BLD AUTO-RTO: 0 /100
PLATELET # BLD AUTO: 150 10E3/UL (ref 150–450)
POTASSIUM SERPL-SCNC: 4.1 MMOL/L (ref 3.4–5.3)
PROT SERPL-MCNC: 7.4 G/DL (ref 6.4–8.3)
RBC # BLD AUTO: 3.37 10E6/UL (ref 4.4–5.9)
SODIUM SERPL-SCNC: 135 MMOL/L (ref 136–145)
WBC # BLD AUTO: 3.8 10E3/UL (ref 4–11)

## 2023-04-07 PROCEDURE — 97140 MANUAL THERAPY 1/> REGIONS: CPT | Mod: GP | Performed by: PHYSICAL THERAPIST

## 2023-04-07 PROCEDURE — 83036 HEMOGLOBIN GLYCOSYLATED A1C: CPT

## 2023-04-07 PROCEDURE — 71250 CT THORAX DX C-: CPT | Performed by: RADIOLOGY

## 2023-04-07 PROCEDURE — 36415 COLL VENOUS BLD VENIPUNCTURE: CPT

## 2023-04-07 PROCEDURE — 97110 THERAPEUTIC EXERCISES: CPT | Mod: GP | Performed by: PHYSICAL THERAPIST

## 2023-04-07 PROCEDURE — 80053 COMPREHEN METABOLIC PANEL: CPT

## 2023-04-07 PROCEDURE — 85025 COMPLETE CBC W/AUTO DIFF WBC: CPT

## 2023-04-07 ASSESSMENT — PAIN SCALES - GENERAL: PAINLEVEL: NO PAIN (0)

## 2023-04-07 NOTE — NURSING NOTE
Chief Complaint   Patient presents with     Blood Draw     Labs drawn from PIV placed by vascular RN. Line flushed with saline. Vitals taken. Pt checked in for appointment(s).      Naima Duenas RN

## 2023-04-11 ENCOUNTER — OFFICE VISIT (OUTPATIENT)
Dept: FAMILY MEDICINE | Facility: CLINIC | Age: 73
End: 2023-04-11
Payer: COMMERCIAL

## 2023-04-11 VITALS
OXYGEN SATURATION: 99 % | HEART RATE: 111 BPM | SYSTOLIC BLOOD PRESSURE: 100 MMHG | DIASTOLIC BLOOD PRESSURE: 60 MMHG | WEIGHT: 175.6 LBS | BODY MASS INDEX: 26.71 KG/M2

## 2023-04-11 DIAGNOSIS — R05.9 COUGH, UNSPECIFIED TYPE: ICD-10-CM

## 2023-04-11 DIAGNOSIS — R73.03 PREDIABETES: Primary | ICD-10-CM

## 2023-04-11 LAB — HBA1C MFR BLD: 6.2 %

## 2023-04-11 PROCEDURE — 99214 OFFICE O/P EST MOD 30 MIN: CPT | Performed by: FAMILY MEDICINE

## 2023-04-11 RX ORDER — MONTELUKAST SODIUM 10 MG/1
10 TABLET ORAL AT BEDTIME
Qty: 30 TABLET | Refills: 3 | Status: SHIPPED | OUTPATIENT
Start: 2023-04-11 | End: 2023-10-31

## 2023-04-11 NOTE — NURSING NOTE
Ronna Collins is a 72 year old male that presents in clinic today for the following:     Chief Complaint   Patient presents with     Follow Up     Pt here due to persisting cough and to go over CT results       The patient's allergies and medications were reviewed. The patient's vitals were obtained without incident. The patient does not have any other questions for the provider.     Alona Fishman, EMT at 10:32 AM on 4/11/2023.  Primary Care Clinic: [phone]

## 2023-04-11 NOTE — PROGRESS NOTES
"  Assessment & Plan     Prediabetes  Due, stable, no changes needed right now, monitor  - Hemoglobin A1c; Future    Cough, unspecified type  Rinocort seems to help, continue, add singulair, if not better soon consider either empiric zpak or pred burst but likely PND mediated. Discussed could it be GERD, some GERD symptoms not severe, discussed we could consider see ENT for scope, he defers for now but if messages to us we can order that, I also did not order sinus ct now as even if congested on ct I would not change treatment plan, but can consider that in the future if needed .Nothing on chest ct to explain a cough.  - budesonide (RINOCORT AQUA) 32 MCG/ACT nasal spray; Spray 2 sprays into both nostrils daily  - montelukast (SINGULAIR) 10 MG tablet; Take 1 tablet (10 mg) by mouth At Bedtime      31 minutes spent by me on the date of the encounter doing chart review, history and exam, documentation and further activities per the note    BMI:   Estimated body mass index is 26.71 kg/m  as calculated from the following:    Height as of 2/7/23: 1.727 m (5' 7.99\").    Weight as of this encounter: 79.7 kg (175 lb 9.6 oz).     Stewart Henry MD  [facility] is a 72 year old, presenting for the following health issues:  Follow Up (Pt here due to persisting cough and to go over CT results)         View : No data to display.              HPI Here for cough  A month roughly, accompanied by post nasal drg, might be allergies  No SOB, no cough phlegm production, no fever, no ear ache sore throat  Several years ago had ENT consult for similar concern, sinus ct then did show sinus congestion  No h/o copd, asthma; remote smoker  New chest ct did not show anything that would cause cough  Rvwd status/evaluation breast ca, in LE therapy w/ help for R arm LE post lymph node dissection  Past Medical History:   Diagnosis Date     Breast cancer (H) 05/2021     Chronic " sinusitis      Hypertension 2002     Past Surgical History:   Procedure Laterality Date     COLONOSCOPY WITH CO2 INSUFFLATION N/A 10/28/2022    Procedure: COLONOSCOPY, WITH CO2 INSUFFLATION;  Surgeon: Annemarie Noel DO;  Location: [facility] OR     COMBINED ESOPHAGOSCOPY, GASTROSCOPY, DUODENOSCOPY (EGD) WITH CO2 INSUFFLATION N/A 10/28/2022    Procedure: ESOPHAGOGASTRODUODENOSCOPY, WITH CO2 INSUFFLATION;  Surgeon: Annemarie Noel DO;  Location: [facility] OR     DISSECT LYMPH NODE AXILLA Right 10/27/2021    Procedure: RIGHT Axillary Lymph Node Dissection;  Surgeon: Nida Snider MD;  Location: [facility] OR     ESOPHAGOSCOPY, GASTROSCOPY, DUODENOSCOPY (EGD), COMBINED N/A 10/28/2022    Procedure: ESOPHAGOGASTRODUODENOSCOPY, WITH BIOPSY;  Surgeon: Annemarie Noel DO;  Location: [facility] OR     MASTECTOMY SIMPLE Right 10/27/2021    Procedure: RIGHT Simple Mastectomy;  Surgeon: Nida Snider MD;  Location: [facility] OR     Current Outpatient Medications   Medication     abemaciclib (VERZENIO) 150 MG tablet     budesonide (RINOCORT AQUA) 32 MCG/ACT nasal spray     famotidine (PEPCID) 40 MG tablet     letrozole (FEMARA) 2.5 MG tablet     loperamide (IMODIUM) 2 MG capsule     losartan-hydrochlorothiazide (HYZAAR) 100-25 MG tablet     montelukast (SINGULAIR) 10 MG tablet     letrozole (FEMARA) 2.5 MG tablet     MELATONIN PO     Naproxen Sodium (ALEVE PO)     prochlorperazine (COMPAZINE) 10 MG tablet     triamcinolone (KENALOG) 0.1 % external ointment     urea (GORDONS UREA) 40 % external ointment     No current facility-administered medications for this visit.     No Known Allergies            Review of Systems         Objective    /60 (BP Location: Right arm, Patient Position: Sitting, Cuff Size: Adult Regular)   Pulse 111   Wt 79.7 kg (175 lb 9.6 oz)   SpO2 99%   BMI 26.71 kg/m    Body mass index is 26.71 kg/m .  Physical Exam   GENERAL: healthy, alert and no distress  RESP: lungs clear to auscultation - no rales, rhonchi or  wheezes  CV: regular rate and rhythm, normal S1 S2, no S3 or S4, no murmur, click or rub, no peripheral edema and peripheral pulses strong  MS: no gross musculoskeletal defects noted, no edema

## 2023-04-14 ENCOUNTER — HOSPITAL ENCOUNTER (OUTPATIENT)
Dept: PHYSICAL THERAPY | Facility: CLINIC | Age: 73
Setting detail: THERAPIES SERIES
Discharge: HOME | End: 2023-04-14
Attending: INTERNAL MEDICINE
Payer: COMMERCIAL

## 2023-04-14 PROCEDURE — 97140 MANUAL THERAPY 1/> REGIONS: CPT | Mod: GP | Performed by: PHYSICAL THERAPIST

## 2023-04-14 PROCEDURE — 97535 SELF CARE MNGMENT TRAINING: CPT | Mod: GP | Performed by: PHYSICAL THERAPIST

## 2023-04-14 NOTE — PROGRESS NOTES
[facility] Rehabilitation Service    Outpatient Physical Therapy Progress Note  Patient: Ronna Collins  : 1950    Beginning/End Dates of Reporting Period:  23 to 4/15/23    Referring Provider: Dr. Mikaela Sr    Therapy Diagnosis: Lymphedema R UE, chest wall tightness     Client Self Report: Brought pump to look at with CLT,  garments at [employer] to be picked up on Monday.    Objective Measurements:  Objective Measure: Volume  Details: 1868.98mL, L arm increased over time so % swelling is 8.9% (decreased from 12.8%) in January.  Objective Measure: Edema  Details: no pitting, forearm more full than upper arm, hand improved as able to pinch skin and see tendons  Objective Measure: ROM  Details: Pt is stretching but pec muscle and chest tissue limited.  Flexion 130 at start of care but 137 after manual therapy  Objective Measure: Tissue  Details: Still very tight across chest  Objective Measure: Chest measurements     Goals:  Goal Identifier Edema   Goal Description Pt will maintain limb volumes with in 6% of each demonstrating appropriate management of edema as B volume may increase as he continues to strengthen   Target Date 23   Date Met      Progress (detail required for progress note): 9.8% difference, this is improved sweling volume decreased from 12.8% to 8.9% since January     Goal Identifier Tissue   Goal Description Pt will be independent with self massage for lymph drainage and fibrotic tissue mobility (cupping) to prevent any loss of ROM (maintain standing AROM flexion >145, abduction >155) to prevent functional deficits.   Target Date 23   Date Met      Progress (detail required for progress note): Pt declines functional deficits though ROM is decreased     Goal Identifier HEP   Goal Description Pt will continue lifelong chest and shoulder stretching program to promote improved mobility and  prevent disability.   Target Date 09/01/23   Date Met      Progress (detail required for progress note):       Plan:  Changes to therapy plan of care: 1x/mo for 3 months, re-assess needs after that. Pt will be getting improved garments, now has pump and will continue to work on weight management. He has been very consistent with daytime compression,self massage, stretching and working out with a  at least 2x/week. He walks quite a bit around campus and occasionally in the evening since the snow has melted.    Discharge:  No

## 2023-04-17 DIAGNOSIS — C50.929 MALIGNANT NEOPLASM OF MALE BREAST, UNSPECIFIED ESTROGEN RECEPTOR STATUS, UNSPECIFIED LATERALITY, UNSPECIFIED SITE OF BREAST (H): Primary | ICD-10-CM

## 2023-04-19 DIAGNOSIS — Z17.0 MALIGNANT NEOPLASM OF CENTRAL PORTION OF RIGHT BREAST IN MALE, ESTROGEN RECEPTOR POSITIVE (H): ICD-10-CM

## 2023-04-19 DIAGNOSIS — Z79.811 LONG TERM (CURRENT) USE OF AROMATASE INHIBITORS: Primary | ICD-10-CM

## 2023-04-19 DIAGNOSIS — C50.121 MALIGNANT NEOPLASM OF CENTRAL PORTION OF RIGHT BREAST IN MALE, ESTROGEN RECEPTOR POSITIVE (H): ICD-10-CM

## 2023-04-19 DIAGNOSIS — M89.9 DISORDER OF BONE AND CARTILAGE: ICD-10-CM

## 2023-04-19 DIAGNOSIS — M94.9 DISORDER OF BONE AND CARTILAGE: ICD-10-CM

## 2023-04-19 RX ORDER — DIPHENHYDRAMINE HYDROCHLORIDE 50 MG/ML
50 INJECTION INTRAMUSCULAR; INTRAVENOUS
Status: CANCELLED
Start: 2023-04-24

## 2023-04-19 RX ORDER — METHYLPREDNISOLONE SODIUM SUCCINATE 125 MG/2ML
125 INJECTION, POWDER, LYOPHILIZED, FOR SOLUTION INTRAMUSCULAR; INTRAVENOUS
Status: CANCELLED
Start: 2023-04-24

## 2023-04-19 RX ORDER — ALBUTEROL SULFATE 90 UG/1
1-2 AEROSOL, METERED RESPIRATORY (INHALATION)
Status: CANCELLED
Start: 2023-04-24

## 2023-04-19 RX ORDER — MEPERIDINE HYDROCHLORIDE 25 MG/ML
25 INJECTION INTRAMUSCULAR; INTRAVENOUS; SUBCUTANEOUS EVERY 30 MIN PRN
Status: CANCELLED | OUTPATIENT
Start: 2023-04-24

## 2023-04-19 RX ORDER — EPINEPHRINE 1 MG/ML
0.3 INJECTION, SOLUTION INTRAMUSCULAR; SUBCUTANEOUS EVERY 5 MIN PRN
Status: CANCELLED | OUTPATIENT
Start: 2023-04-24

## 2023-04-19 RX ORDER — ALBUTEROL SULFATE 0.83 MG/ML
2.5 SOLUTION RESPIRATORY (INHALATION)
Status: CANCELLED | OUTPATIENT
Start: 2023-04-24

## 2023-04-20 ENCOUNTER — APPOINTMENT (OUTPATIENT)
Dept: LAB | Facility: CLINIC | Age: 73
End: 2023-04-20
Attending: INTERNAL MEDICINE
Payer: COMMERCIAL

## 2023-04-20 ENCOUNTER — DOCUMENTATION ONLY (OUTPATIENT)
Dept: ONCOLOGY | Facility: CLINIC | Age: 73
End: 2023-04-20

## 2023-04-20 ENCOUNTER — INFUSION THERAPY VISIT (OUTPATIENT)
Dept: ONCOLOGY | Facility: CLINIC | Age: 73
End: 2023-04-20
Attending: INTERNAL MEDICINE
Payer: COMMERCIAL

## 2023-04-20 VITALS
DIASTOLIC BLOOD PRESSURE: 69 MMHG | OXYGEN SATURATION: 100 % | TEMPERATURE: 98.7 F | BODY MASS INDEX: 27.1 KG/M2 | HEART RATE: 101 BPM | SYSTOLIC BLOOD PRESSURE: 115 MMHG | WEIGHT: 178.2 LBS | RESPIRATION RATE: 16 BRPM

## 2023-04-20 DIAGNOSIS — M94.9 DISORDER OF BONE AND CARTILAGE: ICD-10-CM

## 2023-04-20 DIAGNOSIS — C50.929 MALIGNANT NEOPLASM OF MALE BREAST, UNSPECIFIED ESTROGEN RECEPTOR STATUS, UNSPECIFIED LATERALITY, UNSPECIFIED SITE OF BREAST (H): Primary | ICD-10-CM

## 2023-04-20 DIAGNOSIS — C50.121 MALIGNANT NEOPLASM OF CENTRAL PORTION OF RIGHT BREAST IN MALE, ESTROGEN RECEPTOR POSITIVE (H): ICD-10-CM

## 2023-04-20 DIAGNOSIS — Z79.811 LONG TERM (CURRENT) USE OF AROMATASE INHIBITORS: ICD-10-CM

## 2023-04-20 DIAGNOSIS — Z17.0 MALIGNANT NEOPLASM OF CENTRAL PORTION OF RIGHT BREAST IN MALE, ESTROGEN RECEPTOR POSITIVE (H): ICD-10-CM

## 2023-04-20 DIAGNOSIS — M89.9 DISORDER OF BONE AND CARTILAGE: ICD-10-CM

## 2023-04-20 LAB
ALBUMIN SERPL BCG-MCNC: 4.2 G/DL (ref 3.5–5.2)
ALP SERPL-CCNC: 50 U/L (ref 40–129)
ALT SERPL W P-5'-P-CCNC: 20 U/L (ref 10–50)
ANION GAP SERPL CALCULATED.3IONS-SCNC: 13 MMOL/L (ref 7–15)
AST SERPL W P-5'-P-CCNC: 21 U/L (ref 10–50)
BASOPHILS # BLD AUTO: 0 10E3/UL (ref 0–0.2)
BASOPHILS NFR BLD AUTO: 1 %
BILIRUB SERPL-MCNC: 0.3 MG/DL
BUN SERPL-MCNC: 11 MG/DL (ref 8–23)
CALCIUM SERPL-MCNC: 10 MG/DL (ref 8.8–10.2)
CANCER AG15-3 SERPL-ACNC: 38 U/ML
CEA SERPL-MCNC: 4.4 NG/ML
CHLORIDE SERPL-SCNC: 102 MMOL/L (ref 98–107)
CREAT SERPL-MCNC: 0.92 MG/DL (ref 0.67–1.17)
DEPRECATED HCO3 PLAS-SCNC: 21 MMOL/L (ref 22–29)
EOSINOPHIL # BLD AUTO: 0.2 10E3/UL (ref 0–0.7)
EOSINOPHIL NFR BLD AUTO: 6 %
ERYTHROCYTE [DISTWIDTH] IN BLOOD BY AUTOMATED COUNT: 13.2 % (ref 10–15)
GFR SERPL CREATININE-BSD FRML MDRD: 89 ML/MIN/1.73M2
GLUCOSE SERPL-MCNC: 164 MG/DL (ref 70–99)
HCT VFR BLD AUTO: 31.3 % (ref 40–53)
HGB BLD-MCNC: 10.7 G/DL (ref 13.3–17.7)
IMM GRANULOCYTES # BLD: 0 10E3/UL
IMM GRANULOCYTES NFR BLD: 1 %
LYMPHOCYTES # BLD AUTO: 1.1 10E3/UL (ref 0.8–5.3)
LYMPHOCYTES NFR BLD AUTO: 31 %
MCH RBC QN AUTO: 31.4 PG (ref 26.5–33)
MCHC RBC AUTO-ENTMCNC: 34.2 G/DL (ref 31.5–36.5)
MCV RBC AUTO: 92 FL (ref 78–100)
MONOCYTES # BLD AUTO: 0.3 10E3/UL (ref 0–1.3)
MONOCYTES NFR BLD AUTO: 8 %
NEUTROPHILS # BLD AUTO: 1.9 10E3/UL (ref 1.6–8.3)
NEUTROPHILS NFR BLD AUTO: 53 %
NRBC # BLD AUTO: 0 10E3/UL
NRBC BLD AUTO-RTO: 0 /100
PLATELET # BLD AUTO: 182 10E3/UL (ref 150–450)
POTASSIUM SERPL-SCNC: 4.1 MMOL/L (ref 3.4–5.3)
PROT SERPL-MCNC: 7.4 G/DL (ref 6.4–8.3)
RBC # BLD AUTO: 3.41 10E6/UL (ref 4.4–5.9)
SODIUM SERPL-SCNC: 136 MMOL/L (ref 136–145)
WBC # BLD AUTO: 3.5 10E3/UL (ref 4–11)

## 2023-04-20 PROCEDURE — 250N000011 HC RX IP 250 OP 636: Performed by: INTERNAL MEDICINE

## 2023-04-20 PROCEDURE — 36415 COLL VENOUS BLD VENIPUNCTURE: CPT

## 2023-04-20 PROCEDURE — 80053 COMPREHEN METABOLIC PANEL: CPT

## 2023-04-20 PROCEDURE — 96372 THER/PROPH/DIAG INJ SC/IM: CPT | Performed by: INTERNAL MEDICINE

## 2023-04-20 PROCEDURE — 96402 CHEMO HORMON ANTINEOPL SQ/IM: CPT

## 2023-04-20 PROCEDURE — 96372 THER/PROPH/DIAG INJ SC/IM: CPT | Mod: 59 | Performed by: INTERNAL MEDICINE

## 2023-04-20 PROCEDURE — 82378 CARCINOEMBRYONIC ANTIGEN: CPT

## 2023-04-20 PROCEDURE — 85025 COMPLETE CBC W/AUTO DIFF WBC: CPT

## 2023-04-20 PROCEDURE — 86300 IMMUNOASSAY TUMOR CA 15-3: CPT

## 2023-04-20 PROCEDURE — 36592 COLLECT BLOOD FROM PICC: CPT

## 2023-04-20 RX ORDER — METHYLPREDNISOLONE SODIUM SUCCINATE 125 MG/2ML
125 INJECTION, POWDER, LYOPHILIZED, FOR SOLUTION INTRAMUSCULAR; INTRAVENOUS
Status: CANCELLED
Start: 2023-10-17

## 2023-04-20 RX ORDER — MEPERIDINE HYDROCHLORIDE 25 MG/ML
25 INJECTION INTRAMUSCULAR; INTRAVENOUS; SUBCUTANEOUS EVERY 30 MIN PRN
Status: CANCELLED | OUTPATIENT
Start: 2023-10-17

## 2023-04-20 RX ORDER — DIPHENHYDRAMINE HYDROCHLORIDE 50 MG/ML
50 INJECTION INTRAMUSCULAR; INTRAVENOUS
Status: CANCELLED
Start: 2023-10-17

## 2023-04-20 RX ORDER — ALBUTEROL SULFATE 90 UG/1
1-2 AEROSOL, METERED RESPIRATORY (INHALATION)
Status: CANCELLED
Start: 2023-10-17

## 2023-04-20 RX ORDER — EPINEPHRINE 1 MG/ML
0.3 INJECTION, SOLUTION INTRAMUSCULAR; SUBCUTANEOUS EVERY 5 MIN PRN
Status: CANCELLED | OUTPATIENT
Start: 2023-10-17

## 2023-04-20 RX ORDER — ALBUTEROL SULFATE 0.83 MG/ML
2.5 SOLUTION RESPIRATORY (INHALATION)
Status: CANCELLED | OUTPATIENT
Start: 2023-10-17

## 2023-04-20 RX ADMIN — LEUPROLIDE ACETATE 11.25 MG: KIT at 08:42

## 2023-04-20 RX ADMIN — DENOSUMAB 60 MG: 60 INJECTION SUBCUTANEOUS at 08:40

## 2023-04-20 ASSESSMENT — PAIN SCALES - GENERAL: PAINLEVEL: NO PAIN (0)

## 2023-04-20 NOTE — PROGRESS NOTES
Infusion Injection Note:  Ronna Collins presents today for Prolia and Lupron.    Patient seen by provider today: No   present during visit today: Not Applicable.    Patient had a question for ORIN Hutson prior to injection.     Treatment Conditions:  Results reviewed, labs MET treatment parameters, ok to proceed with treatment.    Pre and Post Injection:  Prolia injection given to Left Arm without incident.   Patient tolerated procedure well..     Lupron injection given to Right Ventrogluteal without incident.   Patient tolerated procedure well..    Discharge Plan:   Patient and/or family verbalized understanding of discharge instructions and all questions answered.  Patient will return 5/4/23 for next appointment.    Hugo Scruggs Clinic Assistant

## 2023-04-20 NOTE — NURSING NOTE
Chief Complaint   Patient presents with     Blood Draw     Labs drawn via piv by rn in lab. VS taken.     Labs drawn from PIV placed by RN. Line flushed with saline. Vitals taken. Pt checked in for appointment(s).    Jt Cheng RN

## 2023-04-20 NOTE — PROGRESS NOTES
Infusion Nursing Note:  Ronan Collins presents today for Lupron, New every 6 month Prolia  Patient seen by provider today: No   present during visit today: Not Applicable.    Note: Patient presents to infusion feeling well. Patient denies acute discomfort and states no acute complaints or concerns needing to be addressed today. Specifically, pt denies s/s of infection such as fever, sore throat, cough, chest pain, shortness of breath, body aches, chills, headache, increased nasal congestion, or changes in taste/smell.     Pt consents to the following injection appt plan for the day: start new every 6 month Prolia and receive 3 month dosing of Lupron since pt will be out of town during his monthly dose time frames. 3 month dosing of Lupron confirmed via IB from Dr. Sr.    Prolia education of mechanism of action, possible side effects and injection schedule reviewed. Pt confirms no issues with teeth/jaw and last saw dentist in January of this year. Pt voices understanding to notify dentist of any changes in teeth/jaw while on Prolia therapy do to osteonecrosis risk. IB sent to Dr. Sr to clarify whether pt is to continue holding Calcium supplement or to restart.       Intravenous Access:  No Intravenous access at this visit.    Treatment Conditions:  Calcium: 10.0  Corrected Calcium: 9.8      Post Infusion Assessment:  Injections provided by JOSE Arias. See note for more information      Discharge Plan:   Patient declined prescription refills.  Discharge instructions reviewed with: Patient.  Patient and/or family verbalized understanding of discharge instructions and all questions answered.  AVS to patient via [employer].  Patient will return PRN for next appointment based on travel plans. IB sent to scheduling to call pt to set up his next injection appts  Patient discharged in stable condition accompanied by: self.  Departure Mode: Ambulatory.  Face to Face time: 0 minutes.      Haresh  ORIN Kathleen

## 2023-04-20 NOTE — PROGRESS NOTES
Oral Chemotherapy Monitoring Program  Lab Follow Up    Reviewed lab results from today.        1/20/2023    10:00 AM 1/27/2023    10:00 AM 2/16/2023     3:00 PM 2/24/2023    10:00 AM 3/16/2023     9:00 AM 3/17/2023     1:00 PM 4/20/2023     8:00 AM   ORAL CHEMOTHERAPY   Assessment Type Refill Lab Monitoring Refill Lab Monitoring Refill Quarterly Follow up Lab Monitoring   Diagnosis Code Breast Cancer Breast Cancer Breast Cancer Breast Cancer Breast Cancer Breast Cancer Breast Cancer   Providers Dr. Remberto Sr   [facility]   Drug Name Verzenio (abemaciclib) Verzenio (abemaciclib) Verzenio (abemaciclib) Verzenio (abemaciclib) Verzenio (abemaciclib) Verzenio (abemaciclib) Verzenio (abemaciclib)   Dose 150 mg 150 mg 150 mg 150 mg 150 mg 150 mg 150 mg   Current Schedule BID BID BID BID BID BID BID   Cycle Details Continuous Continuous Continuous Continuous Continuous Continuous Continuous   Planned next cycle start date      3/31/2023    Doses missed in last 2 weeks      0    Adherence Assessment      Adherent    Adverse Effects      No AE identified during assessment    Any new drug interactions?      No    Is the dose as ordered appropriate for the patient?      Yes        Labs:  _  Result Component Current Result Ref Range   Sodium 136 (4/20/2023) 136 - 145 mmol/L     _  Result Component Current Result Ref Range   Potassium 4.1 (4/20/2023) 3.4 - 5.3 mmol/L     _  Result Component Current Result Ref Range   Calcium 10.0 (4/20/2023) 8.8 - 10.2 mg/dL     No results found for Mag within last 30 days.     No results found for Phos within last 30 days.     _  Result Component Current Result Ref Range   Albumin 4.2 (4/20/2023) 3.5 - 5.2 g/dL     _  Result Component Current Result Ref Range   Urea Nitrogen 11.0 (4/20/2023) 8.0 - 23.0 mg/dL     _  Result Component Current Result Ref  Range   Creatinine 0.92 (4/20/2023) 0.67 - 1.17 mg/dL     _  Result Component Current Result Ref Range   AST 21 (4/20/2023) 10 - 50 U/L     _  Result Component Current Result Ref Range   ALT 20 (4/20/2023) 10 - 50 U/L     _  Result Component Current Result Ref Range   Bilirubin Total 0.3 (4/20/2023) <=1.2 mg/dL     _  Result Component Current Result Ref Range   WBC Count 3.5 (L) (4/20/2023) 4.0 - 11.0 10e3/uL     _  Result Component Current Result Ref Range   Hemoglobin 10.7 (L) (4/20/2023) 13.3 - 17.7 g/dL     _  Result Component Current Result Ref Range   Platelet Count 182 (4/20/2023) 150 - 450 10e3/uL     No results found for ANC within last 30 days.     _  Result Component Current Result Ref Range   Absolute Neutrophils 1.9 (4/20/2023) 1.6 - 8.3 10e3/uL        Assessment & Plan:  Results show no concerning abnormalities. Did not contact patient as he was seen at infusion today. Continue Verzenio therapy as planned.     Follow-Up:  5/8: appt with Dr. Sr  6/14: quarterly assessment      Dalia Childs, PharmD, BCACP  Hematology/Oncology Clinical Pharmacist  [facility]  [phone]

## 2023-04-20 NOTE — PATIENT INSTRUCTIONS
[facility] Triage and after hours / weekends / holidays:  [phone]    Please call the triage or after hours line if you experience a temperature greater than or equal to 100.4, shaking chills, have uncontrolled nausea, vomiting and/or diarrhea, dizziness, shortness of breath, chest pain, bleeding, unexplained bruising, or if you have any other new/concerning symptoms, questions or concerns.      If you are having any concerning symptoms or wish to speak to a provider before your next infusion visit, please call triage to notify them so we can adequately serve you.     If you need a refill on a narcotic prescription or other medication, please call before your infusion appointment.                 April 2023 Sunday Monday Tuesday Wednesday Thursday Friday Saturday                                 1       2     3    NM INJ   9:15 AM   (15 min.)   [id number]   [facility] 4     5     6     7    LAB PERIPHERAL   7:30 AM   (15 min.)   [facility]   [facility]    LYMPHEDEMA TREATMENT   8:00 AM   (60 min.)   Renetta Dempsey PT   [facility]    CT CHEST WO   4:45 PM   (20 min.)   [email or web address]   [facility] [place] 8       9     10     11    [facility] RETURN  10:15 AM   (30 min.)   Stewart Henry MD   [facility] [place] 12     13     14    LYMPHEDEMA TREATMENT   9:00 AM   (60 min.)   Renetta Dempsey, PT   [facility] 15       16     17     18     19     20    LAB PERIPHERAL   7:00 AM   (15 min.)   [facility] DRAW   [facility]    ONC INFUSION 0.5 HR (30 MIN)   7:30 AM   (30 min.)    ONC INFUSION NURSE   [facility] 21     22       23     24     25     26     27     28     29       30                                                 May  2023 Sunday Monday Tuesday Wednesday Thursday Friday Saturday        1     2     3     4    RETURN  11:15 AM   (30 min.)   Celsa Waters MD   [facility] 5     6       7     8    RETURN CCSL   8:45 AM   (30 min.)   Mikaela Sr MD   [facility] 9     10     11     12     13       14     15     16     17     18     19     20       21     22     23     24     25    LYMPHEDEMA TREATMENT   8:00 AM   (60 min.)   Renetta Dempsey PT   [facility] 26     27       28     29     30     31                                      Lab Results:  Recent Results (from the past 12 hour(s))   Comprehensive metabolic panel    Collection Time: 04/20/23  7:44 AM   Result Value Ref Range    Sodium 136 136 - 145 mmol/L    Potassium 4.1 3.4 - 5.3 mmol/L    Chloride 102 98 - 107 mmol/L    Carbon Dioxide (CO2) 21 (L) 22 - 29 mmol/L    Anion Gap 13 7 - 15 mmol/L    Urea Nitrogen 11.0 8.0 - 23.0 mg/dL    Creatinine 0.92 0.67 - 1.17 mg/dL    Calcium 10.0 8.8 - 10.2 mg/dL    Glucose 164 (H) 70 - 99 mg/dL    Alkaline Phosphatase 50 40 - 129 U/L    AST 21 10 - 50 U/L    ALT 20 10 - 50 U/L    Protein Total 7.4 6.4 - 8.3 g/dL    Albumin 4.2 3.5 - 5.2 g/dL    Bilirubin Total 0.3 <=1.2 mg/dL    GFR Estimate 89 >60 mL/min/1.73m2   Cancer Antigen 15-3    Collection Time: 04/20/23  7:44 AM   Result Value Ref Range    Cancer Antigen 15-3 38 (H) <=25 U/mL   CEA    Collection Time: 04/20/23  7:44 AM   Result Value Ref Range    CEA 4.4 ng/mL   CBC with platelets and differential    Collection Time: 04/20/23  7:44 AM   Result Value Ref Range    WBC Count 3.5 (L) 4.0 - 11.0 10e3/uL    RBC Count 3.41 (L) 4.40 - 5.90 10e6/uL    Hemoglobin 10.7 (L) 13.3 - 17.7 g/dL    Hematocrit 31.3 (L) 40.0 - 53.0 %    MCV 92 78 - 100 fL    MCH 31.4 26.5 - 33.0 pg    MCHC 34.2 31.5 - 36.5 g/dL    RDW 13.2 10.0 - 15.0 %    Platelet Count 182 150 - 450 10e3/uL     % Neutrophils 53 %    % Lymphocytes 31 %    % Monocytes 8 %    % Eosinophils 6 %    % Basophils 1 %    % Immature Granulocytes 1 %    NRBCs per 100 WBC 0 <1 /100    Absolute Neutrophils 1.9 1.6 - 8.3 10e3/uL    Absolute Lymphocytes 1.1 0.8 - 5.3 10e3/uL    Absolute Monocytes 0.3 0.0 - 1.3 10e3/uL    Absolute Eosinophils 0.2 0.0 - 0.7 10e3/uL    Absolute Basophils 0.0 0.0 - 0.2 10e3/uL    Absolute Immature Granulocytes 0.0 <=0.4 10e3/uL    Absolute NRBCs 0.0 10e3/uL

## 2023-05-04 ENCOUNTER — ONCOLOGY VISIT (OUTPATIENT)
Dept: ONCOLOGY | Facility: CLINIC | Age: 73
End: 2023-05-04
Attending: STUDENT IN AN ORGANIZED HEALTH CARE EDUCATION/TRAINING PROGRAM
Payer: COMMERCIAL

## 2023-05-04 VITALS
HEART RATE: 94 BPM | TEMPERATURE: 98.4 F | BODY MASS INDEX: 27.12 KG/M2 | OXYGEN SATURATION: 100 % | WEIGHT: 178.3 LBS | SYSTOLIC BLOOD PRESSURE: 108 MMHG | RESPIRATION RATE: 18 BRPM | DIASTOLIC BLOOD PRESSURE: 72 MMHG

## 2023-05-04 DIAGNOSIS — C50.929 MALIGNANT NEOPLASM OF MALE BREAST, UNSPECIFIED ESTROGEN RECEPTOR STATUS, UNSPECIFIED LATERALITY, UNSPECIFIED SITE OF BREAST (H): Primary | ICD-10-CM

## 2023-05-04 DIAGNOSIS — L90.5 SCAR TISSUE: ICD-10-CM

## 2023-05-04 DIAGNOSIS — I89.0 LYMPHEDEMA OF RIGHT ARM: ICD-10-CM

## 2023-05-04 PROCEDURE — G0463 HOSPITAL OUTPT CLINIC VISIT: HCPCS | Performed by: STUDENT IN AN ORGANIZED HEALTH CARE EDUCATION/TRAINING PROGRAM

## 2023-05-04 PROCEDURE — 99215 OFFICE O/P EST HI 40 MIN: CPT | Performed by: STUDENT IN AN ORGANIZED HEALTH CARE EDUCATION/TRAINING PROGRAM

## 2023-05-04 ASSESSMENT — PAIN SCALES - GENERAL: PAINLEVEL: NO PAIN (0)

## 2023-05-04 NOTE — PROGRESS NOTES
[facility]   PM&R clinic note        Interval history:     Ronna Collins presents to clinic today for follow up reg his rehab needs.   He has h/o right breast cancer (clinical prognostic stage IIIb, [id number], grade 2, ER positive, KY positive, HER-2 negative), now s/p right breast mastectomy and right axillary lymph node dissection (10/27/2021).   Was last seen in clinic on 11/4/22.  Recommendations included   1. Therapy/equipment/braces:  1. Continue lymphedema therapy.  2. Continue daily compression, new order placed for compression garments.  3. Continue daily stretches/massage per therapist.   2. Follow up: 6 months.    ONCOLOGIC HISTORY  - First noticed discomfort and hardening of the skin surrounding right nipple in late April/early May 2021. Noted seborrheic keratosis of the right nipple, and had similar lesion of the arm 3 months earlier. Subsequently noted a mass at the same site.  - Right breast skin punch biopsy performed by derm was c/w grade 2 invasive ductal carcinoma w/ associated DCIS. Invasive carcinoma was ER positive 100% and KY positive 70%, w/ tumor invading the dermis. HER-2 negative by IHC (score 1+).   - Diagnostic mammogram and ultrasound showed a retroareolar right breast mass measuring 2.9cm w/ associated nipple retraction and enlarged, abnormal right axillary lymph nodes. Right breast biopsy was again, c/w grade 2 invasive ductal carcinoma, ER strong in 98% of tumor cell nuclei, HER-2 negative, Ki-67 was 15%.    - Elected to screen for the ISPY-2 clinical trial.  - 6/18/2021 MRI breast showed biopsy proven right breast cancer to measure 3.9 cm in max dimension w/ invasion of the nipple and pectoralis muscle as well as at least 4 abnormal level 1 and 2 right axillary lymph nodes and a tiny 0/4 cm right internal mammary lymph node. Mammoprint returned low risk w/ a score of +0.29.   - Elected for tx on the endocrine optimization protocol of ISPY-2,  and was randomized to tx w/ amcenestrant. Was on amcenestrant from 7/1/2021-10/26/2021.  - 10/27/2021 underwent right breast mastectomy and right axillary lymph node dissection w/ Dr. Snider. Pathology showed grade 2 carcinoma of the right breast measuring 3.2 cm w/ invasion of the dermis, nipple, and the pectoralis muscle. Ki-67 of the excised tumor was 7%. 15/44 right axillary lymph nodes were positive w/ 6 of the lymph nodes demonstrating extranodal extension. Largest lymph node metastasis measured 2.1 cm.   - 12/14/2021 began tx w/ Taxotere and cyclophosphamide.  - 1/5/2021 C2 given. Had significant fever following cycle 2.  - 1/26/2021 C3 given  - 2/16/2022 C4 given   - Plan is to start adjuvant radiation soon. Heme/onc planning to initiate tx w/ letrozole at the same time as radiation and abemaciclib upon completion of radiation. Plan to administer abemaciclib for 2 years. Letrozole for a total of 10 years.   - Starting proton beam radiation 3/21/2022. 15 days over 3 weeks at the [facility].   - Saw Nay Blandon for follow up visit on 6/7/22. Patient continues on Verzino. Has trip to [place] coming up for conference, and trip to [facility] in August. Also on treatment with lupron and letrozol. Plan for 2 years of treatment with Verzino. Working with PT and recently started survival to strength program. New back and hip pain, likely secondary to increase in exercise.  - Saw Dr. Sr on 9/12/22 for follow up. Continues on treatment with abemaciclib, plan to continue for total of 2 years. CT C/A/P without lymphadenopathy or abnormalities.  - Saw Dr. Sr  on 2/7/2023.  Continues on treatment with abemaciclib, Lupron and letrozole.  Blood counts were within range to continue at the same dose.  Next Lupron is due 2/20/2023.      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  He states that since his last visit, he has had some fluctuation in his right upper  extremity lymphedema.  He has been seeing lymphedema therapy, Renetta Dempsey, and had the frequency of his visits decreased to monthly.  After this he did have an exacerbation, and is now seeing her more frequently.  He states that in January, it was noticeable during one of his sessions that his right upper extremity was 10% higher in volume than his left upper extremity.  With measures taken at therapy including changing his daytime compression sleeve to a flat knit, 2 pieces with a glove, he was able to get limb volume down to about 6%.  Fluctuation is continued.  He has also obtained at tactile Flexitouch pump, which was initially challenging to use due to the time commitment and some nuances with the machine and usage.  They have been in touch with company representatives to troubleshoot these, and states that over the last 3 weeks or so the machine has seemed a little bit more effective, and the routine has become a little bit easier.  He feels that his range of motion of his right arm is about the same.  He still has a little bit of tightness at the end of range of motion, but range of motion is full with shoulder abduction in that arm.  He and his wife just returned from a 3-day trip to the , and they plan to be leaving in a few days on a trip to [place] in [place] to visit family.    Therapies/HEP,  Continues with outpatient lymphedema therapy, increased frequency.      Functionally,   No changes since his last visit.      Social history is unchanged.        Medications:  Current Outpatient Medications   Medication Sig Dispense Refill     abemaciclib (VERZENIO) 150 MG tablet Take 1 tablet (150 mg) by mouth 2 times daily 56 tablet 2     budesonide (RINOCORT AQUA) 32 MCG/ACT nasal spray Spray 2 sprays into both nostrils daily 8.43 mL 11     famotidine (PEPCID) 40 MG tablet Take 1 tablet (40 mg) by mouth daily 90 tablet 3     letrozole (FEMARA) 2.5 MG tablet Take 1 tablet (2.5 mg) by mouth daily for 360 days 90  tablet 3     loperamide (IMODIUM) 2 MG capsule Start with 2 caps (4 mg), then take one cap (2 mg) after each diarrheal stool as needed. Do not use more than 8 caps (16 mg) per day. 30 capsule 0     losartan-hydrochlorothiazide (HYZAAR) 100-25 MG tablet TAKE 1 TABLET BY MOUTH EVERY MORNING 90 tablet 3     MELATONIN PO        montelukast (SINGULAIR) 10 MG tablet Take 1 tablet (10 mg) by mouth At Bedtime 30 tablet 3     Naproxen Sodium (ALEVE PO)        prochlorperazine (COMPAZINE) 10 MG tablet Take 0.5 tablets (5 mg) by mouth every 6 hours as needed for nausea or vomiting 30 tablet 2     triamcinolone (KENALOG) 0.1 % external ointment Apply topically 2 times daily as needed (rash) 80 g 5     urea (GORDONS UREA) 40 % external ointment For callused feet nightly 60 g 11     letrozole (FEMARA) 2.5 MG tablet Take 1 tablet (2.5 mg) by mouth daily for 28 days 28 tablet 0              Physical Exam:   /72 (BP Location: Left arm, Patient Position: Sitting, Cuff Size: Adult Regular)   Pulse 94   Temp 98.4  F (36.9  C) (Oral)   Resp 18   Wt 80.9 kg (178 lb 4.8 oz)   SpO2 100%   BMI 27.12 kg/m    Gen: NAD, pleasant and cooperative   HEENT: MMM  Cardio: well perfused  Pulm: non-labored breathing on room air  Abd: benign  Ext: Lymphedema in right forearm  slightly increased since his last visit.  Noticeable lymphedema over the dorsum of the hand and along lateral aspect of proximal forearm, increased swelling just inferior to the elbow laterally. Negative Stemmers sign. No lymphedema noted in neck, mild subtle swelling in right lateral chest wall. No enlarged lymph nodes palpated. No cording noted.   Neuro/MSK: RUE full active ROM. No increased tone.     Labs/Imaging:  Lab Results   Component Value Date    WBC 3.5 (L) 04/20/2023    HGB 10.7 (L) 04/20/2023    HCT 31.3 (L) 04/20/2023    MCV 92 04/20/2023     04/20/2023     Lab Results   Component Value Date     04/20/2023    POTASSIUM 4.1 04/20/2023     CHLORIDE 102 04/20/2023    CO2 21 (L) 04/20/2023     (H) 04/20/2023     Lab Results   Component Value Date    GFRESTIMATED 89 04/20/2023    GFRESTBLACK >90 07/08/2021     Lab Results   Component Value Date    AST 21 04/20/2023    ALT 20 04/20/2023    GGT 28 06/23/2021    ALKPHOS 50 04/20/2023    BILITOTAL 0.3 04/20/2023     Lab Results   Component Value Date    INR 1.12 06/14/2021     Lab Results   Component Value Date    BUN 11.0 04/20/2023    CR 0.92 04/20/2023              Assessment/Plan   Ashiabillnatali DICKERSON Karina presents to clinic today for follow up reg his rehab needs.   He has h/o right breast cancer (clinical prognostic stage IIIb, [id number], grade 2, ER positive, KY positive, HER-2 negative), now s/p right breast mastectomy and right axillary lymph node dissection (10/27/2021). Was last seen in clinic on 11/4/22.  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  I agree with increasing the frequency of lymphedema therapy visits as has been done, and recommended that he continue pump use regularly as well as his daytime and nighttime compression sleeve.  Especially in the setting of his upcoming travel, it was recommended that he  wear his sleeve on the flight in addition to daily afterwards.  We discussed reintroducing daily lymphatic massage and stretches, as he does have slight limitation at the end of range of motion with shoulder abduction on that side.  We will plan a 3 to 4-month return visit.  Patient and his wife are in agreement with this plan.      1. Therapy/equipment/braces:  1. Continue lymphedema therapy.    2. Continue wearing daytime and nighttime compression garments daily.  3. Continue Flexitouch pump daily.  4. Restart daily lymphatic massage.  5. Restart daily stretches for right upper extremity.  2. Follow up: 3 to 4 months.      Celsa Waters MD  Physical Medicine &  Rehabilitation      50 minutes spent on the date of the encounter doing chart review, history and exam, documentation and further activities as noted above.

## 2023-05-04 NOTE — NURSING NOTE
"Oncology Rooming Note    May 4, 2023 11:33 AM   Ronna Collins is a 72 year old male who presents for:    Chief Complaint   Patient presents with     Oncology Clinic Visit     RETURN - BREAST CANCER     Initial Vitals: /72 (BP Location: Left arm, Patient Position: Sitting, Cuff Size: Adult Regular)   Pulse 94   Temp 98.4  F (36.9  C) (Oral)   Resp 18   Wt 80.9 kg (178 lb 4.8 oz)   SpO2 100%   BMI 27.12 kg/m   Estimated body mass index is 27.12 kg/m  as calculated from the following:    Height as of 2/7/23: 1.727 m (5' 7.99\").    Weight as of this encounter: 80.9 kg (178 lb 4.8 oz). Body surface area is 1.97 meters squared.  No Pain (0) Comment: Data Unavailable   No LMP for male patient.  Allergies reviewed: Yes  Medications reviewed: Yes    Medications: Medication refills not needed today.  Pharmacy name entered into [facility]:    [id number] - [place], [place] - [address] [facility] - [place], [place] - [address]  [facility][id number] - [place], [place] - [address] [facility] [address]    Clinical concerns: No new clinical concerns other than reason for visit today.       Renay Cuevas CMA            "

## 2023-05-04 NOTE — PATIENT INSTRUCTIONS
1.  Continue with your daily daytime and nighttime compression garments.  2.  Continue with lymphedema therapy and increased frequency as you have been doing.  3.  Continue the Flexitouch pump daily.  4.  Restart daily lymphatic massage.  5.  Restart daily stretches for your right arm.  6.  Follow-up with Dr. Waters in 3 to 4 months.

## 2023-05-04 NOTE — LETTER
5/4/2023         RE: Ronna Collins  [address]  [place] [address]        Dear Colleague,    Thank you for referring your patient, Ronna Collins, to the [facility]. Please see a copy of my visit note below.    [facility]   PM&R clinic note        Interval history:     Ronna Collins presents to clinic today for follow up reg his rehab needs.   He has h/o right breast cancer (clinical prognostic stage IIIb, [id number], grade 2, ER positive, CT positive, HER-2 negative), now s/p right breast mastectomy and right axillary lymph node dissection (10/27/2021).   Was last seen in clinic on 11/4/22.  Recommendations included   Therapy/equipment/braces:  Continue lymphedema therapy.  Continue daily compression, new order placed for compression garments.  Continue daily stretches/massage per therapist.   Follow up: 6 months.    ONCOLOGIC HISTORY  - First noticed discomfort and hardening of the skin surrounding right nipple in late April/early May 2021. Noted seborrheic keratosis of the right nipple, and had similar lesion of the arm 3 months earlier. Subsequently noted a mass at the same site.  - Right breast skin punch biopsy performed by derm was c/w grade 2 invasive ductal carcinoma w/ associated DCIS. Invasive carcinoma was ER positive 100% and CT positive 70%, w/ tumor invading the dermis. HER-2 negative by IHC (score 1+).   - Diagnostic mammogram and ultrasound showed a retroareolar right breast mass measuring 2.9cm w/ associated nipple retraction and enlarged, abnormal right axillary lymph nodes. Right breast biopsy was again, c/w grade 2 invasive ductal carcinoma, ER strong in 98% of tumor cell nuclei, HER-2 negative, Ki-67 was 15%.    - Elected to screen for the ISPY-2 clinical trial.  - 6/18/2021 MRI breast showed biopsy proven right breast cancer to measure 3.9 cm in max dimension w/ invasion of the nipple and pectoralis  muscle as well as at least 4 abnormal level 1 and 2 right axillary lymph nodes and a tiny 0/4 cm right internal mammary lymph node. Mammoprint returned low risk w/ a score of +0.29.   - Elected for tx on the endocrine optimization protocol of ISPY-2, and was randomized to tx w/ amcenestrant. Was on amcenestrant from 7/1/2021-10/26/2021.  - 10/27/2021 underwent right breast mastectomy and right axillary lymph node dissection w/ Dr. Snider. Pathology showed grade 2 carcinoma of the right breast measuring 3.2 cm w/ invasion of the dermis, nipple, and the pectoralis muscle. Ki-67 of the excised tumor was 7%. 15/44 right axillary lymph nodes were positive w/ 6 of the lymph nodes demonstrating extranodal extension. Largest lymph node metastasis measured 2.1 cm.   - 12/14/2021 began tx w/ Taxotere and cyclophosphamide.  - 1/5/2021 C2 given. Had significant fever following cycle 2.  - 1/26/2021 C3 given  - 2/16/2022 C4 given   - Plan is to start adjuvant radiation soon. Heme/onc planning to initiate tx w/ letrozole at the same time as radiation and abemaciclib upon completion of radiation. Plan to administer abemaciclib for 2 years. Letrozole for a total of 10 years.   - Starting proton beam radiation 3/21/2022. 15 days over 3 weeks at the [facility].   - Saw Nay Blandon for follow up visit on 6/7/22. Patient continues on Verzino. Has trip to [place] coming up for conference, and [facility]. Also on treatment with lupron and letrozol. Plan for 2 years of treatment with Verzino. Working with PT and recently started survival to strength program. New back and hip pain, likely secondary to increase in exercise.  - Saw Dr. Sr on 9/12/22 for follow up. Continues on treatment with abemaciclib, plan to continue for total of 2 years. CT C/A/P without lymphadenopathy or abnormalities.  - Saw Dr. Sr  on 2/7/2023.  Continues on treatment with abemaciclib, Lupron and letrozole.  Blood counts were within range  to continue at the same dose.  Next Lupron is due 2/20/2023.      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  He states that since his last visit, he has had some fluctuation in his right upper extremity lymphedema.  He has been seeing lymphedema therapy, Renetta Dempsey, and had the frequency of his visits decreased to monthly.  After this he did have an exacerbation, and is now seeing her more frequently.  He states that in January, it was noticeable during one of his sessions that his right upper extremity was 10% higher in volume than his left upper extremity.  With measures taken at therapy including changing his daytime compression sleeve to a flat knit, 2 pieces with a glove, he was able to get limb volume down to about 6%.  Fluctuation is continued.  He has also obtained at tactile Flexitouch pump, which was initially challenging to use due to the time commitment and some nuances with the machine and usage.  They have been in touch with company representatives to troubleshoot these, and states that over the last 3 weeks or so the machine has seemed a little bit more effective, and the routine has become a little bit easier.  He feels that his range of motion of his right arm is about the same.  He still has a little bit of tightness at the end of range of motion, but range of motion is full with shoulder abduction in that arm.  He and his wife just returned from a 3-day trip to the , and they plan to be leaving in a few days on a trip to [place] in [place] to visit family.    Therapies/HEP,  Continues with outpatient lymphedema therapy, increased frequency.      Functionally,   No changes since his last visit.      Social history is unchanged.        Medications:  Current Outpatient Medications   Medication Sig Dispense Refill    abemaciclib (VERZENIO) 150 MG tablet Take 1 tablet (150 mg) by mouth 2 times daily 56 tablet 2    budesonide (RINOCORT AQUA) 32 MCG/ACT  nasal spray Spray 2 sprays into both nostrils daily 8.43 mL 11    famotidine (PEPCID) 40 MG tablet Take 1 tablet (40 mg) by mouth daily 90 tablet 3    letrozole (FEMARA) 2.5 MG tablet Take 1 tablet (2.5 mg) by mouth daily for 360 days 90 tablet 3    loperamide (IMODIUM) 2 MG capsule Start with 2 caps (4 mg), then take one cap (2 mg) after each diarrheal stool as needed. Do not use more than 8 caps (16 mg) per day. 30 capsule 0    losartan-hydrochlorothiazide (HYZAAR) 100-25 MG tablet TAKE 1 TABLET BY MOUTH EVERY MORNING 90 tablet 3    MELATONIN PO       montelukast (SINGULAIR) 10 MG tablet Take 1 tablet (10 mg) by mouth At Bedtime 30 tablet 3    Naproxen Sodium (ALEVE PO)       prochlorperazine (COMPAZINE) 10 MG tablet Take 0.5 tablets (5 mg) by mouth every 6 hours as needed for nausea or vomiting 30 tablet 2    triamcinolone (KENALOG) 0.1 % external ointment Apply topically 2 times daily as needed (rash) 80 g 5    urea (GORDONS UREA) 40 % external ointment For callused feet nightly 60 g 11    letrozole (FEMARA) 2.5 MG tablet Take 1 tablet (2.5 mg) by mouth daily for 28 days 28 tablet 0              Physical Exam:   /72 (BP Location: Left arm, Patient Position: Sitting, Cuff Size: Adult Regular)   Pulse 94   Temp 98.4  F (36.9  C) (Oral)   Resp 18   Wt 80.9 kg (178 lb 4.8 oz)   SpO2 100%   BMI 27.12 kg/m    Gen: NAD, pleasant and cooperative   HEENT: MMM  Cardio: well perfused  Pulm: non-labored breathing on room air  Abd: benign  Ext: Lymphedema in right forearm  slightly increased since his last visit.  Noticeable lymphedema over the dorsum of the hand and along lateral aspect of proximal forearm, increased swelling just inferior to the elbow laterally. Negative Stemmers sign. No lymphedema noted in neck, mild subtle swelling in right lateral chest wall. No enlarged lymph nodes palpated. No cording noted.   Neuro/MSK: RUE full active ROM. No increased tone.     Labs/Imaging:  Lab Results   Component  Value Date    WBC 3.5 (L) 04/20/2023    HGB 10.7 (L) 04/20/2023    HCT 31.3 (L) 04/20/2023    MCV 92 04/20/2023     04/20/2023     Lab Results   Component Value Date     04/20/2023    POTASSIUM 4.1 04/20/2023    CHLORIDE 102 04/20/2023    CO2 21 (L) 04/20/2023     (H) 04/20/2023     Lab Results   Component Value Date    GFRESTIMATED 89 04/20/2023    GFRESTBLACK >90 07/08/2021     Lab Results   Component Value Date    AST 21 04/20/2023    ALT 20 04/20/2023    GGT 28 06/23/2021    ALKPHOS 50 04/20/2023    BILITOTAL 0.3 04/20/2023     Lab Results   Component Value Date    INR 1.12 06/14/2021     Lab Results   Component Value Date    BUN 11.0 04/20/2023    CR 0.92 04/20/2023              Assessment/Plan   Ronna Collins presents to clinic today for follow up reg his rehab needs.   He has h/o right breast cancer (clinical prognostic stage IIIb, [id number], grade 2, ER positive, ID positive, HER-2 negative), now s/p right breast mastectomy and right axillary lymph node dissection (10/27/2021). Was last seen in clinic on 11/4/22.  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  I agree with increasing the frequency of lymphedema therapy visits as has been done, and recommended that he continue pump use regularly as well as his daytime and nighttime compression sleeve.  Especially in the setting of his upcoming travel, it was recommended that he  wear his sleeve on the flight in addition to daily afterwards.  We discussed reintroducing daily lymphatic massage and stretches, as he does have slight limitation at the end of range of motion with shoulder abduction on that side.  We will plan a 3 to 4-month return visit.  Patient and his wife are in agreement with this plan.      Therapy/equipment/braces:  Continue lymphedema therapy.    Continue wearing daytime and nighttime compression garments  daily.  Continue Flexitouch pump daily.  Restart daily lymphatic massage.  Restart daily stretches for right upper extremity.  Follow up: 3 to 4 months.      Clesa Waters MD  Physical Medicine & Rehabilitation      50 minutes spent on the date of the encounter doing chart review, history and exam, documentation and further activities as noted above.

## 2023-05-05 ENCOUNTER — HOSPITAL ENCOUNTER (OUTPATIENT)
Dept: PHYSICAL THERAPY | Facility: CLINIC | Age: 73
Setting detail: THERAPIES SERIES
Discharge: HOME | End: 2023-05-05
Attending: INTERNAL MEDICINE
Payer: COMMERCIAL

## 2023-05-05 PROCEDURE — 97140 MANUAL THERAPY 1/> REGIONS: CPT | Mod: GP | Performed by: PHYSICAL THERAPIST

## 2023-05-05 PROCEDURE — 97535 SELF CARE MNGMENT TRAINING: CPT | Mod: GP | Performed by: PHYSICAL THERAPIST

## 2023-05-05 NOTE — PROGRESS NOTES
[facility]    Outpatient Physical Therapy Progress Note  Patient: Ronna Collins  : 1950    Beginning/End Dates of Reporting Period:  23 to 23    Referring Provider: Dr. Mikaela Sr    Therapy Diagnosis: Lymphedema R UE and trunk, impaired tissue mobility of chest     Client Self Report: Pt sent MyChart with concern of increased swelling just a few days after last visit, this was first time pt was able to get into therapy.  Wife is back from [place]    Objective Measurements:  Objective Measure: Volume  Details: Mild increase from last visit 1884.38mL and L volume decreased so percentage of swelling back to 12.7%  Objective Measure: Edema  Details: no pitting, forearm less dense than previous  Objective Measure: ROM  Details: not focus today, shoulder limited  Objective Measure: Tissue  Details: Still very tight across chest, not focus today  Objective Measure: Chest measurements  Details: not taken     Goals:  Goal Identifier Edema   Goal Description Pt will maintain limb volumes with in 6% of each demonstrating appropriate management of edema as B volume may increase as he continues to strengthen   Target Date 23   Date Met      Progress (detail required for progress note): 9.8% difference, this is improved sweling volume decreased from 12.8% to 8.9% since January     Goal Identifier Tissue   Goal Description Pt will be independent with self massage for lymph drainage and fibrotic tissue mobility (cupping) to prevent any loss of ROM (maintain standing AROM flexion >145, abduction >155) to prevent functional deficits.   Target Date 23   Date Met      Progress (detail required for progress note): Pt declines functional deficits though ROM is decreased     Goal Identifier HEP   Goal Description Pt will continue lifelong chest and shoulder stretching program to promote improved  mobility and prevent disability.   Target Date 09/01/23   Date Met      Progress (detail required for progress note):       Plan:  Changes to goals: Pt seen today due to exacerbation during international travel, new pump, new garements which were hard to don and wear. Pt messaged to get in sooner this month scheduled visit on 5/25/23.  Pt seen today, he actually did well to manage the flare but volume slightly increased increased.  Will see 2x this month (keep the later visit) and then plan to continue 1x/mon surveillance until stable with home management or no additional interventions to add to program.     Discharge:  No

## 2023-05-07 NOTE — PROGRESS NOTES
Oncology Visit:  Date on this visit: May 8, 2023    Diagnosis: h/o clinical prognostic stage IIIb, [id number], grade 2, ER positive, WA positive, HER-2 negative right breast cancer, [id number].    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  He noted discomfort and hardening of the skin of the right nipple in late April/early May, 2021.  He noted a seborrheic keratosis of the right nipple and remembered he had a similar skin lesion of the arm 3 months earlier.  However, he subsequently noted a mass in the same area.  Right breast skin punch biopsy performed by dermatology was c/w grade 2 invasive ductal carcinoma with associated DCIS.  Invasive carcinoma was ER positive 100% and WA positive 70%, with tumor invading the dermis.  HER-2 was negative by IHC (score 1+).  Diagnostic mammogram and ultrasound showed a retroareolar right breast mass measuring 2.9 cm with associated nipple retraction and enlarged, abnormal right axillary lymph nodes.  Right breast biopsy was again c/w grade 2 invasive ductal carcinoma, ER strong in 98% of tumor cell nuclei, HER-2 negative.  Ki-67 was 15%.    He elected to screen for the ISPY-2 clinical trial.  MRI breast on 6/18/2021 showed the biopsy proven right breast cancer to measure 3.9 cm in maximum dimension with invasion of the nipple and the pectoralis muscle as well as at least 4 abnormal level 1 and 2 right axillary lymph nodes and a tiny 0.4 cm right internal mammary lymph node.  Mammoprint returned low risk with a score of +0.29.  He elected for treatment on the endocrine optimization protocol of ISPY-2 and was randomized to treatment with amcenestrant.  He was on treatment with  amcenestrant from 7/1/2021 - 10/26/2021.  At our visit in 01/2021, both right axillary node and right breast tumor palpated more firm then prior.  Breast MRI was stable, however, due to clinical concerns for progression,  decision was made to proceed with surgery.   Right breast mastectomy and right axillary lymph node dissection was performed by Dr. Snider on 10/27/2021.  Pathology showed grade 2 carcinoma of the right breast measuring 3.2 cm with invasion of the dermis, nipple and the pectoralis muscle.  Ki-67 of the excised tumor was 7%.  15/44 right axillary lymph nodes were positive with 6 of the lymph nodes demonstrating extranodal extension.  The largest lymph node metastasis measured 2.1 cm.       Karina received 4 cycles of Taxotere and cyclophosphamide from 12/14/2021 - 2/16/2022.  His course was complicated by fevers persistent throughout treatment.  He received radiation (4800 cGy in 15 fractions) to the right chest wall and regional lymph nodes from 3/21/2022 - 4/8/2022.  He has been on treatment with lupron and letrozole since 3/16/2022.  Abemaciclib was added 4/26/2022.    Interval History:    Karina comes into clinic today, alongside his spouse, for routine breast cancer follow-up.  He continues on treatment with abemaciclib, Lupron, and letrozole.  He is tolerating this treatment well.  He reports approximately 2 episodes of watery stools per month.  When these occur, he takes 2 Imodium with relief.  He also reports drinking additional water on these days.  He has no significant urgency or episodes of incontinence.  His health has been good.  He denies fevers, chills, or symptoms of infection.  He always gets some sinus congestion that starts in Januar/February and lasts a couple of months.  These already seem to be resolving.  He has had some itching in the area of the groin.  He has been applying moisturizing cream without relief.  He denies any erythema or blisters.  His wife states that sometimes there has been some white dots.  He continues to have itching of the upper back.  He has been applying moisturizing cream to this as well.  He notes that measurements of his right upper extremity have been larger than  the left.  He has been fitted with a new compression sleeve and has made some adjustments to the lymphedema pump.  He and his wife have a few upcoming short trips and therefore last Lupron was given is a 3-month dose.  He had Prolia in April and states he tolerated it much better than the previous Zometa.  He denies new bone or joint aches or pains.  He has no current cough, shortness of breath, or chest pain.  He does feel ongoing generalized fatigue and shortness of breath with activity increased from prior to his cancer treatment.      Past Medical/Surgical History:  Past Medical History:   Diagnosis Date     Breast cancer (H) 05/2021     Chronic sinusitis      Hypertension 2002     Past Surgical History:   Procedure Laterality Date     COLONOSCOPY WITH CO2 INSUFFLATION N/A 10/28/2022    Procedure: COLONOSCOPY, WITH CO2 INSUFFLATION;  Surgeon: Annemarie Noel DO;  Location: [facility] OR     COMBINED ESOPHAGOSCOPY, GASTROSCOPY, DUODENOSCOPY (EGD) WITH CO2 INSUFFLATION N/A 10/28/2022    Procedure: ESOPHAGOGASTRODUODENOSCOPY, WITH CO2 INSUFFLATION;  Surgeon: Annemarie Noel DO;  Location: [facility] OR     DISSECT LYMPH NODE AXILLA Right 10/27/2021    Procedure: RIGHT Axillary Lymph Node Dissection;  Surgeon: Nida Snider MD;  Location: [facility] OR     ESOPHAGOSCOPY, GASTROSCOPY, DUODENOSCOPY (EGD), COMBINED N/A 10/28/2022    Procedure: ESOPHAGOGASTRODUODENOSCOPY, WITH BIOPSY;  Surgeon: Annemarie Noel DO;  Location: [facility] OR     MASTECTOMY SIMPLE Right 10/27/2021    Procedure: RIGHT Simple Mastectomy;  Surgeon: Nida Snider MD;  Location: [facility] OR     Allergies:  Allergies as of 05/08/2023     (No Known Allergies)     Current Medications:  Current Outpatient Medications   Medication Sig Dispense Refill     abemaciclib (VERZENIO) 150 MG tablet Take 1 tablet (150 mg) by mouth 2 times daily 56 tablet 2     budesonide (RINOCORT AQUA) 32 MCG/ACT nasal spray Spray 2 sprays into both nostrils daily 8.43 mL 11     famotidine  (PEPCID) 40 MG tablet Take 1 tablet (40 mg) by mouth daily 90 tablet 3     letrozole (FEMARA) 2.5 MG tablet Take 1 tablet (2.5 mg) by mouth daily for 360 days 90 tablet 3     letrozole (FEMARA) 2.5 MG tablet Take 1 tablet (2.5 mg) by mouth daily for 28 days 28 tablet 0     loperamide (IMODIUM) 2 MG capsule Start with 2 caps (4 mg), then take one cap (2 mg) after each diarrheal stool as needed. Do not use more than 8 caps (16 mg) per day. 30 capsule 0     losartan-hydrochlorothiazide (HYZAAR) 100-25 MG tablet TAKE 1 TABLET BY MOUTH EVERY MORNING 90 tablet 3     MELATONIN PO        montelukast (SINGULAIR) 10 MG tablet Take 1 tablet (10 mg) by mouth At Bedtime 30 tablet 3     Naproxen Sodium (ALEVE PO)        prochlorperazine (COMPAZINE) 10 MG tablet Take 0.5 tablets (5 mg) by mouth every 6 hours as needed for nausea or vomiting 30 tablet 2     triamcinolone (KENALOG) 0.1 % external ointment Apply topically 2 times daily as needed (rash) 80 g 5     urea (GORDONS UREA) 40 % external ointment For callused feet nightly 60 g 11      Genetics Breast Actionable and Breast Expanded panels were both negative for pathogenic germline mutations.    Physical Exam:  /74 (BP Location: Left arm, Patient Position: Sitting, Cuff Size: Adult Regular)   Pulse 55   Temp 97.8  F (36.6  C) (Oral)   Resp 16   Wt 80.9 kg (178 lb 4.8 oz)   SpO2 100%   BMI 27.12 kg/m    General:  Well appearing adult male in NAD.  Alert and oriented x 3.  HEENT:  Normocephalic.  Sclera anicteric.   Lymph:  No palpable cervical, supraclavicular, or axillary LAD.  Palpable fibrosis/skin adherence of the right axilla and chest wall c/w known prior ALND.    Chest:  CTA bilaterally.  No wheezes or crackles.  CV:  RRR.  No audible m/r/g.  Chest wall:  Right mastectomy. No palpable mass or nodularity of either the right or left chest.   Abd:  Soft/ND/NT +BS   Ext:  No edema of the bilateral lower extremities. Wearing RUE compression sleeve and  glove.  Neuro:  Cranial nerves grossly intact.  Gait stable.  Psych:  Mood and affect appear normal.  Skin: Hyperpigmented skin upper back with overlying excoriations, unchanged from prior.    Laboratory/Imaging Studies:  4/7/2023 Chest CT:  No contrast. The included thyroid appeared unremarkable.  There is mild atherosclerotic calcification in the thoracic aorta.  Aorta and pulmonary artery are both normal in size. Overall heart size also normal. No pleural or pericardial effusion. No suspicious upper abdominal finding. No enlarged axillary, internal mammary, low neck, mediastinal or hilar lymph nodes. Right mastectomy and axillary lymph node dissection. Esophagus no longer patulous.  Bone details shows degenerative spurring in the thoracic spine. No  suspicious sclerotic or lytic/destructive lesion.    Detail of the lungs shows 1 mm right upper lobe nodule (series 4 image 117). No dominant nodule. No suspicious opacities. There is subsegmental atelectasis in the right middle lobe which is mild and unchanged. Major airways are nicely patent.    4/20/2023 Labs:  Electrolytes are wnl.  Kidney and liver function tests are wnl.   breast cancer tumor marker remains stable, but elevated at 38 (was 40 on 3/23/2023)    WBC is low at 3.5; ANC is wnl at 1.9  Hemoglobin is low, but stable at 10.7 g/dL  Platelets are wnl.    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  He is s/p treatment with 3.5 months neoadjuvant amcenestrant, right breast mastectomy, right ALND, 4 cycles Taxotere and cyclophosphamide, and radiation.  He is on treatment with abemaciclib, lupron and letrozole.    1.  Right breast cancer:    On treatment with abemaciclib, lupron and letrozole since 4/26/2022.  Plan to continue abemaciclib for a total of 2 years (through 4/26/2024).   - I reviewed the CT chest performed  on 4/7 which is without evidence of disease recurrence.  - Given 15/44 lymph nodes were positive, we are monitoring CA 15-3 and CEA markers once every 6 months to his lab tests for surveillance.   tumor marker is mildly elevated, but has remained stable over time.  Will continue to monitor.  - Next Lupron is due 7/19/2023   - Continue monthly labs.  - Provider visit every 3 months.    2.  Bladder wall thickening seen on CT:   - He is s/p cystoscopy by Dr. Lozano on 1/6/2023.  Dr. Lozano's note was reviewed.  Cystoscopy was without abnormal findings.    - No further evaluation necessary.    3.  RUE lymphedema/cording: Lymphedema of the RUE increased from last visit.  Compression, massage, lymphedema pump, and follow up with lymphedema clinic.    4. Cutaneous candidiasis:  He has symptoms of tinea cruris.  This would be common in the setting of immunosuppression on Verzenio.  Lotrimin cream prescribed today.    5.  Patulous esophagus/gastritis/esophagitis: No change since last visit.  Diagnosed with EGD 10/2022.   - Continue Pepcid BID.    6.  Bone Health:  DEXA 10/2022 with osteopenia of multiple sites.  On every 6 month Zometa for prevention of breast cancer bone metastases.  Unfortunately this was poorly tolerated and therefore changed to Prolia on 4/17.    - Next Prolia due around 10/17.    7.  Pulmonary nodule:  RUL seen on 04/2023 CT chest.  Will plan to repeat a CT chest in 6 months.    8.  Follow Up:  Labs every 4 weeks x 3.  Lupron 7/24/2023 (already scheduled)  Provider visit in 3 months.    35 minutes spent on the date of the encounter doing chart review, review of test results, interpretation of tests, patient visit, documentation and discussion with family.

## 2023-05-08 ENCOUNTER — ONCOLOGY VISIT (OUTPATIENT)
Dept: ONCOLOGY | Facility: CLINIC | Age: 73
End: 2023-05-08
Attending: INTERNAL MEDICINE
Payer: COMMERCIAL

## 2023-05-08 VITALS
OXYGEN SATURATION: 100 % | TEMPERATURE: 97.8 F | BODY MASS INDEX: 27.12 KG/M2 | HEART RATE: 55 BPM | RESPIRATION RATE: 16 BRPM | WEIGHT: 178.3 LBS | DIASTOLIC BLOOD PRESSURE: 74 MMHG | SYSTOLIC BLOOD PRESSURE: 115 MMHG

## 2023-05-08 DIAGNOSIS — C50.121 MALIGNANT NEOPLASM OF CENTRAL PORTION OF RIGHT BREAST IN MALE, ESTROGEN RECEPTOR POSITIVE (H): Primary | ICD-10-CM

## 2023-05-08 DIAGNOSIS — M94.9 DISORDER OF BONE AND CARTILAGE: ICD-10-CM

## 2023-05-08 DIAGNOSIS — Z79.811 LONG TERM (CURRENT) USE OF AROMATASE INHIBITORS: ICD-10-CM

## 2023-05-08 DIAGNOSIS — B37.2 CANDIDIASIS OF SKIN: ICD-10-CM

## 2023-05-08 DIAGNOSIS — M89.9 DISORDER OF BONE AND CARTILAGE: ICD-10-CM

## 2023-05-08 DIAGNOSIS — L90.5 SCAR ADHERENT: ICD-10-CM

## 2023-05-08 DIAGNOSIS — Z17.0 MALIGNANT NEOPLASM OF CENTRAL PORTION OF RIGHT BREAST IN MALE, ESTROGEN RECEPTOR POSITIVE (H): Primary | ICD-10-CM

## 2023-05-08 DIAGNOSIS — I89.0 LYMPHEDEMA OF RIGHT ARM: ICD-10-CM

## 2023-05-08 PROCEDURE — G0463 HOSPITAL OUTPT CLINIC VISIT: HCPCS | Performed by: INTERNAL MEDICINE

## 2023-05-08 PROCEDURE — 99214 OFFICE O/P EST MOD 30 MIN: CPT | Performed by: INTERNAL MEDICINE

## 2023-05-08 RX ORDER — CLOTRIMAZOLE 1 %
CREAM (GRAM) TOPICAL
Qty: 30 G | Refills: 1 | Status: SHIPPED | OUTPATIENT
Start: 2023-05-08 | End: 2023-10-31

## 2023-05-08 RX ORDER — LOSARTAN POTASSIUM 25 MG/1
1 TABLET ORAL
COMMUNITY
End: 2024-01-18

## 2023-05-08 ASSESSMENT — PAIN SCALES - GENERAL: PAINLEVEL: NO PAIN (0)

## 2023-05-08 NOTE — LETTER
5/8/2023         RE: Ronna Collins  [address]  [facility] [address]        Dear Colleague,    Thank you for referring your patient, Ronna Collins, to the [facility]. Please see a copy of my visit note below.    Oncology Visit:  Date on this visit: May 8, 2023    Diagnosis: h/o clinical prognostic stage IIIb, [id number], grade 2, ER positive, IA positive, HER-2 negative right breast cancer, [id number].    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  He noted discomfort and hardening of the skin of the right nipple in late April/early May, 2021.  He noted a seborrheic keratosis of the right nipple and remembered he had a similar skin lesion of the arm 3 months earlier.  However, he subsequently noted a mass in the same area.  Right breast skin punch biopsy performed by dermatology was c/w grade 2 invasive ductal carcinoma with associated DCIS.  Invasive carcinoma was ER positive 100% and IA positive 70%, with tumor invading the dermis.  HER-2 was negative by IHC (score 1+).  Diagnostic mammogram and ultrasound showed a retroareolar right breast mass measuring 2.9 cm with associated nipple retraction and enlarged, abnormal right axillary lymph nodes.  Right breast biopsy was again c/w grade 2 invasive ductal carcinoma, ER strong in 98% of tumor cell nuclei, HER-2 negative.  Ki-67 was 15%.    He elected to screen for the ISPY-2 clinical trial.  MRI breast on 6/18/2021 showed the biopsy proven right breast cancer to measure 3.9 cm in maximum dimension with invasion of the nipple and the pectoralis muscle as well as at least 4 abnormal level 1 and 2 right axillary lymph nodes and a tiny 0.4 cm right internal mammary lymph node.  Mammoprint returned low risk with a score of +0.29.  He elected for treatment on the endocrine optimization protocol of ISPY-2 and was randomized to treatment with  amcenestrant.  He was on treatment with  amcenestrant from 7/1/2021 - 10/26/2021.  At our visit in 01/2021, both right axillary node and right breast tumor palpated more firm then prior.  Breast MRI was stable, however, due to clinical concerns for progression, decision was made to proceed with surgery.   Right breast mastectomy and right axillary lymph node dissection was performed by Dr. Snider on 10/27/2021.  Pathology showed grade 2 carcinoma of the right breast measuring 3.2 cm with invasion of the dermis, nipple and the pectoralis muscle.  Ki-67 of the excised tumor was 7%.  15/44 right axillary lymph nodes were positive with 6 of the lymph nodes demonstrating extranodal extension.  The largest lymph node metastasis measured 2.1 cm.       Karina received 4 cycles of Taxotere and cyclophosphamide from 12/14/2021 - 2/16/2022.  His course was complicated by fevers persistent throughout treatment.  He received radiation (4800 cGy in 15 fractions) to the right chest wall and regional lymph nodes from 3/21/2022 - 4/8/2022.  He has been on treatment with lupron and letrozole since 3/16/2022.  Abemaciclib was added 4/26/2022.    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  He continues on treatment with abemaciclib, Lupron, and letrozole.  He is tolerating this treatment well.  He reports approximately 2 episodes of watery stools per month.  When these occur, he takes 2 Imodium with relief.  He also reports drinking additional water on these days.  He has no significant urgency or episodes of incontinence.  His health has been good.  He denies fevers, chills, or symptoms of infection.  He always gets some sinus congestion that starts in Januar/February and lasts a couple of months.  These already seem to be resolving.  He has had some itching in the area of the groin.  He has been applying moisturizing cream without relief.  He denies any erythema or  blisters.  His wife states that sometimes there has been some white dots.  He continues to have itching of the upper back.  He has been applying moisturizing cream to this as well.  He notes that measurements of his right upper extremity have been larger than the left.  He has been fitted with a new compression sleeve and has made some adjustments to the lymphedema pump.  He and his wife have a few upcoming short trips and therefore last Lupron was given is a 3-month dose.  He had Prolia in April and states he tolerated it much better than the previous Zometa.  He denies new bone or joint aches or pains.  He has no current cough, shortness of breath, or chest pain.  He does feel ongoing generalized fatigue and shortness of breath with activity increased from prior to his cancer treatment.      Past Medical/Surgical History:  Past Medical History:   Diagnosis Date    Breast cancer (H) 05/2021    Chronic sinusitis     Hypertension 2002     Past Surgical History:   Procedure Laterality Date    COLONOSCOPY WITH CO2 INSUFFLATION N/A 10/28/2022    Procedure: COLONOSCOPY, WITH CO2 INSUFFLATION;  Surgeon: Annemarie Noel DO;  Location: [facility] OR    COMBINED ESOPHAGOSCOPY, GASTROSCOPY, DUODENOSCOPY (EGD) WITH CO2 INSUFFLATION N/A 10/28/2022    Procedure: ESOPHAGOGASTRODUODENOSCOPY, WITH CO2 INSUFFLATION;  Surgeon: Annemarie Noel DO;  Location: [facility]    DISSECT LYMPH NODE AXILLA Right 10/27/2021    Procedure: RIGHT Axillary Lymph Node Dissection;  Surgeon: Nida Snider MD;  Location: [facility]    ESOPHAGOSCOPY, GASTROSCOPY, DUODENOSCOPY (EGD), COMBINED N/A 10/28/2022    Procedure: ESOPHAGOGASTRODUODENOSCOPY, WITH BIOPSY;  Surgeon: Annemarie Noel DO;  Location: [facility]    MASTECTOMY SIMPLE Right 10/27/2021    Procedure: RIGHT Simple Mastectomy;  Surgeon: Nida Snider MD;  Location: [facility] OR     Allergies:  Allergies as of 05/08/2023    (No Known Allergies)     Current Medications:  Current Outpatient Medications    Medication Sig Dispense Refill    abemaciclib (VERZENIO) 150 MG tablet Take 1 tablet (150 mg) by mouth 2 times daily 56 tablet 2    budesonide (RINOCORT AQUA) 32 MCG/ACT nasal spray Spray 2 sprays into both nostrils daily 8.43 mL 11    famotidine (PEPCID) 40 MG tablet Take 1 tablet (40 mg) by mouth daily 90 tablet 3    letrozole (FEMARA) 2.5 MG tablet Take 1 tablet (2.5 mg) by mouth daily for 360 days 90 tablet 3    letrozole (FEMARA) 2.5 MG tablet Take 1 tablet (2.5 mg) by mouth daily for 28 days 28 tablet 0    loperamide (IMODIUM) 2 MG capsule Start with 2 caps (4 mg), then take one cap (2 mg) after each diarrheal stool as needed. Do not use more than 8 caps (16 mg) per day. 30 capsule 0    losartan-hydrochlorothiazide (HYZAAR) 100-25 MG tablet TAKE 1 TABLET BY MOUTH EVERY MORNING 90 tablet 3    MELATONIN PO       montelukast (SINGULAIR) 10 MG tablet Take 1 tablet (10 mg) by mouth At Bedtime 30 tablet 3    Naproxen Sodium (ALEVE PO)       prochlorperazine (COMPAZINE) 10 MG tablet Take 0.5 tablets (5 mg) by mouth every 6 hours as needed for nausea or vomiting 30 tablet 2    triamcinolone (KENALOG) 0.1 % external ointment Apply topically 2 times daily as needed (rash) 80 g 5    urea (GORDONS UREA) 40 % external ointment For callused feet nightly 60 g 11      Genetics Breast Actionable and Breast Expanded panels were both negative for pathogenic germline mutations.    Physical Exam:  /74 (BP Location: Left arm, Patient Position: Sitting, Cuff Size: Adult Regular)   Pulse 55   Temp 97.8  F (36.6  C) (Oral)   Resp 16   Wt 80.9 kg (178 lb 4.8 oz)   SpO2 100%   BMI 27.12 kg/m    General:  Well appearing adult male in NAD.  Alert and oriented x 3.  HEENT:  Normocephalic.  Sclera anicteric.   Lymph:  No palpable cervical, supraclavicular, or axillary LAD.  Palpable fibrosis/skin adherence of the right axilla and chest wall c/w known prior ALND.    Chest:  CTA bilaterally.  No wheezes or crackles.  CV:  RRR.   No audible m/r/g.  Chest wall:  Right mastectomy. No palpable mass or nodularity of either the right or left chest.   Abd:  Soft/ND/NT +BS   Ext:  No edema of the bilateral lower extremities. Wearing RUE compression sleeve and glove.  Neuro:  Cranial nerves grossly intact.  Gait stable.  Psych:  Mood and affect appear normal.  Skin: Hyperpigmented skin upper back with overlying excoriations, unchanged from prior.    Laboratory/Imaging Studies:  4/7/2023 Chest CT:  No contrast. The included thyroid appeared unremarkable.  There is mild atherosclerotic calcification in the thoracic aorta.  Aorta and pulmonary artery are both normal in size. Overall heart size also normal. No pleural or pericardial effusion. No suspicious upper abdominal finding. No enlarged axillary, internal mammary, low neck, mediastinal or hilar lymph nodes. Right mastectomy and axillary lymph node dissection. Esophagus no longer patulous.  Bone details shows degenerative spurring in the thoracic spine. No  suspicious sclerotic or lytic/destructive lesion.    Detail of the lungs shows 1 mm right upper lobe nodule (series 4 image 117). No dominant nodule. No suspicious opacities. There is subsegmental atelectasis in the right middle lobe which is mild and unchanged. Major airways are nicely patent.    4/20/2023 Labs:  Electrolytes are wnl.  Kidney and liver function tests are wnl.   breast cancer tumor marker remains stable, but elevated at 38 (was 40 on 3/23/2023)    WBC is low at 3.5; ANC is wnl at 1.9  Hemoglobin is low, but stable at 10.7 g/dL  Platelets are wnl.    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  He is s/p treatment with 3.5 months neoadjuvant amcenestrant, right breast mastectomy, right ALND, 4 cycles Taxotere and cyclophosphamide, and radiation.  He is on treatment with abemaciclib, lupron  and letrozole.    1.  Right breast cancer:    On treatment with abemaciclib, lupron and letrozole since 4/26/2022.  Plan to continue abemaciclib for a total of 2 years (through 4/26/2024).   - I reviewed the CT chest performed on 4/7 which is without evidence of disease recurrence.  - Given 15/44 lymph nodes were positive, we are monitoring CA 15-3 and CEA markers once every 6 months to his lab tests for surveillance.   tumor marker is mildly elevated, but has remained stable over time.  Will continue to monitor.  - Next Lupron is due 7/19/2023   - Continue monthly labs.  - Provider visit every 3 months.    2.  Bladder wall thickening seen on CT:   - He is s/p cystoscopy by Dr. Lozano on 1/6/2023.  Dr. Lozano's note was reviewed.  Cystoscopy was without abnormal findings.    - No further evaluation necessary.    3.  RUE lymphedema/cording: Lymphedema of the RUE increased from last visit.  Compression, massage, lymphedema pump, and follow up with lymphedema clinic.    4. Cutaneous candidiasis:  He has symptoms of tinea cruris.  This would be common in the setting of immunosuppression on Verzenio.  Lotrimin cream prescribed today.    5.  Patulous esophagus/gastritis/esophagitis: No change since last visit.  Diagnosed with EGD 10/2022.   - Continue Pepcid BID.    6.  Bone Health:  DEXA 10/2022 with osteopenia of multiple sites.  On every 6 month Zometa for prevention of breast cancer bone metastases.  Unfortunately this was poorly tolerated and therefore changed to Prolia on 4/17.    - Next Prolia due around 10/17.    7.  Pulmonary nodule:  RUL seen on 04/2023 CT chest.  Will plan to repeat a CT chest in 6 months.    8.  Follow Up:  Labs every 4 weeks x 3.  Lupron 7/24/2023 (already scheduled)  Provider visit in 3 months.    35 minutes spent on the date of the encounter doing chart review, review of test results, interpretation of tests, patient visit, documentation and discussion with family.                Again, thank you for allowing me to participate in the care of your patient.        Sincerely,    Mikaela Sr MD

## 2023-05-08 NOTE — NURSING NOTE
"Oncology Rooming Note    May 8, 2023 8:52 AM   Ronna Collins is a 72 year old male who presents for:    Chief Complaint   Patient presents with     Oncology Clinic Visit     Return- breast cancer     Initial Vitals: /74 (BP Location: Left arm, Patient Position: Sitting, Cuff Size: Adult Regular)   Pulse 55   Temp 97.8  F (36.6  C) (Oral)   Resp 16   Wt 80.9 kg (178 lb 4.8 oz)   SpO2 100%   BMI 27.12 kg/m   Estimated body mass index is 27.12 kg/m  as calculated from the following:    Height as of 2/7/23: 1.727 m (5' 7.99\").    Weight as of this encounter: 80.9 kg (178 lb 4.8 oz). Body surface area is 1.97 meters squared.  No Pain (0) Comment: Data Unavailable   No LMP for male patient.  Allergies reviewed: Yes  Medications reviewed: Yes    Medications: Medication refills not needed today.  Pharmacy name entered into [facility]:    [id number] - [place], [place] - [address] AT [facility] - [place], [place] - [address]  [facility] #[id number] - [place], [place] - [address] AT [facility] [address]    Clinical concerns: No new clinical concerns other than reason for visit today.      Mima Moe            "

## 2023-05-24 ENCOUNTER — MYC MEDICAL ADVICE (OUTPATIENT)
Dept: ONCOLOGY | Facility: CLINIC | Age: 73
End: 2023-05-24
Payer: COMMERCIAL

## 2023-05-24 NOTE — TELEPHONE ENCOUNTER
Attempted to contact pt to inform him of provider recommendation below. LVM requesting pt return call to [phone], opt 5, opt 2.    I agree with further evaluation by PCP. May want to test for COVID again as it may have been too early for the prior test to be positive.  I also recommend he hold abemaciclib until other infectious etiologies are ruled out.

## 2023-05-24 NOTE — TELEPHONE ENCOUNTER
Nurse Triage SBAR    Situation: Persistent cough    Background:    DX: h/o clinical prognostic stage IIIb, [id number], grade 2, ER positive, IA positive, HER-2 negative right breast cancer, [id number]  Provider:   Most recent appointment: 05/08/23 w/  Upcoming appointments: 08/08/23 w/  Most recent treatment: abemaciclib, lupron and letrozole since 4/26/2022.  Plan to continue abemaciclib for a total of 2 years (through 4/26/2024      Assessment:     Pt reports he has had cough since Tuesday. He traveled to [place] one week ago and developed fever and cough on Tuesday. Fever lasted for a couple of days, until Thursday. Cough was not severe initially but has gotten worse since Sunday.   Cough is productive s/clear mucous. He has not tried any OTC tx because he wanted to talk to provider first.    Pt did test for COVID when he first started to have Sx. It was negative.    Denies F/C, chest pain, SOB    This writer reviewed home care instructions below and s/sx to look out for and when to seek care. Advised pt also reach out to PCP regarding cough. Pt verbalized understanding and states he would reach out to PCP but wanted to also get  recommendation.       Recommendations:   Routed Provider:      Home Care Instructions:  -Drink clear liquids   -Avoid environmental factors that worsen cough such as smoke, perfume, cold/dry air.   -Self-Monitor for fever or any additional signs of infection and avoid contact with individuals who are sick.     Seek Emergency Care Immediately if any of the following occurs:     Sudden, unexpected increase in SOB at rest    Chest pain    Frothy pink sputum or bright red blood    Facial swelling    Changes in mental status      (Reference used Telephone Triage for Oncology Nurses, Third Edition, [employer] Dept)

## 2023-05-25 ENCOUNTER — MYC MEDICAL ADVICE (OUTPATIENT)
Dept: FAMILY MEDICINE | Facility: CLINIC | Age: 73
End: 2023-05-25

## 2023-05-25 ENCOUNTER — THERAPY VISIT (OUTPATIENT)
Dept: PHYSICAL THERAPY | Facility: CLINIC | Age: 73
End: 2023-05-25
Attending: FAMILY MEDICINE
Payer: COMMERCIAL

## 2023-05-25 DIAGNOSIS — I89.0 LYMPHEDEMA OF RIGHT UPPER EXTREMITY: ICD-10-CM

## 2023-05-25 PROCEDURE — 97110 THERAPEUTIC EXERCISES: CPT | Mod: GP | Performed by: PHYSICAL THERAPIST

## 2023-05-25 PROCEDURE — 97140 MANUAL THERAPY 1/> REGIONS: CPT | Mod: GP | Performed by: PHYSICAL THERAPIST

## 2023-05-25 NOTE — TELEPHONE ENCOUNTER
Call from pt, who questioned what triage RN said yesterday in VM. Writer reviewed note from 5/24/23, which states per Dr. Sr, follow up with PCP, re-test for COVID as previous may have been too early, hold abemaciclib until other infectious etiologies are ruled out. Pt voiced understanding, states he will contact PCP.

## 2023-05-26 ENCOUNTER — LAB (OUTPATIENT)
Dept: LAB | Facility: CLINIC | Age: 73
End: 2023-05-26
Attending: INTERNAL MEDICINE
Payer: COMMERCIAL

## 2023-05-26 ENCOUNTER — VIRTUAL VISIT (OUTPATIENT)
Dept: FAMILY MEDICINE | Facility: CLINIC | Age: 73
End: 2023-05-26
Payer: COMMERCIAL

## 2023-05-26 VITALS
TEMPERATURE: 98 F | HEART RATE: 90 BPM | OXYGEN SATURATION: 100 % | SYSTOLIC BLOOD PRESSURE: 114 MMHG | DIASTOLIC BLOOD PRESSURE: 77 MMHG | RESPIRATION RATE: 16 BRPM

## 2023-05-26 DIAGNOSIS — C50.929 MALIGNANT NEOPLASM OF MALE BREAST, UNSPECIFIED ESTROGEN RECEPTOR STATUS, UNSPECIFIED LATERALITY, UNSPECIFIED SITE OF BREAST (H): ICD-10-CM

## 2023-05-26 DIAGNOSIS — R05.1 ACUTE COUGH: Primary | ICD-10-CM

## 2023-05-26 LAB
ALBUMIN SERPL BCG-MCNC: 4.3 G/DL (ref 3.5–5.2)
ALP SERPL-CCNC: 58 U/L (ref 40–129)
ALT SERPL W P-5'-P-CCNC: 16 U/L (ref 10–50)
ANION GAP SERPL CALCULATED.3IONS-SCNC: 9 MMOL/L (ref 7–15)
AST SERPL W P-5'-P-CCNC: 20 U/L (ref 10–50)
BASOPHILS # BLD AUTO: 0.1 10E3/UL (ref 0–0.2)
BASOPHILS NFR BLD AUTO: 1 %
BILIRUB SERPL-MCNC: 0.3 MG/DL
BUN SERPL-MCNC: 12 MG/DL (ref 8–23)
CALCIUM SERPL-MCNC: 9.6 MG/DL (ref 8.8–10.2)
CHLORIDE SERPL-SCNC: 103 MMOL/L (ref 98–107)
CREAT SERPL-MCNC: 0.97 MG/DL (ref 0.67–1.17)
DEPRECATED HCO3 PLAS-SCNC: 24 MMOL/L (ref 22–29)
EOSINOPHIL # BLD AUTO: 0.2 10E3/UL (ref 0–0.7)
EOSINOPHIL NFR BLD AUTO: 5 %
ERYTHROCYTE [DISTWIDTH] IN BLOOD BY AUTOMATED COUNT: 13.2 % (ref 10–15)
GFR SERPL CREATININE-BSD FRML MDRD: 84 ML/MIN/1.73M2
GLUCOSE SERPL-MCNC: 116 MG/DL (ref 70–99)
HCT VFR BLD AUTO: 31.8 % (ref 40–53)
HGB BLD-MCNC: 10.6 G/DL (ref 13.3–17.7)
IMM GRANULOCYTES # BLD: 0 10E3/UL
IMM GRANULOCYTES NFR BLD: 1 %
LYMPHOCYTES # BLD AUTO: 0.9 10E3/UL (ref 0.8–5.3)
LYMPHOCYTES NFR BLD AUTO: 22 %
MCH RBC QN AUTO: 31.3 PG (ref 26.5–33)
MCHC RBC AUTO-ENTMCNC: 33.3 G/DL (ref 31.5–36.5)
MCV RBC AUTO: 94 FL (ref 78–100)
MONOCYTES # BLD AUTO: 0.3 10E3/UL (ref 0–1.3)
MONOCYTES NFR BLD AUTO: 7 %
NEUTROPHILS # BLD AUTO: 2.5 10E3/UL (ref 1.6–8.3)
NEUTROPHILS NFR BLD AUTO: 64 %
NRBC # BLD AUTO: 0 10E3/UL
NRBC BLD AUTO-RTO: 0 /100
PLATELET # BLD AUTO: 210 10E3/UL (ref 150–450)
POTASSIUM SERPL-SCNC: 4.1 MMOL/L (ref 3.4–5.3)
PROT SERPL-MCNC: 7.7 G/DL (ref 6.4–8.3)
RBC # BLD AUTO: 3.39 10E6/UL (ref 4.4–5.9)
SODIUM SERPL-SCNC: 136 MMOL/L (ref 136–145)
WBC # BLD AUTO: 3.9 10E3/UL (ref 4–11)

## 2023-05-26 PROCEDURE — 36415 COLL VENOUS BLD VENIPUNCTURE: CPT

## 2023-05-26 PROCEDURE — 99213 OFFICE O/P EST LOW 20 MIN: CPT | Mod: VID | Performed by: STUDENT IN AN ORGANIZED HEALTH CARE EDUCATION/TRAINING PROGRAM

## 2023-05-26 PROCEDURE — 85025 COMPLETE CBC W/AUTO DIFF WBC: CPT

## 2023-05-26 PROCEDURE — 80053 COMPREHEN METABOLIC PANEL: CPT

## 2023-05-26 RX ORDER — AZITHROMYCIN 250 MG/1
TABLET, FILM COATED ORAL
Qty: 6 TABLET | Refills: 0 | Status: CANCELLED | OUTPATIENT
Start: 2023-05-26 | End: 2023-05-31

## 2023-05-26 NOTE — ORAL ONC MGMT
Oral Chemotherapy Monitoring Program  Lab Follow Up  Reviewed lab results from 5/26/2023.    Labs:  _  Result Component Current Result Ref Range   Sodium 136 (5/26/2023) 136 - 145 mmol/L   _  Result Component Current Result Ref Range   Potassium 4.1 (5/26/2023) 3.4 - 5.3 mmol/L   _  Result Component Current Result Ref Range   Calcium 9.6 (5/26/2023) 8.8 - 10.2 mg/dL   _  Result Component Current Result Ref Range   Albumin 4.3 (5/26/2023) 3.5 - 5.2 g/dL   _  Result Component Current Result Ref Range   Urea Nitrogen 12.0 (5/26/2023) 8.0 - 23.0 mg/dL   _  Result Component Current Result Ref Range   Creatinine 0.97 (5/26/2023) 0.67 - 1.17 mg/dL     Result Component Current Result Ref Range   AST 20 (5/26/2023) 10 - 50 U/L   _  Result Component Current Result Ref Range   ALT 16 (5/26/2023) 10 - 50 U/L   _  Result Component Current Result Ref Range   Bilirubin Total 0.3 (5/26/2023) <=1.2 mg/dL   _  Result Component Current Result Ref Range   WBC Count 3.9 (L) (5/26/2023) 4.0 - 11.0 10e3/uL   _  Result Component Current Result Ref Range   Hemoglobin 10.6 (L) (5/26/2023) 13.3 - 17.7 g/dL   _  Result Component Current Result Ref Range   Platelet Count 210 (5/26/2023) 150 - 450 10e3/uL     Assessment & Plan:  No new concerning abnormalities. Continue plan of care.     Follow-Up:  8/8: Dr. Sr visit.     Fred Julio, PharmD  Hematology/Oncology Clinical Pharmacist  [facility] [place]  [phone]

## 2023-05-26 NOTE — PROGRESS NOTES
"Ronna is a 72 year old who is being evaluated via a billable video visit.      How would you like to obtain your AVS? MyChart  If the video visit is dropped, the invitation should be resent by: Text to cell phone: [phone]  Will anyone else be joining your video visit? No   Patient was unable to join video visit, converted to telephone visit.          Assessment & Plan     Acute cough  Acute, improving per patient. history of breast cancer status post right mastectomy, currently being treated with Verzenio which could be source of cough as well.  Patient was instructed to hold medication and consider restarting on Monday if cough improves.   -Recommended that patient continue to hold Verzenio until Monday, patient was agreeable.       Review of external notes as documented elsewhere in note       BMI:   Estimated body mass index is 26.94 kg/m  as calculated from the following:    Height as of 6/1/23: 1.727 m (5' 7.99\").    Weight as of 6/1/23: 80.3 kg (177 lb 1.6 oz).       Shaye Colon MD  [facility]    Subjective   Ronna is a 72 year old, presenting for the following health issues:  office visit (Persistent cough starting last Wednesday,pt state he had cancer.)        5/26/2023     3:43 PM   Additional Questions   Roomed by eh   Accompanied by wife     History of Present Illness       Reason for visit:  Persistent Cough    He eats 4 or more servings of fruits and vegetables daily.He consumes 1 sweetened beverage(s) daily.He exercises with enough effort to increase his heart rate 10 to 19 minutes per day.  He exercises with enough effort to increase his heart rate 5 days per week.   He is taking medications regularly.      Per chart review, pt has had cough since 4/11/23.  Saw PCP:   Rhinocort seemed to help.  Added Singulair.  If not improved, consider either empiric zpak or pred burst but likely PND mediated. Discussed could it be GERD, some GERD symptoms not severe, " discussed we could consider see ENT for scope, he defers for now but if messages to us we can order that, I also did not order sinus ct now as even if congested on ct I would not change treatment plan, but can consider that in the future if needed .Nothing on chest ct to explain a cough.    -Today patient wa [facility] last week, slight fever on Tues until Thursday, disappeared Cough that started 10 days ago, persistent, was not able to sleep through the night. improving in the last day or so.   - Therapy includes Verz, luprone q3 months.   - contacte Oncologist - Verzenio side effect - reduces WBC  - Last   - stop verzenio, but would like him to see doc before starting it. [place] appt is Monday, would like to talk to someone to restart.  - Cough has been there before, but this seems to be a different kind of cough. Will use rhinocort and Zpak.   -Discussed that procedure could have a side effect of lowering white blood cells and cough, and given that he has held it for the last 2 days and has improved, could be from increase in white blood cell or light effect.  Based on oncologist's recommendation, would recommend that he hold off on starting Verzenio until Monday and make sure that the cough continues to improve.  Does not have any other concerning symptoms of pneumonia like fever, productive cough, respiratory distress.  Discussed that if any of these develop, the patient should report to urgent care or ED.  Patient was agreeable.                Review of Systems   Constitutional: Negative for fever and chills.   Respiratory: Negative for shortness of breath.  Positive for cough.  Cardiovascular: Negative for chest pain or chest pressure.   Gastrointestinal: Negative for nausea, vomiting, diarrhea or abdominal pain.        Objective           Vitals:  No vitals were obtained today due to telephone visit.    Physical Exam   Able to speak in full sentences without any respiratory distress, audible wheezing.  Mild  cough.          Type of service:  Patient was unable to login for video visit, converted to telephone visit.  20 minutes spent on phone call.    Originating Location (pt. Location): Home    Distant Location (provider location):  On-site

## 2023-05-26 NOTE — NURSING NOTE
Chief Complaint   Patient presents with     Labs Only     Venipuncture,     Helene Walls RN on 5/26/2023 at 8:28 AM

## 2023-06-01 ENCOUNTER — OFFICE VISIT (OUTPATIENT)
Dept: INTERNAL MEDICINE | Facility: CLINIC | Age: 73
End: 2023-06-01
Payer: COMMERCIAL

## 2023-06-01 VITALS
HEIGHT: 68 IN | OXYGEN SATURATION: 99 % | BODY MASS INDEX: 26.84 KG/M2 | HEART RATE: 87 BPM | DIASTOLIC BLOOD PRESSURE: 82 MMHG | SYSTOLIC BLOOD PRESSURE: 122 MMHG | WEIGHT: 177.1 LBS

## 2023-06-01 DIAGNOSIS — R05.9 COUGH, UNSPECIFIED TYPE: Primary | ICD-10-CM

## 2023-06-01 PROCEDURE — 99213 OFFICE O/P EST LOW 20 MIN: CPT | Mod: GC

## 2023-06-01 NOTE — PROGRESS NOTES
Subjective     Ronna Collins is a 72 year old man, with concerns including:  Chief Complaint   Patient presents with     Cough     Cough x2 weeks; has been improving recently. Would like to discuss verzenio side effects     PCP: Stewart Henry   Visit type: problem-oriented    72-year-old male with history of breast cancer s/p right mastectomy currently on abemaciclib (Verzenio), luprorelin (Lupron), and letrozole since April 2022 (planned for a 2-year course through 4/2024). He is here to discuss a recent cough. Initially had a congestive cough about 2 months ago that felt more related to sinus problems. Used budesonide with good relief at that time. However, about 2 weeks ago, patient was in [place] and shortly after returning home developed fever with new cough. States that this cough felt different from his prior one. Was dry, non-productive. Initially got worse during that first week. Patient spoke with his primary oncologist Dr. Sr. They discussed side effects of abemaciclib (Verzenio) at that time, since it has some immunosuppressive effects as well as well-known side effect of cough. The decision was made to discontinue the medication for the time being. Patient's cough has since improved significantly and is resolved as of today. Has not had any recurrence of fever (last fever 2 weeks ago). Did not have SOB or chest pain at any point. COVID tests at home were negative. Patient is up to date on all vaccinations including COVID boosters. No prior COVID infections.     After discussing with Dr. Sr last week, they had decided that since his symptoms were improving, if his cough was still better by the start of this week, he should see a provider in person, and if no acute issues, then would be ok to resume Vezenio. He was seen by family medicine via video visit last week and had lab work done at that time. Overall patient feels well today.       Review of Systems   As noted in HPI.     As  "part of this encounter, I reviewed (and updated as appropriate) the past medical, family, and social history.    Past Medical History:   Diagnosis Date     Breast cancer (H) 05/2021     Chronic sinusitis      Hypertension 2002     Past Surgical History:   Procedure Laterality Date     COLONOSCOPY WITH CO2 INSUFFLATION N/A 10/28/2022    Procedure: COLONOSCOPY, WITH CO2 INSUFFLATION;  Surgeon: Annemarie Noel DO;  Location: [facility]     COMBINED ESOPHAGOSCOPY, GASTROSCOPY, DUODENOSCOPY (EGD) WITH CO2 INSUFFLATION N/A 10/28/2022    Procedure: ESOPHAGOGASTRODUODENOSCOPY, WITH CO2 INSUFFLATION;  Surgeon: Annemarie Noel DO;  Location: [facility]     DISSECT LYMPH NODE AXILLA Right 10/27/2021    Procedure: RIGHT Axillary Lymph Node Dissection;  Surgeon: Nida Snider MD;  Location: [facility]     ESOPHAGOSCOPY, GASTROSCOPY, DUODENOSCOPY (EGD), COMBINED N/A 10/28/2022    Procedure: ESOPHAGOGASTRODUODENOSCOPY, WITH BIOPSY;  Surgeon: Annemarie Noel DO;  Location: [facility]     MASTECTOMY SIMPLE Right 10/27/2021    Procedure: RIGHT Simple Mastectomy;  Surgeon: Nida Snider MD;  Location: [facility] OR         Objective     /82 (BP Location: Left arm, Patient Position: Sitting, Cuff Size: Adult Regular)   Pulse 87   Ht 1.727 m (5' 7.99\")   Wt 80.3 kg (177 lb 1.6 oz)   SpO2 99%   BMI 26.94 kg/m      Physical Exam   Gen: No acute distress, sitting up in chair and appears comfortable  HEENT: Normocephalic, atraumatic. Conjunctiva are normal. No nuchal rigidity, neck ROM normal. No cervical adenopathy.   Chest: Regular rate and rhythm without discernable murmur. Evidence of prior right mastectomy is present with well-healed scar.   Pulm: Clear to auscultation bilaterally in all lung fields. No focally decreased breath sounds, crackles, or wheezes.   Abd: Soft, non-tender. Normoactive bowel sounds.   Extr: No pitting edema in bilateral lower extremities.   Skin: Warm and dry.   Neuro: Alert and oriented x3. Strength is " normal and symmetrical in upper and lower extremities.         Assessment & Plan     Ronna is a 72-year-old male with history of breast cancer s/p right mastectomy on chronic Verzenio, Lupron, and letrozole who presents for evaluation of cough and clearance for resumption of Verzenio.     # Cough, recent fever  Had onset of new cough and transient fever about 2 weeks ago after plane flight from [place]. Held Verzenio at that time. Symptoms are now resolved. COVID negative per home tests. Exam today is normal without any cardiopulmonary abnormalities or any evidence of cervical adenopathy. Suspect that patient had an upper respiratory infection that is now resolved. Reviewed recent labs from 5/26/2023, which showed no acute changes from baseline. Patient had previously spoken with Dr. Sr about his Verzenio, with tentative plan to resume the medication if in-person examination by a physician was without issue. Since he appears well today, we have discussed that he can resume Verzenio at this time.   - OK to resume Verzenio, Dr. Sr aware of this plan      Patient seen and discussed with Dr. Zamarripa.     Ibrahima Davis MD  PGY-1  Internal Medicine
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
## 2023-06-14 ENCOUNTER — TELEPHONE (OUTPATIENT)
Dept: ONCOLOGY | Facility: CLINIC | Age: 73
End: 2023-06-14
Payer: COMMERCIAL

## 2023-06-14 DIAGNOSIS — C50.929 MALIGNANT NEOPLASM OF MALE BREAST, UNSPECIFIED ESTROGEN RECEPTOR STATUS, UNSPECIFIED LATERALITY, UNSPECIFIED SITE OF BREAST (H): Primary | ICD-10-CM

## 2023-06-14 NOTE — TELEPHONE ENCOUNTER
Oral Chemotherapy Monitoring Program     Placed call to Ronna Collins in follow up of Verzenio oral chemotherapy.     Patient reports currently being in [place] with a 2 week supply of medication. Requests call back next week to set up refill upon his return.       Dalia Childs, PharmD, BCACP  Oral Chemotherapy Monitoring Program  [facility]  [phone]  June 14, 2023

## 2023-06-20 ENCOUNTER — TELEPHONE (OUTPATIENT)
Dept: ONCOLOGY | Facility: CLINIC | Age: 73
End: 2023-06-20
Payer: COMMERCIAL

## 2023-06-20 NOTE — TELEPHONE ENCOUNTER
Oral Chemotherapy Monitoring Program    Subjective/Objective:  Ronna Collins is a 72 year old male contacted by phone for a follow-up visit for oral chemotherapy.  Ronna confirms taking abemaciclib 150 mg twice daily. He states he is tolerating this well, with no new or worsening adverse effects. Denies recent medication changes. Did hold for 4-5 days for cough which is now resolved. PDC is 0.97, no concerns for adherence.         3/17/2023     1:00 PM 4/20/2023     8:00 AM 5/9/2023     9:00 AM 5/26/2023    11:00 AM 6/14/2023     9:00 AM 6/14/2023    12:00 PM 6/20/2023     2:00 PM   ORAL CHEMOTHERAPY   Assessment Type Quarterly Follow up Lab Monitoring Chart Review Lab Monitoring Refill Other Quarterly Follow up   Diagnosis Code Breast Cancer Breast Cancer Breast Cancer Breast Cancer Breast Cancer Breast Cancer Breast Cancer   Providers Dr. Remberto Sr   [facility]   Is this patient followed by the [facility] OC team?  Yes Yes Yes Yes Yes Yes   Drug Name Verzenio (abemaciclib) Verzenio (abemaciclib) Verzenio (abemaciclib) Verzenio (abemaciclib) Verzenio (abemaciclib) Verzenio (abemaciclib) Verzenio (abemaciclib)   Dose 150 mg 150 mg 150 mg 150 mg 150 mg 150 mg 150 mg   Current Schedule BID BID BID BID BID BID BID   Cycle Details Continuous Continuous Continuous Continuous Continuous Continuous Continuous   Planned next cycle start date 3/31/2023         Doses missed in last 2 weeks 0         Adherence Assessment Adherent         Adverse Effects No AE identified during assessment      No AE identified during assessment   Any new drug interactions? No      No   Is the dose as ordered appropriate for the patient? Yes             Last PHQ-2 Score on record:       5/26/2023     1:18 PM 1/2/2023     7:36 AM   PHQ-2 ( 1999 [employer])   Q1: Little interest or pleasure in doing things 0  "0   Q2: Feeling down, depressed or hopeless 0 0   PHQ-2 Score 0 0   Q1: Little interest or pleasure in doing things Not at all Not at all   Q2: Feeling down, depressed or hopeless Not at all Not at all   PHQ-2 Score 0 0       Vitals:  BP:   BP Readings from Last 1 Encounters:   06/01/23 122/82     Wt Readings from Last 1 Encounters:   06/01/23 80.3 kg (177 lb 1.6 oz)     Estimated body surface area is 1.96 meters squared as calculated from the following:    Height as of 6/1/23: 1.727 m (5' 7.99\").    Weight as of 6/1/23: 80.3 kg (177 lb 1.6 oz).    Labs:  _  Result Component Current Result Ref Range   Sodium 136 (5/26/2023) 136 - 145 mmol/L     _  Result Component Current Result Ref Range   Potassium 4.1 (5/26/2023) 3.4 - 5.3 mmol/L     _  Result Component Current Result Ref Range   Calcium 9.6 (5/26/2023) 8.8 - 10.2 mg/dL     No results found for Mag within last 30 days.     No results found for Phos within last 30 days.     _  Result Component Current Result Ref Range   Albumin 4.3 (5/26/2023) 3.5 - 5.2 g/dL     _  Result Component Current Result Ref Range   Urea Nitrogen 12.0 (5/26/2023) 8.0 - 23.0 mg/dL     _  Result Component Current Result Ref Range   Creatinine 0.97 (5/26/2023) 0.67 - 1.17 mg/dL     _  Result Component Current Result Ref Range   AST 20 (5/26/2023) 10 - 50 U/L     _  Result Component Current Result Ref Range   ALT 16 (5/26/2023) 10 - 50 U/L     _  Result Component Current Result Ref Range   Bilirubin Total 0.3 (5/26/2023) <=1.2 mg/dL     _  Result Component Current Result Ref Range   WBC Count 3.9 (L) (5/26/2023) 4.0 - 11.0 10e3/uL     _  Result Component Current Result Ref Range   Hemoglobin 10.6 (L) (5/26/2023) 13.3 - 17.7 g/dL     _  Result Component Current Result Ref Range   Platelet Count 210 (5/26/2023) 150 - 450 10e3/uL     No results found for ANC within last 30 days.     _  Result Component Current Result Ref Range   Absolute Neutrophils 2.5 (5/26/2023) 1.6 - 8.3 10e3/uL    "       Assessment/Plan:  Continue on verzenio    Follow-Up:  6/23 labs  7/24 labs  7/24 Dr. Sr     Refill Due:  Delivery set up for 6/29/2023    Merary Allen, Therapy Management RN  [facility]  [phone]

## 2023-06-23 ENCOUNTER — LAB (OUTPATIENT)
Dept: LAB | Facility: CLINIC | Age: 73
End: 2023-06-23
Attending: INTERNAL MEDICINE
Payer: COMMERCIAL

## 2023-06-23 DIAGNOSIS — C50.929 MALIGNANT NEOPLASM OF MALE BREAST, UNSPECIFIED ESTROGEN RECEPTOR STATUS, UNSPECIFIED LATERALITY, UNSPECIFIED SITE OF BREAST (H): ICD-10-CM

## 2023-06-23 LAB
ALBUMIN SERPL BCG-MCNC: 4.2 G/DL (ref 3.5–5.2)
ALP SERPL-CCNC: 45 U/L (ref 40–129)
ALT SERPL W P-5'-P-CCNC: 20 U/L (ref 0–70)
ANION GAP SERPL CALCULATED.3IONS-SCNC: 13 MMOL/L (ref 7–15)
AST SERPL W P-5'-P-CCNC: 22 U/L (ref 0–45)
BASOPHILS # BLD AUTO: 0.1 10E3/UL (ref 0–0.2)
BASOPHILS NFR BLD AUTO: 2 %
BILIRUB SERPL-MCNC: 0.3 MG/DL
BUN SERPL-MCNC: 14.4 MG/DL (ref 8–23)
CALCIUM SERPL-MCNC: 9.3 MG/DL (ref 8.8–10.2)
CHLORIDE SERPL-SCNC: 105 MMOL/L (ref 98–107)
CREAT SERPL-MCNC: 1.01 MG/DL (ref 0.67–1.17)
DEPRECATED HCO3 PLAS-SCNC: 22 MMOL/L (ref 22–29)
EOSINOPHIL # BLD AUTO: 0.3 10E3/UL (ref 0–0.7)
EOSINOPHIL NFR BLD AUTO: 9 %
ERYTHROCYTE [DISTWIDTH] IN BLOOD BY AUTOMATED COUNT: 13 % (ref 10–15)
GFR SERPL CREATININE-BSD FRML MDRD: 80 ML/MIN/1.73M2
GLUCOSE SERPL-MCNC: 155 MG/DL (ref 70–99)
HCT VFR BLD AUTO: 31.4 % (ref 40–53)
HGB BLD-MCNC: 10.8 G/DL (ref 13.3–17.7)
IMM GRANULOCYTES # BLD: 0 10E3/UL
IMM GRANULOCYTES NFR BLD: 1 %
LYMPHOCYTES # BLD AUTO: 0.9 10E3/UL (ref 0.8–5.3)
LYMPHOCYTES NFR BLD AUTO: 29 %
MCH RBC QN AUTO: 31.8 PG (ref 26.5–33)
MCHC RBC AUTO-ENTMCNC: 34.4 G/DL (ref 31.5–36.5)
MCV RBC AUTO: 92 FL (ref 78–100)
MONOCYTES # BLD AUTO: 0.2 10E3/UL (ref 0–1.3)
MONOCYTES NFR BLD AUTO: 6 %
NEUTROPHILS # BLD AUTO: 1.7 10E3/UL (ref 1.6–8.3)
NEUTROPHILS NFR BLD AUTO: 53 %
NRBC # BLD AUTO: 0 10E3/UL
NRBC BLD AUTO-RTO: 0 /100
PLATELET # BLD AUTO: 167 10E3/UL (ref 150–450)
POTASSIUM SERPL-SCNC: 3.9 MMOL/L (ref 3.4–5.3)
PROT SERPL-MCNC: 7.4 G/DL (ref 6.4–8.3)
RBC # BLD AUTO: 3.4 10E6/UL (ref 4.4–5.9)
SODIUM SERPL-SCNC: 140 MMOL/L (ref 136–145)
WBC # BLD AUTO: 3.1 10E3/UL (ref 4–11)

## 2023-06-23 PROCEDURE — 36415 COLL VENOUS BLD VENIPUNCTURE: CPT

## 2023-06-23 PROCEDURE — 85025 COMPLETE CBC W/AUTO DIFF WBC: CPT

## 2023-06-23 PROCEDURE — 80053 COMPREHEN METABOLIC PANEL: CPT

## 2023-06-23 NOTE — NURSING NOTE
Chief Complaint   Patient presents with     Lab Only     Labs drawn via  by vascular access.     Kelli Martin RN

## 2023-06-26 ENCOUNTER — MYC MEDICAL ADVICE (OUTPATIENT)
Dept: ONCOLOGY | Facility: CLINIC | Age: 73
End: 2023-06-26
Payer: COMMERCIAL

## 2023-06-26 NOTE — TELEPHONE ENCOUNTER
Oral Chemotherapy Monitoring Program  Lab Follow Up     Patient currently on Verzenio  Reviewed lab results from 6/23/23.     Lab Results   Component Value Date    WBC 3.1 06/23/2023    WBC 5.7 07/08/2021     Lab Results   Component Value Date    RBC 3.40 06/23/2023    RBC 5.24 07/08/2021     Lab Results   Component Value Date    HGB 10.8 06/23/2023    HGB 14.2 07/08/2021     Lab Results   Component Value Date    HCT 31.4 06/23/2023    HCT 43.9 07/08/2021     Lab Results   Component Value Date    MCV 92 06/23/2023    MCV 84 07/08/2021     Lab Results   Component Value Date    MCH 31.8 06/23/2023    MCH 27.1 07/08/2021     Lab Results   Component Value Date    MCHC 34.4 06/23/2023    MCHC 32.3 07/08/2021     Lab Results   Component Value Date    RDW 13.0 06/23/2023    RDW 12.2 07/08/2021     Lab Results   Component Value Date     06/23/2023     07/08/2021       Last Comprehensive Metabolic Panel:  Sodium   Date Value Ref Range Status   06/23/2023 140 136 - 145 mmol/L Final   07/08/2021 139 133 - 144 mmol/L Final     Potassium   Date Value Ref Range Status   06/23/2023 3.9 3.4 - 5.3 mmol/L Final   08/30/2022 4.6 3.4 - 5.3 mmol/L Final   07/08/2021 4.3 3.4 - 5.3 mmol/L Final     Chloride   Date Value Ref Range Status   06/23/2023 105 98 - 107 mmol/L Final   08/30/2022 107 94 - 109 mmol/L Final   07/08/2021 107 94 - 109 mmol/L Final     Carbon Dioxide   Date Value Ref Range Status   07/08/2021 27 20 - 32 mmol/L Final     Carbon Dioxide (CO2)   Date Value Ref Range Status   06/23/2023 22 22 - 29 mmol/L Final   08/30/2022 26 20 - 32 mmol/L Final     Anion Gap   Date Value Ref Range Status   06/23/2023 13 7 - 15 mmol/L Final   08/30/2022 9 3 - 14 mmol/L Final   07/08/2021 4 3 - 14 mmol/L Final     Glucose   Date Value Ref Range Status   06/23/2023 155 (H) 70 - 99 mg/dL Final   08/30/2022 135 (H) 70 - 99 mg/dL Final   07/08/2021 106 (H) 70 - 99 mg/dL Final     Urea Nitrogen   Date Value Ref Range Status    06/23/2023 14.4 8.0 - 23.0 mg/dL Final   08/30/2022 13 7 - 30 mg/dL Final   07/08/2021 8 7 - 30 mg/dL Final     Creatinine   Date Value Ref Range Status   06/23/2023 1.01 0.67 - 1.17 mg/dL Final   07/08/2021 0.80 0.66 - 1.25 mg/dL Final     GFR Estimate   Date Value Ref Range Status   06/23/2023 80 >60 mL/min/1.73m2 Final   07/08/2021 >90 >60 mL/min/[1.73_m2] Final     Comment:     Non  GFR Calc  Starting 12/18/2018, serum creatinine based estimated GFR (eGFR) will be   calculated using the Chronic Kidney Disease Epidemiology Collaboration   (CKD-EPI) equation.       Calcium   Date Value Ref Range Status   06/23/2023 9.3 8.8 - 10.2 mg/dL Final   07/08/2021 8.7 8.5 - 10.1 mg/dL Final     Bilirubin Total   Date Value Ref Range Status   06/23/2023 0.3 <=1.2 mg/dL Final   07/08/2021 0.5 0.2 - 1.3 mg/dL Final     Alkaline Phosphatase   Date Value Ref Range Status   06/23/2023 45 40 - 129 U/L Final   07/08/2021 61 40 - 150 U/L Final     ALT   Date Value Ref Range Status   06/23/2023 20 0 - 70 U/L Final     Comment:     Reference intervals for this test were updated on 6/12/2023 to more accurately reflect our healthy population. There may be differences in the flagging of prior results with similar values performed with this method. Interpretation of those prior results can be made in the context of the updated reference intervals.     07/08/2021 45 0 - 70 U/L Final     AST   Date Value Ref Range Status   06/23/2023 22 0 - 45 U/L Final     Comment:     Reference intervals for this test were updated on 6/12/2023 to more accurately reflect our healthy population. There may be differences in the flagging of prior results with similar values performed with this method. Interpretation of those prior results can be made in the context of the updated reference intervals.   07/08/2021 21 0 - 45 U/L Final         Assessment & Plan:  Results from CMP and CBC show no concerning abnormalities. MyChart message sent to  patient with results. Plan to continue Verzenio therapy as planned.    Follow-Up:  7/25: Labs  8/8: Appt with Dr. Sr  9/20: Quarterly assessment    Madeline Stockton, PharmD  PGY2 Oncology Pharmacy Resident  June 26, 2023

## 2023-06-30 ENCOUNTER — THERAPY VISIT (OUTPATIENT)
Dept: PHYSICAL THERAPY | Facility: CLINIC | Age: 73
End: 2023-06-30
Attending: FAMILY MEDICINE
Payer: COMMERCIAL

## 2023-06-30 DIAGNOSIS — Z17.0 MALIGNANT NEOPLASM OF CENTRAL PORTION OF RIGHT BREAST IN MALE, ESTROGEN RECEPTOR POSITIVE (H): Primary | ICD-10-CM

## 2023-06-30 DIAGNOSIS — I89.0 LYMPHEDEMA OF RIGHT UPPER EXTREMITY: ICD-10-CM

## 2023-06-30 DIAGNOSIS — C50.121 MALIGNANT NEOPLASM OF CENTRAL PORTION OF RIGHT BREAST IN MALE, ESTROGEN RECEPTOR POSITIVE (H): Primary | ICD-10-CM

## 2023-06-30 PROCEDURE — 97140 MANUAL THERAPY 1/> REGIONS: CPT | Mod: GP | Performed by: PHYSICAL THERAPIST

## 2023-07-30 DIAGNOSIS — C50.929 MALIGNANT NEOPLASM OF MALE BREAST, UNSPECIFIED ESTROGEN RECEPTOR STATUS, UNSPECIFIED LATERALITY, UNSPECIFIED SITE OF BREAST (H): Primary | ICD-10-CM

## 2023-08-01 ENCOUNTER — INFUSION THERAPY VISIT (OUTPATIENT)
Dept: ONCOLOGY | Facility: CLINIC | Age: 73
End: 2023-08-01
Attending: INTERNAL MEDICINE
Payer: COMMERCIAL

## 2023-08-01 ENCOUNTER — APPOINTMENT (OUTPATIENT)
Dept: LAB | Facility: CLINIC | Age: 73
End: 2023-08-01
Attending: INTERNAL MEDICINE
Payer: COMMERCIAL

## 2023-08-01 VITALS
BODY MASS INDEX: 27.06 KG/M2 | OXYGEN SATURATION: 98 % | TEMPERATURE: 98.4 F | WEIGHT: 177.9 LBS | SYSTOLIC BLOOD PRESSURE: 125 MMHG | RESPIRATION RATE: 16 BRPM | HEART RATE: 91 BPM | DIASTOLIC BLOOD PRESSURE: 75 MMHG

## 2023-08-01 DIAGNOSIS — Z17.0 MALIGNANT NEOPLASM OF CENTRAL PORTION OF RIGHT BREAST IN MALE, ESTROGEN RECEPTOR POSITIVE (H): Primary | ICD-10-CM

## 2023-08-01 DIAGNOSIS — C50.121 MALIGNANT NEOPLASM OF CENTRAL PORTION OF RIGHT BREAST IN MALE, ESTROGEN RECEPTOR POSITIVE (H): Primary | ICD-10-CM

## 2023-08-01 LAB
ALBUMIN SERPL BCG-MCNC: 4.5 G/DL (ref 3.5–5.2)
ALP SERPL-CCNC: 51 U/L (ref 40–129)
ALT SERPL W P-5'-P-CCNC: 21 U/L (ref 0–70)
ANION GAP SERPL CALCULATED.3IONS-SCNC: 11 MMOL/L (ref 7–15)
AST SERPL W P-5'-P-CCNC: 23 U/L (ref 0–45)
BASOPHILS # BLD AUTO: 0.1 10E3/UL (ref 0–0.2)
BASOPHILS NFR BLD AUTO: 2 %
BILIRUB SERPL-MCNC: 0.4 MG/DL
BUN SERPL-MCNC: 12.5 MG/DL (ref 8–23)
CALCIUM SERPL-MCNC: 9.5 MG/DL (ref 8.8–10.2)
CANCER AG15-3 SERPL-ACNC: 42 U/ML
CEA SERPL-MCNC: 4.2 NG/ML
CHLORIDE SERPL-SCNC: 103 MMOL/L (ref 98–107)
CREAT SERPL-MCNC: 0.95 MG/DL (ref 0.67–1.17)
DEPRECATED HCO3 PLAS-SCNC: 23 MMOL/L (ref 22–29)
EOSINOPHIL # BLD AUTO: 0.2 10E3/UL (ref 0–0.7)
EOSINOPHIL NFR BLD AUTO: 7 %
ERYTHROCYTE [DISTWIDTH] IN BLOOD BY AUTOMATED COUNT: 12.9 % (ref 10–15)
GFR SERPL CREATININE-BSD FRML MDRD: 86 ML/MIN/1.73M2
GLUCOSE SERPL-MCNC: 141 MG/DL (ref 70–99)
HCT VFR BLD AUTO: 33.1 % (ref 40–53)
HGB BLD-MCNC: 11.2 G/DL (ref 13.3–17.7)
IMM GRANULOCYTES # BLD: 0 10E3/UL
IMM GRANULOCYTES NFR BLD: 1 %
LYMPHOCYTES # BLD AUTO: 1.1 10E3/UL (ref 0.8–5.3)
LYMPHOCYTES NFR BLD AUTO: 33 %
MCH RBC QN AUTO: 31.4 PG (ref 26.5–33)
MCHC RBC AUTO-ENTMCNC: 33.8 G/DL (ref 31.5–36.5)
MCV RBC AUTO: 93 FL (ref 78–100)
MONOCYTES # BLD AUTO: 0.2 10E3/UL (ref 0–1.3)
MONOCYTES NFR BLD AUTO: 6 %
NEUTROPHILS # BLD AUTO: 1.7 10E3/UL (ref 1.6–8.3)
NEUTROPHILS NFR BLD AUTO: 51 %
NRBC # BLD AUTO: 0 10E3/UL
NRBC BLD AUTO-RTO: 0 /100
PLATELET # BLD AUTO: 173 10E3/UL (ref 150–450)
POTASSIUM SERPL-SCNC: 4.2 MMOL/L (ref 3.4–5.3)
PROT SERPL-MCNC: 7.6 G/DL (ref 6.4–8.3)
RBC # BLD AUTO: 3.57 10E6/UL (ref 4.4–5.9)
SODIUM SERPL-SCNC: 137 MMOL/L (ref 136–145)
WBC # BLD AUTO: 3.4 10E3/UL (ref 4–11)

## 2023-08-01 PROCEDURE — 250N000011 HC RX IP 250 OP 636: Mod: JZ | Performed by: INTERNAL MEDICINE

## 2023-08-01 PROCEDURE — 36415 COLL VENOUS BLD VENIPUNCTURE: CPT

## 2023-08-01 PROCEDURE — 96402 CHEMO HORMON ANTINEOPL SQ/IM: CPT

## 2023-08-01 PROCEDURE — 80053 COMPREHEN METABOLIC PANEL: CPT

## 2023-08-01 PROCEDURE — 82378 CARCINOEMBRYONIC ANTIGEN: CPT

## 2023-08-01 PROCEDURE — 86300 IMMUNOASSAY TUMOR CA 15-3: CPT

## 2023-08-01 PROCEDURE — 85025 COMPLETE CBC W/AUTO DIFF WBC: CPT

## 2023-08-01 RX ADMIN — LEUPROLIDE ACETATE 7.5 MG: KIT at 10:41

## 2023-08-01 ASSESSMENT — PAIN SCALES - GENERAL: PAINLEVEL: NO PAIN (0)

## 2023-08-01 NOTE — NURSING NOTE
Chief Complaint   Patient presents with    Blood Draw     Vpt blood draw by lab RN. Vitals taken and appointment arrived     Татьяна Colbert RN

## 2023-08-01 NOTE — PROGRESS NOTES
Infusion Injection Note:  Ronna Collins presents today for Lupron injection.    Patient seen by provider today: No  Labs drawn: Yes   present during visit today: Not Applicable.    Patient declined to speak with an RN today.     Treatment Conditions:  NOT APPLICABLE.    Pre and Post Injection:  Lupron injection given to Left Ventrogluteal without incident.   Patient tolerated procedure well.    Discharge Plan:   KDISCHARGE: Patient declined prescription refills.  Patient and/or family verbalized understanding of discharge instructions and all questions answered.  AVS to patient via [employer].    Patient discharged in stable condition accompanied by: self.  Departure Mode: Ambulatory.    Iris SAMANIEGO on 8/1/2023 at 11:06 AM

## 2023-08-07 NOTE — PROGRESS NOTES
Oncology Visit:  Date on this visit: Aug 8, 2023    Diagnosis: h/o clinical prognostic stage IIIb, [id number], grade 2, ER positive, MN positive, HER-2 negative right breast cancer, [id number].    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  He noted discomfort and hardening of the skin of the right nipple in late April/early May, 2021.  He noted a seborrheic keratosis of the right nipple and remembered he had a similar skin lesion of the arm 3 months earlier.  However, he subsequently noted a mass in the same area.  Right breast skin punch biopsy performed by dermatology was c/w grade 2 invasive ductal carcinoma with associated DCIS.  Invasive carcinoma was ER positive 100% and MN positive 70%, with tumor invading the dermis.  HER-2 was negative by IHC (score 1+).  Diagnostic mammogram and ultrasound showed a retroareolar right breast mass measuring 2.9 cm with associated nipple retraction and enlarged, abnormal right axillary lymph nodes.  Right breast biopsy was again c/w grade 2 invasive ductal carcinoma, ER strong in 98% of tumor cell nuclei, HER-2 negative.  Ki-67 was 15%.    He elected to screen for the ISPY-2 clinical trial.  MRI breast on 6/18/2021 showed the biopsy proven right breast cancer to measure 3.9 cm in maximum dimension with invasion of the nipple and the pectoralis muscle as well as at least 4 abnormal level 1 and 2 right axillary lymph nodes and a tiny 0.4 cm right internal mammary lymph node.  Mammoprint returned low risk with a score of +0.29.  He elected for treatment on the endocrine optimization protocol of ISPY-2 and was randomized to treatment with amcenestrant.  He was on treatment with  amcenestrant from 7/1/2021 - 10/26/2021.  At our visit in 01/2021, both right axillary node and right breast tumor palpated more firm then prior.  Breast MRI was stable, however, due to clinical concerns for progression,  decision was made to proceed with surgery.   Right breast mastectomy and right axillary lymph node dissection was performed by Dr. Snider on 10/27/2021.  Pathology showed grade 2 carcinoma of the right breast measuring 3.2 cm with invasion of the dermis, nipple and the pectoralis muscle.  Ki-67 of the excised tumor was 7%.  15/44 right axillary lymph nodes were positive with 6 of the lymph nodes demonstrating extranodal extension.  The largest lymph node metastasis measured 2.1 cm.      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  His course was complicated by fevers persistent throughout treatment.  He received radiation (4800 cGy in 15 fractions) to the right chest wall and regional lymph nodes from 3/21/2022 - 4/8/2022.  He has been on treatment with lupron and letrozole since 3/16/2022.  Abemaciclib was added 4/26/2022.    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  He is on treatment with Lupron, letrozole, and abemaciclib.  He is tolerating this treatment regimen well.  He reports occasional episodes of diarrhea on abemaciclib.  He states when he awakens in the morning he can tell that he will have an episode of diarrhea.  Following the first diarrheal stool he takes Imodium, which usually is enough to relieve the symptom.  He denies poor appetite or nausea.  In fact, he believes he has gained 3 pounds in the last month.  He and his wife have been traveling.  They recently went to [facility] and [facility].  While there, they did quite a bit of walking including climbing hills.  For the most part he tolerated this well.  There was one hill with an elevation gain of 2000 feet, where he had to stop approximately three fourths of the way up the hill.  He states that is new for him.  When he is at home he is taking an occasional half hour nap in the afternoons.  He denies new bone or joint aches or  pains.  He has had a cough again for the last few weeks to month.  Cough is worse when he lies down at night.  He is currently taking a Pepcid in the morning.  He has had no fevers or chills.  No shortness of breath.  The remainder of a complete 12 point review of systems is otherwise negative.      Past Medical/Surgical History:  Past Medical History:   Diagnosis Date     Breast cancer (H) 05/2021     Chronic sinusitis      Hypertension 2002     Past Surgical History:   Procedure Laterality Date     COLONOSCOPY WITH CO2 INSUFFLATION N/A 10/28/2022    Procedure: COLONOSCOPY, WITH CO2 INSUFFLATION;  Surgeon: Annemarie Noel DO;  Location: [facility] OR     COMBINED ESOPHAGOSCOPY, GASTROSCOPY, DUODENOSCOPY (EGD) WITH CO2 INSUFFLATION N/A 10/28/2022    Procedure: ESOPHAGOGASTRODUODENOSCOPY, WITH CO2 INSUFFLATION;  Surgeon: Annemarie Noel DO;  Location: [facility]     DISSECT LYMPH NODE AXILLA Right 10/27/2021    Procedure: RIGHT Axillary Lymph Node Dissection;  Surgeon: Nida Snider MD;  Location: [facility] OR     ESOPHAGOSCOPY, GASTROSCOPY, DUODENOSCOPY (EGD), COMBINED N/A 10/28/2022    Procedure: ESOPHAGOGASTRODUODENOSCOPY, WITH BIOPSY;  Surgeon: Annemarie Noel DO;  Location: [facility]     MASTECTOMY SIMPLE Right 10/27/2021    Procedure: RIGHT Simple Mastectomy;  Surgeon: Nida Snider MD;  Location: [facility] OR     Allergies:  Allergies as of 08/08/2023     (No Known Allergies)     Current Medications:  Current Outpatient Medications   Medication Sig Dispense Refill     abemaciclib (VERZENIO) 150 MG tablet Take 1 tablet (150 mg) by mouth 2 times daily 56 tablet 2     budesonide (RINOCORT AQUA) 32 MCG/ACT nasal spray Spray 2 sprays into both nostrils daily (Patient not taking: Reported on 8/1/2023) 8.43 mL 11     clotrimazole (LOTRIMIN) 1 % external cream Apply topically to the groin area twice a day until resolved. (Patient not taking: Reported on 8/1/2023) 30 g 1     famotidine (PEPCID) 40 MG tablet Take 1 tablet (40  mg) by mouth daily 90 tablet 3     letrozole (FEMARA) 2.5 MG tablet Take 1 tablet (2.5 mg) by mouth daily for 360 days 90 tablet 3     letrozole (FEMARA) 2.5 MG tablet Take 1 tablet (2.5 mg) by mouth daily for 28 days 28 tablet 0     loperamide (IMODIUM) 2 MG capsule Start with 2 caps (4 mg), then take one cap (2 mg) after each diarrheal stool as needed. Do not use more than 8 caps (16 mg) per day. (Patient not taking: Reported on 8/1/2023) 30 capsule 0     losartan (COZAAR) 25 MG tablet 1 tablet       losartan-hydrochlorothiazide (HYZAAR) 100-25 MG tablet TAKE 1 TABLET BY MOUTH EVERY MORNING 90 tablet 3     MELATONIN PO  (Patient not taking: Reported on 5/8/2023)       montelukast (SINGULAIR) 10 MG tablet Take 1 tablet (10 mg) by mouth At Bedtime (Patient not taking: Reported on 5/8/2023) 30 tablet 3     Naproxen Sodium (ALEVE PO)  (Patient not taking: Reported on 5/8/2023)       prochlorperazine (COMPAZINE) 10 MG tablet Take 0.5 tablets (5 mg) by mouth every 6 hours as needed for nausea or vomiting (Patient not taking: Reported on 5/8/2023) 30 tablet 2     triamcinolone (KENALOG) 0.1 % external ointment Apply topically 2 times daily as needed (rash) (Patient not taking: Reported on 8/1/2023) 80 g 5     urea (GORDONS UREA) 40 % external ointment For callused feet nightly (Patient not taking: Reported on 5/26/2023) 60 g 11      Genetics Breast Actionable and Breast Expanded panels were both negative for pathogenic germline mutations.    Physical Exam:  /87 (BP Location: Left arm, Patient Position: Sitting, Cuff Size: Adult Regular)   Pulse 89   Temp 97.8  F (36.6  C) (Oral)   Resp 16   Wt 81.8 kg (180 lb 6.4 oz)   SpO2 98%   BMI 27.44 kg/m    General:  Well appearing adult male in NAD.  Alert and oriented x 3.  HEENT:  Normocephalic.  Sclera anicteric.   Lymph:  No palpable cervical, supraclavicular, or axillary LAD.    Chest:  CTA bilaterally.  No wheezes or crackles.  CV:  RRR.  Nl S1 and S2.  No  audible m/r/g.  Chest wall:  Right mastectomy. No palpable mass or nodularity of either the right or left chest.   Abd:  Soft/ND   Ext:  No edema of the bilateral lower extremities. Wearing RUE compression sleeve  Neuro:  Cranial nerves grossly intact.  Gait stable.  Psych:  Mood and affect appear normal.  Skin: Hyperpigmented skin upper back with overlying excoriations, unchanged from prior.    Laboratory/Imaging Studies:  I personally reviewed the below images and laboratory studies:    4/7/2023 Chest CT:  No contrast. The included thyroid appeared unremarkable.  There is mild atherosclerotic calcification in the thoracic aorta.  Aorta and pulmonary artery are both normal in size. Overall heart size also normal. No pleural or pericardial effusion. No suspicious upper abdominal finding. No enlarged axillary, internal mammary, low neck, mediastinal or hilar lymph nodes. Right mastectomy and axillary lymph node dissection. Esophagus no longer patulous.  Bone details shows degenerative spurring in the thoracic spine. No  suspicious sclerotic or lytic/destructive lesion.    Detail of the lungs shows a 1 mm right upper lobe nodule (series 4 image 117). No dominant nodule. No suspicious opacities. There is subsegmental atelectasis in the right middle lobe which is mild and unchanged. Major airways are nicely patent.    8/1/2023 Labs:  WBC is low 3.4  Hemoglobin 11.2 g/dL  Platelets are wnl.    Electrolytes are wnl.  Glucose is elevated at 141.  LFTs are wnl.  Kidney function is wnl.     is elevated at 42  CEA is wnl.     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  He is s/p treatment with 3.5 months  neoadjuvant amcenestrant, right breast mastectomy, right ALND, 4 cycles Taxotere and cyclophosphamide, and radiation.  He is on treatment with abemaciclib, lupron and letrozole.    1.  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  He continues on treatment with abemaciclib, lupron and letrozole since 4/26/2022.  Plan to continue abemaciclib for a total of 2 years (through 4/26/2024), lupron and letrozole for a total of 10 years (through 3/16/2032).   - I reviewed the CT chest performed on 4/7 which is without evidence of disease recurrence.  - Given 15/44 lymph nodes were positive, we are monitoring CA 15-3 and CEA markers once every 6 months to his lab tests for surveillance.   tumor marker is mildly elevated, but has remained stable over time.  Will continue to monitor.  - Next Lupron is due 8/29/2023   - Continue monthly labs to be drawn on the same day as Lupron injections.  - Provider visit every 3 months.    2.  Fatigue:  We discussed fatigue from cancer treatment is common.  He also recognizes some of this may be due to age.   - Okay to take naps as needed in the afternoons.   - Recommend continue routine daily cardiovascular exercise.  - Limit refined sugar intake in the setting of diabetes    3.  Diarrhea:  Secondary to abemaciclib.  - Continue to take 1-2 imodium with the first diarrheal stool.    4.  Bladder wall thickening seen on CT:  No change since last visit.  - He is s/p cystoscopy by Dr. Lozano on 1/6/2023. Cystoscopy was without abnormal findings.    - No further evaluation necessary.    5.  RUE lymphedema/cording: Had some worsening after not using his lymphedema pump one week on vacation.  He resumed compression, massage, and lymphedema pump with good response.    6.  Patulous esophagus/gastritis/esophagitis:  Diagnosed with EGD 10/2022. He has worsening symptoms at night.  - Will start taking Pepcid at bedtime  instead of in the a.m.    7.  Bone Health:  DEXA 10/2022 with osteopenia of multiple sites.  On every 6 month Zometa for prevention of breast cancer bone metastases.  Unfortunately this was poorly tolerated and therefore changed to Prolia on 4/17.    - Next Prolia due around 10/17.    8.  Pulmonary nodule:  RUL seen on 04/2023 CT chest.  Plan to repeat a CT chest in October.  He is in agreement with this plan.    9.  Follow Up:  Labs every 4 weeks x 3 starting 8/29/2023.  Lupron 8/29/2023  Labs, chest CT, and Prolia around 10/17/2023  Provider visit in 3 months.    I spent 35 minutes on the date of the encounter doing chart review, review of test results, interpretation of tests, patient visit and documentation.

## 2023-08-08 ENCOUNTER — ONCOLOGY VISIT (OUTPATIENT)
Dept: ONCOLOGY | Facility: CLINIC | Age: 73
End: 2023-08-08
Attending: INTERNAL MEDICINE
Payer: COMMERCIAL

## 2023-08-08 VITALS
SYSTOLIC BLOOD PRESSURE: 134 MMHG | HEART RATE: 89 BPM | WEIGHT: 180.4 LBS | BODY MASS INDEX: 27.44 KG/M2 | TEMPERATURE: 97.8 F | RESPIRATION RATE: 16 BRPM | DIASTOLIC BLOOD PRESSURE: 87 MMHG | OXYGEN SATURATION: 98 %

## 2023-08-08 DIAGNOSIS — T45.1X5A CHEMOTHERAPY-INDUCED NEUTROPENIA (H): ICD-10-CM

## 2023-08-08 DIAGNOSIS — T45.1X5A CHEMOTHERAPY INDUCED DIARRHEA: ICD-10-CM

## 2023-08-08 DIAGNOSIS — R53.83 FATIGUE DUE TO TREATMENT: ICD-10-CM

## 2023-08-08 DIAGNOSIS — Z17.0 MALIGNANT NEOPLASM OF CENTRAL PORTION OF RIGHT BREAST IN MALE, ESTROGEN RECEPTOR POSITIVE (H): Primary | ICD-10-CM

## 2023-08-08 DIAGNOSIS — K52.1 CHEMOTHERAPY INDUCED DIARRHEA: ICD-10-CM

## 2023-08-08 DIAGNOSIS — I89.0 LYMPHEDEMA OF RIGHT ARM: ICD-10-CM

## 2023-08-08 DIAGNOSIS — C50.121 MALIGNANT NEOPLASM OF CENTRAL PORTION OF RIGHT BREAST IN MALE, ESTROGEN RECEPTOR POSITIVE (H): Primary | ICD-10-CM

## 2023-08-08 DIAGNOSIS — D70.1 CHEMOTHERAPY-INDUCED NEUTROPENIA (H): ICD-10-CM

## 2023-08-08 DIAGNOSIS — K21.00 GASTROESOPHAGEAL REFLUX DISEASE WITH ESOPHAGITIS WITHOUT HEMORRHAGE: ICD-10-CM

## 2023-08-08 DIAGNOSIS — R91.8 PULMONARY NODULES: ICD-10-CM

## 2023-08-08 PROCEDURE — G0463 HOSPITAL OUTPT CLINIC VISIT: HCPCS | Performed by: INTERNAL MEDICINE

## 2023-08-08 PROCEDURE — 99214 OFFICE O/P EST MOD 30 MIN: CPT | Performed by: INTERNAL MEDICINE

## 2023-08-08 ASSESSMENT — PAIN SCALES - GENERAL: PAINLEVEL: NO PAIN (0)

## 2023-08-08 NOTE — LETTER
8/8/2023         RE: Ronna Collins  [address]  [facility] [id number]        Dear Colleague,    Thank you for referring your patient, Ronna Collins, to the [facility]. Please see a copy of my visit note below.    Oncology Visit:  Date on this visit: Aug 8, 2023    Diagnosis: h/o clinical prognostic stage IIIb, [id number], grade 2, ER positive, WV positive, HER-2 negative right breast cancer, [id number].    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  He noted discomfort and hardening of the skin of the right nipple in late April/early May, 2021.  He noted a seborrheic keratosis of the right nipple and remembered he had a similar skin lesion of the arm 3 months earlier.  However, he subsequently noted a mass in the same area.  Right breast skin punch biopsy performed by dermatology was c/w grade 2 invasive ductal carcinoma with associated DCIS.  Invasive carcinoma was ER positive 100% and WV positive 70%, with tumor invading the dermis.  HER-2 was negative by IHC (score 1+).  Diagnostic mammogram and ultrasound showed a retroareolar right breast mass measuring 2.9 cm with associated nipple retraction and enlarged, abnormal right axillary lymph nodes.  Right breast biopsy was again c/w grade 2 invasive ductal carcinoma, ER strong in 98% of tumor cell nuclei, HER-2 negative.  Ki-67 was 15%.    He elected to screen for the ISPY-2 clinical trial.  MRI breast on 6/18/2021 showed the biopsy proven right breast cancer to measure 3.9 cm in maximum dimension with invasion of the nipple and the pectoralis muscle as well as at least 4 abnormal level 1 and 2 right axillary lymph nodes and a tiny 0.4 cm right internal mammary lymph node.  Mammoprint returned low risk with a score of +0.29.  He elected for treatment on the endocrine optimization protocol of ISPY-2 and was randomized to treatment with  amcenestrant.  He was on treatment with  amcenestrant from 7/1/2021 - 10/26/2021.  At our visit in 01/2021, both right axillary node and right breast tumor palpated more firm then prior.  Breast MRI was stable, however, due to clinical concerns for progression, decision was made to proceed with surgery.   Right breast mastectomy and right axillary lymph node dissection was performed by Dr. Snider on 10/27/2021.  Pathology showed grade 2 carcinoma of the right breast measuring 3.2 cm with invasion of the dermis, nipple and the pectoralis muscle.  Ki-67 of the excised tumor was 7%.  15/44 right axillary lymph nodes were positive with 6 of the lymph nodes demonstrating extranodal extension.  The largest lymph node metastasis measured 2.1 cm.      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  His course was complicated by fevers persistent throughout treatment.  He received radiation (4800 cGy in 15 fractions) to the right chest wall and regional lymph nodes from 3/21/2022 - 4/8/2022.  He has been on treatment with lupron and letrozole since 3/16/2022.  Abemaciclib was added 4/26/2022.    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  He is on treatment with Lupron, letrozole, and abemaciclib.  He is tolerating this treatment regimen well.  He reports occasional episodes of diarrhea on abemaciclib.  He states when he awakens in the morning he can tell that he will have an episode of diarrhea.  Following the first diarrheal stool he takes Imodium, which usually is enough to relieve the symptom.  He denies poor appetite or nausea.  In fact, he believes he has gained 3 pounds in the last month.  He and his wife have been traveling.  They recently went to [place] and [place].  While there, they did quite a bit of walking including climbing hills.  For the most part he tolerated this well.  There was one hill  with an elevation gain of 2000 feet, where he had to stop approximately three fourths of the way up the hill.  He states that is new for him.  When he is at home he is taking an occasional half hour nap in the afternoons.  He denies new bone or joint aches or pains.  He has had a cough again for the last few weeks to month.  Cough is worse when he lies down at night.  He is currently taking a Pepcid in the morning.  He has had no fevers or chills.  No shortness of breath.  The remainder of a complete 12 point review of systems is otherwise negative.      Past Medical/Surgical History:  Past Medical History:   Diagnosis Date    Breast cancer (H) 05/2021    Chronic sinusitis     Hypertension 2002     Past Surgical History:   Procedure Laterality Date    COLONOSCOPY WITH CO2 INSUFFLATION N/A 10/28/2022    Procedure: COLONOSCOPY, WITH CO2 INSUFFLATION;  Surgeon: Annemarie Noel DO;  Location: [facility] OR    COMBINED ESOPHAGOSCOPY, GASTROSCOPY, DUODENOSCOPY (EGD) WITH CO2 INSUFFLATION N/A 10/28/2022    Procedure: ESOPHAGOGASTRODUODENOSCOPY, WITH CO2 INSUFFLATION;  Surgeon: Annemarie Noel DO;  Location: [facility] OR    DISSECT LYMPH NODE AXILLA Right 10/27/2021    Procedure: RIGHT Axillary Lymph Node Dissection;  Surgeon: Nida Snider MD;  Location: [facility]    ESOPHAGOSCOPY, GASTROSCOPY, DUODENOSCOPY (EGD), COMBINED N/A 10/28/2022    Procedure: ESOPHAGOGASTRODUODENOSCOPY, WITH BIOPSY;  Surgeon: Annemarie Noel DO;  Location: [facility] OR    MASTECTOMY SIMPLE Right 10/27/2021    Procedure: RIGHT Simple Mastectomy;  Surgeon: Nida Snider MD;  Location: [facility] OR     Allergies:  Allergies as of 08/08/2023    (No Known Allergies)     Current Medications:  Current Outpatient Medications   Medication Sig Dispense Refill    abemaciclib (VERZENIO) 150 MG tablet Take 1 tablet (150 mg) by mouth 2 times daily 56 tablet 2    budesonide (RINOCORT AQUA) 32 MCG/ACT nasal spray Spray 2 sprays into both nostrils daily (Patient not taking:  Reported on 8/1/2023) 8.43 mL 11    clotrimazole (LOTRIMIN) 1 % external cream Apply topically to the groin area twice a day until resolved. (Patient not taking: Reported on 8/1/2023) 30 g 1    famotidine (PEPCID) 40 MG tablet Take 1 tablet (40 mg) by mouth daily 90 tablet 3    letrozole (FEMARA) 2.5 MG tablet Take 1 tablet (2.5 mg) by mouth daily for 360 days 90 tablet 3    letrozole (FEMARA) 2.5 MG tablet Take 1 tablet (2.5 mg) by mouth daily for 28 days 28 tablet 0    loperamide (IMODIUM) 2 MG capsule Start with 2 caps (4 mg), then take one cap (2 mg) after each diarrheal stool as needed. Do not use more than 8 caps (16 mg) per day. (Patient not taking: Reported on 8/1/2023) 30 capsule 0    losartan (COZAAR) 25 MG tablet 1 tablet      losartan-hydrochlorothiazide (HYZAAR) 100-25 MG tablet TAKE 1 TABLET BY MOUTH EVERY MORNING 90 tablet 3    MELATONIN PO  (Patient not taking: Reported on 5/8/2023)      montelukast (SINGULAIR) 10 MG tablet Take 1 tablet (10 mg) by mouth At Bedtime (Patient not taking: Reported on 5/8/2023) 30 tablet 3    Naproxen Sodium (ALEVE PO)  (Patient not taking: Reported on 5/8/2023)      prochlorperazine (COMPAZINE) 10 MG tablet Take 0.5 tablets (5 mg) by mouth every 6 hours as needed for nausea or vomiting (Patient not taking: Reported on 5/8/2023) 30 tablet 2    triamcinolone (KENALOG) 0.1 % external ointment Apply topically 2 times daily as needed (rash) (Patient not taking: Reported on 8/1/2023) 80 g 5    urea (GORDONS UREA) 40 % external ointment For callused feet nightly (Patient not taking: Reported on 5/26/2023) 60 g 11      Genetics Breast Actionable and Breast Expanded panels were both negative for pathogenic germline mutations.    Physical Exam:  /87 (BP Location: Left arm, Patient Position: Sitting, Cuff Size: Adult Regular)   Pulse 89   Temp 97.8  F (36.6  C) (Oral)   Resp 16   Wt 81.8 kg (180 lb 6.4 oz)   SpO2 98%   BMI 27.44 kg/m    General:  Well appearing adult  male in NAD.  Alert and oriented x 3.  HEENT:  Normocephalic.  Sclera anicteric.   Lymph:  No palpable cervical, supraclavicular, or axillary LAD.    Chest:  CTA bilaterally.  No wheezes or crackles.  CV:  RRR.  Nl S1 and S2.  No audible m/r/g.  Chest wall:  Right mastectomy. No palpable mass or nodularity of either the right or left chest.   Abd:  Soft/ND   Ext:  No edema of the bilateral lower extremities. Wearing RUE compression sleeve  Neuro:  Cranial nerves grossly intact.  Gait stable.  Psych:  Mood and affect appear normal.  Skin: Hyperpigmented skin upper back with overlying excoriations, unchanged from prior.    Laboratory/Imaging Studies:  I personally reviewed the below images and laboratory studies:    4/7/2023 Chest CT:  No contrast. The included thyroid appeared unremarkable.  There is mild atherosclerotic calcification in the thoracic aorta.  Aorta and pulmonary artery are both normal in size. Overall heart size also normal. No pleural or pericardial effusion. No suspicious upper abdominal finding. No enlarged axillary, internal mammary, low neck, mediastinal or hilar lymph nodes. Right mastectomy and axillary lymph node dissection. Esophagus no longer patulous.  Bone details shows degenerative spurring in the thoracic spine. No  suspicious sclerotic or lytic/destructive lesion.    Detail of the lungs shows a 1 mm right upper lobe nodule (series 4 image 117). No dominant nodule. No suspicious opacities. There is subsegmental atelectasis in the right middle lobe which is mild and unchanged. Major airways are nicely patent.    8/1/2023 Labs:  WBC is low 3.4  Hemoglobin 11.2 g/dL  Platelets are wnl.    Electrolytes are wnl.  Glucose is elevated at 141.  LFTs are wnl.  Kidney function is wnl.     is elevated at 42  CEA is wnl.     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  He is s/p treatment with 3.5 months neoadjuvant amcenestrant, right breast mastectomy, right ALND, 4 cycles Taxotere and cyclophosphamide, and radiation.  He is on treatment with abemaciclib, lupron and letrozole.    1.  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  He continues on treatment with abemaciclib, lupron and letrozole since 4/26/2022.  Plan to continue abemaciclib for a total of 2 years (through 4/26/2024), lupron and letrozole for a total of 10 years (through 3/16/2032).   - I reviewed the CT chest performed on 4/7 which is without evidence of disease recurrence.  - Given 15/44 lymph nodes were positive, we are monitoring CA 15-3 and CEA markers once every 6 months to his lab tests for surveillance.   tumor marker is mildly elevated, but has remained stable over time.  Will continue to monitor.  - Next Lupron is due 8/29/2023   - Continue monthly labs to be drawn on the same day as Lupron injections.  - Provider visit every 3 months.    2.  Fatigue:  We discussed fatigue from cancer treatment is common.  He also recognizes some of this may be due to age.   - Okay to take naps as needed in the afternoons.   - Recommend continue routine daily cardiovascular exercise.  - Limit refined sugar intake in the setting of diabetes    3.  Diarrhea:  Secondary to abemaciclib.  - Continue to take 1-2 imodium with the first diarrheal stool.    4.  Bladder wall thickening seen on CT:  No change since last visit.  - He is s/p cystoscopy by Dr. Lozano on 1/6/2023. Cystoscopy was without abnormal findings.    - No further evaluation necessary.    5.  RUE lymphedema/cording: Had some worsening after not using his lymphedema pump one week on  vacation.  He resumed compression, massage, and lymphedema pump with good response.    6.  Patulous esophagus/gastritis/esophagitis:  Diagnosed with EGD 10/2022. He has worsening symptoms at night.  - Will start taking Pepcid at bedtime instead of in the a.m.    7.  Bone Health:  DEXA 10/2022 with osteopenia of multiple sites.  On every 6 month Zometa for prevention of breast cancer bone metastases.  Unfortunately this was poorly tolerated and therefore changed to Prolia on 4/17.    - Next Prolia due around 10/17.    8.  Pulmonary nodule:  RUL seen on 04/2023 CT chest.  Plan to repeat a CT chest in October.  He is in agreement with this plan.    9.  Follow Up:  Labs every 4 weeks x 3 starting 8/29/2023.  Lupron 8/29/2023  Labs, chest CT, and Prolia around 10/17/2023  Provider visit in 3 months.    I spent 35 minutes on the date of the encounter doing chart review, review of test results, interpretation of tests, patient visit and documentation.         Mikaela Sr MD

## 2023-08-08 NOTE — NURSING NOTE
"Oncology Rooming Note    August 8, 2023 8:16 AM   Ronna Collins is a 73 year old male who presents for:    Chief Complaint   Patient presents with    Oncology Clinic Visit     Breast  cancer     Initial Vitals: /87 (BP Location: Left arm, Patient Position: Sitting, Cuff Size: Adult Regular)   Pulse 89   Temp 97.8  F (36.6  C) (Oral)   Resp 16   Wt 81.8 kg (180 lb 6.4 oz)   SpO2 98%   BMI 27.44 kg/m   Estimated body mass index is 27.44 kg/m  as calculated from the following:    Height as of 6/1/23: 1.727 m (5' 7.99\").    Weight as of this encounter: 81.8 kg (180 lb 6.4 oz). Body surface area is 1.98 meters squared.  No Pain (0) Comment: Data Unavailable   No LMP for male patient.  Allergies reviewed: Yes  Medications reviewed: Yes    Medications: Medication refills not needed today.  Pharmacy name entered into [facility]:    [id number] - [place], [place] - [address] [facility] - [place], [place] - [address]  [id number] - [place], [place] - [address] AT [facility] [address]    Clinical concerns:       Bhupinder Smalls              "

## 2023-08-17 ENCOUNTER — THERAPY VISIT (OUTPATIENT)
Dept: PHYSICAL THERAPY | Facility: CLINIC | Age: 73
End: 2023-08-17
Attending: FAMILY MEDICINE
Payer: COMMERCIAL

## 2023-08-17 DIAGNOSIS — Z17.0 MALIGNANT NEOPLASM OF CENTRAL PORTION OF RIGHT BREAST IN MALE, ESTROGEN RECEPTOR POSITIVE (H): Primary | ICD-10-CM

## 2023-08-17 DIAGNOSIS — I89.0 LYMPHEDEMA OF RIGHT UPPER EXTREMITY: ICD-10-CM

## 2023-08-17 DIAGNOSIS — C50.121 MALIGNANT NEOPLASM OF CENTRAL PORTION OF RIGHT BREAST IN MALE, ESTROGEN RECEPTOR POSITIVE (H): Primary | ICD-10-CM

## 2023-08-17 PROCEDURE — 97535 SELF CARE MNGMENT TRAINING: CPT | Mod: GP | Performed by: PHYSICAL THERAPIST

## 2023-08-17 PROCEDURE — 97140 MANUAL THERAPY 1/> REGIONS: CPT | Mod: GP | Performed by: PHYSICAL THERAPIST

## 2023-08-17 NOTE — PROGRESS NOTES
[facility]    Outpatient Physical Therapy Progress Note         PLAN  Continue therapy about 1x/mo, work on decreasing frequency as pt is more independent and further out from his treatment.  Pt remained on schedule due to exacerbation of edema after travel to [place], better today.  He continues to struggle at times with self management of edema, especially when spouse is out of town    Beginning/End Dates of Progress Note Reporting Period:  05/02/23 to 08/17/2023    Referring Provider:  Dr. Mikaela Sr     08/17/23 0500   Appointment Info   Signing clinician's name / credentials Renetta Dempsey, PT,CLT-MIRANDA   Total/Authorized Visits 25   Medical Diagnosis Lymphedema of R UE   PT Tx Diagnosis R UE lymphedema and trunk, impaired tissue mobility of chest   Other pertinent information order date 9/12/22   Progress Note/Certification   Onset of illness/injury or Date of Surgery 10/25/21   Therapy Frequency up to 1x/month   Predicted Duration 6months   Progress Note Due Date 08/17/23   Progress Note Completed Date 05/02/23   PT Goal 1   Goal Identifier Edema   Goal Description Pt will maintain limb volumes with in 10% of each demonstrating appropriate management of edema as B volume may increase as he continues to strengthen   Goal Progress modified goal to reflect gradual progression of edema but acheivable maintenance volume   Target Date 02/17/24   PT Goal 2   Goal Identifier Tissue   Goal Description Pt will be independent with self massage for lymph drainage and fibrotic tissue mobility (cupping) to prevent any loss of ROM (maintain standing AROM flexion >140, abduction >90) to prevent functional deficits.   Goal Progress modified goals as pt has not been at the past ROMs for quite some time, also issued new MLD today   Target Date 02/17/24   PT Goal 3   Goal Identifier HEP   Goal Description Pt will continue  lifelong chest and shoulder stretching program to promote improved mobility and prevent disability.   Goal Progress Has been completing, supine >141   Target Date 09/01/23   Date Met 08/17/23   Subjective Report   Subjective Report pt went to [place] for extended stay. He has been pumping daily and using night garment with a wrap over. He has gained some weight but the arm feels pretty stable. Wife feels that it looks a little bigger.  Pt reports daily walks but spouse thinks there could me more exercise. Restarted with S2S last Sunday.   Objective Measure 1   Objective Measure Volume   Details 1848.87,L (L 1596.2mL)   Objective Measure 2   Objective Measure Edema   Details present, stable, forearm is most effective   Objective Measure 3   Objective Measure ROM   Details Standing flexion 140, abduction 90   Objective Measure 4   Objective Measure Tissue   Details no change but improved lateral trunk tissue mobility after manual. Very firm end feel due to tissue retriction for elevation on R arm   Objective Measure 5   Objective Measure Chest measurements   Details 96.2cm under axilla, 97.2cm mipple line   Vitals Signs   Weight 180   Therapeutic Procedure/Exercise   PTRx Ther Proc 1 Wand Shoulder Flexion Supine   PTRx Ther Proc 1 - Details No Notes   PTRx Ther Proc 2 Cervical ROM Rotation   PTRx Ther Proc 2 - Details No Notes   PTRx Ther Proc 3 Lymphedema Exercise: W and Dive   PTRx Ther Proc 3 - Details No Notes   PTRx Ther Proc 4 Side Arm Raise   PTRx Ther Proc 4 - Details No Notes   PTRx Ther Proc 5 Shoulder Rolls   PTRx Ther Proc 5 - Details No Notes   PTRx Ther Proc 6 Elbow Active Range of Motion Combined Extension and Flexion   PTRx Ther Proc 6 - Details No Notes   PTRx Ther Proc 7 Forearm Active Range of Motion Supination   PTRx Ther Proc 7 - Details No Notes   Skilled Intervention Draingage exercises for home, not completed today other than quick demo and 1-2 rep trials of the W and Dive   Patient  "Response/Progress Report understanding from doing in the past when looking at previous activities   PTRx Ther Proc 8 Wrist Active Range of Motion Extension and Flexion Combined   PTRx Ther Proc 8 - Details No Notes   PTRx Ther Proc 9  Intrinsic Finger Active Range of Motion Ab and Adduction   PTRx Ther Proc 9 - Details No Notes   PTRx Ther Proc 10 Wrist Active Range of Motion Combined Radial and Ulnar Deviation in Neutral   PTRx Ther Proc 10 - Details No Notes   PTRx Ther Proc 11 Finger Active Range of Motion Tendon Glides Fist Series   PTRx Ther Proc 11 - Details No Notes   Manual Therapy   Manual Therapy: Mobilization, MFR, MLD, friction massage minutes (56154) 40   Progress maintaining ROM, tissue is stil tight.  Trunkal edema mild, no need for compression.   Manual Therapy 1 measurements/assessment   Manual Therapy 1 - Details B UE measurements taken due to weight gain to have good comparison.  R UE volume decreased from last visit, in range with earlier this year however the L was also down quite a bit so the % of swelling is increased. Pt is just getting back to strengthening after a break when out of town.  Edema most notable wrist to and including elbow.   Manual Therapy 2 MLD   Manual Therapy 2 - Details Training completed for new PTRx program.  Stroke technique is different though same effect with skin stretch so CLT felt important to review so pt does not have confusion on the \"right way\" as both the previous circles and strokes and new focus on pumping nodes and tissue are effective.  CLT does perform with patient supine, and he demonstrates understanding, reminded of light pressure.   Manual Therapy 3 Tissue mobility   Manual Therapy 3 - Details sidelying and supine positioning. First addressing the chest tissue above scar which medial has decreased mobility but able to lift with c-stroke where as closer to axilla is very tied down to underlying tissue, really no mobility.  Pec also very tight.  Very " small in place circles on the adhered area performed. glide of tissue noted medially but less so laterally. Insidelying, trunk and tissue below incistion mobilizes with full and stationary circles and pinch and roll. pt tolerated well felt some increase in rotation of trunk afterward. Shoulder ROM limited but not getting tighter. Pt has maintained flexion and abduction from previous sessions.   Self Care/home Management   ADL/Home Mgmt Training (38749) 15   Progress Pt has increased confidence, as does spouse as after training.   Self Care 1 Compression   Self Care 1 - Details Pt is not able to get sleeve on by himself and spouse will be out of town for a week. Pt worked on donning the sleeve after measurements to ensure that there hasn't been a big increase in his volume.  Volume stable.  Pt shown how to fold past elbow and pull on to upper part of forearm working all the way around the arm then pulling up to axilla but grabbing fabric elbow first working around the arm and then continuing to upper arm.  Pt applied himself x3 trials with then small adjustments which he can use his gloves at home. pt with better idea of where elbow should line up.   Intervention (Other)   PTRx Other  1 MLD Deep Breathing   PTRx Other 1 - Details x5   PTRx Other  2 MLD Supraclavicular Nodes   PTRx Other 2 - Details educated on hand placement and skin stretch   PTRx Other  3 MLD Axillary Nodes   PTRx Other 3 - Details x30 L side only   PTRx Other  4 MLD Right to Left Across the Half Chest   PTRx Other 4 - Details Educated on pumps rather than sweeps   PTRx Other  5 MLD Right to Left Across the Full Chest   PTRx Other 5 - Details Pt able to complete after demo   PTRx Other  6 MLD Inguinal Nodes   PTRx Other 6 - Details No Notes   PTRx Other  7 MLD Right lateral trunk to groin   PTRx Other 7 - Details No Notes   PTRx Other  8 MLD Right armpit to groin   PTRx Other 8 - Details No Notes   PTRx Other  9 MLD Upper Arm   PTRx Other 9 -  Details No Notes   PTRx Other  10 MLD Medial to Lateral Upper Arm   PTRx Other 10 - Details No Notes   PTRx Other  11 MLD Elbow   PTRx Other 11 - Details Pt educated on cubital nodes  stim and elbow clearing   PTRx Other  12 MLD Forearm   PTRx Other 12 - Details No Notes   PTRx Other  13 MLD Hand   PTRx Other 13 - Details No Notes   PTRx Other  14 MLD Palm   PTRx Other 14 - Details No Notes   PTRx Other  15 MLD Fingers   PTRx Other 15 - Details No Notes   Education   Learner/Method Patient;Significant Other;No Barriers to Learning   Education Comments donning sleeve   Plan   Home program MLD, daytime sleeve/glove, pump, and night garment, cupping as needed, 2x/day ROM/stretching of chest/shoulder   Plan for next session returns 9/14 but needs new order, how is weight management, volume, new website based HEP   Comments   Comments requested new order   Total Session Time   Timed Code Treatment Minutes 55   Total Treatment Time (sum of timed and untimed services) 55

## 2023-08-20 ENCOUNTER — HEALTH MAINTENANCE LETTER (OUTPATIENT)
Age: 73
End: 2023-08-20

## 2023-08-22 ENCOUNTER — MYC MEDICAL ADVICE (OUTPATIENT)
Dept: FAMILY MEDICINE | Facility: CLINIC | Age: 73
End: 2023-08-22
Payer: COMMERCIAL

## 2023-08-28 ENCOUNTER — INFUSION THERAPY VISIT (OUTPATIENT)
Dept: ONCOLOGY | Facility: CLINIC | Age: 73
End: 2023-08-28
Attending: INTERNAL MEDICINE
Payer: COMMERCIAL

## 2023-08-28 ENCOUNTER — MYC MEDICAL ADVICE (OUTPATIENT)
Dept: FAMILY MEDICINE | Facility: CLINIC | Age: 73
End: 2023-08-28

## 2023-08-28 VITALS
TEMPERATURE: 97.9 F | RESPIRATION RATE: 18 BRPM | HEART RATE: 92 BPM | BODY MASS INDEX: 27.88 KG/M2 | OXYGEN SATURATION: 100 % | DIASTOLIC BLOOD PRESSURE: 73 MMHG | SYSTOLIC BLOOD PRESSURE: 122 MMHG | WEIGHT: 183.3 LBS

## 2023-08-28 DIAGNOSIS — C50.121 MALIGNANT NEOPLASM OF CENTRAL PORTION OF RIGHT BREAST IN MALE, ESTROGEN RECEPTOR POSITIVE (H): Primary | ICD-10-CM

## 2023-08-28 DIAGNOSIS — E11.65 TYPE 2 DIABETES MELLITUS WITH HYPERGLYCEMIA, WITHOUT LONG-TERM CURRENT USE OF INSULIN (H): ICD-10-CM

## 2023-08-28 DIAGNOSIS — Z17.0 MALIGNANT NEOPLASM OF CENTRAL PORTION OF RIGHT BREAST IN MALE, ESTROGEN RECEPTOR POSITIVE (H): Primary | ICD-10-CM

## 2023-08-28 DIAGNOSIS — E11.65 TYPE 2 DIABETES MELLITUS WITH HYPERGLYCEMIA, WITHOUT LONG-TERM CURRENT USE OF INSULIN (H): Primary | ICD-10-CM

## 2023-08-28 LAB
ALBUMIN SERPL BCG-MCNC: 4.7 G/DL (ref 3.5–5.2)
ALP SERPL-CCNC: 49 U/L (ref 40–129)
ALT SERPL W P-5'-P-CCNC: 19 U/L (ref 0–70)
ANION GAP SERPL CALCULATED.3IONS-SCNC: 10 MMOL/L (ref 7–15)
AST SERPL W P-5'-P-CCNC: 24 U/L (ref 0–45)
BASOPHILS # BLD AUTO: 0.1 10E3/UL (ref 0–0.2)
BASOPHILS NFR BLD AUTO: 2 %
BILIRUB SERPL-MCNC: 0.4 MG/DL
BUN SERPL-MCNC: 11 MG/DL (ref 8–23)
CALCIUM SERPL-MCNC: 9.7 MG/DL (ref 8.8–10.2)
CHLORIDE SERPL-SCNC: 102 MMOL/L (ref 98–107)
CREAT SERPL-MCNC: 0.88 MG/DL (ref 0.67–1.17)
DEPRECATED HCO3 PLAS-SCNC: 26 MMOL/L (ref 22–29)
EOSINOPHIL # BLD AUTO: 0.2 10E3/UL (ref 0–0.7)
EOSINOPHIL NFR BLD AUTO: 7 %
ERYTHROCYTE [DISTWIDTH] IN BLOOD BY AUTOMATED COUNT: 13.2 % (ref 10–15)
GFR SERPL CREATININE-BSD FRML MDRD: >90 ML/MIN/1.73M2
GLUCOSE SERPL-MCNC: 148 MG/DL (ref 70–99)
HBA1C MFR BLD: 6.1 %
HCT VFR BLD AUTO: 33.7 % (ref 40–53)
HGB BLD-MCNC: 11.6 G/DL (ref 13.3–17.7)
IMM GRANULOCYTES # BLD: 0 10E3/UL
IMM GRANULOCYTES NFR BLD: 1 %
LYMPHOCYTES # BLD AUTO: 0.9 10E3/UL (ref 0.8–5.3)
LYMPHOCYTES NFR BLD AUTO: 30 %
MCH RBC QN AUTO: 32.5 PG (ref 26.5–33)
MCHC RBC AUTO-ENTMCNC: 34.4 G/DL (ref 31.5–36.5)
MCV RBC AUTO: 94 FL (ref 78–100)
MONOCYTES # BLD AUTO: 0.3 10E3/UL (ref 0–1.3)
MONOCYTES NFR BLD AUTO: 9 %
NEUTROPHILS # BLD AUTO: 1.5 10E3/UL (ref 1.6–8.3)
NEUTROPHILS NFR BLD AUTO: 51 %
NRBC # BLD AUTO: 0 10E3/UL
NRBC BLD AUTO-RTO: 0 /100
PLATELET # BLD AUTO: 178 10E3/UL (ref 150–450)
POTASSIUM SERPL-SCNC: 4.3 MMOL/L (ref 3.4–5.3)
PROT SERPL-MCNC: 7.9 G/DL (ref 6.4–8.3)
RBC # BLD AUTO: 3.57 10E6/UL (ref 4.4–5.9)
SODIUM SERPL-SCNC: 138 MMOL/L (ref 136–145)
WBC # BLD AUTO: 3 10E3/UL (ref 4–11)

## 2023-08-28 PROCEDURE — 36415 COLL VENOUS BLD VENIPUNCTURE: CPT

## 2023-08-28 PROCEDURE — 80053 COMPREHEN METABOLIC PANEL: CPT

## 2023-08-28 PROCEDURE — 250N000011 HC RX IP 250 OP 636: Mod: JZ | Performed by: INTERNAL MEDICINE

## 2023-08-28 PROCEDURE — 85025 COMPLETE CBC W/AUTO DIFF WBC: CPT

## 2023-08-28 PROCEDURE — 83036 HEMOGLOBIN GLYCOSYLATED A1C: CPT

## 2023-08-28 PROCEDURE — 36592 COLLECT BLOOD FROM PICC: CPT

## 2023-08-28 PROCEDURE — 96402 CHEMO HORMON ANTINEOPL SQ/IM: CPT

## 2023-08-28 RX ADMIN — LEUPROLIDE ACETATE 7.5 MG: KIT at 12:53

## 2023-08-28 ASSESSMENT — PAIN SCALES - GENERAL: PAINLEVEL: NO PAIN (0)

## 2023-08-28 NOTE — PROGRESS NOTES
Infusion Injection Note:  Ronna Collins presents today for Lupron injection.    CBC and CMP drawn in infusion by vascular access.    Patient stated he is feeling well and declined to speak with an RN today.     Treatment Conditions:  Not Applicable.    Pre and Post Injection:  Lupron injection given to Right Ventrogluteal without incident.   Patient tolerated procedure well.    Discharge Plan:  Patient verbalized understanding of discharge instructions and all questions answered.  AVS to patient via MYCHART.    Patient discharged in stable condition accompanied by: self.  Departure Mode: Ambulatory.  Patient will return 9/26/2023 for next appointment.    Iris SAMANIEGO on 8/28/2023 at 1:18 PM

## 2023-08-28 NOTE — PROGRESS NOTES
Vascular Access Services Notes:    Ultrasound-guided lab draw done without complication. Attempted x1 in the left upper forearm using a 22 gauge x 1.75 inch BB PIV needle. Staff was present to assist.    Time spent with pt - 30 minutes      TEODORA David, RN [facility]

## 2023-08-30 ENCOUNTER — TELEPHONE (OUTPATIENT)
Dept: ONCOLOGY | Facility: CLINIC | Age: 73
End: 2023-08-30
Payer: COMMERCIAL

## 2023-08-30 NOTE — TELEPHONE ENCOUNTER
Oral Chemotherapy Monitoring Program  Lab Follow Up    Reviewed lab results from 8/29/23.        5/9/2023     9:00 AM 5/26/2023    11:00 AM 6/14/2023     9:00 AM 6/14/2023    12:00 PM 6/20/2023     2:00 PM 6/26/2023     8:00 AM 8/30/2023    12:00 PM   ORAL CHEMOTHERAPY   Assessment Type Chart Review Lab Monitoring Refill Other Quarterly Follow up Lab Monitoring Lab Monitoring   Diagnosis Code Breast Cancer Breast Cancer Breast Cancer Breast Cancer Breast Cancer Breast Cancer Breast Cancer   Providers Dr. Remberto Sr   [facility]   Is this patient followed by the [facility] OC team? Yes Yes Yes Yes Yes Yes Yes   Drug Name Verzenio (abemaciclib) Verzenio (abemaciclib) Verzenio (abemaciclib) Verzenio (abemaciclib) Verzenio (abemaciclib) Verzenio (abemaciclib) Verzenio (abemaciclib)   Dose 150 mg 150 mg 150 mg 150 mg 150 mg 150 mg 150 mg   Current Schedule BID BID BID BID BID BID BID   Cycle Details Continuous Continuous Continuous Continuous Continuous Continuous Continuous   Adverse Effects     No AE identified during assessment     Any new drug interactions?     No         Labs:  _  Result Component Current Result Ref Range   Sodium 138 (8/28/2023) 136 - 145 mmol/L     _  Result Component Current Result Ref Range   Potassium 4.3 (8/28/2023) 3.4 - 5.3 mmol/L     _  Result Component Current Result Ref Range   Calcium 9.7 (8/28/2023) 8.8 - 10.2 mg/dL     No results found for Mag within last 30 days.     No results found for Phos within last 30 days.     _  Result Component Current Result Ref Range   Albumin 4.7 (8/28/2023) 3.5 - 5.2 g/dL     _  Result Component Current Result Ref Range   Urea Nitrogen 11.0 (8/28/2023) 8.0 - 23.0 mg/dL     _  Result Component Current Result Ref Range   Creatinine 0.88 (8/28/2023) 0.67 - 1.17 mg/dL     _  Result Component Current Result Ref Range   AST 24 (8/28/2023)  0 - 45 U/L     _  Result Component Current Result Ref Range   ALT 19 (8/28/2023) 0 - 70 U/L     _  Result Component Current Result Ref Range   Bilirubin Total 0.4 (8/28/2023) <=1.2 mg/dL     _  Result Component Current Result Ref Range   WBC Count 3.0 (L) (8/28/2023) 4.0 - 11.0 10e3/uL     _  Result Component Current Result Ref Range   Hemoglobin 11.6 (L) (8/28/2023) 13.3 - 17.7 g/dL     _  Result Component Current Result Ref Range   Platelet Count 178 (8/28/2023) 150 - 450 10e3/uL     No results found for ANC within last 30 days.     _  Result Component Current Result Ref Range   Absolute Neutrophils 1.5 (L) (8/28/2023) 1.6 - 8.3 10e3/uL        Assessment & Plan:  Results of the CBC and CMP show no concerning abnormalities.  Will plan to continue Verzenio therapy.  Patient was not contacted as he was seen in infusion.     Follow-Up:  9/20: Quarterly assessment  9/26: Labs    Regina Knutson, PharmD  Hematology/Oncology Clinical Pharmacist  [facility]  [phone]

## 2023-09-14 ENCOUNTER — THERAPY VISIT (OUTPATIENT)
Dept: PHYSICAL THERAPY | Facility: CLINIC | Age: 73
End: 2023-09-14
Attending: FAMILY MEDICINE
Payer: COMMERCIAL

## 2023-09-14 DIAGNOSIS — C50.121 MALIGNANT NEOPLASM OF CENTRAL PORTION OF RIGHT BREAST IN MALE, ESTROGEN RECEPTOR POSITIVE (H): Primary | ICD-10-CM

## 2023-09-14 DIAGNOSIS — I89.0 LYMPHEDEMA OF RIGHT UPPER EXTREMITY: ICD-10-CM

## 2023-09-14 DIAGNOSIS — Z17.0 MALIGNANT NEOPLASM OF CENTRAL PORTION OF RIGHT BREAST IN MALE, ESTROGEN RECEPTOR POSITIVE (H): Primary | ICD-10-CM

## 2023-09-14 PROCEDURE — 97140 MANUAL THERAPY 1/> REGIONS: CPT | Mod: GP | Performed by: PHYSICAL THERAPIST

## 2023-09-14 PROCEDURE — 97535 SELF CARE MNGMENT TRAINING: CPT | Mod: GP | Performed by: PHYSICAL THERAPIST

## 2023-09-18 ENCOUNTER — TELEPHONE (OUTPATIENT)
Dept: ONCOLOGY | Facility: CLINIC | Age: 73
End: 2023-09-18
Payer: COMMERCIAL

## 2023-09-18 DIAGNOSIS — C50.929 MALIGNANT NEOPLASM OF MALE BREAST, UNSPECIFIED ESTROGEN RECEPTOR STATUS, UNSPECIFIED LATERALITY, UNSPECIFIED SITE OF BREAST (H): Primary | ICD-10-CM

## 2023-09-18 RX ORDER — LETROZOLE 2.5 MG/1
2.5 TABLET, FILM COATED ORAL DAILY
Qty: 90 TABLET | Refills: 3 | Status: SHIPPED | OUTPATIENT
Start: 2023-09-18 | End: 2024-09-12

## 2023-09-18 NOTE — TELEPHONE ENCOUNTER
Oral Chemotherapy Monitoring Program    Subjective/Objective:  Ronna Collins is a 73 year old male contacted by phone for a follow-up visit for oral chemotherapy.  Pt confirms taking the appropriate dose of Verzenio, 150mg twice daily. Denies new or worsening side effects, missed doses, and recent hospital or ED visits. Patient has not had any recent medication changes. Pt states he has 9DS remaining, and is going on vacation 9/25.          6/14/2023     9:00 AM 6/14/2023    12:00 PM 6/20/2023     2:00 PM 6/26/2023     8:00 AM 8/30/2023    12:00 PM 9/18/2023    10:00 AM 9/18/2023     3:00 PM   ORAL CHEMOTHERAPY   Assessment Type Refill Other Quarterly Follow up Lab Monitoring Lab Monitoring Refill Quarterly Follow up   Diagnosis Code Breast Cancer Breast Cancer Breast Cancer Breast Cancer Breast Cancer Breast Cancer Breast Cancer   Providers Dr. Rmeberto Sr   [facility]   Is this patient followed by the [facility] OC team? Yes Yes Yes Yes Yes Yes Yes   Drug Name Verzenio (abemaciclib) Verzenio (abemaciclib) Verzenio (abemaciclib) Verzenio (abemaciclib) Verzenio (abemaciclib) Verzenio (abemaciclib) Verzenio (abemaciclib)   Dose 150 mg 150 mg 150 mg 150 mg 150 mg 150 mg 150 mg   Current Schedule BID BID BID BID BID BID BID   Cycle Details Continuous Continuous Continuous Continuous Continuous Continuous Continuous   Doses missed in last 2 weeks       1   Adherence Assessment       Adherent   Adverse Effects   No AE identified during assessment    No AE identified during assessment   Any new drug interactions?   No    No   Is the dose as ordered appropriate for the patient?       Yes   Since the last time we talked, have you been hospitalized or used the emergency room?       No       Last PHQ-2 Score on record:       5/26/2023     1:18 PM 1/2/2023     7:36 AM   PHQ-2 ( 1999 [employer])   Q1: Little  "interest or pleasure in doing things 0 0   Q2: Feeling down, depressed or hopeless 0 0   PHQ-2 Score 0 0   Q1: Little interest or pleasure in doing things Not at all Not at all   Q2: Feeling down, depressed or hopeless Not at all Not at all   PHQ-2 Score 0 0       Vitals:  BP:   BP Readings from Last 1 Encounters:   08/28/23 122/73     Wt Readings from Last 1 Encounters:   08/28/23 83.1 kg (183 lb 4.8 oz)     Estimated body surface area is 2 meters squared as calculated from the following:    Height as of 6/1/23: 1.727 m (5' 7.99\").    Weight as of 8/28/23: 83.1 kg (183 lb 4.8 oz).    Labs:  _  Result Component Current Result Ref Range   Sodium 138 (8/28/2023) 136 - 145 mmol/L     _  Result Component Current Result Ref Range   Potassium 4.3 (8/28/2023) 3.4 - 5.3 mmol/L     _  Result Component Current Result Ref Range   Calcium 9.7 (8/28/2023) 8.8 - 10.2 mg/dL     No results found for Mag within last 30 days.     No results found for Phos within last 30 days.     _  Result Component Current Result Ref Range   Albumin 4.7 (8/28/2023) 3.5 - 5.2 g/dL     _  Result Component Current Result Ref Range   Urea Nitrogen 11.0 (8/28/2023) 8.0 - 23.0 mg/dL     _  Result Component Current Result Ref Range   Creatinine 0.88 (8/28/2023) 0.67 - 1.17 mg/dL     _  Result Component Current Result Ref Range   AST 24 (8/28/2023) 0 - 45 U/L     _  Result Component Current Result Ref Range   ALT 19 (8/28/2023) 0 - 70 U/L     _  Result Component Current Result Ref Range   Bilirubin Total 0.4 (8/28/2023) <=1.2 mg/dL     _  Result Component Current Result Ref Range   WBC Count 3.0 (L) (8/28/2023) 4.0 - 11.0 10e3/uL     _  Result Component Current Result Ref Range   Hemoglobin 11.6 (L) (8/28/2023) 13.3 - 17.7 g/dL     _  Result Component Current Result Ref Range   Platelet Count 178 (8/28/2023) 150 - 450 10e3/uL     No results found for ANC within last 30 days.     _  Result Component Current Result Ref Range   Absolute Neutrophils 1.5 (L) " (8/28/2023) 1.6 - 8.3 10e3/uL      Assessment/Plan:  Pt continues to tolerate Verzenio well. Continue therapy as planned. Pt has labs scheduled for 9/26, encouraged pt to reschedule these since he will be out of town.    Follow-Up:  9/26: Look for rescheduled labs    Refill Due:  [facility] to deliver Verzenio 9/22.     Grace Myhre, PharmD  Hematology/Oncology Pharmacist  [facility]  [facility]  [phone]

## 2023-09-30 DIAGNOSIS — C50.929 MALIGNANT NEOPLASM OF MALE BREAST, UNSPECIFIED ESTROGEN RECEPTOR STATUS, UNSPECIFIED LATERALITY, UNSPECIFIED SITE OF BREAST (H): Primary | ICD-10-CM

## 2023-09-30 DIAGNOSIS — Z17.0 MALIGNANT NEOPLASM OF CENTRAL PORTION OF RIGHT BREAST IN MALE, ESTROGEN RECEPTOR POSITIVE (H): Primary | ICD-10-CM

## 2023-09-30 DIAGNOSIS — C50.121 MALIGNANT NEOPLASM OF CENTRAL PORTION OF RIGHT BREAST IN MALE, ESTROGEN RECEPTOR POSITIVE (H): Primary | ICD-10-CM

## 2023-10-03 ENCOUNTER — INFUSION THERAPY VISIT (OUTPATIENT)
Dept: ONCOLOGY | Facility: CLINIC | Age: 73
End: 2023-10-03
Attending: INTERNAL MEDICINE
Payer: COMMERCIAL

## 2023-10-03 ENCOUNTER — APPOINTMENT (OUTPATIENT)
Dept: LAB | Facility: CLINIC | Age: 73
End: 2023-10-03
Attending: INTERNAL MEDICINE
Payer: COMMERCIAL

## 2023-10-03 VITALS
WEIGHT: 177 LBS | BODY MASS INDEX: 26.92 KG/M2 | TEMPERATURE: 97.7 F | HEART RATE: 83 BPM | RESPIRATION RATE: 18 BRPM | SYSTOLIC BLOOD PRESSURE: 123 MMHG | DIASTOLIC BLOOD PRESSURE: 69 MMHG | OXYGEN SATURATION: 99 %

## 2023-10-03 DIAGNOSIS — C50.121 MALIGNANT NEOPLASM OF CENTRAL PORTION OF RIGHT BREAST IN MALE, ESTROGEN RECEPTOR POSITIVE (H): Primary | ICD-10-CM

## 2023-10-03 DIAGNOSIS — Z17.0 MALIGNANT NEOPLASM OF CENTRAL PORTION OF RIGHT BREAST IN MALE, ESTROGEN RECEPTOR POSITIVE (H): Primary | ICD-10-CM

## 2023-10-03 LAB
ALBUMIN SERPL BCG-MCNC: 4.3 G/DL (ref 3.5–5.2)
ALP SERPL-CCNC: 52 U/L (ref 40–129)
ALT SERPL W P-5'-P-CCNC: 19 U/L (ref 0–70)
ANION GAP SERPL CALCULATED.3IONS-SCNC: 13 MMOL/L (ref 7–15)
AST SERPL W P-5'-P-CCNC: 24 U/L (ref 0–45)
BASO+EOS+MONOS # BLD AUTO: ABNORMAL 10*3/UL
BASO+EOS+MONOS NFR BLD AUTO: ABNORMAL %
BASOPHILS # BLD AUTO: 0 10E3/UL (ref 0–0.2)
BASOPHILS NFR BLD AUTO: 1 %
BILIRUB SERPL-MCNC: 0.4 MG/DL
BUN SERPL-MCNC: 18.1 MG/DL (ref 8–23)
CALCIUM SERPL-MCNC: 8.8 MG/DL (ref 8.8–10.2)
CANCER AG15-3 SERPL-ACNC: 37 U/ML
CEA SERPL-MCNC: 4.2 NG/ML
CHLORIDE SERPL-SCNC: 101 MMOL/L (ref 98–107)
CREAT SERPL-MCNC: 1.23 MG/DL (ref 0.67–1.17)
DEPRECATED HCO3 PLAS-SCNC: 24 MMOL/L (ref 22–29)
EGFRCR SERPLBLD CKD-EPI 2021: 63 ML/MIN/1.73M2
EOSINOPHIL # BLD AUTO: 0.1 10E3/UL (ref 0–0.7)
EOSINOPHIL NFR BLD AUTO: 6 %
ERYTHROCYTE [DISTWIDTH] IN BLOOD BY AUTOMATED COUNT: 12.6 % (ref 10–15)
GLUCOSE SERPL-MCNC: 125 MG/DL (ref 70–99)
HCT VFR BLD AUTO: 31 % (ref 40–53)
HGB BLD-MCNC: 10.5 G/DL (ref 13.3–17.7)
IMM GRANULOCYTES # BLD: 0 10E3/UL
IMM GRANULOCYTES NFR BLD: 0 %
LYMPHOCYTES # BLD AUTO: 1 10E3/UL (ref 0.8–5.3)
LYMPHOCYTES NFR BLD AUTO: 38 %
MCH RBC QN AUTO: 31.8 PG (ref 26.5–33)
MCHC RBC AUTO-ENTMCNC: 33.9 G/DL (ref 31.5–36.5)
MCV RBC AUTO: 94 FL (ref 78–100)
MONOCYTES # BLD AUTO: 0.3 10E3/UL (ref 0–1.3)
MONOCYTES NFR BLD AUTO: 10 %
NEUTROPHILS # BLD AUTO: 1.1 10E3/UL (ref 1.6–8.3)
NEUTROPHILS NFR BLD AUTO: 45 %
NRBC # BLD AUTO: 0 10E3/UL
NRBC BLD AUTO-RTO: 0 /100
PLATELET # BLD AUTO: 156 10E3/UL (ref 150–450)
POTASSIUM SERPL-SCNC: 3.6 MMOL/L (ref 3.4–5.3)
PROT SERPL-MCNC: 7.6 G/DL (ref 6.4–8.3)
RBC # BLD AUTO: 3.3 10E6/UL (ref 4.4–5.9)
SODIUM SERPL-SCNC: 138 MMOL/L (ref 135–145)
WBC # BLD AUTO: 2.5 10E3/UL (ref 4–11)

## 2023-10-03 PROCEDURE — 82378 CARCINOEMBRYONIC ANTIGEN: CPT

## 2023-10-03 PROCEDURE — 85025 COMPLETE CBC W/AUTO DIFF WBC: CPT

## 2023-10-03 PROCEDURE — 36415 COLL VENOUS BLD VENIPUNCTURE: CPT

## 2023-10-03 PROCEDURE — 96402 CHEMO HORMON ANTINEOPL SQ/IM: CPT

## 2023-10-03 PROCEDURE — 80053 COMPREHEN METABOLIC PANEL: CPT

## 2023-10-03 PROCEDURE — 86300 IMMUNOASSAY TUMOR CA 15-3: CPT

## 2023-10-03 PROCEDURE — 250N000011 HC RX IP 250 OP 636: Mod: JZ | Performed by: INTERNAL MEDICINE

## 2023-10-03 RX ADMIN — LEUPROLIDE ACETATE 7.5 MG: KIT at 08:40

## 2023-10-03 ASSESSMENT — PAIN SCALES - GENERAL: PAINLEVEL: NO PAIN (0)

## 2023-10-03 NOTE — PROGRESS NOTES
Infusion Injection Note:  Ronna Collins presents today for Lupron.    Patient seen by provider today: No   present during visit today: Not Applicable.    Patient declined to speak with an RN today.     Treatment Conditions:  Not Applicable.    Pre and Post Injection:  Lupron injection given to Left Ventrogluteal without incident.   Patient tolerated procedure well..    Discharge Plan:  Patient and/or family verbalized understanding of discharge instructions and all questions answered.  Patient has requested his next appointment, on 10/11/23 ne rescheduled.    Hugo Scruggs, Clinic Assistant

## 2023-10-03 NOTE — NURSING NOTE
Chief Complaint   Patient presents with    Blood Draw     Labs drawn with  by RN. Vitals taken. Pt checked into next appointment.     Drawn by vascular RN.    Mima Menard RN

## 2023-10-04 ENCOUNTER — DOCUMENTATION ONLY (OUTPATIENT)
Dept: ONCOLOGY | Facility: CLINIC | Age: 73
End: 2023-10-04
Payer: COMMERCIAL

## 2023-10-04 NOTE — PROGRESS NOTES
Oral Chemotherapy Monitoring Program  Lab Follow Up    Reviewed CBC and CMP lab results from 10/3/2023.        6/14/2023    12:00 PM 6/20/2023     2:00 PM 6/26/2023     8:00 AM 8/30/2023    12:00 PM 9/18/2023    10:00 AM 9/18/2023     3:00 PM 10/4/2023     8:00 AM   ORAL CHEMOTHERAPY   Assessment Type Other Quarterly Follow up Lab Monitoring Lab Monitoring Refill Quarterly Follow up Lab Monitoring   Diagnosis Code Breast Cancer Breast Cancer Breast Cancer Breast Cancer Breast Cancer Breast Cancer Breast Cancer   Providers Dr. Remberto Sr   [facility]   Is this patient followed by the [facility] OC team? Yes Yes Yes Yes Yes Yes Yes   Drug Name Verzenio (abemaciclib) Verzenio (abemaciclib) Verzenio (abemaciclib) Verzenio (abemaciclib) Verzenio (abemaciclib) Verzenio (abemaciclib) Verzenio (abemaciclib)   Dose 150 mg 150 mg 150 mg 150 mg 150 mg 150 mg 150 mg   Current Schedule BID BID BID BID BID BID BID   Cycle Details Continuous Continuous Continuous Continuous Continuous Continuous Continuous   Doses missed in last 2 weeks      1    Adherence Assessment      Adherent    Adverse Effects  No AE identified during assessment    No AE identified during assessment    Any new drug interactions?  No    No    Is the dose as ordered appropriate for the patient?      Yes    Since the last time we talked, have you been hospitalized or used the emergency room?      No        Labs:  _  Result Component Current Result Ref Range   Sodium 138 (10/3/2023) 135 - 145 mmol/L   _  Result Component Current Result Ref Range   Potassium 3.6 (10/3/2023) 3.4 - 5.3 mmol/L   _  Result Component Current Result Ref Range   Calcium 8.8 (10/3/2023) 8.8 - 10.2 mg/dL   _  Result Component Current Result Ref Range   Albumin 4.3 (10/3/2023) 3.5 - 5.2 g/dL   _  Result Component Current Result Ref Range   Urea Nitrogen 18.1 (10/3/2023) 8.0 -  23.0 mg/dL   _  Result Component Current Result Ref Range   Creatinine 1.23 (H) (10/3/2023) 0.67 - 1.17 mg/dL   _  Result Component Current Result Ref Range   AST 24 (10/3/2023) 0 - 45 U/L   _  Result Component Current Result Ref Range   ALT 19 (10/3/2023) 0 - 70 U/L   _  Result Component Current Result Ref Range   Bilirubin Total 0.4 (10/3/2023) <=1.2 mg/dL   _  Result Component Current Result Ref Range   WBC Count 2.5 (L) (10/3/2023) 4.0 - 11.0 10e3/uL   _  Result Component Current Result Ref Range   Hemoglobin 10.5 (L) (10/3/2023) 13.3 - 17.7 g/dL   _  Result Component Current Result Ref Range   Platelet Count 156 (10/3/2023) 150 - 450 10e3/uL     Result Component Current Result Ref Range   Absolute Neutrophils 1.1 (L) (10/3/2023) 1.6 - 8.3 10e3/uL      Assessment & Plan:  No new concerning lab abnormalities. No changes with Verzenio needed at this time. Continue plan of care. Patient not contacted as patient was seen in infusion yesterday.    Follow-Up:  10/31: Dr. Sr visit     Fred Julio, PharmD  Hematology/Oncology Clinical Pharmacist  [facility]  [facility]  [phone]

## 2023-10-11 ENCOUNTER — ANCILLARY PROCEDURE (OUTPATIENT)
Dept: CT IMAGING | Facility: CLINIC | Age: 73
End: 2023-10-11
Attending: INTERNAL MEDICINE
Payer: COMMERCIAL

## 2023-10-11 DIAGNOSIS — R91.8 PULMONARY NODULES: ICD-10-CM

## 2023-10-11 DIAGNOSIS — C50.121 MALIGNANT NEOPLASM OF CENTRAL PORTION OF RIGHT BREAST IN MALE, ESTROGEN RECEPTOR POSITIVE (H): ICD-10-CM

## 2023-10-11 DIAGNOSIS — Z17.0 MALIGNANT NEOPLASM OF CENTRAL PORTION OF RIGHT BREAST IN MALE, ESTROGEN RECEPTOR POSITIVE (H): ICD-10-CM

## 2023-10-11 PROCEDURE — 71250 CT THORAX DX C-: CPT | Performed by: RADIOLOGY

## 2023-10-12 ENCOUNTER — THERAPY VISIT (OUTPATIENT)
Dept: PHYSICAL THERAPY | Facility: CLINIC | Age: 73
End: 2023-10-12
Payer: COMMERCIAL

## 2023-10-12 DIAGNOSIS — Z17.0 MALIGNANT NEOPLASM OF CENTRAL PORTION OF RIGHT BREAST IN MALE, ESTROGEN RECEPTOR POSITIVE (H): Primary | ICD-10-CM

## 2023-10-12 DIAGNOSIS — C50.121 MALIGNANT NEOPLASM OF CENTRAL PORTION OF RIGHT BREAST IN MALE, ESTROGEN RECEPTOR POSITIVE (H): Primary | ICD-10-CM

## 2023-10-12 DIAGNOSIS — I89.0 LYMPHEDEMA OF RIGHT UPPER EXTREMITY: ICD-10-CM

## 2023-10-12 PROCEDURE — 97535 SELF CARE MNGMENT TRAINING: CPT | Mod: GP | Performed by: PHYSICAL THERAPIST

## 2023-10-12 PROCEDURE — 97140 MANUAL THERAPY 1/> REGIONS: CPT | Mod: GP | Performed by: PHYSICAL THERAPIST

## 2023-10-29 ENCOUNTER — HEALTH MAINTENANCE LETTER (OUTPATIENT)
Age: 73
End: 2023-10-29

## 2023-10-30 NOTE — PROGRESS NOTES
Oncology Visit:  Date on this visit: Oct 31, 2023    Diagnosis: h/o clinical prognostic stage IIIb, [id number], grade 2, ER positive, KS positive, HER-2 negative right breast cancer, [id number].    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  He noted discomfort and hardening of the skin of the right nipple in late April/early May, 2021.  He noted a seborrheic keratosis of the right nipple and remembered he had a similar skin lesion of the arm 3 months earlier.  However, he subsequently noted a mass in the same area.  Right breast skin punch biopsy performed by dermatology was c/w grade 2 invasive ductal carcinoma with associated DCIS.  Invasive carcinoma was ER positive 100% and KS positive 70%, with tumor invading the dermis.  HER-2 was negative by IHC (score 1+).  Diagnostic mammogram and ultrasound showed a retroareolar right breast mass measuring 2.9 cm with associated nipple retraction and enlarged, abnormal right axillary lymph nodes.  Right breast biopsy was again c/w grade 2 invasive ductal carcinoma, ER strong in 98% of tumor cell nuclei, HER-2 negative.  Ki-67 was 15%.    He elected to screen for the ISPY-2 clinical trial.  MRI breast on 6/18/2021 showed the biopsy proven right breast cancer to measure 3.9 cm in maximum dimension with invasion of the nipple and the pectoralis muscle as well as at least 4 abnormal level 1 and 2 right axillary lymph nodes and a tiny 0.4 cm right internal mammary lymph node.  Mammoprint returned low risk with a score of +0.29.  He elected for treatment on the endocrine optimization protocol of ISPY-2 and was randomized to treatment with amcenestrant.  He was on treatment with  amcenestrant from 7/1/2021 - 10/26/2021.  At our visit in 01/2021, both right axillary node and right breast tumor palpated more firm then prior.  Breast MRI was stable, however, due to clinical concerns for progression,  "decision was made to proceed with surgery.   Right breast mastectomy and right axillary lymph node dissection was performed by Dr. Snider on 10/27/2021.  Pathology showed grade 2 carcinoma of the right breast measuring 3.2 cm with invasion of the dermis, nipple and the pectoralis muscle.  Ki-67 of the excised tumor was 7%.  15/44 right axillary lymph nodes were positive with 6 of the lymph nodes demonstrating extranodal extension.  The largest lymph node metastasis measured 2.1 cm.      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  His course was complicated by fevers persistent throughout treatment.  He received radiation (4800 cGy in 15 fractions) to the right chest wall and regional lymph nodes from 3/21/2022 - 4/8/2022.  He has been on treatment with lupron and letrozole since 3/16/2022.  Abemaciclib was added 4/26/2022.    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  He continues on treatment with Lupron, letrozole, and abemaciclib.  Most bothersome to him at this time is what he calls the \"3 I's \".  The first of these is itching.  Since he completed chemotherapy, he has had hyperpigmented lesion of the back and also of the groin with associated pruritus.  He has previously applied topical clotrimazole to these areas without any relief.  The second bothersome symptom is intestinal distress.  Approximately every 3 to 4 weeks, he will have an episode of acute onset nausea and vomiting.  In addition, he will have diarrhea.  He states Imodium is enough to treat the diarrhea.  He is uncertain as to whether these are triggered by certain food intake.  He does note that the last episode was 4 days ago and occurred after attending a Halloween party.  He has not been taking antinausea medicines or Imodium prophylactically.  The third bothersome symptom is insomnia.  This too, has been chronic.  He believes " recently it is exacerbated by lack of exercise.  As the weather has become colder he and his wife have been walking much less.  He has not tried any sleep aids, but would be interested in trying melatonin.      He denies concerning lumps or masses.  He continues to have some tightness in the area of the lateral pectoralis muscle with full extension of his right upper extremity.  There is perhaps a 5 to 10 degree difference in the amount he is able to extend his right upper extremity compared to the left.  He has mild lymphedema of the right upper extremity.  He diligently uses his lymphedema pump as well as wears a compression sleeve and gauntlet, although needs a new prescription for both today.  He denies new bone or joint aches or pains.  He has no cough or shortness of breath.  He has no abdominal pain.      Past Medical/Surgical History:  Past Medical History:   Diagnosis Date    Breast cancer (H) 05/2021    Chronic sinusitis     Hypertension 2002     Past Surgical History:   Procedure Laterality Date    COLONOSCOPY WITH CO2 INSUFFLATION N/A 10/28/2022    Procedure: COLONOSCOPY, WITH CO2 INSUFFLATION;  Surgeon: Annemarie Noel DO;  Location: [facility] OR    COMBINED ESOPHAGOSCOPY, GASTROSCOPY, DUODENOSCOPY (EGD) WITH CO2 INSUFFLATION N/A 10/28/2022    Procedure: ESOPHAGOGASTRODUODENOSCOPY, WITH CO2 INSUFFLATION;  Surgeon: Annemarie Noel DO;  Location: [facility]    DISSECT LYMPH NODE AXILLA Right 10/27/2021    Procedure: RIGHT Axillary Lymph Node Dissection;  Surgeon: Nida Snider MD;  Location: [facility] OR    ESOPHAGOSCOPY, GASTROSCOPY, DUODENOSCOPY (EGD), COMBINED N/A 10/28/2022    Procedure: ESOPHAGOGASTRODUODENOSCOPY, WITH BIOPSY;  Surgeon: Annemarie Noel DO;  Location: [facility]    MASTECTOMY SIMPLE Right 10/27/2021    Procedure: RIGHT Simple Mastectomy;  Surgeon: Nida Snider MD;  Location: [facility] OR     Allergies:  Allergies as of 10/31/2023    (No Known Allergies)     Current Medications:  Current  Outpatient Medications   Medication Sig Dispense Refill    abemaciclib (VERZENIO) 150 MG tablet Take 1 tablet (150 mg) by mouth 2 times daily 56 tablet 2    budesonide (RINOCORT AQUA) 32 MCG/ACT nasal spray Spray 2 sprays into both nostrils daily (Patient not taking: Reported on 8/1/2023) 8.43 mL 11    clotrimazole (LOTRIMIN) 1 % external cream Apply topically to the groin area twice a day until resolved. (Patient not taking: Reported on 8/1/2023) 30 g 1    famotidine (PEPCID) 40 MG tablet Take 1 tablet (40 mg) by mouth daily 90 tablet 3    letrozole (FEMARA) 2.5 MG tablet Take 1 tablet (2.5 mg) by mouth daily for 360 days 90 tablet 3    loperamide (IMODIUM) 2 MG capsule Start with 2 caps (4 mg), then take one cap (2 mg) after each diarrheal stool as needed. Do not use more than 8 caps (16 mg) per day. (Patient not taking: Reported on 8/1/2023) 30 capsule 0    losartan (COZAAR) 25 MG tablet 1 tablet      losartan-hydrochlorothiazide (HYZAAR) 100-25 MG tablet TAKE 1 TABLET BY MOUTH EVERY MORNING (Patient not taking: Reported on 8/8/2023) 90 tablet 3    MELATONIN PO  (Patient not taking: Reported on 5/8/2023)      montelukast (SINGULAIR) 10 MG tablet Take 1 tablet (10 mg) by mouth At Bedtime (Patient not taking: Reported on 5/8/2023) 30 tablet 3    Naproxen Sodium (ALEVE PO)  (Patient not taking: Reported on 5/8/2023)      prochlorperazine (COMPAZINE) 10 MG tablet Take 0.5 tablets (5 mg) by mouth every 6 hours as needed for nausea or vomiting (Patient not taking: Reported on 5/8/2023) 30 tablet 2    triamcinolone (KENALOG) 0.1 % external ointment Apply topically 2 times daily as needed (rash) (Patient not taking: Reported on 8/1/2023) 80 g 5    urea (GORDONS UREA) 40 % external ointment For callused feet nightly (Patient not taking: Reported on 5/26/2023) 60 g 11      Genetics Breast Actionable and Breast Expanded panels were both negative for pathogenic germline mutations.    Physical Exam:  /77   Pulse 77    Temp 98  F (36.7  C) (Oral)   Resp 16   Wt 80.3 kg (177 lb)   SpO2 100%   BMI 26.92 kg/m    General:  Well appearing adult male in NAD.  Alert and oriented x 3.  HEENT:  Normocephalic.  Sclera anicteric.   Lymph:  No palpable cervical, supraclavicular, or axillary LAD.    Chest:  CTA bilaterally.  No wheezes or crackles.  CV:  RRR.  Nl S1 and S2.  No audible m/r/g.  Chest wall:  Right mastectomy. No palpable mass or nodularity of either the right or left chest.   Abd:  Soft/ND   Ext:  No edema of the bilateral lower extremities. Wearing RUE compression sleeve  Neuro:  Cranial nerves grossly intact.  Gait stable.  Psych:  Mood and affect appear normal.  Skin: Hyperpigmented skin upper back with overlying excoriations, unchanged from prior.    Laboratory/Imaging Studies:  I personally reviewed the below images and laboratory studies:    10/3/2023 Labs:  Electrolytes, kidney, and liver function tests are wnl.   is stable at 37  CEA is stable at 4.2    WBC is low at 2.5  Hemoglobin is low at 10.5 g/dL  Platelets are wnl.    10/11/2023 CT chest w/o contrast:  FINDINGS: No contrast. The included thyroid appears unremarkable.  Right breast surgical changes again demonstrated.  Heart size normal. Aorta and pulmonary artery calibers are normal. No  enlarged lymph nodes. Extensive right axillary surgical clipping again  noted likely from axillary lymph node dissection.  No pleural or pericardial effusion.  The included upper abdomen shows no suspicious findings.     Bone detail shows osteopenia with diffuse idiopathic skeletal  hyperostosis in the mid to upper thoracic spine. No suspicious  sclerotic or lytic/destructive lesion.     Detail of the lungs shows mild pleural-based atelectasis or scarring  at the anterior right middle lobe. No dominant pulmonary nodule. The  previous 1 mm right middle lobe nodule appears calcified and unchanged  (series 4 image 107) condyle consistent with benign granuloma.  Major  airways are nicely patent.                                                                      IMPRESSION: No acute or other interval findings to suggest thoracic  metastatic disease. Right mastectomy and axillary lymph node  dissection again noted    ASSESSMENT/PLAN:   Karina is a 72 yo male with pathologic stage IIIb, [id number], grade 2, ER positive, ID positive, HER-2 negative invasive ductal carcinoma of the right breast.  He is s/p treatment with 3.5 months neoadjuvant amcenestrant, right breast mastectomy, right ALND, 4 cycles Taxotere and cyclophosphamide, and radiation.  He is on treatment with abemaciclib, lupron and letrozole.    1.  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  He continues on treatment with abemaciclib, lupron and letrozole since 4/26/2022.  Plan to continue abemaciclib for a total of 2 years (through 4/26/2024), lupron and letrozole for a total of 10 years (through 3/16/2032).   - I reviewed the CT chest performed on 10/11/23 which is without evidence of disease recurrence.  - Given 15/44 lymph nodes were positive, we are monitoring CA 15-3 and CEA markers once every 6 months to his lab tests for surveillance.   tumor marker is mildly elevated, but has remained stable over time.  Will continue to monitor.  - Discussed the pros and cons of monitoring for residual circulating tumor DNA with a test such as Signatera.  Patient was provided with written information on this as well.  They expressed they are likely interested in doing this, with baseline level perhaps upon completion of abemaciclib in April.  - Next Lupron is due 11/3/2023, he will receive the 3 month dose as they are planning to travel to [place] November - early January to help with their grandchildren; they have a new grandchild due on 11/10.  - Continue monthly labs.  - Provider visit every 3 months.    2.  Nausea/vomiting/diarrhea:   Secondary to abemaciclib.  - Recommend taking Pepcid, ondansetron, and an imodium prophylactically prior to dining out at a restaurant or attending social events.  - Continue to take 1-2 imodium with the first diarrheal stool.  - discussed we can consider a dose reduction in abemaciclib to 100 mg PO BID if persistent bothersome GI symptoms.    3.  RUE lymphedema/cording: Continue compression, massage, and lymphedema pump with good response.  - Provided with a DME prescription for a new compression sleeve and gauntlet.    4.  Cutaneous candidiasis:  Hyperpigmentation and pruritus of the back and pruritus of the groin are consistent w/ cutaneous candidiasis.  - Ketoconazole 2% cream applied topically daily x 2 weeks prescribed today.    5.  Patulous esophagus/gastritis/esophagitis:  No change since last visit.  Diagnosed with EGD 10/2022.   - Continue Pepcid QHS    6.  Bone Health:  DEXA 10/2022 with osteopenia of multiple sites.  On every 6 month Zometa for prevention of breast cancer bone metastases.  Unfortunately this was poorly tolerated and therefore changed to Prolia on 4/17.    - He will receive Prolia 11/3, same day as his next Lupron injection.    7.  Pulmonary nodule:  RUL seen on 04/2023 CT chest, review of 10/11/2023 CT chest shows this nodule to no longer be present.  No further evaluation needed.      8.  Follow Up:.  Lupron and Prolia 11/3/2023 Labs, SANDRA or visit with me, and Lupron in 3 months. Labs, visit with me, and Prolia in 6 months.

## 2023-10-31 ENCOUNTER — ONCOLOGY VISIT (OUTPATIENT)
Dept: ONCOLOGY | Facility: CLINIC | Age: 73
End: 2023-10-31
Attending: INTERNAL MEDICINE
Payer: COMMERCIAL

## 2023-10-31 VITALS
RESPIRATION RATE: 16 BRPM | TEMPERATURE: 98 F | DIASTOLIC BLOOD PRESSURE: 77 MMHG | BODY MASS INDEX: 26.92 KG/M2 | HEART RATE: 77 BPM | OXYGEN SATURATION: 100 % | SYSTOLIC BLOOD PRESSURE: 114 MMHG | WEIGHT: 177 LBS

## 2023-10-31 DIAGNOSIS — T45.1X5A CHEMOTHERAPY INDUCED DIARRHEA: ICD-10-CM

## 2023-10-31 DIAGNOSIS — M94.9 DISORDER OF BONE AND CARTILAGE: ICD-10-CM

## 2023-10-31 DIAGNOSIS — Z17.0 MALIGNANT NEOPLASM OF CENTRAL PORTION OF RIGHT BREAST IN MALE, ESTROGEN RECEPTOR POSITIVE (H): Primary | ICD-10-CM

## 2023-10-31 DIAGNOSIS — R11.2 CHEMOTHERAPY INDUCED NAUSEA AND VOMITING: ICD-10-CM

## 2023-10-31 DIAGNOSIS — C50.121 MALIGNANT NEOPLASM OF CENTRAL PORTION OF RIGHT BREAST IN MALE, ESTROGEN RECEPTOR POSITIVE (H): Primary | ICD-10-CM

## 2023-10-31 DIAGNOSIS — I89.0 LYMPHEDEMA OF RIGHT UPPER EXTREMITY: ICD-10-CM

## 2023-10-31 DIAGNOSIS — M89.9 DISORDER OF BONE AND CARTILAGE: ICD-10-CM

## 2023-10-31 DIAGNOSIS — B37.2 CANDIDIASIS OF SKIN: ICD-10-CM

## 2023-10-31 DIAGNOSIS — R91.8 PULMONARY NODULES: ICD-10-CM

## 2023-10-31 DIAGNOSIS — K52.1 CHEMOTHERAPY INDUCED DIARRHEA: ICD-10-CM

## 2023-10-31 DIAGNOSIS — Z79.811 LONG TERM (CURRENT) USE OF AROMATASE INHIBITORS: ICD-10-CM

## 2023-10-31 DIAGNOSIS — T45.1X5A CHEMOTHERAPY INDUCED NAUSEA AND VOMITING: ICD-10-CM

## 2023-10-31 PROCEDURE — 99211 OFF/OP EST MAY X REQ PHY/QHP: CPT | Performed by: INTERNAL MEDICINE

## 2023-10-31 PROCEDURE — 99215 OFFICE O/P EST HI 40 MIN: CPT | Performed by: INTERNAL MEDICINE

## 2023-10-31 RX ORDER — KETOCONAZOLE 20 MG/G
CREAM TOPICAL DAILY
Qty: 30 G | Refills: 1 | Status: SHIPPED | OUTPATIENT
Start: 2023-10-31

## 2023-10-31 ASSESSMENT — PAIN SCALES - GENERAL: PAINLEVEL: NO PAIN (0)

## 2023-10-31 NOTE — LETTER
10/31/2023         RE: Ronna Collins  [address]  [facility] [address]        Dear Colleague,    Thank you for referring your patient, Ronna Collins, to the [facility]. Please see a copy of my visit note below.    Oncology Visit:  Date on this visit: Oct 31, 2023    Diagnosis: h/o clinical prognostic stage IIIb, [id number], grade 2, ER positive, MT positive, HER-2 negative right breast cancer, [id number].    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  He noted discomfort and hardening of the skin of the right nipple in late April/early May, 2021.  He noted a seborrheic keratosis of the right nipple and remembered he had a similar skin lesion of the arm 3 months earlier.  However, he subsequently noted a mass in the same area.  Right breast skin punch biopsy performed by dermatology was c/w grade 2 invasive ductal carcinoma with associated DCIS.  Invasive carcinoma was ER positive 100% and MT positive 70%, with tumor invading the dermis.  HER-2 was negative by IHC (score 1+).  Diagnostic mammogram and ultrasound showed a retroareolar right breast mass measuring 2.9 cm with associated nipple retraction and enlarged, abnormal right axillary lymph nodes.  Right breast biopsy was again c/w grade 2 invasive ductal carcinoma, ER strong in 98% of tumor cell nuclei, HER-2 negative.  Ki-67 was 15%.    He elected to screen for the ISPY-2 clinical trial.  MRI breast on 6/18/2021 showed the biopsy proven right breast cancer to measure 3.9 cm in maximum dimension with invasion of the nipple and the pectoralis muscle as well as at least 4 abnormal level 1 and 2 right axillary lymph nodes and a tiny 0.4 cm right internal mammary lymph node.  Mammoprint returned low risk with a score of +0.29.  He elected for treatment on the endocrine optimization protocol of ISPY-2 and was randomized to treatment  "with amcenestrant.  He was on treatment with  amcenestrant from 7/1/2021 - 10/26/2021.  At our visit in 01/2021, both right axillary node and right breast tumor palpated more firm then prior.  Breast MRI was stable, however, due to clinical concerns for progression, decision was made to proceed with surgery.   Right breast mastectomy and right axillary lymph node dissection was performed by Dr. Snider on 10/27/2021.  Pathology showed grade 2 carcinoma of the right breast measuring 3.2 cm with invasion of the dermis, nipple and the pectoralis muscle.  Ki-67 of the excised tumor was 7%.  15/44 right axillary lymph nodes were positive with 6 of the lymph nodes demonstrating extranodal extension.  The largest lymph node metastasis measured 2.1 cm.      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  His course was complicated by fevers persistent throughout treatment.  He received radiation (4800 cGy in 15 fractions) to the right chest wall and regional lymph nodes from 3/21/2022 - 4/8/2022.  He has been on treatment with lupron and letrozole since 3/16/2022.  Abemaciclib was added 4/26/2022.    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  He continues on treatment with Lupron, letrozole, and abemaciclib.  Most bothersome to him at this time is what he calls the \"3 I's \".  The first of these is itching.  Since he completed chemotherapy, he has had hyperpigmented lesion of the back and also of the groin with associated pruritus.  He has previously applied topical clotrimazole to these areas without any relief.  The second bothersome symptom is intestinal distress.  Approximately every 3 to 4 weeks, he will have an episode of acute onset nausea and vomiting.  In addition, he will have diarrhea.  He states Imodium is enough to treat the diarrhea.  He is uncertain as to whether these are triggered by certain food " intake.  He does note that the last episode was 4 days ago and occurred after attending a Halloween party.  He has not been taking antinausea medicines or Imodium prophylactically.  The third bothersome symptom is insomnia.  This too, has been chronic.  He believes recently it is exacerbated by lack of exercise.  As the weather has become colder he and his wife have been walking much less.  He has not tried any sleep aids, but would be interested in trying melatonin.      He denies concerning lumps or masses.  He continues to have some tightness in the area of the lateral pectoralis muscle with full extension of his right upper extremity.  There is perhaps a 5 to 10 degree difference in the amount he is able to extend his right upper extremity compared to the left.  He has mild lymphedema of the right upper extremity.  He diligently uses his lymphedema pump as well as wears a compression sleeve and gauntlet, although needs a new prescription for both today.  He denies new bone or joint aches or pains.  He has no cough or shortness of breath.  He has no abdominal pain.      Past Medical/Surgical History:  Past Medical History:   Diagnosis Date    Breast cancer (H) 05/2021    Chronic sinusitis     Hypertension 2002     Past Surgical History:   Procedure Laterality Date    COLONOSCOPY WITH CO2 INSUFFLATION N/A 10/28/2022    Procedure: COLONOSCOPY, WITH CO2 INSUFFLATION;  Surgeon: Annemarie Noel DO;  Location: [facility] OR    COMBINED ESOPHAGOSCOPY, GASTROSCOPY, DUODENOSCOPY (EGD) WITH CO2 INSUFFLATION N/A 10/28/2022    Procedure: ESOPHAGOGASTRODUODENOSCOPY, WITH CO2 INSUFFLATION;  Surgeon: Annemarie Noel DO;  Location: [facility] OR    DISSECT LYMPH NODE AXILLA Right 10/27/2021    Procedure: RIGHT Axillary Lymph Node Dissection;  Surgeon: Nida Snider MD;  Location: [facility] OR    ESOPHAGOSCOPY, GASTROSCOPY, DUODENOSCOPY (EGD), COMBINED N/A 10/28/2022    Procedure: ESOPHAGOGASTRODUODENOSCOPY, WITH BIOPSY;  Surgeon: Sadie  DO Annemarie;  Location: [facility] OR    MASTECTOMY SIMPLE Right 10/27/2021    Procedure: RIGHT Simple Mastectomy;  Surgeon: Nida Snider MD;  Location: [facility] OR     Allergies:  Allergies as of 10/31/2023    (No Known Allergies)     Current Medications:  Current Outpatient Medications   Medication Sig Dispense Refill    abemaciclib (VERZENIO) 150 MG tablet Take 1 tablet (150 mg) by mouth 2 times daily 56 tablet 2    budesonide (RINOCORT AQUA) 32 MCG/ACT nasal spray Spray 2 sprays into both nostrils daily (Patient not taking: Reported on 8/1/2023) 8.43 mL 11    clotrimazole (LOTRIMIN) 1 % external cream Apply topically to the groin area twice a day until resolved. (Patient not taking: Reported on 8/1/2023) 30 g 1    famotidine (PEPCID) 40 MG tablet Take 1 tablet (40 mg) by mouth daily 90 tablet 3    letrozole (FEMARA) 2.5 MG tablet Take 1 tablet (2.5 mg) by mouth daily for 360 days 90 tablet 3    loperamide (IMODIUM) 2 MG capsule Start with 2 caps (4 mg), then take one cap (2 mg) after each diarrheal stool as needed. Do not use more than 8 caps (16 mg) per day. (Patient not taking: Reported on 8/1/2023) 30 capsule 0    losartan (COZAAR) 25 MG tablet 1 tablet      losartan-hydrochlorothiazide (HYZAAR) 100-25 MG tablet TAKE 1 TABLET BY MOUTH EVERY MORNING (Patient not taking: Reported on 8/8/2023) 90 tablet 3    MELATONIN PO  (Patient not taking: Reported on 5/8/2023)      montelukast (SINGULAIR) 10 MG tablet Take 1 tablet (10 mg) by mouth At Bedtime (Patient not taking: Reported on 5/8/2023) 30 tablet 3    Naproxen Sodium (ALEVE PO)  (Patient not taking: Reported on 5/8/2023)      prochlorperazine (COMPAZINE) 10 MG tablet Take 0.5 tablets (5 mg) by mouth every 6 hours as needed for nausea or vomiting (Patient not taking: Reported on 5/8/2023) 30 tablet 2    triamcinolone (KENALOG) 0.1 % external ointment Apply topically 2 times daily as needed (rash) (Patient not taking: Reported on 8/1/2023) 80 g 5    urea (GORDONS  UREA) 40 % external ointment For callused feet nightly (Patient not taking: Reported on 5/26/2023) 60 g 11      Genetics Breast Actionable and Breast Expanded panels were both negative for pathogenic germline mutations.    Physical Exam:  /77   Pulse 77   Temp 98  F (36.7  C) (Oral)   Resp 16   Wt 80.3 kg (177 lb)   SpO2 100%   BMI 26.92 kg/m    General:  Well appearing adult male in NAD.  Alert and oriented x 3.  HEENT:  Normocephalic.  Sclera anicteric.   Lymph:  No palpable cervical, supraclavicular, or axillary LAD.    Chest:  CTA bilaterally.  No wheezes or crackles.  CV:  RRR.  Nl S1 and S2.  No audible m/r/g.  Chest wall:  Right mastectomy. No palpable mass or nodularity of either the right or left chest.   Abd:  Soft/ND   Ext:  No edema of the bilateral lower extremities. Wearing RUE compression sleeve  Neuro:  Cranial nerves grossly intact.  Gait stable.  Psych:  Mood and affect appear normal.  Skin: Hyperpigmented skin upper back with overlying excoriations, unchanged from prior.    Laboratory/Imaging Studies:  I personally reviewed the below images and laboratory studies:    10/3/2023 Labs:  Electrolytes, kidney, and liver function tests are wnl.   is stable at 37  CEA is stable at 4.2    WBC is low at 2.5  Hemoglobin is low at 10.5 g/dL  Platelets are wnl.    10/11/2023 CT chest w/o contrast:  FINDINGS: No contrast. The included thyroid appears unremarkable.  Right breast surgical changes again demonstrated.  Heart size normal. Aorta and pulmonary artery calibers are normal. No  enlarged lymph nodes. Extensive right axillary surgical clipping again  noted likely from axillary lymph node dissection.  No pleural or pericardial effusion.  The included upper abdomen shows no suspicious findings.     Bone detail shows osteopenia with diffuse idiopathic skeletal  hyperostosis in the mid to upper thoracic spine. No suspicious  sclerotic or lytic/destructive lesion.     Detail of the lungs  shows mild pleural-based atelectasis or scarring  at the anterior right middle lobe. No dominant pulmonary nodule. The  previous 1 mm right middle lobe nodule appears calcified and unchanged  (series 4 image 107) condyle consistent with benign granuloma. Major  airways are nicely patent.                                                                      IMPRESSION: No acute or other interval findings to suggest thoracic  metastatic disease. 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  He is s/p treatment with 3.5 months neoadjuvant amcenestrant, right breast mastectomy, right ALND, 4 cycles Taxotere and cyclophosphamide, and radiation.  He is on treatment with abemaciclib, lupron and letrozole.    1.  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  He continues on treatment with abemaciclib, lupron and letrozole since 4/26/2022.  Plan to continue abemaciclib for a total of 2 years (through 4/26/2024), lupron and letrozole for a total of 10 years (through 3/16/2032).   - I reviewed the CT chest performed on 10/11/23 which is without evidence of disease recurrence.  - Given 15/44 lymph nodes were positive, we are monitoring CA 15-3 and CEA markers once every 6 months to his lab tests for surveillance.   tumor marker is mildly elevated, but has remained stable over time.  Will continue to monitor.  - Discussed the pros and cons of monitoring for residual circulating tumor DNA with a test such as Signatera.  Patient was provided with written information on this as well.  They expressed they are likely interested in doing this, with baseline level perhaps upon completion of abemaciclib in April.  - Next Lupron is due 11/3/2023, he will receive the  3 month dose as they are planning to travel to [place] November - early January to help with their grandchildren; they have a new grandchild due on 11/10.  - Continue monthly labs.  - Provider visit every 3 months.    2.  Nausea/vomiting/diarrhea:  Secondary to abemaciclib.  - Recommend taking Pepcid, ondansetron, and an imodium prophylactically prior to dining out at a restaurant or attending social events.  - Continue to take 1-2 imodium with the first diarrheal stool.  - discussed we can consider a dose reduction in abemaciclib to 100 mg PO BID if persistent bothersome GI symptoms.    3.  RUE lymphedema/cording: Continue compression, massage, and lymphedema pump with good response.  - Provided with a DME prescription for a new compression sleeve and gauntlet.    4.  Cutaneous candidiasis:  Hyperpigmentation and pruritus of the back and pruritus of the groin are consistent w/ cutaneous candidiasis.  - Ketoconazole 2% cream applied topically daily x 2 weeks prescribed today.    5.  Patulous esophagus/gastritis/esophagitis:  No change since last visit.  Diagnosed with EGD 10/2022.   - Continue Pepcid QHS    6.  Bone Health:  DEXA 10/2022 with osteopenia of multiple sites.  On every 6 month Zometa for prevention of breast cancer bone metastases.  Unfortunately this was poorly tolerated and therefore changed to Prolia on 4/17.    - He will receive Prolia 11/3, same day as his next Lupron injection.    7.  Pulmonary nodule:  RUL seen on 04/2023 CT chest, review of 10/11/2023 CT chest shows this nodule to no longer be present.  No further evaluation needed.      8.  Follow Up:.  Lupron and Prolia 11/3/2023 Labs, SANDRA or visit with me, and Lupron in 3 months. Labs, visit with me, and Prolia in 6 months.       Sincerely,        Mikaela Sr MD

## 2023-10-31 NOTE — NURSING NOTE
"Oncology Rooming Note    October 31, 2023 9:12 AM   Ronna Collins is a 73 year old male who presents for:    Chief Complaint   Patient presents with    Oncology Clinic Visit     Breast CA     Initial Vitals: /77   Pulse 77   Temp 98  F (36.7  C) (Oral)   Resp 16   Wt 80.3 kg (177 lb)   SpO2 100%   BMI 26.92 kg/m   Estimated body mass index is 26.92 kg/m  as calculated from the following:    Height as of 6/1/23: 1.727 m (5' 7.99\").    Weight as of this encounter: 80.3 kg (177 lb). Body surface area is 1.96 meters squared.  No Pain (0) Comment: Data Unavailable   No LMP for male patient.  Allergies reviewed: Yes  Medications reviewed: Yes    Medications: Medication refills not needed today.  Pharmacy name entered into [facility]:    [id number] - [place], [place] - [address] AT [facility] - [place], [place] - [address]  [id number] - [place], [place] - [address] AT [facility] [address]    Clinical concerns: none       Linda De Leon MA             "

## 2023-11-01 DIAGNOSIS — C50.929 MALIGNANT NEOPLASM OF MALE BREAST, UNSPECIFIED ESTROGEN RECEPTOR STATUS, UNSPECIFIED LATERALITY, UNSPECIFIED SITE OF BREAST (H): Primary | ICD-10-CM

## 2023-11-03 ENCOUNTER — INFUSION THERAPY VISIT (OUTPATIENT)
Dept: ONCOLOGY | Facility: CLINIC | Age: 73
End: 2023-11-03
Payer: COMMERCIAL

## 2023-11-03 ENCOUNTER — APPOINTMENT (OUTPATIENT)
Dept: LAB | Facility: CLINIC | Age: 73
End: 2023-11-03
Payer: COMMERCIAL

## 2023-11-03 VITALS
RESPIRATION RATE: 16 BRPM | DIASTOLIC BLOOD PRESSURE: 69 MMHG | TEMPERATURE: 97.9 F | SYSTOLIC BLOOD PRESSURE: 117 MMHG | BODY MASS INDEX: 27.7 KG/M2 | HEART RATE: 83 BPM | OXYGEN SATURATION: 95 % | WEIGHT: 182.1 LBS

## 2023-11-03 DIAGNOSIS — C50.929 MALIGNANT NEOPLASM OF MALE BREAST, UNSPECIFIED ESTROGEN RECEPTOR STATUS, UNSPECIFIED LATERALITY, UNSPECIFIED SITE OF BREAST (H): ICD-10-CM

## 2023-11-03 DIAGNOSIS — M89.9 DISORDER OF BONE AND CARTILAGE: ICD-10-CM

## 2023-11-03 DIAGNOSIS — Z17.0 MALIGNANT NEOPLASM OF CENTRAL PORTION OF RIGHT BREAST IN MALE, ESTROGEN RECEPTOR POSITIVE (H): Primary | ICD-10-CM

## 2023-11-03 DIAGNOSIS — Z79.811 LONG TERM (CURRENT) USE OF AROMATASE INHIBITORS: ICD-10-CM

## 2023-11-03 DIAGNOSIS — M94.9 DISORDER OF BONE AND CARTILAGE: ICD-10-CM

## 2023-11-03 DIAGNOSIS — C50.121 MALIGNANT NEOPLASM OF CENTRAL PORTION OF RIGHT BREAST IN MALE, ESTROGEN RECEPTOR POSITIVE (H): Primary | ICD-10-CM

## 2023-11-03 LAB
ALBUMIN SERPL BCG-MCNC: 4.2 G/DL (ref 3.5–5.2)
ALP SERPL-CCNC: 47 U/L (ref 40–129)
ALT SERPL W P-5'-P-CCNC: 20 U/L (ref 0–70)
ANION GAP SERPL CALCULATED.3IONS-SCNC: 11 MMOL/L (ref 7–15)
AST SERPL W P-5'-P-CCNC: 24 U/L (ref 0–45)
BASOPHILS # BLD AUTO: 0.1 10E3/UL (ref 0–0.2)
BASOPHILS NFR BLD AUTO: 2 %
BILIRUB SERPL-MCNC: 0.4 MG/DL
BUN SERPL-MCNC: 13 MG/DL (ref 8–23)
CALCIUM SERPL-MCNC: 9.6 MG/DL (ref 8.8–10.2)
CHLORIDE SERPL-SCNC: 102 MMOL/L (ref 98–107)
CREAT SERPL-MCNC: 0.91 MG/DL (ref 0.67–1.17)
DEPRECATED HCO3 PLAS-SCNC: 23 MMOL/L (ref 22–29)
EGFRCR SERPLBLD CKD-EPI 2021: 90 ML/MIN/1.73M2
EOSINOPHIL # BLD AUTO: 0.2 10E3/UL (ref 0–0.7)
EOSINOPHIL NFR BLD AUTO: 7 %
ERYTHROCYTE [DISTWIDTH] IN BLOOD BY AUTOMATED COUNT: 12.7 % (ref 10–15)
GLUCOSE SERPL-MCNC: 132 MG/DL (ref 70–99)
HCT VFR BLD AUTO: 31.6 % (ref 40–53)
HGB BLD-MCNC: 10.7 G/DL (ref 13.3–17.7)
IMM GRANULOCYTES # BLD: 0 10E3/UL
IMM GRANULOCYTES NFR BLD: 0 %
LYMPHOCYTES # BLD AUTO: 0.9 10E3/UL (ref 0.8–5.3)
LYMPHOCYTES NFR BLD AUTO: 33 %
MCH RBC QN AUTO: 32.3 PG (ref 26.5–33)
MCHC RBC AUTO-ENTMCNC: 33.9 G/DL (ref 31.5–36.5)
MCV RBC AUTO: 96 FL (ref 78–100)
MONOCYTES # BLD AUTO: 0.2 10E3/UL (ref 0–1.3)
MONOCYTES NFR BLD AUTO: 7 %
NEUTROPHILS # BLD AUTO: 1.3 10E3/UL (ref 1.6–8.3)
NEUTROPHILS NFR BLD AUTO: 51 %
NRBC # BLD AUTO: 0 10E3/UL
NRBC BLD AUTO-RTO: 0 /100
PLATELET # BLD AUTO: 167 10E3/UL (ref 150–450)
POTASSIUM SERPL-SCNC: 4.1 MMOL/L (ref 3.4–5.3)
PROT SERPL-MCNC: 7.4 G/DL (ref 6.4–8.3)
RBC # BLD AUTO: 3.31 10E6/UL (ref 4.4–5.9)
SODIUM SERPL-SCNC: 136 MMOL/L (ref 135–145)
WBC # BLD AUTO: 2.6 10E3/UL (ref 4–11)

## 2023-11-03 PROCEDURE — 96402 CHEMO HORMON ANTINEOPL SQ/IM: CPT

## 2023-11-03 PROCEDURE — 250N000011 HC RX IP 250 OP 636: Mod: JZ | Performed by: INTERNAL MEDICINE

## 2023-11-03 PROCEDURE — 80053 COMPREHEN METABOLIC PANEL: CPT

## 2023-11-03 PROCEDURE — 85004 AUTOMATED DIFF WBC COUNT: CPT

## 2023-11-03 PROCEDURE — 36415 COLL VENOUS BLD VENIPUNCTURE: CPT

## 2023-11-03 PROCEDURE — 96372 THER/PROPH/DIAG INJ SC/IM: CPT | Performed by: INTERNAL MEDICINE

## 2023-11-03 PROCEDURE — 96372 THER/PROPH/DIAG INJ SC/IM: CPT | Mod: 59 | Performed by: INTERNAL MEDICINE

## 2023-11-03 RX ORDER — METHYLPREDNISOLONE SODIUM SUCCINATE 125 MG/2ML
125 INJECTION, POWDER, LYOPHILIZED, FOR SOLUTION INTRAMUSCULAR; INTRAVENOUS
Status: CANCELLED
Start: 2024-04-14

## 2023-11-03 RX ORDER — ALBUTEROL SULFATE 90 UG/1
1-2 AEROSOL, METERED RESPIRATORY (INHALATION)
Status: CANCELLED
Start: 2024-04-14

## 2023-11-03 RX ORDER — ALBUTEROL SULFATE 0.83 MG/ML
2.5 SOLUTION RESPIRATORY (INHALATION)
Status: CANCELLED | OUTPATIENT
Start: 2024-04-14

## 2023-11-03 RX ORDER — EPINEPHRINE 1 MG/ML
0.3 INJECTION, SOLUTION, CONCENTRATE INTRAVENOUS EVERY 5 MIN PRN
Status: CANCELLED | OUTPATIENT
Start: 2024-04-14

## 2023-11-03 RX ORDER — DIPHENHYDRAMINE HYDROCHLORIDE 50 MG/ML
50 INJECTION INTRAMUSCULAR; INTRAVENOUS
Status: CANCELLED
Start: 2024-04-14

## 2023-11-03 RX ORDER — MEPERIDINE HYDROCHLORIDE 25 MG/ML
25 INJECTION INTRAMUSCULAR; INTRAVENOUS; SUBCUTANEOUS EVERY 30 MIN PRN
Status: CANCELLED | OUTPATIENT
Start: 2024-04-14

## 2023-11-03 RX ADMIN — DENOSUMAB 60 MG: 60 INJECTION SUBCUTANEOUS at 09:27

## 2023-11-03 RX ADMIN — LEUPROLIDE ACETATE 11.25 MG: KIT at 08:59

## 2023-11-03 ASSESSMENT — PAIN SCALES - GENERAL: PAINLEVEL: NO PAIN (0)

## 2023-11-03 NOTE — NURSING NOTE
Chief Complaint   Patient presents with    Blood Draw     Labs collected from venipuncture by RN. Vitals taken. Checked in for appointment(s).      [place] YUEN RN PHN BSN  BMT/Oncology Lab

## 2023-11-03 NOTE — PROGRESS NOTES
Infusion Nursing Note:  Ronna Collins presents today for Cycle 20 Day 1 Lupron (every 3 months) and Prolia.    Patient seen by provider today: No, saw Dr. Sr 10/31/23   present during visit today: Not Applicable.    Note: Patient presents to the infusion center today denying any new symptoms or concerns since his provider appt 10/31.    Patient states he is not taking calcium or vit D per Dr. Sr's request.    Intravenous Access:  No Intravenous access at this visit.    Treatment Conditions:   Latest Reference Range & Units 11/03/23 08:30   Sodium 135 - 145 mmol/L 136   Potassium 3.4 - 5.3 mmol/L 4.1   Chloride 98 - 107 mmol/L 102   Carbon Dioxide (CO2) 22 - 29 mmol/L 23   Urea Nitrogen 8.0 - 23.0 mg/dL 13.0   Creatinine 0.67 - 1.17 mg/dL 0.91   GFR Estimate >60 mL/min/1.73m2 90   Calcium 8.8 - 10.2 mg/dL 9.6   Anion Gap 7 - 15 mmol/L 11   Albumin 3.5 - 5.2 g/dL 4.2   Protein Total 6.4 - 8.3 g/dL 7.4   Alkaline Phosphatase 40 - 129 U/L 47   ALT 0 - 70 U/L 20   AST 0 - 45 U/L 24   Bilirubin Total <=1.2 mg/dL 0.4   Glucose 70 - 99 mg/dL 132 (H)   WBC 4.0 - 11.0 10e3/uL 2.6 (L)   Hemoglobin 13.3 - 17.7 g/dL 10.7 (L)   Hematocrit 40.0 - 53.0 % 31.6 (L)   Platelet Count 150 - 450 10e3/uL 167   RBC Count 4.40 - 5.90 10e6/uL 3.31 (L)   MCV 78 - 100 fL 96   MCH 26.5 - 33.0 pg 32.3   MCHC 31.5 - 36.5 g/dL 33.9   RDW 10.0 - 15.0 % 12.7   % Neutrophils % 51   % Lymphocytes % 33   % Monocytes % 7   % Eosinophils % 7   % Basophils % 2   Absolute Basophils 0.0 - 0.2 10e3/uL 0.1   Absolute Eosinophils 0.0 - 0.7 10e3/uL 0.2   Absolute Immature Granulocytes <=0.4 10e3/uL 0.0   Absolute Lymphocytes 0.8 - 5.3 10e3/uL 0.9   Absolute Monocytes 0.0 - 1.3 10e3/uL 0.2   % Immature Granulocytes % 0   Absolute Neutrophils 1.6 - 8.3 10e3/uL 1.3 (L)   Absolute NRBCs 10e3/uL 0.0   NRBCs per 100 WBC <1 /100 0     Results reviewed, labs MET treatment parameters, ok to proceed with treatment.    Post Infusion  Assessment:  Patient tolerated Lupron injection without incident in right ventrogluteal.  Patient tolerated Prolia injection in left arm without incident.  Site patent and intact, free from redness, edema or discomfort.  No evidence of extravasations.     Discharge Plan:   Patient declined prescription refills.  Discharge instructions reviewed with: Patient.  Patient and/or family verbalized understanding of discharge instructions and all questions answered.  AVS to patient via [employer].  Patient will return 1/22 for next appointment.   Patient discharged in stable condition accompanied by: self.  Departure Mode: Ambulatory.      Rema Marrufo RN

## 2023-11-03 NOTE — PATIENT INSTRUCTIONS
[facility] Triage and after hours / weekends / holidays:  [phone]    Please call the triage or after hours line if you experience a temperature greater than or equal to 100.4, shaking chills, have uncontrolled nausea, vomiting and/or diarrhea, dizziness, shortness of breath, chest pain, bleeding, unexplained bruising, or if you have any other new/concerning symptoms, questions or concerns.      If you are having any concerning symptoms or wish to speak to a provider before your next infusion visit, please call triage to notify them so we can adequately serve you.     If you need a refill on a narcotic prescription or other medication, please call before your infusion appointment.

## 2023-11-20 DIAGNOSIS — K21.9 GASTROESOPHAGEAL REFLUX DISEASE WITHOUT ESOPHAGITIS: ICD-10-CM

## 2023-11-21 RX ORDER — FAMOTIDINE 40 MG/1
40 TABLET, FILM COATED ORAL DAILY
Qty: 90 TABLET | Refills: 3 | Status: SHIPPED | OUTPATIENT
Start: 2023-11-21

## 2023-11-21 NOTE — NURSING NOTE
"Oncology Rooming Note    June 7, 2022 7:55 AM   Ronna Collins is a 71 year old male who presents for:    Chief Complaint   Patient presents with     Oncology Clinic Visit     Breast Cancer     Blood Draw     Labs drawn via  by RN. Vitals taken.     Initial Vitals: BP (!) 146/96 (BP Location: Left arm, Patient Position: Sitting, Cuff Size: Adult Regular)   Pulse 76   Temp 97.7  F (36.5  C) (Oral)   Resp 16   Wt 75.5 kg (166 lb 8 oz)   SpO2 99%   BMI 25.68 kg/m   Estimated body mass index is 25.68 kg/m  as calculated from the following:    Height as of 3/3/22: 1.715 m (5' 7.52\").    Weight as of this encounter: 75.5 kg (166 lb 8 oz). Body surface area is 1.9 meters squared.  No Pain (0) Comment: Data Unavailable   No LMP for male patient.  Allergies reviewed: Yes  Medications reviewed: No    Medications: Medication refills not needed today.  Pharmacy name entered into [facility]:    [id number] - [place], [place] - [address] AT [facility] - [place], [place] - [address]    Clinical concerns: Pt presents today for f/u Breast Cancer. Pt states he has had difficulty sleeping x 10 days. Pt states he has had soreness in his quads.  Pt is currently going thru strength training which has helped.       Emy Gao LPN  6/7/2022              " s/p left TKA/yes

## 2023-11-22 NOTE — TELEPHONE ENCOUNTER
LVD 6/1/2023  [facility] [place]     Ibrahima Davis MD  Student in organized health care education/training program   BUFFY per david

## 2023-12-11 DIAGNOSIS — C50.929 MALIGNANT NEOPLASM OF MALE BREAST, UNSPECIFIED ESTROGEN RECEPTOR STATUS, UNSPECIFIED LATERALITY, UNSPECIFIED SITE OF BREAST (H): Primary | ICD-10-CM

## 2023-12-11 RX ORDER — LETROZOLE 2.5 MG/1
2.5 TABLET, FILM COATED ORAL DAILY
Qty: 90 TABLET | Refills: 3 | Status: SHIPPED | OUTPATIENT
Start: 2023-12-11 | End: 2024-12-05

## 2024-01-07 ENCOUNTER — HEALTH MAINTENANCE LETTER (OUTPATIENT)
Age: 74
End: 2024-01-07

## 2024-01-10 ENCOUNTER — TELEPHONE (OUTPATIENT)
Dept: ONCOLOGY | Facility: CLINIC | Age: 74
End: 2024-01-10
Payer: COMMERCIAL

## 2024-01-10 NOTE — ORAL ONC MGMT
Oral Chemotherapy Monitoring Program    Subjective/Objective:  Ronna Collins is a 73 year old male contacted by phone for a follow-up visit for oral chemotherapy.  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 Denies new or worsening side effects, missed doses, and recent hospital or ED visits. Patient has not had any recent medication changes.           6/26/2023     8:00 AM 8/30/2023    12:00 PM 9/18/2023    10:00 AM 9/18/2023     3:00 PM 10/4/2023     8:00 AM 12/11/2023     9:00 AM 1/10/2024     2:00 PM   ORAL CHEMOTHERAPY   Assessment Type Lab Monitoring Lab Monitoring Refill Quarterly Follow up Lab Monitoring Refill Quarterly Follow up   Diagnosis Code Breast Cancer Breast Cancer Breast Cancer Breast Cancer Breast Cancer Breast Cancer Breast Cancer   Providers Remberto Remberto Remberto Remberto Remberto Remberto Remberto   [facility]   Is this patient followed by the [facility] OC team? Yes Yes Yes Yes Yes Yes Yes   Drug Name Verzenio (abemaciclib) Verzenio (abemaciclib) Verzenio (abemaciclib) Verzenio (abemaciclib) Verzenio (abemaciclib) Verzenio (abemaciclib) Verzenio (abemaciclib)   Dose 150 mg 150 mg 150 mg 150 mg 150 mg 150 mg 150 mg   Current Schedule BID BID BID BID BID BID BID   Cycle Details Continuous Continuous Continuous Continuous Continuous Continuous Continuous   Doses missed in last 2 weeks    1   0   Adherence Assessment    Adherent   Adherent   Adverse Effects    No AE identified during assessment   No AE identified during assessment   Any new drug interactions?    No   No   Is the dose as ordered appropriate for the patient?    Yes   Yes   Since the last time we talked, have you been hospitalized or used the emergency room?    No   No       Last PHQ-2 Score on record:       5/26/2023     1:18 PM 1/2/2023     7:36 AM   PHQ-2 ( 1999 [employer])   Q1: Little interest or pleasure in doing things 0 0  "  Q2: Feeling down, depressed or hopeless 0 0   PHQ-2 Score 0 0   Q1: Little interest or pleasure in doing things Not at all Not at all   Q2: Feeling down, depressed or hopeless Not at all Not at all   PHQ-2 Score 0 0       Vitals:  BP:   BP Readings from Last 1 Encounters:   11/03/23 117/69     Wt Readings from Last 1 Encounters:   11/03/23 82.6 kg (182 lb 1.6 oz)     Estimated body surface area is 1.99 meters squared as calculated from the following:    Height as of 6/1/23: 1.727 m (5' 7.99\").    Weight as of 11/3/23: 82.6 kg (182 lb 1.6 oz).    Labs:  No results found for NA within last 30 days.     No results found for K within last 30 days.     No results found for CA within last 30 days.     No results found for Mag within last 30 days.     No results found for Phos within last 30 days.     No results found for ALBUMIN within last 30 days.     No results found for BUN within last 30 days.     No results found for CR within last 30 days.     No results found for AST within last 30 days.     No results found for ALT within last 30 days.     No results found for BILITOTAL within last 30 days.     No results found for WBC within last 30 days.     No results found for HGB within last 30 days.     No results found for PLT within last 30 days.     No results found for ANC within last 30 days.     No results found for ANC within last 30 days.          Assessment/Plan:  Patient continues to tolerate Verzenio well.  Will continue medication as planned.     Follow-Up:  1/22: Dr. Sr + labs  4/3: Quarterly assessment    Refill Due:  [facility] to deliver Verzenio on 1/18/24.     Regina Knutson, PharmD  Hematology/Oncology Clinical Pharmacist  [facility]  [phone]      "

## 2024-01-18 ENCOUNTER — MYC REFILL (OUTPATIENT)
Dept: FAMILY MEDICINE | Facility: CLINIC | Age: 74
End: 2024-01-18
Payer: COMMERCIAL

## 2024-01-18 DIAGNOSIS — I10 HTN (HYPERTENSION): Primary | ICD-10-CM

## 2024-01-18 NOTE — TELEPHONE ENCOUNTER
losartan (COZAAR) 25 MG tablet -- --  -- --   Si tablet   Class: Historical   Order: [id number]     Last Office Visit : 23  Future Office visit:  0    Routing refill request to provider for review/approval because:  Medication is reported/historical

## 2024-01-22 ENCOUNTER — MYC MEDICAL ADVICE (OUTPATIENT)
Dept: FAMILY MEDICINE | Facility: CLINIC | Age: 74
End: 2024-01-22

## 2024-01-22 ENCOUNTER — APPOINTMENT (OUTPATIENT)
Dept: LAB | Facility: CLINIC | Age: 74
End: 2024-01-22
Attending: INTERNAL MEDICINE
Payer: COMMERCIAL

## 2024-01-22 ENCOUNTER — VIRTUAL VISIT (OUTPATIENT)
Dept: ONCOLOGY | Facility: CLINIC | Age: 74
End: 2024-01-22
Attending: INTERNAL MEDICINE
Payer: COMMERCIAL

## 2024-01-22 ENCOUNTER — MYC REFILL (OUTPATIENT)
Dept: FAMILY MEDICINE | Facility: CLINIC | Age: 74
End: 2024-01-22

## 2024-01-22 VITALS
TEMPERATURE: 98 F | HEART RATE: 77 BPM | RESPIRATION RATE: 16 BRPM | BODY MASS INDEX: 27.26 KG/M2 | WEIGHT: 179.2 LBS | DIASTOLIC BLOOD PRESSURE: 76 MMHG | SYSTOLIC BLOOD PRESSURE: 142 MMHG | OXYGEN SATURATION: 100 %

## 2024-01-22 DIAGNOSIS — L27.0 DERMATITIS, DRUG-INDUCED: ICD-10-CM

## 2024-01-22 DIAGNOSIS — I10 HTN (HYPERTENSION): Primary | ICD-10-CM

## 2024-01-22 DIAGNOSIS — Z51.11 ENCOUNTER FOR ANTINEOPLASTIC CHEMOTHERAPY: ICD-10-CM

## 2024-01-22 DIAGNOSIS — C50.121 MALIGNANT NEOPLASM OF CENTRAL PORTION OF RIGHT BREAST IN MALE, ESTROGEN RECEPTOR POSITIVE (H): Primary | ICD-10-CM

## 2024-01-22 DIAGNOSIS — F19.982 DRUG INDUCED INSOMNIA (H): ICD-10-CM

## 2024-01-22 DIAGNOSIS — Z17.0 MALIGNANT NEOPLASM OF CENTRAL PORTION OF RIGHT BREAST IN MALE, ESTROGEN RECEPTOR POSITIVE (H): Primary | ICD-10-CM

## 2024-01-22 LAB
ALBUMIN SERPL BCG-MCNC: 4.3 G/DL (ref 3.5–5.2)
ALP SERPL-CCNC: 51 U/L (ref 40–150)
ALT SERPL W P-5'-P-CCNC: 21 U/L (ref 0–70)
ANION GAP SERPL CALCULATED.3IONS-SCNC: 9 MMOL/L (ref 7–15)
AST SERPL W P-5'-P-CCNC: 22 U/L (ref 0–45)
BASOPHILS # BLD AUTO: 0.1 10E3/UL (ref 0–0.2)
BASOPHILS NFR BLD AUTO: 2 %
BILIRUB SERPL-MCNC: 0.3 MG/DL
BUN SERPL-MCNC: 10.3 MG/DL (ref 8–23)
CALCIUM SERPL-MCNC: 9.8 MG/DL (ref 8.8–10.2)
CANCER AG15-3 SERPL-ACNC: 42 U/ML
CEA SERPL-MCNC: 4.9 NG/ML
CHLORIDE SERPL-SCNC: 103 MMOL/L (ref 98–107)
CREAT SERPL-MCNC: 0.95 MG/DL (ref 0.67–1.17)
DEPRECATED HCO3 PLAS-SCNC: 24 MMOL/L (ref 22–29)
EGFRCR SERPLBLD CKD-EPI 2021: 86 ML/MIN/1.73M2
EOSINOPHIL # BLD AUTO: 0.1 10E3/UL (ref 0–0.7)
EOSINOPHIL NFR BLD AUTO: 4 %
ERYTHROCYTE [DISTWIDTH] IN BLOOD BY AUTOMATED COUNT: 12.9 % (ref 10–15)
GLUCOSE SERPL-MCNC: 79 MG/DL (ref 70–99)
HCT VFR BLD AUTO: 31.1 % (ref 40–53)
HGB BLD-MCNC: 10.7 G/DL (ref 13.3–17.7)
IMM GRANULOCYTES # BLD: 0 10E3/UL
IMM GRANULOCYTES NFR BLD: 0 %
LYMPHOCYTES # BLD AUTO: 0.9 10E3/UL (ref 0.8–5.3)
LYMPHOCYTES NFR BLD AUTO: 28 %
MCH RBC QN AUTO: 31.9 PG (ref 26.5–33)
MCHC RBC AUTO-ENTMCNC: 34.4 G/DL (ref 31.5–36.5)
MCV RBC AUTO: 93 FL (ref 78–100)
MONOCYTES # BLD AUTO: 0.2 10E3/UL (ref 0–1.3)
MONOCYTES NFR BLD AUTO: 8 %
NEUTROPHILS # BLD AUTO: 1.9 10E3/UL (ref 1.6–8.3)
NEUTROPHILS NFR BLD AUTO: 58 %
NRBC # BLD AUTO: 0 10E3/UL
NRBC BLD AUTO-RTO: 0 /100
PLATELET # BLD AUTO: 168 10E3/UL (ref 150–450)
POTASSIUM SERPL-SCNC: 4.4 MMOL/L (ref 3.4–5.3)
PROT SERPL-MCNC: 7.5 G/DL (ref 6.4–8.3)
RBC # BLD AUTO: 3.35 10E6/UL (ref 4.4–5.9)
SODIUM SERPL-SCNC: 136 MMOL/L (ref 135–145)
WBC # BLD AUTO: 3.1 10E3/UL (ref 4–11)

## 2024-01-22 PROCEDURE — 250N000011 HC RX IP 250 OP 636: Mod: JZ,GT,95 | Performed by: INTERNAL MEDICINE

## 2024-01-22 PROCEDURE — 96402 CHEMO HORMON ANTINEOPL SQ/IM: CPT | Mod: GT,95

## 2024-01-22 PROCEDURE — 80053 COMPREHEN METABOLIC PANEL: CPT | Mod: GT,95 | Performed by: INTERNAL MEDICINE

## 2024-01-22 PROCEDURE — 82378 CARCINOEMBRYONIC ANTIGEN: CPT | Mod: GT,95 | Performed by: INTERNAL MEDICINE

## 2024-01-22 PROCEDURE — 99214 OFFICE O/P EST MOD 30 MIN: CPT | Mod: 95 | Performed by: INTERNAL MEDICINE

## 2024-01-22 PROCEDURE — 85025 COMPLETE CBC W/AUTO DIFF WBC: CPT | Mod: GT,95 | Performed by: INTERNAL MEDICINE

## 2024-01-22 PROCEDURE — 86300 IMMUNOASSAY TUMOR CA 15-3: CPT | Mod: GT,95 | Performed by: INTERNAL MEDICINE

## 2024-01-22 PROCEDURE — 36415 COLL VENOUS BLD VENIPUNCTURE: CPT | Mod: GT,95 | Performed by: INTERNAL MEDICINE

## 2024-01-22 RX ORDER — LOSARTAN POTASSIUM 25 MG/1
25 TABLET ORAL DAILY
Qty: 90 TABLET | Refills: 1 | Status: SHIPPED | OUTPATIENT
Start: 2024-01-22 | End: 2024-01-23

## 2024-01-22 RX ORDER — LOSARTAN POTASSIUM AND HYDROCHLOROTHIAZIDE 25; 100 MG/1; MG/1
1 TABLET ORAL DAILY
Status: CANCELLED | OUTPATIENT
Start: 2024-01-22

## 2024-01-22 RX ORDER — LOSARTAN POTASSIUM AND HYDROCHLOROTHIAZIDE 25; 100 MG/1; MG/1
1 TABLET ORAL DAILY
COMMUNITY
Start: 2024-01-20 | End: 2024-01-23

## 2024-01-22 RX ADMIN — LEUPROLIDE ACETATE 7.5 MG: KIT at 09:08

## 2024-01-22 ASSESSMENT — PAIN SCALES - GENERAL: PAINLEVEL: NO PAIN (0)

## 2024-01-22 NOTE — PROGRESS NOTES
Virtual Visit Details    Type of service:  Video Visit     Originating Location (pt. Location): [facility] [place]  Distant Location (provider location):  On-site  Platform used for Video Visit: Jhonathan

## 2024-01-22 NOTE — PROGRESS NOTES
Oncology Visit:  Date on this visit: Jan 22, 2024    Diagnosis: h/o clinical prognostic stage IIIb, [id number], grade 2, ER positive, WV positive, HER-2 negative right breast cancer, [id number].    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  He noted discomfort and hardening of the skin of the right nipple in late April/early May, 2021.  He noted a seborrheic keratosis of the right nipple and remembered he had a similar skin lesion of the arm 3 months earlier.  However, he subsequently noted a mass in the same area.  Right breast skin punch biopsy performed by dermatology was c/w grade 2 invasive ductal carcinoma with associated DCIS.  Invasive carcinoma was ER positive 100% and WV positive 70%, with tumor invading the dermis.  HER-2 was negative by IHC (score 1+).  Diagnostic mammogram and ultrasound showed a retroareolar right breast mass measuring 2.9 cm with associated nipple retraction and enlarged, abnormal right axillary lymph nodes.  Right breast biopsy was again c/w grade 2 invasive ductal carcinoma, ER strong in 98% of tumor cell nuclei, HER-2 negative.  Ki-67 was 15%.    He elected to screen for the ISPY-2 clinical trial.  MRI breast on 6/18/2021 showed the biopsy proven right breast cancer to measure 3.9 cm in maximum dimension with invasion of the nipple and the pectoralis muscle as well as at least 4 abnormal level 1 and 2 right axillary lymph nodes and a tiny 0.4 cm right internal mammary lymph node.  Mammoprint returned low risk with a score of +0.29.  He elected for treatment on the endocrine optimization protocol of ISPY-2 and was randomized to treatment with amcenestrant.  He was on treatment with  amcenestrant from 7/1/2021 - 10/26/2021.  At our visit in 01/2021, both right axillary node and right breast tumor palpated more firm then prior.  Breast MRI was stable, however, due to clinical concerns for progression,  decision was made to proceed with surgery.   Right breast mastectomy and right axillary lymph node dissection was performed by Dr. Snider on 10/27/2021.  Pathology showed grade 2 carcinoma of the right breast measuring 3.2 cm with invasion of the dermis, nipple and the pectoralis muscle.  Ki-67 of the excised tumor was 7%.  15/44 right axillary lymph nodes were positive with 6 of the lymph nodes demonstrating extranodal extension.  The largest lymph node metastasis measured 2.1 cm.       Karina received 4 cycles of Taxotere and cyclophosphamide from 12/14/2021 - 2/16/2022.  His course was complicated by fevers persistent throughout treatment.  He received radiation (4800 cGy in 15 fractions) to the right chest wall and regional lymph nodes from 3/21/2022 - 4/8/2022.  He has been on treatment with lupron and letrozole since 3/16/2022.  Abemaciclib was added 4/26/2022.    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  He has generally been doing well.  In fact, [place] family.  They have a new grandson and also helped watch their daughter's 3-year-old as well.  Overall, they rather enjoyed this time with their family.  They have remained healthy throughout their travels and have no current fevers, chills, or symptoms of infection.  He continues on treatment with abemaciclib.  He notes ongoing insomnia.  He states it is intermittent.  His energy level has been okay during the day and not bothersome enough to take a sleep aid.  He notes ongoing hyperpigmentation and pruritus of the skin.  He states dry skin has been chronic for him, but it seemed to get much worse after starting abemaciclib.  He has an upcoming appointment with his dermatologist in July.  We both note that he will be stopping abemaciclib in April and therefore the timing of the dermatologist visit in July is appropriate.  His GI symptoms  have been intermittent as well, and not bothersome.  He does not take any medication.  He notes that his blood pressure medication was removed from his medication list and he had quite a bit of difficulty getting it added back and refilled.  He has been working with Dr. Merino's office on this.      Past Medical/Surgical History:  Past Medical History:   Diagnosis Date    Breast cancer (H) 05/2021    Chronic sinusitis     Hypertension 2002     Past Surgical History:   Procedure Laterality Date    COLONOSCOPY WITH CO2 INSUFFLATION N/A 10/28/2022    Procedure: COLONOSCOPY, WITH CO2 INSUFFLATION;  Surgeon: Annemarie Noel DO;  Location: [facility]    COMBINED ESOPHAGOSCOPY, GASTROSCOPY, DUODENOSCOPY (EGD) WITH CO2 INSUFFLATION N/A 10/28/2022    Procedure: ESOPHAGOGASTRODUODENOSCOPY, WITH CO2 INSUFFLATION;  Surgeon: Annemarie Noel DO;  Location: [facility]    DISSECT LYMPH NODE AXILLA Right 10/27/2021    Procedure: RIGHT Axillary Lymph Node Dissection;  Surgeon: Nida Snider MD;  Location: [facility]    ESOPHAGOSCOPY, GASTROSCOPY, DUODENOSCOPY (EGD), COMBINED N/A 10/28/2022    Procedure: ESOPHAGOGASTRODUODENOSCOPY, WITH BIOPSY;  Surgeon: Annemarie Noel DO;  Location: [facility]    MASTECTOMY SIMPLE Right 10/27/2021    Procedure: RIGHT Simple Mastectomy;  Surgeon: Nida Snider MD;  Location: [facility]     Allergies:  Allergies as of 01/22/2024    (No Known Allergies)     Current Medications:  Current Outpatient Medications   Medication Sig Dispense Refill    abemaciclib (VERZENIO) 150 MG tablet Take 1 tablet (150 mg) by mouth 2 times daily 56 tablet 2    famotidine (PEPCID) 40 MG tablet Take 1 tablet (40 mg) by mouth daily 90 tablet 3    ketoconazole (NIZORAL) 2 % external cream Apply topically daily (Patient not taking: Reported on 11/3/2023) 30 g 1    letrozole (FEMARA) 2.5 MG tablet Take 1 tablet (2.5 mg) by mouth daily for 360 days 90 tablet 3    letrozole (FEMARA) 2.5 MG tablet Take 1 tablet (2.5 mg) by mouth  daily for 360 days 90 tablet 3    loperamide (IMODIUM) 2 MG capsule Start with 2 caps (4 mg), then take one cap (2 mg) after each diarrheal stool as needed. Do not use more than 8 caps (16 mg) per day. (Patient not taking: Reported on 8/1/2023) 30 capsule 0    losartan (COZAAR) 25 MG tablet 1 tablet      MELATONIN PO         Genetics Breast Actionable and Breast Expanded panels were both negative for pathogenic germline mutations.    Physical Exam:  BP (!) 142/76 (BP Location: Left arm, Patient Position: Sitting, Cuff Size: Adult Regular)   Pulse 77   Temp 98  F (36.7  C) (Oral)   Resp 16   Wt 81.3 kg (179 lb 3.2 oz)   SpO2 100%   BMI 27.26 kg/m    General:  Well appearing, well nourished adult male in NAD.  Alert and oriented x 3.  Eyes:  No erythema or discharge  Respiratory:  Breathing comfortably on room air.  No wheezing or distress.  Musculoskeletal:  Normal movement of the bilateral upper extremities.  Skin:  No visible concerning skin rashes or lesions  Neuro:  No notable tremor and dyskinetic movements.  Psych:  Mood and affect appear normal.    Laboratory/Imaging Studies:  I personally reviewed the below images and laboratory studies:    1/22/2024 Labs:  Electrolytes, kidney, and liver function tests are wnl.  WBC is slightly low at 3.1; ANC is wnl at 1.9  Hemoglobin is slightly low at 10.7 g/dL  Platelets are wnl.    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  He is s/p treatment with 3.5 months neoadjuvant amcenestrant, right breast mastectomy, right ALND, 4 cycles Taxotere and cyclophosphamide, and radiation.  He is on treatment with abemaciclib, lupron and letrozole.    1.  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  He has been on treatment with abemaciclib, lupron and  letrozole since 4/26/2022.  Plan to continue abemaciclib for a total of 2 years (through 4/2024), lupron and letrozole for a total of 10 years (through 3/16/2032).   - Given 15/44 lymph nodes were positive, we are monitoring CA 15-3 and CEA markers once every 6 months to his lab tests for surveillance.   tumor marker is mildly elevated, but has remained stable over time.  Will continue to monitor.  - They reviewed Signater[employer] data with their daughter who is an ENT.  Given there is no evidence yet that a change in treatment or initiation of early treatment improves outcomes, they have decided not to pursue it at this time.  - They inquired about future imaging.  I reminded them that national guidelines for cancer surveillance do not recommend scheduled routien whole body imaging surveillance for breast cancer.  Imaging is performed based on symptoms only.  - Next Lupron is due 2/1/2024, continue monthly dosing  - Continue monthly labs until completion of abemaciclib.  - Provider visit every 3 months.    2.  Nausea/vomiting/diarrhea:  Secondary to abemaciclib.  - Okay to take Pepcid, ondansetron, and an imodium prn prophylactically prior to dining out at a restaurant or attending social events.  Of note, has not been bothersome enough to do this.  - Continue to take 1-2 imodium with the first diarrheal stool.    3.  RUE lymphedema/cording: No change since last visit.  Continue compression, massage, and lymphedema pump with good response.    4.  Hyperpigmented rash:  Hyperpigmentation and pruritus of the back and pruritus of the groin are consistent w/ cutaneous candidiasis.  - No improvement with topical ketoconazole.  - Reports chronic dry skin but worsening of this and added hyperpigmentation with starting abemaciclib.  As he will complete course of abemaciclib in April, he will wait to see if this improves with discontinuation of the medication.  If not, will discuss possible punch biopsy with her dermatologist  at their visit in July.    5.  Insomnia:  Chronic and exacerbated by endocrine therapy.  Not bothersome enough to take medication at this time.    6.  Bone Health:  DEXA 10/2022 with osteopenia of multiple sites.  On every 6 month Zometa for prevention of breast cancer bone metastases.  Unfortunately this was poorly tolerated and therefore changed to Prolia on 4/17.    - Next dose of Prolia is due around 5/1/2024.    7.  Follow Up:.  Labs and Lupron every 4 weeks x 3 starting 2/1/2024  Labs and visit with Nay Wolf in 3 months.  Visit with me in 6 months.    I spent 35 minutes on the date of the encounter doing chart review, review of test results, interpretation of tests, patient visit, and documentation.

## 2024-01-22 NOTE — NURSING NOTE
Chief Complaint   Patient presents with    Blood Draw     Labs drawn via  by vascular in lab. VS taken.      Labs collected from venipuncture by vascular. Vitals taken.     Janelle Lucero RN

## 2024-01-22 NOTE — LETTER
1/22/2024         RE: Ronna Collins  [address]  [facility] [address]        Dear Colleague,    Thank you for referring your patient, Ronna Collins, to the [facility]. Please see a copy of my visit note below.    Oncology Visit:  Date on this visit: Jan 22, 2024    Diagnosis: h/o clinical prognostic stage IIIb, [id number], grade 2, ER positive, AK positive, HER-2 negative right breast cancer, [id number].    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  He noted discomfort and hardening of the skin of the right nipple in late April/early May, 2021.  He noted a seborrheic keratosis of the right nipple and remembered he had a similar skin lesion of the arm 3 months earlier.  However, he subsequently noted a mass in the same area.  Right breast skin punch biopsy performed by dermatology was c/w grade 2 invasive ductal carcinoma with associated DCIS.  Invasive carcinoma was ER positive 100% and AK positive 70%, with tumor invading the dermis.  HER-2 was negative by IHC (score 1+).  Diagnostic mammogram and ultrasound showed a retroareolar right breast mass measuring 2.9 cm with associated nipple retraction and enlarged, abnormal right axillary lymph nodes.  Right breast biopsy was again c/w grade 2 invasive ductal carcinoma, ER strong in 98% of tumor cell nuclei, HER-2 negative.  Ki-67 was 15%.    He elected to screen for the ISPY-2 clinical trial.  MRI breast on 6/18/2021 showed the biopsy proven right breast cancer to measure 3.9 cm in maximum dimension with invasion of the nipple and the pectoralis muscle as well as at least 4 abnormal level 1 and 2 right axillary lymph nodes and a tiny 0.4 cm right internal mammary lymph node.  Mammoprint returned low risk with a score of +0.29.  He elected for treatment on the endocrine optimization protocol of ISPY-2 and was randomized to treatment  with amcenestrant.  He was on treatment with  amcenestrant from 7/1/2021 - 10/26/2021.  At our visit in 01/2021, both right axillary node and right breast tumor palpated more firm then prior.  Breast MRI was stable, however, due to clinical concerns for progression, decision was made to proceed with surgery.   Right breast mastectomy and right axillary lymph node dissection was performed by Dr. Snider on 10/27/2021.  Pathology showed grade 2 carcinoma of the right breast measuring 3.2 cm with invasion of the dermis, nipple and the pectoralis muscle.  Ki-67 of the excised tumor was 7%.  15/44 right axillary lymph nodes were positive with 6 of the lymph nodes demonstrating extranodal extension.  The largest lymph node metastasis measured 2.1 cm.      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  His course was complicated by fevers persistent throughout treatment.  He received radiation (4800 cGy in 15 fractions) to the right chest wall and regional lymph nodes from 3/21/2022 - 4/8/2022.  He has been on treatment with lupron and letrozole since 3/16/2022.  Abemaciclib was added 4/26/2022.    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  He has generally been doing well.  In fact, [place] family.  They have a new grandson and also helped watch their daughter's 3-year-old as well.  Overall, they rather enjoyed this time with their family.  They have remained healthy throughout their travels and have no current fevers, chills, or symptoms of infection.  He continues on treatment with abemaciclib.  He notes ongoing insomnia.  He states it is intermittent.  His energy level has been okay during the day and not bothersome enough to take a sleep aid.  He notes ongoing hyperpigmentation and pruritus of the skin.  He states dry skin has been chronic for him, but it  seemed to get much worse after starting abemaciclib.  He has an upcoming appointment with his dermatologist in July.  We both note that he will be stopping abemaciclib in April and therefore the timing of the dermatologist visit in July is appropriate.  His GI symptoms have been intermittent as well, and not bothersome.  He does not take any medication.  He notes that his blood pressure medication was removed from his medication list and he had quite a bit of difficulty getting it added back and refilled.  He has been working with Dr. Merino's office on this.      Past Medical/Surgical History:  Past Medical History:   Diagnosis Date    Breast cancer (H) 05/2021    Chronic sinusitis     Hypertension 2002     Past Surgical History:   Procedure Laterality Date    COLONOSCOPY WITH CO2 INSUFFLATION N/A 10/28/2022    Procedure: COLONOSCOPY, WITH CO2 INSUFFLATION;  Surgeon: Annemarie Noel DO;  Location: [facility]    COMBINED ESOPHAGOSCOPY, GASTROSCOPY, DUODENOSCOPY (EGD) WITH CO2 INSUFFLATION N/A 10/28/2022    Procedure: ESOPHAGOGASTRODUODENOSCOPY, WITH CO2 INSUFFLATION;  Surgeon: Annemarie Noel DO;  [facility]    DISSECT LYMPH NODE AXILLA Right 10/27/2021    Procedure: RIGHT Axillary Lymph Node Dissection;  Surgeon: Nida Snider MD;  [facility]    ESOPHAGOSCOPY, GASTROSCOPY, DUODENOSCOPY (EGD), COMBINED N/A 10/28/2022    Procedure: ESOPHAGOGASTRODUODENOSCOPY, WITH BIOPSY;  Surgeon: Annemarie Noel DO;  Location: [facility]    MASTECTOMY SIMPLE Right 10/27/2021    Procedure: RIGHT Simple Mastectomy;  Surgeon: Nida Snider MD;  Location: [facility]     Allergies:  Allergies as of 01/22/2024    (No Known Allergies)     Current Medications:  Current Outpatient Medications   Medication Sig Dispense Refill    abemaciclib (VERZENIO) 150 MG tablet Take 1 tablet (150 mg) by mouth 2 times daily 56 tablet 2    famotidine (PEPCID) 40 MG tablet Take 1 tablet (40 mg) by mouth daily 90 tablet 3    ketoconazole  (NIZORAL) 2 % external cream Apply topically daily (Patient not taking: Reported on 11/3/2023) 30 g 1    letrozole (FEMARA) 2.5 MG tablet Take 1 tablet (2.5 mg) by mouth daily for 360 days 90 tablet 3    letrozole (FEMARA) 2.5 MG tablet Take 1 tablet (2.5 mg) by mouth daily for 360 days 90 tablet 3    loperamide (IMODIUM) 2 MG capsule Start with 2 caps (4 mg), then take one cap (2 mg) after each diarrheal stool as needed. Do not use more than 8 caps (16 mg) per day. (Patient not taking: Reported on 8/1/2023) 30 capsule 0    losartan (COZAAR) 25 MG tablet 1 tablet      MELATONIN PO         Genetics Breast Actionable and Breast Expanded panels were both negative for pathogenic germline mutations.    Physical Exam:  BP (!) 142/76 (BP Location: Left arm, Patient Position: Sitting, Cuff Size: Adult Regular)   Pulse 77   Temp 98  F (36.7  C) (Oral)   Resp 16   Wt 81.3 kg (179 lb 3.2 oz)   SpO2 100%   BMI 27.26 kg/m    General:  Well appearing, well nourished adult male in NAD.  Alert and oriented x 3.  Eyes:  No erythema or discharge  Respiratory:  Breathing comfortably on room air.  No wheezing or distress.  Musculoskeletal:  Normal movement of the bilateral upper extremities.  Skin:  No visible concerning skin rashes or lesions  Neuro:  No notable tremor and dyskinetic movements.  Psych:  Mood and affect appear normal.    Laboratory/Imaging Studies:  I personally reviewed the below images and laboratory studies:    1/22/2024 Labs:  Electrolytes, kidney, and liver function tests are wnl.  WBC is slightly low at 3.1; ANC is wnl at 1.9  Hemoglobin is slightly low at 10.7 g/dL  Platelets are wnl.    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  He is s/p treatment with 3.5 months neoadjuvant amcenestrant, right breast mastectomy, right ALND, 4 cycles Taxotere and cyclophosphamide, and  radiation.  He is on treatment with abemaciclib, lupron and letrozole.    1.  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  He has been on treatment with abemaciclib, lupron and letrozole since 4/26/2022.  Plan to continue abemaciclib for a total of 2 years (through 4/2024), lupron and letrozole for a total of 10 years (through 3/16/2032).   - Given 15/44 lymph nodes were positive, we are monitoring CA 15-3 and CEA markers once every 6 months to his lab tests for surveillance.   tumor marker is mildly elevated, but has remained stable over time.  Will continue to monitor.  - They reviewed Signater[employer] data with their daughter who is an ENT.  Given there is no evidence yet that a change in treatment or initiation of early treatment improves outcomes, they have decided not to pursue it at this time.  - They inquired about future imaging.  I reminded them that national guidelines for cancer surveillance do not recommend scheduled routien whole body imaging surveillance for breast cancer.  Imaging is performed based on symptoms only.  - Next Lupron is due 2/1/2024, continue monthly dosing  - Continue monthly labs until completion of abemaciclib.  - Provider visit every 3 months.    2.  Nausea/vomiting/diarrhea:  Secondary to abemaciclib.  - Okay to take Pepcid, ondansetron, and an imodium prn prophylactically prior to dining out at a restaurant or attending social events.  Of note, has not been bothersome enough to do this.  - Continue to take 1-2 imodium with the first diarrheal stool.    3.  RUE lymphedema/cording: No change since last visit.  Continue compression, massage, and lymphedema pump with good response.    4.  Hyperpigmented rash:  Hyperpigmentation and pruritus of the back and pruritus of the groin are consistent w/ cutaneous candidiasis.  - No improvement with topical ketoconazole.  - Reports chronic dry skin but  worsening of this and added hyperpigmentation with starting abemaciclib.  As he will complete course of abemaciclib in April, he will wait to see if this improves with discontinuation of the medication.  If not, will discuss possible punch biopsy with her dermatologist at their visit in July.    5.  Insomnia:  Chronic and exacerbated by endocrine therapy.  Not bothersome enough to take medication at this time.    6.  Bone Health:  DEXA 10/2022 with osteopenia of multiple sites.  On every 6 month Zometa for prevention of breast cancer bone metastases.  Unfortunately this was poorly tolerated and therefore changed to Prolia on 4/17.    - Next dose of Prolia is due around 5/1/2024.    7.  Follow Up:.  Labs and Lupron every 4 weeks x 3 starting 2/1/2024  Labs and visit with Nay Wolf in 3 months.  Visit with me in 6 months.    I spent 35 minutes on the date of the encounter doing chart review, review of test results, interpretation of tests, patient visit, and documentation.       Virtual Visit Details    Type of service:  Video Visit     Originating Location (pt. Location): Other [facility]  Distant Location (provider location):  On-site  Platform used for Video Visit: Jhonathan      Sincerely,        Mikaela Sr MD

## 2024-01-22 NOTE — NURSING NOTE
Is the patient currently in the state of [place]? YES    Visit mode:VIDEO    If the visit is dropped, the patient can be reconnected by: VIDEO VISIT: Text to cell phone:   Telephone Information:   Mobile [phone]           Will anyone else be joining the visit? Yes, pt's wife Melanie will be joining.   (If patient encounters technical issues they should call [phone]    How would you like to obtain your AVS? MyChart    Are changes needed to the allergy or medication list? No    Reason for visit: Blood Draw (Labs drawn via  by vascular in lab. VS taken. ) and RECHECK    Medications and allergies have been reviewed.     Joss STEPHENSON

## 2024-01-22 NOTE — NURSING NOTE
Lupron injection was administered in the  Left ventrogluteal without complication.  Patient tolerated well and was discharged to home.  See MAR for details.    Cailin Owens CMA,

## 2024-01-23 RX ORDER — LOSARTAN POTASSIUM AND HYDROCHLOROTHIAZIDE 25; 100 MG/1; MG/1
1 TABLET ORAL DAILY
Qty: 90 TABLET | Refills: 3 | Status: SHIPPED | OUTPATIENT
Start: 2024-01-23

## 2024-01-25 ENCOUNTER — THERAPY VISIT (OUTPATIENT)
Dept: PHYSICAL THERAPY | Facility: CLINIC | Age: 74
End: 2024-01-25
Attending: INTERNAL MEDICINE
Payer: COMMERCIAL

## 2024-01-25 DIAGNOSIS — Z17.0 MALIGNANT NEOPLASM OF CENTRAL PORTION OF RIGHT BREAST IN MALE, ESTROGEN RECEPTOR POSITIVE (H): Primary | ICD-10-CM

## 2024-01-25 DIAGNOSIS — C50.121 MALIGNANT NEOPLASM OF CENTRAL PORTION OF RIGHT BREAST IN MALE, ESTROGEN RECEPTOR POSITIVE (H): Primary | ICD-10-CM

## 2024-01-25 DIAGNOSIS — I89.0 LYMPHEDEMA OF RIGHT UPPER EXTREMITY: ICD-10-CM

## 2024-01-25 PROCEDURE — 97535 SELF CARE MNGMENT TRAINING: CPT | Mod: GP | Performed by: PHYSICAL THERAPIST

## 2024-01-25 PROCEDURE — 97140 MANUAL THERAPY 1/> REGIONS: CPT | Mod: GP | Performed by: PHYSICAL THERAPIST

## 2024-01-25 NOTE — PROGRESS NOTES
DISCHARGE  Reason for Discharge: Progress dilma, pt with home program    Equipment Issued: pump, compression sleeves and gloves, home program, bandaging supplies    Discharge Plan: Patient to continue home program. Continue with strengthening in the gym, consider massage therapy    Referring Provider:  Mikaela Sr     01/25/24 0500   Appointment Info   Signing clinician's name / credentials Renetta Dempsey PT,CLT-MIRANDA   Total/Authorized Visits 28   Medical Diagnosis Lymphedema of R UE   PT Tx Diagnosis R UE lymphedema and trunk, impaired tissue mobility of chest   Other pertinent information order date 8/18/23   Progress Note/Certification   Onset of illness/injury or Date of Surgery 10/25/21   Therapy Frequency up to 1x/month   Predicted Duration 6months   Progress Note Due Date 08/17/23   Progress Note Completed Date 05/02/23   PT Goal 1   Goal Identifier Edema   Goal Description Pt will maintain limb volumes with in 10% of each demonstrating appropriate management of edema as B volume may increase as he continues to strengthen   Goal Progress today volume 12.2% difference but this seems to correlate to greater volume discrepency during exercises holidays and the the R volume increased slightly but L decreased more.   Target Date 02/17/24   PT Goal 2   Goal Identifier Tissue   Goal Description Pt will be independent with self massage for lymph drainage and fibrotic tissue mobility (cupping) to prevent any loss of ROM (maintain standing AROM flexion >140, abduction >90) to prevent functional deficits.   Goal Progress Shoulder flexion 123,abduction 97. Pt to keep up with his HEP   Target Date 02/17/24   PT Goal 3   Goal Identifier HEP   Goal Description Pt will continue lifelong chest and shoulder stretching program to promote improved mobility and prevent disability.   Target Date 09/01/23   Date Met 08/17/23   Subjective Report   Subjective Report Pt arrived with spouse, post-poned this visit as  he was in [place] longer than expected with new grandson to spend some time with. Been itchy, going to see derm. but  not for while because couldn't get appt sooner.   Objective Measure 1   Objective Measure Volume   Details 1848.8mL, 12.2% swelling.  L UE decreased in volume, pt has not worked out in over 2mo   Objective Measure 2   Objective Measure Edema   Details overall R UE assessment is very good, no pitting noted, increased tissue density compared to the L but no great concern.  Measurements show increased volume but about 3% from last visit.   Objective Measure 3   Objective Measure ROM   Details Continues to be limited more in the R than L. Radiation fiborsis and tissue tightness/adhesions in the chest as well as pec length limiting.  Pt encouraged to keep stretching. Today flexion standing 120, supine 123, and abduction standing 97   Objective Measure 4   Objective Measure Tissue   Details tight anterior chest and fibrosis of lateral trunk as well   Objective Measure 5   Objective Measure LLIS   Details 7   Vitals Signs   Weight 179  (in clinic)   Manual Therapy   Manual Therapy: Mobilization, MFR, MLD, friction massage minutes (49080) 40   Manual Therapy 1 measurements/assessment   Manual Therapy 1 - Details B UE measurements taken and assessments of tissue and arm as noted above.  Overall stable iwth fluctuation.   Manual Therapy 2 MLD   Manual Therapy 2 - Details MLD completed with time allowed. CLT recommending use fo Axillary pathway to the R SC and to the L AX but with trunk fibrosis, less likely that drainage to inguinal nodes is as effective based on new ICG studies. Pt educated on stim of B SC, parasternal nodes, L AX and then clearing of arm with antecubital node stim.  Softening of upper arm noted.   Treatment Detail assessment, MLD   Progress Pt encouraged to keep up daily ROM, given list of private pay massage therapists for MLD/STM   Self Care/home Management   ADL/Home Mgmt Training (26674)  "16   Self Care 2 Home Program   Self Care 2 - Details Pt is very consistent with day and night garments.  When spouse available he will wrap over the night garment. Pt using pump most  nights, skips every now and then, maybe 2x/mo. Did not pump last night.  Garments are about a year old, CLT suggested new garments for daytime but night garment should be fine for another year.  Pt also educated that he may dry the older sleeves 1x/mo to \"shrink\" up so they remain tight.  Pt knows how to contact fitter for scheduling and reports having an order.   Skilled Intervention maintenance plan   Patient Response/Progress understading of discharge plan   Plan   Homework Stretching 2x every single day,don't miss a day.  Keep up with compression, and pump   Updates to plan of care discharge, goals not completely met as they have been updated but pt plateau, therapist without more to offer, pt to return if exacerbation   Total Session Time   Timed Code Treatment Minutes 56   Total Treatment Time (sum of timed and untimed services) 56       "

## 2024-01-26 ENCOUNTER — DOCUMENTATION ONLY (OUTPATIENT)
Dept: ONCOLOGY | Facility: CLINIC | Age: 74
End: 2024-01-26
Payer: COMMERCIAL

## 2024-02-18 DIAGNOSIS — C50.929 MALIGNANT NEOPLASM OF MALE BREAST, UNSPECIFIED ESTROGEN RECEPTOR STATUS, UNSPECIFIED LATERALITY, UNSPECIFIED SITE OF BREAST (H): Primary | ICD-10-CM

## 2024-02-20 DIAGNOSIS — Z17.0 MALIGNANT NEOPLASM OF CENTRAL PORTION OF RIGHT BREAST IN MALE, ESTROGEN RECEPTOR POSITIVE (H): Primary | ICD-10-CM

## 2024-02-20 DIAGNOSIS — C50.121 MALIGNANT NEOPLASM OF CENTRAL PORTION OF RIGHT BREAST IN MALE, ESTROGEN RECEPTOR POSITIVE (H): Primary | ICD-10-CM

## 2024-02-21 ENCOUNTER — INFUSION THERAPY VISIT (OUTPATIENT)
Dept: ONCOLOGY | Facility: CLINIC | Age: 74
End: 2024-02-21
Attending: INTERNAL MEDICINE
Payer: COMMERCIAL

## 2024-02-21 ENCOUNTER — DOCUMENTATION ONLY (OUTPATIENT)
Dept: ONCOLOGY | Facility: CLINIC | Age: 74
End: 2024-02-21

## 2024-02-21 ENCOUNTER — APPOINTMENT (OUTPATIENT)
Dept: LAB | Facility: CLINIC | Age: 74
End: 2024-02-21
Attending: INTERNAL MEDICINE
Payer: COMMERCIAL

## 2024-02-21 VITALS
RESPIRATION RATE: 18 BRPM | BODY MASS INDEX: 27.18 KG/M2 | HEART RATE: 79 BPM | WEIGHT: 178.7 LBS | SYSTOLIC BLOOD PRESSURE: 118 MMHG | DIASTOLIC BLOOD PRESSURE: 70 MMHG | OXYGEN SATURATION: 98 % | TEMPERATURE: 98 F

## 2024-02-21 DIAGNOSIS — Z17.0 MALIGNANT NEOPLASM OF CENTRAL PORTION OF RIGHT BREAST IN MALE, ESTROGEN RECEPTOR POSITIVE (H): Primary | ICD-10-CM

## 2024-02-21 DIAGNOSIS — C50.929 MALIGNANT NEOPLASM OF MALE BREAST, UNSPECIFIED ESTROGEN RECEPTOR STATUS, UNSPECIFIED LATERALITY, UNSPECIFIED SITE OF BREAST (H): ICD-10-CM

## 2024-02-21 DIAGNOSIS — C50.121 MALIGNANT NEOPLASM OF CENTRAL PORTION OF RIGHT BREAST IN MALE, ESTROGEN RECEPTOR POSITIVE (H): Primary | ICD-10-CM

## 2024-02-21 LAB
ALBUMIN SERPL BCG-MCNC: 4.1 G/DL (ref 3.5–5.2)
ALP SERPL-CCNC: 48 U/L (ref 40–150)
ALT SERPL W P-5'-P-CCNC: 19 U/L (ref 0–70)
ANION GAP SERPL CALCULATED.3IONS-SCNC: 10 MMOL/L (ref 7–15)
AST SERPL W P-5'-P-CCNC: 22 U/L (ref 0–45)
BASOPHILS # BLD AUTO: 0 10E3/UL (ref 0–0.2)
BASOPHILS NFR BLD AUTO: 1 %
BILIRUB SERPL-MCNC: 0.4 MG/DL
BUN SERPL-MCNC: 17.4 MG/DL (ref 8–23)
CALCIUM SERPL-MCNC: 9.5 MG/DL (ref 8.8–10.2)
CANCER AG15-3 SERPL-ACNC: 41 U/ML
CEA SERPL-MCNC: 4.6 NG/ML
CHLORIDE SERPL-SCNC: 106 MMOL/L (ref 98–107)
CREAT SERPL-MCNC: 1.15 MG/DL (ref 0.67–1.17)
DEPRECATED HCO3 PLAS-SCNC: 23 MMOL/L (ref 22–29)
EGFRCR SERPLBLD CKD-EPI 2021: 68 ML/MIN/1.73M2
EOSINOPHIL # BLD AUTO: 0.1 10E3/UL (ref 0–0.7)
EOSINOPHIL NFR BLD AUTO: 5 %
ERYTHROCYTE [DISTWIDTH] IN BLOOD BY AUTOMATED COUNT: 12.8 % (ref 10–15)
GLUCOSE SERPL-MCNC: 137 MG/DL (ref 70–99)
HCT VFR BLD AUTO: 30.9 % (ref 40–53)
HGB BLD-MCNC: 10.6 G/DL (ref 13.3–17.7)
IMM GRANULOCYTES # BLD: 0 10E3/UL
IMM GRANULOCYTES NFR BLD: 0 %
LYMPHOCYTES # BLD AUTO: 0.8 10E3/UL (ref 0.8–5.3)
LYMPHOCYTES NFR BLD AUTO: 29 %
MCH RBC QN AUTO: 31.6 PG (ref 26.5–33)
MCHC RBC AUTO-ENTMCNC: 34.3 G/DL (ref 31.5–36.5)
MCV RBC AUTO: 92 FL (ref 78–100)
MONOCYTES # BLD AUTO: 0.2 10E3/UL (ref 0–1.3)
MONOCYTES NFR BLD AUTO: 9 %
NEUTROPHILS # BLD AUTO: 1.6 10E3/UL (ref 1.6–8.3)
NEUTROPHILS NFR BLD AUTO: 56 %
NRBC # BLD AUTO: 0 10E3/UL
NRBC BLD AUTO-RTO: 0 /100
PLATELET # BLD AUTO: 150 10E3/UL (ref 150–450)
POTASSIUM SERPL-SCNC: 4.1 MMOL/L (ref 3.4–5.3)
PROT SERPL-MCNC: 7.3 G/DL (ref 6.4–8.3)
RBC # BLD AUTO: 3.35 10E6/UL (ref 4.4–5.9)
SODIUM SERPL-SCNC: 139 MMOL/L (ref 135–145)
WBC # BLD AUTO: 2.8 10E3/UL (ref 4–11)

## 2024-02-21 PROCEDURE — 82378 CARCINOEMBRYONIC ANTIGEN: CPT

## 2024-02-21 PROCEDURE — 86300 IMMUNOASSAY TUMOR CA 15-3: CPT

## 2024-02-21 PROCEDURE — 250N000011 HC RX IP 250 OP 636: Mod: JZ | Performed by: INTERNAL MEDICINE

## 2024-02-21 PROCEDURE — 85004 AUTOMATED DIFF WBC COUNT: CPT

## 2024-02-21 PROCEDURE — 36415 COLL VENOUS BLD VENIPUNCTURE: CPT

## 2024-02-21 PROCEDURE — 82040 ASSAY OF SERUM ALBUMIN: CPT

## 2024-02-21 PROCEDURE — 96402 CHEMO HORMON ANTINEOPL SQ/IM: CPT

## 2024-02-21 RX ADMIN — LEUPROLIDE ACETATE 7.5 MG: KIT at 09:14

## 2024-02-21 ASSESSMENT — PAIN SCALES - GENERAL: PAINLEVEL: NO PAIN (0)

## 2024-02-21 NOTE — NURSING NOTE
Chief Complaint   Patient presents with    Blood Draw     Labs drawn via  by RN. VS taken.     Labs collected from venipuncture by VAT. Vitals taken. Checked in for appointment(s).     Leta Myles RN

## 2024-02-21 NOTE — PHARMACY
Oral Chemotherapy Monitoring Program  Lab Follow Up    Reviewed lab results from 2/21/24.        9/18/2023    10:00 AM 9/18/2023     3:00 PM 10/4/2023     8:00 AM 12/11/2023     9:00 AM 1/10/2024     2:00 PM 1/26/2024    11:00 AM 2/21/2024    11:00 AM   ORAL CHEMOTHERAPY   Assessment Type Refill Quarterly Follow up Lab Monitoring Refill Quarterly Follow up Chart Review Lab Monitoring   Diagnosis Code Breast Cancer Breast Cancer Breast Cancer Breast Cancer Breast Cancer Breast Cancer Breast Cancer   Providers Remberto Remberto Remberto Remberto Remberto Remberto Remberto   [facility]   Is this patient followed by the [facility] OC team? Yes Yes Yes Yes Yes Yes Yes   Drug Name Verzenio (abemaciclib) Verzenio (abemaciclib) Verzenio (abemaciclib) Verzenio (abemaciclib) Verzenio (abemaciclib) Verzenio (abemaciclib) Verzenio (abemaciclib)   Dose 150 mg 150 mg 150 mg 150 mg 150 mg 150 mg 150 mg   Current Schedule BID BID BID BID BID BID BID   Cycle Details Continuous Continuous Continuous Continuous Continuous Continuous Continuous   Doses missed in last 2 weeks  1   0     Adherence Assessment  Adherent   Adherent     Adverse Effects  No AE identified during assessment   No AE identified during assessment  No AE identified during assessment   Any new drug interactions?  No   No     Is the dose as ordered appropriate for the patient?  Yes   Yes     Since the last time we talked, have you been hospitalized or used the emergency room?  No   No         Labs:  _  Result Component Current Result Ref Range   Sodium 139 (2/21/2024) 135 - 145 mmol/L     _  Result Component Current Result Ref Range   Potassium 4.1 (2/21/2024) 3.4 - 5.3 mmol/L     _  Result Component Current Result Ref Range   Calcium 9.5 (2/21/2024) 8.8 - 10.2 mg/dL     No results found for Mag within last 30 days.     No results found for Phos within last 30 days.     _  Result Component Current Result Ref  Range   Albumin 4.1 (2/21/2024) 3.5 - 5.2 g/dL     _  Result Component Current Result Ref Range   Urea Nitrogen 17.4 (2/21/2024) 8.0 - 23.0 mg/dL     _  Result Component Current Result Ref Range   Creatinine 1.15 (2/21/2024) 0.67 - 1.17 mg/dL     _  Result Component Current Result Ref Range   AST 22 (2/21/2024) 0 - 45 U/L     _  Result Component Current Result Ref Range   ALT 19 (2/21/2024) 0 - 70 U/L     _  Result Component Current Result Ref Range   Bilirubin Total 0.4 (2/21/2024) <=1.2 mg/dL     _  Result Component Current Result Ref Range   WBC Count 2.8 (L) (2/21/2024) 4.0 - 11.0 10e3/uL     _  Result Component Current Result Ref Range   Hemoglobin 10.6 (L) (2/21/2024) 13.3 - 17.7 g/dL     _  Result Component Current Result Ref Range   Platelet Count 150 (2/21/2024) 150 - 450 10e3/uL     No results found for ANC within last 30 days.     _  Result Component Current Result Ref Range   Absolute Neutrophils 1.6 (2/21/2024) 1.6 - 8.3 10e3/uL        Assessment & Plan:  No concerning abnormalities.    Mychart sent to patient    Follow-Up:  2/28/24 Review and look for updates    Landon Ashley  Oncology Pharmacy Resident  February 21, 2024

## 2024-02-21 NOTE — PROGRESS NOTES
Infusion Injection Note:  Ronna Collins presents today for Lupron injection.    Patient declined to speak with an RN today; denies having any questions or concerns.  Labs drawn prior to infusion appointment today.    Treatment Conditions:  Lab Results   Component Value Date    HGB 10.6 (L) 02/21/2024    WBC 2.8 (L) 02/21/2024    ANEU 1.7 05/24/2022    ANEUTAUTO 1.6 02/21/2024     02/21/2024        Lab Results   Component Value Date     02/21/2024    POTASSIUM 4.1 02/21/2024    MAG 2.3 06/22/2021    CR 1.15 02/21/2024    PROSPER 9.5 02/21/2024    BILITOTAL 0.4 02/21/2024    ALBUMIN 4.1 02/21/2024    ALT 19 02/21/2024    AST 22 02/21/2024     Results reviewed, labs MET treatment parameters, ok to proceed with treatment.    Pre and Post Injection:  Lupron injection given to Left Ventrogluteal without incident.   Patient tolerated procedure well.    Discharge Plan:  [facility]    Patient discharged in stable condition accompanied by: self.  Departure Mode: Ambulatory.  Patient will return 3/20/2024 for next appointment.    Iris Lemus CA on 2/21/2024 at 11:09 AM

## 2024-02-28 ENCOUNTER — TELEPHONE (OUTPATIENT)
Dept: ONCOLOGY | Facility: CLINIC | Age: 74
End: 2024-02-28
Payer: COMMERCIAL

## 2024-02-28 NOTE — TELEPHONE ENCOUNTER
Oral Chemotherapy Monitoring Program     Placed call to patient in follow up of Verzenio oral chemotherapy. Verzenio on hold as of 1/31 for illness. Pt felt better and restarted ~2/7/24. His symptoms have not recurred.    Next labs scheduled for 3/20/24.    Grace Myhre, PharmD  Hematology/Oncology Clinical Pharmacist  [facility]  [facility]  [phone]

## 2024-03-11 DIAGNOSIS — C50.929 MALIGNANT NEOPLASM OF MALE BREAST, UNSPECIFIED ESTROGEN RECEPTOR STATUS, UNSPECIFIED LATERALITY, UNSPECIFIED SITE OF BREAST (H): Primary | ICD-10-CM

## 2024-03-19 DIAGNOSIS — C50.121 MALIGNANT NEOPLASM OF CENTRAL PORTION OF RIGHT BREAST IN MALE, ESTROGEN RECEPTOR POSITIVE (H): Primary | ICD-10-CM

## 2024-03-19 DIAGNOSIS — Z17.0 MALIGNANT NEOPLASM OF CENTRAL PORTION OF RIGHT BREAST IN MALE, ESTROGEN RECEPTOR POSITIVE (H): Primary | ICD-10-CM

## 2024-03-20 ENCOUNTER — APPOINTMENT (OUTPATIENT)
Dept: LAB | Facility: CLINIC | Age: 74
End: 2024-03-20
Attending: INTERNAL MEDICINE
Payer: COMMERCIAL

## 2024-03-20 ENCOUNTER — INFUSION THERAPY VISIT (OUTPATIENT)
Dept: ONCOLOGY | Facility: CLINIC | Age: 74
End: 2024-03-20
Attending: INTERNAL MEDICINE
Payer: COMMERCIAL

## 2024-03-20 VITALS
OXYGEN SATURATION: 99 % | RESPIRATION RATE: 16 BRPM | DIASTOLIC BLOOD PRESSURE: 76 MMHG | BODY MASS INDEX: 27.3 KG/M2 | WEIGHT: 179.5 LBS | SYSTOLIC BLOOD PRESSURE: 130 MMHG | TEMPERATURE: 98.2 F | HEART RATE: 85 BPM

## 2024-03-20 DIAGNOSIS — E83.52 HYPERCALCEMIA: ICD-10-CM

## 2024-03-20 DIAGNOSIS — C50.121 MALIGNANT NEOPLASM OF CENTRAL PORTION OF RIGHT BREAST IN MALE, ESTROGEN RECEPTOR POSITIVE (H): Primary | ICD-10-CM

## 2024-03-20 DIAGNOSIS — Z17.0 MALIGNANT NEOPLASM OF CENTRAL PORTION OF RIGHT BREAST IN MALE, ESTROGEN RECEPTOR POSITIVE (H): Primary | ICD-10-CM

## 2024-03-20 LAB
ALBUMIN SERPL BCG-MCNC: 4.1 G/DL (ref 3.5–5.2)
ALP SERPL-CCNC: 47 U/L (ref 40–150)
ALT SERPL W P-5'-P-CCNC: 20 U/L (ref 0–70)
ANION GAP SERPL CALCULATED.3IONS-SCNC: 14 MMOL/L (ref 7–15)
AST SERPL W P-5'-P-CCNC: 24 U/L (ref 0–45)
BASOPHILS # BLD AUTO: 0 10E3/UL (ref 0–0.2)
BASOPHILS NFR BLD AUTO: 1 %
BILIRUB SERPL-MCNC: 0.3 MG/DL
BUN SERPL-MCNC: 12.9 MG/DL (ref 8–23)
CALCIUM SERPL-MCNC: 9.3 MG/DL (ref 8.8–10.2)
CANCER AG15-3 SERPL-ACNC: 39 U/ML
CEA SERPL-MCNC: 4.4 NG/ML
CHLORIDE SERPL-SCNC: 104 MMOL/L (ref 98–107)
CREAT SERPL-MCNC: 0.87 MG/DL (ref 0.67–1.17)
DEPRECATED HCO3 PLAS-SCNC: 20 MMOL/L (ref 22–29)
EGFRCR SERPLBLD CKD-EPI 2021: >90 ML/MIN/1.73M2
EOSINOPHIL # BLD AUTO: 0.2 10E3/UL (ref 0–0.7)
EOSINOPHIL NFR BLD AUTO: 6 %
ERYTHROCYTE [DISTWIDTH] IN BLOOD BY AUTOMATED COUNT: 13.3 % (ref 10–15)
GLUCOSE SERPL-MCNC: 117 MG/DL (ref 70–99)
HCT VFR BLD AUTO: 30.2 % (ref 40–53)
HGB BLD-MCNC: 10 G/DL (ref 13.3–17.7)
IMM GRANULOCYTES # BLD: 0 10E3/UL
IMM GRANULOCYTES NFR BLD: 0 %
LYMPHOCYTES # BLD AUTO: 0.8 10E3/UL (ref 0.8–5.3)
LYMPHOCYTES NFR BLD AUTO: 29 %
MCH RBC QN AUTO: 31.3 PG (ref 26.5–33)
MCHC RBC AUTO-ENTMCNC: 33.1 G/DL (ref 31.5–36.5)
MCV RBC AUTO: 95 FL (ref 78–100)
MONOCYTES # BLD AUTO: 0.2 10E3/UL (ref 0–1.3)
MONOCYTES NFR BLD AUTO: 8 %
NEUTROPHILS # BLD AUTO: 1.6 10E3/UL (ref 1.6–8.3)
NEUTROPHILS NFR BLD AUTO: 56 %
NRBC # BLD AUTO: 0 10E3/UL
NRBC BLD AUTO-RTO: 0 /100
PLATELET # BLD AUTO: 180 10E3/UL (ref 150–450)
POTASSIUM SERPL-SCNC: 4.2 MMOL/L (ref 3.4–5.3)
PROT SERPL-MCNC: 7.4 G/DL (ref 6.4–8.3)
RBC # BLD AUTO: 3.19 10E6/UL (ref 4.4–5.9)
SODIUM SERPL-SCNC: 138 MMOL/L (ref 135–145)
WBC # BLD AUTO: 2.8 10E3/UL (ref 4–11)

## 2024-03-20 PROCEDURE — 82378 CARCINOEMBRYONIC ANTIGEN: CPT

## 2024-03-20 PROCEDURE — 85025 COMPLETE CBC W/AUTO DIFF WBC: CPT

## 2024-03-20 PROCEDURE — 80053 COMPREHEN METABOLIC PANEL: CPT

## 2024-03-20 PROCEDURE — 36415 COLL VENOUS BLD VENIPUNCTURE: CPT

## 2024-03-20 PROCEDURE — 86300 IMMUNOASSAY TUMOR CA 15-3: CPT

## 2024-03-20 PROCEDURE — 96402 CHEMO HORMON ANTINEOPL SQ/IM: CPT

## 2024-03-20 PROCEDURE — 250N000011 HC RX IP 250 OP 636: Mod: JZ | Performed by: INTERNAL MEDICINE

## 2024-03-20 RX ADMIN — LEUPROLIDE ACETATE 7.5 MG: KIT at 09:02

## 2024-03-20 ASSESSMENT — PAIN SCALES - GENERAL: PAINLEVEL: NO PAIN (0)

## 2024-03-20 NOTE — NURSING NOTE
Chief Complaint   Patient presents with    Blood Draw     Labs drawn via  by VAT in lab. VS Taken.      Labs collected from venipuncture by VAT. Vitals taken. Checked in for appointment(s).    Regina Miranda RN

## 2024-03-20 NOTE — PROGRESS NOTES
Infusion Injection Note:  Ronna Collins presents today for Lupron injection.    Patient declined to speak with an RN today.     Treatment Conditions:  Not Applicable.    Pre and Post Injection:  Lupron injection given to Right Ventrogluteal without incident.   Patient tolerated procedure well.    Discharge Plan:  Patient verbalized understanding of discharge instructions and all questions answered.  AVS to patient via [facility].    Patient discharged in stable condition accompanied by: self.  Departure Mode: Ambulatory.  Patient will return 4/16/2024 for next appointment.    Iris Lemus CA on 3/20/2024 at 9:28 AM

## 2024-03-21 NOTE — ORAL ONC MGMT
Oral Chemotherapy Monitoring Program  Lab Follow Up    Reviewed CBC and CMP lab results from 3/20/2024.        12/11/2023     9:00 AM 1/10/2024     2:00 PM 1/26/2024    11:00 AM 2/21/2024    11:00 AM 2/28/2024    11:00 AM 3/11/2024     8:00 AM 3/21/2024     2:00 PM   ORAL CHEMOTHERAPY   Assessment Type Refill Quarterly Follow up Chart Review Lab Monitoring Other Refill Lab Monitoring   Diagnosis Code Breast Cancer Breast Cancer Breast Cancer Breast Cancer Breast Cancer Breast Cancer Breast Cancer   Providers Remberto Remberto Remberto Remberto Remberto Remberto Remberto   [facility]   Is this patient followed by the [facility] OC team? Yes Yes Yes Yes Yes Yes Yes   Drug Name Verzenio (abemaciclib) Verzenio (abemaciclib) Verzenio (abemaciclib) Verzenio (abemaciclib) Verzenio (abemaciclib) Verzenio (abemaciclib) Verzenio (abemaciclib)   Dose 150 mg 150 mg 150 mg 150 mg 150 mg 150 mg 150 mg   Current Schedule BID BID BID BID BID BID BID   Cycle Details Continuous Continuous Continuous Continuous Continuous Continuous Continuous   Doses missed in last 2 weeks  0        Adherence Assessment  Adherent        Adverse Effects  No AE identified during assessment  No AE identified during assessment      Any new drug interactions?  No        Is the dose as ordered appropriate for the patient?  Yes        Since the last time we talked, have you been hospitalized or used the emergency room?  No            Labs:  _  Result Component Current Result Ref Range   Sodium 138 (3/20/2024) 135 - 145 mmol/L     _  Result Component Current Result Ref Range   Potassium 4.2 (3/20/2024) 3.4 - 5.3 mmol/L     _  Result Component Current Result Ref Range   Calcium 9.3 (3/20/2024) 8.8 - 10.2 mg/dL     _  Result Component Current Result Ref Range   Albumin 4.1 (3/20/2024) 3.5 - 5.2 g/dL     _  Result Component Current Result Ref Range   Urea Nitrogen 12.9 (3/20/2024) 8.0 - 23.0 mg/dL      _  Result Component Current Result Ref Range   Creatinine 0.87 (3/20/2024) 0.67 - 1.17 mg/dL     _  Result Component Current Result Ref Range   AST 24 (3/20/2024) 0 - 45 U/L     _  Result Component Current Result Ref Range   ALT 20 (3/20/2024) 0 - 70 U/L     _  Result Component Current Result Ref Range   Bilirubin Total 0.3 (3/20/2024) <=1.2 mg/dL     _  Result Component Current Result Ref Range   WBC Count 2.8 (L) (3/20/2024) 4.0 - 11.0 10e3/uL     _  Result Component Current Result Ref Range   Hemoglobin 10.0 (L) (3/20/2024) 13.3 - 17.7 g/dL     _  Result Component Current Result Ref Range   Platelet Count 180 (3/20/2024) 150 - 450 10e3/uL     No results found for ANC within last 30 days.     _  Result Component Current Result Ref Range   Absolute Neutrophils 1.6 (3/20/2024) 1.6 - 8.3 10e3/uL        Assessment & Plan:  No new concerning lab abnormalities.No changes with abemaciclib needed at this time. Patient not contacted as patient was seen in infusion yesterday.    Follow-Up:  4/16: visit and labs     Fred Julio, PharmD  Hematology/Oncology Clinical Pharmacist  [facility]  [facility]  [phone]

## 2024-04-15 NOTE — PROGRESS NOTES
Oncology Visit:  Date on this visit: Apr 16, 2024    Diagnosis: h/o clinical prognostic stage IIIb, [id number], grade 2, ER positive, ND positive, HER-2 negative right breast cancer, [id number].    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  He noted discomfort and hardening of the skin of the right nipple in late April/early May, 2021.  He noted a seborrheic keratosis of the right nipple and remembered he had a similar skin lesion of the arm 3 months earlier.  However, he subsequently noted a mass in the same area.  Right breast skin punch biopsy performed by dermatology was c/w grade 2 invasive ductal carcinoma with associated DCIS.  Invasive carcinoma was ER positive 100% and ND positive 70%, with tumor invading the dermis.  HER-2 was negative by IHC (score 1+).  Diagnostic mammogram and ultrasound showed a retroareolar right breast mass measuring 2.9 cm with associated nipple retraction and enlarged, abnormal right axillary lymph nodes.  Right breast biopsy was again c/w grade 2 invasive ductal carcinoma, ER strong in 98% of tumor cell nuclei, HER-2 negative.  Ki-67 was 15%.    He elected to screen for the ISPY-2 clinical trial.  MRI breast on 6/18/2021 showed the biopsy proven right breast cancer to measure 3.9 cm in maximum dimension with invasion of the nipple and the pectoralis muscle as well as at least 4 abnormal level 1 and 2 right axillary lymph nodes and a tiny 0.4 cm right internal mammary lymph node.  Mammoprint returned low risk with a score of +0.29.  He elected for treatment on the endocrine optimization protocol of ISPY-2 and was randomized to treatment with amcenestrant.  He was on treatment with  amcenestrant from 7/1/2021 - 10/26/2021.  At our visit in 01/2021, both right axillary node and right breast tumor palpated more firm then prior.  Breast MRI was stable, however, due to clinical concerns for progression,  decision was made to proceed with surgery.   Right breast mastectomy and right axillary lymph node dissection was performed by Dr. Snider on 10/27/2021.  Pathology showed grade 2 carcinoma of the right breast measuring 3.2 cm with invasion of the dermis, nipple and the pectoralis muscle.  Ki-67 of the excised tumor was 7%.  15/44 right axillary lymph nodes were positive with 6 of the lymph nodes demonstrating extranodal extension.  The largest lymph node metastasis measured 2.1 cm.      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  His course was complicated by fevers persistent throughout treatment.  He received radiation (4800 cGy in 15 fractions) to the right chest wall and regional lymph nodes from 3/21/2022 - 4/8/2022.  He has been on treatment with lupron and letrozole since 3/16/2022.  Abemaciclib was added 4/26/2022.    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 He is feeling well overall. He notes continued itching from rash which is stable. He has continued fatigue from treatment. Gets up 1-2x per night to urinate which is chronic. He had URI about 2 months ago which is resolved now. He gained some weight and is working out with a  but has noticed more dyspnea when he climbs stairs or walks up an incline. This is going on for about 2 weeks. Sometimes will cough after this. No CP, chest tightness, or fevers/chills. Lymphedema has been well controlled. He has some R shoulder ROM tightness. He notes he had not been doing his exercises as  much.     Would like to be given a 3 month Lupron shot today since he will be traveling a lot the next few months.       Past Medical/Surgical History:  Past Medical History:   Diagnosis Date    Breast cancer (H) 05/2021    Chronic sinusitis     Hypertension 2002     Past Surgical History:   Procedure Laterality Date    COLONOSCOPY WITH CO2 INSUFFLATION N/A 10/28/2022    Procedure: COLONOSCOPY,  WITH CO2 INSUFFLATION;  Surgeon: Annemarie Noel DO;  Location: [facility]    COMBINED ESOPHAGOSCOPY, GASTROSCOPY, DUODENOSCOPY (EGD) WITH CO2 INSUFFLATION N/A 10/28/2022    Procedure: ESOPHAGOGASTRODUODENOSCOPY, WITH CO2 INSUFFLATION;  Surgeon: Annemarie Noel DO;  Location: [facility]    DISSECT LYMPH NODE AXILLA Right 10/27/2021    Procedure: RIGHT Axillary Lymph Node Dissection;  Surgeon: Nida Snider MD;  Location: [facility]    ESOPHAGOSCOPY, GASTROSCOPY, DUODENOSCOPY (EGD), COMBINED N/A 10/28/2022    Procedure: ESOPHAGOGASTRODUODENOSCOPY, WITH BIOPSY;  Surgeon: Annemarie Noel DO;  Location: [facility]    MASTECTOMY SIMPLE Right 10/27/2021    Procedure: RIGHT Simple Mastectomy;  Surgeon: Nida Snider MD;  Location: [facility]     Allergies:  Allergies as of 04/16/2024    (No Known Allergies)     Current Medications:  Current Outpatient Medications   Medication Sig Dispense Refill    abemaciclib (VERZENIO) 150 MG tablet Take 1 tablet (150 mg) by mouth 2 times daily 56 tablet 2    famotidine (PEPCID) 40 MG tablet Take 1 tablet (40 mg) by mouth daily 90 tablet 3    ketoconazole (NIZORAL) 2 % external cream Apply topically daily (Patient not taking: Reported on 11/3/2023) 30 g 1    letrozole (FEMARA) 2.5 MG tablet Take 1 tablet (2.5 mg) by mouth daily for 360 days 90 tablet 3    letrozole (FEMARA) 2.5 MG tablet Take 1 tablet (2.5 mg) by mouth daily for 360 days (Patient not taking: Reported on 1/22/2024) 90 tablet 3    loperamide (IMODIUM) 2 MG capsule Start with 2 caps (4 mg), then take one cap (2 mg) after each diarrheal stool as needed. Do not use more than 8 caps (16 mg) per day. (Patient not taking: Reported on 8/1/2023) 30 capsule 0    losartan-hydrochlorothiazide (HYZAAR) 100-25 MG tablet Take 1 tablet by mouth daily 90 tablet 3    MELATONIN PO Take by mouth nightly as needed        Genetics Breast Actionable and Breast Expanded panels were both negative for pathogenic germline mutations.    PHYSICAL  EXAM:  /76 (BP Location: Right arm, Patient Position: Sitting, Cuff Size: Adult Regular)   Pulse 93   Temp 98  F (36.7  C) (Oral)   Resp 18   Wt 82 kg (180 lb 12.8 oz)   SpO2 98%   BMI 27.50 kg/m    Wt Readings from Last 4 Encounters:   04/16/24 82 kg (180 lb 12.8 oz)   03/20/24 81.4 kg (179 lb 8 oz)   02/21/24 81.1 kg (178 lb 11.2 oz)   01/22/24 81.3 kg (179 lb 3.2 oz)   General: Alert, oriented, pleasant, NAD  HEENT: Normocephalic, atraumatic, no icterus. Moist mucus membranes, no lesions, or thrush  Neck: No cervical or supraclavicular LAD.  Axillary: No LAD  Breast: Left breast tissue is benign. R chest wall has some fibrosis along pec muscle/tendon group and is tight about chest wall from lymphedema but no concerning skin changes or nodularity   Lungs: CTA bilaterally, normal work of breathing  Cardiac: RRR  Abdomen: Soft, nontender, nondistended. Normoactive bowel sounds.   Neuro: CNII-XII grossly intact  Extremities: No pedal edema      Laboratory/Imaging Studies:  I personally reviewed the below images and laboratory studies:   04/16/24 07:57   Sodium 137   Potassium 4.3   Chloride 103   Carbon Dioxide (CO2) 20 (L)   Urea Nitrogen 12.8   Creatinine 0.93   GFR Estimate 88   Calcium 9.4   Anion Gap 14   Albumin 4.2   Protein Total 7.7   Alkaline Phosphatase 49   ALT 17   AST 22   Bilirubin Total 0.4   Glucose 140 (H)   WBC 3.2 (L)   Hemoglobin 10.7 (L)   Hematocrit 32.1 (L)   Platelet Count 161   RBC Count 3.39 (L)   MCV 95   MCH 31.6   MCHC 33.3   RDW 13.2   % Neutrophils 49   % Lymphocytes 32   % Monocytes 8   % Eosinophils 8   % Basophils 2   Absolute Basophils 0.1   Absolute Eosinophils 0.3   Absolute Immature Granulocytes 0.0   Absolute Lymphocytes 1.0   Absolute Monocytes 0.3   % Immature Granulocytes 1   Absolute Neutrophils 1.6   Absolute NRBCs 0.0   NRBCs per 100 WBC 0         ASSESSMENT/PLAN:  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  He is s/p treatment with 3.5 months neoadjuvant amcenestrant, right breast mastectomy, right ALND, 4 cycles Taxotere and cyclophosphamide, and radiation.  He is on treatment with abemaciclib, lupron and letrozole.    1.  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  He has been on treatment with abemaciclib, lupron and letrozole since 4/26/2022.  Plan to continue abemaciclib for a total of 2 years (through 4/2024), lupron and letrozole for a total of 10 years (through 3/16/2032).   - Given 15/44 lymph nodes were positive, we are monitoring CA 15-3 and CEA markers once every 6 months to his lab tests for surveillance.   tumor marker is mildly elevated, but has remained stable over time.  Will continue to monitor.  - Next Lupron today, will give 3 month dose today and then resume monthly dosing in July.   - Can transition to every 3 month labs now.   - Provider visit every 3 months.     2.  Nausea/vomiting/diarrhea:  Secondary to abemaciclib.Has been much better lately.   - Okay to take Pepcid, ondansetron, and an imodium prn prophylactically prior to dining out at a restaurant or attending social events.  Of note, has not been bothersome enough to do this.  - Continue to take 1-2 imodium with the first diarrheal stool.    3.  RUE lymphedema/cording: No change since last visit.  Continue compression, massage, and lymphedema pump with good response. Encouraged ROM exercises to help R shoulder as well.     4.  Hyperpigmented rash:  Hyperpigmentation and pruritus of the back and pruritus of the groin are consistent w/ cutaneous candidiasis.  - No improvement with topical ketoconazole.  - Reports chronic dry skin but worsening of this and added hyperpigmentation with starting abemaciclib.  As he will complete course of abemaciclib next week, he will wait to  see if this improves with discontinuation of the medication.  If not, will discuss possible punch biopsy with her dermatologist at their visit in July.    5. MARTINEZ: x 2 weeks in association with some weight gain and working out with a . Lung exam is benign today. Suspect this may be from deconditioning + stable anemia effect which will hopefully improve once he stops Verzenio. Discussed calling in with any progressive MARTINEZ or any CP and then PCP should consider cardiac w/up.     6.  Bone Health:  DEXA 10/2022 with osteopenia of multiple sites.  On every 6 month Zometa for prevention of breast cancer bone metastases.  Unfortunately this was poorly tolerated and therefore changed to Prolia on 4/17.    - Next dose of Prolia is due around 5/1/2024.     Nay Wolf PA-C

## 2024-04-16 ENCOUNTER — APPOINTMENT (OUTPATIENT)
Dept: LAB | Facility: CLINIC | Age: 74
End: 2024-04-16
Attending: INTERNAL MEDICINE
Payer: COMMERCIAL

## 2024-04-16 ENCOUNTER — ONCOLOGY VISIT (OUTPATIENT)
Dept: ONCOLOGY | Facility: CLINIC | Age: 74
End: 2024-04-16
Attending: INTERNAL MEDICINE
Payer: COMMERCIAL

## 2024-04-16 VITALS
HEART RATE: 93 BPM | OXYGEN SATURATION: 98 % | SYSTOLIC BLOOD PRESSURE: 136 MMHG | TEMPERATURE: 98 F | BODY MASS INDEX: 27.5 KG/M2 | WEIGHT: 180.8 LBS | RESPIRATION RATE: 18 BRPM | DIASTOLIC BLOOD PRESSURE: 76 MMHG

## 2024-04-16 DIAGNOSIS — Z17.0 MALIGNANT NEOPLASM OF CENTRAL PORTION OF RIGHT BREAST IN MALE, ESTROGEN RECEPTOR POSITIVE (H): Primary | ICD-10-CM

## 2024-04-16 DIAGNOSIS — C50.121 MALIGNANT NEOPLASM OF CENTRAL PORTION OF RIGHT BREAST IN MALE, ESTROGEN RECEPTOR POSITIVE (H): Primary | ICD-10-CM

## 2024-04-16 DIAGNOSIS — C50.929 MALIGNANT NEOPLASM OF MALE BREAST, UNSPECIFIED ESTROGEN RECEPTOR STATUS, UNSPECIFIED LATERALITY, UNSPECIFIED SITE OF BREAST (H): ICD-10-CM

## 2024-04-16 LAB
ALBUMIN SERPL BCG-MCNC: 4.2 G/DL (ref 3.5–5.2)
ALP SERPL-CCNC: 49 U/L (ref 40–150)
ALT SERPL W P-5'-P-CCNC: 17 U/L (ref 0–70)
ANION GAP SERPL CALCULATED.3IONS-SCNC: 14 MMOL/L (ref 7–15)
AST SERPL W P-5'-P-CCNC: 22 U/L (ref 0–45)
BASOPHILS # BLD AUTO: 0.1 10E3/UL (ref 0–0.2)
BASOPHILS NFR BLD AUTO: 2 %
BILIRUB SERPL-MCNC: 0.4 MG/DL
BUN SERPL-MCNC: 12.8 MG/DL (ref 8–23)
CALCIUM SERPL-MCNC: 9.4 MG/DL (ref 8.8–10.2)
CHLORIDE SERPL-SCNC: 103 MMOL/L (ref 98–107)
CREAT SERPL-MCNC: 0.93 MG/DL (ref 0.67–1.17)
DEPRECATED HCO3 PLAS-SCNC: 20 MMOL/L (ref 22–29)
EGFRCR SERPLBLD CKD-EPI 2021: 88 ML/MIN/1.73M2
EOSINOPHIL # BLD AUTO: 0.3 10E3/UL (ref 0–0.7)
EOSINOPHIL NFR BLD AUTO: 8 %
ERYTHROCYTE [DISTWIDTH] IN BLOOD BY AUTOMATED COUNT: 13.2 % (ref 10–15)
GLUCOSE SERPL-MCNC: 140 MG/DL (ref 70–99)
HCT VFR BLD AUTO: 32.1 % (ref 40–53)
HGB BLD-MCNC: 10.7 G/DL (ref 13.3–17.7)
IMM GRANULOCYTES # BLD: 0 10E3/UL
IMM GRANULOCYTES NFR BLD: 1 %
LYMPHOCYTES # BLD AUTO: 1 10E3/UL (ref 0.8–5.3)
LYMPHOCYTES NFR BLD AUTO: 32 %
MCH RBC QN AUTO: 31.6 PG (ref 26.5–33)
MCHC RBC AUTO-ENTMCNC: 33.3 G/DL (ref 31.5–36.5)
MCV RBC AUTO: 95 FL (ref 78–100)
MONOCYTES # BLD AUTO: 0.3 10E3/UL (ref 0–1.3)
MONOCYTES NFR BLD AUTO: 8 %
NEUTROPHILS # BLD AUTO: 1.6 10E3/UL (ref 1.6–8.3)
NEUTROPHILS NFR BLD AUTO: 49 %
NRBC # BLD AUTO: 0 10E3/UL
NRBC BLD AUTO-RTO: 0 /100
PLATELET # BLD AUTO: 161 10E3/UL (ref 150–450)
POTASSIUM SERPL-SCNC: 4.3 MMOL/L (ref 3.4–5.3)
PROT SERPL-MCNC: 7.7 G/DL (ref 6.4–8.3)
RBC # BLD AUTO: 3.39 10E6/UL (ref 4.4–5.9)
SODIUM SERPL-SCNC: 137 MMOL/L (ref 135–145)
WBC # BLD AUTO: 3.2 10E3/UL (ref 4–11)

## 2024-04-16 PROCEDURE — 99214 OFFICE O/P EST MOD 30 MIN: CPT | Performed by: PHYSICIAN ASSISTANT

## 2024-04-16 PROCEDURE — 250N000011 HC RX IP 250 OP 636: Mod: JZ | Performed by: PHYSICIAN ASSISTANT

## 2024-04-16 PROCEDURE — 36415 COLL VENOUS BLD VENIPUNCTURE: CPT | Performed by: PHYSICIAN ASSISTANT

## 2024-04-16 PROCEDURE — 36415 COLL VENOUS BLD VENIPUNCTURE: CPT

## 2024-04-16 PROCEDURE — 96402 CHEMO HORMON ANTINEOPL SQ/IM: CPT

## 2024-04-16 PROCEDURE — 99213 OFFICE O/P EST LOW 20 MIN: CPT | Performed by: PHYSICIAN ASSISTANT

## 2024-04-16 PROCEDURE — 80053 COMPREHEN METABOLIC PANEL: CPT | Performed by: PHYSICIAN ASSISTANT

## 2024-04-16 PROCEDURE — 96372 THER/PROPH/DIAG INJ SC/IM: CPT | Performed by: PHYSICIAN ASSISTANT

## 2024-04-16 PROCEDURE — 85025 COMPLETE CBC W/AUTO DIFF WBC: CPT | Performed by: PHYSICIAN ASSISTANT

## 2024-04-16 RX ADMIN — LEUPROLIDE ACETATE 11.25 MG: KIT at 08:47

## 2024-04-16 ASSESSMENT — PAIN SCALES - GENERAL: PAINLEVEL: NO PAIN (0)

## 2024-04-16 NOTE — NURSING NOTE
Chief Complaint   Patient presents with    Blood Draw     Labs drawn with  by vascular. Vitals taken. Pt checked into next appointment.         Mima Menard RN

## 2024-04-16 NOTE — LETTER
4/16/2024         RE: Ronna Collins  [address]  [facility] [address]        Dear Colleague,    Thank you for referring your patient, Ronna Collins, to the [facility]. Please see a copy of my visit note below.    Oncology Visit:  Date on this visit: Apr 16, 2024    Diagnosis: h/o clinical prognostic stage IIIb, [id number], grade 2, ER positive, AZ positive, HER-2 negative right breast cancer, [id number].    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  He noted discomfort and hardening of the skin of the right nipple in late April/early May, 2021.  He noted a seborrheic keratosis of the right nipple and remembered he had a similar skin lesion of the arm 3 months earlier.  However, he subsequently noted a mass in the same area.  Right breast skin punch biopsy performed by dermatology was c/w grade 2 invasive ductal carcinoma with associated DCIS.  Invasive carcinoma was ER positive 100% and AZ positive 70%, with tumor invading the dermis.  HER-2 was negative by IHC (score 1+).  Diagnostic mammogram and ultrasound showed a retroareolar right breast mass measuring 2.9 cm with associated nipple retraction and enlarged, abnormal right axillary lymph nodes.  Right breast biopsy was again c/w grade 2 invasive ductal carcinoma, ER strong in 98% of tumor cell nuclei, HER-2 negative.  Ki-67 was 15%.    He elected to screen for the ISPY-2 clinical trial.  MRI breast on 6/18/2021 showed the biopsy proven right breast cancer to measure 3.9 cm in maximum dimension with invasion of the nipple and the pectoralis muscle as well as at least 4 abnormal level 1 and 2 right axillary lymph nodes and a tiny 0.4 cm right internal mammary lymph node.  Mammoprint returned low risk with a score of +0.29.  He elected for treatment on the endocrine optimization protocol of ISPY-2 and was randomized to treatment  with amcenestrant.  He was on treatment with  amcenestrant from 7/1/2021 - 10/26/2021.  At our visit in 01/2021, both right axillary node and right breast tumor palpated more firm then prior.  Breast MRI was stable, however, due to clinical concerns for progression, decision was made to proceed with surgery.   Right breast mastectomy and right axillary lymph node dissection was performed by Dr. Snider on 10/27/2021.  Pathology showed grade 2 carcinoma of the right breast measuring 3.2 cm with invasion of the dermis, nipple and the pectoralis muscle.  Ki-67 of the excised tumor was 7%.  15/44 right axillary lymph nodes were positive with 6 of the lymph nodes demonstrating extranodal extension.  The largest lymph node metastasis measured 2.1 cm.      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  His course was complicated by fevers persistent throughout treatment.  He received radiation (4800 cGy in 15 fractions) to the right chest wall and regional lymph nodes from 3/21/2022 - 4/8/2022.  He has been on treatment with lupron and letrozole since 3/16/2022.  Abemaciclib was added 4/26/2022.    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 He is feeling well overall. He notes continued itching from rash which is stable. He has continued fatigue from treatment. Gets up 1-2x per night to urinate which is chronic. He had URI about 2 months ago which is resolved now. He gained some weight and is working out with a  but has noticed more dyspnea when he climbs stairs or walks up an incline. This is going on for about 2 weeks. Sometimes will cough after this. No CP, chest tightness, or fevers/chills. Lymphedema has been well controlled. He has some R shoulder ROM tightness. He notes he had not been doing his exercises as  much.     Would like to be given a 3 month Lupron shot today since he will be traveling a lot the next few months.       Past  Medical/Surgical History:  Past Medical History:   Diagnosis Date    Breast cancer (H) 05/2021    Chronic sinusitis     Hypertension 2002     Past Surgical History:   Procedure Laterality Date    COLONOSCOPY WITH CO2 INSUFFLATION N/A 10/28/2022    Procedure: COLONOSCOPY, WITH CO2 INSUFFLATION;  Surgeon: Annemarie Noel DO;  Location: [facility]    COMBINED ESOPHAGOSCOPY, GASTROSCOPY, DUODENOSCOPY (EGD) WITH CO2 INSUFFLATION N/A 10/28/2022    Procedure: ESOPHAGOGASTRODUODENOSCOPY, WITH CO2 INSUFFLATION;  Surgeon: Annemarie Noel DO;  Location: [facility]    DISSECT LYMPH NODE AXILLA Right 10/27/2021    Procedure: RIGHT Axillary Lymph Node Dissection;  Surgeon: Nida Snider MD;  [facility]    ESOPHAGOSCOPY, GASTROSCOPY, DUODENOSCOPY (EGD), COMBINED N/A 10/28/2022    Procedure: ESOPHAGOGASTRODUODENOSCOPY, WITH BIOPSY;  Surgeon: Annemarie Noel DO;  Location: [facility]    MASTECTOMY SIMPLE Right 10/27/2021    Procedure: RIGHT Simple Mastectomy;  Surgeon: Nida Snider MD;  Location: [facility]     Allergies:  Allergies as of 04/16/2024    (No Known Allergies)     Current Medications:  Current Outpatient Medications   Medication Sig Dispense Refill    abemaciclib (VERZENIO) 150 MG tablet Take 1 tablet (150 mg) by mouth 2 times daily 56 tablet 2    famotidine (PEPCID) 40 MG tablet Take 1 tablet (40 mg) by mouth daily 90 tablet 3    ketoconazole (NIZORAL) 2 % external cream Apply topically daily (Patient not taking: Reported on 11/3/2023) 30 g 1    letrozole (FEMARA) 2.5 MG tablet Take 1 tablet (2.5 mg) by mouth daily for 360 days 90 tablet 3    letrozole (FEMARA) 2.5 MG tablet Take 1 tablet (2.5 mg) by mouth daily for 360 days (Patient not taking: Reported on 1/22/2024) 90 tablet 3    loperamide (IMODIUM) 2 MG capsule Start with 2 caps (4 mg), then take one cap (2 mg) after each diarrheal stool as needed. Do not use more than 8 caps (16 mg) per day. (Patient not taking: Reported on 8/1/2023) 30 capsule 0     losartan-hydrochlorothiazide (HYZAAR) 100-25 MG tablet Take 1 tablet by mouth daily 90 tablet 3    MELATONIN PO Take by mouth nightly as needed        Genetics Breast Actionable and Breast Expanded panels were both negative for pathogenic germline mutations.    PHYSICAL EXAM:  /76 (BP Location: Right arm, Patient Position: Sitting, Cuff Size: Adult Regular)   Pulse 93   Temp 98  F (36.7  C) (Oral)   Resp 18   Wt 82 kg (180 lb 12.8 oz)   SpO2 98%   BMI 27.50 kg/m    Wt Readings from Last 4 Encounters:   04/16/24 82 kg (180 lb 12.8 oz)   03/20/24 81.4 kg (179 lb 8 oz)   02/21/24 81.1 kg (178 lb 11.2 oz)   01/22/24 81.3 kg (179 lb 3.2 oz)   General: Alert, oriented, pleasant, NAD  HEENT: Normocephalic, atraumatic, no icterus. Moist mucus membranes, no lesions, or thrush  Neck: No cervical or supraclavicular LAD.  Axillary: No LAD  Breast: Left breast tissue is benign. R chest wall has some fibrosis along pec muscle/tendon group and is tight about chest wall from lymphedema but no concerning skin changes or nodularity   Lungs: CTA bilaterally, normal work of breathing  Cardiac: RRR  Abdomen: Soft, nontender, nondistended. Normoactive bowel sounds.   Neuro: CNII-XII grossly intact  Extremities: No pedal edema      Laboratory/Imaging Studies:  I personally reviewed the below images and laboratory studies:   04/16/24 07:57   Sodium 137   Potassium 4.3   Chloride 103   Carbon Dioxide (CO2) 20 (L)   Urea Nitrogen 12.8   Creatinine 0.93   GFR Estimate 88   Calcium 9.4   Anion Gap 14   Albumin 4.2   Protein Total 7.7   Alkaline Phosphatase 49   ALT 17   AST 22   Bilirubin Total 0.4   Glucose 140 (H)   WBC 3.2 (L)   Hemoglobin 10.7 (L)   Hematocrit 32.1 (L)   Platelet Count 161   RBC Count 3.39 (L)   MCV 95   MCH 31.6   MCHC 33.3   RDW 13.2   % Neutrophils 49   % Lymphocytes 32   % Monocytes 8   % Eosinophils 8   % Basophils 2   Absolute Basophils 0.1   Absolute Eosinophils 0.3   Absolute Immature Granulocytes 0.0    Absolute Lymphocytes 1.0   Absolute Monocytes 0.3   % Immature Granulocytes 1   Absolute Neutrophils 1.6   Absolute NRBCs 0.0   NRBCs per 100 WBC 0         ASSESSMENT/PLAN:  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  He is s/p treatment with 3.5 months neoadjuvant amcenestrant, right breast mastectomy, right ALND, 4 cycles Taxotere and cyclophosphamide, and radiation.  He is on treatment with abemaciclib, lupron and letrozole.    1.  Right breast cancer:     Karina is nearly 2 years, 6 months out from excision of and adjuvant treatment of a right breast cancer.  He has been on treatment with abemaciclib, lupron and letrozole since 4/26/2022.  Plan to continue abemaciclib for a total of 2 years (through 4/2024), lupron and letrozole for a total of 10 years (through 3/16/2032).   - Given 15/44 lymph nodes were positive, we are monitoring CA 15-3 and CEA markers once every 6 months to his lab tests for surveillance.   tumor marker is mildly elevated, but has remained stable over time.  Will continue to monitor.  - Next Lupron today, will give 3 month dose today and then resume monthly dosing in July.   - Can transition to every 3 month labs now.   - Provider visit every 3 months.     2.  Nausea/vomiting/diarrhea:  Secondary to abemaciclib.Has been much better lately.   - Okay to take Pepcid, ondansetron, and an imodium prn prophylactically prior to dining out at a restaurant or attending social events.  Of note, has not been bothersome enough to do this.  - Continue to take 1-2 imodium with the first diarrheal stool.    3.  RUE lymphedema/cording: No change since last visit.  Continue compression, massage, and lymphedema pump with good response. Encouraged ROM exercises to help R shoulder as well.     4.  Hyperpigmented rash:  Hyperpigmentation and pruritus of the back and pruritus of the  groin are consistent w/ cutaneous candidiasis.  - No improvement with topical ketoconazole.  - Reports chronic dry skin but worsening of this and added hyperpigmentation with starting abemaciclib.  As he will complete course of abemaciclib next week, he will wait to see if this improves with discontinuation of the medication.  If not, will discuss possible punch biopsy with her dermatologist at their visit in July.    5. MARTINEZ: x 2 weeks in association with some weight gain and working out with a . Lung exam is benign today. Suspect this may be from deconditioning + stable anemia effect which will hopefully improve once he stops Verzenio. Discussed calling in with any progressive MARTINEZ or any CP and then PCP should consider cardiac w/up.     6.  Bone Health:  DEXA 10/2022 with osteopenia of multiple sites.  On every 6 month Zometa for prevention of breast cancer bone metastases.  Unfortunately this was poorly tolerated and therefore changed to Prolia on 4/17.    - Next dose of Prolia is due around 5/1/2024.     Nay Wolf PA-C

## 2024-04-16 NOTE — NURSING NOTE
Lupron administered to Left Ventrogluteal without incident. Patient tolerated well.    Susannah Moe RN

## 2024-04-16 NOTE — NURSING NOTE
"Oncology Rooming Note    April 16, 2024 8:14 AM   Ronna Collins is a 73 year old male who presents for:    Chief Complaint   Patient presents with    Blood Draw     Labs drawn with  by vascular. Vitals taken. Pt checked into next appointment.      Oncology Clinic Visit     Malignant neoplasm of male breast     Initial Vitals: /76 (BP Location: Right arm, Patient Position: Sitting, Cuff Size: Adult Regular)   Pulse 93   Temp 98  F (36.7  C) (Oral)   Resp 18   Wt 82 kg (180 lb 12.8 oz)   SpO2 98%   BMI 27.50 kg/m   Estimated body mass index is 27.5 kg/m  as calculated from the following:    Height as of 6/1/23: 1.727 m (5' 7.99\").    Weight as of this encounter: 82 kg (180 lb 12.8 oz). Body surface area is 1.98 meters squared.  No Pain (0) Comment: Data Unavailable   No LMP for male patient.  Allergies reviewed: Yes  Medications reviewed: Yes    Medications: Medication refills not needed today.  Pharmacy name entered into [facility]:    [id number] - [place], [place] - [address] AT [facility] - [place], [place] - [address]  [id number] - [place], [place] - [address] AT [facility] [address]    Frailty Screening:   Is the patient here for a new oncology consult visit in cancer care? 2. No      Clinical concerns:  none      Shana Madrigal"

## 2024-04-17 ENCOUNTER — TELEPHONE (OUTPATIENT)
Dept: ONCOLOGY | Facility: CLINIC | Age: 74
End: 2024-04-17
Payer: COMMERCIAL

## 2024-04-17 NOTE — ORAL ONC MGMT
[facility] Oral Chemotherapy Monitoring Program    Thank you for the opportunity to be a part in the care of this patient's oral chemotherapy. The oncology pharmacy will no longer be following this patient for oral chemotherapy. If there are any questions or the plan changes, feel free to contact us.        1/10/2024     2:00 PM 1/26/2024    11:00 AM 2/21/2024    11:00 AM 2/28/2024    11:00 AM 3/11/2024     8:00 AM 3/21/2024     2:00 PM 4/17/2024     8:00 AM   ORAL CHEMOTHERAPY   Assessment Type Quarterly Follow up Chart Review Lab Monitoring Other Refill Lab Monitoring Monthly Follow up;Chart Review;Discontinuation   Stop Date       4/27/2024   Reason for Discontinuation       Therapy complete   Diagnosis Code Breast Cancer Breast Cancer Breast Cancer Breast Cancer Breast Cancer Breast Cancer Breast Cancer   Providers Remberto Remberto Remberto Remberto Remberto Remberto Remberto   [facility]   Is this patient followed by the [facility] OC team? Yes Yes Yes Yes Yes Yes Yes   Drug Name Verzenio (abemaciclib) Verzenio (abemaciclib) Verzenio (abemaciclib) Verzenio (abemaciclib) Verzenio (abemaciclib) Verzenio (abemaciclib) Verzenio (abemaciclib)   Dose 150 mg 150 mg 150 mg 150 mg 150 mg 150 mg 150 mg   Current Schedule BID BID BID BID BID BID BID   Cycle Details Continuous Continuous Continuous Continuous Continuous Continuous Continuous   Doses missed in last 2 weeks 0         Adherence Assessment Adherent         Adverse Effects No AE identified during assessment  No AE identified during assessment       Any new drug interactions? No         Is the dose as ordered appropriate for the patient? Yes         Since the last time we talked, have you been hospitalized or used the emergency room? No             Fred Julio, PharmD  Hematology/Oncology Clinical Pharmacist  [facility]  [facility]  [facility]  [phone]

## 2024-04-30 RX ORDER — ALBUTEROL SULFATE 0.83 MG/ML
2.5 SOLUTION RESPIRATORY (INHALATION)
OUTPATIENT
Start: 2024-05-01

## 2024-04-30 RX ORDER — EPINEPHRINE 1 MG/ML
0.3 INJECTION, SOLUTION INTRAMUSCULAR; SUBCUTANEOUS EVERY 5 MIN PRN
OUTPATIENT
Start: 2024-05-01

## 2024-04-30 RX ORDER — MEPERIDINE HYDROCHLORIDE 25 MG/ML
25 INJECTION INTRAMUSCULAR; INTRAVENOUS; SUBCUTANEOUS EVERY 30 MIN PRN
OUTPATIENT
Start: 2024-05-01

## 2024-04-30 RX ORDER — DIPHENHYDRAMINE HYDROCHLORIDE 50 MG/ML
50 INJECTION INTRAMUSCULAR; INTRAVENOUS
Start: 2024-05-01

## 2024-04-30 RX ORDER — ALBUTEROL SULFATE 90 UG/1
1-2 AEROSOL, METERED RESPIRATORY (INHALATION)
Start: 2024-05-01

## 2024-04-30 RX ORDER — METHYLPREDNISOLONE SODIUM SUCCINATE 125 MG/2ML
125 INJECTION, POWDER, LYOPHILIZED, FOR SOLUTION INTRAMUSCULAR; INTRAVENOUS
Start: 2024-05-01

## 2024-05-01 ENCOUNTER — INFUSION THERAPY VISIT (OUTPATIENT)
Dept: ONCOLOGY | Facility: CLINIC | Age: 74
End: 2024-05-01
Attending: INTERNAL MEDICINE
Payer: COMMERCIAL

## 2024-05-01 ENCOUNTER — LAB (OUTPATIENT)
Dept: LAB | Facility: CLINIC | Age: 74
End: 2024-05-01
Payer: COMMERCIAL

## 2024-05-01 DIAGNOSIS — Z17.0 MALIGNANT NEOPLASM OF CENTRAL PORTION OF RIGHT BREAST IN MALE, ESTROGEN RECEPTOR POSITIVE (H): Primary | ICD-10-CM

## 2024-05-01 DIAGNOSIS — C50.121 MALIGNANT NEOPLASM OF CENTRAL PORTION OF RIGHT BREAST IN MALE, ESTROGEN RECEPTOR POSITIVE (H): Primary | ICD-10-CM

## 2024-05-01 DIAGNOSIS — Z17.0 MALIGNANT NEOPLASM OF CENTRAL PORTION OF RIGHT BREAST IN MALE, ESTROGEN RECEPTOR POSITIVE (H): ICD-10-CM

## 2024-05-01 DIAGNOSIS — C50.121 MALIGNANT NEOPLASM OF CENTRAL PORTION OF RIGHT BREAST IN MALE, ESTROGEN RECEPTOR POSITIVE (H): ICD-10-CM

## 2024-05-01 DIAGNOSIS — M94.9 DISORDER OF BONE AND CARTILAGE: ICD-10-CM

## 2024-05-01 DIAGNOSIS — Z79.811 LONG TERM (CURRENT) USE OF AROMATASE INHIBITORS: ICD-10-CM

## 2024-05-01 DIAGNOSIS — M89.9 DISORDER OF BONE AND CARTILAGE: ICD-10-CM

## 2024-05-01 LAB
ALBUMIN SERPL BCG-MCNC: 4.4 G/DL (ref 3.5–5.2)
CALCIUM SERPL-MCNC: 9.8 MG/DL (ref 8.8–10.2)
CANCER AG15-3 SERPL-ACNC: 47 U/ML
CEA SERPL-MCNC: 5.1 NG/ML
CREAT SERPL-MCNC: 0.85 MG/DL (ref 0.67–1.17)
EGFRCR SERPLBLD CKD-EPI 2021: >90 ML/MIN/1.73M2

## 2024-05-01 PROCEDURE — 96372 THER/PROPH/DIAG INJ SC/IM: CPT | Performed by: INTERNAL MEDICINE

## 2024-05-01 PROCEDURE — 86300 IMMUNOASSAY TUMOR CA 15-3: CPT | Performed by: INTERNAL MEDICINE

## 2024-05-01 PROCEDURE — 250N000011 HC RX IP 250 OP 636: Mod: JZ | Performed by: INTERNAL MEDICINE

## 2024-05-01 PROCEDURE — 36415 COLL VENOUS BLD VENIPUNCTURE: CPT | Performed by: INTERNAL MEDICINE

## 2024-05-01 PROCEDURE — 82310 ASSAY OF CALCIUM: CPT | Performed by: INTERNAL MEDICINE

## 2024-05-01 PROCEDURE — 82378 CARCINOEMBRYONIC ANTIGEN: CPT | Performed by: INTERNAL MEDICINE

## 2024-05-01 PROCEDURE — 82565 ASSAY OF CREATININE: CPT | Performed by: INTERNAL MEDICINE

## 2024-05-01 PROCEDURE — 82040 ASSAY OF SERUM ALBUMIN: CPT | Performed by: INTERNAL MEDICINE

## 2024-05-01 PROCEDURE — 99000 SPECIMEN HANDLING OFFICE-LAB: CPT | Performed by: PATHOLOGY

## 2024-05-01 RX ADMIN — DENOSUMAB 60 MG: 60 INJECTION SUBCUTANEOUS at 09:29

## 2024-05-02 DIAGNOSIS — C50.921: Primary | ICD-10-CM

## 2024-05-02 DIAGNOSIS — C77.3: Primary | ICD-10-CM

## 2024-05-02 DIAGNOSIS — R97.8 ABNORMAL TUMOR MARKERS: ICD-10-CM

## 2024-05-11 ENCOUNTER — ANCILLARY PROCEDURE (OUTPATIENT)
Dept: CT IMAGING | Facility: CLINIC | Age: 74
End: 2024-05-11
Attending: INTERNAL MEDICINE
Payer: COMMERCIAL

## 2024-05-11 DIAGNOSIS — C77.3: ICD-10-CM

## 2024-05-11 DIAGNOSIS — R97.8 ABNORMAL TUMOR MARKERS: ICD-10-CM

## 2024-05-11 DIAGNOSIS — C50.921: ICD-10-CM

## 2024-05-11 PROCEDURE — 71260 CT THORAX DX C+: CPT | Performed by: STUDENT IN AN ORGANIZED HEALTH CARE EDUCATION/TRAINING PROGRAM

## 2024-05-11 PROCEDURE — 74177 CT ABD & PELVIS W/CONTRAST: CPT | Performed by: STUDENT IN AN ORGANIZED HEALTH CARE EDUCATION/TRAINING PROGRAM

## 2024-05-11 RX ORDER — IOPAMIDOL 755 MG/ML
90 INJECTION, SOLUTION INTRAVASCULAR ONCE
Status: COMPLETED | OUTPATIENT
Start: 2024-05-11 | End: 2024-05-11

## 2024-05-11 RX ADMIN — IOPAMIDOL 90 ML: 755 INJECTION, SOLUTION INTRAVASCULAR at 07:57

## 2024-05-11 NOTE — DISCHARGE INSTRUCTIONS

## 2024-05-14 ENCOUNTER — ONCOLOGY VISIT (OUTPATIENT)
Dept: ONCOLOGY | Facility: CLINIC | Age: 74
End: 2024-05-14
Payer: COMMERCIAL

## 2024-05-14 VITALS
RESPIRATION RATE: 16 BRPM | HEART RATE: 73 BPM | TEMPERATURE: 98 F | WEIGHT: 181.2 LBS | SYSTOLIC BLOOD PRESSURE: 150 MMHG | BODY MASS INDEX: 27.56 KG/M2 | OXYGEN SATURATION: 100 % | DIASTOLIC BLOOD PRESSURE: 84 MMHG

## 2024-05-14 DIAGNOSIS — Z79.811 LONG TERM (CURRENT) USE OF AROMATASE INHIBITORS: ICD-10-CM

## 2024-05-14 DIAGNOSIS — R06.09 DYSPNEA ON EXERTION: ICD-10-CM

## 2024-05-14 DIAGNOSIS — R21 RASH AND NONSPECIFIC SKIN ERUPTION: ICD-10-CM

## 2024-05-14 DIAGNOSIS — M89.9 DISORDER OF BONE AND CARTILAGE: ICD-10-CM

## 2024-05-14 DIAGNOSIS — Z17.0 MALIGNANT NEOPLASM OF CENTRAL PORTION OF RIGHT BREAST IN MALE, ESTROGEN RECEPTOR POSITIVE (H): Primary | ICD-10-CM

## 2024-05-14 DIAGNOSIS — R97.8 ABNORMAL TUMOR MARKERS: ICD-10-CM

## 2024-05-14 DIAGNOSIS — C50.121 MALIGNANT NEOPLASM OF CENTRAL PORTION OF RIGHT BREAST IN MALE, ESTROGEN RECEPTOR POSITIVE (H): Primary | ICD-10-CM

## 2024-05-14 DIAGNOSIS — M94.9 DISORDER OF BONE AND CARTILAGE: ICD-10-CM

## 2024-05-14 PROCEDURE — 99417 PROLNG OP E/M EACH 15 MIN: CPT | Performed by: INTERNAL MEDICINE

## 2024-05-14 PROCEDURE — 99215 OFFICE O/P EST HI 40 MIN: CPT | Performed by: INTERNAL MEDICINE

## 2024-05-14 PROCEDURE — 99213 OFFICE O/P EST LOW 20 MIN: CPT | Performed by: INTERNAL MEDICINE

## 2024-05-14 ASSESSMENT — PAIN SCALES - GENERAL: PAINLEVEL: NO PAIN (0)

## 2024-05-14 NOTE — NURSING NOTE
"Oncology Rooming Note    May 14, 2024 12:30 PM   Ronna Collins is a 73 year old male who presents for:    Chief Complaint   Patient presents with    Oncology Clinic Visit     Breast Cancer     Initial Vitals: BP (!) 150/84 (BP Location: Left arm, Patient Position: Sitting, Cuff Size: Adult Regular)   Pulse 73   Temp 98  F (36.7  C) (Oral)   Resp 16   Wt 82.2 kg (181 lb 3.2 oz)   SpO2 100%   BMI 27.56 kg/m   Estimated body mass index is 27.56 kg/m  as calculated from the following:    Height as of 6/1/23: 1.727 m (5' 7.99\").    Weight as of this encounter: 82.2 kg (181 lb 3.2 oz). Body surface area is 1.99 meters squared.  No Pain (0) Comment: Data Unavailable   No LMP for male patient.  Allergies reviewed: Yes  Medications reviewed: Yes    Medications: Medication refills not needed today.  Pharmacy name entered into [facility]:    [id number] - [place], [place] - [address] [facility] - [place], [place] - [address]   [id number] - [place], [place] - [address] [facility] [address]    Frailty Screening:   Is the patient here for a new oncology consult visit in cancer care? 2. No      Clinical concerns: none      Guadalupe Ricci, EMT  5/14/2024            "

## 2024-05-14 NOTE — PROGRESS NOTES
Oncology Visit:  Date on this visit: May 14, 2024    Diagnosis: h/o clinical prognostic stage IIIb, [id number], grade 2, ER positive, MA positive, HER-2 negative right breast cancer, [id number].    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  He noted discomfort and hardening of the skin of the right nipple in late April/early May, 2021.  He noted a seborrheic keratosis of the right nipple and remembered he had a similar skin lesion of the arm 3 months earlier.  However, he subsequently noted a mass in the same area.  Right breast skin punch biopsy performed by dermatology was c/w grade 2 invasive ductal carcinoma with associated DCIS.  Invasive carcinoma was ER positive 100% and MA positive 70%, with tumor invading the dermis.  HER-2 was negative by IHC (score 1+).  Diagnostic mammogram and ultrasound showed a retroareolar right breast mass measuring 2.9 cm with associated nipple retraction and enlarged, abnormal right axillary lymph nodes.  Right breast biopsy was again c/w grade 2 invasive ductal carcinoma, ER strong in 98% of tumor cell nuclei, HER-2 negative.  Ki-67 was 15%.    He elected to screen for the ISPY-2 clinical trial.  MRI breast on 6/18/2021 showed the biopsy proven right breast cancer to measure 3.9 cm in maximum dimension with invasion of the nipple and the pectoralis muscle as well as at least 4 abnormal level 1 and 2 right axillary lymph nodes and a tiny 0.4 cm right internal mammary lymph node.  Mammoprint returned low risk with a score of +0.29.  He elected for treatment on the endocrine optimization protocol of ISPY-2 and was randomized to treatment with amcenestrant.  He was on treatment with  amcenestrant from 7/1/2021 - 10/26/2021.  At our visit in 01/2021, both right axillary node and right breast tumor palpated more firm then prior.  Breast MRI was stable, however, due to clinical concerns for progression,  decision was made to proceed with surgery.   Right breast mastectomy and right axillary lymph node dissection was performed by Dr. Snider on 10/27/2021.  Pathology showed grade 2 carcinoma of the right breast measuring 3.2 cm with invasion of the dermis, nipple and the pectoralis muscle.  Ki-67 of the excised tumor was 7%.  15/44 right axillary lymph nodes were positive with 6 of the lymph nodes demonstrating extranodal extension.  The largest lymph node metastasis measured 2.1 cm.      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  His course was complicated by fevers persistent throughout treatment.  He received radiation (4800 cGy in 15 fractions) to the right chest wall and regional lymph nodes from 3/21/2022 - 4/8/2022.  He has been on treatment with lupron and letrozole since 3/16/2022.  He is s/p 2 years of adjuvant abemaciclib from 4/26/2022 - 04/2024.    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 He is accompanied by his daughter, Caity. His wife is on the phone. He reports some shortness of breath with exertion for the past 2-3 months, specifically when he climbs stairs or walks uphill. He recovers quickly from this. He denies cough, chest pain, palpitations, fevers. These symptoms have been stable over the past few months.     He thinks his rash is mildly improved, though he still has some pruritus. He has an appointment to see dermatology in July. His insomnia is about the same. He is not currently taking any medications for insomnia.     He notes GI symptoms are resolved since stopping abemaciclib.  He has no current abdominal complaints.    He is concerned about his rising tumor markers and is curious whether he needs to change treatments. He and his family also wonder about risk of recurrence for his cancer.       Past Medical/Surgical History:  Past Medical History:   Diagnosis Date    Breast cancer (H) 05/2021    Chronic sinusitis      Hypertension 2002     Past Surgical History:   Procedure Laterality Date    COLONOSCOPY WITH CO2 INSUFFLATION N/A 10/28/2022    Procedure: COLONOSCOPY, WITH CO2 INSUFFLATION;  Surgeon: Annemarie Noel DO;  Location: [facility] OR    COMBINED ESOPHAGOSCOPY, GASTROSCOPY, DUODENOSCOPY (EGD) WITH CO2 INSUFFLATION N/A 10/28/2022    Procedure: ESOPHAGOGASTRODUODENOSCOPY, WITH CO2 INSUFFLATION;  Surgeon: Annemarei Noel DO;  Location: [facility] OR    DISSECT LYMPH NODE AXILLA Right 10/27/2021    Procedure: RIGHT Axillary Lymph Node Dissection;  Surgeon: Nida Snider MD;  Location: [facility] OR    ESOPHAGOSCOPY, GASTROSCOPY, DUODENOSCOPY (EGD), COMBINED N/A 10/28/2022    Procedure: ESOPHAGOGASTRODUODENOSCOPY, WITH BIOPSY;  Surgeon: Annemarie Noel DO;  Location: [facility] OR    MASTECTOMY SIMPLE Right 10/27/2021    Procedure: RIGHT Simple Mastectomy;  Surgeon: Nida Snider MD;  Location: [facility] OR     Allergies:  Allergies as of 05/14/2024    (No Known Allergies)     Current Medications:  Current Outpatient Medications   Medication Sig Dispense Refill    abemaciclib (VERZENIO) 150 MG tablet Take 1 tablet (150 mg) by mouth 2 times daily (Patient not taking: Reported on 5/1/2024) 56 tablet 2    famotidine (PEPCID) 40 MG tablet Take 1 tablet (40 mg) by mouth daily 90 tablet 3    ketoconazole (NIZORAL) 2 % external cream Apply topically daily (Patient not taking: Reported on 11/3/2023) 30 g 1    letrozole (FEMARA) 2.5 MG tablet Take 1 tablet (2.5 mg) by mouth daily for 360 days 90 tablet 3    letrozole (FEMARA) 2.5 MG tablet Take 1 tablet (2.5 mg) by mouth daily for 360 days (Patient not taking: Reported on 1/22/2024) 90 tablet 3    loperamide (IMODIUM) 2 MG capsule Start with 2 caps (4 mg), then take one cap (2 mg) after each diarrheal stool as needed. Do not use more than 8 caps (16 mg) per day. (Patient not taking: Reported on 8/1/2023) 30 capsule 0    losartan-hydrochlorothiazide (HYZAAR) 100-25 MG tablet Take 1 tablet by mouth  daily 90 tablet 3    MELATONIN PO Take by mouth nightly as needed        Genetics Breast Actionable and Breast Expanded panels were both negative for pathogenic germline mutations.    Physical Exam:  BP (!) 150/84 (BP Location: Left arm, Patient Position: Sitting, Cuff Size: Adult Regular)   Pulse 73   Temp 98  F (36.7  C) (Oral)   Resp 16   Wt 82.2 kg (181 lb 3.2 oz)   SpO2 100%   BMI 27.56 kg/m    General:  Well appearing, well nourished adult male in NAD.  Alert and oriented x 3.  HEENT:  Normocephalic.  Sclera are anicteric.  MMM.  Breast: right breast mastectomy without palpable masses or lymphadenopathy. (+) right axillary cording.   Respiratory:  Breathing comfortably on room air.  CTA bilaterally, no wheezes or crackles.  CV:  RRR.  No audible m/r/g.  Musculoskeletal:  Normal movement of the bilateral upper extremities.    Skin: Hyperpigmentation evident on patient's back, improved from previous  Neuro:  No notable tremor and dyskinetic movements.  Psych:  Mood and affect appear normal.    Laboratory/Imaging Studies:  I personally reviewed the below images and laboratory studies:    5/11/2024 CT C/A/P w/ contrast:  FINDINGS:     Chest:     Heart/ Mediastinum: Heart is within normal limits. No evidence of  central pulmonary embolism. No bulky lymphadenopathy.  Esophagus  appears normal.     Lungs/pleura: The central tracheobronchial tree is patent.  No focal  mass or consolidation.  Scattered small nodules measuring 3 mm or  less. No suspicious nodules. No pneumothorax or pleural effusion.      Chest wall/axilla: No bulky lymphadenopathy. Right mastectomy and  axillary lymph node dissection.      Abdomen and Pelvis:     Liver: Unremarkable. No suspicious masses. No hepatic steatosis.      Gallbladder/biliary tree: No gallstones. No biliary dilatation.     Spleen: Unremarkable.     Pancreas: Unremarkable. No mass or ductal dilatation.     Adrenal glands: Unremarkable.     Kidneys: Unremarkable. No  hydronephrosis.     Bowel: Unremarkable. No obstruction. Normal appendix.     Retroperitoneum: Vasculature is grossly unremarkable..  No bulky  lymphadenopathy.     Pelvis: Urinary bladder is unremarkable.  Prostate and seminal  vesicles are within normal limits.     Bones: Unremarkable. No suspicious lesions.     Soft Tissues: Unremarkable.                                                                      IMPRESSION:     1.  Stable post surgical changes of right mastectomy and right  axillary lymph node dissection. No convincing evidence of recurrent or  metastatic disease.      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  He is s/p treatment with 3.5 months neoadjuvant amcenestrant, right breast mastectomy, right ALND, 4 cycles Taxotere and cyclophosphamide, and radiation.  He completed 2 years of abemaciclib in 04/2024.  He continues on adjuvant curative intent treatment with lupron and letrozole.    1.  Right breast cancer:    Dr. 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  Since last visit, he has completed planned 2 year course of adjuvant abemaciclib.  He has been on endocrine therapy since 03/2022 and continues on treatment with lupron and letrozole at this time.  Plan to continue letrozole for a total of 10 years (through 3/16/2032).   - Given 15/44 positive lymph nodes, we are monitoring CA 15-3 and CEA markers once every 6 months to his lab tests for surveillance.  This month, tumor markers slightly elevated from  "prior.  CT C/A/P w/ contrast performed and without evidence of recurrent disease.  - Will obtain a NM bone scan to rule out bone metastases.  - We discussed tumor markers are nonspecific and factors unrelated to malignancy can cause variations in them.  It does seem from graphing these, the latest values are increased from prior.  However, it is not clear as to whether this is a \"one-off\" or a trend.  We will plan to repeat tumor markers in one month (early June). If still increasing, will plan to repeat a CT scan 3 months after the prior. Although tumor markers may increase before signs of recurrence on imaging, it is important to note that there is no data to suggest that changing treatment based on tumor markers improves any cancer outcome (time to progression, progression free survival, overall survival, etc).    - We did discuss that if increasing tumor marker trend, we could consider a change in endocrine therapy to tamoxifen.  Tamoxifen is standard adjuvant endocrine therapy choice in men with ER positive breast cancer.  We chose treatment with lupron + letrozole, due to high risk disease with indication for adjuvant CDK 4/6 inhibitors.  The CDK 4/6 inhibitors are not approved for combination with tamoxifen.  This decision was based on multi-disciplinary tumor board discussion.  - We have previously discussed Signatera for monitoring for residual disease.  Given there is no evidence yet that a change in treatment or initiation of early treatment improves outcomes, they have decided not to pursue it at this time.  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    2. Dyspnea on exertion:  Initially present when patient started abemaciclib, then improved. Now has recurred in the past 2-3 months. No other symptoms including cough, fevers, chest pain, palpitations. No history of cardiac disease. CT chest without any concern for " pulmonary metastases, pneumonitis or other etiology. Could be restrictive lung disease in the setting of chest wall fibrosis induced by right chest mastectomy and adjuvant radiation therapy.   - Will order PFTs; if these are normal then recommended patient follow up with PCP for further cardiac evaluation.     3.  RUE lymphedema/cording: No change since last visit.  Continue compression, massage, and lymphedema pump with good response.    4.  Hyperpigmented rash:  Hyperpigmentation and pruritus of the back and pruritus of the groin are consistent w/ possible cutaneous candidiasis vs reaction from abemaciclib. Did not improve with topical ketoconazole, which decreases suspicion for cutaneous candidiasis. Has had some improvement recently, so possibly the rash is from the abemaciclib.   - Patient has dermatology follow up in July. Currently using moisturizing creams.     5.  Insomnia:  Chronic and exacerbated by endocrine therapy.  Not bothersome enough to take medication at this time. Did discuss trying melatonin if patient is interested.    6.  Bone Health:  DEXA 10/2022 with osteopenia of multiple sites.  On every 6 month Zometa for prevention of breast cancer bone metastases.  Unfortunately this was poorly tolerated and therefore changed to Prolia on 4/17.    - Last received Prolia on 5/1/2024, next due around 10/28/2024.    7.  Follow Up:.  Labs (tumor markers) in early June and visit with me on 7/9 as already scheduled.  Nuclear medicine bone scan ordered  PFTs ordered    Patient was seen and discussed with internal medicine resident, Dr. Delmis Steiner. The resident was personally supervised by me during the patient examination. I personally performed a physical exam and the medical decision-making. I made appropriate changes to the documentation and the assessment and plan based on my verification, exam, and medical decision making.     I personally spent 65 minutes spent on the date of the encounter doing  chart review, review of test results, interpretation of tests, patient visit, documentation, and discussion with family

## 2024-05-20 ENCOUNTER — OFFICE VISIT (OUTPATIENT)
Dept: PULMONOLOGY | Facility: CLINIC | Age: 74
End: 2024-05-20
Payer: COMMERCIAL

## 2024-05-20 ENCOUNTER — OFFICE VISIT (OUTPATIENT)
Dept: DERMATOLOGY | Facility: CLINIC | Age: 74
End: 2024-05-20
Payer: COMMERCIAL

## 2024-05-20 DIAGNOSIS — L85.3 XEROSIS OF SKIN: ICD-10-CM

## 2024-05-20 DIAGNOSIS — L82.1 SEBORRHEIC KERATOSES: Primary | ICD-10-CM

## 2024-05-20 DIAGNOSIS — R06.09 DYSPNEA ON EXERTION: ICD-10-CM

## 2024-05-20 DIAGNOSIS — I87.2 STASIS DERMATITIS OF BOTH LEGS: ICD-10-CM

## 2024-05-20 DIAGNOSIS — L30.4 INTERTRIGO: ICD-10-CM

## 2024-05-20 DIAGNOSIS — D18.01 CHERRY ANGIOMA: ICD-10-CM

## 2024-05-20 LAB
6 MIN WALK (FT): 1400 FT
6 MIN WALK (M): 427 M

## 2024-05-20 PROCEDURE — 94618 PULMONARY STRESS TESTING: CPT | Performed by: INTERNAL MEDICINE

## 2024-05-20 PROCEDURE — 94726 PLETHYSMOGRAPHY LUNG VOLUMES: CPT | Performed by: INTERNAL MEDICINE

## 2024-05-20 PROCEDURE — 94729 DIFFUSING CAPACITY: CPT | Performed by: INTERNAL MEDICINE

## 2024-05-20 PROCEDURE — 94375 RESPIRATORY FLOW VOLUME LOOP: CPT | Performed by: INTERNAL MEDICINE

## 2024-05-20 PROCEDURE — 99214 OFFICE O/P EST MOD 30 MIN: CPT | Performed by: DERMATOLOGY

## 2024-05-20 RX ORDER — HYDROCORTISONE 2.5 %
CREAM (GRAM) TOPICAL
Qty: 30 G | Refills: 11 | Status: SHIPPED | OUTPATIENT
Start: 2024-05-20

## 2024-05-20 RX ORDER — KETOCONAZOLE 20 MG/G
CREAM TOPICAL
Qty: 60 G | Refills: 11 | Status: SHIPPED | OUTPATIENT
Start: 2024-05-20

## 2024-05-20 ASSESSMENT — PAIN SCALES - GENERAL: PAINLEVEL: NO PAIN (0)

## 2024-05-20 NOTE — PATIENT INSTRUCTIONS
For itchy skin:  Use moisturizers with either menthol or pramoxine    Examples: Sarna lotion ( menthol)                    CeraVe Itch Relief Moisturizing cream ( pramoxine)    For groin: ketoconazole cream with 2.5% Hydrocortisone cream twice daily when rash is present    For Legs: use compression stockings daily

## 2024-05-20 NOTE — NURSING NOTE
Dermatology Rooming Note    Ronna Collins's goals for this visit include:   Chief Complaint   Patient presents with    Derm Problem     FBSE- no spots of concern     Seema Florez [place]

## 2024-05-20 NOTE — PROGRESS NOTES
[facility] Dermatology Note  Encounter Date: May 20, 2024  Office Visit     Dermatology Problem List:  FBSE 05/20/24  # Invasive ductal adenocarcinoma, right breast, ER+, OK+, HER2-  - skin bx 6/2021 (Dr. Yanez, [facility])   - s/p right mastectomy, chemoradiation Taxol x 22, XRT 3/31/22, Abraxone x 8, SLNbx 6/2021  - abemaciclib, letrozole, leuprolide  - following with oncology ([facility])  # Nummular dermatitis, previously with impetiginization  - current: triam ointment   - prior: mometasone 0.1 oint  - bacterial cx 6/21/19 [facility]  # Atopic dermatitis with chronic sinusitis and cat/dog allergy on prick testing 6/21/19   # Benign bx  - VK, R tricep bx 1/14/21    ____________________________________________    Assessment & Plan:     # Stasis dermatitis of legs with xerosis and edema  - Compression stockings daily    # Intertrigo of groin  - ketoconazole cream with 2.5% hydrocortisone cream twice daily for rash    # Xerosis with pruritus  - Continue Cetaphil cleanser and CeraVe cream but may add either menthol or pramoxine (examples given).     # Notalgia paresthetica- pramoxine or menthol lotions    # Benign findings: cherry angiomas, seborrheic keratoses, nevi, lentigo      Follow-up: 1 year(s) in-person, or earlier for new or changing lesions    Staff:     Nati Church MD  ____________________________________________    CC: Derm Problem (FBSE- no spots of concern)    HPI:  Mr. Ronna Collins is a(n) 73 year old male who presents today as a return patient for a skin check    Patient is otherwise feeling well, without additional skin concerns.     Labs Reviewed:  N/A    Physical Exam:  Vitals: There were no vitals taken for this visit.  SKIN: Total skin excluding the undergarment areas but including buttocks was performed. The exam included the head/face, neck, both arms, chest, back, abdomen, both legs, digits and/or nails.   - waxy stuck on plaques  -  cherry angiomas  - nevi with no atypia on dermoscopy  -erythema and scale of inguinal folds  - diffuse xerosis  - edema of BLE with 2+DP pulses  - hyperpigmented patches of back   - No other lesions of concern on areas examined.     Medications:  Current Outpatient Medications   Medication Sig Dispense Refill    famotidine (PEPCID) 40 MG tablet Take 1 tablet (40 mg) by mouth daily 90 tablet 3    letrozole (FEMARA) 2.5 MG tablet Take 1 tablet (2.5 mg) by mouth daily for 360 days 90 tablet 3    losartan-hydrochlorothiazide (HYZAAR) 100-25 MG tablet Take 1 tablet by mouth daily 90 tablet 3    MELATONIN PO Take by mouth nightly as needed      abemaciclib (VERZENIO) 150 MG tablet Take 1 tablet (150 mg) by mouth 2 times daily (Patient not taking: Reported on 5/1/2024) 56 tablet 2    ketoconazole (NIZORAL) 2 % external cream Apply topically daily (Patient not taking: Reported on 11/3/2023) 30 g 1    letrozole (FEMARA) 2.5 MG tablet Take 1 tablet (2.5 mg) by mouth daily for 360 days (Patient not taking: Reported on 1/22/2024) 90 tablet 3    loperamide (IMODIUM) 2 MG capsule Start with 2 caps (4 mg), then take one cap (2 mg) after each diarrheal stool as needed. Do not use more than 8 caps (16 mg) per day. (Patient not taking: Reported on 8/1/2023) 30 capsule 0     No current facility-administered medications for this visit.      Past Medical History:   Patient Active Problem List   Diagnosis    High triglycerides    HTN (hypertension)    Malignant neoplasm of male breast (H)    Lumbar radiculopathy    Long term (current) use of aromatase inhibitors    Disorder of bone and cartilage    Medication side effects    Diabetes mellitus, type 2 (H)    Lymphedema of right upper extremity     Past Medical History:   Diagnosis Date    Breast cancer (H) 05/2021    Chronic sinusitis     Hypertension 2002       CC Stewart Henry MD  [address] [place] [address] on close of this encounter.

## 2024-05-20 NOTE — LETTER
5/20/2024       RE: Ronna Collins  [address]  [facility] [address]     Dear Colleague,    Thank you for referring your patient, Ronna Collins, to the [facility] DERMATOLOGY CL[facility]IC [place] [facility]. Please see a copy of my visit note below.    [facility] Dermatology Note  Encounter Date: May 20, 2024  Office Visit     Dermatology Problem List:  FBSE 05/20/24  # Invasive ductal adenocarcinoma, right breast, ER+, NH+, HER2-  - skin bx 6/2021 (Dr. Yanez, [facility])   - s/p right mastectomy, chemoradiation Taxol x 22, XRT 3/31/22, Abraxone x 8, SLNbx 6/2021  - abemaciclib, letrozole, leuprolide  - following with oncology ([facility])  # Nummular dermatitis, previously with impetiginization  - current: triam ointment   - prior: mometasone 0.1 oint  - bacterial cx 6/21/19 MSSA  # Atopic dermatitis with chronic sinusitis and cat/dog allergy on prick testing 6/21/19   # Benign bx  - VK, R tricep bx 1/14/21    ____________________________________________    Assessment & Plan:     # Stasis dermatitis of legs with xerosis and edema  - Compression stockings daily    # Intertrigo of groin  - ketoconazole cream with 2.5% hydrocortisone cream twice daily for rash    # Xerosis with pruritus  - Continue Cetaphil cleanser and CeraVe cream but may add either menthol or pramoxine (examples given).     # Notalgia paresthetica- pramoxine or menthol lotions    # Benign findings: cherry angiomas, seborrheic keratoses, nevi, lentigo      Follow-up: 1 year(s) in-person, or earlier for new or changing lesions    Staff:     Nati Church MD  ____________________________________________    CC: Derm Problem (FBSE- no spots of concern)    HPI:  Mr. Ronna Collins is a(n) 73 year old male who presents today as a return patient for a skin check    Patient is otherwise feeling well, without additional skin  concerns.     Labs Reviewed:  N/A    Physical Exam:  Vitals: There were no vitals taken for this visit.  SKIN: Total skin excluding the undergarment areas but including buttocks was performed. The exam included the head/face, neck, both arms, chest, back, abdomen, both legs, digits and/or nails.   - waxy stuck on plaques  - cherry angiomas  - nevi with no atypia on dermoscopy  -erythema and scale of inguinal folds  - diffuse xerosis  - edema of BLE with 2+DP pulses  - hyperpigmented patches of back   - No other lesions of concern on areas examined.     Medications:  Current Outpatient Medications   Medication Sig Dispense Refill    famotidine (PEPCID) 40 MG tablet Take 1 tablet (40 mg) by mouth daily 90 tablet 3    letrozole (FEMARA) 2.5 MG tablet Take 1 tablet (2.5 mg) by mouth daily for 360 days 90 tablet 3    losartan-hydrochlorothiazide (HYZAAR) 100-25 MG tablet Take 1 tablet by mouth daily 90 tablet 3    MELATONIN PO Take by mouth nightly as needed      abemaciclib (VERZENIO) 150 MG tablet Take 1 tablet (150 mg) by mouth 2 times daily (Patient not taking: Reported on 5/1/2024) 56 tablet 2    ketoconazole (NIZORAL) 2 % external cream Apply topically daily (Patient not taking: Reported on 11/3/2023) 30 g 1    letrozole (FEMARA) 2.5 MG tablet Take 1 tablet (2.5 mg) by mouth daily for 360 days (Patient not taking: Reported on 1/22/2024) 90 tablet 3    loperamide (IMODIUM) 2 MG capsule Start with 2 caps (4 mg), then take one cap (2 mg) after each diarrheal stool as needed. Do not use more than 8 caps (16 mg) per day. (Patient not taking: Reported on 8/1/2023) 30 capsule 0     No current facility-administered medications for this visit.      Past Medical History:   Patient Active Problem List   Diagnosis    High triglycerides    HTN (hypertension)    Malignant neoplasm of male breast (H)    Lumbar radiculopathy    Long term (current) use of aromatase inhibitors    Disorder of bone and cartilage    Medication side  effects    Diabetes mellitus, type 2 (H)    Lymphedema of right upper extremity     Past Medical History:   Diagnosis Date    Breast cancer (H) 05/2021    Chronic sinusitis     Hypertension 2002       CC Stewart Henry MD  [address] [place] [phone] on close of this encounter.

## 2024-05-21 LAB
DLCOUNC-%PRED-PRE: 95 %
DLCOUNC-PRE: 23.09 ML/MIN/MMHG
DLCOUNC-PRED: 24.15 ML/MIN/MMHG
ERV-%PRED-PRE: 59 %
ERV-PRE: 0.79 L
ERV-PRED: 1.33 L
EXPTIME-PRE: 5.72 SEC
FEF2575-%PRED-PRE: 150 %
FEF2575-PRE: 3.22 L/SEC
FEF2575-PRED: 2.14 L/SEC
FEFMAX-%PRED-PRE: 80 %
FEFMAX-PRE: 6.21 L/SEC
FEFMAX-PRED: 7.73 L/SEC
FEV1-%PRED-PRE: 87 %
FEV1-PRE: 2.42 L
FEV1FEV6-PRE: 85 %
FEV1FEV6-PRED: 77 %
FEV1FVC-PRE: 85 %
FEV1FVC-PRED: 77 %
FEV1SVC-PRE: 89 %
FEV1SVC-PRED: 70 %
FIFMAX-PRE: 5.1 L/SEC
FRCPLETH-%PRED-PRE: 159 %
FRCPLETH-PRE: 5.74 L
FRCPLETH-PRED: 3.6 L
FVC-%PRED-PRE: 79 %
FVC-PRE: 2.85 L
FVC-PRED: 3.59 L
IC-%PRED-PRE: 72 %
IC-PRE: 1.92 L
IC-PRED: 2.66 L
RVPLETH-%PRED-PRE: 189 %
RVPLETH-PRE: 4.95 L
RVPLETH-PRED: 2.61 L
TLCPLETH-%PRED-PRE: 114 %
TLCPLETH-PRE: 7.66 L
TLCPLETH-PRED: 6.72 L
VA-%PRED-PRE: 79 %
VA-PRE: 4.75 L
VC-%PRED-PRE: 68 %
VC-PRE: 2.72 L
VC-PRED: 3.94 L

## 2024-05-26 ENCOUNTER — HEALTH MAINTENANCE LETTER (OUTPATIENT)
Age: 74
End: 2024-05-26

## 2024-06-01 SDOH — HEALTH STABILITY: PHYSICAL HEALTH: ON AVERAGE, HOW MANY MINUTES DO YOU ENGAGE IN EXERCISE AT THIS LEVEL?: 40 MIN

## 2024-06-01 SDOH — HEALTH STABILITY: PHYSICAL HEALTH: ON AVERAGE, HOW MANY DAYS PER WEEK DO YOU ENGAGE IN MODERATE TO STRENUOUS EXERCISE (LIKE A BRISK WALK)?: 4 DAYS

## 2024-06-01 ASSESSMENT — SOCIAL DETERMINANTS OF HEALTH (SDOH): HOW OFTEN DO YOU GET TOGETHER WITH FRIENDS OR RELATIVES?: ONCE A WEEK

## 2024-06-04 ENCOUNTER — LAB (OUTPATIENT)
Dept: LAB | Facility: CLINIC | Age: 74
End: 2024-06-04
Attending: INTERNAL MEDICINE
Payer: COMMERCIAL

## 2024-06-04 DIAGNOSIS — C50.121 MALIGNANT NEOPLASM OF CENTRAL PORTION OF RIGHT BREAST IN MALE, ESTROGEN RECEPTOR POSITIVE (H): ICD-10-CM

## 2024-06-04 DIAGNOSIS — Z17.0 MALIGNANT NEOPLASM OF CENTRAL PORTION OF RIGHT BREAST IN MALE, ESTROGEN RECEPTOR POSITIVE (H): ICD-10-CM

## 2024-06-04 LAB
CANCER AG15-3 SERPL-ACNC: 37 U/ML
CEA SERPL-MCNC: 4 NG/ML

## 2024-06-04 PROCEDURE — 36415 COLL VENOUS BLD VENIPUNCTURE: CPT

## 2024-06-04 PROCEDURE — 82378 CARCINOEMBRYONIC ANTIGEN: CPT

## 2024-06-04 PROCEDURE — 86300 IMMUNOASSAY TUMOR CA 15-3: CPT

## 2024-06-04 NOTE — NURSING NOTE
Chief Complaint   Patient presents with    Labs Only     Lab only visit, labs drawn by VAT     There were no vitals taken for this visit.  Shashi Rene RN on 6/4/2024 at 8:23 AM

## 2024-06-06 ENCOUNTER — OFFICE VISIT (OUTPATIENT)
Dept: FAMILY MEDICINE | Facility: CLINIC | Age: 74
End: 2024-06-06
Payer: COMMERCIAL

## 2024-06-06 VITALS
BODY MASS INDEX: 27.48 KG/M2 | SYSTOLIC BLOOD PRESSURE: 149 MMHG | WEIGHT: 180.7 LBS | OXYGEN SATURATION: 97 % | HEART RATE: 79 BPM | DIASTOLIC BLOOD PRESSURE: 86 MMHG

## 2024-06-06 DIAGNOSIS — R73.03 PREDIABETES: Primary | ICD-10-CM

## 2024-06-06 DIAGNOSIS — I10 HYPERTENSION, UNSPECIFIED TYPE: ICD-10-CM

## 2024-06-06 DIAGNOSIS — Z00.00 ROUTINE GENERAL MEDICAL EXAMINATION AT A HEALTH CARE FACILITY: ICD-10-CM

## 2024-06-06 DIAGNOSIS — Z00.00 HEALTH CARE MAINTENANCE: ICD-10-CM

## 2024-06-06 DIAGNOSIS — R06.09 DOE (DYSPNEA ON EXERTION): ICD-10-CM

## 2024-06-06 LAB
ATRIAL RATE - MUSE: 71 BPM
DIASTOLIC BLOOD PRESSURE - MUSE: NORMAL MMHG
INTERPRETATION ECG - MUSE: NORMAL
P AXIS - MUSE: 35 DEGREES
PR INTERVAL - MUSE: 230 MS
QRS DURATION - MUSE: 72 MS
QT - MUSE: 424 MS
QTC - MUSE: 460 MS
R AXIS - MUSE: 3 DEGREES
SYSTOLIC BLOOD PRESSURE - MUSE: NORMAL MMHG
T AXIS - MUSE: 20 DEGREES
VENTRICULAR RATE- MUSE: 71 BPM

## 2024-06-06 PROCEDURE — 90480 ADMN SARSCOV2 VAC 1/ONLY CMP: CPT | Performed by: FAMILY MEDICINE

## 2024-06-06 PROCEDURE — 93000 ELECTROCARDIOGRAM COMPLETE: CPT | Performed by: FAMILY MEDICINE

## 2024-06-06 PROCEDURE — 91320 SARSCV2 VAC 30MCG TRS-SUC IM: CPT | Performed by: FAMILY MEDICINE

## 2024-06-06 PROCEDURE — 99397 PER PM REEVAL EST PAT 65+ YR: CPT | Mod: 25 | Performed by: FAMILY MEDICINE

## 2024-06-06 NOTE — PROGRESS NOTES
Preventive Care Visit  [facility]  Stewart Henry MD, Family Medicine  Jun 6, 2024      Assessment & Plan     Prediabetes  Labs     Hypertension, unspecified type  Cont as is    Likely add statin post labs, discussed    MARTINEZ (dyspnea on exertion)  EKG no acute changes  - Echocardiogram Exercise Stress; Future  - EKG 12-lead complete w/read - Clinics    Health care maintenance    - REVIEW OF HEALTH MAINTENANCE PROTOCOL ORDERS  - COVID-19 12+ (2023-24) (PFIZER)  - Adult Eye  Referral; Future    Routine general medical examination at a health care facility  Done        Counseling  Appropriate preventive services were discussed with this patient, including applicable screening as appropriate for fall prevention, nutrition, physical activity, Tobacco-use cessation, weight loss and cognition.  Checklist reviewing preventive services available has been given to the patient.  Reviewed patient's diet, addressing concerns and/or questions.         Return in about 1 year (around 6/6/2025) for Annual Wellness Visit.    Mary Friend is a 73 year old, presenting for the following:  Physical (Shortness of breath when climbing stairs.)        6/6/2024     4:43 PM   Additional Questions   Roomed by KTR   Accompanied by Taryn(wife)            HPI  Here in follow-up  MARTINEZ, mild  Brother recent stroke, age 80  Due for DM labs        6/1/2024   General Health   How would you rate your overall physical health? Good   Feel stress (tense, anxious, or unable to sleep) Not at all         6/1/2024   Nutrition   Diet: Vegetarian/vegan         6/1/2024   Exercise   Days per week of moderate/strenous exercise 4 days   Average minutes spent exercising at this level 40 min         6/1/2024   Social Factors   Frequency of gathering with friends or relatives Once a week   Worry food won't last until get money to buy more No   Food not last or not have enough money for food? No    Do you have housing?  Yes   Are you worried about losing your housing? No   Lack of transportation? No   Unable to get utilities (heat,electricity)? No         2024   Fall Risk   Fallen 2 or more times in the past year? No   Trouble with walking or balance? No          2024   Activities of Daily Living- Home Safety   Needs help with the following daily activites None of the above   Safety concerns in the home None of the above         2024   Dental   Dentist two times every year? Yes         2024   Hearing Screening   Hearing concerns? None of the above         2024   Driving Risk Screening   Patient/family members have concerns about driving No         2024   General Alertness/Fatigue Screening   Have you been more tired than usual lately? No         2024   Urinary Incontinence Screening   Bothered by leaking urine in past 6 months No         2024   TB Screening   Were you born outside of the [place]? Yes             2024   Substance Use   Alcohol more than 3/day or more than 7/wk No   Do you have a current opioid prescription? No   How severe/bad is pain from 1 to 10? 0/10 (No Pain)   Do you use any other substances recreationally? No     Social History     Tobacco Use    Smoking status: Former     Current packs/day: 0.00     Average packs/day: 0.5 packs/day for 24.7 years (12.3 ttl pk-yrs)     Types: Cigarettes     Start date: 1976     Quit date: 2000     Years since quittin.7     Passive exposure: Never    Smokeless tobacco: Never   Substance Use Topics    Alcohol use: Yes     Comment: occ    Drug use: No       ASCVD Risk   The 10-year ASCVD risk score (Willow RITTER, et al., 2019) is: 40.9%    Values used to calculate the score:      Age: 73 years      Sex: Male      Is Non- : No      Diabetic: Yes      Tobacco smoker: No      Systolic Blood Pressure: 149 mmHg      Is BP treated: Yes      HDL Cholesterol: 68 mg/dL      Total Cholesterol:  179 mg/dL          Reviewed and updated as needed this visit by Provider                    Current providers sharing in care for this patient include:  Patient Care Team:  Stewart Henry MD as PCP - General (Family Medicine)  Jeremy Varela MD as MD (Dermatology)  Kayla Buitrago MD as MD (Internal Medicine)  Stewart Henry MD as Assigned PCP  Nida Snider MD as MD (Surgery)  Delvis Brown, RN as Research Personnel  Mikaela Sr MD as Assigned Cancer Care Provider  Mikaela Sr MD as MD (Breast Oncology)  Ana Mak Laura A John, Celsa Pérez MD as Assigned Neuroscience Provider  Samia Castillo CNP as Nurse Practitioner (Urology)  Nati Church MD as MD (Dermatology)  Renetta Dempsey PT as Physical Therapist (Physical Therapy)  Nati Church MD as Assigned Surgical Provider  Myhre, Grace C, [facility] as Pharmacist  Carmina Farris RN as Specialty Care Coordinator (Hematology & Oncology)    The following health maintenance items are reviewed in Epic and correct as of today:  Health Maintenance   Topic Date Due    MICROALBUMIN  Never done    DIABETIC FOOT EXAM  Never done    ANNUAL REVIEW OF HM ORDERS  Never done    ADVANCE CARE PLANNING  Never done    ZOSTER IMMUNIZATION (2 of 2) 12/02/2019    EYE EXAM  11/01/2022    DTAP/TDAP/TD IMMUNIZATION (2 - Td or Tdap) 10/28/2023    COVID-19 Vaccine (7 - 2023-24 season) 10/31/2023    MEDICARE ANNUAL WELLNESS VISIT  11/04/2023    LIPID  11/15/2023    A1C  11/28/2023    BMP  04/16/2025    FALL RISK ASSESSMENT  06/06/2025    HEPATITIS C SCREENING  Completed    PHQ-2 (once per calendar year)  Completed    INFLUENZA VACCINE  Completed    Pneumococcal Vaccine: 65+ Years  Completed    RSV VACCINE (Pregnancy & 60+)  Completed    AORTIC ANEURYSM SCREENING (SYSTEM ASSIGNED)  Completed    IPV IMMUNIZATION  Aged Out    HPV IMMUNIZATION  Aged Out    MENINGITIS IMMUNIZATION  Aged Out    RSV  "MONOCLONAL ANTIBODY  Aged Out    COLORECTAL CANCER SCREENING  Discontinued            Objective    Exam  BP (!) 149/86 (BP Location: Right arm, Patient Position: Sitting, Cuff Size: Adult Regular)   Pulse 79   Wt 82 kg (180 lb 11.2 oz)   SpO2 97%   BMI 27.48 kg/m     Estimated body mass index is 27.48 kg/m  as calculated from the following:    Height as of 6/1/23: 1.727 m (5' 7.99\").    Weight as of this encounter: 82 kg (180 lb 11.2 oz).    Physical Exam  GENERAL: alert and no distress  EYES: Eyes grossly normal to inspection, PERRL and conjunctivae and sclerae normal  HENT: ear canals and TM's normal, nose and mouth without ulcers or lesions  NECK: no adenopathy, no asymmetry, masses, or scars  RESP: lungs clear to auscultation - no rales, rhonchi or wheezes  CV: regular rate and rhythm, normal S1 S2, no S3 or S4, no murmur, click or rub, no peripheral edema  ABDOMEN: soft, nontender, no hepatosplenomegaly, no masses and bowel sounds normal   (male): normal male genitalia without lesions or urethral discharge, no hernia  MS: no gross musculoskeletal defects noted, no edema  SKIN: no suspicious lesions or rashes  NEURO: Normal strength and tone, mentation intact and speech normal  PSYCH: mentation appears normal, affect normal/bright        6/6/2024   Mini Cog   Clock Draw Score 2 Normal   3 Item Recall 3 objects recalled   Mini Cog Total Score 5     Signed Electronically by: Stewart Henry MD    "

## 2024-06-07 NOTE — PATIENT INSTRUCTIONS
"Preventive Care Advice   This is general advice we often give to help people stay healthy. Your care team may have specific advice just for you. Please talk to your care team about your own preventive care needs.  Lifestyle  Exercise at least 150 minutes each week (30 minutes a day, 5 days a week).  Do muscle strengthening activities 2 days a week. These help control your weight and prevent disease.  No smoking.  Wear sunscreen to prevent skin cancer.  Have your home tested for radon every 2 to 5 years. Radon is a colorless, odorless gas that can harm your lungs. To learn more, go to www.health.[facility].mn. and search for \"Radon in Homes.\"  Keep guns unloaded and locked up in a safe place like a safe or gun vault, or, use a gun lock and hide the keys. Always lock away bullets separately. To learn more, visit [employer].[place].gov and search for \"safe gun storage.\"  Nutrition  Eat 5 or more servings of fruits and vegetables each day.  Try wheat bread, brown rice and whole grain pasta (instead of white bread, rice, and pasta).  Get enough calcium and vitamin D. Check the label on foods and aim for 100% of the RDA (recommended daily allowance).  Regular exams  Have a dental exam and cleaning every 6 months.  See your health care team every year to talk about:  Any changes in your health.  Any medicines your care team has prescribed.  Preventive care, family planning, and ways to prevent chronic diseases.  Shots (vaccines)   HPV shots (up to age 26), if you've never had them before.  Hepatitis B shots (up to age 59), if you've never had them before.  COVID-19 shot: Get this shot when it's due.  Flu shot: Get a flu shot every year.  Tetanus shot: Get a tetanus shot every 10 years.  Pneumococcal, hepatitis A, and RSV shots: Ask your care team if you need these based on your risk.  Shingles shot (for age 50 and up).  General health tests  Diabetes screening:  Starting at age 35, Get screened for diabetes at least every 3 years.  If " you are younger than age 35, ask your care team if you should be screened for diabetes.  Cholesterol test: At age 39, start having a cholesterol test every 5 years, or more often if advised.  Bone density scan (DEXA): At age 50, ask your care team if you should have this scan for osteoporosis (brittle bones).  Hepatitis C: Get tested at least once in your life.  Abdominal aortic aneurysm screening: Talk to your doctor about having this screening if you:  Have ever smoked; and  Are biologically male; and  Are between the ages of 65 and 75.  STIs (sexually transmitted infections)  Before age 24: Ask your care team if you should be screened for STIs.  After age 24: Get screened for STIs if you're at risk. You are at risk for STIs (including HIV) if:  You are sexually active with more than one person.  You don't use condoms every time.  You or a partner was diagnosed with a sexually transmitted infection.  If you are at risk for HIV, ask about PrEP medicine to prevent HIV.  Get tested for HIV at least once in your life, whether you are at risk for HIV or not.  Cancer screening tests  Cervical cancer screening: If you have a cervix, begin getting regular cervical cancer screening tests at age 21. Most people who have regular screenings with normal results can stop after age 65. Talk about this with your provider.  Breast cancer scan (mammogram): If you've ever had breasts, begin having regular mammograms starting at age 40. This is a scan to check for breast cancer.  Colon cancer screening: It is important to start screening for colon cancer at age 45.  Have a colonoscopy test every 10 years (or more often if you're at risk) Or, ask your provider about stool tests like a FIT test every year or Cologuard test every 3 years.  To learn more about your testing options, visit: www.[employer]/047077.pdf.  For help making a decision, visit: radha/fk39827.  Prostate cancer screening test: If you have a prostate and are age 55  to 69, ask your provider if you would benefit from a yearly prostate cancer screening test.  Lung cancer screening: If you are a current or former smoker age 50 to 80, ask your care team if ongoing lung cancer screenings are right for you.  For informational purposes only. Not to replace the advice of your health care provider. Copyright   2023 [employer]. All rights reserved. Clinically reviewed by the [facility]. [employer] [id number] - REV 04/24.

## 2024-06-10 ENCOUNTER — TELEPHONE (OUTPATIENT)
Dept: FAMILY MEDICINE | Facility: CLINIC | Age: 74
End: 2024-06-10
Payer: COMMERCIAL

## 2024-06-12 ENCOUNTER — HOSPITAL ENCOUNTER (OUTPATIENT)
Dept: CARDIOLOGY | Facility: CLINIC | Age: 74
Discharge: HOME | End: 2024-06-12
Attending: FAMILY MEDICINE | Admitting: FAMILY MEDICINE
Payer: COMMERCIAL

## 2024-06-12 ENCOUNTER — LAB (OUTPATIENT)
Dept: LAB | Facility: CLINIC | Age: 74
End: 2024-06-12
Payer: COMMERCIAL

## 2024-06-12 DIAGNOSIS — R73.03 PREDIABETES: ICD-10-CM

## 2024-06-12 DIAGNOSIS — R06.09 DOE (DYSPNEA ON EXERTION): ICD-10-CM

## 2024-06-12 LAB
CHOLEST SERPL-MCNC: 173 MG/DL
FASTING STATUS PATIENT QL REPORTED: ABNORMAL
HBA1C MFR BLD: 6.8 %
HDLC SERPL-MCNC: 56 MG/DL
LDLC SERPL CALC-MCNC: 52 MG/DL
NONHDLC SERPL-MCNC: 117 MG/DL
TRIGL SERPL-MCNC: 325 MG/DL

## 2024-06-12 PROCEDURE — 80061 LIPID PANEL: CPT | Performed by: PATHOLOGY

## 2024-06-12 PROCEDURE — 93350 STRESS TTE ONLY: CPT | Mod: 26 | Performed by: INTERNAL MEDICINE

## 2024-06-12 PROCEDURE — 93321 DOPPLER ECHO F-UP/LMTD STD: CPT | Mod: 26 | Performed by: INTERNAL MEDICINE

## 2024-06-12 PROCEDURE — 83036 HEMOGLOBIN GLYCOSYLATED A1C: CPT | Performed by: FAMILY MEDICINE

## 2024-06-12 PROCEDURE — 255N000002 HC RX 255 OP 636: Performed by: INTERNAL MEDICINE

## 2024-06-12 PROCEDURE — 999N000128 HC STATISTIC PERIPHERAL IV START W/O US GUIDANCE

## 2024-06-12 PROCEDURE — 93325 DOPPLER ECHO COLOR FLOW MAPG: CPT | Mod: TC

## 2024-06-12 PROCEDURE — 99000 SPECIMEN HANDLING OFFICE-LAB: CPT | Performed by: PATHOLOGY

## 2024-06-12 PROCEDURE — 93016 CV STRESS TEST SUPVJ ONLY: CPT | Performed by: INTERNAL MEDICINE

## 2024-06-12 PROCEDURE — 82570 ASSAY OF URINE CREATININE: CPT | Performed by: FAMILY MEDICINE

## 2024-06-12 PROCEDURE — 36415 COLL VENOUS BLD VENIPUNCTURE: CPT | Performed by: PATHOLOGY

## 2024-06-12 PROCEDURE — 93325 DOPPLER ECHO COLOR FLOW MAPG: CPT | Mod: 26 | Performed by: INTERNAL MEDICINE

## 2024-06-12 PROCEDURE — 93018 CV STRESS TEST I&R ONLY: CPT | Performed by: INTERNAL MEDICINE

## 2024-06-12 RX ADMIN — PERFLUTREN 5 ML: 6.52 INJECTION, SUSPENSION INTRAVENOUS at 08:23

## 2024-06-13 LAB
CREAT UR-MCNC: 42.3 MG/DL
MICROALBUMIN UR-MCNC: <12 MG/L
MICROALBUMIN/CREAT UR: NORMAL MG/G{CREAT}

## 2024-07-08 ENCOUNTER — DOCUMENTATION ONLY (OUTPATIENT)
Dept: ONCOLOGY | Facility: CLINIC | Age: 74
End: 2024-07-08
Payer: COMMERCIAL

## 2024-07-09 ENCOUNTER — LAB (OUTPATIENT)
Dept: LAB | Facility: CLINIC | Age: 74
End: 2024-07-09
Payer: COMMERCIAL

## 2024-07-09 ENCOUNTER — ONCOLOGY VISIT (OUTPATIENT)
Dept: ONCOLOGY | Facility: CLINIC | Age: 74
End: 2024-07-09
Attending: INTERNAL MEDICINE
Payer: COMMERCIAL

## 2024-07-09 VITALS
BODY MASS INDEX: 27.83 KG/M2 | OXYGEN SATURATION: 97 % | WEIGHT: 183 LBS | TEMPERATURE: 97.9 F | RESPIRATION RATE: 16 BRPM | DIASTOLIC BLOOD PRESSURE: 84 MMHG | SYSTOLIC BLOOD PRESSURE: 132 MMHG | HEART RATE: 88 BPM

## 2024-07-09 DIAGNOSIS — R21 RASH AND NONSPECIFIC SKIN ERUPTION: ICD-10-CM

## 2024-07-09 DIAGNOSIS — Z79.811 LONG TERM (CURRENT) USE OF AROMATASE INHIBITORS: ICD-10-CM

## 2024-07-09 DIAGNOSIS — Z17.0 MALIGNANT NEOPLASM OF CENTRAL PORTION OF RIGHT BREAST IN MALE, ESTROGEN RECEPTOR POSITIVE (H): Primary | ICD-10-CM

## 2024-07-09 DIAGNOSIS — C50.121 MALIGNANT NEOPLASM OF CENTRAL PORTION OF RIGHT BREAST IN MALE, ESTROGEN RECEPTOR POSITIVE (H): Primary | ICD-10-CM

## 2024-07-09 DIAGNOSIS — M85.89 OSTEOPENIA OF MULTIPLE SITES: ICD-10-CM

## 2024-07-09 DIAGNOSIS — C50.121 MALIGNANT NEOPLASM OF CENTRAL PORTION OF RIGHT BREAST IN MALE, ESTROGEN RECEPTOR POSITIVE (H): ICD-10-CM

## 2024-07-09 DIAGNOSIS — R53.81 PHYSICAL DECONDITIONING: ICD-10-CM

## 2024-07-09 DIAGNOSIS — Z17.0 MALIGNANT NEOPLASM OF CENTRAL PORTION OF RIGHT BREAST IN MALE, ESTROGEN RECEPTOR POSITIVE (H): ICD-10-CM

## 2024-07-09 LAB
ALBUMIN SERPL BCG-MCNC: 4.5 G/DL (ref 3.5–5.2)
ALP SERPL-CCNC: 59 U/L (ref 40–150)
ALT SERPL W P-5'-P-CCNC: 27 U/L (ref 0–70)
ANION GAP SERPL CALCULATED.3IONS-SCNC: 13 MMOL/L (ref 7–15)
AST SERPL W P-5'-P-CCNC: 28 U/L (ref 0–45)
BASOPHILS # BLD AUTO: 0 10E3/UL (ref 0–0.2)
BASOPHILS NFR BLD AUTO: 1 %
BILIRUB SERPL-MCNC: 0.4 MG/DL
BUN SERPL-MCNC: 10.7 MG/DL (ref 8–23)
CALCIUM SERPL-MCNC: 9.4 MG/DL (ref 8.8–10.2)
CANCER AG15-3 SERPL-ACNC: 34 U/ML
CEA SERPL-MCNC: 3.7 NG/ML
CHLORIDE SERPL-SCNC: 100 MMOL/L (ref 98–107)
CREAT SERPL-MCNC: 0.66 MG/DL (ref 0.67–1.17)
DEPRECATED HCO3 PLAS-SCNC: 24 MMOL/L (ref 22–29)
EGFRCR SERPLBLD CKD-EPI 2021: >90 ML/MIN/1.73M2
EOSINOPHIL # BLD AUTO: 0.3 10E3/UL (ref 0–0.7)
EOSINOPHIL NFR BLD AUTO: 5 %
ERYTHROCYTE [DISTWIDTH] IN BLOOD BY AUTOMATED COUNT: 11.8 % (ref 10–15)
GLUCOSE SERPL-MCNC: 200 MG/DL (ref 70–99)
HCT VFR BLD AUTO: 35.8 % (ref 40–53)
HGB BLD-MCNC: 11.9 G/DL (ref 13.3–17.7)
IMM GRANULOCYTES # BLD: 0 10E3/UL
IMM GRANULOCYTES NFR BLD: 1 %
LYMPHOCYTES # BLD AUTO: 1.4 10E3/UL (ref 0.8–5.3)
LYMPHOCYTES NFR BLD AUTO: 26 %
MCH RBC QN AUTO: 29.3 PG (ref 26.5–33)
MCHC RBC AUTO-ENTMCNC: 33.2 G/DL (ref 31.5–36.5)
MCV RBC AUTO: 88 FL (ref 78–100)
MONOCYTES # BLD AUTO: 0.4 10E3/UL (ref 0–1.3)
MONOCYTES NFR BLD AUTO: 7 %
NEUTROPHILS # BLD AUTO: 3.4 10E3/UL (ref 1.6–8.3)
NEUTROPHILS NFR BLD AUTO: 60 %
NRBC # BLD AUTO: 0 10E3/UL
NRBC BLD AUTO-RTO: 0 /100
PLATELET # BLD AUTO: 195 10E3/UL (ref 150–450)
POTASSIUM SERPL-SCNC: 3.8 MMOL/L (ref 3.4–5.3)
PROT SERPL-MCNC: 8 G/DL (ref 6.4–8.3)
RBC # BLD AUTO: 4.06 10E6/UL (ref 4.4–5.9)
SODIUM SERPL-SCNC: 137 MMOL/L (ref 135–145)
WBC # BLD AUTO: 5.5 10E3/UL (ref 4–11)

## 2024-07-09 PROCEDURE — 99214 OFFICE O/P EST MOD 30 MIN: CPT | Performed by: INTERNAL MEDICINE

## 2024-07-09 PROCEDURE — 86300 IMMUNOASSAY TUMOR CA 15-3: CPT | Performed by: INTERNAL MEDICINE

## 2024-07-09 PROCEDURE — 85025 COMPLETE CBC W/AUTO DIFF WBC: CPT | Performed by: PATHOLOGY

## 2024-07-09 PROCEDURE — 80053 COMPREHEN METABOLIC PANEL: CPT | Performed by: PATHOLOGY

## 2024-07-09 PROCEDURE — 99213 OFFICE O/P EST LOW 20 MIN: CPT | Performed by: INTERNAL MEDICINE

## 2024-07-09 PROCEDURE — 36415 COLL VENOUS BLD VENIPUNCTURE: CPT | Performed by: PATHOLOGY

## 2024-07-09 PROCEDURE — 82378 CARCINOEMBRYONIC ANTIGEN: CPT | Performed by: INTERNAL MEDICINE

## 2024-07-09 PROCEDURE — 99000 SPECIMEN HANDLING OFFICE-LAB: CPT | Performed by: PATHOLOGY

## 2024-07-09 ASSESSMENT — PAIN SCALES - GENERAL: PAINLEVEL: NO PAIN (0)

## 2024-07-09 NOTE — NURSING NOTE
"Oncology Rooming Note    July 9, 2024 1:46 PM   Ronna Collins is a 73 year old male who presents for:    Chief Complaint   Patient presents with    Oncology Clinic Visit     RTN for Breast Cancer     Initial Vitals: /84 (BP Location: Right arm, Patient Position: Right side, Cuff Size: Adult Regular)   Pulse 88   Temp 97.9  F (36.6  C) (Oral)   Resp 16   Wt 83 kg (183 lb)   SpO2 97%   BMI 27.83 kg/m   Estimated body mass index is 27.83 kg/m  as calculated from the following:    Height as of 6/1/23: 1.727 m (5' 7.99\").    Weight as of this encounter: 83 kg (183 lb). Body surface area is 2 meters squared.  No Pain (0) Comment: Data Unavailable   No LMP for male patient.  Allergies reviewed: Yes  Medications reviewed: Yes    Medications: Medication refills not needed today.  Pharmacy name entered into [facility]:    [id number] - [place], [place] - [address] AT [facility] - [place], [place] - [address]   [id number] - [place], [place] - [address] AT [facility] [address]    Frailty Screening:   Is the patient here for a new oncology consult visit in cancer care? 2. No      Clinical concerns: none       Linda De Leon MA             "

## 2024-07-09 NOTE — LETTER
7/9/2024      Ronna Collins  [address]  [facility] [address]      Dear Colleague,    Thank you for referring your patient, Ronna Collins, to the [facility]. Please see a copy of my visit note below.    Oncology Visit:  Date on this visit: Jul 9, 2024    Diagnosis: h/o clinical prognostic stage IIIb, [id number], grade 2, ER positive, NY positive, HER-2 negative right breast cancer, [id number].    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  He noted discomfort and hardening of the skin of the right nipple in late April/early May, 2021.  He noted a seborrheic keratosis of the right nipple and remembered he had a similar skin lesion of the arm 3 months earlier.  However, he subsequently noted a mass in the same area.  Right breast skin punch biopsy performed by dermatology was c/w grade 2 invasive ductal carcinoma with associated DCIS.  Invasive carcinoma was ER positive 100% and NY positive 70%, with tumor invading the dermis.  HER-2 was negative by IHC (score 1+).  Diagnostic mammogram and ultrasound showed a retroareolar right breast mass measuring 2.9 cm with associated nipple retraction and enlarged, abnormal right axillary lymph nodes.  Right breast biopsy was again c/w grade 2 invasive ductal carcinoma, ER strong in 98% of tumor cell nuclei, HER-2 negative.  Ki-67 was 15%.    He elected to screen for the ISPY-2 clinical trial.  MRI breast on 6/18/2021 showed the biopsy proven right breast cancer to measure 3.9 cm in maximum dimension with invasion of the nipple and the pectoralis muscle as well as at least 4 abnormal level 1 and 2 right axillary lymph nodes and a tiny 0.4 cm right internal mammary lymph node.  Mammoprint returned low risk with a score of +0.29.  He elected for treatment on the endocrine optimization protocol of ISPY-2 and was randomized to treatment with  amcenestrant.  He was on treatment with  amcenestrant from 7/1/2021 - 10/26/2021.  At our visit in 01/2021, both right axillary node and right breast tumor palpated more firm then prior.  Breast MRI was stable, however, due to clinical concerns for progression, decision was made to proceed with surgery.   Right breast mastectomy and right axillary lymph node dissection was performed by Dr. Snider on 10/27/2021.  Pathology showed grade 2 carcinoma of the right breast measuring 3.2 cm with invasion of the dermis, nipple and the pectoralis muscle.  Ki-67 of the excised tumor was 7%.  15/44 right axillary lymph nodes were positive with 6 of the lymph nodes demonstrating extranodal extension.  The largest lymph node metastasis measured 2.1 cm.      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  His course was complicated by fevers persistent throughout treatment.  He received radiation (4800 cGy in 15 fractions) to the right chest wall and regional lymph nodes from 3/21/2022 - 4/8/2022.  He has been on treatment with lupron and letrozole since 3/16/2022.  He is s/p 2 years of adjuvant abemaciclib from 4/26/2022 - 04/2024.    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  He has no recent illnesses.  [place]  He recently saw dermatology and was prescribed two creams, sarna and hydrocortisone.  Pruritus is improved.  Sleep issues seem to be improved as well.  He has less dyspnea on exertion.  Continues to go for long walks, but is not doing as much vigorous exercise.  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  He confirms routine follow up with Dr. Henry.  They have questions about surveillance moving forward as well as risk of recurrence.    Past Medical/Surgical History:  Past Medical History:   Diagnosis Date     Breast cancer (H) 05/2021     Chronic sinusitis       Hypertension 2002     Past Surgical History:   Procedure Laterality Date     COLONOSCOPY WITH CO2 INSUFFLATION N/A 10/28/2022    Procedure: COLONOSCOPY, WITH CO2 INSUFFLATION;  Surgeon: Annemarei Noel DO;  Location: [facility]     COMBINED ESOPHAGOSCOPY, GASTROSCOPY, DUODENOSCOPY (EGD) WITH CO2 INSUFFLATION N/A 10/28/2022    Procedure: ESOPHAGOGASTRODUODENOSCOPY, WITH CO2 INSUFFLATION;  Surgeon: Annemarie Noel DO;  Location: [facility]     DISSECT LYMPH NODE AXILLA Right 10/27/2021    Procedure: RIGHT Axillary Lymph Node Dissection;  Surgeon: Nida Snider MD;  Location: [facility]     ESOPHAGOSCOPY, GASTROSCOPY, DUODENOSCOPY (EGD), COMBINED N/A 10/28/2022    Procedure: ESOPHAGOGASTRODUODENOSCOPY, WITH BIOPSY;  Surgeon: Annemarie Noel DO;  Location: [facility]     MASTECTOMY SIMPLE Right 10/27/2021    Procedure: RIGHT Simple Mastectomy;  Surgeon: Nida Snider MD;  Location: [facility]     Allergies:  Allergies as of 07/09/2024     (No Known Allergies)     Current Medications:  Current Outpatient Medications   Medication Sig Dispense Refill     famotidine (PEPCID) 40 MG tablet Take 1 tablet (40 mg) by mouth daily 90 tablet 3     hydrocortisone 2.5 % cream Twice daily to groin rash 30 g 11     ketoconazole (NIZORAL) 2 % external cream Twice daily to groin rash 60 g 11     ketoconazole (NIZORAL) 2 % external cream Apply topically daily (Patient not taking: Reported on 11/3/2023) 30 g 1     letrozole (FEMARA) 2.5 MG tablet Take 1 tablet (2.5 mg) by mouth daily for 360 days 90 tablet 3     letrozole (FEMARA) 2.5 MG tablet Take 1 tablet (2.5 mg) by mouth daily for 360 days (Patient not taking: Reported on 1/22/2024) 90 tablet 3     loperamide (IMODIUM) 2 MG capsule Start with 2 caps (4 mg), then take one cap (2 mg) after each diarrheal stool as needed. Do not use more than 8 caps (16 mg) per day. (Patient not taking: Reported on 8/1/2023) 30 capsule 0     losartan-hydrochlorothiazide (HYZAAR) 100-25 MG tablet Take 1  tablet by mouth daily 90 tablet 3     MELATONIN PO Take by mouth nightly as needed        Genetics Breast Actionable and Breast Expanded panels were both negative for pathogenic germline mutations.    Physical Exam:  /84 (BP Location: Right arm, Patient Position: Right side, Cuff Size: Adult Regular)   Pulse 88   Temp 97.9  F (36.6  C) (Oral)   Resp 16   Wt 83 kg (183 lb)   SpO2 97%   BMI 27.83 kg/m    General:  Well appearing, well nourished adult male in NAD.  Alert and oriented x 3.  HEENT:  Normocephalic.  Sclera are anicteric.  MMM.  Respiratory:  Breathing comfortably on room air.  CTA bilaterally, no wheezes or crackles.  CV:  RRR.  No audible m/r/g.  Musculoskeletal:  Normal movement of the bilateral upper extremities.    Skin: Hyperpigmentation evident on patient's back, improved from previous  Neuro:  No notable tremor and dyskinetic movements.  Psych:  Mood and affect appear normal.    Laboratory/Imaging Studies:  I personally reviewed the below images and laboratory studies:    7/9/2024 Labs:  Electrolytes, kidney, and liver function tests are wnl.   tumor marker is elevated at 34, but stable from prior.  Nonfasting glucose is elevated at 200.    WBC is wnl.  Hemoglobin is low at 11.9 g/dL  Platelets are wnl.    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  He is s/p treatment with 3.5 months neoadjuvant amcenestrant, right breast mastectomy, right ALND, 4 cycles Taxotere and cyclophosphamide, and radiation.  He completed 2 years of abemaciclib in 04/2024.  He continues on adjuvant curative intent treatment with lupron and letrozole.    1.  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  He has been on endocrine therapy since 03/2022 and continues on adjuvant curative intent  treatment with lupron and letrozole at this time.  Plan to continue for a total of 10 years (through 3/16/2032).     We discussed that given number of positive lymph nodes at surgery, her remains at high risk of recurrence.  He has done everything recommended to reduce this risk of recurrence including chemotherapy, radiation, 2 years of CDK 4/6 inhibitor, Zometa, and has optimized his endocrine therapy.  We reviewed that national guidelines do not recommend scheduled whole body surveillance imaging; we reviewed the rationale for this.  Systemic imaging is symptom based.  We are monitoring  tumor marker, I recommend reducing frequency of monitoring to once every 3 months so as not to overread small changes.   - Given 15/44 positive lymph nodes, we are monitoring CA 15-3 tumor marker for surveillance.  Recommend only checking these once every 3 months.  - We have previously discussed Signatera for monitoring for residual disease.  Given there is no evidence yet that a change in treatment or initiation of early treatment improves outcomes, they have decided not to pursue it at this time.  - Next Lupron is due around 7/22/2024  - Labs and return visit in 3 months.    2. Dyspnea on exertion:  Initially present when patient started abemaciclib, then improved. CT chest was without any concern for pulmonary metastases, pneumonitis or other etiology. PFTs performed on 5/20 were within normal limits.  - since last visit Echo stress exercise test was performed and was without concerning findings.  - suspect due to deconditioning.  Encouraged ongoing routine cardiovascular exercise.      3.  RUE lymphedema/cording: No change since last visit.  Continue compression, massage, and lymphedema pump with good response.    4.  Hyperpigmented rash:  Hyperpigmentation and pruritus of the back and pruritus of the groin.  Did not improve with topical ketoconazole, which decreases suspicion for cutaneous candidiasis. Had some  improvement after stopping abemaciclib.   - Has seen dermatology since last visit and is being treated with a topical corticosteroid cream.    5.  Insomnia:  Some improvement since last visit.  Chronic and exacerbated by endocrine therapy.  Not bothersome enough to take medication at this time.    6.  Bone Health:  DEXA 10/2022 with osteopenia of multiple sites.  Zometa initiated for prevention of breast cancer bone metastases.  Unfortunately this was poorly tolerated and therefore changed to Prolia on 4/17.    - Last received Prolia on 5/1/2024, next due around 10/28/2024.    7.  Follow Up:.  Labs and Prolia injection around 10/28/2024.  Labs and return visit in 3 months.    I spent 35 minutes on the date of the encounter doing chart review, review of test results, interpretation of tests, patient visit, and documentation           Again, thank you for allowing me to participate in the care of your patient.        Sincerely,        Mikaela Sr MD

## 2024-07-09 NOTE — PROGRESS NOTES
Oncology Visit:  Date on this visit: Jul 9, 2024    Diagnosis: h/o clinical prognostic stage IIIb, [id number], grade 2, ER positive, VT positive, HER-2 negative right breast cancer, [id number].    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  He noted discomfort and hardening of the skin of the right nipple in late April/early May, 2021.  He noted a seborrheic keratosis of the right nipple and remembered he had a similar skin lesion of the arm 3 months earlier.  However, he subsequently noted a mass in the same area.  Right breast skin punch biopsy performed by dermatology was c/w grade 2 invasive ductal carcinoma with associated DCIS.  Invasive carcinoma was ER positive 100% and VT positive 70%, with tumor invading the dermis.  HER-2 was negative by IHC (score 1+).  Diagnostic mammogram and ultrasound showed a retroareolar right breast mass measuring 2.9 cm with associated nipple retraction and enlarged, abnormal right axillary lymph nodes.  Right breast biopsy was again c/w grade 2 invasive ductal carcinoma, ER strong in 98% of tumor cell nuclei, HER-2 negative.  Ki-67 was 15%.    He elected to screen for the ISPY-2 clinical trial.  MRI breast on 6/18/2021 showed the biopsy proven right breast cancer to measure 3.9 cm in maximum dimension with invasion of the nipple and the pectoralis muscle as well as at least 4 abnormal level 1 and 2 right axillary lymph nodes and a tiny 0.4 cm right internal mammary lymph node.  Mammoprint returned low risk with a score of +0.29.  He elected for treatment on the endocrine optimization protocol of ISPY-2 and was randomized to treatment with amcenestrant.  He was on treatment with  amcenestrant from 7/1/2021 - 10/26/2021.  At our visit in 01/2021, both right axillary node and right breast tumor palpated more firm then prior.  Breast MRI was stable, however, due to clinical concerns for progression,  decision was made to proceed with surgery.   Right breast mastectomy and right axillary lymph node dissection was performed by Dr. Snider on 10/27/2021.  Pathology showed grade 2 carcinoma of the right breast measuring 3.2 cm with invasion of the dermis, nipple and the pectoralis muscle.  Ki-67 of the excised tumor was 7%.  15/44 right axillary lymph nodes were positive with 6 of the lymph nodes demonstrating extranodal extension.  The largest lymph node metastasis measured 2.1 cm.      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  His course was complicated by fevers persistent throughout treatment.  He received radiation (4800 cGy in 15 fractions) to the right chest wall and regional lymph nodes from 3/21/2022 - 4/8/2022.  He has been on treatment with lupron and letrozole since 3/16/2022.  He is s/p 2 years of adjuvant abemaciclib from 4/26/2022 - 04/2024.    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  He has no recent illnesses.  [place]  He recently saw dermatology and was prescribed two creams, sarna and hydrocortisone.  Pruritus is improved.  Sleep issues seem to be improved as well.  He has less dyspnea on exertion.  Continues to go for long walks, but is not doing as much vigorous exercise.  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  He confirms routine follow up with Dr. Henry.  They have questions about surveillance moving forward as well as risk of recurrence.    Past Medical/Surgical History:  Past Medical History:   Diagnosis Date    Breast cancer (H) 05/2021    Chronic sinusitis     Hypertension 2002     Past Surgical History:   Procedure Laterality Date    COLONOSCOPY WITH CO2 INSUFFLATION N/A 10/28/2022    Procedure: COLONOSCOPY, WITH CO2 INSUFFLATION;  Surgeon: Annemarie Noel DO;  Location: [facility]    COMBINED ESOPHAGOSCOPY, GASTROSCOPY,  DUODENOSCOPY (EGD) WITH CO2 INSUFFLATION N/A 10/28/2022    Procedure: ESOPHAGOGASTRODUODENOSCOPY, WITH CO2 INSUFFLATION;  Surgeon: Annemarie Noel DO;  Location: [facility]    DISSECT LYMPH NODE AXILLA Right 10/27/2021    Procedure: RIGHT Axillary Lymph Node Dissection;  Surgeon: Nida Snider MD;  Location: [facility]    ESOPHAGOSCOPY, GASTROSCOPY, DUODENOSCOPY (EGD), COMBINED N/A 10/28/2022    Procedure: ESOPHAGOGASTRODUODENOSCOPY, WITH BIOPSY;  Surgeon: Annemarie Noel DO;  Location: [facility]    MASTECTOMY SIMPLE Right 10/27/2021    Procedure: RIGHT Simple Mastectomy;  Surgeon: Nida Snider MD;  Location: [facility]     Allergies:  Allergies as of 07/09/2024    (No Known Allergies)     Current Medications:  Current Outpatient Medications   Medication Sig Dispense Refill    famotidine (PEPCID) 40 MG tablet Take 1 tablet (40 mg) by mouth daily 90 tablet 3    hydrocortisone 2.5 % cream Twice daily to groin rash 30 g 11    ketoconazole (NIZORAL) 2 % external cream Twice daily to groin rash 60 g 11    ketoconazole (NIZORAL) 2 % external cream Apply topically daily (Patient not taking: Reported on 11/3/2023) 30 g 1    letrozole (FEMARA) 2.5 MG tablet Take 1 tablet (2.5 mg) by mouth daily for 360 days 90 tablet 3    letrozole (FEMARA) 2.5 MG tablet Take 1 tablet (2.5 mg) by mouth daily for 360 days (Patient not taking: Reported on 1/22/2024) 90 tablet 3    loperamide (IMODIUM) 2 MG capsule Start with 2 caps (4 mg), then take one cap (2 mg) after each diarrheal stool as needed. Do not use more than 8 caps (16 mg) per day. (Patient not taking: Reported on 8/1/2023) 30 capsule 0    losartan-hydrochlorothiazide (HYZAAR) 100-25 MG tablet Take 1 tablet by mouth daily 90 tablet 3    MELATONIN PO Take by mouth nightly as needed        Genetics Breast Actionable and Breast Expanded panels were both negative for pathogenic germline mutations.    Physical Exam:  /84 (BP Location: Right arm, Patient Position: Right side,  Cuff Size: Adult Regular)   Pulse 88   Temp 97.9  F (36.6  C) (Oral)   Resp 16   Wt 83 kg (183 lb)   SpO2 97%   BMI 27.83 kg/m    General:  Well appearing, well nourished adult male in NAD.  Alert and oriented x 3.  HEENT:  Normocephalic.  Sclera are anicteric.  MMM.  Respiratory:  Breathing comfortably on room air.  CTA bilaterally, no wheezes or crackles.  CV:  RRR.  No audible m/r/g.  Musculoskeletal:  Normal movement of the bilateral upper extremities.    Skin: Hyperpigmentation evident on patient's back, improved from previous  Neuro:  No notable tremor and dyskinetic movements.  Psych:  Mood and affect appear normal.    Laboratory/Imaging Studies:  I personally reviewed the below images and laboratory studies:    7/9/2024 Labs:  Electrolytes, kidney, and liver function tests are wnl.   tumor marker is elevated at 34, but stable from prior.  Nonfasting glucose is elevated at 200.    WBC is wnl.  Hemoglobin is low at 11.9 g/dL  Platelets are wnl.    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  He is s/p treatment with 3.5 months neoadjuvant amcenestrant, right breast mastectomy, right ALND, 4 cycles Taxotere and cyclophosphamide, and radiation.  He completed 2 years of abemaciclib in 04/2024.  He continues on adjuvant curative intent treatment with lupron and letrozole.    1.  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  He has been on endocrine therapy since 03/2022 and continues on adjuvant curative intent treatment with lupron and letrozole at this time.  Plan to continue for a total of 10 years (through 3/16/2032).     We discussed that given number of positive lymph nodes at surgery, her remains at high risk of recurrence.  He has done everything recommended to reduce this risk of  recurrence including chemotherapy, radiation, 2 years of CDK 4/6 inhibitor, Zometa, and has optimized his endocrine therapy.  We reviewed that national guidelines do not recommend scheduled whole body surveillance imaging; we reviewed the rationale for this.  Systemic imaging is symptom based.  We are monitoring  tumor marker, I recommend reducing frequency of monitoring to once every 3 months so as not to overread small changes.   - Given 15/44 positive lymph nodes, we are monitoring CA 15-3 tumor marker for surveillance.  Recommend only checking these once every 3 months.  - We have previously discussed Signatera for monitoring for residual disease.  Given there is no evidence yet that a change in treatment or initiation of early treatment improves outcomes, they have decided not to pursue it at this time.  - Next Lupron is due around 7/22/2024  - Labs and return visit in 3 months.    2. Dyspnea on exertion:  Initially present when patient started abemaciclib, then improved. CT chest was without any concern for pulmonary metastases, pneumonitis or other etiology. PFTs performed on 5/20 were within normal limits.  - since last visit Echo stress exercise test was performed and was without concerning findings.  - suspect due to deconditioning.  Encouraged ongoing routine cardiovascular exercise.      3.  RUE lymphedema/cording: No change since last visit.  Continue compression, massage, and lymphedema pump with good response.    4.  Hyperpigmented rash:  Hyperpigmentation and pruritus of the back and pruritus of the groin.  Did not improve with topical ketoconazole, which decreases suspicion for cutaneous candidiasis. Had some improvement after stopping abemaciclib.   - Has seen dermatology since last visit and is being treated with a topical corticosteroid cream.    5.  Insomnia:  Some improvement since last visit.  Chronic and exacerbated by endocrine therapy.  Not bothersome enough to take medication at this  time.    6.  Bone Health:  DEXA 10/2022 with osteopenia of multiple sites.  Zometa initiated for prevention of breast cancer bone metastases.  Unfortunately this was poorly tolerated and therefore changed to Prolia on 4/17.    - Last received Prolia on 5/1/2024, next due around 10/28/2024.    7.  Follow Up:.  Labs and Prolia injection around 10/28/2024.  Labs and return visit in 3 months.    I spent 35 minutes on the date of the encounter doing chart review, review of test results, interpretation of tests, patient visit, and documentation

## 2024-09-06 ENCOUNTER — MYC MEDICAL ADVICE (OUTPATIENT)
Dept: FAMILY MEDICINE | Facility: CLINIC | Age: 74
End: 2024-09-06
Payer: COMMERCIAL

## 2024-09-06 DIAGNOSIS — R05.9 COUGH: Primary | ICD-10-CM

## 2024-09-06 RX ORDER — AZITHROMYCIN 250 MG/1
TABLET, FILM COATED ORAL
Qty: 6 TABLET | Refills: 0 | Status: SHIPPED | OUTPATIENT
Start: 2024-09-06 | End: 2024-09-11

## 2024-10-13 ENCOUNTER — HEALTH MAINTENANCE LETTER (OUTPATIENT)
Age: 74
End: 2024-10-13

## 2024-10-28 ENCOUNTER — LAB (OUTPATIENT)
Dept: LAB | Facility: CLINIC | Age: 74
End: 2024-10-28
Attending: INTERNAL MEDICINE
Payer: COMMERCIAL

## 2024-10-28 DIAGNOSIS — Z17.0 MALIGNANT NEOPLASM OF CENTRAL PORTION OF RIGHT BREAST IN MALE, ESTROGEN RECEPTOR POSITIVE (H): ICD-10-CM

## 2024-10-28 DIAGNOSIS — E11.9 DIABETES MELLITUS, TYPE 2 (H): Primary | ICD-10-CM

## 2024-10-28 DIAGNOSIS — C50.121 MALIGNANT NEOPLASM OF CENTRAL PORTION OF RIGHT BREAST IN MALE, ESTROGEN RECEPTOR POSITIVE (H): ICD-10-CM

## 2024-10-28 DIAGNOSIS — Z92.21 STATUS POST CHEMOTHERAPY: ICD-10-CM

## 2024-10-28 LAB
ALBUMIN SERPL BCG-MCNC: 4.5 G/DL (ref 3.5–5.2)
ALP SERPL-CCNC: 60 U/L (ref 40–150)
ALT SERPL W P-5'-P-CCNC: 18 U/L (ref 0–70)
ANION GAP SERPL CALCULATED.3IONS-SCNC: 12 MMOL/L (ref 7–15)
AST SERPL W P-5'-P-CCNC: 25 U/L (ref 0–45)
BASOPHILS # BLD AUTO: 0 10E3/UL (ref 0–0.2)
BASOPHILS NFR BLD AUTO: 1 %
BILIRUB SERPL-MCNC: 0.4 MG/DL
BUN SERPL-MCNC: 10.1 MG/DL (ref 8–23)
CALCIUM SERPL-MCNC: 9.7 MG/DL (ref 8.8–10.4)
CANCER AG15-3 SERPL-ACNC: 34 U/ML
CHLORIDE SERPL-SCNC: 102 MMOL/L (ref 98–107)
CREAT SERPL-MCNC: 0.71 MG/DL (ref 0.67–1.17)
EGFRCR SERPLBLD CKD-EPI 2021: >90 ML/MIN/1.73M2
EOSINOPHIL # BLD AUTO: 0.2 10E3/UL (ref 0–0.7)
EOSINOPHIL NFR BLD AUTO: 4 %
ERYTHROCYTE [DISTWIDTH] IN BLOOD BY AUTOMATED COUNT: 13.8 % (ref 10–15)
EST. AVERAGE GLUCOSE BLD GHB EST-MCNC: 166 MG/DL
GLUCOSE SERPL-MCNC: 182 MG/DL (ref 70–99)
HBA1C MFR BLD: 7.4 %
HCO3 SERPL-SCNC: 25 MMOL/L (ref 22–29)
HCT VFR BLD AUTO: 36.8 % (ref 40–53)
HGB BLD-MCNC: 11.7 G/DL (ref 13.3–17.7)
IMM GRANULOCYTES # BLD: 0.1 10E3/UL
IMM GRANULOCYTES NFR BLD: 1 %
LYMPHOCYTES # BLD AUTO: 1.5 10E3/UL (ref 0.8–5.3)
LYMPHOCYTES NFR BLD AUTO: 28 %
MCH RBC QN AUTO: 27.3 PG (ref 26.5–33)
MCHC RBC AUTO-ENTMCNC: 31.8 G/DL (ref 31.5–36.5)
MCV RBC AUTO: 86 FL (ref 78–100)
MONOCYTES # BLD AUTO: 0.4 10E3/UL (ref 0–1.3)
MONOCYTES NFR BLD AUTO: 7 %
NEUTROPHILS # BLD AUTO: 3.1 10E3/UL (ref 1.6–8.3)
NEUTROPHILS NFR BLD AUTO: 59 %
NRBC # BLD AUTO: 0 10E3/UL
NRBC BLD AUTO-RTO: 0 /100
PLATELET # BLD AUTO: 228 10E3/UL (ref 150–450)
POTASSIUM SERPL-SCNC: 4.3 MMOL/L (ref 3.4–5.3)
PROT SERPL-MCNC: 7.9 G/DL (ref 6.4–8.3)
RBC # BLD AUTO: 4.28 10E6/UL (ref 4.4–5.9)
SODIUM SERPL-SCNC: 139 MMOL/L (ref 135–145)
WBC # BLD AUTO: 5.3 10E3/UL (ref 4–11)

## 2024-10-28 PROCEDURE — 86300 IMMUNOASSAY TUMOR CA 15-3: CPT | Performed by: INTERNAL MEDICINE

## 2024-10-28 PROCEDURE — 85025 COMPLETE CBC W/AUTO DIFF WBC: CPT | Performed by: PATHOLOGY

## 2024-10-28 PROCEDURE — 80053 COMPREHEN METABOLIC PANEL: CPT | Performed by: PATHOLOGY

## 2024-10-28 PROCEDURE — 99000 SPECIMEN HANDLING OFFICE-LAB: CPT | Performed by: PATHOLOGY

## 2024-10-28 PROCEDURE — 36415 COLL VENOUS BLD VENIPUNCTURE: CPT | Performed by: PATHOLOGY

## 2024-10-28 PROCEDURE — 83036 HEMOGLOBIN GLYCOSYLATED A1C: CPT | Performed by: FAMILY MEDICINE

## 2024-10-30 ENCOUNTER — PATIENT OUTREACH (OUTPATIENT)
Dept: ONCOLOGY | Facility: CLINIC | Age: 74
End: 2024-10-30
Payer: COMMERCIAL

## 2024-10-30 NOTE — PROGRESS NOTES
[facility]: Cancer Care                                                                                         Completed chart audit to assign Oncology Care Coordination enrollment status.      April Farris MSN, RN, OCN   RN Care Coordinator   [facility]

## 2024-11-26 ENCOUNTER — VIRTUAL VISIT (OUTPATIENT)
Dept: PHARMACY | Facility: CLINIC | Age: 74
End: 2024-11-26
Attending: FAMILY MEDICINE
Payer: COMMERCIAL

## 2024-11-26 DIAGNOSIS — K21.00 GASTROESOPHAGEAL REFLUX DISEASE WITH ESOPHAGITIS WITHOUT HEMORRHAGE: ICD-10-CM

## 2024-11-26 DIAGNOSIS — E11.9 TYPE 2 DIABETES MELLITUS WITHOUT COMPLICATION, WITHOUT LONG-TERM CURRENT USE OF INSULIN (H): Primary | ICD-10-CM

## 2024-11-26 DIAGNOSIS — G47.9 SLEEP DISORDER: ICD-10-CM

## 2024-11-26 DIAGNOSIS — Z17.0 MALIGNANT NEOPLASM OF CENTRAL PORTION OF RIGHT BREAST IN MALE, ESTROGEN RECEPTOR POSITIVE (H): ICD-10-CM

## 2024-11-26 DIAGNOSIS — I10 HYPERTENSION, UNSPECIFIED TYPE: ICD-10-CM

## 2024-11-26 DIAGNOSIS — C50.121 MALIGNANT NEOPLASM OF CENTRAL PORTION OF RIGHT BREAST IN MALE, ESTROGEN RECEPTOR POSITIVE (H): ICD-10-CM

## 2024-11-26 PROCEDURE — 99605 MTMS BY PHARM NP 15 MIN: CPT | Mod: 93

## 2024-11-26 PROCEDURE — 99607 MTMS BY PHARM ADDL 15 MIN: CPT | Mod: 93

## 2024-11-26 RX ORDER — METFORMIN HYDROCHLORIDE 500 MG/1
500 TABLET, EXTENDED RELEASE ORAL
Qty: 90 TABLET | Refills: 3 | Status: SHIPPED | OUTPATIENT
Start: 2024-11-26

## 2024-11-26 NOTE — PROGRESS NOTES
Medication Therapy Management (MTM) Encounter    ASSESSMENT:                            Medication Adherence/Access: No issues identified.    Diabetes   Patient is not meeting A1c goal of < 7%.  Patient would benefit from starting a medication to lower blood sugar.  Discussed options of metformin, SGLT2i (Jardiance), and GLP-1/GIP (Ozempic/Mounjaro). Since patient does not have kidney disease or heart failure, I do not strongly recommend Jardiance (also would not want to worsen urinary frequency). Since patient does not have a CV history and is a relatively healthy weight, I do not strongly recommeng Ozempic/Mounjaro. Based on patient preference and cost/convenience considerations, I recommend metformin.    Hypertension   Stable, home blood pressure readings are at goal of <140/90 mmHg.     GERD    Stable.    Insomnia   Stable.     History of Male Breast Cancer  Stable.    PLAN:                            Start metformin  mg daily  Recheck A1c at end of January    Follow-up: via Apsmarthart in 1 month and via phone in 2 months    SUBJECTIVE/OBJECTIVE:                          Ronna Collins is a 74 year old male seen for an initial visit. He was referred to me from Dr. eHnry.      Reason for visit: Diabetes management.    Allergies/ADRs: Reviewed in chart  Past Medical History: Reviewed in chart  Tobacco: He reports that he quit smoking about 24 years ago. His smoking use included cigarettes. He started smoking about 48 years ago. He has a 12.3 pack-year smoking history. He has never been exposed to tobacco smoke. He has never used smokeless tobacco.  Alcohol: Not discussed today    Medication Adherence/Access: no issues reported.    Diabetes   A1c has been maintained primarily through lifestyle, but was recently >7%. Patient reports it may be higher due to a recent trip to [place], as he was eating more carbs than usual. He is not experiencing symptoms of hyperglycemia, however, urinary frequency has been a  minor concern for a while.     Blood sugar monitoring: never  Eye exam in the last 12 months? No  Foot exam: due    Hypertension   Losartan-hydrochlorothiazide 100-25 mg daily  Patient reports no current medication side effects  Patient self monitors blood pressure.  Home BP monitoring is typically 120-130/80-90 mmHg .     GERD    Famotidine 40 mg daily  Patient reports no current symptoms.   Patient feels that current regimen is effective.  The patient does have a history or non-bleeding ulcer and LA Grade C esophagitis.  Was previously on omeprazole, but was switched to famotidine 11/2022.     Insomnia   Melatonin 3 mg at bedtime as needed  Patient reported issues at this time.      History of Male Breast Cancer  Letrozole 2.5 mg daily  Patient reported issues at this time.     Today's Vitals: There were no vitals taken for this visit.  ----------------    I spent 30 minutes with this patient today. All changes were made via collaborative practice agreement with Stewart Henry MD. A copy of the visit note was provided to the patient's provider(s).    A summary of these recommendations was sent via [employer].    Eladia Chong (Hailie), MarceloD, MPH  Medication Therapy Management Pharmacist     Telemedicine Visit Details  The patient's medications can be safely assessed via a telemedicine encounter.  Type of service:  Telephone visit  Originating Location (pt. Location): Home    Distant Location (provider location):  Off-site  Start Time:  1:30 PM  End Time:  2:00 PM     Medication Therapy Recommendations  Diabetes mellitus, type 2 (H)   1 Rationale: Untreated condition - Needs additional medication therapy - Indication   Recommendation: Start Medication - metFORMIN 500 MG tablet   Status: Accepted per CPA   Identified Date: 11/26/2024 Completed Date: 11/26/2024

## 2024-12-02 NOTE — PATIENT INSTRUCTIONS
"Recommendations from today's MTM visit:                                                    MTM (medication therapy management) is a service provided by a clinical pharmacist designed to help you get the most of out of your medicines.   Today we reviewed what your medicines are for, how to know if they are working, that your medicines are safe and how to make your medicine regimen as easy as possible.      Start metformin  mg daily  Recheck A1c at end of January    Follow-up: via Picplumhart in 1 month and via phone in 2 months    It was great speaking with you today.  I value your experience and would be very thankful for your time in providing feedback in our clinic survey. In the next few days, you may receive an email or text message from [employer] with a link to a survey related to your  clinical pharmacist.\"     To schedule another MTM appointment, please call the clinic directly or you may call the MTM scheduling line at [phone] or toll-free at [phone].     My Clinical Pharmacist's contact information:                                                      Please feel free to contact me with any questions or concerns you have.      Eladia Chong (Hailie), PharmD, MPH  Medication Therapy Management Pharmacist    "

## 2024-12-12 ENCOUNTER — MYC MEDICAL ADVICE (OUTPATIENT)
Dept: FAMILY MEDICINE | Facility: CLINIC | Age: 74
End: 2024-12-12
Payer: COMMERCIAL

## 2024-12-12 ENCOUNTER — MYC REFILL (OUTPATIENT)
Dept: FAMILY MEDICINE | Facility: CLINIC | Age: 74
End: 2024-12-12
Payer: COMMERCIAL

## 2024-12-12 DIAGNOSIS — K21.9 GASTROESOPHAGEAL REFLUX DISEASE WITHOUT ESOPHAGITIS: ICD-10-CM

## 2024-12-12 RX ORDER — FAMOTIDINE 40 MG/1
40 TABLET, FILM COATED ORAL DAILY
Qty: 90 TABLET | Refills: 2 | Status: SHIPPED | OUTPATIENT
Start: 2024-12-12

## 2024-12-12 NOTE — TELEPHONE ENCOUNTER
LVD:  6/6/2024  [facility] Stewart Osorio MD  Family Medicine     Refilled per protocol.

## 2024-12-16 ENCOUNTER — TELEPHONE (OUTPATIENT)
Dept: FAMILY MEDICINE | Facility: CLINIC | Age: 74
End: 2024-12-16
Payer: COMMERCIAL

## 2024-12-16 ENCOUNTER — MYC MEDICAL ADVICE (OUTPATIENT)
Dept: INTERNAL MEDICINE | Facility: CLINIC | Age: 74
End: 2024-12-16
Payer: COMMERCIAL

## 2024-12-16 NOTE — TELEPHONE ENCOUNTER
Left Voicemail (1st Attempt) and Sent Mychart (1st Attempt) for the patient to call back and schedule the following:    Appointment type: [facility] Physical  Provider: Dr. Henry  Return date: 6/7/25

## 2024-12-18 ENCOUNTER — MYC MEDICAL ADVICE (OUTPATIENT)
Dept: DERMATOLOGY | Facility: CLINIC | Age: 74
End: 2024-12-18
Payer: COMMERCIAL

## 2024-12-20 ENCOUNTER — MYC MEDICAL ADVICE (OUTPATIENT)
Dept: PHARMACY | Facility: CLINIC | Age: 74
End: 2024-12-20
Payer: COMMERCIAL

## 2024-12-20 DIAGNOSIS — E11.9 TYPE 2 DIABETES MELLITUS WITHOUT COMPLICATION, WITHOUT LONG-TERM CURRENT USE OF INSULIN (H): Primary | ICD-10-CM

## 2024-12-21 ENCOUNTER — HEALTH MAINTENANCE LETTER (OUTPATIENT)
Age: 74
End: 2024-12-21

## 2024-12-30 ENCOUNTER — OFFICE VISIT (OUTPATIENT)
Dept: DERMATOLOGY | Facility: CLINIC | Age: 74
End: 2024-12-30
Payer: COMMERCIAL

## 2024-12-30 DIAGNOSIS — L30.9 FACIAL DERMATITIS: Primary | ICD-10-CM

## 2024-12-30 PROCEDURE — 99214 OFFICE O/P EST MOD 30 MIN: CPT | Mod: GC | Performed by: DERMATOLOGY

## 2024-12-30 RX ORDER — HYDROCORTISONE 25 MG/G
CREAM TOPICAL 2 TIMES DAILY
Qty: 30 G | Refills: 3 | Status: SHIPPED | OUTPATIENT
Start: 2024-12-30

## 2024-12-30 ASSESSMENT — PAIN SCALES - GENERAL: PAINLEVEL_OUTOF10: NO PAIN (0)

## 2024-12-30 NOTE — PATIENT INSTRUCTIONS
- apply hydrocortisone 2.5% cream twice daily to the areas on the face as needed  - limit washing face to 1-2 times per day with warm (not hot) water. Then gently pat dry with a towel, apply hydrocortisone and then moisturizer (vanicream, cerave, vaseline, aquaphor) to affected areas  - use an electric clipper instead of a razor for shaving to decrease irritation  - stop your aqua di robert cologne    Send us a [employer] message if it does not continue to improve

## 2024-12-30 NOTE — LETTER
12/30/2024       RE: Ronna Collins  [address]  [facility] [address]     Dear Colleague,    Thank you for referring your patient, Ronna Collins, to the [facility] [place] [facility]. Please see a copy of my visit note below.    [facility] Dermatology Note  Encounter Date: Dec 30, 2024  Office Visit     Dermatology Problem List:  FBSE 05/20/24  # Invasive ductal adenocarcinoma, right breast, ER+, [place]+, HER2-  - skin bx 6/2021 (Dr. Yanez, [facility])   - s/p right mastectomy, chemoradiation Taxol x 22, XRT 3/31/22, Abraxone x 8, SLNbx 6/2021  - abemaciclib, letrozole, leuprolide  - following with oncology ([facility])  # Nummular dermatitis, previously with impetiginization  - current: triam ointment   - prior: mometasone 0.1 oint  - bacterial cx 6/21/19 [facility]  # Atopic dermatitis with chronic sinusitis and cat/dog allergy on prick testing 6/21/19   # Benign bx  - VK, R tricep bx 1/14/21  # Facial dermatitis  - hydrocortisone 2.5, gentle skin cares    ____________________________________________    Assessment & Plan:     # Facial dermatitis  Symmetric erythema, whitish scale and pruritus on cheeks, neck, and retroauricular folds. Improved on cheeks/neck with abstaining from razors/aftershave (aqua di robert). Ddx would include most prominently seborrheic dermatitis though he does have a hx of atopy and an allergic contact dermatitis to aftershave or to his razor could also be considered (but the retroauricular component argues against this and more in favor of seborrheic dermatitis). Counseled on etiology and natural history of conditions, expected course.  - Hydrocortisone 2.5% cream BID PRN  - Moisturizer 2x daily  - Electric clipper instead of razor  - Gentle skin cares, avoid aqua di robert  - Patient to send mychart if not improved, would consider patch testing next    #  Seborrheic keratoses  # Cherry angiomas  # Benign-appearing nevi  Chronic uncomplicated.  - Counseled patient regarding etiology, natural history, and benign nature of lesions. No treatment is necessary at this time unless the lesions change or become symptomatic. Will monitor for any clinical changes  - ABCDEs of melanoma and signs of non-melanoma skin cancer were discussed and self skin checks were advised. Handout provided.  - Sun precaution was advised including the use of sun screens of SPF 30 or higher, sun protective clothing, and avoidance of tanning beds.        Follow-up: 5/2025 as scheduled    Staff and Resident:     Troy Church and Hesham Dahl MD  Medicine/Dermatology PGY-4  Page via [employer]  Pager: [phone]     I, Nati Church MD, saw this patient with the resident and agree with the resident s findings and plan of care as documented in the resident s note.      ____________________________________________    CC: Derm Problem (Pt reports face rash and dry skin, starting about three months. Pt reports he stopped shaving and this has slightly improved symptoms.)    HPI:  Mr. Ronna Collins is a(n) 74 year old male who presents today as a return patient for face rash. New problem, though last seen for skin check 5/2024.    Started 2-3 mos ago with itching, redness, scale on his cheeks and chin and retroauricular folds. Stopped shaving two weeks ago and it has improved somewhat though still has pruritus at neck area and to a lesser extent on the cheeks. Uses Aqua di robert cologne for men on the area after shaving and also stopped this at the same time he stopped shaving.    Patient is otherwise feeling well, without additional skin concerns.    Labs Reviewed:  N/A    Physical Exam:  Vitals: There were no vitals taken for this visit.  SKIN: Focused examination of face, neck, scalp was performed.  - erythematous confluent patch with loose whitish greasy scale on bilateral cheeks,  chin/anterior neck, retroauricular folds  - waxy stuck on plaques  - cherry angiomas  - nevi with no atypia on dermoscopy  - erythema and scale of inguinal folds  - No other lesions of concern on areas examined.                                     Medications:  Current Outpatient Medications   Medication Sig Dispense Refill     famotidine (PEPCID) 40 MG tablet Take 1 tablet (40 mg) by mouth daily. 90 tablet 2     hydrocortisone 2.5 % cream Twice daily to groin rash 30 g 11     losartan-hydrochlorothiazide (HYZAAR) 100-25 MG tablet Take 1 tablet by mouth daily 90 tablet 3     MELATONIN PO Take 3 mg by mouth nightly as needed.       metFORMIN (GLUCOPHAGE XR) 500 MG 24 hr tablet Take 1 tablet (500 mg) by mouth daily with food. 90 tablet 3     letrozole (FEMARA) 2.5 MG tablet Take 1 tablet (2.5 mg) by mouth daily for 360 days 90 tablet 3     No current facility-administered medications for this visit.      Past Medical History:   Patient Active Problem List   Diagnosis     High triglycerides     HTN (hypertension)     Malignant neoplasm of male breast (H)     Lumbar radiculopathy     Long term (current) use of aromatase inhibitors     Disorder of bone and cartilage     Medication side effects     Diabetes mellitus, type 2 (H)     Lymphedema of right upper extremity     Past Medical History:   Diagnosis Date     Breast cancer (H) 05/2021     Chronic sinusitis      Hypertension 2002       CC Nati Church MD  [address] [place] [id number] on close of this encounter.      Again, thank you for allowing me to participate in the care of your patient.      Sincerely,    Nati Church MD

## 2024-12-30 NOTE — PROGRESS NOTES
[facility] Dermatology Note  Encounter Date: Dec 30, 2024  Office Visit     Dermatology Problem List:  FBSE 05/20/24  # Invasive ductal adenocarcinoma, right breast, ER+, [place]+, HER2-  - skin bx 6/2021 (Dr. Yanez, [facility])   - s/p right mastectomy, chemoradiation Taxol x 22, XRT 3/31/22, Abraxone x 8, SLNbx 6/2021  - abemaciclib, letrozole, leuprolide  - following with oncology ([facility])  # Nummular dermatitis, previously with impetiginization  - current: triam ointment   - prior: mometasone 0.1 oint  - bacterial cx 6/21/19 [facility]  # Atopic dermatitis with chronic sinusitis and cat/dog allergy on prick testing 6/21/19   # Benign bx  - VK, R tricep bx 1/14/21  # Facial dermatitis  - hydrocortisone 2.5, gentle skin cares    ____________________________________________    Assessment & Plan:     # Facial dermatitis  Symmetric erythema, whitish scale and pruritus on cheeks, neck, and retroauricular folds. Improved on cheeks/neck with abstaining from razors/aftershave (aqua di robert). Ddx would include most prominently seborrheic dermatitis though he does have a hx of atopy and an allergic contact dermatitis to aftershave or to his razor could also be considered (but the retroauricular component argues against this and more in favor of seborrheic dermatitis). Counseled on etiology and natural history of conditions, expected course.  - Hydrocortisone 2.5% cream BID PRN  - Moisturizer 2x daily  - Electric clipper instead of razor  - Gentle skin cares, avoid aqua di robert  - Patient to send mychart if not improved, would consider patch testing next    # Seborrheic keratoses  # Cherry angiomas  # Benign-appearing nevi  Chronic uncomplicated.  - Counseled patient regarding etiology, natural history, and benign nature of lesions. No treatment is necessary at this time unless the lesions change or become symptomatic. Will monitor for any clinical changes  - ABCDEs of melanoma and  signs of non-melanoma skin cancer were discussed and self skin checks were advised. Handout provided.  - Sun precaution was advised including the use of sun screens of SPF 30 or higher, sun protective clothing, and avoidance of tanning beds.        Follow-up: 5/2025 as scheduled    Staff and Resident:     Troy Church and Hesham Dahl MD  Medicine/Dermatology PGY-4  Page via [employer] (P)  Pager: [phone]     I, Nati Church MD, saw this patient with the resident and agree with the resident s findings and plan of care as documented in the resident s note.      ____________________________________________    CC: Derm Problem (Pt reports face rash and dry skin, starting about three months. Pt reports he stopped shaving and this has slightly improved symptoms.)    HPI:  Mr. Ronna Collins is a(n) 74 year old male who presents today as a return patient for face rash. New problem, though last seen for skin check 5/2024.    Started 2-3 mos ago with itching, redness, scale on his cheeks and chin and retroauricular folds. Stopped shaving two weeks ago and it has improved somewhat though still has pruritus at neck area and to a lesser extent on the cheeks. Uses Aqua di robert cologne for men on the area after shaving and also stopped this at the same time he stopped shaving.    Patient is otherwise feeling well, without additional skin concerns.    Labs Reviewed:  N/A    Physical Exam:  Vitals: There were no vitals taken for this visit.  SKIN: Focused examination of face, neck, scalp was performed.  - erythematous confluent patch with loose whitish greasy scale on bilateral cheeks, chin/anterior neck, retroauricular folds  - waxy stuck on plaques  - cherry angiomas  - nevi with no atypia on dermoscopy  - erythema and scale of inguinal folds  - No other lesions of concern on areas examined.                                     Medications:  Current Outpatient Medications   Medication Sig Dispense Refill     famotidine (PEPCID) 40 MG tablet Take 1 tablet (40 mg) by mouth daily. 90 tablet 2    hydrocortisone 2.5 % cream Twice daily to groin rash 30 g 11    losartan-hydrochlorothiazide (HYZAAR) 100-25 MG tablet Take 1 tablet by mouth daily 90 tablet 3    MELATONIN PO Take 3 mg by mouth nightly as needed.      metFORMIN (GLUCOPHAGE XR) 500 MG 24 hr tablet Take 1 tablet (500 mg) by mouth daily with food. 90 tablet 3    letrozole (FEMARA) 2.5 MG tablet Take 1 tablet (2.5 mg) by mouth daily for 360 days 90 tablet 3     No current facility-administered medications for this visit.      Past Medical History:   Patient Active Problem List   Diagnosis    High triglycerides    HTN (hypertension)    Malignant neoplasm of male breast (H)    Lumbar radiculopathy    Long term (current) use of aromatase inhibitors    Disorder of bone and cartilage    Medication side effects    Diabetes mellitus, type 2 (H)    Lymphedema of right upper extremity     Past Medical History:   Diagnosis Date    Breast cancer (H) 05/2021    Chronic sinusitis     Hypertension 2002        Nati Jessica Church MD  [address] [place] [address] [address]  [place] [address] on close of this encounter.

## 2024-12-30 NOTE — NURSING NOTE
Dermatology Rooming Note    Ronna Collins's goals for this visit include:   Chief Complaint   Patient presents with    Derm Problem     Pt reports face rash and dry skin, starting about three months. Pt reports he stopped shaving and this has slightly improved symptoms.     Álvaro Mckenzie, Visit Facilitator

## 2025-01-30 DIAGNOSIS — I10 HTN (HYPERTENSION): ICD-10-CM

## 2025-02-05 RX ORDER — LOSARTAN POTASSIUM AND HYDROCHLOROTHIAZIDE 25; 100 MG/1; MG/1
1 TABLET ORAL DAILY
Qty: 90 TABLET | Refills: 0 | Status: SHIPPED | OUTPATIENT
Start: 2025-02-05

## 2025-02-05 NOTE — TELEPHONE ENCOUNTER
Last Written Prescription:     losartan-hydrochlorothiazide (HYZAAR) 100-25 MG tablet 90 tablet 3 1/23/2024 -- No   Sig - Route: Take 1 tablet by mouth daily - Oral     ----------------------  [facility]  Future Visit Date: None  ----------------------      Refill decision: Medication refilled per  Medication Refill in Ambulatory Care  policy.       [x]    Other:  Can refill x 3 months if BP greater than or equal to 140/90, and refer to PCP for follow up.     Request from pharmacy:  Requested Prescriptions   Pending Prescriptions Disp Refills    losartan-hydrochlorothiazide (HYZAAR) 100-25 MG tablet 90 tablet 3     Sig: Take 1 tablet by mouth daily.       Angiotensin-II Receptors Failed - 2/5/2025 11:23 AM        Failed - Most recent blood pressure under 140/90 in past 12 months     BP Readings from Last 3 Encounters:   10/28/24 (!) 143/83   07/09/24 132/84   06/06/24 (!) 149/86       No data recorded

## 2025-02-18 ENCOUNTER — ONCOLOGY VISIT (OUTPATIENT)
Dept: ONCOLOGY | Facility: CLINIC | Age: 75
End: 2025-02-18
Attending: PHYSICIAN ASSISTANT
Payer: COMMERCIAL

## 2025-02-18 VITALS
DIASTOLIC BLOOD PRESSURE: 76 MMHG | TEMPERATURE: 97.6 F | SYSTOLIC BLOOD PRESSURE: 133 MMHG | WEIGHT: 177.5 LBS | BODY MASS INDEX: 27 KG/M2 | RESPIRATION RATE: 16 BRPM | HEART RATE: 87 BPM | OXYGEN SATURATION: 99 %

## 2025-02-18 DIAGNOSIS — C50.121 MALIGNANT NEOPLASM OF CENTRAL PORTION OF RIGHT BREAST IN MALE, ESTROGEN RECEPTOR POSITIVE (H): Primary | ICD-10-CM

## 2025-02-18 DIAGNOSIS — E11.9 TYPE 2 DIABETES MELLITUS WITHOUT COMPLICATION, WITHOUT LONG-TERM CURRENT USE OF INSULIN (H): ICD-10-CM

## 2025-02-18 DIAGNOSIS — Z17.0 MALIGNANT NEOPLASM OF CENTRAL PORTION OF RIGHT BREAST IN MALE, ESTROGEN RECEPTOR POSITIVE (H): Primary | ICD-10-CM

## 2025-02-18 DIAGNOSIS — D63.0 ANEMIA IN NEOPLASTIC DISEASE: ICD-10-CM

## 2025-02-18 LAB
ALBUMIN SERPL BCG-MCNC: 4.3 G/DL (ref 3.5–5.2)
ALP SERPL-CCNC: 57 U/L (ref 40–150)
ALT SERPL W P-5'-P-CCNC: 25 U/L (ref 0–70)
ANION GAP SERPL CALCULATED.3IONS-SCNC: 11 MMOL/L (ref 7–15)
AST SERPL W P-5'-P-CCNC: 25 U/L (ref 0–45)
BASOPHILS # BLD AUTO: 0 10E3/UL (ref 0–0.2)
BASOPHILS NFR BLD AUTO: 1 %
BILIRUB SERPL-MCNC: 0.4 MG/DL
BUN SERPL-MCNC: 11.3 MG/DL (ref 8–23)
CALCIUM SERPL-MCNC: 9.2 MG/DL (ref 8.8–10.4)
CANCER AG15-3 SERPL-ACNC: 29 U/ML
CHLORIDE SERPL-SCNC: 101 MMOL/L (ref 98–107)
CREAT SERPL-MCNC: 0.73 MG/DL (ref 0.67–1.17)
EGFRCR SERPLBLD CKD-EPI 2021: >90 ML/MIN/1.73M2
EOSINOPHIL # BLD AUTO: 0.2 10E3/UL (ref 0–0.7)
EOSINOPHIL NFR BLD AUTO: 5 %
ERYTHROCYTE [DISTWIDTH] IN BLOOD BY AUTOMATED COUNT: 13.4 % (ref 10–15)
EST. AVERAGE GLUCOSE BLD GHB EST-MCNC: 186 MG/DL
GLUCOSE SERPL-MCNC: 195 MG/DL (ref 70–99)
HBA1C MFR BLD: 8.1 %
HCO3 SERPL-SCNC: 24 MMOL/L (ref 22–29)
HCT VFR BLD AUTO: 34.7 % (ref 40–53)
HGB BLD-MCNC: 11.5 G/DL (ref 13.3–17.7)
IMM GRANULOCYTES # BLD: 0 10E3/UL
IMM GRANULOCYTES NFR BLD: 1 %
IRON BINDING CAPACITY (ROCHE): 355 UG/DL (ref 240–430)
IRON SATN MFR SERPL: 19 % (ref 15–46)
IRON SERPL-MCNC: 69 UG/DL (ref 61–157)
LYMPHOCYTES # BLD AUTO: 1.5 10E3/UL (ref 0.8–5.3)
LYMPHOCYTES NFR BLD AUTO: 29 %
MCH RBC QN AUTO: 26.9 PG (ref 26.5–33)
MCHC RBC AUTO-ENTMCNC: 33.1 G/DL (ref 31.5–36.5)
MCV RBC AUTO: 81 FL (ref 78–100)
MONOCYTES # BLD AUTO: 0.3 10E3/UL (ref 0–1.3)
MONOCYTES NFR BLD AUTO: 6 %
NEUTROPHILS # BLD AUTO: 2.9 10E3/UL (ref 1.6–8.3)
NEUTROPHILS NFR BLD AUTO: 59 %
NRBC # BLD AUTO: 0 10E3/UL
NRBC BLD AUTO-RTO: 0 /100
PLATELET # BLD AUTO: 213 10E3/UL (ref 150–450)
POTASSIUM SERPL-SCNC: 4 MMOL/L (ref 3.4–5.3)
PROT SERPL-MCNC: 7.6 G/DL (ref 6.4–8.3)
RBC # BLD AUTO: 4.27 10E6/UL (ref 4.4–5.9)
SODIUM SERPL-SCNC: 136 MMOL/L (ref 135–145)
TSH SERPL DL<=0.005 MIU/L-ACNC: 2.89 UIU/ML (ref 0.3–4.2)
VIT B12 SERPL-MCNC: 190 PG/ML (ref 232–1245)
WBC # BLD AUTO: 4.9 10E3/UL (ref 4–11)

## 2025-02-18 PROCEDURE — G2211 COMPLEX E/M VISIT ADD ON: HCPCS | Performed by: PHYSICIAN ASSISTANT

## 2025-02-18 PROCEDURE — 86300 IMMUNOASSAY TUMOR CA 15-3: CPT | Performed by: PHYSICIAN ASSISTANT

## 2025-02-18 PROCEDURE — 96402 CHEMO HORMON ANTINEOPL SQ/IM: CPT

## 2025-02-18 PROCEDURE — 99213 OFFICE O/P EST LOW 20 MIN: CPT | Performed by: PHYSICIAN ASSISTANT

## 2025-02-18 PROCEDURE — 36415 COLL VENOUS BLD VENIPUNCTURE: CPT

## 2025-02-18 PROCEDURE — 84443 ASSAY THYROID STIM HORMONE: CPT | Performed by: PHYSICIAN ASSISTANT

## 2025-02-18 PROCEDURE — 96372 THER/PROPH/DIAG INJ SC/IM: CPT | Performed by: PHYSICIAN ASSISTANT

## 2025-02-18 PROCEDURE — 82607 VITAMIN B-12: CPT | Performed by: PHYSICIAN ASSISTANT

## 2025-02-18 PROCEDURE — 83540 ASSAY OF IRON: CPT | Performed by: PHYSICIAN ASSISTANT

## 2025-02-18 PROCEDURE — 250N000011 HC RX IP 250 OP 636: Mod: JZ | Performed by: PHYSICIAN ASSISTANT

## 2025-02-18 PROCEDURE — 83036 HEMOGLOBIN GLYCOSYLATED A1C: CPT | Performed by: PHYSICIAN ASSISTANT

## 2025-02-18 PROCEDURE — 85025 COMPLETE CBC W/AUTO DIFF WBC: CPT | Performed by: PHYSICIAN ASSISTANT

## 2025-02-18 PROCEDURE — 99214 OFFICE O/P EST MOD 30 MIN: CPT | Performed by: PHYSICIAN ASSISTANT

## 2025-02-18 PROCEDURE — 84450 TRANSFERASE (AST) (SGOT): CPT | Performed by: PHYSICIAN ASSISTANT

## 2025-02-18 RX ADMIN — LEUPROLIDE ACETATE 11.25 MG: KIT at 08:37

## 2025-02-18 ASSESSMENT — PAIN SCALES - GENERAL: PAINLEVEL_OUTOF10: NO PAIN (0)

## 2025-02-18 NOTE — LETTER
2/18/2025      Ronna Collins  [address]  [facility] [address]      Dear Colleague,    Thank you for referring your patient, Ronna Collins, to the [facility]. Please see a copy of my visit note below.    Oncology Visit:  Date on this visit: Feb 18, 2025    Diagnosis: h/o clinical prognostic stage IIIb, [id number], grade 2, ER positive, NJ positive, HER-2 negative right breast cancer, [id number].    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  He noted discomfort and hardening of the skin of the right nipple in late April/early May, 2021.  He noted a seborrheic keratosis of the right nipple and remembered he had a similar skin lesion of the arm 3 months earlier.  However, he subsequently noted a mass in the same area.  Right breast skin punch biopsy performed by dermatology was c/w grade 2 invasive ductal carcinoma with associated DCIS.  Invasive carcinoma was ER positive 100% and NJ positive 70%, with tumor invading the dermis.  HER-2 was negative by IHC (score 1+).  Diagnostic mammogram and ultrasound showed a retroareolar right breast mass measuring 2.9 cm with associated nipple retraction and enlarged, abnormal right axillary lymph nodes.  Right breast biopsy was again c/w grade 2 invasive ductal carcinoma, ER strong in 98% of tumor cell nuclei, HER-2 negative.  Ki-67 was 15%.    He elected to screen for the ISPY-2 clinical trial.  MRI breast on 6/18/2021 showed the biopsy proven right breast cancer to measure 3.9 cm in maximum dimension with invasion of the nipple and the pectoralis muscle as well as at least 4 abnormal level 1 and 2 right axillary lymph nodes and a tiny 0.4 cm right internal mammary lymph node.  Mammoprint returned low risk with a score of +0.29.  He elected for treatment on the endocrine optimization protocol of ISPY-2 and was randomized to treatment with  amcenestrant.  He was on treatment with  amcenestrant from 7/1/2021 - 10/26/2021.  At our visit in 01/2021, both right axillary node and right breast tumor palpated more firm then prior.  Breast MRI was stable, however, due to clinical concerns for progression, decision was made to proceed with surgery.   Right breast mastectomy and right axillary lymph node dissection was performed by Dr. Snider on 10/27/2021.  Pathology showed grade 2 carcinoma of the right breast measuring 3.2 cm with invasion of the dermis, nipple and the pectoralis muscle.  Ki-67 of the excised tumor was 7%.  15/44 right axillary lymph nodes were positive with 6 of the lymph nodes demonstrating extranodal extension.  The largest lymph node metastasis measured 2.1 cm.      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  His course was complicated by fevers persistent throughout treatment.  He received radiation (4800 cGy in 15 fractions) to the right chest wall and regional lymph nodes from 3/21/2022 - 4/8/2022.  He has been on treatment with lupron and letrozole since 3/16/2022.  He is s/p 2 years of adjuvant abemaciclib from 4/26/2022 - 04/2024.    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  He continues on adjuvant curative intent treatment with Lupron and letrozole. He has been feeling well. He has had a flare of arm lymphedema. He is using compression machine daily but has not been wearing his lymphedema sleeve regularly. He is wearing it today. R chest wall and R shoulder continue to feel tight and has limited ROM. He admits he could be better at stretching and massage.     He has been routinely exercising and eating healthy. He does cardio 2-4x per week but currently no strength training. No new or different pain, no lumps/bumps. No recent infections or fevers. Digestion is at baseline.     Dry skin is better controlled now. Seeing Dermatology regularly.      Past Medical/Surgical History:  Past Medical History:   Diagnosis Date     Breast cancer (H) 05/2021     Chronic sinusitis      Hypertension 2002     Past Surgical History:   Procedure Laterality Date     COLONOSCOPY WITH CO2 INSUFFLATION N/A 10/28/2022    Procedure: COLONOSCOPY, WITH CO2 INSUFFLATION;  Surgeon: Annemarie Noel DO;  Location: [facility]     COMBINED ESOPHAGOSCOPY, GASTROSCOPY, DUODENOSCOPY (EGD) WITH CO2 INSUFFLATION N/A 10/28/2022    Procedure: ESOPHAGOGASTRODUODENOSCOPY, WITH CO2 INSUFFLATION;  Surgeon: Annemarei Noel DO;  Location: [facility]     DISSECT LYMPH NODE AXILLA Right 10/27/2021    Procedure: RIGHT Axillary Lymph Node Dissection;  Surgeon: Nida Snider MD;  Location: [facility]     ESOPHAGOSCOPY, GASTROSCOPY, DUODENOSCOPY (EGD), COMBINED N/A 10/28/2022    Procedure: ESOPHAGOGASTRODUODENOSCOPY, WITH BIOPSY;  Surgeon: Annemarie Noel DO;  Location: [facility]     MASTECTOMY SIMPLE Right 10/27/2021    Procedure: RIGHT Simple Mastectomy;  Surgeon: Nida Snider MD;  Location: [facility]     Allergies:  Allergies as of 02/18/2025     (No Known Allergies)     Current Medications:  Current Outpatient Medications   Medication Sig Dispense Refill     famotidine (PEPCID) 40 MG tablet Take 1 tablet (40 mg) by mouth daily. 90 tablet 2     hydrocortisone 2.5 % cream Apply topically 2 times daily. 30 g 3     hydrocortisone 2.5 % cream Twice daily to groin rash 30 g 11     letrozole (FEMARA) 2.5 MG tablet Take 1 tablet (2.5 mg) by mouth daily for 360 days 90 tablet 3     losartan-hydrochlorothiazide (HYZAAR) 100-25 MG tablet Take 1 tablet by mouth daily. 90 tablet 0     MELATONIN PO Take 3 mg by mouth nightly as needed.       metFORMIN (GLUCOPHAGE XR) 500 MG 24 hr tablet Take 1 tablet (500 mg) by mouth daily with food. 90 tablet 3      Genetics Breast Actionable and Breast Expanded panels were both negative for pathogenic germline mutations.    Physical Exam:  /76 (BP Location: Left arm,  Patient Position: Sitting, Cuff Size: Adult Regular)   Pulse 87   Temp 97.6  F (36.4  C) (Oral)   Resp 16   Wt 80.5 kg (177 lb 8 oz)   SpO2 99%   BMI 27.00 kg/m    General:  Well appearing adult male in NAD.  Alert and oriented x 3.  HEENT:  Normocephalic.  Sclera anicteric.   Lymph:  No palpable cervical or axillary LAD.  Chest:  CTA bilaterally.  No wheezes or crackles.  CV:  RRR.   Breast:  Right mastectomy.  There is scar adherence upper outer chest wall.  No palpable masses or nodularity of the chest wall.  Abd:  Soft/ND/NT +BS   Ext:  No pitting edema of the bilateral lower extremities.   Musculo:  Limited abduction of the RUE.  Neuro:  Cranial nerves grossly intact.  Gait is stable.  No tremors or dyskinetic movements.  Psych:  Mood and affect appear normal    Laboratory/Imaging Studies:  I personally reviewed the below laboratory studies:   02/18/25 07:47 02/18/25 07:48   Sodium 136    Potassium 4.0    Chloride 101    Carbon Dioxide (CO2) 24    Urea Nitrogen 11.3    Creatinine 0.73    GFR Estimate >90    Calcium 9.2    Anion Gap 11    Albumin 4.3    Protein Total 7.6    Alkaline Phosphatase 57    ALT 25    AST 25    Bilirubin Total 0.4    Glucose 195 (H)    Estimated Average Glucose  186 (H)   Hemoglobin A1C  8.1 (H)   Iron 69    Iron Binding Capacity 355    Iron Sat Index 19    TSH 2.89    WBC 4.9    Hemoglobin 11.5 (L)    Hematocrit 34.7 (L)    Platelet Count 213    RBC Count 4.27 (L)    MCV 81    MCH 26.9    MCHC 33.1    RDW 13.4    % Neutrophils 59    % Lymphocytes 29    % Monocytes 6    % Eosinophils 5    % Basophils 1    Absolute Basophils 0.0    Absolute Eosinophils 0.2    Absolute Immature Granulocytes 0.0    Absolute Lymphocytes 1.5    Absolute Monocytes 0.3    % Immature Granulocytes 1    Absolute Neutrophils 2.9    Absolute NRBCs 0.0    NRBCs per 100 WBC 0          ASSESSMENT/PLAN:  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  He is s/p treatment with 3.5 months neoadjuvant amcenestrant, right breast mastectomy, right ALND, 4 cycles Taxotere and cyclophosphamide, and radiation.  He completed 2 years of abemaciclib in 04/2024.  He continues on adjuvant curative intent treatment with lupron and letrozole.    1.  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  He has been on endocrine therapy since 03/2022 and continues on adjuvant curative intent treatment with lupron and letrozole at this time. He is tolerating this well. Plan to continue for a total of 10 years (through 3/16/2032).   - Given 15/44 positive lymph nodes, we are monitoring CA 15-3 tumor marker for surveillance.  Recommend checking this once every 3 months. Pending today. Has been stable on recent checks.   - Is due for Lupron at this time, will administer today.  - Labs and return visit in 3 months.    2. Dry skin, pruritus: Improved with using moisturizer regularly.     3.  RUE lymphedema/cording/restricted ROM:  He is using lymphedema pump but will start wearing sleeve daily, stretching and massaging his arm, axilla, and R chest wall.     4.  Osteopenia of multiple sites:  DEXA 10/2022 with osteopenia of multiple sites.  Zometa initiated for prevention of breast cancer bone metastases.  Unfortunately this was poorly tolerated and therefore changed to Prolia on 4/17/2023. Next due around 4/28/25.     5.  Anemia: Hgb was lower on verzenio but has not fully normalized. May be from endocrine therapy secondary to testosterone suppression but will check iron studies, vitamin B12, and TSH.     6.  Diabetes:  Hemoglobin A1C on 6/12/2024 c/w diabetes.  Diabetes is a risk factor for breast cancer and patients with a hemoglobin A1C >7% have worse breast cancer outcomes. Hgb A1c was a little worse at 8.1. Continue to follow with PCP for management.     The longitudinal plan of care for  the diagnosis(es)/condition(s) as documented were addressed during this visit. Due to the added complexity in care, I will continue to support Ronna in the subsequent management and with ongoing continuity of care.      Nay Wolf PA-C                 Again, thank you for allowing me to participate in the care of your patient.        Sincerely,        Nay Wolf PA-C    Electronically signed

## 2025-02-18 NOTE — PROGRESS NOTES
Oncology Visit:  Date on this visit: Feb 18, 2025    Diagnosis: h/o clinical prognostic stage IIIb, [id number], grade 2, ER positive, LA positive, HER-2 negative right breast cancer, [id number].    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  He noted discomfort and hardening of the skin of the right nipple in late April/early May, 2021.  He noted a seborrheic keratosis of the right nipple and remembered he had a similar skin lesion of the arm 3 months earlier.  However, he subsequently noted a mass in the same area.  Right breast skin punch biopsy performed by dermatology was c/w grade 2 invasive ductal carcinoma with associated DCIS.  Invasive carcinoma was ER positive 100% and LA positive 70%, with tumor invading the dermis.  HER-2 was negative by IHC (score 1+).  Diagnostic mammogram and ultrasound showed a retroareolar right breast mass measuring 2.9 cm with associated nipple retraction and enlarged, abnormal right axillary lymph nodes.  Right breast biopsy was again c/w grade 2 invasive ductal carcinoma, ER strong in 98% of tumor cell nuclei, HER-2 negative.  Ki-67 was 15%.    He elected to screen for the ISPY-2 clinical trial.  MRI breast on 6/18/2021 showed the biopsy proven right breast cancer to measure 3.9 cm in maximum dimension with invasion of the nipple and the pectoralis muscle as well as at least 4 abnormal level 1 and 2 right axillary lymph nodes and a tiny 0.4 cm right internal mammary lymph node.  Mammoprint returned low risk with a score of +0.29.  He elected for treatment on the endocrine optimization protocol of ISPY-2 and was randomized to treatment with amcenestrant.  He was on treatment with  amcenestrant from 7/1/2021 - 10/26/2021.  At our visit in 01/2021, both right axillary node and right breast tumor palpated more firm then prior.  Breast MRI was stable, however, due to clinical concerns for progression,  decision was made to proceed with surgery.   Right breast mastectomy and right axillary lymph node dissection was performed by Dr. Snider on 10/27/2021.  Pathology showed grade 2 carcinoma of the right breast measuring 3.2 cm with invasion of the dermis, nipple and the pectoralis muscle.  Ki-67 of the excised tumor was 7%.  15/44 right axillary lymph nodes were positive with 6 of the lymph nodes demonstrating extranodal extension.  The largest lymph node metastasis measured 2.1 cm.      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  His course was complicated by fevers persistent throughout treatment.  He received radiation (4800 cGy in 15 fractions) to the right chest wall and regional lymph nodes from 3/21/2022 - 4/8/2022.  He has been on treatment with lupron and letrozole since 3/16/2022.  He is s/p 2 years of adjuvant abemaciclib from 4/26/2022 - 04/2024.    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  He continues on adjuvant curative intent treatment with Lupron and letrozole. He has been feeling well. He has had a flare of arm lymphedema. He is using compression machine daily but has not been wearing his lymphedema sleeve regularly. He is wearing it today. R chest wall and R shoulder continue to feel tight and has limited ROM. He admits he could be better at stretching and massage.     He has been routinely exercising and eating healthy. He does cardio 2-4x per week but currently no strength training. No new or different pain, no lumps/bumps. No recent infections or fevers. Digestion is at baseline.     Dry skin is better controlled now. Seeing Dermatology regularly.     Past Medical/Surgical History:  Past Medical History:   Diagnosis Date    Breast cancer (H) 05/2021    Chronic sinusitis     Hypertension 2002     Past Surgical History:   Procedure Laterality Date    COLONOSCOPY WITH CO2 INSUFFLATION N/A 10/28/2022    Procedure:  COLONOSCOPY, WITH CO2 INSUFFLATION;  Surgeon: Annemarie Noel DO;  Location: [facility]    COMBINED ESOPHAGOSCOPY, GASTROSCOPY, DUODENOSCOPY (EGD) WITH CO2 INSUFFLATION N/A 10/28/2022    Procedure: ESOPHAGOGASTRODUODENOSCOPY, WITH CO2 INSUFFLATION;  Surgeon: Annemarie Noel DO;  [facility]: [facility]    DISSECT LYMPH NODE AXILLA Right 10/27/2021    Procedure: RIGHT Axillary Lymph Node Dissection;  Surgeon: Nida Snider MD;  [facility]: [facility]    ESOPHAGOSCOPY, GASTROSCOPY, DUODENOSCOPY (EGD), COMBINED N/A 10/28/2022    Procedure: ESOPHAGOGASTRODUODENOSCOPY, WITH BIOPSY;  Surgeon: Annemarie Noel DO;  Location: [facility]    MASTECTOMY SIMPLE Right 10/27/2021    Procedure: RIGHT Simple Mastectomy;  Surgeon: Nida Snider MD;  Location: [facility]     Allergies:  Allergies as of 02/18/2025    (No Known Allergies)     Current Medications:  Current Outpatient Medications   Medication Sig Dispense Refill    famotidine (PEPCID) 40 MG tablet Take 1 tablet (40 mg) by mouth daily. 90 tablet 2    hydrocortisone 2.5 % cream Apply topically 2 times daily. 30 g 3    hydrocortisone 2.5 % cream Twice daily to groin rash 30 g 11    letrozole (FEMARA) 2.5 MG tablet Take 1 tablet (2.5 mg) by mouth daily for 360 days 90 tablet 3    losartan-hydrochlorothiazide (HYZAAR) 100-25 MG tablet Take 1 tablet by mouth daily. 90 tablet 0    MELATONIN PO Take 3 mg by mouth nightly as needed.      metFORMIN (GLUCOPHAGE XR) 500 MG 24 hr tablet Take 1 tablet (500 mg) by mouth daily with food. 90 tablet 3      Genetics Breast Actionable and Breast Expanded panels were both negative for pathogenic germline mutations.    Physical Exam:  /76 (BP Location: Left arm, Patient Position: Sitting, Cuff Size: Adult Regular)   Pulse 87   Temp 97.6  F (36.4  C) (Oral)   Resp 16   Wt 80.5 kg (177 lb 8 oz)   SpO2 99%   BMI 27.00 kg/m    General:  Well appearing adult male in NAD.  Alert and oriented x 3.  HEENT:  Normocephalic.  Sclera anicteric.    Lymph:  No palpable cervical or axillary LAD.  Chest:  CTA bilaterally.  No wheezes or crackles.  CV:  RRR.   Breast:  Right mastectomy.  There is scar adherence upper outer chest wall.  No palpable masses or nodularity of the chest wall.  Abd:  Soft/ND/NT +BS   Ext:  No pitting edema of the bilateral lower extremities.   Musculo:  Limited abduction of the RUE.  Neuro:  Cranial nerves grossly intact.  Gait is stable.  No tremors or dyskinetic movements.  Psych:  Mood and affect appear normal    Laboratory/Imaging Studies:  I personally reviewed the below laboratory studies:   02/18/25 07:47 02/18/25 07:48   Sodium 136    Potassium 4.0    Chloride 101    Carbon Dioxide (CO2) 24    Urea Nitrogen 11.3    Creatinine 0.73    GFR Estimate >90    Calcium 9.2    Anion Gap 11    Albumin 4.3    Protein Total 7.6    Alkaline Phosphatase 57    ALT 25    AST 25    Bilirubin Total 0.4    Glucose 195 (H)    Estimated Average Glucose  186 (H)   Hemoglobin A1C  8.1 (H)   Iron 69    Iron Binding Capacity 355    Iron Sat Index 19    TSH 2.89    WBC 4.9    Hemoglobin 11.5 (L)    Hematocrit 34.7 (L)    Platelet Count 213    RBC Count 4.27 (L)    MCV 81    MCH 26.9    MCHC 33.1    RDW 13.4    % Neutrophils 59    % Lymphocytes 29    % Monocytes 6    % Eosinophils 5    % Basophils 1    Absolute Basophils 0.0    Absolute Eosinophils 0.2    Absolute Immature Granulocytes 0.0    Absolute Lymphocytes 1.5    Absolute Monocytes 0.3    % Immature Granulocytes 1    Absolute Neutrophils 2.9    Absolute NRBCs 0.0    NRBCs per 100 WBC 0          ASSESSMENT/PLAN:  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  He is s/p treatment with 3.5 months neoadjuvant amcenestrant, right breast mastectomy, right ALND, 4 cycles Taxotere and cyclophosphamide, and radiation.  He completed 2 years of abemaciclib in 04/2024.  He continues on adjuvant  curative intent treatment with lupron and letrozole.    1.  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  He has been on endocrine therapy since 03/2022 and continues on adjuvant curative intent treatment with lupron and letrozole at this time. He is tolerating this well. Plan to continue for a total of 10 years (through 3/16/2032).   - Given 15/44 positive lymph nodes, we are monitoring CA 15-3 tumor marker for surveillance.  Recommend checking this once every 3 months. Pending today. Has been stable on recent checks.   - Is due for Lupron at this time, will administer today.  - Labs and return visit in 3 months.    2. Dry skin, pruritus: Improved with using moisturizer regularly.     3.  RUE lymphedema/cording/restricted ROM:  He is using lymphedema pump but will start wearing sleeve daily, stretching and massaging his arm, axilla, and R chest wall.     4.  Osteopenia of multiple sites:  DEXA 10/2022 with osteopenia of multiple sites.  Zometa initiated for prevention of breast cancer bone metastases.  Unfortunately this was poorly tolerated and therefore changed to Prolia on 4/17/2023. Next due around 4/28/25.     5.  Anemia: Hgb was lower on verzenio but has not fully normalized. May be from endocrine therapy secondary to testosterone suppression but will check iron studies, vitamin B12, and TSH.     6.  Diabetes:  Hemoglobin A1C on 6/12/2024 c/w diabetes.  Diabetes is a risk factor for breast cancer and patients with a hemoglobin A1C >7% have worse breast cancer outcomes. Hgb A1c was a little worse at 8.1. Continue to follow with PCP for management.     The longitudinal plan of care for the diagnosis(es)/condition(s) as documented were addressed during this visit. Due to the added complexity in care, I will continue to support Ronna in the subsequent management and with ongoing continuity of care.      Nay Wolf PA-C     ADDENDUM: vitamin B12 level came  back low. May be secondary to metformin. Will have him follow-up with PCP about this. -NG

## 2025-02-18 NOTE — NURSING NOTE
Lupron given into LEFT ventrogluteal without incident. See MAR for details.    Mari Medina RN on 2/18/2025 at 8:40 AM

## 2025-02-18 NOTE — NURSING NOTE
"Oncology Rooming Note    February 18, 2025 8:00 AM   Ronna Collins is a 74 year old male who presents for:    Chief Complaint   Patient presents with    Blood Draw     Labs drawn via  by VAT. VS taken.    Oncology Clinic Visit     Malignant neoplasm of central portion of right breast in male     Initial Vitals: /76 (BP Location: Left arm, Patient Position: Sitting, Cuff Size: Adult Regular)   Pulse 87   Temp 97.6  F (36.4  C) (Oral)   Resp 16   Wt 80.5 kg (177 lb 8 oz)   SpO2 99%   BMI 27.00 kg/m   Estimated body mass index is 27 kg/m  as calculated from the following:    Height as of 6/1/23: 1.727 m (5' 7.99\").    Weight as of this encounter: 80.5 kg (177 lb 8 oz). Body surface area is 1.97 meters squared.  No Pain (0) Comment: Data Unavailable   No LMP for male patient.  Allergies reviewed: Yes  Medications reviewed: Yes    Medications: Medication refills not needed today.  Pharmacy name entered into [facility]:    [id number] - [place], [place] - [facility] AT [facility] - [place], [place] - [address]   [id number] - [place], [place] - [address] AT [facility] [address] [facility] [place] [facility]    Frailty Screening:   Is the patient here for a new oncology consult visit in cancer care? 2. No    PHQ9:  Did this patient require a PHQ9?: No      Clinical concerns: none       Dennise Juan"

## 2025-02-18 NOTE — NURSING NOTE
Chief Complaint   Patient presents with    Blood Draw     Labs drawn via  by VAT. VS taken.     Labs collected from venipuncture by VAT. Vitals taken. Checked in for appointment(s).     Leta Myles RN

## 2025-02-26 ENCOUNTER — MYC MEDICAL ADVICE (OUTPATIENT)
Dept: PHARMACY | Facility: CLINIC | Age: 75
End: 2025-02-26
Payer: COMMERCIAL

## 2025-02-26 DIAGNOSIS — E11.9 TYPE 2 DIABETES MELLITUS WITHOUT COMPLICATION, WITHOUT LONG-TERM CURRENT USE OF INSULIN (H): ICD-10-CM

## 2025-02-26 RX ORDER — METFORMIN HYDROCHLORIDE 500 MG/1
TABLET, EXTENDED RELEASE ORAL
Qty: 339 TABLET | Refills: 1 | Status: SHIPPED | OUTPATIENT
Start: 2025-02-26 | End: 2025-05-27

## 2025-03-06 DIAGNOSIS — C50.121 MALIGNANT NEOPLASM OF CENTRAL PORTION OF RIGHT BREAST IN MALE, ESTROGEN RECEPTOR POSITIVE (H): Primary | ICD-10-CM

## 2025-03-06 DIAGNOSIS — M89.9 DISORDER OF BONE AND CARTILAGE: ICD-10-CM

## 2025-03-06 DIAGNOSIS — M94.9 DISORDER OF BONE AND CARTILAGE: ICD-10-CM

## 2025-03-06 DIAGNOSIS — Z79.811 LONG TERM (CURRENT) USE OF AROMATASE INHIBITORS: ICD-10-CM

## 2025-03-06 DIAGNOSIS — Z17.0 MALIGNANT NEOPLASM OF CENTRAL PORTION OF RIGHT BREAST IN MALE, ESTROGEN RECEPTOR POSITIVE (H): Primary | ICD-10-CM

## 2025-03-25 ENCOUNTER — MYC REFILL (OUTPATIENT)
Dept: ONCOLOGY | Facility: CLINIC | Age: 75
End: 2025-03-25
Payer: COMMERCIAL

## 2025-03-25 DIAGNOSIS — C50.929 MALIGNANT NEOPLASM OF MALE BREAST, UNSPECIFIED ESTROGEN RECEPTOR STATUS, UNSPECIFIED LATERALITY, UNSPECIFIED SITE OF BREAST (H): ICD-10-CM

## 2025-03-25 RX ORDER — LETROZOLE 2.5 MG/1
2.5 TABLET, FILM COATED ORAL DAILY
Qty: 90 TABLET | Refills: 3 | Status: SHIPPED | OUTPATIENT
Start: 2025-03-25

## 2025-03-25 NOTE — TELEPHONE ENCOUNTER
Pending Prescriptions:                       Disp   Refills    letrozole (FEMARA) 2.5 MG tablet          90 tab*3            Sig: Take 1 tablet (2.5 mg) by mouth daily.    Last prescribing provider:     Last clinic visit date: 02/18/25 w/Nay Wolf    Recommendations for requested medication (if none, N/A): Copied from last OV note: He has been on endocrine therapy since 03/2022 and continues on adjuvant curative intent treatment with lupron and letrozole at this time. He is tolerating this well. Plan to continue for a total of 10 years (through 3/16/2032).     Any other pertinent information (if none, N/A): N/A    Refilled: Y/N, if NO, why?

## 2025-05-12 ENCOUNTER — MYC MEDICAL ADVICE (OUTPATIENT)
Dept: PHARMACY | Facility: CLINIC | Age: 75
End: 2025-05-12
Payer: COMMERCIAL

## 2025-05-12 DIAGNOSIS — E11.9 TYPE 2 DIABETES MELLITUS WITHOUT COMPLICATION, WITHOUT LONG-TERM CURRENT USE OF INSULIN (H): Primary | ICD-10-CM

## 2025-05-12 NOTE — PROGRESS NOTES
Oncology Visit:  Date on this visit: May 13, 2025    Diagnosis: h/o clinical prognostic stage IIIb, [id number], grade 2, ER positive, NH positive, HER-2 negative right breast cancer, [id number].    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  He noted discomfort and hardening of the skin of the right nipple in late April/early May, 2021.  He noted a seborrheic keratosis of the right nipple and remembered he had a similar skin lesion of the arm 3 months earlier.  However, he subsequently noted a mass in the same area.  Right breast skin punch biopsy performed by dermatology was c/w grade 2 invasive ductal carcinoma with associated DCIS.  Invasive carcinoma was ER positive 100% and NH positive 70%, with tumor invading the dermis.  HER-2 was negative by IHC (score 1+).  Diagnostic mammogram and ultrasound showed a retroareolar right breast mass measuring 2.9 cm with associated nipple retraction and enlarged, abnormal right axillary lymph nodes.  Right breast biopsy was again c/w grade 2 invasive ductal carcinoma, ER strong in 98% of tumor cell nuclei, HER-2 negative.  Ki-67 was 15%.    He elected to screen for the ISPY-2 clinical trial.  MRI breast on 6/18/2021 showed the biopsy proven right breast cancer to measure 3.9 cm in maximum dimension with invasion of the nipple and the pectoralis muscle as well as at least 4 abnormal level 1 and 2 right axillary lymph nodes and a tiny 0.4 cm right internal mammary lymph node.  Mammoprint returned low risk with a score of +0.29.  He elected for treatment on the endocrine optimization protocol of ISPY-2 and was randomized to treatment with amcenestrant.  He was on treatment with  amcenestrant from 7/1/2021 - 10/26/2021.  At our visit in 01/2021, both right axillary node and right breast tumor palpated more firm then prior.  Breast MRI was stable, however, due to clinical concerns for progression,  decision was made to proceed with surgery.   Right breast mastectomy and right axillary lymph node dissection was performed by Dr. Snider on 10/27/2021.  Pathology showed grade 2 carcinoma of the right breast measuring 3.2 cm with invasion of the dermis, nipple and the pectoralis muscle.  Ki-67 of the excised tumor was 7%.  15/44 right axillary lymph nodes were positive with 6 of the lymph nodes demonstrating extranodal extension.  The largest lymph node metastasis measured 2.1 cm.      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  His course was complicated by fevers persistent throughout treatment.  He received radiation (4800 cGy in 15 fractions) to the right chest wall and regional lymph nodes from 3/21/2022 - 4/8/2022.  He has been on treatment with lupron and letrozole since 3/16/2022.  He is s/p 2 years of adjuvant abemaciclib from 4/26/2022 - 04/2024.    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  He is on curative intent treatment with Lupron and letrozole.  In general, he is tolerating this treatment well.  He reports his health has been good since her last visit.  He notes hemoglobin A1c was elevated in February, he subsequently started treatment with metformin.  He is tolerating metformin well.  He notes that he had a repeat hemoglobin A1c drawn today.  He states he continues to have insomnia, however has found a sleep pattern that works for him.  He denies significant daytime fatigue and is able to do all of the daytime activities that he would like to do.  He and my telemetry continue to intermittently watch their grandchildren in [place].  [place]  He notes increased bilateral lower extremity edema.  This is worse at the end of the day.  He has dry skin of the bilateral lower extremities.  He has mild swelling of his right upper  extremity.  He intermittently wears his compression sleeve.  He notes left shoulder pain present for the past 6 months.  He has difficulty extending his arm in front of him.  He is not currently taking pain medication for this.  He denies other new bone or joint aches or pains.  He has no cough, shortness of breath, or chest pain.  He has no abdominal complaints.    Past Medical/Surgical History:  Past Medical History:   Diagnosis Date    Breast cancer (H) 05/2021    Chronic sinusitis     Hypertension 2002     Past Surgical History:   Procedure Laterality Date    COLONOSCOPY WITH CO2 INSUFFLATION N/A 10/28/2022    Procedure: COLONOSCOPY, WITH CO2 INSUFFLATION;  Surgeon: Annemarie Noel DO;  Location: [facility] OR    COMBINED ESOPHAGOSCOPY, GASTROSCOPY, DUODENOSCOPY (EGD) WITH CO2 INSUFFLATION N/A 10/28/2022    Procedure: ESOPHAGOGASTRODUODENOSCOPY, WITH CO2 INSUFFLATION;  Surgeon: Annemarie Noel DO;  Location: [facility]    DISSECT LYMPH NODE AXILLA Right 10/27/2021    Procedure: RIGHT Axillary Lymph Node Dissection;  Surgeon: Nida Snider MD;  Location: [facility] OR    ESOPHAGOSCOPY, GASTROSCOPY, DUODENOSCOPY (EGD), COMBINED N/A 10/28/2022    Procedure: ESOPHAGOGASTRODUODENOSCOPY, WITH BIOPSY;  Surgeon: Annemarie Noel DO;  Location: [facility]    MASTECTOMY SIMPLE Right 10/27/2021    Procedure: RIGHT Simple Mastectomy;  Surgeon: Nida Snider MD;  Location: [facility] OR     Allergies:  Allergies as of 05/13/2025    (No Known Allergies)     Current Medications:  Current Outpatient Medications   Medication Sig Dispense Refill    famotidine (PEPCID) 40 MG tablet Take 1 tablet (40 mg) by mouth daily. 90 tablet 2    hydrocortisone 2.5 % cream Apply topically 2 times daily. 30 g 3    hydrocortisone 2.5 % cream Twice daily to groin rash 30 g 11    letrozole (FEMARA) 2.5 MG tablet Take 1 tablet (2.5 mg) by mouth daily. 90 tablet 3    losartan-hydrochlorothiazide (HYZAAR) 100-25 MG tablet Take 1 tablet by mouth daily. 90 tablet 0     MELATONIN PO Take 3 mg by mouth nightly as needed.      metFORMIN (GLUCOPHAGE XR) 500 MG 24 hr tablet Take 2 tablets (1,000 mg) by mouth daily with food for 7 days, THEN 3 tablets (1,500 mg) daily with food for 7 days, THEN 4 tablets (2,000 mg) daily with food. 339 tablet 1      Genetics Breast Actionable and Breast Expanded panels were both negative for pathogenic germline mutations.    Physical Exam:    General:  Well appearing adult male in NAD.  Alert and oriented x 3.  HEENT:  Normocephalic.  Sclera anicteric.   Lymph:  No palpable cervical or axillary LAD.  Chest:  CTA bilaterally.  No wheezes or crackles.  CV:  RRR.   Breast:  Right mastectomy.  There is scar adherence upper outer chest wall.  No palpable masses or nodularity of the chest wall.  Abd:  Soft/ND/NT +BS   Ext:  No pitting edema of the bilateral lower extremities.   Musculo:  Limited abduction of the RUE.  Neuro:  Cranial nerves grossly intact.  Gait is stable.  No tremors or dyskinetic movements.  Psych:  Mood and affect appear normal    Laboratory/Imaging Studies:  I personally reviewed the below laboratory studies:    5/12/2025 Labs:   Latest Reference Range & Units 05/13/25 08:14   WBC 4.0 - 11.0 10e3/uL 6.3   Hemoglobin 13.3 - 17.7 g/dL 12.4 (L)   Hematocrit 40.0 - 53.0 % 38.6 (L)   Platelet Count 150 - 450 10e3/uL 240   (L): Data is abnormally low   Latest Reference Range & Units 05/13/25 08:14   Absolute Neutrophil 1.6 - 8.3 10e3/uL 3.9   Absolute Lymphocytes 0.8 - 5.3 10e3/uL 1.6   Absolute Monocytes 0.0 - 1.3 10e3/uL 0.4   Absolute Eosinophils 0.0 - 0.7 10e3/uL 0.4   Absolute Basophils 0.0 - 0.2 10e3/uL 0.0   Absolute Immature Granulocytes <=0.4 10e3/uL 0.0   Absolute NRBCs 10e3/uL 0.0     ASSESSMENT/PLAN:  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  He is s/p treatment with 3.5 months neoadjuvant amcenestrant, right  breast mastectomy, right ALND, 4 cycles Taxotere and cyclophosphamide, and radiation.  He completed 2 years of abemaciclib in 04/2024.  He continues on adjuvant curative intent treatment with lupron and letrozole.    1.  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  He has been on endocrine therapy since 03/2022 and continues on adjuvant curative intent treatment with lupron and letrozole at this time. He is tolerating this well. Plan to continue for a total of 10 years (through 3/16/2032).   - Given 15/44 positive lymph nodes, we are monitoring CA 15-3 tumor marker for surveillance.  Recommend checking this once every 3 months. Pending today.   - Is due for Lupron at this time, will administer today.  - Labs and return visit in 4 months.    2. Dry skin, pruritus: Improved with using moisturizer regularly.     3.  RUE lymphedema/cording/restricted ROM:  He is using lymphedema pump but will start wearing sleeve daily, stretching and massaging his arm, axilla, and R chest wall.     4.  Osteopenia of multiple sites:  DEXA 10/2022 with osteopenia of multiple sites.  Zometa initiated for prevention of breast cancer bone metastases.  Unfortunately this was poorly tolerated and therefore changed to Prolia on 4/17/2023.   - Recommend a repeat DEXA at this time.    5.  Anemia: Hgb was lower on verzenio but has not fully normalized.   - labs from 02/2025 were reviewed and c/w B12 deficiency.  Recommend starting 1000 mcg PO daily.  Can be purchased OTC.    6.  Diabetes:  Hemoglobin A1C on 6/12/2024 c/w diabetes.  Diabetes is a risk factor for breast cancer and patients with a hemoglobin A1C >7% have worse breast cancer outcomes. Last Hgb A1c 02/2025 was elevated at 8.1%. Continue to follow with PCP for management.     Follow Up:  DEXA bone density scan within 1 month.  Labs, Prolia and Lupron, SANDRA visit in 3 months.  Labs, Prolia and Luporon, visit with me in 6 months.   injury.   - MRI left shoulder    3. Dry skin, pruritus: Improved with using moisturizer regularly.     4.  RUE lymphedema/cording/restricted ROM:  He has lymphedema pump Encouraged more routine use of compression sleeve, stretching and massage.     5.  Osteopenia of multiple sites:  DEXA 10/2022 with osteopenia of multiple sites.  Zometa initiated for prevention of breast cancer bone metastases.  Unfortunately this was poorly tolerated and therefore changed to Prolia on 4/17/2023.   - continues on once every 6 month Prolia.  He is due at this time.  - Recommend a repeat DEXA at this time.    6.  Anemia: Hgb was lower on verzenio but has not fully normalized.   - labs from 02/2025 were reviewed and c/w B12 deficiency.  He has not yet started a vitamin B12 supplement.  - Recommend starting 1000 mcg PO daily.      7.  Diabetes:  Hemoglobin A1C on 6/12/2024 c/w diabetes.  Diabetes is a risk factor for breast cancer and patients with a hemoglobin A1C >7% have worse breast cancer outcomes. Last Hgb A1c 02/2025 was elevated at 8.1%.  - On metformin since 02/2025  - Hemoglobin A1C today improved to 7.2%.   - Continue to follow with PCP for management.     8.  Venous insufficiency:  Dry skin and swelling of the lower extremities are c/w venous insufficiency.  - Recommend wearing compression stockings as much as able.  - Elevated feet while sitting.    Follow Up:  DEXA bone density scan and MRI left shoulder prior to 6/1 (patient is leaving [facility] 6/1 and will be gone for a month)   Tricia Perez, SANDRA visit in 3 months.   Labs, Prolia, Lupron, visit with me in 6 months.     The longitudinal plan of care for the diagnosis(es)/condition(s) as documented were addressed during this visit. Due to the added complexity in care, I will continue to support Ronna in the subsequent management and with ongoing continuity of care.    I spent 63 minutes on the date of the encounter doing chart review, review of test results,  interpretation of tests, patient visit, documentation, and discussion with family

## 2025-05-13 ENCOUNTER — ONCOLOGY VISIT (OUTPATIENT)
Dept: ONCOLOGY | Facility: CLINIC | Age: 75
End: 2025-05-13
Attending: INTERNAL MEDICINE
Payer: COMMERCIAL

## 2025-05-13 ENCOUNTER — APPOINTMENT (OUTPATIENT)
Dept: LAB | Facility: CLINIC | Age: 75
End: 2025-05-13
Attending: INTERNAL MEDICINE
Payer: COMMERCIAL

## 2025-05-13 VITALS
TEMPERATURE: 97.4 F | RESPIRATION RATE: 16 BRPM | BODY MASS INDEX: 26.42 KG/M2 | DIASTOLIC BLOOD PRESSURE: 92 MMHG | SYSTOLIC BLOOD PRESSURE: 152 MMHG | WEIGHT: 173.7 LBS | HEART RATE: 77 BPM | OXYGEN SATURATION: 98 %

## 2025-05-13 DIAGNOSIS — E11.9 TYPE 2 DIABETES MELLITUS WITHOUT COMPLICATION, WITHOUT LONG-TERM CURRENT USE OF INSULIN (H): ICD-10-CM

## 2025-05-13 DIAGNOSIS — M25.612 DECREASED RANGE OF MOTION OF LEFT SHOULDER: ICD-10-CM

## 2025-05-13 DIAGNOSIS — Z79.811 LONG TERM (CURRENT) USE OF AROMATASE INHIBITORS: ICD-10-CM

## 2025-05-13 DIAGNOSIS — M89.9 DISORDER OF BONE AND CARTILAGE: ICD-10-CM

## 2025-05-13 DIAGNOSIS — Z17.0 MALIGNANT NEOPLASM OF CENTRAL PORTION OF RIGHT BREAST IN MALE, ESTROGEN RECEPTOR POSITIVE (H): Primary | ICD-10-CM

## 2025-05-13 DIAGNOSIS — Z92.21 STATUS POST CHEMOTHERAPY: ICD-10-CM

## 2025-05-13 DIAGNOSIS — G89.29 CHRONIC LEFT SHOULDER PAIN: ICD-10-CM

## 2025-05-13 DIAGNOSIS — E53.8 VITAMIN B12 DEFICIENCY (NON ANEMIC): ICD-10-CM

## 2025-05-13 DIAGNOSIS — M94.9 DISORDER OF BONE AND CARTILAGE: ICD-10-CM

## 2025-05-13 DIAGNOSIS — M25.512 CHRONIC LEFT SHOULDER PAIN: ICD-10-CM

## 2025-05-13 DIAGNOSIS — D64.9 ANEMIA, UNSPECIFIED TYPE: ICD-10-CM

## 2025-05-13 DIAGNOSIS — C50.121 MALIGNANT NEOPLASM OF CENTRAL PORTION OF RIGHT BREAST IN MALE, ESTROGEN RECEPTOR POSITIVE (H): Primary | ICD-10-CM

## 2025-05-13 LAB
ALBUMIN SERPL BCG-MCNC: 4.8 G/DL (ref 3.5–5.2)
ALP SERPL-CCNC: 62 U/L (ref 40–150)
ALT SERPL W P-5'-P-CCNC: 29 U/L (ref 0–70)
ANION GAP SERPL CALCULATED.3IONS-SCNC: 15 MMOL/L (ref 7–15)
AST SERPL W P-5'-P-CCNC: 26 U/L (ref 0–45)
BASOPHILS # BLD AUTO: 0 10E3/UL (ref 0–0.2)
BASOPHILS NFR BLD AUTO: 1 %
BILIRUB SERPL-MCNC: 0.4 MG/DL
BUN SERPL-MCNC: 10.7 MG/DL (ref 8–23)
CALCIUM SERPL-MCNC: 10.2 MG/DL (ref 8.8–10.4)
CANCER AG15-3 SERPL-ACNC: 28 U/ML
CEA SERPL-MCNC: 3.3 NG/ML
CHLORIDE SERPL-SCNC: 102 MMOL/L (ref 98–107)
CREAT SERPL-MCNC: 0.66 MG/DL (ref 0.67–1.17)
EGFRCR SERPLBLD CKD-EPI 2021: >90 ML/MIN/1.73M2
EOSINOPHIL # BLD AUTO: 0.4 10E3/UL (ref 0–0.7)
EOSINOPHIL NFR BLD AUTO: 6 %
ERYTHROCYTE [DISTWIDTH] IN BLOOD BY AUTOMATED COUNT: 13.1 % (ref 10–15)
EST. AVERAGE GLUCOSE BLD GHB EST-MCNC: 160 MG/DL
FERRITIN SERPL-MCNC: 164 NG/ML (ref 31–409)
GLUCOSE SERPL-MCNC: 138 MG/DL (ref 70–99)
HBA1C MFR BLD: 7.2 %
HCO3 SERPL-SCNC: 22 MMOL/L (ref 22–29)
HCT VFR BLD AUTO: 38.6 % (ref 40–53)
HGB BLD-MCNC: 12.4 G/DL (ref 13.3–17.7)
IMM GRANULOCYTES # BLD: 0 10E3/UL
IMM GRANULOCYTES NFR BLD: 1 %
LYMPHOCYTES # BLD AUTO: 1.6 10E3/UL (ref 0.8–5.3)
LYMPHOCYTES NFR BLD AUTO: 26 %
MCH RBC QN AUTO: 26.3 PG (ref 26.5–33)
MCHC RBC AUTO-ENTMCNC: 32.1 G/DL (ref 31.5–36.5)
MCV RBC AUTO: 82 FL (ref 78–100)
MONOCYTES # BLD AUTO: 0.4 10E3/UL (ref 0–1.3)
MONOCYTES NFR BLD AUTO: 6 %
NEUTROPHILS # BLD AUTO: 3.9 10E3/UL (ref 1.6–8.3)
NEUTROPHILS NFR BLD AUTO: 62 %
NRBC # BLD AUTO: 0 10E3/UL
NRBC BLD AUTO-RTO: 0 /100
PLATELET # BLD AUTO: 240 10E3/UL (ref 150–450)
POTASSIUM SERPL-SCNC: 4 MMOL/L (ref 3.4–5.3)
PROT SERPL-MCNC: 8.1 G/DL (ref 6.4–8.3)
RBC # BLD AUTO: 4.71 10E6/UL (ref 4.4–5.9)
SODIUM SERPL-SCNC: 139 MMOL/L (ref 135–145)
WBC # BLD AUTO: 6.3 10E3/UL (ref 4–11)

## 2025-05-13 PROCEDURE — 96372 THER/PROPH/DIAG INJ SC/IM: CPT | Performed by: INTERNAL MEDICINE

## 2025-05-13 PROCEDURE — 86300 IMMUNOASSAY TUMOR CA 15-3: CPT | Performed by: INTERNAL MEDICINE

## 2025-05-13 PROCEDURE — 83036 HEMOGLOBIN GLYCOSYLATED A1C: CPT | Performed by: INTERNAL MEDICINE

## 2025-05-13 PROCEDURE — 99213 OFFICE O/P EST LOW 20 MIN: CPT | Performed by: INTERNAL MEDICINE

## 2025-05-13 PROCEDURE — 36415 COLL VENOUS BLD VENIPUNCTURE: CPT | Performed by: INTERNAL MEDICINE

## 2025-05-13 PROCEDURE — 80053 COMPREHEN METABOLIC PANEL: CPT | Performed by: INTERNAL MEDICINE

## 2025-05-13 PROCEDURE — 82378 CARCINOEMBRYONIC ANTIGEN: CPT | Performed by: INTERNAL MEDICINE

## 2025-05-13 PROCEDURE — 82728 ASSAY OF FERRITIN: CPT | Performed by: INTERNAL MEDICINE

## 2025-05-13 PROCEDURE — 250N000011 HC RX IP 250 OP 636: Mod: JZ | Performed by: INTERNAL MEDICINE

## 2025-05-13 PROCEDURE — 85004 AUTOMATED DIFF WBC COUNT: CPT | Performed by: INTERNAL MEDICINE

## 2025-05-13 RX ORDER — LANOLIN ALCOHOL/MO/W.PET/CERES
1000 CREAM (GRAM) TOPICAL DAILY
Qty: 90 TABLET | Refills: 3 | Status: SHIPPED | OUTPATIENT
Start: 2025-05-13

## 2025-05-13 RX ADMIN — LEUPROLIDE ACETATE 11.25 MG: KIT at 09:02

## 2025-05-13 ASSESSMENT — PAIN SCALES - GENERAL: PAINLEVEL_OUTOF10: NO PAIN (0)

## 2025-05-13 NOTE — Clinical Note
5/13/2025      Ronna Collins  [address]  [facility] [address]      Dear Colleague,    Thank you for referring your patient, Ronna Collins, to the [facility]. Please see a copy of my visit note below.    Oncology Visit:  Date on this visit: May 13, 2025    Diagnosis: h/o clinical prognostic stage IIIb, [id number], grade 2, ER positive, WY positive, HER-2 negative right breast cancer, [id number].    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  He noted discomfort and hardening of the skin of the right nipple in late April/early May, 2021.  He noted a seborrheic keratosis of the right nipple and remembered he had a similar skin lesion of the arm 3 months earlier.  However, he subsequently noted a mass in the same area.  Right breast skin punch biopsy performed by dermatology was c/w grade 2 invasive ductal carcinoma with associated DCIS.  Invasive carcinoma was ER positive 100% and WY positive 70%, with tumor invading the dermis.  HER-2 was negative by IHC (score 1+).  Diagnostic mammogram and ultrasound showed a retroareolar right breast mass measuring 2.9 cm with associated nipple retraction and enlarged, abnormal right axillary lymph nodes.  Right breast biopsy was again c/w grade 2 invasive ductal carcinoma, ER strong in 98% of tumor cell nuclei, HER-2 negative.  Ki-67 was 15%.    He elected to screen for the ISPY-2 clinical trial.  MRI breast on 6/18/2021 showed the biopsy proven right breast cancer to measure 3.9 cm in maximum dimension with invasion of the nipple and the pectoralis muscle as well as at least 4 abnormal level 1 and 2 right axillary lymph nodes and a tiny 0.4 cm right internal mammary lymph node.  Mammoprint returned low risk with a score of +0.29.  He elected for treatment on the endocrine optimization protocol of ISPY-2 and was randomized to treatment with  amcenestrant.  He was on treatment with  amcenestrant from 7/1/2021 - 10/26/2021.  At our visit in 01/2021, both right axillary node and right breast tumor palpated more firm then prior.  Breast MRI was stable, however, due to clinical concerns for progression, decision was made to proceed with surgery.   Right breast mastectomy and right axillary lymph node dissection was performed by Dr. Snider on 10/27/2021.  Pathology showed grade 2 carcinoma of the right breast measuring 3.2 cm with invasion of the dermis, nipple and the pectoralis muscle.  Ki-67 of the excised tumor was 7%.  15/44 right axillary lymph nodes were positive with 6 of the lymph nodes demonstrating extranodal extension.  The largest lymph node metastasis measured 2.1 cm.      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  His course was complicated by fevers persistent throughout treatment.  He received radiation (4800 cGy in 15 fractions) to the right chest wall and regional lymph nodes from 3/21/2022 - 4/8/2022.  He has been on treatment with lupron and letrozole since 3/16/2022.  He is s/p 2 years of adjuvant abemaciclib from 4/26/2022 - 04/2024.    Interval History:       Past Medical/Surgical History:  Past Medical History:   Diagnosis Date    Breast cancer (H) 05/2021    Chronic sinusitis     Hypertension 2002     Past Surgical History:   Procedure Laterality Date    COLONOSCOPY WITH CO2 INSUFFLATION N/A 10/28/2022    Procedure: COLONOSCOPY, WITH CO2 INSUFFLATION;  Surgeon: Annemarie Noel DO;  Location:  OR    COMBINED ESOPHAGOSCOPY, GASTROSCOPY, DUODENOSCOPY (EGD) WITH CO2 INSUFFLATION N/A 10/28/2022    Procedure: ESOPHAGOGASTRODUODENOSCOPY, WITH CO2 INSUFFLATION;  Surgeon: Annemarie Noel DO;  Location:  OR    DISSECT LYMPH NODE AXILLA Right 10/27/2021    Procedure: RIGHT Axillary Lymph Node Dissection;  Surgeon: Nida Snider MD;  Location:  OR    ESOPHAGOSCOPY, GASTROSCOPY,  DUODENOSCOPY (EGD), COMBINED N/A 10/28/2022    Procedure: ESOPHAGOGASTRODUODENOSCOPY, WITH BIOPSY;  Surgeon: Annemarie Noel DO;  Location: [facility] OR    MASTECTOMY SIMPLE Right 10/27/2021    Procedure: RIGHT Simple Mastectomy;  Surgeon: Nida Snider MD;  Location: [facility] OR     Allergies:  Allergies as of 05/13/2025    (No Known Allergies)     Current Medications:  Current Outpatient Medications   Medication Sig Dispense Refill    famotidine (PEPCID) 40 MG tablet Take 1 tablet (40 mg) by mouth daily. 90 tablet 2    hydrocortisone 2.5 % cream Apply topically 2 times daily. 30 g 3    hydrocortisone 2.5 % cream Twice daily to groin rash 30 g 11    letrozole (FEMARA) 2.5 MG tablet Take 1 tablet (2.5 mg) by mouth daily. 90 tablet 3    losartan-hydrochlorothiazide (HYZAAR) 100-25 MG tablet Take 1 tablet by mouth daily. 90 tablet 0    MELATONIN PO Take 3 mg by mouth nightly as needed.      metFORMIN (GLUCOPHAGE XR) 500 MG 24 hr tablet Take 2 tablets (1,000 mg) by mouth daily with food for 7 days, THEN 3 tablets (1,500 mg) daily with food for 7 days, THEN 4 tablets (2,000 mg) daily with food. 339 tablet 1      Genetics Breast Actionable and Breast Expanded panels were both negative for pathogenic germline mutations.    Physical Exam:    General:  Well appearing adult male in NAD.  Alert and oriented x 3.  HEENT:  Normocephalic.  Sclera anicteric.   Lymph:  No palpable cervical or axillary LAD.  Chest:  CTA bilaterally.  No wheezes or crackles.  CV:  RRR.   Breast:  Right mastectomy.  There is scar adherence upper outer chest wall.  No palpable masses or nodularity of the chest wall.  Abd:  Soft/ND/NT +BS   Ext:  No pitting edema of the bilateral lower extremities.   Musculo:  Limited abduction of the RUE.  Neuro:  Cranial nerves grossly intact.  Gait is stable.  No tremors or dyskinetic movements.  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  He is s/p treatment with 3.5 months neoadjuvant amcenestrant, right breast mastectomy, right ALND, 4 cycles Taxotere and cyclophosphamide, and radiation.  He completed 2 years of abemaciclib in 04/2024.  He continues on adjuvant curative intent treatment with lupron and letrozole.    1.  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  He has been on endocrine therapy since 03/2022 and continues on adjuvant curative intent treatment with lupron and letrozole at this time. He is tolerating this well. Plan to continue for a total of 10 years (through 3/16/2032).   - Given 15/44 positive lymph nodes, we are monitoring CA 15-3 tumor marker for surveillance.  Recommend checking this once every 3 months. Pending today.   - Is due for Lupron at this time, will administer today.  - Labs and return visit in 4 months.    2. Dry skin, pruritus: Improved with using moisturizer regularly.     3.  RUE lymphedema/cording/restricted ROM:  He is using lymphedema pump but will start wearing sleeve daily, stretching and massaging his arm, axilla, and R chest wall.     4.  Osteopenia of multiple sites:  DEXA 10/2022 with osteopenia of multiple sites.  Zometa initiated for prevention of breast cancer bone metastases.  Unfortunately this was poorly tolerated and therefore changed to Prolia on 4/17/2023.   - Prolia today.  - Recommend a repeat DEXA at this time.    5.  Anemia: Hgb was lower on verzenio but has not fully normalized.   - labs from 02/2025 were reviewed and c/w B12 deficiency.  Recommend starting 1000 mcg PO daily.  Can be purchased OTC.    6.  Diabetes:  Hemoglobin A1C on 6/12/2024 c/w diabetes.  Diabetes is a risk factor for breast cancer and patients with a  hemoglobin A1C >7% have worse breast cancer outcomes. Last Hgb A1c 02/2025 was elevated at 8.1%. Continue to follow with PCP for management.     Follow Up:  DEXA bone density scan within 1 month.  Labs, Prolia and Lupron, SANDRA visit in 3 months.  Labs, Prolia and Luporon, visit with me in 6 months.    Oncology Visit:  Date on this visit: May 13, 2025    Diagnosis: h/o clinical prognostic stage IIIb, [id number], grade 2, ER positive, OK positive, HER-2 negative right breast cancer, [id number].    Primary Physician: Stewart Henry     History Of Present Illness:  Jamie Karina is a 74 year old male with right breast cancer.  He noted discomfort and hardening of the skin of the right nipple in late April/early May, 2021.  He noted a seborrheic keratosis of the right nipple and remembered he had a similar skin lesion of the arm 3 months earlier.  However, he subsequently noted a mass in the same area.  Right breast skin punch biopsy performed by dermatology was c/w grade 2 invasive ductal carcinoma with associated DCIS.  Invasive carcinoma was ER positive 100% and OK positive 70%, with tumor invading the dermis.  HER-2 was negative by IHC (score 1+).  Diagnostic mammogram and ultrasound showed a retroareolar right breast mass measuring 2.9 cm with associated nipple retraction and enlarged, abnormal right axillary lymph nodes.  Right breast biopsy was again c/w grade 2 invasive ductal carcinoma, ER strong in 98% of tumor cell nuclei, HER-2 negative.  Ki-67 was 15%.    He elected to screen for the ISPY-2 clinical trial.  MRI breast on 6/18/2021 showed the biopsy proven right breast cancer to measure 3.9 cm in maximum dimension with invasion of the nipple and the pectoralis muscle as well as at least 4 abnormal level 1 and 2 right axillary lymph nodes and a tiny 0.4 cm right internal mammary lymph node.  Mammoprint returned low risk with a score of +0.29.  He elected for treatment on the endocrine optimization protocol of  ISPY-2 and was randomized to treatment with amcenestrant.  He was on treatment with  amcenestrant from 7/1/2021 - 10/26/2021.  At our visit in 01/2021, both right axillary node and right breast tumor palpated more firm then prior.  Breast MRI was stable, however, due to clinical concerns for progression, decision was made to proceed with surgery.   Right breast mastectomy and right axillary lymph node dissection was performed by Dr. Snider on 10/27/2021.  Pathology showed grade 2 carcinoma of the right breast measuring 3.2 cm with invasion of the dermis, nipple and the pectoralis muscle.  Ki-67 of the excised tumor was 7%.  15/44 right axillary lymph nodes were positive with 6 of the lymph nodes demonstrating extranodal extension.  The largest lymph node metastasis measured 2.1 cm.      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  His course was complicated by fevers persistent throughout treatment.  He received radiation (4800 cGy in 15 fractions) to the right chest wall and regional lymph nodes from 3/21/2022 - 4/8/2022.  He has been on treatment with lupron and letrozole since 3/16/2022.  He is s/p 2 years of adjuvant abemaciclib from 4/26/2022 - 04/2024.    Interval History:       Past Medical/Surgical History:  Past Medical History:   Diagnosis Date     Breast cancer (H) 05/2021     Chronic sinusitis      Hypertension 2002     Past Surgical History:   Procedure Laterality Date     COLONOSCOPY WITH CO2 INSUFFLATION N/A 10/28/2022    Procedure: COLONOSCOPY, WITH CO2 INSUFFLATION;  Surgeon: Annemarie Noel DO;  Location:  OR     COMBINED ESOPHAGOSCOPY, GASTROSCOPY, DUODENOSCOPY (EGD) WITH CO2 INSUFFLATION N/A 10/28/2022    Procedure: ESOPHAGOGASTRODUODENOSCOPY, WITH CO2 INSUFFLATION;  Surgeon: Annemarie Noel DO;  Location:  OR     DISSECT LYMPH NODE AXILLA Right 10/27/2021    Procedure: RIGHT Axillary Lymph Node Dissection;  Surgeon: Nida Snider MD;   Location: [facility] OR     ESOPHAGOSCOPY, GASTROSCOPY, DUODENOSCOPY (EGD), COMBINED N/A 10/28/2022    Procedure: ESOPHAGOGASTRODUODENOSCOPY, WITH BIOPSY;  Surgeon: Annemarie Noel DO;  Location: [facility] OR     MASTECTOMY SIMPLE Right 10/27/2021    Procedure: RIGHT Simple Mastectomy;  Surgeon: Nida Snider MD;  Location: [facility] OR     Allergies:  Allergies as of 05/13/2025     (No Known Allergies)     Current Medications:  Current Outpatient Medications   Medication Sig Dispense Refill     famotidine (PEPCID) 40 MG tablet Take 1 tablet (40 mg) by mouth daily. 90 tablet 2     hydrocortisone 2.5 % cream Apply topically 2 times daily. 30 g 3     hydrocortisone 2.5 % cream Twice daily to groin rash 30 g 11     letrozole (FEMARA) 2.5 MG tablet Take 1 tablet (2.5 mg) by mouth daily. 90 tablet 3     losartan-hydrochlorothiazide (HYZAAR) 100-25 MG tablet Take 1 tablet by mouth daily. 90 tablet 0     MELATONIN PO Take 3 mg by mouth nightly as needed.       metFORMIN (GLUCOPHAGE XR) 500 MG 24 hr tablet Take 2 tablets (1,000 mg) by mouth daily with food for 7 days, THEN 3 tablets (1,500 mg) daily with food for 7 days, THEN 4 tablets (2,000 mg) daily with food. 339 tablet 1      Genetics Breast Actionable and Breast Expanded panels were both negative for pathogenic germline mutations.    Physical Exam:    General:  Well appearing adult male in NAD.  Alert and oriented x 3.  HEENT:  Normocephalic.  Sclera anicteric.   Lymph:  No palpable cervical or axillary LAD.  Chest:  CTA bilaterally.  No wheezes or crackles.  CV:  RRR.   Breast:  Right mastectomy.  There is scar adherence upper outer chest wall.  No palpable masses or nodularity of the chest wall.  Abd:  Soft/ND/NT +BS   Ext:  No pitting edema of the bilateral lower extremities.   Musculo:  Limited abduction of the RUE.  Neuro:  Cranial nerves grossly intact.  Gait is stable.  No tremors or dyskinetic movements.  Psych:  Mood and affect appear normal    Laboratory/Imaging  Studies:  I personally reviewed the below laboratory studies:    5/12/2025 Labs:   Latest Reference Range & Units 05/13/25 08:14   WBC 4.0 - 11.0 10e3/uL 6.3   Hemoglobin 13.3 - 17.7 g/dL 12.4 (L)   Hematocrit 40.0 - 53.0 % 38.6 (L)   Platelet Count 150 - 450 10e3/uL 240   (L): Data is abnormally low   Latest Reference Range & Units 05/13/25 08:14   Absolute Neutrophil 1.6 - 8.3 10e3/uL 3.9   Absolute Lymphocytes 0.8 - 5.3 10e3/uL 1.6   Absolute Monocytes 0.0 - 1.3 10e3/uL 0.4   Absolute Eosinophils 0.0 - 0.7 10e3/uL 0.4   Absolute Basophils 0.0 - 0.2 10e3/uL 0.0   Absolute Immature Granulocytes <=0.4 10e3/uL 0.0   Absolute NRBCs 10e3/uL 0.0     ASSESSMENT/PLAN:  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  He is s/p treatment with 3.5 months neoadjuvant amcenestrant, right breast mastectomy, right ALND, 4 cycles Taxotere and cyclophosphamide, and radiation.  He completed 2 years of abemaciclib in 04/2024.  He continues on adjuvant curative intent treatment with lupron and letrozole.    1.  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  He has been on endocrine therapy since 03/2022 and continues on adjuvant curative intent treatment with lupron and letrozole at this time. He is tolerating this well. Plan to continue for a total of 10 years (through 3/16/2032).   - Given 15/44 positive lymph nodes, we are monitoring CA 15-3 tumor marker for surveillance.  Recommend checking this once every 3 months. Pending today.   - Is due for Lupron at this time, will administer today.  - Labs and return visit in 4 months.    2. Dry skin, pruritus: Improved with using moisturizer regularly.     3.  RUE lymphedema/cording/restricted ROM:  He is using lymphedema pump but will start wearing sleeve daily, stretching and massaging his arm, axilla, and R chest wall.      4.  Osteopenia of multiple sites:  DEXA 10/2022 with osteopenia of multiple sites.  Zometa initiated for prevention of breast cancer bone metastases.  Unfortunately this was poorly tolerated and therefore changed to Prolia on 4/17/2023.   - Prolia today.  - Recommend a repeat DEXA at this time.    5.  Anemia: Hgb was lower on verzenio but has not fully normalized.   - labs from 02/2025 were reviewed and c/w B12 deficiency.  Recommend starting 1000 mcg PO daily.  Can be purchased OTC.    6.  Diabetes:  Hemoglobin A1C on 6/12/2024 c/w diabetes.  Diabetes is a risk factor for breast cancer and patients with a hemoglobin A1C >7% have worse breast cancer outcomes. Last Hgb A1c 02/2025 was elevated at 8.1%. Continue to follow with PCP for management.     Follow Up:  DEXA bone density scan within 1 month.  Labs, Prolia and Lupron, SANDRA visit in 3 months.  Labs, Prolia and Luporon, visit with me in 6 months.      Again, thank you for allowing me to participate in the care of your patient.        Sincerely,        Mikaela Sr MD    Electronically signed

## 2025-05-13 NOTE — NURSING NOTE
"Oncology Rooming Note    May 13, 2025 8:20 AM   Ronna Collins is a 74 year old male who presents for:    Chief Complaint   Patient presents with    Blood Draw     Vpt blood draw by ultrasound    Oncology Clinic Visit     Malignant neoplasm of central portion of right breast in male, estrogen receptor positive      Initial Vitals: BP (!) 152/92   Pulse 77   Temp 97.4  F (36.3  C) (Oral)   Resp 16   Wt 78.8 kg (173 lb 11.2 oz)   SpO2 98%   BMI 26.42 kg/m   Estimated body mass index is 26.42 kg/m  as calculated from the following:    Height as of 6/1/23: 1.727 m (5' 7.99\").    Weight as of this encounter: 78.8 kg (173 lb 11.2 oz). Body surface area is 1.94 meters squared.  No Pain (0) Comment: Data Unavailable   No LMP for male patient.  Allergies reviewed: Yes  Medications reviewed: Yes    Medications: Medication refills not needed today.  Pharmacy name entered into [facility]:    [id number] - [place], [place] - [address] AT [facility] - [place], [place] - [address]   [id number] - [place], [place] - [address] AT [facility] & [address] [facility]    Frailty Screening:   Is the patient here for a new oncology consult visit in cancer care? 2. No    PHQ9:  Did this patient require a PHQ9?: No      Clinical concerns: none       Dennise Juan            "

## 2025-05-13 NOTE — NURSING NOTE
Chief Complaint   Patient presents with    Blood Draw     Vpt blood draw by ultrasound     Venipuncture labs drawn by ultrasound, vitals taken and appointment arrived.    Татьяна Colbert RN

## 2025-05-13 NOTE — NURSING NOTE
Administrations This Visit       leuprolide (LUPRON DEPOT) kit 11.25 mg       Admin Date  05/13/2025 Action  $Given Dose  11.25 mg Route  Intramuscular Documented By  Hortencia Taylor, RN                Lupron administered to R ventrogluteal muscle without incident.    Hortencia Taylor, RN

## 2025-05-14 ENCOUNTER — RESULTS FOLLOW-UP (OUTPATIENT)
Dept: PHARMACY | Facility: CLINIC | Age: 75
End: 2025-05-14
Payer: COMMERCIAL

## 2025-05-21 ENCOUNTER — HOSPITAL ENCOUNTER (OUTPATIENT)
Dept: MRI IMAGING | Facility: CLINIC | Age: 75
Discharge: HOME | End: 2025-05-21
Attending: INTERNAL MEDICINE
Payer: COMMERCIAL

## 2025-05-21 ENCOUNTER — RESULTS FOLLOW-UP (OUTPATIENT)
Dept: SURGERY | Facility: CLINIC | Age: 75
End: 2025-05-21

## 2025-05-21 ENCOUNTER — OFFICE VISIT (OUTPATIENT)
Dept: DERMATOLOGY | Facility: CLINIC | Age: 75
End: 2025-05-21
Attending: DERMATOLOGY
Payer: COMMERCIAL

## 2025-05-21 DIAGNOSIS — L30.9 FACIAL DERMATITIS: Primary | ICD-10-CM

## 2025-05-21 DIAGNOSIS — M25.612 DECREASED RANGE OF MOTION OF LEFT SHOULDER: ICD-10-CM

## 2025-05-21 DIAGNOSIS — M25.512 CHRONIC LEFT SHOULDER PAIN: ICD-10-CM

## 2025-05-21 DIAGNOSIS — G89.29 CHRONIC LEFT SHOULDER PAIN: ICD-10-CM

## 2025-05-21 PROCEDURE — A9585 GADOBUTROL INJECTION: HCPCS | Performed by: INTERNAL MEDICINE

## 2025-05-21 PROCEDURE — 255N000002 HC RX 255 OP 636: Performed by: INTERNAL MEDICINE

## 2025-05-21 PROCEDURE — 73223 MRI JOINT UPR EXTR W/O&W/DYE: CPT | Mod: LT

## 2025-05-21 RX ORDER — GADOBUTROL 604.72 MG/ML
0.1 INJECTION INTRAVENOUS ONCE
Status: COMPLETED | OUTPATIENT
Start: 2025-05-21 | End: 2025-05-21

## 2025-05-21 RX ADMIN — GADOBUTROL 8 ML: 604.72 INJECTION INTRAVENOUS at 14:19

## 2025-05-21 ASSESSMENT — PAIN SCALES - GENERAL: PAINLEVEL_OUTOF10: NO PAIN (0)

## 2025-05-21 NOTE — PROGRESS NOTES
[facility] Dermatology Note  Encounter Date: May 21, 2025  Office Visit     Dermatology Problem List:  # Invasive ductal adenocarcinoma, right breast, ER+, IL+, HER2-  - skin bx 6/2021 (Dr. Yanez, [facility])   - s/p right mastectomy, chemoradiation Taxol x 22, XRT 3/31/22, Abraxone x 8, SLNbx 6/2021  - abemaciclib, letrozole, leuprolide  - following with oncology ([facility])  # Nummular dermatitis, previously with impetiginization  - current: triam ointment   - prior: mometasone 0.1 oint  - bacterial cx 6/21/19 [facility]  # Atopic dermatitis with chronic sinusitis and cat/dog allergy on prick testing 6/21/19   # Benign bx  - VK, R tricep bx 1/14/21  # Facial dermatitis  - hydrocortisone 2.5, gentle skin cares    ____________________________________________    Assessment & Plan:     # Facial dermatitis- much improved but stopping aftershave, switching to an electric razor and using a daily emollient.  Only rarely needs to use the hydrocortisone 2.5% cream.  Continue with present plan.    # Baseline xerosis and atopy- still has dry back with itch and admits could be better with emollients.  Does have a back applicator to apply emollients.  Encouraged use as needed.        Follow-up: 1 year(s) in-person, or earlier for new or changing lesions    Staff:     Nati Church MD  ____________________________________________    CC: Derm Problem (Facial dermatitis follow up; Pt reports that dermatitis has cleared.)    HPI:  Mr. Ronna Collins is a(n) 74 year old male who presents today as a return patient for a facial rash which is clear today.  He made lifestyle changes and is much better.  Does still have dry back and itch    Patient is otherwise feeling well, without additional skin concerns.     Labs Reviewed:  N/A    Physical Exam:  Vitals: There were no vitals taken for this visit.  SKIN: Focused examination of face was performed.  - no active rash   - No other  lesions of concern on areas examined.     Medications:  Current Outpatient Medications   Medication Sig Dispense Refill    cyanocobalamin (VITAMIN B-12) 1000 MCG tablet Take 1 tablet (1,000 mcg) by mouth daily. 90 tablet 3    famotidine (PEPCID) 40 MG tablet Take 1 tablet (40 mg) by mouth daily. 90 tablet 2    hydrocortisone 2.5 % cream Apply topically 2 times daily. 30 g 3    hydrocortisone 2.5 % cream Twice daily to groin rash 30 g 11    losartan-hydrochlorothiazide (HYZAAR) 100-25 MG tablet Take 1 tablet by mouth daily. 90 tablet 0    MELATONIN PO Take 3 mg by mouth nightly as needed.      metFORMIN (GLUCOPHAGE XR) 500 MG 24 hr tablet Take 2 tablets (1,000 mg) by mouth daily with food for 7 days, THEN 3 tablets (1,500 mg) daily with food for 7 days, THEN 4 tablets (2,000 mg) daily with food. 339 tablet 1     No current facility-administered medications for this visit.     Facility-Administered Medications Ordered in Other Visits   Medication Dose Route Frequency Provider Last Rate Last Admin    leuprolide (LUPRON DEPOT) kit 11.25 mg  11.25 mg Intramuscular Q90 Days Mikaela Sr MD   11.25 mg at 05/13/25 0902      Past Medical History:   Patient Active Problem List   Diagnosis    High triglycerides    HTN (hypertension)    Malignant neoplasm of male breast (H)    Lumbar radiculopathy    Long term (current) use of aromatase inhibitors    Disorder of bone and cartilage    Medication side effects    Diabetes mellitus, type 2 (H)    Lymphedema of right upper extremity     Past Medical History:   Diagnosis Date    Breast cancer (H) 05/2021    Chronic sinusitis     Hypertension 2002        Natistephanie Church MD  [address]  [place] [address] on close of this encounter.

## 2025-05-21 NOTE — LETTER
5/21/2025       RE: Ronna Collins  [address]  [facility] [address]     Dear Colleague,    Thank you for referring your patient, Ronna Collins, to the [facility] DERMATOLOGY [facility] [place] at [facility]. Please see a copy of my visit note below.    [facility] Dermatology Note  Encounter Date: May 21, 2025  Office Visit     Dermatology Problem List:  # Invasive ductal adenocarcinoma, right breast, ER+, PA+, HER2-  - skin bx 6/2021 (Dr. Yanez, [facility])   - s/p right mastectomy, chemoradiation Taxol x 22, XRT 3/31/22, Abraxone x 8, SLNbx 6/2021  - abemaciclib, letrozole, leuprolide  - following with oncology ([facility])  # Nummular dermatitis, previously with impetiginization  - current: triam ointment   - prior: mometasone 0.1 oint  - bacterial cx 6/21/19 [facility]  # Atopic dermatitis with chronic sinusitis and cat/dog allergy on prick testing 6/21/19   # Benign bx  - VK, R tricep bx 1/14/21  # Facial dermatitis  - hydrocortisone 2.5, gentle skin cares    ____________________________________________    Assessment & Plan:     # Facial dermatitis- much improved but stopping aftershave, switching to an electric razor and using a daily emollient.  Only rarely needs to use the hydrocortisone 2.5% cream.  Continue with present plan.    # Baseline xerosis and atopy- still has dry back with itch and admits could be better with emollients.  Does have a back applicator to apply emollients.  Encouraged use as needed.        Follow-up: 1 year(s) in-person, or earlier for new or changing lesions    Staff:     Nati Church MD  ____________________________________________    CC: Derm Problem (Facial dermatitis follow up; Pt reports that dermatitis has cleared.)    HPI:  Mr. Ronna Collins is a(n) 74 year old male who presents today as a return patient for a facial rash which is clear today.  He made  lifestyle changes and is much better.  Does still have dry back and itch    Patient is otherwise feeling well, without additional skin concerns.     Labs Reviewed:  N/A    Physical Exam:  Vitals: There were no vitals taken for this visit.  SKIN: Focused examination of face was performed.  - no active rash   - No other lesions of concern on areas examined.     Medications:  Current Outpatient Medications   Medication Sig Dispense Refill     cyanocobalamin (VITAMIN B-12) 1000 MCG tablet Take 1 tablet (1,000 mcg) by mouth daily. 90 tablet 3     famotidine (PEPCID) 40 MG tablet Take 1 tablet (40 mg) by mouth daily. 90 tablet 2     hydrocortisone 2.5 % cream Apply topically 2 times daily. 30 g 3     hydrocortisone 2.5 % cream Twice daily to groin rash 30 g 11     losartan-hydrochlorothiazide (HYZAAR) 100-25 MG tablet Take 1 tablet by mouth daily. 90 tablet 0     MELATONIN PO Take 3 mg by mouth nightly as needed.       metFORMIN (GLUCOPHAGE XR) 500 MG 24 hr tablet Take 2 tablets (1,000 mg) by mouth daily with food for 7 days, THEN 3 tablets (1,500 mg) daily with food for 7 days, THEN 4 tablets (2,000 mg) daily with food. 339 tablet 1     No current facility-administered medications for this visit.     Facility-Administered Medications Ordered in Other Visits   Medication Dose Route Frequency Provider Last Rate Last Admin     leuprolide (LUPRON DEPOT) kit 11.25 mg  11.25 mg Intramuscular Q90 Days Mikaela Sr MD   11.25 mg at 05/13/25 0902      Past Medical History:   Patient Active Problem List   Diagnosis     High triglycerides     HTN (hypertension)     Malignant neoplasm of male breast (H)     Lumbar radiculopathy     Long term (current) use of aromatase inhibitors     Disorder of bone and cartilage     Medication side effects     Diabetes mellitus, type 2 (H)     Lymphedema of right upper extremity     Past Medical History:   Diagnosis Date     Breast cancer (H) 05/2021     Chronic sinusitis       Hypertension 2002       CC Nati Church MD  [address]  [place] [address] on close of this encounter.     Again, thank you for allowing me to participate in the care of your patient.      Sincerely,    Nati Church MD

## 2025-05-21 NOTE — NURSING NOTE
Dermatology Rooming Note    Ronna Collins's goals for this visit include:   Chief Complaint   Patient presents with    Derm Problem     Facial dermatitis follow up; Pt reports that dermatitis has cleared.     CODY Celaya

## 2025-05-22 ENCOUNTER — TELEPHONE (OUTPATIENT)
Dept: DERMATOLOGY | Facility: CLINIC | Age: 75
End: 2025-05-22
Payer: COMMERCIAL

## 2025-05-22 DIAGNOSIS — M75.112 NONTRAUMATIC INCOMPLETE TEAR OF LEFT ROTATOR CUFF: Primary | ICD-10-CM

## 2025-05-22 NOTE — TELEPHONE ENCOUNTER
Sent Sensipass (1st Attempt) and Patient Contacted for the patient to call back and schedule the following:    Appointment type: Return dermatology  Provider: Dr. Nati Church  Return date: Approx. 5/21/26  Specialty phone number: [phone]    Additional Notes: Patient was unable to schedule. He will return the call to schedule when he's able.

## 2025-05-26 ENCOUNTER — PATIENT OUTREACH (OUTPATIENT)
Dept: CARE COORDINATION | Facility: CLINIC | Age: 75
End: 2025-05-26
Payer: COMMERCIAL

## 2025-06-16 ENCOUNTER — OFFICE VISIT (OUTPATIENT)
Dept: ORTHOPEDICS | Facility: CLINIC | Age: 75
End: 2025-06-16
Attending: INTERNAL MEDICINE
Payer: COMMERCIAL

## 2025-06-16 VITALS — WEIGHT: 175 LBS | BODY MASS INDEX: 26.62 KG/M2

## 2025-06-16 DIAGNOSIS — M75.02 ADHESIVE CAPSULITIS OF LEFT SHOULDER: Primary | ICD-10-CM

## 2025-06-16 DIAGNOSIS — M75.112 NONTRAUMATIC INCOMPLETE TEAR OF LEFT ROTATOR CUFF: ICD-10-CM

## 2025-06-16 PROCEDURE — 20610 DRAIN/INJ JOINT/BURSA W/O US: CPT | Mod: LT | Performed by: ORTHOPAEDIC SURGERY

## 2025-06-16 PROCEDURE — 99244 OFF/OP CNSLTJ NEW/EST MOD 40: CPT | Mod: 25 | Performed by: ORTHOPAEDIC SURGERY

## 2025-06-16 RX ORDER — LIDOCAINE HYDROCHLORIDE 10 MG/ML
5 INJECTION, SOLUTION INFILTRATION; PERINEURAL
Status: COMPLETED | OUTPATIENT
Start: 2025-06-16 | End: 2025-06-16

## 2025-06-16 RX ORDER — TRIAMCINOLONE ACETONIDE 40 MG/ML
40 INJECTION, SUSPENSION INTRA-ARTICULAR; INTRAMUSCULAR
Status: COMPLETED | OUTPATIENT
Start: 2025-06-16 | End: 2025-06-16

## 2025-06-16 RX ADMIN — TRIAMCINOLONE ACETONIDE 40 MG: 40 INJECTION, SUSPENSION INTRA-ARTICULAR; INTRAMUSCULAR at 15:06

## 2025-06-16 RX ADMIN — LIDOCAINE HYDROCHLORIDE 5 ML: 10 INJECTION, SOLUTION INFILTRATION; PERINEURAL at 15:06

## 2025-06-16 NOTE — PROGRESS NOTES
Large Joint Injection/Arthocentesis: L subacromial bursa    Date/Time: 6/16/2025 3:06 PM    Performed by: Harjit Lua MD  Authorized by: Harjit Lua MD    Indications:  Pain  Needle Size:  22 G  Guidance: landmark guided    Approach:  Posterolateral  Location:  Shoulder      Site:  L subacromial bursa  Medications:  40 mg triamcinolone 40 MG/ML; 5 mL lidocaine 1 %  Outcome:  Tolerated well, no immediate complications  Procedure discussed: discussed risks, benefits, and alternatives    Consent Given by:  Patient  Timeout: timeout called immediately prior to procedure    Prep: patient was prepped and draped in usual sterile fashion

## 2025-06-16 NOTE — LETTER
6/16/2025      Ronna Collins  [address]  [facility] [address]      Dear Colleague,    Thank you for referring your patient, Ronna Collins, to the [facility]. Please see a copy of my visit note below.    Large Joint Injection/Arthocentesis: L subacromial bursa    Date/Time: 6/16/2025 3:06 PM    Performed by: Harjit Lua MD  Authorized by: Harjit Lua MD    Indications:  Pain  Needle Size:  22 G  Guidance: landmark guided    Approach:  Posterolateral  Location:  Shoulder      Site:  L subacromial bursa  Medications:  40 mg triamcinolone 40 MG/ML; 5 mL lidocaine 1 %  Outcome:  Tolerated well, no immediate complications  Procedure discussed: discussed risks, benefits, and alternatives    Consent Given by:  Patient  Timeout: timeout called immediately prior to procedure    Prep: patient was prepped and draped in usual sterile fashion          Ronna Collins is a 74 year old male who is seen in consultation at the request of Dr. Sr for left shoulder pain.  He has had this for about 4 months without any definite history of injury.  He has had history of right breast cancer with excision and x-ray node dissection.  Because of this he has used the right arm less in the left arm more.  He thinks it may be overuse of the left arm.  He reports dull pain rated 2 out of 10 primarily at the shoulder itself.  He does not report radiation up in the neck or down past the elbow.  He reports no numbness of the arm.  He has difficulty with sleeping.  He is right-hand dominant.    MRI scan has been performed on 5/21/2025 showing a low-grade intrasubstance tear of the supraspinatus.  There was tendinosis of the infraspinatus and a intrasubstance tear of the subscapularis as well.  The biceps showed tendinopathy and fraying, but no complete tear.  There is no dislocation of the biceps.  There is no glenohumeral arthritis noted by the MRI scan.    Past Medical  History:   Diagnosis Date     Breast cancer (H) 2021     Chronic sinusitis      Hypertension        Past Surgical History:   Procedure Laterality Date     COLONOSCOPY WITH CO2 INSUFFLATION N/A 10/28/2022    Procedure: COLONOSCOPY, WITH CO2 INSUFFLATION;  Surgeon: Annemarie Noel DO;  Location: [facility]     COMBINED ESOPHAGOSCOPY, GASTROSCOPY, DUODENOSCOPY (EGD) WITH CO2 INSUFFLATION N/A 10/28/2022    Procedure: ESOPHAGOGASTRODUODENOSCOPY, WITH CO2 INSUFFLATION;  Surgeon: Annemarie Noel DO;  Location: [facility]     DISSECT LYMPH NODE AXILLA Right 10/27/2021    Procedure: RIGHT Axillary Lymph Node Dissection;  Surgeon: Nida Snider MD;  Location: [facility]     ESOPHAGOSCOPY, GASTROSCOPY, DUODENOSCOPY (EGD), COMBINED N/A 10/28/2022    Procedure: ESOPHAGOGASTRODUODENOSCOPY, WITH BIOPSY;  Surgeon: Annemarie Noel DO;  Location: [facility]     MASTECTOMY SIMPLE Right 10/27/2021    Procedure: RIGHT Simple Mastectomy;  Surgeon: Nida Snider MD;  Location: [facility] OR       Family History   Problem Relation Age of Onset     Diabetes Brother      Hypertension Brother      Cerebrovascular Disease Brother      Hypertension Brother      Bladder Cancer Brother      Other Cancer Brother         Bladder     Anesthesia Reaction No family hx of      Deep Vein Thrombosis (DVT) No family hx of      Melanoma No family hx of      Skin Cancer No family hx of        Social History     Socioeconomic History     Marital status:      Spouse name: Melanie     Number of children: 2     Years of education: Not on file     Highest education level: Not on file   Occupational History     Not on file   Tobacco Use     Smoking status: Former     Current packs/day: 0.00     Average packs/day: 0.5 packs/day for 24.7 years (12.3 ttl pk-yrs)     Types: Cigarettes     Start date: 1976     Quit date: 2000     Years since quittin.8     Passive exposure: Never     Smokeless tobacco: Never   Substance and Sexual Activity      Alcohol use: Yes     Comment: occ     Drug use: No     Sexual activity: Yes     Partners: Female     Birth control/protection: None   Other Topics Concern     Not on file   Social History Narrative     Not on file     Social Drivers of Health     Financial Resource Strain: Low Risk  (6/1/2024)    Financial Resource Strain      Within the past 12 months, have you or your family members you live with been unable to get utilities (heat, electricity) when it was really needed?: No   Food Insecurity: Low Risk  (6/1/2024)    Food Insecurity      Within the past 12 months, did you worry that your food would run out before you got money to buy more?: No      Within the past 12 months, did the food you bought just not last and you didn t have money to get more?: No   Transportation Needs: Low Risk  (6/1/2024)    Transportation Needs      Within the past 12 months, has lack of transportation kept you from medical appointments, getting your medicines, non-medical meetings or appointments, work, or from getting things that you need?: No   Physical Activity: Sufficiently Active (6/1/2024)    Exercise Vital Sign      Days of Exercise per Week: 4 days      Minutes of Exercise per Session: 40 min   Stress: No Stress Concern Present (6/1/2024)    [employer] [place] [employer] - Occupational Stress Questionnaire      Feeling of Stress : Not at all   Social Connections: Unknown (6/1/2024)    Social Connection and Isolation Panel [NHANES]      Frequency of Communication with Friends and Family: Not on file      Frequency of Social Gatherings with Friends and Family: Once a week      Attends Zoroastrianism Services: Not on file      Active Member of Clubs or Organizations: Not on file      Attends Club or Organization Meetings: Not on file      Marital Status: Not on file   Interpersonal Safety: Not At Risk (6/15/2022)    Humiliation, Afraid, Rape, and Kick questionnaire      Fear of Current or Ex-Partner: No      Emotionally  Abused: No      Physically Abused: No      Sexually Abused: No   Housing Stability: Low Risk  (6/1/2024)    Housing Stability      Do you have housing? : Yes      Are you worried about losing your housing?: No       Current Outpatient Medications   Medication Sig Dispense Refill     cyanocobalamin (VITAMIN B-12) 1000 MCG tablet Take 1 tablet (1,000 mcg) by mouth daily. 90 tablet 3     famotidine (PEPCID) 40 MG tablet Take 1 tablet (40 mg) by mouth daily. 90 tablet 2     hydrocortisone 2.5 % cream Apply topically 2 times daily. 30 g 3     hydrocortisone 2.5 % cream Twice daily to groin rash 30 g 11     losartan-hydrochlorothiazide (HYZAAR) 100-25 MG tablet Take 1 tablet by mouth daily. 90 tablet 0     MELATONIN PO Take 3 mg by mouth nightly as needed.       metFORMIN (GLUCOPHAGE XR) 500 MG 24 hr tablet Take 2 tablets (1,000 mg) by mouth daily with food for 7 days, THEN 3 tablets (1,500 mg) daily with food for 7 days, THEN 4 tablets (2,000 mg) daily with food. 339 tablet 1       No Known Allergies    REVIEW OF SYSTEMS:  CONSTITUTIONAL:  NEGATIVE for fever, chills, change in weight, not feeling tired  SKIN:  NEGATIVE for worrisome rashes, no skin lumps, no skin ulcers and no non-healing wounds  EYES:  NEGATIVE for vision changes or irritation.  ENT/MOUTH:  NEGATIVE.  No hearing loss, no hoarseness, no difficulty swallowing.  RESP:  NEGATIVE. No cough or shortness of breath.  CV:  NEGATIVE for chest pain, palpitations or peripheral edema  GI:  NEGATIVE for nausea, abdominal pain, heartburn, or change in bowel habits  :  Negative. No dysuria, no hematuria  MUSCULOSKELETAL:  See HPI above  NEURO:  NEGATIVE . No headaches, no dizziness,  no numbness  ENDOCRINE:  NEGATIVE for temperature intolerance, skin/hair changes  HEME/ALLERGY/IMMUNE:  NEGATIVE for bleeding problems  PSYCHIATRIC:  NEGATIVE. no anxiety, no depression.     Exam:  Vitals: Wt 79.4 kg (175 lb)   BMI 26.62 kg/m    BMI= Body mass index is 26.62  kg/m .  Constitutional:  healthy, alert and no distress  Neuro: Alert and Oriented x 3, no focal defects.  Psych: Affect normal   Respiratory: Breathing not labored.  Cardiovascular: normal peripheral pulses  Lymph: no adenopathy  Skin: No rashes,worrisome lesions or skin problems  He has decreased motion of the left shoulder.  He has flexion to about 100 degrees passively, external rotation to about 45 degrees.  He had no pain with resisted, abduction, blocking test, empty can test.  He had no definite tenderness in the subacromial space, AC joint, or trapezius.  He had good mobility of the neck without symptoms.    Assessment:  1. Left shoulder adhesive capsulitis.  2.  Incomplete left rotator cuff tear.  This is not a large enough tear to be significant, and he has no symptoms of rotator cuff pain.    Plan: Right now the frozen shoulder is dominating the picture.  We will try to overcome this with steroid injection in the subacromial bursa and physical therapy.  Return to clinic in 1 month for clinical recheck and to see if his motion is better and pain is better.  If he has not made progress we will consider intra-articular injection.    Patient desires injection today of left shoulder(s).  Risks, benefits, potential complications and alternatives were discussed.  With the patient's consent, sterile prep was performed of left shoulder(s).  Left shoulder was injected with Kenalog 40 mg and lidocaine at shoulder subacromial space from the posterolateral approach .  Return to clinic as needed.         Again, thank you for allowing me to participate in the care of your patient.        Sincerely,        Harjit Lua MD    Electronically signed

## 2025-06-17 NOTE — PROGRESS NOTES
Ronna Collins is a 74 year old male who is seen in consultation at the request of Dr. Sr for left shoulder pain.  He has had this for about 4 months without any definite history of injury.  He has had history of right breast cancer with excision and x-ray node dissection.  Because of this he has used the right arm less in the left arm more.  He thinks it may be overuse of the left arm.  He reports dull pain rated 2 out of 10 primarily at the shoulder itself.  He does not report radiation up in the neck or down past the elbow.  He reports no numbness of the arm.  He has difficulty with sleeping.  He is right-hand dominant.    MRI scan has been performed on 5/21/2025 showing a low-grade intrasubstance tear of the supraspinatus.  There was tendinosis of the infraspinatus and a intrasubstance tear of the subscapularis as well.  The biceps showed tendinopathy and fraying, but no complete tear.  There is no dislocation of the biceps.  There is no glenohumeral arthritis noted by the MRI scan.    Past Medical History:   Diagnosis Date    Breast cancer (H) 05/2021    Chronic sinusitis     Hypertension 2002       Past Surgical History:   Procedure Laterality Date    COLONOSCOPY WITH CO2 INSUFFLATION N/A 10/28/2022    Procedure: COLONOSCOPY, WITH CO2 INSUFFLATION;  Surgeon: Annemarie Noel DO;  Location: [facility]    COMBINED ESOPHAGOSCOPY, GASTROSCOPY, DUODENOSCOPY (EGD) WITH CO2 INSUFFLATION N/A 10/28/2022    Procedure: ESOPHAGOGASTRODUODENOSCOPY, WITH CO2 INSUFFLATION;  Surgeon: Annemarie Noel DO;  Location: [facility]    DISSECT LYMPH NODE AXILLA Right 10/27/2021    Procedure: RIGHT Axillary Lymph Node Dissection;  Surgeon: Nida Snider MD;  Location: [facility]    ESOPHAGOSCOPY, GASTROSCOPY, DUODENOSCOPY (EGD), COMBINED N/A 10/28/2022    Procedure: ESOPHAGOGASTRODUODENOSCOPY, WITH BIOPSY;  Surgeon: Annemarie Noel DO;  Location: [facility]    MASTECTOMY SIMPLE Right 10/27/2021    Procedure: RIGHT Simple  Mastectomy;  Surgeon: Nida Snider MD;  Location:  OR       Family History   Problem Relation Age of Onset    Diabetes Brother     Hypertension Brother     Cerebrovascular Disease Brother     Hypertension Brother     Bladder Cancer Brother     Other Cancer Brother         Bladder    Anesthesia Reaction No family hx of     Deep Vein Thrombosis (DVT) No family hx of     Melanoma No family hx of     Skin Cancer No family hx of        Social History     Socioeconomic History    Marital status:      Spouse name: Melanie    Number of children: 2    Years of education: Not on file    Highest education level: Not on file   Occupational History    Not on file   Tobacco Use    Smoking status: Former     Current packs/day: 0.00     Average packs/day: 0.5 packs/day for 24.7 years (12.3 ttl pk-yrs)     Types: Cigarettes     Start date: 1976     Quit date: 2000     Years since quittin.8     Passive exposure: Never    Smokeless tobacco: Never   Substance and Sexual Activity    Alcohol use: Yes     Comment: occ    Drug use: No    Sexual activity: Yes     Partners: Female     Birth control/protection: None   Other Topics Concern    Not on file   Social History Narrative    Not on file     Social Drivers of Health     Financial Resource Strain: Low Risk  (2024)    Financial Resource Strain     Within the past 12 months, have you or your family members you live with been unable to get utilities (heat, electricity) when it was really needed?: No   Food Insecurity: Low Risk  (2024)    Food Insecurity     Within the past 12 months, did you worry that your food would run out before you got money to buy more?: No     Within the past 12 months, did the food you bought just not last and you didn t have money to get more?: No   Transportation Needs: Low Risk  (2024)    Transportation Needs     Within the past 12 months, has lack of transportation kept you from medical appointments, getting your  medicines, non-medical meetings or appointments, work, or from getting things that you need?: No   Physical Activity: Sufficiently Active (6/1/2024)    Exercise Vital Sign     Days of Exercise per Week: 4 days     Minutes of Exercise per Session: 40 min   Stress: No Stress Concern Present (6/1/2024)    [employer] [place] [employer]ational [employer] - Occupational Stress Questionnaire     Feeling of Stress : Not at all   Social Connections: Unknown (6/1/2024)    Social Connection and Isolation Panel [NHANES]     Frequency of Communication with Friends and Family: Not on file     Frequency of Social Gatherings with Friends and Family: Once a week     Attends Scientology Services: Not on file     Active Member of Clubs or Organizations: Not on file     Attends Club or Organization Meetings: Not on file     Marital Status: Not on file   Interpersonal Safety: Not At Risk (6/15/2022)    Humiliation, Afraid, Rape, and Kick questionnaire     Fear of Current or Ex-Partner: No     Emotionally Abused: No     Physically Abused: No     Sexually Abused: No   Housing Stability: Low Risk  (6/1/2024)    Housing Stability     Do you have housing? : Yes     Are you worried about losing your housing?: No       Current Outpatient Medications   Medication Sig Dispense Refill    cyanocobalamin (VITAMIN B-12) 1000 MCG tablet Take 1 tablet (1,000 mcg) by mouth daily. 90 tablet 3    famotidine (PEPCID) 40 MG tablet Take 1 tablet (40 mg) by mouth daily. 90 tablet 2    hydrocortisone 2.5 % cream Apply topically 2 times daily. 30 g 3    hydrocortisone 2.5 % cream Twice daily to groin rash 30 g 11    losartan-hydrochlorothiazide (HYZAAR) 100-25 MG tablet Take 1 tablet by mouth daily. 90 tablet 0    MELATONIN PO Take 3 mg by mouth nightly as needed.      metFORMIN (GLUCOPHAGE XR) 500 MG 24 hr tablet Take 2 tablets (1,000 mg) by mouth daily with food for 7 days, THEN 3 tablets (1,500 mg) daily with food for 7 days, THEN 4 tablets (2,000 mg) daily with  food. 339 tablet 1       No Known Allergies    REVIEW OF SYSTEMS:  CONSTITUTIONAL:  NEGATIVE for fever, chills, change in weight, not feeling tired  SKIN:  NEGATIVE for worrisome rashes, no skin lumps, no skin ulcers and no non-healing wounds  EYES:  NEGATIVE for vision changes or irritation.  ENT/MOUTH:  NEGATIVE.  No hearing loss, no hoarseness, no difficulty swallowing.  RESP:  NEGATIVE. No cough or shortness of breath.  CV:  NEGATIVE for chest pain, palpitations or peripheral edema  GI:  NEGATIVE for nausea, abdominal pain, heartburn, or change in bowel habits  :  Negative. No dysuria, no hematuria  MUSCULOSKELETAL:  See HPI above  NEURO:  NEGATIVE . No headaches, no dizziness,  no numbness  ENDOCRINE:  NEGATIVE for temperature intolerance, skin/hair changes  HEME/ALLERGY/IMMUNE:  NEGATIVE for bleeding problems  PSYCHIATRIC:  NEGATIVE. no anxiety, no depression.     Exam:  Vitals: Wt 79.4 kg (175 lb)   BMI 26.62 kg/m    BMI= Body mass index is 26.62 kg/m .  Constitutional:  healthy, alert and no distress  Neuro: Alert and Oriented x 3, no focal defects.  Psych: Affect normal   Respiratory: Breathing not labored.  Cardiovascular: normal peripheral pulses  Lymph: no adenopathy  Skin: No rashes,worrisome lesions or skin problems  He has decreased motion of the left shoulder.  He has flexion to about 100 degrees passively, external rotation to about 45 degrees.  He had no pain with resisted, abduction, blocking test, empty can test.  He had no definite tenderness in the subacromial space, AC joint, or trapezius.  He had good mobility of the neck without symptoms.    Assessment:  1. Left shoulder adhesive capsulitis.  2.  Incomplete left rotator cuff tear.  This is not a large enough tear to be significant, and he has no symptoms of rotator cuff pain.    Plan: Right now the frozen shoulder is dominating the picture.  We will try to overcome this with steroid injection in the subacromial bursa and physical  therapy.  Return to clinic in 1 month for clinical recheck and to see if his motion is better and pain is better.  If he has not made progress we will consider intra-articular injection.    Patient desires injection today of left shoulder(s).  Risks, benefits, potential complications and alternatives were discussed.  With the patient's consent, sterile prep was performed of left shoulder(s).  Left shoulder was injected with Kenalog 40 mg and lidocaine at shoulder subacromial space from the posterolateral approach .  Return to clinic as needed.

## 2025-06-20 ENCOUNTER — THERAPY VISIT (OUTPATIENT)
Dept: PHYSICAL THERAPY | Facility: CLINIC | Age: 75
End: 2025-06-20
Attending: ORTHOPAEDIC SURGERY
Payer: COMMERCIAL

## 2025-06-20 DIAGNOSIS — M75.112 NONTRAUMATIC INCOMPLETE TEAR OF LEFT ROTATOR CUFF: ICD-10-CM

## 2025-06-20 DIAGNOSIS — M75.02 ADHESIVE CAPSULITIS OF LEFT SHOULDER: ICD-10-CM

## 2025-06-20 DIAGNOSIS — M25.512 SHOULDER PAIN, LEFT: Primary | ICD-10-CM

## 2025-06-20 PROCEDURE — 97110 THERAPEUTIC EXERCISES: CPT | Mod: GP | Performed by: PHYSICAL THERAPIST

## 2025-06-20 PROCEDURE — 97530 THERAPEUTIC ACTIVITIES: CPT | Mod: GP | Performed by: PHYSICAL THERAPIST

## 2025-06-20 PROCEDURE — 97161 PT EVAL LOW COMPLEX 20 MIN: CPT | Mod: GP | Performed by: PHYSICAL THERAPIST

## 2025-06-20 ASSESSMENT — ACTIVITIES OF DAILY LIVING (ADL)
AT_ITS_WORST?: 5
WHEN_LYING_ON_THE_INVOLVED_SIDE: 4
WASHING_YOUR_BACK: 5
PUTTING_ON_AN_UNDERSHIRT_OR_A_PULLOVER_SWEATER: 5
PUSHING_WITH_THE_INVOLVED_ARM: 3
PLACING_AN_OBJECT_ON_A_HIGH_SHELF: 5
PUTTING_ON_YOUR_PANTS: 1
WASHING_YOUR_HAIR?: 2
REACHING_FOR_SOMETHING_ON_A_HIGH_SHELF: 5
TOUCHING_THE_BACK_OF_YOUR_NECK: 1
PUTTING_ON_A_SHIRT_THAT_BUTTONS_DOWN_THE_FRONT: 2
REMOVING_SOMETHING_FROM_YOUR_BACK_POCKET: 2
PLEASE_INDICATE_YOR_PRIMARY_REASON_FOR_REFERRAL_TO_THERAPY:: SHOULDER
CARRYING_A_HEAVY_OBJECT_OF_10_POUNDS: 3

## 2025-06-20 NOTE — PROGRESS NOTES
PHYSICAL THERAPY EVALUATION  Type of Visit: Evaluation       Fall Risk Screen:  Have you fallen 2 or more times in the past year?: No  Have you fallen and had an injury in the past year?: No    Subjective   Patient reports to physical therapy for left shoulder pain that has been ongoing for about four months, mobility and range of motion.  Due to history of right breast cancer, he did follow-up about L shoulder pain with oncology, MRI was clear but did show some RC pathology and adhesive capsulitis.  He did see ortho on Monday, had steroid injection that lidocaine helped for a little bit, but pain is now worse in the last couple of days.  He notes that ROM is decreased on L side, hard to sleep on L side, also hard to sleep on R side due to mastectomy, radiation, and radiation (still on oral medication and injection) Patient is right handed, but has used his left shoulder more often.  Working as a professor at the [employer], works out at the [employer] with his wife 30-45 min a few times per day.        Presenting condition or subjective complaint: Frozen Shoulder  Date of onset: 06/16/25 (provider referral date)    Relevant medical history: High blood pressure   Dates & types of surgery: October 1921 Breast Cancer    Prior diagnostic imaging/testing results: MRI     Prior therapy history for the same diagnosis, illness or injury: No      Prior Level of Function  Transfers: Independent  Ambulation: Independent  ADL: Independent  IADL: Driving, Finances, Housekeeping, Laundry, Meal preparation, Medication management, Yard work    Living Environment  Social support: With a significant other or spouse   Type of home: House   Stairs to enter the home: Yes 3     Ramp: No   Stairs inside the home: Yes   Is there a railing: Yes     Help at home: None  Equipment owned:       Employment: Yes Professor  Hobbies/Interests: Bridge, Walking, Grandchildren    Patient goals for therapy: Range of Motion    Pain assessment: See objective  evaluation for additional pain details     Objective   SHOULDER EVALUATION  PAIN: Pain Level at Rest: 5/10  Pain Level with Use: 6/10  Pain Location: shoulder  INTEGUMENTARY (edema, incisions):   POSTURE: Sitting Posture: Rounded shoulders, Forward head, Thoracic kyphosis increased  GAIT:   Weightbearing Status:   Assistive Device(s):   Gait Deviations:   BALANCE/PROPRIOCEPTION:   WEIGHTBEARING ALIGNMENT:   ROM:   (Degrees) Left AROM Left PROM Right AROM  Right PROM   Shoulder Flexion 97  118    Shoulder Extension 50  52    Shoulder Abduction 90  108    Shoulder Adduction       Shoulder Internal Rotation 32  T12    Shoulder External Rotation 24  C7    Shoulder Horizontal Abduction       Shoulder Horizontal Adduction       Shoulder Flexion ER       Shoulder Flexion IR       Elbow Extension       Elbow Flexion       Pain:   End feel:     STRENGTH:   Pain: - none + mild ++ moderate +++ severe  Strength Scale: 0-5/5 Left Right   Shoulder Flexion 4+ 4+   Shoulder Extension     Shoulder Abduction 4 4+   Shoulder Adduction     Shoulder Internal Rotation 4, + (mild) 4+   Shoulder External Rotation 4 4+   Shoulder Horizontal Abduction     Shoulder Horizontal Adduction     Elbow Flexion     Elbow Extension     Mid Trap     Lower Trap     Rhomboid     Serratus Anterior       FLEXIBILITY:   SPECIAL TESTS:   PALPATION: TTP to proximal biceps tendon, anterior compartment of L shoulder   JOINT MOBILITY:    Left Right   Glenohumeral anterior Hypomobile    Glenohumeral posterior Hypomobile    Glenohumeral inferior Hypomobile    Acromioclavicular     Scapulothoracic     Sternoclavicular     Scapulohumeral rhythm       CERVICAL SCREEN: [facility]    Assessment & Plan   CLINICAL IMPRESSIONS  Medical Diagnosis: Nontraumatic incomplete tear of left rotator cuff (M75.112), Adhesive capsulitis of left shoulder (M75.02)    Treatment Diagnosis: L shoulder pain   Impression/Assessment: Patient is a 74 year old male with left shoulder complaints.   The following significant findings have been identified: Pain, Decreased ROM/flexibility, Decreased joint mobility, Decreased strength, Impaired muscle performance, Decreased activity tolerance, and Impaired posture. These impairments interfere with their ability to perform self care tasks, recreational activities, and household chores as compared to previous level of function.     Clinical Decision Making (Complexity):  Clinical Presentation: Stable/Uncomplicated  Clinical Presentation Rationale: based on medical and personal factors listed in PT evaluation  Clinical Decision Making (Complexity): Low complexity    PLAN OF CARE  Treatment Interventions:  Modalities: Cryotherapy, Cupping, Dry Needling  Interventions: Manual Therapy, Neuromuscular Re-education, Therapeutic Activity, Therapeutic Exercise, Self-Care/Home Management    Long Term Goals     PT Goal 1  Goal Identifier: LTG 1  Goal Description: Patient will demonstrate improved ROM of L shoulder to 170 flexion, 160 deg abd, 50 deg IR, 60 deg ER  Rationale: to maximize safety and independence with performance of ADLs and functional tasks  Target Date: 09/12/25      Frequency of Treatment: every 1-2 weeks  Duration of Treatment: 12 weeks    Recommended Referrals to Other Professionals: No further referral needed, did consult with Renetta Dempsey PT, CLT on lympedema and massage   Education Assessment:   Learner/Method: Patient;No Barriers to Learning    Risks and benefits of evaluation/treatment have been explained.   Patient/Family/caregiver agrees with Plan of Care.     Evaluation Time:     PT Eval, Low Complexity Minutes (50722): 15  Evaluation Only     Signing Clinician: Cristiane Wheatley PT

## 2025-06-23 ENCOUNTER — RESULTS FOLLOW-UP (OUTPATIENT)
Dept: SURGERY | Facility: CLINIC | Age: 75
End: 2025-06-23

## 2025-06-24 PROBLEM — M75.112 NONTRAUMATIC INCOMPLETE TEAR OF LEFT ROTATOR CUFF: Status: ACTIVE | Noted: 2025-06-24

## 2025-06-24 PROBLEM — M75.02 ADHESIVE CAPSULITIS OF LEFT SHOULDER: Status: ACTIVE | Noted: 2025-06-24

## 2025-06-24 PROBLEM — M25.512 SHOULDER PAIN, LEFT: Status: ACTIVE | Noted: 2025-06-24

## 2025-06-30 ENCOUNTER — MYC MEDICAL ADVICE (OUTPATIENT)
Dept: ONCOLOGY | Facility: CLINIC | Age: 75
End: 2025-06-30
Payer: COMMERCIAL

## 2025-06-30 DIAGNOSIS — C50.929 MALIGNANT NEOPLASM OF MALE BREAST, UNSPECIFIED ESTROGEN RECEPTOR STATUS, UNSPECIFIED LATERALITY, UNSPECIFIED SITE OF BREAST (H): ICD-10-CM

## 2025-06-30 RX ORDER — LETROZOLE 2.5 MG/1
2.5 TABLET, FILM COATED ORAL DAILY
Qty: 90 TABLET | Refills: 3 | Status: SHIPPED | OUTPATIENT
Start: 2025-06-30

## 2025-06-30 NOTE — CONFIDENTIAL NOTE
Letrozole Refill to [facility]     Last prescribing provider: Dr Sr     Last clinic visit date: 5/13/25 DR Sr     Recommendations for requested medication (if none, N/A): Copied from chart note     1. 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 He has been on endocrine therapy since 03/2022 and continues on adjuvant curative intent treatment with lupron and letrozole at this time. He is tolerating this well. Plan to continue for a total of 10 years (through 3/16/2032).     Any other pertinent information (if none, N/A): N//A    Refilled: Y/N, if NO, why?

## 2025-07-26 ENCOUNTER — HEALTH MAINTENANCE LETTER (OUTPATIENT)
Age: 75
End: 2025-07-26

## 2025-08-05 DIAGNOSIS — I10 HTN (HYPERTENSION): ICD-10-CM

## 2025-08-07 RX ORDER — LOSARTAN POTASSIUM AND HYDROCHLOROTHIAZIDE 25; 100 MG/1; MG/1
1 TABLET ORAL DAILY
Qty: 90 TABLET | Refills: 0 | Status: SHIPPED | OUTPATIENT
Start: 2025-08-07

## 2025-08-13 DIAGNOSIS — Z17.0 MALIGNANT NEOPLASM OF CENTRAL PORTION OF RIGHT BREAST IN MALE, ESTROGEN RECEPTOR POSITIVE (H): Primary | ICD-10-CM

## 2025-08-13 DIAGNOSIS — C50.121 MALIGNANT NEOPLASM OF CENTRAL PORTION OF RIGHT BREAST IN MALE, ESTROGEN RECEPTOR POSITIVE (H): Primary | ICD-10-CM

## 2025-08-14 ENCOUNTER — ONCOLOGY VISIT (OUTPATIENT)
Dept: ONCOLOGY | Facility: CLINIC | Age: 75
End: 2025-08-14
Attending: PHYSICIAN ASSISTANT
Payer: COMMERCIAL

## 2025-08-14 ENCOUNTER — APPOINTMENT (OUTPATIENT)
Dept: LAB | Facility: CLINIC | Age: 75
End: 2025-08-14
Attending: INTERNAL MEDICINE
Payer: COMMERCIAL

## 2025-08-14 VITALS
DIASTOLIC BLOOD PRESSURE: 82 MMHG | RESPIRATION RATE: 16 BRPM | BODY MASS INDEX: 25.02 KG/M2 | SYSTOLIC BLOOD PRESSURE: 132 MMHG | WEIGHT: 164.5 LBS | OXYGEN SATURATION: 99 % | TEMPERATURE: 98 F | HEART RATE: 85 BPM

## 2025-08-14 DIAGNOSIS — E11.9 TYPE 2 DIABETES MELLITUS WITHOUT COMPLICATION, WITHOUT LONG-TERM CURRENT USE OF INSULIN (H): ICD-10-CM

## 2025-08-14 DIAGNOSIS — C50.121 MALIGNANT NEOPLASM OF CENTRAL PORTION OF RIGHT BREAST IN MALE, ESTROGEN RECEPTOR POSITIVE (H): Primary | ICD-10-CM

## 2025-08-14 DIAGNOSIS — E53.8 VITAMIN B12 DEFICIENCY (NON ANEMIC): ICD-10-CM

## 2025-08-14 DIAGNOSIS — Z17.0 MALIGNANT NEOPLASM OF CENTRAL PORTION OF RIGHT BREAST IN MALE, ESTROGEN RECEPTOR POSITIVE (H): Primary | ICD-10-CM

## 2025-08-14 LAB
ALBUMIN SERPL BCG-MCNC: 4.2 G/DL (ref 3.5–5.2)
ALP SERPL-CCNC: 61 U/L (ref 40–150)
ALT SERPL W P-5'-P-CCNC: 22 U/L (ref 0–70)
ANION GAP SERPL CALCULATED.3IONS-SCNC: 16 MMOL/L (ref 7–15)
AST SERPL W P-5'-P-CCNC: 22 U/L (ref 0–45)
BASOPHILS # BLD AUTO: 0.05 10E3/UL (ref 0–0.2)
BASOPHILS NFR BLD AUTO: 0.9 %
BILIRUB SERPL-MCNC: 0.4 MG/DL
BUN SERPL-MCNC: 11.1 MG/DL (ref 8–23)
CALCIUM SERPL-MCNC: 9.4 MG/DL (ref 8.8–10.4)
CANCER AG15-3 SERPL-ACNC: 29 U/ML
CEA SERPL-MCNC: 3.6 NG/ML
CHLORIDE SERPL-SCNC: 102 MMOL/L (ref 98–107)
CREAT SERPL-MCNC: 0.66 MG/DL (ref 0.67–1.17)
EGFRCR SERPLBLD CKD-EPI 2021: >90 ML/MIN/1.73M2
EOSINOPHIL # BLD AUTO: 0.36 10E3/UL (ref 0–0.7)
EOSINOPHIL NFR BLD AUTO: 6.6 %
ERYTHROCYTE [DISTWIDTH] IN BLOOD BY AUTOMATED COUNT: 13.6 % (ref 10–15)
EST. AVERAGE GLUCOSE BLD GHB EST-MCNC: 143 MG/DL
GLUCOSE SERPL-MCNC: 122 MG/DL (ref 70–99)
HBA1C MFR BLD: 6.6 %
HCO3 SERPL-SCNC: 21 MMOL/L (ref 22–29)
HCT VFR BLD AUTO: 36.8 % (ref 40–53)
HGB BLD-MCNC: 11.8 G/DL (ref 13.3–17.7)
IMM GRANULOCYTES # BLD: 0.06 10E3/UL
IMM GRANULOCYTES NFR BLD: 1.1 %
LYMPHOCYTES # BLD AUTO: 1.31 10E3/UL (ref 0.8–5.3)
LYMPHOCYTES NFR BLD AUTO: 23.9 %
MCH RBC QN AUTO: 26.5 PG (ref 26.5–33)
MCHC RBC AUTO-ENTMCNC: 32.1 G/DL (ref 31.5–36.5)
MCV RBC AUTO: 82.5 FL (ref 78–100)
MONOCYTES # BLD AUTO: 0.34 10E3/UL (ref 0–1.3)
MONOCYTES NFR BLD AUTO: 6.2 %
NEUTROPHILS # BLD AUTO: 3.36 10E3/UL (ref 1.6–8.3)
NEUTROPHILS NFR BLD AUTO: 61.3 %
NRBC # BLD AUTO: <0.03 10E3/UL
NRBC BLD AUTO-RTO: 0 /100
PLATELET # BLD AUTO: 220 10E3/UL (ref 150–450)
POTASSIUM SERPL-SCNC: 3.8 MMOL/L (ref 3.4–5.3)
PROT SERPL-MCNC: 7.4 G/DL (ref 6.4–8.3)
RBC # BLD AUTO: 4.46 10E6/UL (ref 4.4–5.9)
SODIUM SERPL-SCNC: 139 MMOL/L (ref 135–145)
VIT B12 SERPL-MCNC: 452 PG/ML (ref 232–1245)
WBC # BLD AUTO: 5.48 10E3/UL (ref 4–11)

## 2025-08-14 PROCEDURE — 36415 COLL VENOUS BLD VENIPUNCTURE: CPT | Performed by: PHYSICIAN ASSISTANT

## 2025-08-14 PROCEDURE — 250N000011 HC RX IP 250 OP 636: Mod: JZ | Performed by: INTERNAL MEDICINE

## 2025-08-14 PROCEDURE — 83036 HEMOGLOBIN GLYCOSYLATED A1C: CPT | Performed by: PHYSICIAN ASSISTANT

## 2025-08-14 PROCEDURE — 80053 COMPREHEN METABOLIC PANEL: CPT | Performed by: PHYSICIAN ASSISTANT

## 2025-08-14 PROCEDURE — 82378 CARCINOEMBRYONIC ANTIGEN: CPT | Performed by: PHYSICIAN ASSISTANT

## 2025-08-14 PROCEDURE — 99213 OFFICE O/P EST LOW 20 MIN: CPT | Performed by: PHYSICIAN ASSISTANT

## 2025-08-14 PROCEDURE — 85014 HEMATOCRIT: CPT | Performed by: PHYSICIAN ASSISTANT

## 2025-08-14 PROCEDURE — 82607 VITAMIN B-12: CPT | Performed by: PHYSICIAN ASSISTANT

## 2025-08-14 PROCEDURE — 86300 IMMUNOASSAY TUMOR CA 15-3: CPT | Performed by: PHYSICIAN ASSISTANT

## 2025-08-14 PROCEDURE — 96372 THER/PROPH/DIAG INJ SC/IM: CPT | Performed by: INTERNAL MEDICINE

## 2025-08-14 RX ORDER — LANOLIN ALCOHOL/MO/W.PET/CERES
1000 CREAM (GRAM) TOPICAL DAILY
Qty: 90 TABLET | Refills: 3 | Status: SHIPPED | OUTPATIENT
Start: 2025-08-14

## 2025-08-14 RX ADMIN — LEUPROLIDE ACETATE 11.25 MG: KIT at 09:24

## 2025-08-14 ASSESSMENT — PAIN SCALES - GENERAL: PAINLEVEL_OUTOF10: NO PAIN (0)

## 2025-08-18 DIAGNOSIS — E11.9 TYPE 2 DIABETES MELLITUS WITHOUT COMPLICATION, WITHOUT LONG-TERM CURRENT USE OF INSULIN (H): ICD-10-CM

## 2025-08-19 ENCOUNTER — ANCILLARY PROCEDURE (OUTPATIENT)
Dept: BONE DENSITY | Facility: CLINIC | Age: 75
End: 2025-08-19
Attending: INTERNAL MEDICINE
Payer: COMMERCIAL

## 2025-08-19 DIAGNOSIS — Z79.811 LONG TERM (CURRENT) USE OF AROMATASE INHIBITORS: ICD-10-CM

## 2025-08-19 DIAGNOSIS — M89.9 DISORDER OF BONE AND CARTILAGE: ICD-10-CM

## 2025-08-19 DIAGNOSIS — M94.9 DISORDER OF BONE AND CARTILAGE: ICD-10-CM

## 2025-08-19 PROCEDURE — 77080 DXA BONE DENSITY AXIAL: CPT | Performed by: RADIOLOGY

## 2025-08-19 RX ORDER — METFORMIN HYDROCHLORIDE 500 MG/1
2000 TABLET, EXTENDED RELEASE ORAL
Qty: 360 TABLET | Refills: 1 | Status: SHIPPED | OUTPATIENT
Start: 2025-08-19

## (undated) DEVICE — SURGICEL ABSORBABLE HEMOSTAT SNOW 4"X4" 2083

## (undated) DEVICE — GOWN IMPERVIOUS BREATHABLE SMART LG 89015

## (undated) DEVICE — DRAIN JACKSON PRATT RESERVOIR 100ML SU130-1305

## (undated) DEVICE — SU MONOCRYL 4-0 P-3 18" UND Y494G

## (undated) DEVICE — DRSG GAUZE 4X4" TRAY 6939

## (undated) DEVICE — CLOSURE SYS SKIN PREMIERPRO EXOFIN FUSION 4X22CM STRL 3472

## (undated) DEVICE — SYR 10ML FINGER CONTROL W/O NDL 309695

## (undated) DEVICE — SU VICRYL 3-0 SH 27" UND J416H

## (undated) DEVICE — SPONGE LAP 18X18" X8435

## (undated) DEVICE — CLIP HORIZON MED BLUE 002200

## (undated) DEVICE — SUCTION SLEEVE NEPTUNE 2 125MM 0703-005-125

## (undated) DEVICE — Device

## (undated) DEVICE — DRSG ABDOMINAL 07 1/2X8" 7197D

## (undated) DEVICE — ESU ELEC BLADE 6" COATED/INSULATED E1455-6

## (undated) DEVICE — MARKER MARGIN MARKER STD 6 COLOR SGL USE MMS6

## (undated) DEVICE — DRSG PRIMAPORE 02X3" 7133

## (undated) DEVICE — SU ETHILON 3-0 PS-1 18" 1663H

## (undated) DEVICE — BLADE KNIFE SURG 10 371110

## (undated) DEVICE — DRSG KERLIX 4 1/2"X4YDS ROLL 6715

## (undated) DEVICE — DRAPE U SPLIT 74X120" 29440

## (undated) DEVICE — PAD CHUX UNDERPAD 23X24" 7136

## (undated) DEVICE — GLOVE PROTEXIS BLUE W/NEU-THERA 6.0  2D73EB60

## (undated) DEVICE — CLIP HORIZON SM RED WIDE SLOT 001201

## (undated) DEVICE — PREP CHLORAPREP 26ML TINTED ORANGE  260815

## (undated) DEVICE — ESU GROUND PAD ADULT W/CORD E7507

## (undated) DEVICE — SYR 05ML LL W/O NDL

## (undated) DEVICE — ESU PENCIL SMOKE EVAC W/ROCKER SWITCH 0703-047-000

## (undated) DEVICE — GLOVE PROTEXIS MICRO 5.5  2D73PM55

## (undated) DEVICE — DRAIN JACKSON PRATT CHANNEL 15FR ROUND HUBLESS SIL JP-2228

## (undated) DEVICE — SPONGE RAY-TEC 4X8" 7318

## (undated) DEVICE — LINEN TOWEL PACK X30 5481

## (undated) DEVICE — SU PROLENE 4-0 RB-1DA 18" 8757H

## (undated) DEVICE — SU SILK 3-0 SH 30" K832H

## (undated) DEVICE — DRAPE IOBAN INCISE 23X17" 6650EZ

## (undated) DEVICE — SU SILK 3-0 TIE 12X30" A304H

## (undated) DEVICE — BNDG ELASTIC 6" DBL LENGTH UNSTERILE 6611-16

## (undated) DEVICE — DRAPE POUCH INSTRUMENT 1018

## (undated) DEVICE — KIT ENDO FIRST STEP DISINFECTANT 200ML W/POUCH EP-4

## (undated) DEVICE — SUCTION MANIFOLD NEPTUNE 2 SYS 4 PORT 0702-020-000

## (undated) DEVICE — LINEN TOWEL PACK X6 WHITE 5487

## (undated) DEVICE — CATH TRAY URETHRAL 14FR LF 772417

## (undated) DEVICE — ESU ELEC BLADE 2.75" COATED/INSULATED E1455

## (undated) RX ORDER — OXYCODONE HYDROCHLORIDE 5 MG/1
TABLET ORAL
Status: DISPENSED
Start: 2021-10-27

## (undated) RX ORDER — PROPOFOL 10 MG/ML
INJECTION, EMULSION INTRAVENOUS
Status: DISPENSED
Start: 2022-10-28

## (undated) RX ORDER — FENTANYL CITRATE 50 UG/ML
INJECTION, SOLUTION INTRAMUSCULAR; INTRAVENOUS
Status: DISPENSED
Start: 2021-10-27

## (undated) RX ORDER — HYDROMORPHONE HYDROCHLORIDE 1 MG/ML
INJECTION, SOLUTION INTRAMUSCULAR; INTRAVENOUS; SUBCUTANEOUS
Status: DISPENSED
Start: 2021-10-27

## (undated) RX ORDER — FENTANYL CITRATE-0.9 % NACL/PF 10 MCG/ML
PLASTIC BAG, INJECTION (ML) INTRAVENOUS
Status: DISPENSED
Start: 2021-10-27

## (undated) RX ORDER — ACETAMINOPHEN 325 MG/1
TABLET ORAL
Status: DISPENSED
Start: 2021-10-27

## (undated) RX ORDER — HYDROMORPHONE HCL IN WATER/PF 6 MG/30 ML
PATIENT CONTROLLED ANALGESIA SYRINGE INTRAVENOUS
Status: DISPENSED
Start: 2021-10-27

## (undated) RX ORDER — FENTANYL CITRATE-0.9 % NACL/PF 10 MCG/ML
PLASTIC BAG, INJECTION (ML) INTRAVENOUS
Status: DISPENSED
Start: 2022-10-28

## (undated) RX ORDER — CEFAZOLIN SODIUM 2 G/100ML
INJECTION, SOLUTION INTRAVENOUS
Status: DISPENSED
Start: 2021-10-27

## (undated) RX ORDER — LIDOCAINE HYDROCHLORIDE 20 MG/ML
JELLY TOPICAL
Status: DISPENSED
Start: 2023-01-06